# Patient Record
Sex: FEMALE | Race: WHITE | NOT HISPANIC OR LATINO | Employment: OTHER | URBAN - METROPOLITAN AREA
[De-identification: names, ages, dates, MRNs, and addresses within clinical notes are randomized per-mention and may not be internally consistent; named-entity substitution may affect disease eponyms.]

---

## 2017-01-12 ENCOUNTER — HOSPITAL ENCOUNTER (EMERGENCY)
Facility: CLINIC | Age: 32
Discharge: HOME | End: 2017-01-12
Attending: PSYCHIATRY & NEUROLOGY | Admitting: PSYCHIATRY & NEUROLOGY
Payer: MEDICARE

## 2017-01-12 VITALS
HEART RATE: 57 BPM | DIASTOLIC BLOOD PRESSURE: 81 MMHG | RESPIRATION RATE: 16 BRPM | OXYGEN SATURATION: 97 % | TEMPERATURE: 97.5 F | SYSTOLIC BLOOD PRESSURE: 153 MMHG

## 2017-01-12 DIAGNOSIS — F71 MODERATE INTELLECTUAL DISABILITY: ICD-10-CM

## 2017-01-12 PROCEDURE — 90791 PSYCH DIAGNOSTIC EVALUATION: CPT

## 2017-01-12 PROCEDURE — 99285 EMERGENCY DEPT VISIT HI MDM: CPT | Mod: 25 | Performed by: PSYCHIATRY & NEUROLOGY

## 2017-01-12 PROCEDURE — 99283 EMERGENCY DEPT VISIT LOW MDM: CPT | Mod: Z6 | Performed by: PSYCHIATRY & NEUROLOGY

## 2017-01-12 ASSESSMENT — ENCOUNTER SYMPTOMS
HALLUCINATIONS: 0
DYSPHORIC MOOD: 0
CHEST TIGHTNESS: 0
DIZZINESS: 0
FEVER: 0
SHORTNESS OF BREATH: 0
BACK PAIN: 0
ABDOMINAL PAIN: 0
NERVOUS/ANXIOUS: 0
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
## 2017-01-12 NOTE — ED NOTES
Bed: ED12  Expected date: 1/12/17  Expected time: 3:58 PM  Means of arrival: Ambulance  Comments:  Rita Medic. 31 y.o F Crisis eval. Yellow. 5 mins
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
## 2017-01-12 NOTE — ED AVS SNAPSHOT
[facility], Emergency Department    [address]    [place] [address]    Phone:  [phone]    Fax:  [phone]                                       Dionne Perez   MRN: [id number]    Department:  [facility], Emergency Department   Date of Visit:  1/12/2017           Patient Information     Date Of Birth          1985        Your diagnoses for this visit were:     Moderate intellectual disability        You were seen by Edy Bethea MD.        Discharge Instructions       Follow the treatment program at the [facility]        24 Hour Appointment Hotline       To make an appointment at any [facility] clinic, call [phone] ([phone]). If you don't have a family doctor or clinic, we will help you find one. [facility] clinics are conveniently located to serve the needs of you and your family.             Review of your medicines      Notice     You have not been prescribed any medications.            Orders Needing Specimen Collection     Ordered          01/12/17 1654  Drug abuse screen 6 urine (chem dep) ([facility]) - STAT, [facility]: STAT, Needs to be Collected     Scheduled Task Status   01/12/17 1655 Collect Drug abuse screen 6 urine (chem dep) ([facility]) Open   Order Class:  PCU Collect                01/12/17 1654  HCG qualitative urine - STAT, [facility]: STAT, Needs to be Collected     Scheduled Task Status   01/12/17 1655 Collect HCG qualitative urine Open   Order Class:  PCU Collect                  Pending Results     No orders found from 1/11/2017 to 1/13/2017.            Pending Culture Results     No orders found from 1/11/2017 to 1/13/2017.            Thank you for choosing [facility]       Thank you for choosing [facility] for your care. Our goal is always to provide you with excellent care. Hearing back from our patients is one way we can continue to improve our services. Please take a few minutes to complete the written survey that you may receive in the mail after you visit with us.  "Thank you!        [employer] Information     [employer] lets you send messages to your doctor, view your test results, renew your prescriptions, schedule appointments and more. To sign up, go to [email or web address] . Click on \"Log in\" on the left side of the screen, which will take you to the Welcome page. Then click on \"Sign up Now\" on the right side of the page.     You will be asked to enter the access code listed below, as well as some personal information. Please follow the directions to create your username and password.     Your access code is: [email or web address]  Expires: 2017  4:10 PM     Your access code will  in 90 days. If you need help or a new code, please call your [facility] clinic or [phone].        Care EveryWhere ID     This is your Care EveryWhere ID. This could be used by other organizations to access your [facility] medical records  [id number]        After Visit Summary       This is your record. Keep this with you and show to your community pharmacist(s) and doctor(s) at your next visit.                  "

## 2017-01-12 NOTE — ED AVS SNAPSHOT
[facility], [place], Emergency Department    [address]    [facility] [address]    Phone:  [phone]    Fax:  [phone]                                       Dionne Perez   MRN: [id number]    Department:  [facility], Emergency Department   Date of Visit:  1/12/2017           After Visit Summary Signature Page     I have received my discharge instructions, and my questions have been answered. I have discussed any challenges I see with this plan with the nurse or doctor.    ..........................................................................................................................................  Patient/Patient Representative Signature      ..........................................................................................................................................  Patient Representative Print Name and Relationship to Patient    ..................................................               ................................................  Date                                            Time    ..........................................................................................................................................  Reviewed by Signature/Title    ...................................................              ..............................................  Date                                                            Time

## 2018-01-17 ENCOUNTER — HOSPITAL ENCOUNTER (EMERGENCY)
Facility: CLINIC | Age: 33
Discharge: HOME | End: 2018-01-17
Attending: EMERGENCY MEDICINE | Admitting: EMERGENCY MEDICINE
Payer: MEDICARE

## 2018-01-17 VITALS
TEMPERATURE: 98.4 F | OXYGEN SATURATION: 96 % | DIASTOLIC BLOOD PRESSURE: 89 MMHG | RESPIRATION RATE: 16 BRPM | SYSTOLIC BLOOD PRESSURE: 143 MMHG

## 2018-01-17 DIAGNOSIS — M25.551 BILATERAL HIP PAIN: ICD-10-CM

## 2018-01-17 DIAGNOSIS — M25.552 BILATERAL HIP PAIN: ICD-10-CM

## 2018-01-17 LAB
ALBUMIN UR-MCNC: NEGATIVE MG/DL
APPEARANCE UR: CLEAR
BILIRUB UR QL STRIP: NEGATIVE
COLOR UR AUTO: YELLOW
GLUCOSE UR STRIP-MCNC: NEGATIVE MG/DL
HGB UR QL STRIP: NEGATIVE
KETONES UR STRIP-MCNC: NEGATIVE MG/DL
LEUKOCYTE ESTERASE UR QL STRIP: ABNORMAL
NITRATE UR QL: NEGATIVE
NON-SQ EPI CELLS #/AREA URNS LPF: ABNORMAL /LPF
PH UR STRIP: 6.5 PH (ref 5–7)
RBC #/AREA URNS AUTO: ABNORMAL /HPF
SOURCE: ABNORMAL
SP GR UR STRIP: 1.02 (ref 1–1.03)
UROBILINOGEN UR STRIP-ACNC: 1 EU/DL (ref 0.2–1)
WBC #/AREA URNS AUTO: ABNORMAL /HPF

## 2018-01-17 PROCEDURE — 87086 URINE CULTURE/COLONY COUNT: CPT | Performed by: EMERGENCY MEDICINE

## 2018-01-17 PROCEDURE — 99283 EMERGENCY DEPT VISIT LOW MDM: CPT

## 2018-01-17 PROCEDURE — 81001 URINALYSIS AUTO W/SCOPE: CPT | Performed by: EMERGENCY MEDICINE

## 2018-01-17 ASSESSMENT — ENCOUNTER SYMPTOMS: DYSURIA: 1
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
## 2018-01-17 NOTE — ED AVS SNAPSHOT
Emergency Department    [address] [place] [address]    Phone:  [phone]    Fax:  [phone]                                       Dionne Perez   MRN: [id number]    Department:   Emergency Department   Date of Visit:  1/17/2018           After Visit Summary Signature Page     I have received my discharge instructions, and my questions have been answered. I have discussed any challenges I see with this plan with the nurse or doctor.    ..........................................................................................................................................  Patient/Patient Representative Signature      ..........................................................................................................................................  Patient Representative Print Name and Relationship to Patient    ..................................................               ................................................  Date                                            Time    ..........................................................................................................................................  Reviewed by Signature/Title    ...................................................              ..............................................  Date                                                            Time

## 2018-01-17 NOTE — ED AVS SNAPSHOT
Emergency Department    [address] [facility] [address]    Phone:  [phone]    Fax:  [phone]                                       Dionne Perez   MRN: [id number]    Department:   Emergency Department   Date of Visit:  1/17/2018           Patient Information     Date Of Birth          1985        Your diagnoses for this visit were:     Bilateral hip pain        You were seen by Sophia Couch MD.      Follow-up Information     Follow up with Clinic, [facility]. Schedule an appointment as soon as possible for a visit in 2 days.    Contact information:    [address] [place] [address]  [phone]          Follow up with  Emergency Department.    Specialty:  EMERGENCY MEDICINE    Why:  As needed, If symptoms worsen    Contact information:    [address] [facility] [id number] [phone]        Discharge Instructions         Arthralgia    Arthralgia is the term for pain in or around the joint. It is a symptom, not a disease. This pain may involve one or more joints. In some cases, the pain moves from joint to joint.  There are many causes for joint pain. These include:    Injury    Osteoarthritis (wearing out of the joint surface)    Gout (inflammation of the joint due to crystals in the joint fluid)    Infection inside the joint      Bursitis (inflammation of the fluid-filled sacs around the joint)    Autoimmune disorders such as rheumatoid arthritis or lupus    Tendonitis (inflammation of chords that attach muscle to bone)  Home care    Rest the involved joint(s) until your symptoms improve.     You may be prescribed pain medicine. If none is prescribed, you may use acetaminophen or ibuprofen to control pain and inflammation.  Follow-up care  Follow up with your healthcare provider or as advised.  When to seek medical advice  Contact your healthcare provider right away if any of the following occurs:    Pain, swelling, or redness  of joint increases    Pain worsens or recurs after a period of improvement    Pain moves to other joints    You cannot bear weight on the affected joint     You cannot move the affected joint    Joint appears deformed    New rash appears    Fever of 100.4 F (38 C) or higher, or as directed by your healthcare provider  Date Last Reviewed: 3/1/2017    3793-1282 [employer]. [address] [place] [address]. All rights reserved. This information is not intended as a substitute for professional medical care. Always follow your healthcare professional's instructions.          24 Hour Appointment Hotline       To make an appointment at any [facility], call [phone] ([phone]). If you don't have a family doctor or clinic, we will help you find one. [facility] clinics are conveniently located to serve the needs of you and your family.          ED Discharge Orders     [facility] PT, HAND, AND CHIROPRACTIC REFERRAL       **This order will print in the [facility] Scheduling Office**    Physical Therapy, Hand Therapy and Chiropractic Care are available through:    *[facility]  *[facility]  *[facility]    Call one number to schedule at any of the above locations: [phone].    Your provider has referred you to: [facility] - PT and/or Chiropractic Care determined by clinical presentation at [facility] or [facility] Initial Visit    Indication/Reason for Referral: Low Back and hip Pain  Onset of Illness: weeks  Therapy Orders: Evaluate and Treat  Special Programs: None  Special Request: None    Cesaar Mcdonough      Additional Comments for the Therapist or Chiropractor:       Please be aware that coverage of these services is subject to the terms and limitations of your health insurance plan.  Call member services at your health plan with any benefit or coverage questions.      Please bring the following to your appointment:    *Your personal  calendar for scheduling future appointments  *Comfortable clothing                     Review of your medicines      Notice     You have not been prescribed any medications.            Procedures and tests performed during your visit     UA reflex to Microscopic and Culture    Urine Microscopic      Orders Needing Specimen Collection     None      Pending Results     No orders found from 1/15/2018 to 1/18/2018.            Pending Culture Results     No orders found from 1/15/2018 to 1/18/2018.            Pending Results Instructions     If you had any lab results that were not finalized at the time of your Discharge, you can call the ED Lab Result RN at [phone]. You will be contacted by this team for any positive Lab results or changes in treatment. The nurses are available 7 days a week from 10A to 6:30P.  You can leave a message 24 hours per day and they will return your call.        Test Results From Your Hospital Stay        1/17/2018  6:37 PM      Component Results     Component Value Ref Range & Units Status    Color Urine Yellow  Final    Appearance Urine Clear  Final    Glucose Urine Negative NEG^Negative mg/dL Final    Bilirubin Urine Negative NEG^Negative Final    Ketones Urine Negative NEG^Negative mg/dL Final    Specific Gravity Urine 1.020 1.003 - 1.035 Final    Blood Urine Negative NEG^Negative Final    pH Urine 6.5 5.0 - 7.0 pH Final    Protein Albumin Urine Negative NEG^Negative mg/dL Final    Urobilinogen Urine 1.0 0.2 - 1.0 EU/dL Final    Nitrite Urine Negative NEG^Negative Final    Leukocyte Esterase Urine Small (A) NEG^Negative Final    Source Midstream Urine  Final         1/17/2018  6:37 PM      Component Results     Component Value Ref Range & Units Status    WBC Urine 2-5 (A) OTO2^O - 2 /HPF Final    RBC Urine O - 2 OTO2^O - 2 /HPF Final    Squamous Epithelial /LPF Urine Few FEW^Few /LPF Final                Clinical Quality Measure: Blood Pressure Screening     Your blood pressure was  "checked while you were in the emergency department today. The last reading we obtained was  BP: 143/89 . Please read the guidelines below about what these numbers mean and what you should do about them.  If your systolic blood pressure (the top number) is less than 120 and your diastolic blood pressure (the bottom number) is less than 80, then your blood pressure is normal. There is nothing more that you need to do about it.  If your systolic blood pressure (the top number) is 120-139 or your diastolic blood pressure (the bottom number) is 80-89, your blood pressure may be higher than it should be. You should have your blood pressure rechecked within a year by a primary care provider.  If your systolic blood pressure (the top number) is 140 or greater or your diastolic blood pressure (the bottom number) is 90 or greater, you may have high blood pressure. High blood pressure is treatable, but if left untreated over time it can put you at risk for heart attack, stroke, or kidney failure. You should have your blood pressure rechecked by a primary care provider within the next 4 weeks.  If your provider in the emergency department today gave you specific instructions to follow-up with your doctor or provider even sooner than that, you should follow that instruction and not wait for up to 4 weeks for your follow-up visit.        Thank you for choosing [facility]       Thank you for choosing [facility] for your care. Our goal is always to provide you with excellent care. Hearing back from our patients is one way we can continue to improve our services. Please take a few minutes to complete the written survey that you may receive in the mail after you visit with us. Thank you!        [employer] Information     [employer] lets you send messages to your doctor, view your test results, renew your prescriptions, schedule appointments and more. To sign up, go to [email or web address] . Click on \"Log in\" on the left side of the screen, " "which will take you to the Welcome page. Then click on \"Sign up Now\" on the right side of the page.     You will be asked to enter the access code listed below, as well as some personal information. Please follow the directions to create your username and password.     Your access code is: [email or web address]  Expires: 2018  6:51 PM     Your access code will  in 90 days. If you need help or a new code, please call your [facility] clinic or [phone].        Care EveryWhere ID     This is your Care EveryWhere ID. This could be used by other organizations to access your [facility] medical records  [id number]        Equal Access to Services     BATOOL TRAVIS : Mayra Wilson, tracey smith, joe ross, dominic fontana. So [facility] [phone].    ATENCIÓN: Si habla español, tiene a horta disposición servicios gratuitos de asistencia lingüística. Llame al [phone].    We comply with applicable federal civil rights laws and [place] laws. We do not discriminate on the basis of race, color, national origin, age, disability, sex, sexual orientation, or gender identity.            After Visit Summary       This is your record. Keep this with you and show to your community pharmacist(s) and doctor(s) at your next visit.                  "

## 2018-01-18 LAB
BACTERIA SPEC CULT: NORMAL
BACTERIA SPEC CULT: NORMAL
Lab: NORMAL
SPECIMEN SOURCE: NORMAL

## 2018-01-18 NOTE — DISCHARGE INSTRUCTIONS

## 2018-02-19 ENCOUNTER — APPOINTMENT (OUTPATIENT)
Dept: GENERAL RADIOLOGY | Facility: CLINIC | Age: 33
End: 2018-02-19
Attending: PHYSICIAN ASSISTANT
Payer: MEDICARE

## 2018-02-19 ENCOUNTER — HOSPITAL ENCOUNTER (EMERGENCY)
Facility: CLINIC | Age: 33
Discharge: HOME | End: 2018-02-19
Attending: EMERGENCY MEDICINE | Admitting: EMERGENCY MEDICINE
Payer: MEDICARE

## 2018-02-19 VITALS
OXYGEN SATURATION: 94 % | RESPIRATION RATE: 16 BRPM | SYSTOLIC BLOOD PRESSURE: 135 MMHG | DIASTOLIC BLOOD PRESSURE: 74 MMHG | TEMPERATURE: 99.1 F

## 2018-02-19 DIAGNOSIS — M25.552 HIP PAIN, LEFT: ICD-10-CM

## 2018-02-19 PROCEDURE — 72170 X-RAY EXAM OF PELVIS: CPT

## 2018-02-19 PROCEDURE — A9270 NON-COVERED ITEM OR SERVICE: HCPCS | Mod: GY | Performed by: PHYSICIAN ASSISTANT

## 2018-02-19 PROCEDURE — 99283 EMERGENCY DEPT VISIT LOW MDM: CPT

## 2018-02-19 PROCEDURE — 25000132 ZZH RX MED GY IP 250 OP 250 PS 637: Mod: GY | Performed by: PHYSICIAN ASSISTANT

## 2018-02-19 RX ORDER — HYDROCORTISONE 2.5 %
CREAM (GRAM) TOPICAL 2 TIMES DAILY
Status: ON HOLD | COMMUNITY
End: 2020-06-06

## 2018-02-19 RX ORDER — ACETAMINOPHEN 500 MG
500 TABLET ORAL ONCE
Status: COMPLETED | OUTPATIENT
Start: 2018-02-19 | End: 2018-02-19

## 2018-02-19 RX ORDER — NYSTATIN 10B UNIT
POWDER (EA) MISCELLANEOUS
Status: ON HOLD | COMMUNITY
End: 2020-06-06

## 2018-02-19 RX ADMIN — ACETAMINOPHEN 500 MG: 500 TABLET, FILM COATED ORAL at 16:54

## 2018-02-19 ASSESSMENT — ENCOUNTER SYMPTOMS
ABDOMINAL PAIN: 1
VOMITING: 0
WOUND: 1
FEVER: 0
ARTHRALGIAS: 1

## 2018-02-19 NOTE — ED AVS SNAPSHOT
Emergency Department    [address] [facility] [address]    Phone:  [phone]    Fax:  [phone]                                       Dionne Perez   MRN: [id number]    Department:   Emergency Department   Date of Visit:  2/19/2018           Patient Information     Date Of Birth          1985        Your diagnoses for this visit were:     Hip pain, left        You were seen by Rocco Laughlin MD and Makayla Jensen PA-C.      Follow-up Information     Follow up with Clinic, [facility] In 2 days.    Why:  recheck    Contact information:    [address] [address]  [phone]          Follow up with Orthopaedics, [facility].    Contact information:    [address] [address] [phone]          Discharge Instructions         Your symptoms are likely due to early arthritis in your hips. Please continue Tylenol and use of your cane as needed. Following up with your primary doctor will be essential as they can refer you to physical therapy and other appropriate care facilities. I would recommend follow up with orthopedics as this appears to be a bone issue. Weight loss would also be quite helpful.   Arthralgia    Arthralgia is the term for pain in or around the joint. It is a symptom, not a disease. This pain may involve one or more joints. In some cases, the pain moves from joint to joint.  There are many causes for joint pain. These include:    Injury    Osteoarthritis (wearing out of the joint surface)    Gout (inflammation of the joint due to crystals in the joint fluid)    Infection inside the joint      Bursitis (inflammation of the fluid-filled sacs around the joint)    Autoimmune disorders such as rheumatoid arthritis or lupus    Tendonitis (inflammation of chords that attach muscle to bone)  Home care    Rest the involved joint(s) until your symptoms improve.     You may be prescribed pain medicine. If none is prescribed,  you may use acetaminophen or ibuprofen to control pain and inflammation.  Follow-up care  Follow up with your healthcare provider or as advised.  When to seek medical advice  Contact your healthcare provider right away if any of the following occurs:    Pain, swelling, or redness of joint increases    Pain worsens or recurs after a period of improvement    Pain moves to other joints    You cannot bear weight on the affected joint     You cannot move the affected joint    Joint appears deformed  New rash appears  Osteoarthritis  Osteoarthritis (also called degenerative joint disease) happens when the cartilage in a joint becomes damaged and worn. This may be due to age, wear and tear, overuse of the joint, or other problems. Osteoarthritis can affect any joint. But it is most common in hands, knees, spine, hips, and feet. Symptoms include joint stiffness, pain, and swelling.  Home care    When a joint is more sore than usual, rest it for a day or two.    Heat can help relieve stiffness. Take a hot bath or apply a heating pad for up to 30 minutes at a time. If symptoms are worse in the morning, using heat just after awakening can help relax the muscle and soothe the joints.     Ice helps relieve pain and swelling. It is often used after activity. Use a cold pack wrapped in a thin cloth on the joint for 10 to 15 minutes at a time.     Alternating hot and cold can also help relieve pain. Try this for 20 minutes at a time, several times per day.    Exercise helps prevent the muscles and ligaments around the joint from becoming weak. It also helps maintain function in the joint.  Be as active as you can. Talk to your healthcare provider about what activity program is best for you.    Excess weight puts a lot of extra strain on weight-bearing joints of the lower back, hips, knees, feet and ankles. If you are overweight, talk to your healthcare provider about a safe and effective weight loss program.    Use  anti-inflammatory medicines as prescribed for pain. This includes acetaminophen or NSAIDs such as ibuprofen or naproxen. If needed, topical or injected medicines may be recommended. Talk to your healthcare provider if these options are not enough to manage your pain.    Talk with your healthcare provider about devices that might help improve your function and reduce pain.  Follow-up care  Follow up with your healthcare provider as advised by our staff.  When to seek medical advice  Call your healthcare provider right away if any of these occur:    Redness or swelling of a painful joint    Discharge or pus from a painful joint    Fever of 100.4 F (38 C) or higher, or as directed by your healthcare provider    Worsening joint pain    Decreased ability to move the joint or bear weight on the joint  Date Last Reviewed: 3/1/2017    8763-2341 [employer]. [address] [place] [address]. All rights reserved. This information is not intended as a substitute for professional medical care. Always follow your healthcare professional's instructions.            Fever of 100.4 F (38 C) or higher, or as directed by your healthcare provider  Date Last Reviewed: 3/1/2017    6185-8268 [employer]. [address] [place] [address]. All rights reserved. This information is not intended as a substitute for professional medical care. Always follow your healthcare professional's instructions.          24 Hour Appointment Hotline       To make an appointment at any [facility], call [phone] ([phone]). If you don't have a family doctor or clinic, we will help you find one. [facility] clinics are conveniently located to serve the needs of you and your family.             Review of your medicines      Our records show that you are taking the medicines listed below. If these are incorrect, please call your family doctor or clinic.        Dose / Directions Last dose taken     CHERATUSSIN AC PO        Refills:  0        DICLOFENAC SODIUM PO        Refills:  0        ESCITALOPRAM OXALATE PO        Refills:  0        eucerin cream        Apply topically as needed for dry skin   Refills:  0        hydrocortisone 2.5 % cream        Apply topically 2 times daily   Refills:  0        LEVOTHYROXINE SODIUM PO        Refills:  0        MEDROXYPROGESTERONE ACETATE PO        Refills:  0        MULTIVITAL Chew        Refills:  0        nystatin Powd        Refills:  0        OMEPRAZOLE PO        Refills:  0        PRAVASTATIN SODIUM PO        Refills:  0                Procedures and tests performed during your visit     Pelvis XR, 1-2 views      Orders Needing Specimen Collection     None      Pending Results     Date and Time Order Name Status Description    2/19/2018 1648 Pelvis XR, 1-2 views Preliminary             Pending Culture Results     No orders found from 2/17/2018 to 2/20/2018.            Pending Results Instructions     If you had any lab results that were not finalized at the time of your Discharge, you can call the ED Lab Result RN at [phone]. You will be contacted by this team for any positive Lab results or changes in treatment. The nurses are available 7 days a week from 10A to 6:30P.  You can leave a message 24 hours per day and they will return your call.        Test Results From Your Hospital Stay        2/19/2018  5:11 PM      Narrative     PELVIS ONE-TWO VIEWS   2/19/2018 5:03 PM     HISTORY: Left hip pain, fall 5 weeks ago.    COMPARISON: None.        Impression     IMPRESSION: Mild joint space narrowing is seen bilaterally in both  hips. There is no evidence for fracture or dislocation on this  projection.                Clinical Quality Measure: Blood Pressure Screening     Your blood pressure was checked while you were in the emergency department today. The last reading we obtained was  BP: 135/74 . Please read the guidelines below about what these numbers mean and  "what you should do about them.  If your systolic blood pressure (the top number) is less than 120 and your diastolic blood pressure (the bottom number) is less than 80, then your blood pressure is normal. There is nothing more that you need to do about it.  If your systolic blood pressure (the top number) is 120-139 or your diastolic blood pressure (the bottom number) is 80-89, your blood pressure may be higher than it should be. You should have your blood pressure rechecked within a year by a primary care provider.  If your systolic blood pressure (the top number) is 140 or greater or your diastolic blood pressure (the bottom number) is 90 or greater, you may have high blood pressure. High blood pressure is treatable, but if left untreated over time it can put you at risk for heart attack, stroke, or kidney failure. You should have your blood pressure rechecked by a primary care provider within the next 4 weeks.  If your provider in the emergency department today gave you specific instructions to follow-up with your doctor or provider even sooner than that, you should follow that instruction and not wait for up to 4 weeks for your follow-up visit.        Thank you for choosing [facility]       Thank you for choosing [facility] for your care. Our goal is always to provide you with excellent care. Hearing back from our patients is one way we can continue to improve our services. Please take a few minutes to complete the written survey that you may receive in the mail after you visit with us. Thank you!        [facility] Information     [facility] lets you send messages to your doctor, view your test results, renew your prescriptions, schedule appointments and more. To sign up, go to [email or web address] . Click on \"Log in\" on the left side of the screen, which will take you to the Welcome page. Then click on \"Sign up Now\" on the right side of the page.     You will be asked to enter the access code listed below, as well as " some personal information. Please follow the directions to create your username and password.     Your access code is: [email or web address]  Expires: 2018  6:51 PM     Your access code will  in 90 days. If you need help or a new code, please call your [facility] clinic or [phone].        Care EveryWhere ID     This is your Care EveryWhere ID. This could be used by other organizations to access your [facility] medical records  [id number]        Equal Access to Services     [facility]: Mayra devi Sojessica, waaxda luqadaha, qaybta kaalmada adeaftab, dominic tiwari . So [facility] [phone].    ATENCIÓN: Si habla español, tiene a horta disposición servicios gratuitos de asistencia lingüística. Llame al [phone].    We comply with applicable federal civil rights laws and [place] laws. We do not discriminate on the basis of race, color, national origin, age, disability, sex, sexual orientation, or gender identity.            After Visit Summary       This is your record. Keep this with you and show to your community pharmacist(s) and doctor(s) at your next visit.

## 2018-02-19 NOTE — PROGRESS NOTES
"Social Work Note:    D: SW contacted by [facility] ED staff asking that SW assist with contacting pt's sister (Sabra) to discuss her concerns.    I/A: SW called Sabra ([phone]) to discuss ongoing concerns and to explain on-call SW role. Per Sabra, she is one of pt's guardians. Sabra stated that pt is reportedly trying to go to court to change her guardianship. Sabra stated that she is concerned for pt as pt has indicated that she wants to move into an apartment. Sabra stated that pt calls 911 every other day and gets taken to the ED frequently. SW indicated that there would not be a restriction on ED visits for pt as when she calls for EMS/911 help they would have to respond and determine if pt needs an evaluation. SW state that typically pt would be brought in. Sabra stated that she knows that \"there are other people who need help more than her (patient)\". Sabra stated that she is concerned that pt would be \"sent to the streets and it is cold\". SW stated that [facility] ED staff can call her when pt is ready to d/c.    P: SILVIA updated the HUC at the [facility] ED that Sabra would appreciate a call at d/c to let her know the plans.    On-Call SW: ABBY Manzo, LICSW     "

## 2018-02-19 NOTE — ED AVS SNAPSHOT
Emergency Department    [address] [place] [address]    Phone:  [phone]    Fax:  [phone]                                       Dionne Perez   MRN: [id number]    Department:   Emergency Department   Date of Visit:  2/19/2018           After Visit Summary Signature Page     I have received my discharge instructions, and my questions have been answered. I have discussed any challenges I see with this plan with the nurse or doctor.    ..........................................................................................................................................  Patient/Patient Representative Signature      ..........................................................................................................................................  Patient Representative Print Name and Relationship to Patient    ..................................................               ................................................  Date                                            Time    ..........................................................................................................................................  Reviewed by Signature/Title    ...................................................              ..............................................  Date                                                            Time

## 2018-02-19 NOTE — ED NOTES
Bed: ED01  Expected date: 2/19/18  Expected time: 3:48 PM  Means of arrival: Ambulance  Comments:  422 33f hip pain  ETA 1557     Nathaniel Le, RN  02/19/18 1051
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
## 2018-02-19 NOTE — DISCHARGE INSTRUCTIONS
Your symptoms are likely due to early arthritis in your hips. Please continue Tylenol and use of your cane as needed. Following up with your primary doctor will be essential as they can refer you to physical therapy and other appropriate care facilities. I would recommend follow up with orthopedics as this appears to be a bone issue. Weight loss would also be quite helpful.   Arthralgia    Arthralgia is the term for pain in or around the joint. It is a symptom, not a disease. This pain may involve one or more joints. In some cases, the pain moves from joint to joint.  There are many causes for joint pain. These include:    Injury    Osteoarthritis (wearing out of the joint surface)    Gout (inflammation of the joint due to crystals in the joint fluid)    Infection inside the joint      Bursitis (inflammation of the fluid-filled sacs around the joint)    Autoimmune disorders such as rheumatoid arthritis or lupus    Tendonitis (inflammation of chords that attach muscle to bone)  Home care    Rest the involved joint(s) until your symptoms improve.     You may be prescribed pain medicine. If none is prescribed, you may use acetaminophen or ibuprofen to control pain and inflammation.  Follow-up care  Follow up with your healthcare provider or as advised.  When to seek medical advice  Contact your healthcare provider right away if any of the following occurs:    Pain, swelling, or redness of joint increases    Pain worsens or recurs after a period of improvement    Pain moves to other joints    You cannot bear weight on the affected joint     You cannot move the affected joint    Joint appears deformed  New rash appears  Osteoarthritis  Osteoarthritis (also called degenerative joint disease) happens when the cartilage in a joint becomes damaged and worn. This may be due to age, wear and tear, overuse of the joint, or other problems. Osteoarthritis can affect any joint. But it is most common in hands, knees, spine, hips,  and feet. Symptoms include joint stiffness, pain, and swelling.  Home care    When a joint is more sore than usual, rest it for a day or two.    Heat can help relieve stiffness. Take a hot bath or apply a heating pad for up to 30 minutes at a time. If symptoms are worse in the morning, using heat just after awakening can help relax the muscle and soothe the joints.     Ice helps relieve pain and swelling. It is often used after activity. Use a cold pack wrapped in a thin cloth on the joint for 10 to 15 minutes at a time.     Alternating hot and cold can also help relieve pain. Try this for 20 minutes at a time, several times per day.    Exercise helps prevent the muscles and ligaments around the joint from becoming weak. It also helps maintain function in the joint.  Be as active as you can. Talk to your healthcare provider about what activity program is best for you.    Excess weight puts a lot of extra strain on weight-bearing joints of the lower back, hips, knees, feet and ankles. If you are overweight, talk to your healthcare provider about a safe and effective weight loss program.    Use anti-inflammatory medicines as prescribed for pain. This includes acetaminophen or NSAIDs such as ibuprofen or naproxen. If needed, topical or injected medicines may be recommended. Talk to your healthcare provider if these options are not enough to manage your pain.    Talk with your healthcare provider about devices that might help improve your function and reduce pain.  Follow-up care  Follow up with your healthcare provider as advised by our staff.  When to seek medical advice  Call your healthcare provider right away if any of these occur:    Redness or swelling of a painful joint    Discharge or pus from a painful joint    Fever of 100.4 F (38 C) or higher, or as directed by your healthcare provider    Worsening joint pain    Decreased ability to move the joint or bear weight on the joint  Date Last Reviewed: 3/1/2017     1121-3185 [employer]. [address] [place] [address]. All rights reserved. This information is not intended as a substitute for professional medical care. Always follow your healthcare professional's instructions.            Fever of 100.4 F (38 C) or higher, or as directed by your healthcare provider  Date Last Reviewed: 3/1/2017    1738-9398 [employer]. [address] [place] [address]. All rights reserved. This information is not intended as a substitute for professional medical care. Always follow your healthcare professional's instructions.

## 2018-04-28 ENCOUNTER — OFFICE VISIT (OUTPATIENT)
Dept: URGENT CARE | Facility: URGENT CARE | Age: 33
End: 2018-04-28
Payer: MEDICARE

## 2018-04-28 ENCOUNTER — RADIANT APPOINTMENT (OUTPATIENT)
Dept: GENERAL RADIOLOGY | Facility: CLINIC | Age: 33
End: 2018-04-28
Attending: FAMILY MEDICINE
Payer: MEDICARE

## 2018-04-28 VITALS
TEMPERATURE: 98.3 F | HEART RATE: 62 BPM | WEIGHT: 293 LBS | DIASTOLIC BLOOD PRESSURE: 65 MMHG | SYSTOLIC BLOOD PRESSURE: 122 MMHG | RESPIRATION RATE: 18 BRPM | OXYGEN SATURATION: 99 %

## 2018-04-28 DIAGNOSIS — B37.2 INTERTRIGINOUS CANDIDIASIS: ICD-10-CM

## 2018-04-28 DIAGNOSIS — R07.9 CHEST PAIN, UNSPECIFIED TYPE: Primary | ICD-10-CM

## 2018-04-28 DIAGNOSIS — R07.9 CHEST PAIN, UNSPECIFIED TYPE: ICD-10-CM

## 2018-04-28 PROCEDURE — 99203 OFFICE O/P NEW LOW 30 MIN: CPT | Performed by: FAMILY MEDICINE

## 2018-04-28 PROCEDURE — 71046 X-RAY EXAM CHEST 2 VIEWS: CPT | Mod: FY

## 2018-04-28 RX ORDER — CLOTRIMAZOLE 1 %
CREAM (GRAM) TOPICAL 2 TIMES DAILY PRN
Qty: 30 G | Refills: 0 | Status: SHIPPED | OUTPATIENT
Start: 2018-04-28 | End: 2018-05-16

## 2018-04-28 ASSESSMENT — PAIN SCALES - GENERAL: PAINLEVEL: WORST PAIN (10)

## 2018-04-28 NOTE — PROGRESS NOTES
Dionne Perez is a 33 year old female who comes in today for several symptoms    Went to her doctor a couple weeks ago    This last Monday and Wed to emergency room ( today is Saturday )    Per patient they didn't tell her much  No specific diagnosis or treatment    Has been taking tylenol at house    Chest pain, dyspnea, wheezing, rash    Rash on right elbow    No history of asthma    Wt up a bit but not much    Regular doctor at [facility] in [place]    Physical Exam   Constitutional: She is oriented to person, place, and time and well-developed, well-nourished, and in no distress. No distress.   HENT:   Head: Normocephalic and atraumatic.   Right Ear: Tympanic membrane, external ear and ear canal normal.   Left Ear: Tympanic membrane, external ear and ear canal normal.   Nose: Nose normal.   Mouth/Throat: Oropharynx is clear and moist.   No sinus / submandib tenderness     Eyes: Conjunctivae are normal.   Neck: Neck supple. Carotid bruit is not present.   Cardiovascular: Normal rate, regular rhythm, normal heart sounds and intact distal pulses.  Exam reveals no gallop and no friction rub.    No murmur heard.  Pulmonary/Chest: Effort normal and breath sounds normal. She exhibits no tenderness.   Abdominal: Soft. There is no tenderness.   No back or costovertebral angle tenderness     Musculoskeletal: She exhibits no edema.   Lymphadenopathy:     She has no cervical adenopathy.   Neurological: She is alert and oriented to person, place, and time.   Skin: She is not diaphoretic.   Psychiatric: Mood and affect normal.   patient has intertriginous type rash on flexor aspect of right elbow    cxr done, normal    ASSESSMENT / PLAN:  (R07.9) Chest pain, unspecified type  (primary encounter diagnosis)  Comment: exam and xray and vitals all okay here.  She also states she was seen at hospital emergency room two times this last week.  Of note, we did try to look up on care everywhere but not able to pull up  records.  Plan: XR Chest 2 Views         Discussed in detail with patient.  Advised she follow up with her primary care clinic in [place] in the next 1 1/2 weeks or so.  Be seen sooner if needed.  If gets real bad could go to emergency room.    (B37.2) Intertriginous candidiasis  Comment: use antifungal cream 2x daily. Need to give this at least a week to start working.   Plan: clotrimazole (LOTRIMIN) 1 % cream               I reviewed the patient's medications, allergies, medical history, family history, and social history.    Luca Latif MD

## 2018-04-28 NOTE — PATIENT INSTRUCTIONS
Use clotrimazole cream 2x daily as needed to rash area on elbow.  It may take at least several days before you see improvement.    Monitor other symptoms    Follow up with primary clinic in [place] in about 1 1/2 weeks    Be seen sooner if needed based on symptoms

## 2018-04-28 NOTE — MR AVS SNAPSHOT
"              After Visit Summary   4/28/2018    Dionne Perez    MRN: [id number]           Patient Information     Date Of Birth          1985        Visit Information        Provider Department      4/28/2018 2:15 PM Luca Latif MD [facility]        Today's Diagnoses     Chest pain, unspecified type    -  1    Intertriginous candidiasis          Care Instructions    Use clotrimazole cream 2x daily as needed to rash area on elbow.  It may take at least several days before you see improvement.    Monitor other symptoms    Follow up with primary clinic in [place] in about 1 1/2 weeks    Be seen sooner if needed based on symptoms               Follow-ups after your visit        Who to contact     If you have questions or need follow up information about today's clinic visit or your schedule please contact [facility] directly at [phone].  Normal or non-critical lab and imaging results will be communicated to you by [employer], letter or phone within 4 business days after the clinic has received the results. If you do not hear from us within 7 days, please contact the clinic through El[employer] or phone. If you have a critical or abnormal lab result, we will notify you by phone as soon as possible.  Submit refill requests through AQH or call your pharmacy and they will forward the refill request to us. Please allow 3 business days for your refill to be completed.          Additional Information About Your Visit        MyChart Information     AQH lets you send messages to your doctor, view your test results, renew your prescriptions, schedule appointments and more. To sign up, go to [email or web address] . Click on \"Log in\" on the left side of the screen, which will take you to the Welcome page. Then click on \"Sign up Now\" on the right side of the page.     You will be asked to enter the access code listed below, as well as some personal information. Please " follow the directions to create your username and password.     Your access code is: [email or web address]  Expires: 2018  3:10 PM     Your access code will  in 90 days. If you need help or a new code, please call your [place] clinic or [phone].        Care EveryWhere ID     This is your Care EveryWhere ID. This could be used by other organizations to access your [place] medical records  [id number]        Your Vitals Were     Pulse Temperature Respirations Pulse Oximetry Breastfeeding?       62 98.3  F (36.8  C) (Oral) 18 99% No        Blood Pressure from Last 3 Encounters:   18 122/65   18 135/74   18 143/89    Weight from Last 3 Encounters:   18 (!) 360 lb (163.3 kg)                 Today's Medication Changes          These changes are accurate as of 18  3:10 PM.  If you have any questions, ask your nurse or doctor.               Start taking these medicines.        Dose/Directions    clotrimazole 1 % cream   Commonly known as:  LOTRIMIN   Used for:  Intertriginous candidiasis   Started by:  Luca Latif MD        Apply topically 2 times daily as needed   Quantity:  30 g   Refills:  0            Where to get your medicines      Some of these will need a paper prescription and others can be bought over the counter.  Ask your nurse if you have questions.     Bring a paper prescription for each of these medications     clotrimazole 1 % cream                Primary Care Provider Office Phone # Fax #    [facility] [phone]       [address] [place] [address]        Equal Access to Services     [facility]: Hadii chyna elder hadasho Sojessica, waaxda luqadaha, qaybta kaalmada adeaftab, dominic fontana. So [facility] [phone].    ATENCIÓN: Si habla español, tiene a horta disposición servicios gratuitos de asistencia lingüística. Llame al [phone].    We comply with applicable federal civil rights laws and  [place] laws. We do not discriminate on the basis of race, color, national origin, age, disability, sex, sexual orientation, or gender identity.            Thank you!     Thank you for choosing [facility]  for your care. Our goal is always to provide you with excellent care. Hearing back from our patients is one way we can continue to improve our services. Please take a few minutes to complete the written survey that you may receive in the mail after your visit with us. Thank you!             Your Updated Medication List - Protect others around you: Learn how to safely use, store and throw away your medicines at www.disposemymeds.org.          This list is accurate as of 4/28/18  3:10 PM.  Always use your most recent med list.                   Brand Name Dispense Instructions for use Diagnosis    CHERATUSSIN AC PO           clotrimazole 1 % cream    LOTRIMIN    30 g    Apply topically 2 times daily as needed    Intertriginous candidiasis       DICLOFENAC SODIUM PO           ESCITALOPRAM OXALATE PO           eucerin cream      Apply topically as needed for dry skin        hydrocortisone 2.5 % cream      Apply topically 2 times daily        LEVOTHYROXINE SODIUM PO           MEDROXYPROGESTERONE ACETATE PO           MULTIVITAL Chew           nystatin Powd           OMEPRAZOLE PO           PRAVASTATIN SODIUM PO

## 2018-05-15 ENCOUNTER — TELEPHONE (OUTPATIENT)
Dept: FAMILY MEDICINE | Facility: CLINIC | Age: 33
End: 2018-05-15

## 2018-05-15 DIAGNOSIS — B37.2 INTERTRIGINOUS CANDIDIASIS: ICD-10-CM

## 2018-05-16 RX ORDER — CLOTRIMAZOLE 1 %
CREAM (GRAM) TOPICAL 2 TIMES DAILY PRN
Qty: 30 G | Refills: 1 | Status: ON HOLD | OUTPATIENT
Start: 2018-05-16 | End: 2020-06-06

## 2018-05-16 NOTE — TELEPHONE ENCOUNTER
A little unclear what the message was, but sounds like she might need a refill of the cream we had given her.  I sent in refill to [facility] pharmacy.    Please inform patient.    Luca Latif MD

## 2018-05-21 ENCOUNTER — OFFICE VISIT (OUTPATIENT)
Dept: URGENT CARE | Facility: URGENT CARE | Age: 33
End: 2018-05-21
Payer: MEDICARE

## 2018-05-21 ENCOUNTER — TELEPHONE (OUTPATIENT)
Dept: URGENT CARE | Facility: URGENT CARE | Age: 33
End: 2018-05-21

## 2018-05-21 ENCOUNTER — RADIANT APPOINTMENT (OUTPATIENT)
Dept: ULTRASOUND IMAGING | Facility: CLINIC | Age: 33
End: 2018-05-21
Attending: PHYSICIAN ASSISTANT
Payer: MEDICARE

## 2018-05-21 VITALS
SYSTOLIC BLOOD PRESSURE: 138 MMHG | HEART RATE: 68 BPM | TEMPERATURE: 98.8 F | DIASTOLIC BLOOD PRESSURE: 80 MMHG | OXYGEN SATURATION: 96 % | WEIGHT: 293 LBS

## 2018-05-21 DIAGNOSIS — H60.393 INFECTIVE OTITIS EXTERNA, BILATERAL: Primary | ICD-10-CM

## 2018-05-21 DIAGNOSIS — R60.0 LEG EDEMA, LEFT: ICD-10-CM

## 2018-05-21 PROCEDURE — 93971 EXTREMITY STUDY: CPT | Mod: LT

## 2018-05-21 PROCEDURE — 99214 OFFICE O/P EST MOD 30 MIN: CPT | Performed by: PHYSICIAN ASSISTANT

## 2018-05-21 RX ORDER — CIPROFLOXACIN AND DEXAMETHASONE 3; 1 MG/ML; MG/ML
4 SUSPENSION/ DROPS AURICULAR (OTIC) 2 TIMES DAILY
Qty: 7.5 ML | Refills: 0 | Status: SHIPPED | OUTPATIENT
Start: 2018-05-21 | End: 2018-05-28

## 2018-05-21 ASSESSMENT — ENCOUNTER SYMPTOMS
WOUND: 1
COUGH: 0
EYE ITCHING: 0
HEMATURIA: 0
ABDOMINAL PAIN: 0
NAUSEA: 0
EYES NEGATIVE: 1
ACTIVITY CHANGE: 0
FLANK PAIN: 0
DIARRHEA: 0
DECREASED CONCENTRATION: 1
VOMITING: 0
WEAKNESS: 0
FACIAL SWELLING: 0
CHILLS: 0
DYSURIA: 0
NERVOUS/ANXIOUS: 0
EYE REDNESS: 0
HEADACHES: 0
GASTROINTESTINAL NEGATIVE: 1
SHORTNESS OF BREATH: 0
MUSCULOSKELETAL NEGATIVE: 1
ADENOPATHY: 0
HALLUCINATIONS: 0
FEVER: 0
FATIGUE: 0
PALPITATIONS: 0
NECK PAIN: 0
CONFUSION: 1
SINUS PRESSURE: 0
TROUBLE SWALLOWING: 0
MYALGIAS: 0
CHEST TIGHTNESS: 0
EYE DISCHARGE: 0
RESPIRATORY NEGATIVE: 1
HYPERACTIVE: 0
FREQUENCY: 0
SORE THROAT: 0
WHEEZING: 0
DIZZINESS: 0
RHINORRHEA: 0
NECK STIFFNESS: 0
APPETITE CHANGE: 0
LIGHT-HEADEDNESS: 0
SINUS PAIN: 0

## 2018-05-21 NOTE — PROGRESS NOTES
Chief Complaint:     Chief Complaint   Patient presents with     Otalgia     Patient complains of pain in both ears        HPI: Dionne Perze is an 33 year old female here with care giver who presents with bilateral ear pain.  Patient is mentally handicapped, though she cannot tell me what her disability is and her care giver does not have her paperwork with. is a poor historian.     It began  2 day(s) ago and has gradually worsening.  She has been to the emergency department and was in to this clinic for chest pain in the past month, though she does not remember if they found anything.  She does not have any chest pain now.  No significant medical Hx that the care giver can think of.      Care giver also mentions that she has some L leg swelling, redness, and a sore on it.  She noticed this today.  She has not been complaining of any leg pain.  No long car rides or plane trips recently.    Recent travel?  no.      ROS:     Review of Systems   Constitutional: Negative for activity change, appetite change, chills, fatigue and fever.   HENT: Positive for ear pain. Negative for congestion, facial swelling, postnasal drip, rhinorrhea, sinus pain, sinus pressure, sore throat and trouble swallowing.    Eyes: Negative.  Negative for discharge, redness and itching.   Respiratory: Negative.  Negative for cough, chest tightness, shortness of breath and wheezing.    Cardiovascular: Positive for leg swelling. Negative for chest pain and palpitations.   Gastrointestinal: Negative.  Negative for abdominal pain, diarrhea, nausea and vomiting.   Genitourinary: Negative for dysuria, flank pain, frequency, hematuria and urgency.   Musculoskeletal: Negative.  Negative for myalgias, neck pain and neck stiffness.   Skin: Positive for wound.   Allergic/Immunologic: Negative for immunocompromised state.   Neurological: Negative for dizziness, weakness, light-headedness and headaches.   Hematological: Negative for adenopathy.    Psychiatric/Behavioral: Positive for confusion and decreased concentration. Negative for behavioral problems and hallucinations. The patient is not nervous/anxious and is not hyperactive.         Respiratory History  occasional episodes of bronchitis    No pertinent family Hx at this time.  Patient has never smoked.     Family History   No family history on file.     Problem history  There is no problem list on file for this patient.       Allergies  Allergies   Allergen Reactions     Ibuprofen         Social History  Social History     Social History     Marital status: Single     Spouse name: N/A     Number of children: N/A     Years of education: N/A     Occupational History     Not on file.     Social History Main Topics     Smoking status: Never Smoker     Smokeless tobacco: Never Used     Alcohol use No     Drug use: No     Sexual activity: Not on file     Other Topics Concern     Not on file     Social History Narrative        Current Meds    Current Outpatient Prescriptions:      ciprofloxacin-dexamethasone (CIPRODEX) otic suspension, Place 4 drops into both ears 2 times daily for 7 days, Disp: 7.5 mL, Rfl: 0     clotrimazole (LOTRIMIN) 1 % cream, Apply topically 2 times daily as needed, Disp: 30 g, Rfl: 1     DICLOFENAC SODIUM PO, , Disp: , Rfl:      ESCITALOPRAM OXALATE PO, , Disp: , Rfl:      Guaifenesin-Codeine (CHERATUSSIN AC PO), , Disp: , Rfl:      hydrocortisone 2.5 % cream, Apply topically 2 times daily, Disp: , Rfl:      LEVOTHYROXINE SODIUM PO, , Disp: , Rfl:      MEDROXYPROGESTERONE ACETATE PO, , Disp: , Rfl:      Multiple Vitamins-Minerals (MULTIVITAL) CHEW, , Disp: , Rfl:      nystatin POWD, , Disp: , Rfl:      OMEPRAZOLE PO, , Disp: , Rfl:      PRAVASTATIN SODIUM PO, , Disp: , Rfl:      Skin Protectants, Misc. (EUCERIN) cream, Apply topically as needed for dry skin, Disp: , Rfl:         OBJECTIVE     Vital signs reviewed by Zion Chong  /80 (BP Location: Left arm, Patient Position:  "Chair, Cuff Size: Adult Large)  Pulse 68  Temp 98.8  F (37.1  C) (Oral)  Wt (!) 359 lb (162.8 kg)  SpO2 96%     PEFR:  General appearance: healthy, alert and no distress  Ears: R erythema and swelling of the ear canal,  Pain with palpation of tragus. L erythema and swelling of the ear canal,  Pain with palpation of tragus. erythema, and normal landmarks\"}  Eyes: R normal, L normal  Nose: normal  Oropharynx: normal  Neck: supple and no adenopathy  Lungs: normal and clear to auscultation  Heart: S1, S2 normal, no murmur, click, rub or gallop, regular rate and rhythm  Abdomen: Abdomen soft, non-tender without masses or organomegaly  Extremities: L lower leg - Circumference of the L leg is 22 inches at the calf muscle belly compared to 18 inches on the R.  Erythema of the L lower leg.  2 cm in diameter ulceration on the anterior lower leg with black center.  Pedal pulse is weak.  Cap refill less than 2 seconds.        Labs:        Results for orders placed or performed in visit on 05/21/18   US Lower Extremity Venous Duplex Left    Narrative    ULTRASOUND VENOUS LEFT LOWER EXTREMITY  5/21/2018 12:53 PM     HISTORY:  Edema and erythema of the left leg for unknown period of  time.    COMPARISON: None.    TECHNIQUE: Ultrasound gray scale, Color Doppler flow, and spectral  Doppler waveform analysis performed.    FINDINGS:  The left common femoral, superficial femoral, popliteal and  posterior tibial veins are patent and fully compressible and  demonstrate normal venous Doppler flow. The visualized greater  saphenous vein is negative for thrombus.       Impression    IMPRESSION: No DVT demonstrated.    ILDA CHOUDHURY MD          ASSESSMENT    1. Infective otitis externa, bilateral    2. Leg edema, left        PLAN  With lower leg edema US ordered.  This was negative for DVT.  The area is not hot to touch.  She is afebrile with no pain.This does not appear to be cellulitis at this time.  Advised caregiver to have her " follow up with her PCP for further evaluation.   Rx for Ciprodex for otitis externa.  Rest, Push fluids, vaporizer, elevation of head of bed.  Ibuprofen and or Tylenol for any fever or body aches.  If symptoms worsen, recheck immediately otherwise follow up with your PCP PRN.  Worrisome symptoms discussed with instructions to go to the ED.  Care giver verbalized understanding and agreed with this plan.         Zion Chong  5/21/2018, 11:55 AM

## 2018-05-21 NOTE — MR AVS SNAPSHOT
"              After Visit Summary   2018    Dionne Perez    MRN: [id number]           Patient Information     Date Of Birth          1985        Visit Information        Provider Department      2018 11:30 AM Zion Chong PA-C [facility]        Today's Diagnoses     Infective otitis externa, bilateral    -  1    Leg edema, left           Follow-ups after your visit        Who to contact     If you have questions or need follow up information about today's clinic visit or your schedule please contact [facility] directly at [phone].  Normal or non-critical lab and imaging results will be communicated to you by [employer] Companyhart, letter or phone within 4 business days after the clinic has received the results. If you do not hear from us within 7 days, please contact the clinic through [employer] or phone. If you have a critical or abnormal lab result, we will notify you by phone as soon as possible.  Submit refill requests through [employer] or call your pharmacy and they will forward the refill request to us. Please allow 3 business days for your refill to be completed.          Additional Information About Your Visit        MyChart Information     [employer] lets you send messages to your doctor, view your test results, renew your prescriptions, schedule appointments and more. To sign up, go to [email or web address] . Click on \"Log in\" on the left side of the screen, which will take you to the Welcome page. Then click on \"Sign up Now\" on the right side of the page.     You will be asked to enter the access code listed below, as well as some personal information. Please follow the directions to create your username and password.     Your access code is: [email or web address]  Expires: 2018  3:10 PM     Your access code will  in 90 days. If you need help or a new code, please call your [facility] or [phone].        Care EveryWhere ID     This is your Care " EveryWhere ID. This could be used by other organizations to access your [place] medical records  [id number]        Your Vitals Were     Pulse Temperature Pulse Oximetry             68 98.8  F (37.1  C) (Oral) 96%          Blood Pressure from Last 3 Encounters:   05/21/18 138/80   04/28/18 122/65   02/19/18 135/74    Weight from Last 3 Encounters:   05/21/18 (!) 359 lb (162.8 kg)   04/28/18 (!) 360 lb (163.3 kg)              We Performed the Following     US Lower Extremity Venous Duplex Left          Today's Medication Changes          These changes are accurate as of 5/21/18  1:03 PM.  If you have any questions, ask your nurse or doctor.               Start taking these medicines.        Dose/Directions    ciprofloxacin-dexamethasone otic suspension   Commonly known as:  CIPRODEX   Used for:  Infective otitis externa, bilateral   Started by:  Zion Chong PA-C        Dose:  4 drop   Place 4 drops into both ears 2 times daily for 7 days   Quantity:  7.5 mL   Refills:  0            Where to get your medicines      These medications were sent to [facility] [id number] [place] [address] [address]  [address] [place] [address] [address] [address]     Phone:  [phone]     ciprofloxacin-dexamethasone otic suspension                Primary Care Provider Office Phone # Fax #    [facility] [phone]       [address] [place] [address]        Equal Access to Services     [facility] [facility]: Hadii chyna Wilson, waaxda luqadaha, qaybta kaalmada vandana, dominic fontana. So [facility] [phone].    ATENCIÓN: Si habla español, tiene a horta disposición servicios gratuitos de asistencia lingüística. Llame al [phone].    We comply with applicable federal civil rights laws and [place] laws. We do not discriminate on the basis of race, color, national origin, age, disability, sex, sexual orientation, or gender identity.             Thank you!     Thank you for choosing [facility]  for your care. Our goal is always to provide you with excellent care. Hearing back from our patients is one way we can continue to improve our services. Please take a few minutes to complete the written survey that you may receive in the mail after your visit with us. Thank you!             Your Updated Medication List - Protect others around you: Learn how to safely use, store and throw away your medicines at www.disposemymeds.org.          This list is accurate as of 5/21/18  1:03 PM.  Always use your most recent med list.                   Brand Name Dispense Instructions for use Diagnosis    CHERATUSSIN AC PO           ciprofloxacin-dexamethasone otic suspension    CIPRODEX    7.5 mL    Place 4 drops into both ears 2 times daily for 7 days    Infective otitis externa, bilateral       clotrimazole 1 % cream    LOTRIMIN    30 g    Apply topically 2 times daily as needed    Intertriginous candidiasis       DICLOFENAC SODIUM PO           ESCITALOPRAM OXALATE PO           eucerin cream      Apply topically as needed for dry skin        hydrocortisone 2.5 % cream      Apply topically 2 times daily        LEVOTHYROXINE SODIUM PO           MEDROXYPROGESTERONE ACETATE PO           MULTIVITAL Chew           nystatin Powd           OMEPRAZOLE PO           PRAVASTATIN SODIUM PO

## 2020-06-05 ENCOUNTER — TELEPHONE (OUTPATIENT)
Dept: BEHAVIORAL HEALTH | Facility: CLINIC | Age: 35
End: 2020-06-05

## 2020-06-05 ENCOUNTER — HOSPITAL ENCOUNTER (INPATIENT)
Facility: CLINIC | Age: 35
LOS: 34 days | Discharge: NURSING, REHAB OR LONG-TERM CARE | DRG: 887 | End: 2020-07-10
Attending: PSYCHIATRY & NEUROLOGY | Admitting: INTERNAL MEDICINE
Payer: MEDICARE

## 2020-06-05 DIAGNOSIS — F51.01 PRIMARY INSOMNIA: ICD-10-CM

## 2020-06-05 DIAGNOSIS — Z20.822 COVID-19 VIRUS NOT DETECTED: ICD-10-CM

## 2020-06-05 DIAGNOSIS — F81.9 INTELLECTUAL DELAY: ICD-10-CM

## 2020-06-05 DIAGNOSIS — R52 PAIN: ICD-10-CM

## 2020-06-05 DIAGNOSIS — R40.0 SOMNOLENCE: ICD-10-CM

## 2020-06-05 DIAGNOSIS — I63.00 CEREBROVASCULAR ACCIDENT (CVA) DUE TO THROMBOSIS OF PRECEREBRAL ARTERY (H): ICD-10-CM

## 2020-06-05 DIAGNOSIS — E63.9 POOR NUTRITION: ICD-10-CM

## 2020-06-05 DIAGNOSIS — M62.81 GENERALIZED MUSCLE WEAKNESS: ICD-10-CM

## 2020-06-05 DIAGNOSIS — K59.00 CONSTIPATION, UNSPECIFIED CONSTIPATION TYPE: Primary | ICD-10-CM

## 2020-06-05 LAB
ALBUMIN SERPL-MCNC: 4 G/DL (ref 3.4–5)
ALBUMIN UR-MCNC: 100 MG/DL
ALP SERPL-CCNC: 55 U/L (ref 40–150)
ALT SERPL W P-5'-P-CCNC: 89 U/L (ref 0–50)
AMMONIA PLAS-SCNC: 15 UMOL/L (ref 10–50)
AMPHETAMINES UR QL SCN: NEGATIVE
ANION GAP SERPL CALCULATED.3IONS-SCNC: 20 MMOL/L (ref 3–14)
APPEARANCE UR: CLEAR
AST SERPL W P-5'-P-CCNC: 169 U/L (ref 0–45)
BACTERIA #/AREA URNS HPF: ABNORMAL /HPF
BARBITURATES UR QL: NEGATIVE
BASOPHILS # BLD AUTO: 0 10E9/L (ref 0–0.2)
BASOPHILS NFR BLD AUTO: 0 %
BENZODIAZ UR QL: NEGATIVE
BILIRUB SERPL-MCNC: 1.2 MG/DL (ref 0.2–1.3)
BILIRUB UR QL STRIP: ABNORMAL
BUN SERPL-MCNC: 48 MG/DL (ref 7–30)
CALCIUM SERPL-MCNC: 9.4 MG/DL (ref 8.5–10.1)
CANNABINOIDS UR QL SCN: NEGATIVE
CHLORIDE SERPL-SCNC: 106 MMOL/L (ref 94–109)
CO2 SERPL-SCNC: 18 MMOL/L (ref 20–32)
COCAINE UR QL: NEGATIVE
COLOR UR AUTO: YELLOW
CREAT SERPL-MCNC: 0.98 MG/DL (ref 0.52–1.04)
DIFFERENTIAL METHOD BLD: ABNORMAL
EOSINOPHIL # BLD AUTO: 0 10E9/L (ref 0–0.7)
EOSINOPHIL NFR BLD AUTO: 0 %
ERYTHROCYTE [DISTWIDTH] IN BLOOD BY AUTOMATED COUNT: 13.7 % (ref 10–15)
ETHANOL UR QL SCN: NEGATIVE
GFR SERPL CREATININE-BSD FRML MDRD: 75 ML/MIN/{1.73_M2}
GLUCOSE SERPL-MCNC: 88 MG/DL (ref 70–99)
GLUCOSE UR STRIP-MCNC: NEGATIVE MG/DL
HCG UR QL: NEGATIVE
HCT VFR BLD AUTO: 40.6 % (ref 35–47)
HGB BLD-MCNC: 13 G/DL (ref 11.7–15.7)
HGB UR QL STRIP: ABNORMAL
HYALINE CASTS #/AREA URNS LPF: 25 /LPF (ref 0–2)
IMM GRANULOCYTES # BLD: 0.1 10E9/L (ref 0–0.4)
IMM GRANULOCYTES NFR BLD: 0.4 %
KETONES UR STRIP-MCNC: 80 MG/DL
LACTATE BLD-SCNC: 1.6 MMOL/L (ref 0.7–2)
LEUKOCYTE ESTERASE UR QL STRIP: NEGATIVE
LYMPHOCYTES # BLD AUTO: 2 10E9/L (ref 0.8–5.3)
LYMPHOCYTES NFR BLD AUTO: 10.8 %
MCH RBC QN AUTO: 28.8 PG (ref 26.5–33)
MCHC RBC AUTO-ENTMCNC: 32 G/DL (ref 31.5–36.5)
MCV RBC AUTO: 90 FL (ref 78–100)
MONOCYTES # BLD AUTO: 0.4 10E9/L (ref 0–1.3)
MONOCYTES NFR BLD AUTO: 1.9 %
MUCOUS THREADS #/AREA URNS LPF: PRESENT /LPF
NEUTROPHILS # BLD AUTO: 16.2 10E9/L (ref 1.6–8.3)
NEUTROPHILS NFR BLD AUTO: 86.9 %
NITRATE UR QL: NEGATIVE
NRBC # BLD AUTO: 0 10*3/UL
NRBC BLD AUTO-RTO: 0 /100
OPIATES UR QL SCN: NEGATIVE
PH UR STRIP: 6 PH (ref 5–7)
PLATELET # BLD AUTO: 242 10E9/L (ref 150–450)
POTASSIUM SERPL-SCNC: 3 MMOL/L (ref 3.4–5.3)
PROT SERPL-MCNC: 7.8 G/DL (ref 6.8–8.8)
RBC # BLD AUTO: 4.52 10E12/L (ref 3.8–5.2)
RBC #/AREA URNS AUTO: 0 /HPF (ref 0–2)
SODIUM SERPL-SCNC: 144 MMOL/L (ref 133–144)
SOURCE: ABNORMAL
SP GR UR STRIP: 1.02 (ref 1–1.03)
UROBILINOGEN UR STRIP-MCNC: 4 MG/DL (ref 0–2)
VALPROATE SERPL-MCNC: 29 MG/L (ref 50–100)
WBC # BLD AUTO: 18.6 10E9/L (ref 4–11)
WBC #/AREA URNS AUTO: 1 /HPF (ref 0–5)

## 2020-06-05 PROCEDURE — 87635 SARS-COV-2 COVID-19 AMP PRB: CPT | Performed by: PSYCHIATRY & NEUROLOGY

## 2020-06-05 PROCEDURE — 25000128 H RX IP 250 OP 636: Performed by: PSYCHIATRY & NEUROLOGY

## 2020-06-05 PROCEDURE — 83605 ASSAY OF LACTIC ACID: CPT | Performed by: PSYCHIATRY & NEUROLOGY

## 2020-06-05 PROCEDURE — 80320 DRUG SCREEN QUANTALCOHOLS: CPT | Performed by: PSYCHIATRY & NEUROLOGY

## 2020-06-05 PROCEDURE — 96361 HYDRATE IV INFUSION ADD-ON: CPT | Performed by: PSYCHIATRY & NEUROLOGY

## 2020-06-05 PROCEDURE — 96366 THER/PROPH/DIAG IV INF ADDON: CPT | Performed by: PSYCHIATRY & NEUROLOGY

## 2020-06-05 PROCEDURE — 87040 BLOOD CULTURE FOR BACTERIA: CPT | Performed by: PSYCHIATRY & NEUROLOGY

## 2020-06-05 PROCEDURE — 25800030 ZZH RX IP 258 OP 636: Performed by: PSYCHIATRY & NEUROLOGY

## 2020-06-05 PROCEDURE — 80164 ASSAY DIPROPYLACETIC ACD TOT: CPT | Performed by: PSYCHIATRY & NEUROLOGY

## 2020-06-05 PROCEDURE — 96365 THER/PROPH/DIAG IV INF INIT: CPT | Performed by: PSYCHIATRY & NEUROLOGY

## 2020-06-05 PROCEDURE — C9803 HOPD COVID-19 SPEC COLLECT: HCPCS | Performed by: PSYCHIATRY & NEUROLOGY

## 2020-06-05 PROCEDURE — 99285 EMERGENCY DEPT VISIT HI MDM: CPT | Mod: 25 | Performed by: PSYCHIATRY & NEUROLOGY

## 2020-06-05 PROCEDURE — 90791 PSYCH DIAGNOSTIC EVALUATION: CPT

## 2020-06-05 PROCEDURE — 81001 URINALYSIS AUTO W/SCOPE: CPT | Performed by: PSYCHIATRY & NEUROLOGY

## 2020-06-05 PROCEDURE — 82140 ASSAY OF AMMONIA: CPT | Performed by: PSYCHIATRY & NEUROLOGY

## 2020-06-05 PROCEDURE — 96375 TX/PRO/DX INJ NEW DRUG ADDON: CPT | Performed by: PSYCHIATRY & NEUROLOGY

## 2020-06-05 PROCEDURE — 81025 URINE PREGNANCY TEST: CPT | Performed by: PSYCHIATRY & NEUROLOGY

## 2020-06-05 PROCEDURE — 85025 COMPLETE CBC W/AUTO DIFF WBC: CPT | Performed by: PSYCHIATRY & NEUROLOGY

## 2020-06-05 PROCEDURE — 99285 EMERGENCY DEPT VISIT HI MDM: CPT | Mod: Z6 | Performed by: PSYCHIATRY & NEUROLOGY

## 2020-06-05 PROCEDURE — 80053 COMPREHEN METABOLIC PANEL: CPT | Performed by: PSYCHIATRY & NEUROLOGY

## 2020-06-05 PROCEDURE — 25800030 ZZH RX IP 258 OP 636

## 2020-06-05 PROCEDURE — 80307 DRUG TEST PRSMV CHEM ANLYZR: CPT | Performed by: PSYCHIATRY & NEUROLOGY

## 2020-06-05 RX ORDER — SODIUM CHLORIDE 9 MG/ML
INJECTION, SOLUTION INTRAVENOUS ONCE
Status: COMPLETED | OUTPATIENT
Start: 2020-06-05 | End: 2020-06-05

## 2020-06-05 RX ORDER — LORAZEPAM 2 MG/ML
1 INJECTION INTRAMUSCULAR ONCE
Status: COMPLETED | OUTPATIENT
Start: 2020-06-05 | End: 2020-06-05

## 2020-06-05 RX ORDER — POTASSIUM CL/LIDO/0.9 % NACL 10MEQ/0.1L
10 INTRAVENOUS SOLUTION, PIGGYBACK (ML) INTRAVENOUS ONCE
Status: COMPLETED | OUTPATIENT
Start: 2020-06-05 | End: 2020-06-05

## 2020-06-05 RX ORDER — SODIUM CHLORIDE 9 MG/ML
INJECTION, SOLUTION INTRAVENOUS
Status: COMPLETED
Start: 2020-06-05 | End: 2020-06-05

## 2020-06-05 RX ADMIN — Medication 10 MEQ: at 21:43

## 2020-06-05 RX ADMIN — Medication 100 ML: at 21:44

## 2020-06-05 RX ADMIN — LORAZEPAM 1 MG: 2 INJECTION INTRAMUSCULAR; INTRAVENOUS at 20:41

## 2020-06-05 RX ADMIN — SODIUM CHLORIDE: 9 INJECTION, SOLUTION INTRAVENOUS at 18:10

## 2020-06-05 RX ADMIN — SODIUM CHLORIDE 100 ML: 9 INJECTION, SOLUTION INTRAVENOUS at 21:44

## 2020-06-05 ASSESSMENT — ENCOUNTER SYMPTOMS
CARDIOVASCULAR NEGATIVE: 1
RESPIRATORY NEGATIVE: 1
SLEEP DISTURBANCE: 1
APPETITE CHANGE: 1
HALLUCINATIONS: 0
ACTIVITY CHANGE: 1
FACIAL ASYMMETRY: 0
CONFUSION: 1
GASTROINTESTINAL NEGATIVE: 1
MUSCULOSKELETAL NEGATIVE: 1
DECREASED CONCENTRATION: 1
EYES NEGATIVE: 1

## 2020-06-05 NOTE — LETTER
Health Information Management Services               Recipient: Sofie Chou          Sender: ABBY Padilla, LICSW  Acute Care Float   [facility]  Pager: [phone]  Ph: [phone]          Date: Elva 15, 2020  Patient Name:  Dionne Perez  Routing Message:  Global referral for TCU. Please review for TCU. Thanks! Discharge date TBD.         The documents accompanying this notice contain confidential information belonging to the sender.  This information is intended only for the use of the individual or entity named above.  The authorized recipient of this information is prohibited from disclosing this information to any other party and is required to destroy the information after its stated need has been fulfilled, unless otherwise required by state law.      If you are not the intended recipient, you are hereby notified that any disclosure, copy, distribution or action taken in reliance on the contents of these documents is strictly prohibited.  If you have received this document in error, please return it by fax to [phone] with a note on the cover sheet explaining why you are returning it (e.g. not your patient, not your provider, etc.).  If you need further assistance, please call [employer] Centralized Transcription at [phone].  Documents may also be returned by mail to [employer], , [facility] [address] [place], [place] [address].

## 2020-06-05 NOTE — ED NOTES
Sepsis BPA alert has fired and lactate was ordered Yes     Vitals 24 hr (last day)     Date/Time Temp Resp Heart Rate Pulse/Heart Rate Source BP    06/05/20 1346  97.8 (36.6)  20  112  Monitor  (!) 141/98            WBC   Date Value Ref Range Status   06/05/2020 18.6 (H) 4.0 - 11.0 10e9/L Final

## 2020-06-05 NOTE — ED NOTES
Cynthia Reynolds came to triage stating to be the PT guardian.  She states the Pt is normally very talkitive and this is not her baseline.  She left her number and asked to speak to the provider, so she can give more info on the Pt.     Cynthia Reynolds [phone]

## 2020-06-05 NOTE — TELEPHONE ENCOUNTER
S Pt is a 35 year old female who resides in a group home. Pt RN called to give collateral    B Pt is urinating on herself and cannot get off the ground. She has had medication changes, not eating, sleeping, and not enjoying life. Pt has history of presenting normally to the ER but according to RN pt is not acting normally at group home     R In route to ed

## 2020-06-05 NOTE — ED PROVIDER NOTES
ED Provider Note  [facility]      History     Chief Complaint   Patient presents with     Altered Mental Status     Pt was started on Depakote. became unresponsive. Depakote stopped Pt not getting any better. She is incontient . Not eaing She is usallyvery verbal.     HPI  Dionne Perez is a 35 year old female who is here via EMS from her group home. Patient has history of developmental delay/intellectual disability. She has a court-appointed guardian. Patient enjoys visiting the ED and prefers being in the hospital. She has called 911 compulsively when bored and asked to be brought to the ED. She would act out and make suicidal threats. Her treatment team and group home have tried to curb her behavior and excessive use of ED services by providing home care programming visits. She has been seen primarily through [facility]. Patient struggling with the recent shelter-in-place. She has been bored with being at her group home. She is unable to socialize with known acquaintances. She had left the group home and is laying out on the road. Patient has not been hospitalized for her behaviors. Her psychiatrist started her on Depakote. She appeared altered from baseline with alleged falls. She has been worked up multiple times. Depakote was discontinued 2 days ago. Her behavior has not improved. Group home and guardian feel this is new presentation for patient and they are at a loss as to how to help patient. As she was repeatedly being discharged from [facility], guardian wanted her assessed here.    Patient is minimally engaging since arrival to the ED. She has been incontinent. She is not able to care for herself and has decreased appetite and communication with staff.     Patient appears to have declined in her self-cares past several days.    Please see DEC Crisis Assessment on 06/05/2020 in Epic for further details.    Past Medical History  Past Medical History:    Diagnosis Date     Depressive disorder      Gastroesophageal reflux disease      High cholesterol      Knee pain      Thyroid disease      History reviewed. No pertinent surgical history.  clotrimazole (LOTRIMIN) 1 % cream  DICLOFENAC SODIUM PO  ESCITALOPRAM OXALATE PO  Guaifenesin-Codeine (CHERATUSSIN AC PO)  hydrocortisone 2.5 % cream  LEVOTHYROXINE SODIUM PO  MEDROXYPROGESTERONE ACETATE PO  Multiple Vitamins-Minerals (MULTIVITAL) CHEW  nystatin POWD  OMEPRAZOLE PO  PRAVASTATIN SODIUM PO  Skin Protectants, Misc. (EUCERIN) cream      Allergies   Allergen Reactions     Ibuprofen      Past medical history, past surgical history, medications, and allergies were reviewed with the patient.     Family History  No family history on file.  Family history was reviewed with the patient.     Social History  Social History     Tobacco Use     Smoking status: Never Smoker     Smokeless tobacco: Never Used   Substance Use Topics     Alcohol use: No     Drug use: No      Social history was reviewed with the patient.     Review of Systems   Unable to perform ROS: Mental status change   Constitutional: Positive for activity change and appetite change.   HENT: Negative.    Eyes: Negative.    Respiratory: Negative.    Cardiovascular: Negative.    Gastrointestinal: Negative.    Genitourinary: Positive for enuresis.   Musculoskeletal: Negative.    Skin: Negative.    Neurological: Negative for facial asymmetry.   Psychiatric/Behavioral: Positive for behavioral problems, confusion, decreased concentration and sleep disturbance. Negative for hallucinations.   All other systems reviewed and are negative.        Physical Exam   BP: (!) 141/98(wrist)  Pulse: 103  Heart Rate: 112  Temp: 97.8  F (36.6  C)  Resp: 20  SpO2: 99 %  Physical Exam  Vitals signs and nursing note reviewed.   Constitutional:       Appearance: She is obese.   HENT:      Head: Normocephalic and atraumatic.   Eyes:      Extraocular Movements: Extraocular movements  intact.   Neck:      Musculoskeletal: Normal range of motion.   Cardiovascular:      Rate and Rhythm: Normal rate.      Heart sounds: Normal heart sounds.   Pulmonary:      Effort: Pulmonary effort is normal.   Abdominal:      Palpations: Abdomen is soft.   Musculoskeletal: Normal range of motion.   Skin:     General: Skin is warm.   Neurological:      Mental Status: She is lethargic.   Psychiatric:         Attention and Perception: She does not perceive auditory or visual hallucinations.         Mood and Affect: Affect is inappropriate.         Speech: She is noncommunicative.         Behavior: Behavior is withdrawn.         Cognition and Memory: Cognition is impaired. Memory is impaired.         Judgment: Judgment is impulsive and inappropriate.         ED Course      Procedures           Results for orders placed or performed during the hospital encounter of 06/05/20   CBC with platelets differential     Status: Abnormal   Result Value Ref Range    WBC 18.6 (H) 4.0 - 11.0 10e9/L    RBC Count 4.52 3.8 - 5.2 10e12/L    Hemoglobin 13.0 11.7 - 15.7 g/dL    Hematocrit 40.6 35.0 - 47.0 %    MCV 90 78 - 100 fl    MCH 28.8 26.5 - 33.0 pg    MCHC 32.0 31.5 - 36.5 g/dL    RDW 13.7 10.0 - 15.0 %    Platelet Count 242 150 - 450 10e9/L    Diff Method Automated Method     % Neutrophils 86.9 %    % Lymphocytes 10.8 %    % Monocytes 1.9 %    % Eosinophils 0.0 %    % Basophils 0.0 %    % Immature Granulocytes 0.4 %    Nucleated RBCs 0 0 /100    Absolute Neutrophil 16.2 (H) 1.6 - 8.3 10e9/L    Absolute Lymphocytes 2.0 0.8 - 5.3 10e9/L    Absolute Monocytes 0.4 0.0 - 1.3 10e9/L    Absolute Eosinophils 0.0 0.0 - 0.7 10e9/L    Absolute Basophils 0.0 0.0 - 0.2 10e9/L    Abs Immature Granulocytes 0.1 0 - 0.4 10e9/L    Absolute Nucleated RBC 0.0    Comprehensive metabolic panel     Status: Abnormal   Result Value Ref Range    Sodium 144 133 - 144 mmol/L    Potassium 3.0 (L) 3.4 - 5.3 mmol/L    Chloride 106 94 - 109 mmol/L    Carbon  Dioxide 18 (L) 20 - 32 mmol/L    Anion Gap 20 (H) 3 - 14 mmol/L    Glucose 88 70 - 99 mg/dL    Urea Nitrogen 48 (H) 7 - 30 mg/dL    Creatinine 0.98 0.52 - 1.04 mg/dL    GFR Estimate 75 >60 mL/min/[1.73_m2]    GFR Estimate If Black 87 >60 mL/min/[1.73_m2]    Calcium 9.4 8.5 - 10.1 mg/dL    Bilirubin Total 1.2 0.2 - 1.3 mg/dL    Albumin 4.0 3.4 - 5.0 g/dL    Protein Total 7.8 6.8 - 8.8 g/dL    Alkaline Phosphatase 55 40 - 150 U/L    ALT 89 (H) 0 - 50 U/L     (H) 0 - 45 U/L   UA reflex to Microscopic and Culture     Status: Abnormal    Specimen: Urine catheter; Catheterized Urine   Result Value Ref Range    Color Urine Yellow     Appearance Urine Clear     Glucose Urine Negative NEG^Negative mg/dL    Bilirubin Urine Small (A) NEG^Negative    Ketones Urine 80 (A) NEG^Negative mg/dL    Specific Gravity Urine 1.024 1.003 - 1.035    Blood Urine Moderate (A) NEG^Negative    pH Urine 6.0 5.0 - 7.0 pH    Protein Albumin Urine 100 (A) NEG^Negative mg/dL    Urobilinogen mg/dL 4.0 (H) 0.0 - 2.0 mg/dL    Nitrite Urine Negative NEG^Negative    Leukocyte Esterase Urine Negative NEG^Negative    Source Catheterized Urine     RBC Urine 0 0 - 2 /HPF    WBC Urine 1 0 - 5 /HPF    Bacteria Urine Few (A) NEG^Negative /HPF    Mucous Urine Present (A) NEG^Negative /LPF    Hyaline Casts 25 (H) 0 - 2 /LPF   Valproic acid (Depakote level)     Status: Abnormal   Result Value Ref Range    Valproic Acid Level 29 (L) 50 - 100 mg/L   Drug abuse screen 6 urine (chem dep)     Status: None   Result Value Ref Range    Amphetamine Qual Urine Negative NEG^Negative    Barbiturates Qual Urine Negative NEG^Negative    Benzodiazepine Qual Urine Negative NEG^Negative    Cannabinoids Qual Urine Negative NEG^Negative    Cocaine Qual Urine Negative NEG^Negative    Ethanol Qual Urine Negative NEG^Negative    Opiates Qualitative Urine Negative NEG^Negative   HCG qualitative urine (UPT)     Status: None   Result Value Ref Range    HCG Qual Urine Negative  NEG^Negative   Ammonia     Status: None   Result Value Ref Range    Ammonia 15 10 - 50 umol/L   Lactic acid     Status: None   Result Value Ref Range    Lactic Acid 1.6 0.7 - 2.0 mmol/L   Blood culture     Status: None (Preliminary result)    Specimen: Arm, Left; Blood    PURPLE PORT   Result Value Ref Range    Specimen Description Blood PURPLE PORT     Culture Micro PENDING      Medications   sodium chloride 0.9% infusion ( Intravenous Stopped 6/5/20 1922)   LORazepam (ATIVAN) injection 1 mg (1 mg Intravenous Given 6/5/20 2041)   potassium chloride 10 mEq in 100 mL intermittent infusion with 10 mg lidocaine (10 mEq Intravenous New Bag 6/5/20 2143)   sodium chloride 0.9 % infusion (100 mLs  New Bag 6/5/20 2144)        Assessments & Plan (with Medical Decision Making)   Patient with progressive deterioration in her functioning and self-cares. She has been more noncommunicative. This is a new presentation for her group home staff and guardian. There was concern that this is behavioral in nature and perhaps she is volitionally refusing to engage, but lab work reveals elevated WBC and neutrophils and ALT and AST. This is new compared to previous results. Urine does not show an infection. Blood cultures are pending. Patient has been falling and she had a CXR and head CT that was nonrevealing. COVID-10 test was negative from 1 week ago. She was given an Ativan challenge to rule out possible akinetic catatonia. She did not respond with more alertness to Ativan. Patient would benefit from a medical admission for further work-up to her deteriorating mental state and generalized weakness.      I have reviewed the nursing notes. I have reviewed the findings, diagnosis, plan and need for follow up with the patient.    New Prescriptions    No medications on file       Final diagnoses:   Intellectual delay   Somnolence   Generalized muscle weakness       --  Arnoldo Corado MD   Emergency Medicine   [facility], [place], EMERGENCY  DEPARTMENT  6/5/2020     Arnoldo Corado MD  06/05/20 1745

## 2020-06-05 NOTE — LETTER
Health Information Management Services               Recipient: Yg Estrada           Sender: ABBY Padilla, LICSW  Acute Care Float   [facility]  Pager: [phone]  Ph: [phone]          Date: Elva 15, 2020  Patient Name:  Dionne Perez  Routing Message:  Global referral for TCU.Thanks! Discharge date TBD.           The documents accompanying this notice contain confidential information belonging to the sender.  This information is intended only for the use of the individual or entity named above.  The authorized recipient of this information is prohibited from disclosing this information to any other party and is required to destroy the information after its stated need has been fulfilled, unless otherwise required by state law.      If you are not the intended recipient, you are hereby notified that any disclosure, copy, distribution or action taken in reliance on the contents of these documents is strictly prohibited.  If you have received this document in error, please return it by fax to [phone] with a note on the cover sheet explaining why you are returning it (e.g. not your patient, not your provider, etc.).  If you need further assistance, please call [employer] Centralized Transcription at [phone].  Documents may also be returned by mail to [employer], , [address], [place], [place] [address].

## 2020-06-06 ENCOUNTER — APPOINTMENT (OUTPATIENT)
Dept: CT IMAGING | Facility: CLINIC | Age: 35
DRG: 887 | End: 2020-06-06
Payer: MEDICARE

## 2020-06-06 ENCOUNTER — APPOINTMENT (OUTPATIENT)
Dept: MRI IMAGING | Facility: CLINIC | Age: 35
DRG: 887 | End: 2020-06-06
Attending: EMERGENCY MEDICINE
Payer: MEDICARE

## 2020-06-06 ENCOUNTER — APPOINTMENT (OUTPATIENT)
Dept: MRI IMAGING | Facility: CLINIC | Age: 35
DRG: 887 | End: 2020-06-06
Attending: PHYSICIAN ASSISTANT
Payer: MEDICARE

## 2020-06-06 ENCOUNTER — APPOINTMENT (OUTPATIENT)
Dept: CT IMAGING | Facility: CLINIC | Age: 35
DRG: 887 | End: 2020-06-06
Attending: PHYSICIAN ASSISTANT
Payer: MEDICARE

## 2020-06-06 ENCOUNTER — APPOINTMENT (OUTPATIENT)
Dept: GENERAL RADIOLOGY | Facility: CLINIC | Age: 35
DRG: 887 | End: 2020-06-06
Payer: MEDICARE

## 2020-06-06 ENCOUNTER — APPOINTMENT (OUTPATIENT)
Dept: NEUROLOGY | Facility: CLINIC | Age: 35
DRG: 887 | End: 2020-06-06
Attending: PSYCHIATRY & NEUROLOGY
Payer: MEDICARE

## 2020-06-06 PROBLEM — R41.82 ALTERED MENTAL STATUS: Status: ACTIVE | Noted: 2020-06-06

## 2020-06-06 LAB
ALBUMIN SERPL-MCNC: 3.4 G/DL (ref 3.4–5)
ALP SERPL-CCNC: 45 U/L (ref 40–150)
ALT SERPL W P-5'-P-CCNC: 78 U/L (ref 0–50)
ANION GAP SERPL CALCULATED.3IONS-SCNC: 12 MMOL/L (ref 3–14)
AST SERPL W P-5'-P-CCNC: 149 U/L (ref 0–45)
BASE DEFICIT BLDV-SCNC: 1.5 MMOL/L
BASOPHILS # BLD AUTO: 0 10E9/L (ref 0–0.2)
BASOPHILS NFR BLD AUTO: 0.1 %
BILIRUB SERPL-MCNC: 0.9 MG/DL (ref 0.2–1.3)
BUN SERPL-MCNC: 47 MG/DL (ref 7–30)
CALCIUM SERPL-MCNC: 8.7 MG/DL (ref 8.5–10.1)
CHLORIDE SERPL-SCNC: 112 MMOL/L (ref 94–109)
CK SERPL-CCNC: 2642 U/L (ref 30–225)
CO2 SERPL-SCNC: 21 MMOL/L (ref 20–32)
CREAT SERPL-MCNC: 0.79 MG/DL (ref 0.52–1.04)
CRP SERPL-MCNC: 9.8 MG/L (ref 0–8)
DIFFERENTIAL METHOD BLD: ABNORMAL
EOSINOPHIL # BLD AUTO: 0 10E9/L (ref 0–0.7)
EOSINOPHIL NFR BLD AUTO: 0 %
ERYTHROCYTE [DISTWIDTH] IN BLOOD BY AUTOMATED COUNT: 13.5 % (ref 10–15)
GFR SERPL CREATININE-BSD FRML MDRD: >90 ML/MIN/{1.73_M2}
GLUCOSE BLDC GLUCOMTR-MCNC: 103 MG/DL (ref 70–99)
GLUCOSE BLDC GLUCOMTR-MCNC: 109 MG/DL (ref 70–99)
GLUCOSE BLDC GLUCOMTR-MCNC: 135 MG/DL (ref 70–99)
GLUCOSE BLDC GLUCOMTR-MCNC: 90 MG/DL (ref 70–99)
GLUCOSE SERPL-MCNC: 97 MG/DL (ref 70–99)
HBA1C MFR BLD: 5 % (ref 0–5.6)
HCO3 BLDV-SCNC: 22 MMOL/L (ref 21–28)
HCT VFR BLD AUTO: 38.1 % (ref 35–47)
HGB BLD-MCNC: 11.9 G/DL (ref 11.7–15.7)
IMM GRANULOCYTES # BLD: 0.1 10E9/L (ref 0–0.4)
IMM GRANULOCYTES NFR BLD: 0.8 %
LACTATE BLD-SCNC: 1.2 MMOL/L (ref 0.7–2)
LYMPHOCYTES # BLD AUTO: 1 10E9/L (ref 0.8–5.3)
LYMPHOCYTES NFR BLD AUTO: 9.7 %
MCH RBC QN AUTO: 28.1 PG (ref 26.5–33)
MCHC RBC AUTO-ENTMCNC: 31.2 G/DL (ref 31.5–36.5)
MCV RBC AUTO: 90 FL (ref 78–100)
MONOCYTES # BLD AUTO: 0.7 10E9/L (ref 0–1.3)
MONOCYTES NFR BLD AUTO: 7.3 %
NEUTROPHILS # BLD AUTO: 8.2 10E9/L (ref 1.6–8.3)
NEUTROPHILS NFR BLD AUTO: 82.1 %
NRBC # BLD AUTO: 0 10*3/UL
NRBC BLD AUTO-RTO: 0 /100
O2/TOTAL GAS SETTING VFR VENT: ABNORMAL %
PCO2 BLDV: 32 MM HG (ref 40–50)
PH BLDV: 7.45 PH (ref 7.32–7.43)
PLATELET # BLD AUTO: 164 10E9/L (ref 150–450)
PO2 BLDV: 68 MM HG (ref 25–47)
POTASSIUM SERPL-SCNC: 2.9 MMOL/L (ref 3.4–5.3)
POTASSIUM SERPL-SCNC: 3.1 MMOL/L (ref 3.4–5.3)
PROT SERPL-MCNC: 6.5 G/DL (ref 6.8–8.8)
RBC # BLD AUTO: 4.24 10E12/L (ref 3.8–5.2)
SARS-COV-2 RNA SPEC QL NAA+PROBE: NOT DETECTED
SODIUM SERPL-SCNC: 146 MMOL/L (ref 133–144)
SPECIMEN SOURCE: NORMAL
T4 FREE SERPL-MCNC: 0.98 NG/DL (ref 0.76–1.46)
TSH SERPL DL<=0.005 MIU/L-ACNC: 0.34 MU/L (ref 0.4–4)
WBC # BLD AUTO: 9.9 10E9/L (ref 4–11)

## 2020-06-06 PROCEDURE — 83690 ASSAY OF LIPASE: CPT | Performed by: STUDENT IN AN ORGANIZED HEALTH CARE EDUCATION/TRAINING PROGRAM

## 2020-06-06 PROCEDURE — 80053 COMPREHEN METABOLIC PANEL: CPT | Performed by: PHYSICIAN ASSISTANT

## 2020-06-06 PROCEDURE — 25800025 ZZH RX 258: Performed by: PHYSICIAN ASSISTANT

## 2020-06-06 PROCEDURE — A9585 GADOBUTROL INJECTION: HCPCS | Performed by: EMERGENCY MEDICINE

## 2020-06-06 PROCEDURE — 84443 ASSAY THYROID STIM HORMONE: CPT | Performed by: PHYSICIAN ASSISTANT

## 2020-06-06 PROCEDURE — 00000146 ZZHCL STATISTIC GLUCOSE BY METER IP

## 2020-06-06 PROCEDURE — 83605 ASSAY OF LACTIC ACID: CPT | Performed by: PHYSICIAN ASSISTANT

## 2020-06-06 PROCEDURE — 25000128 H RX IP 250 OP 636: Performed by: GENERAL PRACTICE

## 2020-06-06 PROCEDURE — 82803 BLOOD GASES ANY COMBINATION: CPT | Performed by: PHYSICIAN ASSISTANT

## 2020-06-06 PROCEDURE — 85025 COMPLETE CBC W/AUTO DIFF WBC: CPT | Performed by: PHYSICIAN ASSISTANT

## 2020-06-06 PROCEDURE — 25000128 H RX IP 250 OP 636: Performed by: PHYSICIAN ASSISTANT

## 2020-06-06 PROCEDURE — 25000128 H RX IP 250 OP 636: Performed by: PSYCHIATRY & NEUROLOGY

## 2020-06-06 PROCEDURE — 25000128 H RX IP 250 OP 636: Performed by: STUDENT IN AN ORGANIZED HEALTH CARE EDUCATION/TRAINING PROGRAM

## 2020-06-06 PROCEDURE — 70553 MRI BRAIN STEM W/O & W/DYE: CPT

## 2020-06-06 PROCEDURE — 40000141 ZZH STATISTIC PERIPHERAL IV START W/O US GUIDANCE

## 2020-06-06 PROCEDURE — 71045 X-RAY EXAM CHEST 1 VIEW: CPT

## 2020-06-06 PROCEDURE — 70544 MR ANGIOGRAPHY HEAD W/O DYE: CPT

## 2020-06-06 PROCEDURE — 25500064 ZZH RX 255 OP 636: Performed by: RADIOLOGY

## 2020-06-06 PROCEDURE — 84132 ASSAY OF SERUM POTASSIUM: CPT | Performed by: PHYSICIAN ASSISTANT

## 2020-06-06 PROCEDURE — 70547 MR ANGIOGRAPHY NECK W/O DYE: CPT

## 2020-06-06 PROCEDURE — 84439 ASSAY OF FREE THYROXINE: CPT | Performed by: PHYSICIAN ASSISTANT

## 2020-06-06 PROCEDURE — 25500064 ZZH RX 255 OP 636: Performed by: EMERGENCY MEDICINE

## 2020-06-06 PROCEDURE — 20000004 ZZH R&B ICU UMMC

## 2020-06-06 PROCEDURE — 83036 HEMOGLOBIN GLYCOSYLATED A1C: CPT | Performed by: PHYSICIAN ASSISTANT

## 2020-06-06 PROCEDURE — 82550 ASSAY OF CK (CPK): CPT | Performed by: PHYSICIAN ASSISTANT

## 2020-06-06 PROCEDURE — 74160 CT ABDOMEN W/CONTRAST: CPT

## 2020-06-06 PROCEDURE — 86140 C-REACTIVE PROTEIN: CPT | Performed by: PHYSICIAN ASSISTANT

## 2020-06-06 PROCEDURE — 70498 CT ANGIOGRAPHY NECK: CPT

## 2020-06-06 PROCEDURE — 95711 VEEG 2-12 HR UNMONITORED: CPT

## 2020-06-06 PROCEDURE — 99221 1ST HOSP IP/OBS SF/LOW 40: CPT | Performed by: PSYCHIATRY & NEUROLOGY

## 2020-06-06 PROCEDURE — 99223 1ST HOSP IP/OBS HIGH 75: CPT | Mod: AI | Performed by: HOSPITALIST

## 2020-06-06 PROCEDURE — 25800025 ZZH RX 258: Performed by: HOSPITALIST

## 2020-06-06 PROCEDURE — A9585 GADOBUTROL INJECTION: HCPCS | Performed by: RADIOLOGY

## 2020-06-06 PROCEDURE — 25800030 ZZH RX IP 258 OP 636: Performed by: STUDENT IN AN ORGANIZED HEALTH CARE EDUCATION/TRAINING PROGRAM

## 2020-06-06 PROCEDURE — 36415 COLL VENOUS BLD VENIPUNCTURE: CPT | Performed by: PHYSICIAN ASSISTANT

## 2020-06-06 RX ORDER — PSEUDOEPHEDRINE HCL 30 MG
TABLET ORAL
COMMUNITY
End: 2022-07-31

## 2020-06-06 RX ORDER — GADOBUTROL 604.72 MG/ML
15 INJECTION INTRAVENOUS ONCE
Status: DISCONTINUED | OUTPATIENT
Start: 2020-06-06 | End: 2020-06-06 | Stop reason: CLARIF

## 2020-06-06 RX ORDER — CLONIDINE HYDROCHLORIDE 0.1 MG/1
0.1 TABLET ORAL 2 TIMES DAILY
COMMUNITY
End: 2022-07-31

## 2020-06-06 RX ORDER — VITAMINS A AND D OINTMENT 15.5; 53.4 G/100G; G/100G
OINTMENT TOPICAL
COMMUNITY
End: 2022-07-31

## 2020-06-06 RX ORDER — ACETAMINOPHEN 325 MG/1
TABLET ORAL
Status: ON HOLD | COMMUNITY
End: 2020-07-10

## 2020-06-06 RX ORDER — CLONAZEPAM 0.5 MG/1
0.5 TABLET ORAL EVERY 8 HOURS PRN
COMMUNITY
End: 2022-07-31

## 2020-06-06 RX ORDER — POTASSIUM CHLORIDE 7.45 MG/ML
10 INJECTION INTRAVENOUS
Status: DISCONTINUED | OUTPATIENT
Start: 2020-06-06 | End: 2020-07-10 | Stop reason: HOSPADM

## 2020-06-06 RX ORDER — IOPAMIDOL 755 MG/ML
75 INJECTION, SOLUTION INTRAVASCULAR ONCE
Status: COMPLETED | OUTPATIENT
Start: 2020-06-06 | End: 2020-06-06

## 2020-06-06 RX ORDER — MEDROXYPROGESTERONE ACETATE 10 MG
TABLET ORAL
COMMUNITY
End: 2022-07-31

## 2020-06-06 RX ORDER — POTASSIUM CL/LIDO/0.9 % NACL 10MEQ/0.1L
10 INTRAVENOUS SOLUTION, PIGGYBACK (ML) INTRAVENOUS
Status: DISCONTINUED | OUTPATIENT
Start: 2020-06-06 | End: 2020-07-10 | Stop reason: HOSPADM

## 2020-06-06 RX ORDER — POTASSIUM CHLORIDE 750 MG/1
20-40 TABLET, EXTENDED RELEASE ORAL
Status: DISCONTINUED | OUTPATIENT
Start: 2020-06-06 | End: 2020-07-10 | Stop reason: HOSPADM

## 2020-06-06 RX ORDER — GADOBUTROL 604.72 MG/ML
0.1 INJECTION INTRAVENOUS ONCE
Status: COMPLETED | OUTPATIENT
Start: 2020-06-06 | End: 2020-06-06

## 2020-06-06 RX ORDER — LANOLIN ALCOHOL/MO/W.PET/CERES
3 CREAM (GRAM) TOPICAL AT BEDTIME
COMMUNITY
End: 2022-07-31

## 2020-06-06 RX ORDER — CALCIUM POLYCARBOPHIL 625 MG 625 MG/1
3 TABLET ORAL 2 TIMES DAILY
COMMUNITY
End: 2022-07-31

## 2020-06-06 RX ORDER — LEVOTHYROXINE SODIUM 50 UG/1
50 TABLET ORAL DAILY
COMMUNITY

## 2020-06-06 RX ORDER — NALOXONE HYDROCHLORIDE 0.4 MG/ML
.1-.4 INJECTION, SOLUTION INTRAMUSCULAR; INTRAVENOUS; SUBCUTANEOUS
Status: DISCONTINUED | OUTPATIENT
Start: 2020-06-06 | End: 2020-07-10 | Stop reason: HOSPADM

## 2020-06-06 RX ORDER — ASPIRIN 325 MG
325 TABLET ORAL ONCE
Status: DISCONTINUED | OUTPATIENT
Start: 2020-06-06 | End: 2020-06-07

## 2020-06-06 RX ORDER — FAMOTIDINE 20 MG/1
20 TABLET, FILM COATED ORAL DAILY
Status: ON HOLD | COMMUNITY
End: 2023-06-05

## 2020-06-06 RX ORDER — LORAZEPAM 2 MG/ML
2 INJECTION INTRAMUSCULAR ONCE
Status: COMPLETED | OUTPATIENT
Start: 2020-06-06 | End: 2020-06-06

## 2020-06-06 RX ORDER — POTASSIUM CHLORIDE 29.8 MG/ML
20 INJECTION INTRAVENOUS
Status: DISCONTINUED | OUTPATIENT
Start: 2020-06-06 | End: 2020-07-10 | Stop reason: HOSPADM

## 2020-06-06 RX ORDER — HYDROCORTISONE 2.5 %
CREAM (GRAM) TOPICAL 2 TIMES DAILY PRN
COMMUNITY
End: 2022-07-31

## 2020-06-06 RX ORDER — FAMOTIDINE 20 MG/1
20 TABLET, FILM COATED ORAL DAILY
Status: ON HOLD | COMMUNITY
End: 2020-06-06

## 2020-06-06 RX ORDER — ESCITALOPRAM OXALATE 10 MG/1
30 TABLET ORAL DAILY
Status: ON HOLD | COMMUNITY
End: 2023-06-05

## 2020-06-06 RX ORDER — ALUMINA, MAGNESIA, AND SIMETHICONE 2400; 2400; 240 MG/30ML; MG/30ML; MG/30ML
15 SUSPENSION ORAL 2 TIMES DAILY PRN
COMMUNITY
End: 2022-07-31

## 2020-06-06 RX ORDER — POTASSIUM CHLORIDE 1.5 G/1.58G
20-40 POWDER, FOR SOLUTION ORAL
Status: DISCONTINUED | OUTPATIENT
Start: 2020-06-06 | End: 2020-07-10 | Stop reason: HOSPADM

## 2020-06-06 RX ORDER — ASPIRIN 81 MG/1
81 TABLET, CHEWABLE ORAL DAILY
Status: DISCONTINUED | OUTPATIENT
Start: 2020-06-07 | End: 2020-06-08

## 2020-06-06 RX ORDER — ESCITALOPRAM OXALATE 10 MG/1
10 TABLET ORAL DAILY
Status: DISCONTINUED | OUTPATIENT
Start: 2020-06-06 | End: 2020-06-07

## 2020-06-06 RX ORDER — NYSTATIN 100000 [USP'U]/G
POWDER TOPICAL
COMMUNITY
End: 2022-07-31

## 2020-06-06 RX ORDER — IOPAMIDOL 755 MG/ML
135 INJECTION, SOLUTION INTRAVASCULAR ONCE
Status: COMPLETED | OUTPATIENT
Start: 2020-06-06 | End: 2020-06-06

## 2020-06-06 RX ORDER — LIDOCAINE 40 MG/G
CREAM TOPICAL
Status: DISCONTINUED | OUTPATIENT
Start: 2020-06-06 | End: 2020-07-10 | Stop reason: HOSPADM

## 2020-06-06 RX ORDER — ONDANSETRON 4 MG/1
4 TABLET, ORALLY DISINTEGRATING ORAL EVERY 8 HOURS PRN
COMMUNITY
End: 2022-07-31

## 2020-06-06 RX ORDER — PRAVASTATIN SODIUM 40 MG
40 TABLET ORAL DAILY
COMMUNITY

## 2020-06-06 RX ADMIN — DEXTROSE AND SODIUM CHLORIDE: 5; 450 INJECTION, SOLUTION INTRAVENOUS at 11:26

## 2020-06-06 RX ADMIN — IOPAMIDOL 135 ML: 755 INJECTION, SOLUTION INTRAVENOUS at 07:40

## 2020-06-06 RX ADMIN — POTASSIUM CHLORIDE 10 MEQ: 7.46 INJECTION, SOLUTION INTRAVENOUS at 13:10

## 2020-06-06 RX ADMIN — POTASSIUM CHLORIDE 10 MEQ: 7.46 INJECTION, SOLUTION INTRAVENOUS at 11:59

## 2020-06-06 RX ADMIN — POTASSIUM CHLORIDE 10 MEQ: 7.46 INJECTION, SOLUTION INTRAVENOUS at 22:56

## 2020-06-06 RX ADMIN — LORAZEPAM 2 MG: 2 INJECTION, SOLUTION INTRAMUSCULAR; INTRAVENOUS at 16:08

## 2020-06-06 RX ADMIN — POTASSIUM CHLORIDE 10 MEQ: 7.46 INJECTION, SOLUTION INTRAVENOUS at 14:53

## 2020-06-06 RX ADMIN — DEXTROSE AND SODIUM CHLORIDE: 5; 450 INJECTION, SOLUTION INTRAVENOUS at 19:51

## 2020-06-06 RX ADMIN — POTASSIUM CHLORIDE 10 MEQ: 7.46 INJECTION, SOLUTION INTRAVENOUS at 19:02

## 2020-06-06 RX ADMIN — ENOXAPARIN SODIUM 40 MG: 40 INJECTION SUBCUTANEOUS at 11:26

## 2020-06-06 RX ADMIN — GADOBUTROL 10 ML: 604.72 INJECTION INTRAVENOUS at 13:52

## 2020-06-06 RX ADMIN — POTASSIUM CHLORIDE 10 MEQ: 7.46 INJECTION, SOLUTION INTRAVENOUS at 21:15

## 2020-06-06 RX ADMIN — IOPAMIDOL 75 ML: 755 INJECTION, SOLUTION INTRAVENOUS at 20:49

## 2020-06-06 RX ADMIN — POTASSIUM CHLORIDE 10 MEQ: 7.46 INJECTION, SOLUTION INTRAVENOUS at 18:01

## 2020-06-06 RX ADMIN — POTASSIUM CHLORIDE 10 MEQ: 7.46 INJECTION, SOLUTION INTRAVENOUS at 23:57

## 2020-06-06 RX ADMIN — LORAZEPAM 2 MG: 2 INJECTION, SOLUTION INTRAMUSCULAR; INTRAVENOUS at 13:19

## 2020-06-06 RX ADMIN — POTASSIUM CHLORIDE 10 MEQ: 7.46 INJECTION, SOLUTION INTRAVENOUS at 16:06

## 2020-06-06 RX ADMIN — POTASSIUM CHLORIDE 10 MEQ: 7.46 INJECTION, SOLUTION INTRAVENOUS at 20:05

## 2020-06-06 RX ADMIN — SODIUM CHLORIDE, POTASSIUM CHLORIDE, SODIUM LACTATE AND CALCIUM CHLORIDE 1000 ML: 600; 310; 30; 20 INJECTION, SOLUTION INTRAVENOUS at 06:35

## 2020-06-06 ASSESSMENT — PAIN DESCRIPTION - DESCRIPTORS: DESCRIPTORS: PATIENT UNABLE TO DESCRIBE

## 2020-06-06 ASSESSMENT — ACTIVITIES OF DAILY LIVING (ADL)
TRANSFERRING: 4-->COMPLETELY DEPENDENT
WHICH_OF_THE_ABOVE_FUNCTIONAL_RISKS_HAD_A_RECENT_ONSET_OR_CHANGE?: AMBULATION;COGNITION;COMMUNICATION/SPEECH
ADLS_ACUITY_SCORE: 37
ADLS_ACUITY_SCORE: 37
BATHING: 4-->COMPLETELY DEPENDENT
COGNITION: 2 - DIFFICULTY WITH ORGANIZING THOUGHTS
SWALLOWING: 2-->DIFFICULTY SWALLOWING LIQUIDS/FOODS
RETIRED_COMMUNICATION: 3-->UNABLE TO UNDERSTAND OR SPEAK (NOT RELATED TO LANGUAGE BARRIER)
RETIRED_EATING: 4-->COMPLETELY DEPENDENT
DRESS: 4-->COMPLETELY DEPENDENT
FALL_HISTORY_WITHIN_LAST_SIX_MONTHS: YES
ADLS_ACUITY_SCORE: 37
TOILETING: 4-->COMPLETELY DEPENDENT
AMBULATION: 4-->COMPLETELY DEPENDENT
ADLS_ACUITY_SCORE: 37

## 2020-06-06 NOTE — PROGRESS NOTES
Brief medicine note:  - Please refer to H&P by Dr. Uribe early this am for full details regarding the events and concerns that led to admission. In brief, Pt is a 36 yo female with severe developmental delay, hypothyroidism, depression, anxiety and questionable hx of psychosis admitted from  after becoming unresponsive with urinary incontinence. D/w pt's guardian, and at baseline pt acts as if she were 6 or 7 years old and is very outgoing and talkative. However, during the COVID-19 pandemic, pt has had worsening behavioral dysregulation related to not being able to socialize as usual resulting in her calling 911 frequently and lying in streets. Has had a psychiatrist (Dr. Blake at [facility]) for at least three years, and pt apparently chronically managed on Lexapro, but no persistent use of other psychotropic meds. Did trial risperidone last year that did not help. Most recently started on Depakote but this was discontinued 6/3. Pt found yesterday on her belly at  unresponsive covered in her own urine. Currently pt's VSS, but remains unresponsive, and on exam extremities and facial muscles very rigid. Pt currently able to maintain her airway. CK just over 2500. Per pt's guardian, pt had an episode of unresponsive last year of which pt was awake but not responding and had some facial grimmacing, but this episode was transient and current episode much worse. Has hx of being observed as responding to internal stimuli.     Plan for today:  - Consult Neurology and Psychaitry. Appreciate recommendations.  - In interim, obtain MRI brain with and with contrast.   - Start MIVFs with repeat total CK and CMP tomorrow am    Glen Cook PA-C  Internal Medicine Hospitalist   [facility] Hospitalist group  [phone]

## 2020-06-06 NOTE — ED NOTES
[facility], [place]   ED Nurse to Floor Handoff     Dionne Perez is a 35 year old female who speaks English and lives with others,  in a group home  They arrived in the ED by ambulance from home    ED Chief Complaint: Altered Mental Status (Pt was started on Depakote. became unresponsive. Depakote stopped Pt not getting any better. She is incontient . Not eaing She is usallyvery verbal.)    ED Dx;   Final diagnoses:   Intellectual delay   Somnolence   Generalized muscle weakness         Needed?: No    Allergies:   Allergies   Allergen Reactions     Ibuprofen    .  Past Medical Hx:   Past Medical History:   Diagnosis Date     Depressive disorder      Gastroesophageal reflux disease      High cholesterol      Knee pain      Thyroid disease       Baseline Mental status: WDL  Current Mental Status changes: other ? catatonia    Infection present or suspected this encounter: cultures pending  Sepsis suspected: No  Isolation type: No active isolations     Activity level - Baseline/Home:  Independent  Activity Level - Current:   Total Care    Bariatric equipment needed?: Yes    In the ED these meds were given:   Medications   sodium chloride 0.9% infusion ( Intravenous Stopped 6/5/20 1922)   LORazepam (ATIVAN) injection 1 mg (1 mg Intravenous Given 6/5/20 2041)   potassium chloride 10 mEq in 100 mL intermittent infusion with 10 mg lidocaine (10 mEq Intravenous New Bag 6/5/20 2143)   sodium chloride 0.9 % infusion (100 mLs  New Bag 6/5/20 2144)       Drips running?  No    Home pump  No    Current LDAs  Peripheral IV 06/05/20 Left Upper forearm (Active)   Site Assessment WDL 06/05/20 1810   Line Status Infusing 06/05/20 1810   Phlebitis Scale 0-->no symptoms 06/05/20 1810   Number of days: 0       Peripheral IV 06/05/20 Right Upper forearm (Active)   Number of days: 0       Labs results:   Labs Ordered and Resulted from Time of ED Arrival Up to the Time of Departure from the ED   CBC  WITH PLATELETS DIFFERENTIAL - Abnormal; Notable for the following components:       Result Value    WBC 18.6 (*)     Absolute Neutrophil 16.2 (*)     All other components within normal limits   COMPREHENSIVE METABOLIC PANEL - Abnormal; Notable for the following components:    Potassium 3.0 (*)     Carbon Dioxide 18 (*)     Anion Gap 20 (*)     Urea Nitrogen 48 (*)     ALT 89 (*)      (*)     All other components within normal limits   UA MACROSCOPIC WITH REFLEX TO MICRO AND CULTURE - Abnormal; Notable for the following components:    Bilirubin Urine Small (*)     Ketones Urine 80 (*)     Blood Urine Moderate (*)     Protein Albumin Urine 100 (*)     Urobilinogen mg/dL 4.0 (*)     Bacteria Urine Few (*)     Mucous Urine Present (*)     Hyaline Casts 25 (*)     All other components within normal limits   VALPROIC ACID - Abnormal; Notable for the following components:    Valproic Acid Level 29 (*)     All other components within normal limits   DRUG ABUSE SCREEN 6 CHEM DEP URINE ([facility])   HCG QUALITATIVE URINE   AMMONIA   LACTIC ACID WHOLE BLOOD   COVID-19 VIRUS (CORONAVIRUS) BY PCR   PERIPHERAL IV CATHETER   PULSE OXIMETRY NURSING   CARDIAC CONTINUOUS MONITORING   STRAIGHT CATH FOR URINE   BLOOD CULTURE   BLOOD CULTURE       Imaging Studies: No results found for this or any previous visit (from the past 24 hour(s)).    Recent vital signs:   BP (!) 142/75   Pulse 103   Temp 97.2  F (36.2  C) (Axillary)   Resp 22   SpO2 100%     Cordelia Coma Scale Score: 11 (06/05/20 1545)       Cardiac Rhythm: Normal Sinus  Pt needs tele? Yes  Skin/wound Issues: multiple bruising to bilat lower extremities    Code Status: Full Code    Pain control: good    Nausea control: pt had none    Abnormal labs/tests/findings requiring intervention: see emr    Family present during ED course? No   Family Comments/Social Situation comments: resides in group home, has a legal guardian    Tasks needing completion: None    , Jaylon,  RN  [phone] [facility]  [phone]

## 2020-06-06 NOTE — PROGRESS NOTES
Brief Medicine Note    Cross cover notified by Glen Cook PA-C at 3:10pm. Radiology had called Joey regarding MRI Brain w/ and w/o contrast. There is significant artifact, however, Radiology discussed there could possibly be an area suggestive of new infarct in cerebellum.    Called and discussed with Neurology team. They reviewed imaging and will discuss with Stroke Neurology service for further recommendations/evaluation. At this time, unclear if this is true infarct.     However- they discussed that if this was an area of infarct, there is no last known well time and is >4.5 hrs and at this time, the encephalopathy and rigidity do not align with findings on MRI.     Discussed with Neurology and they will be in communication with Medicine service after evaluation and discussion with Stroke Neurology.     Psychiatry also discussed with Medicine. They are planning for an additional Ativan challenge with 2mg IV once EEG leads are placed (psych will order). At this time, they discussed that this is likely functional but awaiting MRI results as above. Will continue to follow.     Will continue to monitor patient closely. Please call medicine with any additional questions or concerns.    ADDENDUM: Discussed with Neurology who d/w Stroke Service. Given subacute in etiology- recommended nonurgent CTA head and neck, TTE as well as loading dose of ASA 325mg once today, followed by 81mg daily thereafter. Will add on Hemoglobin A1C and fasting lipid panel on in the AM. CTA head and neck ordered.     Natty Parson PA-C  Hospitalist Service  Pager [phone]

## 2020-06-06 NOTE — PROGRESS NOTES
EEG CLINICAL NEUROPHYSIOLOGY PRELIMINARY REPORT    First 45 minutes of video-EEG reviewed. Received lorazapam 2 mg about 35 minutes prior to the study.  Long periods of sleep with sleep spindles. EEG is reactive, when stimulated with cares diffuse fast activity and irregular delta is noted. No epileptiform discharges and seizures.    EEG consistent with mild to moderate diffuse encephalopathy. Developmental delay and recent lorazapam may be enough to account for this degree of slowing but hard to know for sure. Thus far no evidence of seizures or seizure tendency. Will continue video-EEG monitoring. Full report to follow.    Kedar Judge MD  Pager [phone]

## 2020-06-06 NOTE — CONSULTS
[facility], [place]   Initial Psychiatric Consult   Consult date: June 6, 2020         Reason for Consult, requesting source:    [facility]. Neurology has been consulted.   Requesting source: Zohaib Ruiz        HPI:   This 35 year old woman his intellectual disability and resides in a group home, has a court appointed guardian.  Normally she does quite well in terms of having a part-time job and socializing with friends.  However since being isolated due to the COVID situation she has gone downhill.  She is started acting out, frequently calling 911 to go to the emergency department at one point left the group home and was lying in the street.  Please refer to Dr Corado's note from 6/5 for more details of recent events.  Was previously on Depakote but she seemed to get worse.  She has been evaluated at [facility] multiple times, and is now been referred to the [facility] for further work-up.  At this point she is essentially non responsive.  I was unable to obtain any information from her.  She has received several doses of IV Ativan with slight response (becoming a bit more interactive)   Has had MRI of brain, EEG just started.         Past Psychiatric History:   I am not aware of any psychiatric admissions, but she has had frequent visits to the hospital emergency department for various ailments.  Clinical summary from [facility] mentions use of Abilify, Risperdal, clonazepam, but not recently filled.  She does continue to use Lexapro 20 mg/day  History of suicidal ideation is mentioned in N Mem chart.         Substance Use and History:   No use of drugs, alcohol or tobacco as far as I know        Past Medical History:   PAST MEDICAL HISTORY:   Past Medical History:   Diagnosis Date     Depressive disorder      Gastroesophageal reflux disease      High cholesterol      Knee pain      Thyroid disease        PAST SURGICAL HISTORY: History reviewed. No pertinent surgical  history.          Family History:   FAMILY HISTORY: No family history on file.        Social History:   SOCIAL HISTORY:   Social History     Tobacco Use     Smoking status: Never Smoker     Smokeless tobacco: Never Used   Substance Use Topics     Alcohol use: No       Resides in a group home. Information regarding her family is not currently available to me.          Physical ROS:   Unable to complete.          PTA Medications:     Medications Prior to Admission   Medication Sig Dispense Refill Last Dose     clotrimazole (LOTRIMIN) 1 % cream Apply topically 2 times daily as needed 30 g 1      DICLOFENAC SODIUM PO         ESCITALOPRAM OXALATE PO         Guaifenesin-Codeine (CHERATUSSIN AC PO)         hydrocortisone 2.5 % cream Apply topically 2 times daily        LEVOTHYROXINE SODIUM PO         MEDROXYPROGESTERONE ACETATE PO         Multiple Vitamins-Minerals (MULTIVITAL) CHEW         nystatin POWD         OMEPRAZOLE PO         PRAVASTATIN SODIUM PO         Skin Protectants, Misc. (EUCERIN) cream Apply topically as needed for dry skin               Allergies:     Allergies   Allergen Reactions     Ibuprofen           Labs:     Recent Results (from the past 48 hour(s))   CBC with platelets differential    Collection Time: 06/05/20  4:21 PM   Result Value Ref Range    WBC 18.6 (H) 4.0 - 11.0 10e9/L    RBC Count 4.52 3.8 - 5.2 10e12/L    Hemoglobin 13.0 11.7 - 15.7 g/dL    Hematocrit 40.6 35.0 - 47.0 %    MCV 90 78 - 100 fl    MCH 28.8 26.5 - 33.0 pg    MCHC 32.0 31.5 - 36.5 g/dL    RDW 13.7 10.0 - 15.0 %    Platelet Count 242 150 - 450 10e9/L    Diff Method Automated Method     % Neutrophils 86.9 %    % Lymphocytes 10.8 %    % Monocytes 1.9 %    % Eosinophils 0.0 %    % Basophils 0.0 %    % Immature Granulocytes 0.4 %    Nucleated RBCs 0 0 /100    Absolute Neutrophil 16.2 (H) 1.6 - 8.3 10e9/L    Absolute Lymphocytes 2.0 0.8 - 5.3 10e9/L    Absolute Monocytes 0.4 0.0 - 1.3 10e9/L    Absolute Eosinophils 0.0 0.0 - 0.7  10e9/L    Absolute Basophils 0.0 0.0 - 0.2 10e9/L    Abs Immature Granulocytes 0.1 0 - 0.4 10e9/L    Absolute Nucleated RBC 0.0    Comprehensive metabolic panel    Collection Time: 06/05/20  4:21 PM   Result Value Ref Range    Sodium 144 133 - 144 mmol/L    Potassium 3.0 (L) 3.4 - 5.3 mmol/L    Chloride 106 94 - 109 mmol/L    Carbon Dioxide 18 (L) 20 - 32 mmol/L    Anion Gap 20 (H) 3 - 14 mmol/L    Glucose 88 70 - 99 mg/dL    Urea Nitrogen 48 (H) 7 - 30 mg/dL    Creatinine 0.98 0.52 - 1.04 mg/dL    GFR Estimate 75 >60 mL/min/[1.73_m2]    GFR Estimate If Black 87 >60 mL/min/[1.73_m2]    Calcium 9.4 8.5 - 10.1 mg/dL    Bilirubin Total 1.2 0.2 - 1.3 mg/dL    Albumin 4.0 3.4 - 5.0 g/dL    Protein Total 7.8 6.8 - 8.8 g/dL    Alkaline Phosphatase 55 40 - 150 U/L    ALT 89 (H) 0 - 50 U/L     (H) 0 - 45 U/L   Valproic acid (Depakote level)    Collection Time: 06/05/20  4:21 PM   Result Value Ref Range    Valproic Acid Level 29 (L) 50 - 100 mg/L   Ammonia    Collection Time: 06/05/20  4:21 PM   Result Value Ref Range    Ammonia 15 10 - 50 umol/L   Lactic acid    Collection Time: 06/05/20  5:56 PM   Result Value Ref Range    Lactic Acid 1.6 0.7 - 2.0 mmol/L   UA reflex to Microscopic and Culture    Collection Time: 06/05/20  7:32 PM    Specimen: Urine catheter; Catheterized Urine   Result Value Ref Range    Color Urine Yellow     Appearance Urine Clear     Glucose Urine Negative NEG^Negative mg/dL    Bilirubin Urine Small (A) NEG^Negative    Ketones Urine 80 (A) NEG^Negative mg/dL    Specific Gravity Urine 1.024 1.003 - 1.035    Blood Urine Moderate (A) NEG^Negative    pH Urine 6.0 5.0 - 7.0 pH    Protein Albumin Urine 100 (A) NEG^Negative mg/dL    Urobilinogen mg/dL 4.0 (H) 0.0 - 2.0 mg/dL    Nitrite Urine Negative NEG^Negative    Leukocyte Esterase Urine Negative NEG^Negative    Source Catheterized Urine     RBC Urine 0 0 - 2 /HPF    WBC Urine 1 0 - 5 /HPF    Bacteria Urine Few (A) NEG^Negative /HPF    Mucous Urine  Present (A) NEG^Negative /LPF    Hyaline Casts 25 (H) 0 - 2 /LPF   Drug abuse screen 6 urine (chem dep)    Collection Time: 06/05/20  7:32 PM   Result Value Ref Range    Amphetamine Qual Urine Negative NEG^Negative    Barbiturates Qual Urine Negative NEG^Negative    Benzodiazepine Qual Urine Negative NEG^Negative    Cannabinoids Qual Urine Negative NEG^Negative    Cocaine Qual Urine Negative NEG^Negative    Ethanol Qual Urine Negative NEG^Negative    Opiates Qualitative Urine Negative NEG^Negative   HCG qualitative urine (UPT)    Collection Time: 06/05/20  7:32 PM   Result Value Ref Range    HCG Qual Urine Negative NEG^Negative   Blood culture    Collection Time: 06/05/20  7:45 PM    Specimen: Arm, Left; Blood    Left Arm   Result Value Ref Range    Specimen Description Blood Left Arm     Culture Micro No growth after 16 hours    Blood culture    Collection Time: 06/05/20  9:38 PM    Specimen: Arm, Left; Blood    PURPLE PORT   Result Value Ref Range    Specimen Description Blood PURPLE PORT     Culture Micro No growth after 14 hours    Glucose by meter    Collection Time: 06/06/20  3:10 AM   Result Value Ref Range    Glucose 90 70 - 99 mg/dL   Glucose by meter    Collection Time: 06/06/20  6:21 AM   Result Value Ref Range    Glucose 103 (H) 70 - 99 mg/dL   CBC with platelets differential    Collection Time: 06/06/20  9:24 AM   Result Value Ref Range    WBC 9.9 4.0 - 11.0 10e9/L    RBC Count 4.24 3.8 - 5.2 10e12/L    Hemoglobin 11.9 11.7 - 15.7 g/dL    Hematocrit 38.1 35.0 - 47.0 %    MCV 90 78 - 100 fl    MCH 28.1 26.5 - 33.0 pg    MCHC 31.2 (L) 31.5 - 36.5 g/dL    RDW 13.5 10.0 - 15.0 %    Platelet Count 164 150 - 450 10e9/L    Diff Method Automated Method     % Neutrophils 82.1 %    % Lymphocytes 9.7 %    % Monocytes 7.3 %    % Eosinophils 0.0 %    % Basophils 0.1 %    % Immature Granulocytes 0.8 %    Nucleated RBCs 0 0 /100    Absolute Neutrophil 8.2 1.6 - 8.3 10e9/L    Absolute Lymphocytes 1.0 0.8 - 5.3 10e9/L     Absolute Monocytes 0.7 0.0 - 1.3 10e9/L    Absolute Eosinophils 0.0 0.0 - 0.7 10e9/L    Absolute Basophils 0.0 0.0 - 0.2 10e9/L    Abs Immature Granulocytes 0.1 0 - 0.4 10e9/L    Absolute Nucleated RBC 0.0    Comprehensive metabolic panel    Collection Time: 06/06/20  9:24 AM   Result Value Ref Range    Sodium 146 (H) 133 - 144 mmol/L    Potassium 3.1 (L) 3.4 - 5.3 mmol/L    Chloride 112 (H) 94 - 109 mmol/L    Carbon Dioxide 21 20 - 32 mmol/L    Anion Gap 12 3 - 14 mmol/L    Glucose 97 70 - 99 mg/dL    Urea Nitrogen 47 (H) 7 - 30 mg/dL    Creatinine 0.79 0.52 - 1.04 mg/dL    GFR Estimate >90 >60 mL/min/[1.73_m2]    GFR Estimate If Black >90 >60 mL/min/[1.73_m2]    Calcium 8.7 8.5 - 10.1 mg/dL    Bilirubin Total 0.9 0.2 - 1.3 mg/dL    Albumin 3.4 3.4 - 5.0 g/dL    Protein Total 6.5 (L) 6.8 - 8.8 g/dL    Alkaline Phosphatase 45 40 - 150 U/L    ALT 78 (H) 0 - 50 U/L     (H) 0 - 45 U/L   Lactic acid whole blood    Collection Time: 06/06/20  9:24 AM   Result Value Ref Range    Lactic Acid 1.2 0.7 - 2.0 mmol/L   CRP inflammation    Collection Time: 06/06/20  9:24 AM   Result Value Ref Range    CRP Inflammation 9.8 (H) 0.0 - 8.0 mg/L   Blood gas venous    Collection Time: 06/06/20  9:24 AM   Result Value Ref Range    Ph Venous 7.45 (H) 7.32 - 7.43 pH    PCO2 Venous 32 (L) 40 - 50 mm Hg    PO2 Venous 68 (H) 25 - 47 mm Hg    Bicarbonate Venous 22 21 - 28 mmol/L    Base Deficit Venous 1.5 mmol/L    FIO2 RA    CK total    Collection Time: 06/06/20  9:24 AM   Result Value Ref Range    CK Total 2,642 (HH) 30 - 225 U/L   TSH with free T4 reflex    Collection Time: 06/06/20  9:24 AM   Result Value Ref Range    TSH 0.34 (L) 0.40 - 4.00 mU/L   T4 free    Collection Time: 06/06/20  9:24 AM   Result Value Ref Range    T4 Free 0.98 0.76 - 1.46 ng/dL   Glucose by meter    Collection Time: 06/06/20  2:54 PM   Result Value Ref Range    Glucose 109 (H) 70 - 99 mg/dL          Physical and Psychiatric Examination:     BP (!) 140/82  "  Pulse 89   Temp 99.5  F (37.5  C) (Axillary)   Resp 18   Ht 1.753 m (5' 9\")   Wt 134 kg (295 lb 6.7 oz)   SpO2 97%   BMI 43.63 kg/m    Weight is 295 lbs 6.66 oz  Body mass index is 43.63 kg/m .    Physical Exam:  I have reviewed the physical exam as documented by by the medical team and agree with findings and assessment and have no additional findings to add at this time.    Mental Status Exam:  Lying quietly in the hospital bed, moaning for most of my visit. Resists movement of extremities (but per nurse, she does relax when sleeping. Will not respond to very commands or light physical stimulation. Reasonably well groomed. Unable to assess memory, concentration, etc.              DSM-5 Diagnosis:   Intellectual disability   MDD and anxiety unspecified   Possible catatonia   Likely conversion disorder   Possible seizure disorder           Assessment:   The time course of developed increasing problems associated with  social isolation does suggest a conversion disorder.  However, there is remote chance of her having catatonia; it is not clear to me yet whether she has responded to intravenous Ativan.  Also neurological work-up is ongoing, EEG in progress          Summary of Recommendations:   I have restarted Lexapro 10 mg/day  Would hold on any antipsychotics.  I would be inclined to continue trying intravenous Ativan to look for any response in the event that this is catatonia.  Page me at [phone], or re-consult psychiatry as needed.      \"This dictation was performed with voice recognition software and may contain errors,  omissions and inadvertent word substitution.\"          "

## 2020-06-06 NOTE — PROGRESS NOTES
Admitted/transferred from: [facility]   Reason for admission/transfer: 6B overflow, altered mental status  2 RN skin assessment: completed by Ronnie Agudelo and Rashid Davila  Result of skin assessment and interventions/actions: Blanchable redness on sacrum and heels. Sacral mepilex placed. Scattered bruising on lower extremities. Interdry placed. WOC consulted.   Height, weight, drug calc weight: Done  Patient belongings (see Flowsheet)  MDRO education added to care planYes  ?

## 2020-06-06 NOTE — H&P
[facility], [place]    History and Physical - [facility] Night Float Service        Date of Admission:  6/5/2020    Assessment & Plan   Dionne Perez is a 35 year old female with PMH of MR, behavioral issues, GERD, obesity, hypothyroidism, and MDD who presents from her group home with AMS.     # AMS   # MR  Hx of progressive AMS at group home. Recently started on Depakote by psychiatry for behavioral issues. Notably, AMS worsened while on Depakote and it was discontinued.  Depakote level subtherapeutic. UDS negative. MRA neck and carotids unrevealing. Afebrile. SpO2 high 90s on room air. Elevated white count with left shift c/f infective process. UA with neg nit/leuk est. BMP remarkable for BUN 48. Glucose nl, ketonuria c/f starvation ketosis. Lactate nl. LFTs unremarkable. Likely multifactorial etiology including infectious, uremic, psych. LP considered, held off on given broad differential at this time, VSS, afebrile.   - CXR   - Follow up BCx   - 1L LR prior to CT abdomen   - CT abdomen r/o infection, obstruction   - VBG   - TSH   - B12; thiamine   - pharmacy consult for medication doses   - Consider psychiatry consult given psychiatric hx   - Consider neurology consult  - Consider LP     # Urinary incontinence   Unclear etiology. UA with ketonuria and proteinuria. No evidence of infection.   - Continue to monitor   - Look for other sources of infection     # Elevated anion gap metabolic acidosis   Lactate 1.6. ?Starvation ketosis. ?Salicylates.   - serum ketone level   - salicylate level     # Leukocytosis   WBC 18.6 with left shift c/f infective process. UA neg nit, leuk est. BCx pending. CXR pending. CT abd pending.     # Ketonuria   # Proteinuria   No hx of DM. ?Starvation ketosis.   - serum ketones     # MDD: restart escitalopram per pharmacy recs   # Hypothyroidism: restart levothyroxine per pharmacy recs   # GERD: restart omeprazole per pharmacy recs       Diet: NPO  Fluids: 1L  LR   DVT Prophylaxis: Enoxaparin (Lovenox) SQ  Davila Catheter: not present  Code Status: Full Code      Contact at [facility]: Cynthia Reynolds: [phone]         Disposition Plan   Expected discharge: 2 - 3 days, recommended to prior living arrangement once mental status at baseline.  Entered: Hoda Uribe MD 06/06/2020, 6:11 AM       The patient's care was discussed with the Attending Physician, Dr. Grant.    Hoda Uribe MD  [facility]  General acute hospital, [place]  Pager: [phone]  Please see sticky note for cross cover information  ______________________________________________________________________    Chief Complaint   AMS    Unable to obtain a history from the patient due to confusion.     History of Present Illness   Dionne Perez is a 35 year old female who presents with AMS from her group home.     History obtained from [facility] and court-appointed guardian. Patient has history of developmental delay/intellectual disability. History of behavioral issues which include repeated calls to EMS and presentations to [facility] ED. Her behavior appears to be exacerbated by the quarantine order. She has developed urinary incontinence. Noted to have recently fallen repeatedly. Recently started on Depakote by psychiatry for behavioral issues; however was discontinued 2 days ago d/t concern for worsening symptoms.     Arrived at ED and noted to be minimally engaging with staff, urinary incontinence.     Review of Systems    The 10 point Review of Systems is negative other than noted in the HPI or here.     Past Medical History    I have reviewed this patient's medical history and updated it with pertinent information if needed.   Past Medical History:   Diagnosis Date     Depressive disorder      Gastroesophageal reflux disease      High cholesterol      Knee pain      Thyroid disease         Past Surgical History   I have reviewed this  patient's surgical history and updated it with pertinent information if needed.  History reviewed. No pertinent surgical history.     Social History   I have reviewed this patient's social history and updated it with pertinent information if needed. Dionne Perez  reports that she has never smoked. She has never used smokeless tobacco. She reports that she does not drink alcohol or use drugs.    Family History   I have reviewed this patient's family history and updated it with pertinent information if needed.   No family history on file.    Prior to Admission Medications   Prior to Admission Medications   Prescriptions Last Dose Informant Patient Reported? Taking?   DICLOFENAC SODIUM PO   Yes No   ESCITALOPRAM OXALATE PO   Yes No   Guaifenesin-Codeine (CHERATUSSIN AC PO)   Yes No   LEVOTHYROXINE SODIUM PO   Yes No   MEDROXYPROGESTERONE ACETATE PO   Yes No   Multiple Vitamins-Minerals (MULTIVITAL) CHEW   Yes No   OMEPRAZOLE PO   Yes No   PRAVASTATIN SODIUM PO   Yes No   Skin Protectants, Misc. (EUCERIN) cream   Yes No   Sig: Apply topically as needed for dry skin   clotrimazole (LOTRIMIN) 1 % cream   No No   Sig: Apply topically 2 times daily as needed   hydrocortisone 2.5 % cream   Yes No   Sig: Apply topically 2 times daily   nystatin POWD   Yes No      Facility-Administered Medications: None     Allergies   Allergies   Allergen Reactions     Ibuprofen        Physical Exam   Vital Signs: Temp: 98.4  F (36.9  C) Temp src: Axillary BP: (!) 155/87 Pulse: 101 Heart Rate: 100 Resp: 18 SpO2: 100 % O2 Device: None (Room air)    Weight: 295 lbs 6.66 oz    General Appearance: lethargic, withdraws from pain, squeezes hand on command  Eyes: anicteric sclera, PERRLA  HEENT: NCAT  Respiratory: CTAB  Cardiovascular: RRR, S1, S2, no murmur   GI: obese, soft, notably withdrew to palpation diffusely   Skin: diffuse eccyhmoses bilateral LE   Neurologic: lethargic, moving all extremities     Data   Data reviewed today: I reviewed all  medications, new labs and imaging results over the last 24 hours. I personally reviewed no images or EKG's today.    Recent Labs   Lab 06/05/20  1621   WBC 18.6*   HGB 13.0   MCV 90         POTASSIUM 3.0*   CHLORIDE 106   CO2 18*   BUN 48*   CR 0.98   ANIONGAP 20*   SANDRA 9.4   GLC 88   ALBUMIN 4.0   PROTTOTAL 7.8   BILITOTAL 1.2   ALKPHOS 55   ALT 89*   *     Recent Results (from the past 24 hour(s))   MRA Neck (Carotids) wo Contrast    Narrative    EXAM: MRA NECK (CAROTIDS) WO CONTRAST  LOCATION: [facility]  DATE/TIME: 6/6/2020 2:03 AM    INDICATION: Altered mental status  COMPARISON: None.  TECHNIQUE: Neck MRA without IV contrast. Stenosis measurements made according to NASCET criteria unless otherwise specified.    FINDINGS:  Motion degraded exam. Please note that series 11 was acquired in the venous phase.    RIGHT CAROTID: No measurable stenosis or dissection.    LEFT CAROTID: No measurable stenosis or dissection.    VERTEBRAL ARTERIES: No flow-limiting stenosis. Dominant right and smaller left vertebral arteries.    AORTIC ARCH: Not visualized on this exam.      Impression    IMPRESSION:  1.  Motion degraded exam. No evidence of flow-limiting stenosis.   MRA Brain ([place]) wo Contrast    Narrative    EXAM: MRA BRAIN ([place]) WO CONTRAST  LOCATION: [facility]  DATE/TIME: 6/6/2020 2:03 AM    INDICATION: Altered mental status.  COMPARISON: None.  TECHNIQUE: 3D time-of-flight head MRA without intravenous contrast.    FINDINGS:  Motion degraded exam.    ANTERIOR CIRCULATION: The internal carotid arteries appear grossly patent. Apparent narrowing of the M1 segment of the right middle cerebral artery is favored to be artifactual. No definite proximal arterial vascular occlusion or aneurysm.    POSTERIOR CIRCULATION: Dominant right vertebral artery and small left vertebral artery. Significant motion artifact. No definite proximal occlusion or  aneurysm.      Impression    IMPRESSION:  1.  Significantly motion degraded exam. No definite proximal arterial vascular occlusion. Apparent narrowing of the right middle cerebral artery M1 segment is favored to be artifactual, but could be further evaluated with CTA or repeat imaging as   clinically indicated.

## 2020-06-06 NOTE — PROGRESS NOTES
Major Shift Events: Pt not responding to staff, not following commands. Does open eyes and moan. She becomes hypertensive and tachycardic when agitated. [facility] team notified of current condition. Interdry placed in skin folds. 1L LR bolus started.     Plan: CT abdomen today.     For vital signs and complete assessments, please see documentation flowsheets.

## 2020-06-06 NOTE — PLAN OF CARE
Pt usually very sleepy not following commands or very inconsistence will follow commands like bilateral  to squeeze hands or wiggles toes. Pt will not open eyes on command but will shut them close tight when writer tries to open gentley to view pupils w/ pen light. Ativan Challenge performed twice today as ordered by Psych,w/ 2mg Ativan, after giving med writer observed pt for 20-30 mins, pt moved very limited tightening her hand  and moaned for few seconds. Neuro ordered EEG, see notes. K+ very low, being replaced today per protocol, MD aware.

## 2020-06-06 NOTE — CONSULTS
"[facility]  Neurology Consultation    Patient Name:  Dionne Perez  MRN:  [id number]    :  1985  Date of Service:  2020  Primary care provider:  Clinic, [facility] [place]      Neurology consultation service was asked to see Dionne Perez by Dr. Zohaib Cook to evaluate altered mental status and rigidity.    History of Present Illness:   Dionne \"Jhonatan Perez is a 35 year old female with h/o developmental delay, intellectual disability (reportedly at cognitive level of 5-6 years old), depression, anxiety, and long-standing behavioral issues who presents with unresponsiveness and full-body rigidity. The patient is unable to provide any history due to unresponsiveness. She lives in a group home, where, per report, she has lately been exhibiting worsening behavioral issues and depressive symptoms thought to be related to quarantine orders during COVID-. She receives the majority of her care at [facility], and has been seen for multiple ED admissions. She has developed falls and incontinence. She was seen on 6/3 in ED for fall getting out of the shower with associated head trauma, though CT head was negative for acute intracranial pathology. Depakote was prescribed recently for worsening mood, but discontinued on 6/3 when symptoms continued to worsen. She currently takes escitalopram and risperidone, which she has taken since 2019 after a hospital admission for suicidal and homicidal behavior.    On arrival to [facility] ED, she was found to be completely rigid, resistant to passive flexion. She was given Ativan challenge (2 mg IV) for possible catatonia with no improvement of the rigidity. She was noted to be afebrile and tachycardic. Notably, rigidity has relaxed significantly whenever patient is asleep. Ativan 2 mg IV was repeated this afternoon with again no discernable effect.        ROS  A 10-point ROS was unable to be performed due to patient " unresponsiveness.     [facility]  Behavior problem, adult 05/26/2020   Suicidal ideation 11/02/2019   Suicidal ideations 11/02/2019   Gallstone pancreatitis 10/10/2019   Cholelithiasis 10/08/2019   Adjustment disorder 07/28/2019   Generalized anxiety disorder 03/22/2019   Health care home, active care coordination 10/30/2018   Acute costochondritis 05/15/2018   Syncope 05/15/2018   Sinus bradycardia, chronic 05/15/2018   Essential hypertension 05/06/2018   Contusion of left hip 11/16/2017   Hypothyroidism due to acquired atrophy of thyroid 05/16/2016   Cognitive impairment 04/10/2014   Morbid obesity 04/10/2014   Hyperlipemia 04/10/2014   History of cellulitis 04/10/2014   Developmental disability     Recurrent major depressive disorder, remission status unspecified     Major depressive disorder, recurrent severe without psychotic features     Adjustment reaction with aggression      Surgical History  Cholecystectomy  Tonsillectomy    Medications   Medication Sig Start Date   aspirin-acetaminophen-caffeine (EXCEDRIN,MIGRAINE) 250-250-65 MG tablet    Indications: Tension headache Take 1-2 tablets by mouth every 6 hours as needed for Headaches. Do not use after 4 PM. Maximal daily dose of acetaminophen (from all sources) = 3000 mg. 01/28/2016   Multiple Vitamins-Minerals (MULTIVITAMINS) CHEW   Take 1 Tab by mouth two times a day. Per patient: Taking 1 BID 06/07/2015   escitalopram oxalate (LEXAPRO) 20 MG tablet   Take 20 mg by mouth daily. 10/18/2018   Psyllium (METAMUCIL SMOOTH TEXTURE OR)   daily.     acetaminophen (TYLENOL) 325 MG tablet    Indications: Arthralgia, unspecified joint 2 tablets in the morning, 2 tablets at 2 pm, and 2 tablets at bedtime daily. 07/19/2019   diclofenac (VOLTAREN) 75 MG enteric coated tablet    Indications: Arthralgia of hip, unspecified laterality Take 1 Tablet by mouth two times a day. 08/29/2019   aluminum & magnesium hydroxide-simethicone (MAALOXMAX,MYLANTA) 400-400-40 MG/5ML suspension     Indications: Gastroesophageal reflux disease, esophagitis presence not specified Take 15 mL by mouth two times daily as needed. 09/03/2019   famotidine (PEPCID) 20 MG tablet    Indications: Chronic abdominal pain Take 1 Tablet by mouth daily. 09/03/2019   NYSTOP 206948 UNIT/GM powder    Indications: Intertrigo APPLY TOPICALLY TWICE DAILY FOR GROIN RASH USE INSTEAD OF CREAM DURING HOT WEATHER APPLY UNDER BREASTS WHEN RASH PRESENT 11/01/2019   pravastatin (PRAVACHOL) 40 MG tablet    Indications: Hyperlipidemia, unspecified hyperlipidemia type (HRC) TAKE 1 TABLET BY MOUTH DAILY. 11/21/2019   cloNIDine (CATAPRES) 0.1 MG tablet   Take 0.1 mg by mouth daily at bedtime.     medroxyPROGESTERone (PROVERA) 10 MG tablet    Indications: Menorrhagia with regular cycle Take 1 tab consecutively for 10 days straight each month. Starting on the 16th of each month 12/05/2019   pseudoephedrine (SUDAFED) 30 MG tablet    Indications: Nasal congestion Take 1-2 tabs q 4-6 hours prn for ear pain or stuffy nose. 12/05/2019   levothyroxine (SYNTHROID) 50 MCG tablet    Indications: Hypothyroidism due to acquired atrophy of thyroid Take 1 Tablet by mouth daily. 12/05/2019   ondansetron (ZOFRAN-ODT) 4 MG disintegrating tablet   1 TAB BY MOUTH EVERY 8 HOURS AS NEEDED FOR NAUSEA 03/23/2020   diclofenac (VOLTAREN) 1 % gel    Indications: Arthralgia of hip, unspecified laterality APPLY 2GRAMS (USING INCLUDED DOSING CARD) TO SKIN TWICE DAILY AS NEEDED 04/23/2020   risperiDONE (RISPERDAL) 0.5 MG tablet   TAKE 2 TABLETS (1 MG) BY MOUTH TWICE A DAY. 06/03/2020     Allergies  Allergies   Allergen Reactions     Ibuprofen Interacts with current medications     Social History  Social History     Tobacco Use     Smoking status: Never Smoker     Smokeless tobacco: Never Used   Substance Use Topics     Alcohol use: No     Care Coordinator: Saige Otto RN Care Coordinator  [facility]  [phone]   Care coordination  "focus: Frequent ER visits without cause  Living situation: Group Home   Important notes: Fan has the mental age of around 7-9 years of age. She is impulsive and sucks her thumb. She likes to go to the ER by ambulance. She has a community paramedic program to assess her as possible and NOT transport her to the ER.        Family History    Medical History Relation Name Comments   Cancer Birth Mother       Cancer Sister       Diabetes Sister         Physical Examination   Vitals: BP (!) 160/97   Pulse 122   Temp 98.5  F (36.9  C)   Resp 20   Ht 1.753 m (5' 9\")   Wt 134 kg (295 lb 6.7 oz)   SpO2 97%   BMI 43.63 kg/m    General: Morbidly obese adult female patient, lying in bed, NAD  HEENT: NC/AT, no icterus, op pink and moist  Cardiac: RRR  Chest: non-labored on RA  Abdomen: S/NT/ND  Extremities: Warm, no edema  Skin: No rash or lesion   Psych: Mood pleasant, affect congruent  Neuro:  Mental status: Unresponsive. Rhythmic grunting only, is non-verbal. Does not follow commands.   Cranial nerves: PERRL. Forcefully resists passive eye-opening.   Motor: Normal bulk and mildly increased tone. Moves all four extremities spontaneously. No abnormal movements. Unable to test strength.   Reflexes: Hyperreflexic and symmetric biceps, brachioradialis, triceps, patellae, and achilles with cross adductors present bilaterally. Positive  and Babinski bilaterally.   Sensory: Minimally withdraws to noxious stimuli in bilateral lower extremities, localizes to pain with bilateral upper extremities.   Coordination: Unable to test  Gait: Unable to test    Investigations   Lab Test 06/06/20  1701 06/06/20  0924 06/05/20  1621   NA  --  146* 144   POTASSIUM 2.9* 3.1* 3.0*   CHLORIDE  --  112* 106   CO2  --  21 18*   ANIONGAP  --  12 20*   GLC  --  97 88   BUN  --  47* 48*   CR  --  0.79 0.98   SANDRA  --  8.7 9.4     Liver Function Studies  Lab Test 06/06/20  0924   PROTTOTAL 6.5*   ALBUMIN 3.4   BILITOTAL 0.9   ALKPHOS 45   AST " "149*   ALT 78*      Ref. Range 6/6/2020 09:24   CK Total 30 - 225 U/L 2,642 (HH)   CRP  0.0 - 8.0 mg/L 9.8 (H)     TSH   Date Value Ref Range Status   06/06/2020 0.34 (L) 0.40 - 4.00 mU/L Final     T4 Free   Date Value Ref Range Status   06/06/2020 0.98 0.76 - 1.46 ng/dL Final       MR BRAIN W/O & W CONTRAST 6/6/2020 2:39 PM  \"Impression: Small infarct in the right cerebellar hemisphere. Marked motion artifact.\"    Impression  35 year old female with developmental delay, intellectual disability, history of repeated ED visits, depression, and anxiety who presents with unresponsiveness and full-body rigidity after several weeks of worsening behavioral issues, falls, and incontinence. Exam is limited as patient is unresponsive. Hemodynamically stable aside from tachycardia. Patient is afebrile. Labs notable for CK moderately elevated at 2,500 with normal lactate, CRP elevated at 9.8, potassium low, sodium high, and AST/ALT elevated in a 2:1 pattern. Rigidity, hyperreflexia, and elevated CK is somewhat suggestive of serotonin syndrome or NMS, though she is afebrile and with inconsistency in her increased tone (improves/resolves when she is asleep). CK elevation is modest enough that this could possibly be the result of volitional muscle tensing, though would be unlikely if uptrending. Additionally, while she is on risperidone which could cause NMS, she has had no changes to this medication and there is no history or report of overdose. Non-convulsive status epilepticus is on the differential as well, given unresponsiveness with mental status/behavioral change leading up to current presentation. Failure to respond to Ativan challenge makes this less likely, but does not rule it out. Likewise, catatonia or other psychiatric manifestation remains a distinct possibility not necessarily eliminated by failure of Ativan trial.    MRI performed on arrival incidentally revealed subacute infarct in the right cerebellum. This by " no means explains her unresponsiveness and muscle rigidity, but does warrant stroke workup including vessel imaging, TTE, and initiation of aspirin. Patient is already taking a statin and should continue to do so.     Recommendations  #Unresponsiveness  #Rigidity  - Video EEG monitoring overnight  - check serial CK  - continuous IV fluids   - trend temperature  - consider obtaining information if available regarding possible history of overdose    #Subacute right cerebellar infarct  - Give aspirin 325 mg x 1 dose, then start 81 mg daily from tomorrow  - CTA head/neck with contrast  - TTE    Thank you for involving Neurology in the care of Dionne Perez.  Please do not hesitate to call with questions/concerns (consult pager [phone]).      Patient was seen and discussed with Dr. Hughes.    Katherin Palma MD  Neurology PGY-2    Attending physician: Dr. Ruiz of the medicine service requested neurologic consultation for Ms. Dionne Perez for opinion regarding etiology of encephalopathy.    I saw and evaluated the patient with the resident team and I agree with the findings and plan of care as per. Dr. Palma above, with the following additions.     The patient is a 35 year old developmentally delayed woman who presents to the hospital with encephalopathy, increased muscle tone and elevated CK level. Of note, she apparently had a somewhat similar but milder presentation a few months ago.    Testing completed today includes an MRI scan of the brain that is badly degraded by motion artifact but does demonstrate a focus of restricted diffusion in the right cerebellar hemisphere consistent with acute infarct.    EEG recording also started and does not reveal any epileptiform abnormality, to date.    Impression: Encephalopathy and hyperCKemia of unknown etiology, differential diagnosis includes toxidrome versus malignant catatonia, less likely primary CNS process    Recommendations:  -Hold all serotonergic agents and  dopamine antagonists  -CTA head/neck  -Transthoracic echo with bubble study  -Aspirin 325 mg po daily    We will continue to follow.    Thor Hughes MD   of Neurology

## 2020-06-07 ENCOUNTER — APPOINTMENT (OUTPATIENT)
Dept: GENERAL RADIOLOGY | Facility: CLINIC | Age: 35
DRG: 887 | End: 2020-06-07
Attending: PHYSICIAN ASSISTANT
Payer: MEDICARE

## 2020-06-07 ENCOUNTER — APPOINTMENT (OUTPATIENT)
Dept: NEUROLOGY | Facility: CLINIC | Age: 35
DRG: 887 | End: 2020-06-07
Attending: PSYCHIATRY & NEUROLOGY
Payer: MEDICARE

## 2020-06-07 ENCOUNTER — APPOINTMENT (OUTPATIENT)
Dept: CARDIOLOGY | Facility: CLINIC | Age: 35
DRG: 887 | End: 2020-06-07
Attending: PHYSICIAN ASSISTANT
Payer: MEDICARE

## 2020-06-07 LAB
ALBUMIN SERPL-MCNC: 3.5 G/DL (ref 3.4–5)
ALP SERPL-CCNC: 42 U/L (ref 40–150)
ALT SERPL W P-5'-P-CCNC: 78 U/L (ref 0–50)
ANION GAP SERPL CALCULATED.3IONS-SCNC: 5 MMOL/L (ref 3–14)
AST SERPL W P-5'-P-CCNC: 130 U/L (ref 0–45)
BASOPHILS # BLD AUTO: 0 10E9/L (ref 0–0.2)
BASOPHILS NFR BLD AUTO: 0.2 %
BILIRUB SERPL-MCNC: 0.9 MG/DL (ref 0.2–1.3)
BUN SERPL-MCNC: 37 MG/DL (ref 7–30)
CALCIUM SERPL-MCNC: 8.6 MG/DL (ref 8.5–10.1)
CHLORIDE SERPL-SCNC: 115 MMOL/L (ref 94–109)
CHOLEST SERPL-MCNC: 140 MG/DL
CK SERPL-CCNC: 1922 U/L (ref 30–225)
CO2 SERPL-SCNC: 25 MMOL/L (ref 20–32)
CREAT SERPL-MCNC: 0.74 MG/DL (ref 0.52–1.04)
CRP SERPL-MCNC: 3.6 MG/L (ref 0–8)
DIFFERENTIAL METHOD BLD: NORMAL
EOSINOPHIL # BLD AUTO: 0 10E9/L (ref 0–0.7)
EOSINOPHIL NFR BLD AUTO: 0.4 %
ERYTHROCYTE [DISTWIDTH] IN BLOOD BY AUTOMATED COUNT: 13.4 % (ref 10–15)
GFR SERPL CREATININE-BSD FRML MDRD: >90 ML/MIN/{1.73_M2}
GLUCOSE BLDC GLUCOMTR-MCNC: 108 MG/DL (ref 70–99)
GLUCOSE BLDC GLUCOMTR-MCNC: 127 MG/DL (ref 70–99)
GLUCOSE SERPL-MCNC: 127 MG/DL (ref 70–99)
HCT VFR BLD AUTO: 37.3 % (ref 35–47)
HDLC SERPL-MCNC: 42 MG/DL
HGB BLD-MCNC: 11.8 G/DL (ref 11.7–15.7)
IMM GRANULOCYTES # BLD: 0.2 10E9/L (ref 0–0.4)
IMM GRANULOCYTES NFR BLD: 2.7 %
LACTATE BLD-SCNC: 1.2 MMOL/L (ref 0.7–2)
LDLC SERPL CALC-MCNC: 58 MG/DL
LYMPHOCYTES # BLD AUTO: 2 10E9/L (ref 0.8–5.3)
LYMPHOCYTES NFR BLD AUTO: 24.1 %
MCH RBC QN AUTO: 28.6 PG (ref 26.5–33)
MCHC RBC AUTO-ENTMCNC: 31.6 G/DL (ref 31.5–36.5)
MCV RBC AUTO: 90 FL (ref 78–100)
MONOCYTES # BLD AUTO: 0.5 10E9/L (ref 0–1.3)
MONOCYTES NFR BLD AUTO: 6.5 %
NEUTROPHILS # BLD AUTO: 5.4 10E9/L (ref 1.6–8.3)
NEUTROPHILS NFR BLD AUTO: 66.1 %
NONHDLC SERPL-MCNC: 98 MG/DL
NRBC # BLD AUTO: 0 10*3/UL
NRBC BLD AUTO-RTO: 0 /100
PLATELET # BLD AUTO: 154 10E9/L (ref 150–450)
POTASSIUM SERPL-SCNC: 3.6 MMOL/L (ref 3.4–5.3)
PROCALCITONIN SERPL-MCNC: <0.05 NG/ML
PROT SERPL-MCNC: 6.4 G/DL (ref 6.8–8.8)
RBC # BLD AUTO: 4.13 10E12/L (ref 3.8–5.2)
SODIUM SERPL-SCNC: 146 MMOL/L (ref 133–144)
TRIGL SERPL-MCNC: 200 MG/DL
WBC # BLD AUTO: 8.2 10E9/L (ref 4–11)
WBC # BLD AUTO: 9.7 10E9/L (ref 4–11)

## 2020-06-07 PROCEDURE — 25000128 H RX IP 250 OP 636: Performed by: STUDENT IN AN ORGANIZED HEALTH CARE EDUCATION/TRAINING PROGRAM

## 2020-06-07 PROCEDURE — 84145 PROCALCITONIN (PCT): CPT | Performed by: PHYSICIAN ASSISTANT

## 2020-06-07 PROCEDURE — 36415 COLL VENOUS BLD VENIPUNCTURE: CPT | Performed by: INTERNAL MEDICINE

## 2020-06-07 PROCEDURE — 99233 SBSQ HOSP IP/OBS HIGH 50: CPT | Mod: GC | Performed by: HOSPITALIST

## 2020-06-07 PROCEDURE — 36415 COLL VENOUS BLD VENIPUNCTURE: CPT | Performed by: PHYSICIAN ASSISTANT

## 2020-06-07 PROCEDURE — 71045 X-RAY EXAM CHEST 1 VIEW: CPT

## 2020-06-07 PROCEDURE — 25800025 ZZH RX 258: Performed by: INTERNAL MEDICINE

## 2020-06-07 PROCEDURE — 20000004 ZZH R&B ICU UMMC

## 2020-06-07 PROCEDURE — 25000132 ZZH RX MED GY IP 250 OP 250 PS 637: Performed by: PHYSICIAN ASSISTANT

## 2020-06-07 PROCEDURE — 40000986 XR ABDOMEN PORT 1 VW

## 2020-06-07 PROCEDURE — 99207 ZZC APP CREDIT; MD BILLING SHARED VISIT: CPT | Performed by: PHYSICIAN ASSISTANT

## 2020-06-07 PROCEDURE — 85004 AUTOMATED DIFF WBC COUNT: CPT | Performed by: PHYSICIAN ASSISTANT

## 2020-06-07 PROCEDURE — 00000146 ZZHCL STATISTIC GLUCOSE BY METER IP

## 2020-06-07 PROCEDURE — 25500064 ZZH RX 255 OP 636: Performed by: INTERNAL MEDICINE

## 2020-06-07 PROCEDURE — 82550 ASSAY OF CK (CPK): CPT | Performed by: PHYSICIAN ASSISTANT

## 2020-06-07 PROCEDURE — 25000128 H RX IP 250 OP 636: Performed by: PHYSICIAN ASSISTANT

## 2020-06-07 PROCEDURE — 80061 LIPID PANEL: CPT | Performed by: PHYSICIAN ASSISTANT

## 2020-06-07 PROCEDURE — 25000128 H RX IP 250 OP 636: Performed by: HOSPITALIST

## 2020-06-07 PROCEDURE — 85027 COMPLETE CBC AUTOMATED: CPT | Performed by: PHYSICIAN ASSISTANT

## 2020-06-07 PROCEDURE — 86140 C-REACTIVE PROTEIN: CPT | Performed by: PHYSICIAN ASSISTANT

## 2020-06-07 PROCEDURE — 80307 DRUG TEST PRSMV CHEM ANLYZR: CPT | Performed by: PHYSICIAN ASSISTANT

## 2020-06-07 PROCEDURE — 25800030 ZZH RX IP 258 OP 636: Performed by: PHYSICIAN ASSISTANT

## 2020-06-07 PROCEDURE — 80053 COMPREHEN METABOLIC PANEL: CPT | Performed by: PHYSICIAN ASSISTANT

## 2020-06-07 PROCEDURE — 85048 AUTOMATED LEUKOCYTE COUNT: CPT | Performed by: INTERNAL MEDICINE

## 2020-06-07 PROCEDURE — 95711 VEEG 2-12 HR UNMONITORED: CPT

## 2020-06-07 PROCEDURE — 93306 TTE W/DOPPLER COMPLETE: CPT | Mod: 26 | Performed by: INTERNAL MEDICINE

## 2020-06-07 PROCEDURE — 25800030 ZZH RX IP 258 OP 636: Performed by: HOSPITALIST

## 2020-06-07 PROCEDURE — 40000264 ECHOCARDIOGRAM COMPLETE

## 2020-06-07 PROCEDURE — 25800025 ZZH RX 258: Performed by: PHYSICIAN ASSISTANT

## 2020-06-07 PROCEDURE — 85004 AUTOMATED DIFF WBC COUNT: CPT | Performed by: INTERNAL MEDICINE

## 2020-06-07 PROCEDURE — 83605 ASSAY OF LACTIC ACID: CPT | Performed by: PHYSICIAN ASSISTANT

## 2020-06-07 PROCEDURE — 87040 BLOOD CULTURE FOR BACTERIA: CPT | Performed by: PHYSICIAN ASSISTANT

## 2020-06-07 PROCEDURE — 25800025 ZZH RX 258: Performed by: HOSPITALIST

## 2020-06-07 PROCEDURE — 27210437 ZZH NUTRITION PRODUCT SEMIELEM INTERMED LITER

## 2020-06-07 RX ORDER — LANOLIN ALCOHOL/MO/W.PET/CERES
3 CREAM (GRAM) TOPICAL AT BEDTIME
Status: DISCONTINUED | OUTPATIENT
Start: 2020-06-07 | End: 2020-07-10 | Stop reason: HOSPADM

## 2020-06-07 RX ORDER — CEFTRIAXONE 2 G/1
2 INJECTION, POWDER, FOR SOLUTION INTRAMUSCULAR; INTRAVENOUS EVERY 12 HOURS
Status: DISCONTINUED | OUTPATIENT
Start: 2020-06-07 | End: 2020-06-09

## 2020-06-07 RX ORDER — FAMOTIDINE 20 MG/1
20 TABLET, FILM COATED ORAL DAILY
Status: DISCONTINUED | OUTPATIENT
Start: 2020-06-08 | End: 2020-07-10 | Stop reason: HOSPADM

## 2020-06-07 RX ORDER — POLYETHYLENE GLYCOL 3350 17 G/17G
17 POWDER, FOR SOLUTION ORAL DAILY
Status: DISCONTINUED | OUTPATIENT
Start: 2020-06-07 | End: 2020-07-10 | Stop reason: HOSPADM

## 2020-06-07 RX ORDER — DEXTROSE MONOHYDRATE 100 MG/ML
INJECTION, SOLUTION INTRAVENOUS CONTINUOUS PRN
Status: DISCONTINUED | OUTPATIENT
Start: 2020-06-07 | End: 2020-07-10 | Stop reason: HOSPADM

## 2020-06-07 RX ORDER — LEVOTHYROXINE SODIUM 50 UG/1
50 TABLET ORAL DAILY
Status: DISCONTINUED | OUTPATIENT
Start: 2020-06-07 | End: 2020-07-10 | Stop reason: HOSPADM

## 2020-06-07 RX ORDER — ACYCLOVIR 200 MG/1
10 CAPSULE ORAL ONCE
Status: COMPLETED | OUTPATIENT
Start: 2020-06-07 | End: 2020-06-07

## 2020-06-07 RX ORDER — CLONAZEPAM 0.5 MG/1
0.5 TABLET ORAL EVERY 8 HOURS PRN
Status: DISCONTINUED | OUTPATIENT
Start: 2020-06-07 | End: 2020-07-10 | Stop reason: HOSPADM

## 2020-06-07 RX ORDER — ESCITALOPRAM OXALATE 10 MG/1
10 TABLET ORAL DAILY
Status: DISCONTINUED | OUTPATIENT
Start: 2020-06-08 | End: 2020-06-07

## 2020-06-07 RX ORDER — PRAVASTATIN SODIUM 40 MG
40 TABLET ORAL EVERY EVENING
Status: DISCONTINUED | OUTPATIENT
Start: 2020-06-07 | End: 2020-06-26

## 2020-06-07 RX ORDER — CLONIDINE HYDROCHLORIDE 0.1 MG/1
0.1 TABLET ORAL 2 TIMES DAILY
Status: DISCONTINUED | OUTPATIENT
Start: 2020-06-07 | End: 2020-07-10 | Stop reason: HOSPADM

## 2020-06-07 RX ORDER — FAMOTIDINE 10 MG
10 TABLET ORAL DAILY
Status: DISCONTINUED | OUTPATIENT
Start: 2020-06-07 | End: 2020-06-07

## 2020-06-07 RX ADMIN — FAMOTIDINE 10 MG: 10 TABLET, FILM COATED ORAL at 12:22

## 2020-06-07 RX ADMIN — ASPIRIN 81 MG CHEWABLE TABLET 81 MG: 81 TABLET CHEWABLE at 12:23

## 2020-06-07 RX ADMIN — DEXTROSE AND SODIUM CHLORIDE: 5; 450 INJECTION, SOLUTION INTRAVENOUS at 04:17

## 2020-06-07 RX ADMIN — ACYCLOVIR SODIUM 1000 MG: 50 INJECTION, SOLUTION INTRAVENOUS at 23:59

## 2020-06-07 RX ADMIN — ENOXAPARIN SODIUM 40 MG: 40 INJECTION SUBCUTANEOUS at 20:24

## 2020-06-07 RX ADMIN — VANCOMYCIN HYDROCHLORIDE 2500 MG: 1 INJECTION, POWDER, LYOPHILIZED, FOR SOLUTION INTRAVENOUS at 15:51

## 2020-06-07 RX ADMIN — MULTIVIT AND MINERALS-FERROUS GLUCONATE 9 MG IRON/15 ML ORAL LIQUID 15 ML: at 20:23

## 2020-06-07 RX ADMIN — LEVOTHYROXINE SODIUM 50 MCG: 50 TABLET ORAL at 12:23

## 2020-06-07 RX ADMIN — HUMAN ALBUMIN MICROSPHERES AND PERFLUTREN 5 ML: 10; .22 INJECTION, SOLUTION INTRAVENOUS at 12:00

## 2020-06-07 RX ADMIN — CEFTRIAXONE SODIUM 2 G: 2 INJECTION, POWDER, FOR SOLUTION INTRAMUSCULAR; INTRAVENOUS at 15:14

## 2020-06-07 RX ADMIN — DEXTROSE AND SODIUM CHLORIDE: 5; 450 INJECTION, SOLUTION INTRAVENOUS at 12:34

## 2020-06-07 RX ADMIN — ACYCLOVIR SODIUM 1000 MG: 50 INJECTION, SOLUTION INTRAVENOUS at 16:28

## 2020-06-07 RX ADMIN — SODIUM CHLORIDE 10 ML: 9 INJECTION INTRAMUSCULAR; INTRAVENOUS; SUBCUTANEOUS at 11:52

## 2020-06-07 RX ADMIN — CLONIDINE HYDROCHLORIDE 0.1 MG: 0.1 TABLET ORAL at 20:21

## 2020-06-07 RX ADMIN — POLYETHYLENE GLYCOL 3350 17 G: 17 POWDER, FOR SOLUTION ORAL at 12:23

## 2020-06-07 RX ADMIN — DEXTROSE AND SODIUM CHLORIDE: 5; 450 INJECTION, SOLUTION INTRAVENOUS at 20:50

## 2020-06-07 RX ADMIN — MELATONIN TAB 3 MG 3 MG: 3 TAB at 20:53

## 2020-06-07 RX ADMIN — ENOXAPARIN SODIUM 40 MG: 40 INJECTION SUBCUTANEOUS at 10:10

## 2020-06-07 RX ADMIN — CLONIDINE HYDROCHLORIDE 0.1 MG: 0.1 TABLET ORAL at 12:23

## 2020-06-07 ASSESSMENT — ACTIVITIES OF DAILY LIVING (ADL)
ADLS_ACUITY_SCORE: 43
ADLS_ACUITY_SCORE: 41
ADLS_ACUITY_SCORE: 47
ADLS_ACUITY_SCORE: 41

## 2020-06-07 ASSESSMENT — MIFFLIN-ST. JEOR: SCORE: 2165.99

## 2020-06-07 NOTE — PROVIDER NOTIFICATION
Provider notified via telephone regarding critical CK of 1,922. Provider also informed patient did not get loading dose of aspirin yesterday by prior RN as recommended by neurology (patient NPO, no NG/NJ/OG). No new orders placed. Will continue POC.

## 2020-06-07 NOTE — PLAN OF CARE
"ICU End of Shift Summary. See flowsheets for vital signs and detailed assessment.    Changes this shift: Pt mostly lethargic, follows commands, opens eyes occasionally.  Said \"I have to go to the bathroom\" and \"I'm done\" and \"I'm wet.\"  Tmax 101.4, BC drawn, CXR done, abx started. Placed NG tube for meds, pt pulled it out, placed another NG tube.  IVMF D5 1/2NS running at 125. Incont of urine 4X and 150ml in pure wick cannister.  Miralax given for constipation, PRN enema available. Oral cares done q2hrs, pt clenches teeth, mouth very odorous.     Plan:  Start TFs tonight at 15ml/hr. Place NJ tomorrow.   "

## 2020-06-07 NOTE — PROGRESS NOTES
[facility], [facility] Progress Note - Hospitalist Service, Gold 6       Date of Admission:  6/5/2020  Assessment & Plan   Ms. Dionne Perez is a 36 yo female with hx of developmental delay, depression, anxiety, likely psychosis, GERD, and hypothyrodism, admitted to [facility] for further eval and management of AMS.     # AMS  # Acute, diffuse muscle rigidity  # Incidental finding of R cerebellar infarct of uncertain significance  Due to pt's developmental delay (BL behavior is likely 5 or 5 yo) lives in a  and normally is very sociable and gregarious; however, due to COVID-19 pandemic, she developed significant behavioral dysregulation as a result of needing to practice safe distancing, etc, resulting in her frequently calling 911 and being evaluated and discharged at [facility]. Also has been randomly lying within nearby streets. The day of admission to [facility], pt was found by her guardian lying on her stomach surrounded in her own urine and noted to severely rigid. Initially evaluated [facility] and was to be admitted to inpatient psychiatry unit for catatonia, however, since she did not improve with lorazepam challenge combined with the fact that her WBC ~18k, she was transferred to [facility] and admitted to medicine. Pt continues to be unresponsive and not follow commands despite further benzodiazepine challenges and continues with diffuse muscle rigidity. Initial CK level ~2500, improved this am to ~1900 after being started on MIVFs. Neurology consulted of which subsequently ordered vEEG neg for seizures but did reveal mod diffuse encephalopathy. MRI brain with incidental R cerebellar infarct but w/o any other notable abnormalities c/w infectious or inflammatory etiologies. As a result of stroke, pt started on baby aspirin, obtained CTA head and neck that was non-diagnostic due to it being severely limited due to poor contrast opacification. TTE with bubble study obtained today  with results still in process. Psychiatry consulted and if no obvious medical etiology to pt's presentation, her current clinical picture likely is due to conversion disorder given pt's escalating problems PTA. Continues to have tachycardia. Was afebrile with no leukocytosis, however, developed low grade fever earlier this afternoon despite receiving her PTA scheduled Tylenol (Tmax 101.4).   - Neurology and Psychiatry consulted and appreciate recommendations.   - Made Neurology team aware of fever and they would like LP; consult IR as pt needs conscious sedation for LP and repeat CTA head and neck   - Start empiric IV acyclovir, ceftriaxone, and vanc  - Obtain BCs x 2, procal, CRP and LA  - Discontinue vEEG  - Hold PTA Lexapro; no start of scheduled or prn antipsychotics.   - Repeat total CK tomorrow am  - Continue MIVFs  - Follow up results of TTE with bubble study  - Continue telemetry  - Start baby aspirin but hold PTA statin given persistent mildly elevated LFTs  - Discontinue scheduled Tylenol    # Probable severe malnutrition  # Concern for dysphagia  Has had no PO intake since admission due to her mouth rigidity and not opening her mouth volitionally. Continues, therefore to be NPO. NG inserted today for med administration and to start enteral nutrition.  - Nutrition consulted and appreciate following  - Start TFs but at low rate and no advancement until pt evaluated tomorrow.   - Monitor daily lytes    # Mildly elevated LFTs:  Unclear etiology as LFTs from OSH as recently as 5/26 normal. AST down to 130 from high of 169. ALT improved from 89 to 78.  - Continue to monitor.     # Chronic pain?: Pt noted to be on scheduled Tylenol PTA, as well as have available as needed diclofenac gel. Continues to occasionally moan, but no obvious area of pain.  - Discontinue PTA scheduled Tylenol given mild transaminitis.      # Hypothyroidism: PTA on levothyroxine 50 mcg daily. TFTs on admission reveal pt euthyroid based on  free T4 WNL.  - Continue PTA levothyroxine    # GERD: Continue PTA Pepcid 20 mg daily       Diet: NPO for Medical/Clinical Reasons Except for: Meds, Ice Chips    DVT Prophylaxis: Enoxaparin (Lovenox) subcutaneous q12hrs (changed from q24hrs to q12hrs per pharmacy recommendation due to pt's wt)  Davila Catheter: not present  Code Status: Full Code           Disposition Plan   Expected discharge: 4 - 7 days, recommended to transitional care unit once neurologic workup complete and pt's mentation returns to BL.  Entered: Zohaib Cook PA-C 06/07/2020, 1:32 PM       The patient's care was discussed with the Attending Physician, Dr. Ruiz, Bedside Nurse and Patient.    Zohaib Cook PA-C  Hospitalist Service, [address], [place]  Pager: [phone]  Please see sticky note for cross cover information  ______________________________________________________________________    Interval History   No acute events overnight. Currently remains essentially unchanged from yesterday and unresponsive. Developed low grade fever earlier this afternoon.     Data reviewed today: I reviewed all medications, new labs and imaging results over the last 24 hours.     Physical Exam   Vital Signs: Temp: 101.4  F (38.6  C) Temp src: Axillary BP: (!) 154/88 Pulse: 90 Heart Rate: 86 Resp: 15 SpO2: 94 % O2 Device: None (Room air)    Weight: 309 lbs 0 oz  GEN: In NAD  HEENT: NCAT; PERRL; sclerae non-icteric  LUNGS: Decreased breath sounds otherwise clear  CV: RRR  ABD: +BSs; Obese SNTND  EXT: Continue facial, upper and lower muscle rigidity  SKIN: No acute rashes noted on exposed areas. Several contusions noted on lower ext's  NEURO: Appears to be sleeping; Is unresponsive and does not follow commands.      Data   CMP  Recent Labs   Lab 06/07/20  0432 06/06/20  1701 06/06/20  0924 06/05/20  1621   *  --  146* 144   POTASSIUM 3.6 2.9* 3.1* 3.0*   CHLORIDE 115*  --  112* 106   CO2 25  --  21 18*    ANIONGAP 5  --  12 20*   *  --  97 88   BUN 37*  --  47* 48*   CR 0.74  --  0.79 0.98   GFRESTIMATED >90  --  >90 75   GFRESTBLACK >90  --  >90 87   SANDRA 8.6  --  8.7 9.4   PROTTOTAL 6.4*  --  6.5* 7.8   ALBUMIN 3.5  --  3.4 4.0   BILITOTAL 0.9  --  0.9 1.2   ALKPHOS 42  --  45 55   *  --  149* 169*   ALT 78*  --  78* 89*     CBC  Recent Labs   Lab 06/07/20  0432 06/06/20  0924 06/05/20  1621   WBC 9.7 9.9 18.6*   RBC 4.13 4.24 4.52   HGB 11.8 11.9 13.0   HCT 37.3 38.1 40.6   MCV 90 90 90   MCH 28.6 28.1 28.8   MCHC 31.6 31.2* 32.0   RDW 13.4 13.5 13.7    164 242     Arterial Blood Gas  Recent Labs   Lab 06/06/20  0924   O2PER RA

## 2020-06-07 NOTE — PLAN OF CARE
ICU End of Shift Summary. See flowsheets for vital signs and detailed assessment.    Changes this shift: Lethargic, arouses to voice/pain. Shakes head no when asked if experiencing pain, but will occasionally moan/groan Following commands intermittency. TMAX 99.7. Elevated BP's intermittently. IVMF's @ 125. Incontinent of urine despite purewick (2 occurrences); 100 mL's urine measured. No stools. CTA head/neck done. K+ replaced and morning recheck wnl's.     Plan: ECHO. Continue EEG.

## 2020-06-07 NOTE — PHARMACY-VANCOMYCIN DOSING SERVICE
Pharmacy Vancomycin Initial Note  Date of Service 2020  Patient's  1985  35 year old, female    Indication: Meningitis    Current estimated CrCl = Estimated Creatinine Clearance: 162 mL/min (based on SCr of 0.74 mg/dL).    Creatinine for last 3 days  2020:  4:21 PM Creatinine 0.98 mg/dL  2020:  9:24 AM Creatinine 0.79 mg/dL  2020:  4:32 AM Creatinine 0.74 mg/dL    Recent Vancomycin Level(s) for last 3 days  No results found for requested labs within last 72 hours.      Vancomycin IV Administrations (past 72 hours)      No vancomycin orders with administrations in past 72 hours.                Nephrotoxins and other renal medications (From now, onward)    Start     Dose/Rate Route Frequency Ordered Stop    20 1600  acyclovir (ZOVIRAX) 1,000 mg in D5W 250 mL intermittent infusion      10 mg/kg × 95.8 kg (Adjusted)  250 mL/hr over 1 Hours Intravenous EVERY 8 HOURS 20 1440      20 1530  vancomycin (VANCOCIN) 2,500 mg in sodium chloride 0.9 % 500 mL intermittent infusion      2,500 mg  over 2 Hours Intravenous ONCE 20 1447            Contrast Orders - past 72 hours (72h ago, onward)    Start     Dose/Rate Route Frequency Ordered Stop    20 1200  perflutren diluted 1mL to 2mL with saline (OPTISON) diluted injection 5 mL      5 mL Intravenous ONCE 20 1151 20 1200    20 2030  iopamidol (ISOVUE-370) solution 75 mL      75 mL Intravenous ONCE 20 2021 20 2049    20 1330  gadobutrol (GADAVIST) injection 13.4 mL      0.1 mL/kg × 134 kg Intravenous ONCE 20 1325 20 1352    20 0700  iopamidol (ISOVUE-370) solution 135 mL      135 mL Intravenous ONCE 20 0655 20 0740    20 0203  gadobutrol (GADAVIST) injection 15 mL  Status:  Discontinued      15 mL Intravenous ONCE 20 0202 20 0541                Plan:  1.  Start vancomycin  2000mg IV x1, then 2000 mg IV q8h (20mg/kg adjBW=95.8kg, note high  risk for accumulation so watch levels/SCR closely), likely will eventually need lower maintenance dose but didn't want to underdose given indication.   2.  Goal Trough Level: 15-20 mg/L   3.  Pharmacy will check trough levels as appropriate in 1-3 Days.  (given BMI likely will accumulate on q8h regimen but dosed aggressively in setting of needing CNS penetration)  4. Serum creatinine levels will be ordered daily for the first week of therapy and at least twice weekly for subsequent weeks.    5. [place] method utilized to dose vancomycin therapy: Method 2    [facility]od, [facility]

## 2020-06-07 NOTE — PROGRESS NOTES
EEG CLINICAL NEUROPHYSIOLOGY PRELIMINARY REPORT    EEG through 9 AM reviewed. Continues consistent with moderate diffuse encephalopathy this morning. EEG is reactive. No epileptiform discharges or seizures. Full report to follow.    Kedar Judge MD  Pager [phone]

## 2020-06-07 NOTE — PROGRESS NOTES
"CLINICAL NUTRITION SERVICES - ASSESSMENT NOTE     Nutrition Prescription    Recommendations:  Advance TF toward goal rate per primary team     Malnutrition Status:    Unable to determine due to lack of nutrition history, weight history and nutrition focused physical assessment.    Interventions ordered by dietitian:  - Initiate Peptamen intense VHP @ 15 ml/hr via NGT. No advancement per MD.    - FWF of 30 ml q4h for tube patency   - Certavite daily     Future Recommendations:  Once appropriate for full feeds, recommend Peptamen intense VHP @ 75 ml/hr to provide 1800 ml/day, 1800 kcal/day (22 kcal/kg), 165 g protein (2 g/kg), 137 g CHO, 1512 ml free H2O, 7 g fiber daily.     If more concentrated regimen desired, recommend Nutren 1.5 @ 55 mL/hr + Prosource (2 pkts BID) to provide 2040 kcals (25 kcal/kg/day), 134 g PRO (1.5 g/kg/day), 1003 mL H2O, 232 g CHO and no fiber daily.       REASON FOR ASSESSMENT  Dionne Peerz is a 35 year old female assessed by the dietitian for Provider Order - \"Probable malnutrition, may need to start enteral feeds soon\"    NUTRITION HISTORY  Unable to obtain nutrition history. Pt lethargic.    CURRENT NUTRITION ORDERS  Diet: NPO <24 hrs. Not safe for oral intake d/t mental status.     LABS  Labs reviewed  K2.9 --> replaced to 3.6     MEDICATIONS  Medications reviewed  D5% + 1/2 NS @ 125 ml/hr     ANTHROPOMETRICS  Height: 175.3 cm (5' 9\")  Most Recent Weight: 140.2 kg (309 lb)    IBW: 65.9 kg  BMI: Obesity Grade III BMI >40  Weight History: Admitted at 134 kg (295 lbs) on 6/6. Based on records, pt has lost 65 lbs over the past 2 years, however, unclear how quickly weight was lost.   Wt Readings from Last 10 Encounters:   06/07/20 140.2 kg (309 lb)   05/21/18 (!) 162.8 kg (359 lb)   04/28/18 (!) 163.3 kg (360 lb)       Dosing Weight: 83 kg (adjusted, based on IBW of 65.9 kg and admit weight of 134 kg on 6/6).     ASSESSED NUTRITION NEEDS  Estimated Energy Needs: 1650 - 2100 kcals/day (20 - " 25 kcals/kg)  Justification: Obese  Estimated Protein Needs: 125 - 165 grams protein/day (1.5 - 2 grams of pro/kg)  Justification: Obesity guidelines  Estimated Fluid Needs: 1650 - 2100 mL/day (1 mL/kcal)   Justification: Maintenance    PHYSICAL FINDINGS  Unable to obtain nutrition-focused physical assessment to ensure social distancing and to minimize use of PPE. Will complete per MD request. See malnutrition section below.    MALNUTRITION  % Intake: Unable to assess  % Weight Loss: Unable to assess  Subcutaneous Fat Loss: Unable to assess  Muscle Loss: Unable to assess  Fluid Accumulation/Edema: Unable to assess  Malnutrition Diagnosis: Unable to determine due to lack of nutrition history, weight history and nutrition focused physical assessment.     NUTRITION DIAGNOSIS  Inadequate oral intake related to altered mental status as evidenced by NPO <24hrs.       INTERVENTIONS  Implementation   Enteral Nutrition - Trophic feeds only per discussion w/ MD.     Goals  Diet adv v nutrition support within 2-3 days.     Monitoring/Evaluation  Progress toward goals will be monitored and evaluated per protocol.    Sophia Emmanuel RD, LD   Weekday Pager: [phone]  Weekend Pager: [phone]

## 2020-06-07 NOTE — PHARMACY-ADMISSION MEDICATION HISTORY
"Admission medication history interview status for the 6/5/2020 admission is complete. See Epic admission navigator for allergy information, pharmacy, prior to admission medications and immunization status.     Medication history interview sources:  SureScripts, Chart Review/Notes, Group Home Med List (Faxed)    Changes made to PTA medication list (reason)  Added:   -Tylenol (per med list)  -Maalox ES (per med list)  -Fibercon (per med list)  -Clonazepam (per med list - fill history confirms this)  -Clonidine (per med list - fill history confirms this)  -Famotidine (per med list - fill history confirms this)  -Melatonin (per med list)  -Calmoseptine (per med list)  -Ondansetron (per med list - fill history confirms this)  -Sudafed (per med list)  -Metamucil (per med list)  -Vitamin A and D Ointment (per med list)  Deleted:   -Clotrimazole (not on med list)  -Cheratussin (not on med list)  -Multivitamin (on med list - but states \"do not fill\")  -Omeprazole (not on med list)  -Eucerin (not on med list)  Changed: ADDED DIRECTIONS TO THE MEDS BELOW:    -Diclofenac: Apply 2 g topically 2 times daily as needed for moderate pain    -Escitalopram: Take 20 mg by mouth daily    -Levothyroxine: Take 50 mcg by mouth daily    -Medroxyprogesterone: Take 10 mg by mouth once daily for 10 days straight each month. Start on the 16th of each month.    -Nystatin: Apply topically 2 times daily for groin rash. Use instead of cream during hot weather. Apply under breasts when rash present.    -Pravastatin: Take 40 mg by mouth daily    -Hydrocortisone: Apply topically 2 times daily as needed      Additional medication history information (including reliability of information, actions taken by pharmacist):    Patient is currently intubated and is unable to participate in medication reconciliation. The group home was contacted and they sent over a medication list to go off of.     No other remarks.      Prior to Admission medications  "   Medication Sig Last Dose Taking? Auth Provider   acetaminophen (TYLENOL) 325 MG tablet Take 2 tablets by mouth at 9AM and 3PM. Take 2 tablets by mouth at bedtime. Total: 6 tablets/day  Yes Unknown, Entered By History   alum & mag hydroxide-simethicone (MAALOX  ES) 400-400-40 MG/5ML SUSP suspension Take 15 mLs by mouth 2 times daily as needed for indigestion  Yes Unknown, Entered By History   calcium polycarbophil (FIBERCON) 625 MG tablet Take 3 tablets by mouth 2 times daily  Yes Unknown, Entered By History   clonazePAM (KLONOPIN) 0.5 MG tablet Take 0.5 mg by mouth every 8 hours as needed for anxiety   Yes Unknown, Entered By History   cloNIDine (CATAPRES) 0.1 MG tablet Take 0.1 mg by mouth 2 times daily  Yes Unknown, Entered By History   diclofenac (VOLTAREN) 1 % topical gel Apply 2 g topically 2 times daily as needed for moderate pain  Yes Unknown, Entered By History   escitalopram (LEXAPRO) 20 MG tablet Take 20 mg by mouth daily  Yes Unknown, Entered By History   famotidine (PEPCID) 20 MG tablet Take 20 mg by mouth daily  Yes Unknown, Entered By History   hydrocortisone 2.5 % cream Apply topically 2 times daily as needed  Yes Unknown, Entered By History   levothyroxine (SYNTHROID/LEVOTHROID) 50 MCG tablet Take 50 mcg by mouth daily  Yes Unknown, Entered By History   medroxyPROGESTERone (PROVERA) 10 MG tablet Take 10 mg by mouth once daily for 10 days straight each month. Start on the 16th of each month.  Yes Unknown, Entered By History   melatonin 3 MG tablet Take 1 mg by mouth At Bedtime  Yes Unknown, Entered By History   menthol-zinc oxide (CALMOSEPTINE) 0.44-20.6 % OINT ointment Apply small amount to anus after bowel movements or as needed for irritation.  Yes Unknown, Entered By History   nystatin (NYSTOP) 321780 UNIT/GM external powder Apply topically 2 times daily for groin rash. Use instead of cream during hot weather. Apply under breasts when rash present.  Yes Unknown, Entered By History   ondansetron  (ZOFRAN-ODT) 4 MG ODT tab Take 4 mg by mouth every 8 hours as needed for nausea  Yes Unknown, Entered By History   pravastatin (PRAVACHOL) 40 MG tablet Take 40 mg by mouth daily  Yes Unknown, Entered By History   pseudoePHEDrine (SUDAFED) 30 MG tablet Take 30-60 mg by mouth every 4 to 6 hours as needed for ear pain or stuffy nose.  Yes Unknown, Entered By History   psyllium (METAMUCIL/KONSYL) 58.6 % powder Take 1 Tablespoonful by mouth daily Mix in 8 ounces of fluid and drink.  Yes Unknown, Entered By History   Vitamins A & D (VITAMIN A & D) external ointment Apply topically to affected area 2 times daily for up to 7 days. (To irritated skin around vulva)  Yes Unknown, Entered By History         Medication history completed by: Dennis Cotto, Pharmacist Intern (PD2)

## 2020-06-07 NOTE — PROGRESS NOTES
[facility]  Neurology Consult Progress Note    Patient Name: Dionne Perez  Patient MRN: [id number]  Admission Date: 6/5/2020  Today's Date: 06/07/2020    Assessment:  Dionne Perez is a 35 year old female with developmental delay, intellectual disability, history of repeated ED visits, depression, and anxiety who presents with unresponsiveness and full-body rigidity after several weeks of worsening behavioral issues, falls, and incontinence. Exam continues to be limited as patient is unresponsive. Hemodynamically stable aside from tachycardia. Patient has been afebrile. Labs initially notable for CK moderately elevated at 2,500 with normal lactate, now down-trending, CRP elevated at 9.8, potassium low, sodium high, and AST/ALT elevated in a 2:1 pattern.     Overall, clinical picture appears to be most c/w toxidrome/medication effect of some sort vs. Primarily Psychiatric or functional origin. Rigidity, hyperreflexia, and elevated CK is somewhat suggestive of serotonin syndrome or NMS, though she is afebrile and with inconsistency in her increased tone (improves/resolves when she is asleep) there is likely a functional component. To this, CK elevation is modest enough that this could possibly be the result of volitional muscle tensing, although this would be dx of exclusion.  Additionally, while she is on risperidone which could cause NMS, she has had no changes to this medication and there is no history or report of overdose; although cannot exclude if she were somehow able to take more or take other resident's medications. Ativan challenge has been attempted x 2, w/o reported significant results, although this does not necessarily exclude catatonia. Otherwise neurologically, she is not in NCSE and no frequent subclinical seizures. MRI performed w/o any notable abnormalities c/w infectious or inflammatory etiologies (Apart from incidental R cerebellar infarct of unclear signficance) and  she is hemodynamically stable beyond tachycardia w/o fever or leukocytosis.     For her stroke, this appears to be incidentally found and unclear why this occurred at this time or how it relates to her current mental status (although in of itself would not lead to this presentation, cannot exclude other related processes leading to both - but these entities would typically have a much more significant systemic appearance). Can continue typical stroke w/u for now; will need repeat CT/CTA and re-assessment based on clinical course.    Recommendations:  - repeat comprehensive blood drug panel  -continue to Trend CK  - ESR  - hold all serotonergic + anti-dopaminergic medications  - fluid  Resuscitation  - Can discontinue video EEG  - Patient will need repeated head and neck vascular studies to better assess stroke and other etiologies; it appears this will likely have to be done under sedation  -Given patient is hemodynamically stable without fever or leukocytosis, CNS infection appears unlikely but if mental status persists despite further work-up, lumbar puncture would be appropriate (although would likely have to be done under sedation. when patient gets CTA head and neck, would be useful to perform LP at the same time if exam remains unchanged).  - Appreciate Psychiatry's continued involvement  - For possibly incidental stroke:  - Continue aspirin daily, if patient cannot take orally, this can be given rectally   -TTE with bubble study  - Atorvastatin 40mg when enteral access available  - Continue cardiac monitoring      Interval Events:  Continues to have minimal responsiveness, supposedly rigidity improved somewhat in sleep.  No seizures overnight per prelim read.  Not responsive to verbal stimuli, groans with tactile and noxious stimuli but no other usable communication.      Review of Systems:  10 point review of systems was unable to be performed due to mental status/particpation.     Objective:  BP (!) 167/87   " Pulse 109   Temp 99  F (37.2  C) (Axillary)   Resp 29   Ht 1.753 m (5' 9\")   Wt 140.2 kg (309 lb)   SpO2 95%   BMI 45.63 kg/m     General: initially resting calmly, upon exam continues to groan intermittently throughout exam.  HEENT: NC/AT. No scleral icterus. MMM.   Cardio: tachycardic.  Pulm: breathing intermittently labored w/ exam maneuvers.  Abdomen: obese.  Extremities/Derm: minimal moving all, warm and well perfused. No focal/significant skin lesions.   Psych: unable to assess.  Neuro:   Mental status: eyes closed, does not open throughout interview. No verbal response beyond moaning/grunting w/ exam maneuvers and throughout rest of encounter. Minimally squeezes fingers w/ R hand x 1, ? Of moves thumb upward to command on R hand, no other e/o command following or communication. Cannot assess language quality/function, fund of knowledge, memory or other component based on alertness/participation.      Cranial nerves: Resists eye opening, cannot assess VF due to this. Pupils equal and reactive to light.  Eyes appear conjugate, no deviation, some lateral movement with VOR.  Face musculature with mild nasolabial asymmetry, likely related to positioning.  Unable to assess facial sensation, hearing,Palate elevation, shoulder shrug, voice quality, tongue or uvula due to mental status.  No hypophonia, no dysarthria. Tongue and uvula are midline.    Motor: Low amplitude/high-frequency tremor in the right greater than left hand infrequently appreciated. Significant rigidity throughout, resists movement.  Spontaneous movement appreciated in right lower extremity when she low amplitude flexed at hip; no other spontaneous movement.  Minimal movement to noxious stimuli.   Reflexes: + Babinski. + . No ankle clonus due to stiffness. B+BR brisk w/ spread b/l. P brisk b/l. A unable to elicit due to stiffness.    Sensory: Grimaces, breathing pattern changes and minimal w/d or indeterminant movement to noxious " stimuli.     Coordination/Gait: unable to assess due to mental status.    Investigations:  I have reviewed all interval/pertinent laboratory and imaging results.

## 2020-06-08 ENCOUNTER — ANESTHESIA EVENT (OUTPATIENT)
Dept: ONCOLOGY | Facility: CLINIC | Age: 35
End: 2020-06-08

## 2020-06-08 ENCOUNTER — ANESTHESIA (OUTPATIENT)
Dept: ONCOLOGY | Facility: CLINIC | Age: 35
End: 2020-06-08

## 2020-06-08 LAB
ACETAMINOPHEN QUAL: NEGATIVE
ALBUMIN SERPL-MCNC: 2.8 G/DL (ref 3.4–5)
ALP SERPL-CCNC: 36 U/L (ref 40–150)
ALT SERPL W P-5'-P-CCNC: 104 U/L (ref 0–50)
AMOBARBITAL QUAL: NEGATIVE
ANION GAP SERPL CALCULATED.3IONS-SCNC: 5 MMOL/L (ref 3–14)
AST SERPL W P-5'-P-CCNC: 170 U/L (ref 0–45)
BARBITAL QUAL: NEGATIVE
BASOPHILS # BLD AUTO: 0 10E9/L (ref 0–0.2)
BASOPHILS NFR BLD AUTO: 0.5 %
BILIRUB SERPL-MCNC: 0.9 MG/DL (ref 0.2–1.3)
BUN SERPL-MCNC: 26 MG/DL (ref 7–30)
BUTABARBITAL QUAL: NEGATIVE
BUTALBITAL QUAL: NEGATIVE
CAFFEINE QUAL: NEGATIVE
CALCIUM SERPL-MCNC: 8.1 MG/DL (ref 8.5–10.1)
CARBAMAZEPINE QUAL: NEGATIVE
CARISOPRODOL QUAL: NEGATIVE
CHLORIDE SERPL-SCNC: 114 MMOL/L (ref 94–109)
CHLORPROPAMIDE UR-MCNC: NEGATIVE UG/ML
CK SERPL-CCNC: 1398 U/L (ref 30–225)
CO2 SERPL-SCNC: 26 MMOL/L (ref 20–32)
CREAT SERPL-MCNC: 0.73 MG/DL (ref 0.52–1.04)
DIFFERENTIAL METHOD BLD: ABNORMAL
DRUGS SERPL SCN: NEGATIVE
EOSINOPHIL # BLD AUTO: 0.1 10E9/L (ref 0–0.7)
EOSINOPHIL NFR BLD AUTO: 0.8 %
ERYTHROCYTE [DISTWIDTH] IN BLOOD BY AUTOMATED COUNT: 13.4 % (ref 10–15)
ERYTHROCYTE [SEDIMENTATION RATE] IN BLOOD BY WESTERGREN METHOD: 12 MM/H (ref 0–20)
ETHCLORVYNOL QUAL: NEGATIVE
ETHINAMATE QUAL: NEGATIVE
ETHOSUXIMIDE QUAL: NEGATIVE
ETHOTOIN QUAL: NEGATIVE
GFR SERPL CREATININE-BSD FRML MDRD: >90 ML/MIN/{1.73_M2}
GGT SERPL-CCNC: 66 U/L (ref 0–40)
GLUCOSE BLDC GLUCOMTR-MCNC: 104 MG/DL (ref 70–99)
GLUCOSE SERPL-MCNC: 125 MG/DL (ref 70–99)
GLUTETHIMIDE QUAL: NEGATIVE
HCT VFR BLD AUTO: 36.5 % (ref 35–47)
HGB BLD-MCNC: 11.1 G/DL (ref 11.7–15.7)
IBUPROFEN QUAL: NEGATIVE
IMM GRANULOCYTES # BLD: 0.2 10E9/L (ref 0–0.4)
IMM GRANULOCYTES NFR BLD: 2.3 %
INR PPP: 1.16 (ref 0.86–1.14)
LIPASE SERPL-CCNC: 876 U/L (ref 73–393)
LYMPHOCYTES # BLD AUTO: 2 10E9/L (ref 0.8–5.3)
LYMPHOCYTES NFR BLD AUTO: 24.6 %
MAGNESIUM SERPL-MCNC: 2.4 MG/DL (ref 1.6–2.3)
MCH RBC QN AUTO: 28.5 PG (ref 26.5–33)
MCHC RBC AUTO-ENTMCNC: 30.4 G/DL (ref 31.5–36.5)
MCV RBC AUTO: 94 FL (ref 78–100)
MEPHENYTOIN QUAL: NEGATIVE
MEPHOBARBITAL QUAL: NEGATIVE
MEPROBAMATE QUAL: NEGATIVE
METHAQUALONE QUAL: NEGATIVE
METHARBITAL QUAL: NEGATIVE
METHSUXIMIDE QUAL: NEGATIVE
METHYPRYLON QUAL: NEGATIVE
MONOCYTES # BLD AUTO: 0.5 10E9/L (ref 0–1.3)
MONOCYTES NFR BLD AUTO: 5.8 %
NEUTROPHILS # BLD AUTO: 5.4 10E9/L (ref 1.6–8.3)
NEUTROPHILS NFR BLD AUTO: 66 %
NRBC # BLD AUTO: 0 10*3/UL
NRBC BLD AUTO-RTO: 0 /100
PENTOBARBITAL QUAL: NEGATIVE
PHENACETIN QUAL: NEGATIVE
PHENOBARBITAL QUAL: NEGATIVE
PHENSUXIMIDE QUAL: NEGATIVE
PHENYTOIN QUAL: NEGATIVE
PHOSPHATE SERPL-MCNC: 1.1 MG/DL (ref 2.5–4.5)
PHOSPHATE SERPL-MCNC: 1.1 MG/DL (ref 2.5–4.5)
PLATELET # BLD AUTO: 125 10E9/L (ref 150–450)
POTASSIUM SERPL-SCNC: 2.9 MMOL/L (ref 3.4–5.3)
POTASSIUM SERPL-SCNC: 3.9 MMOL/L (ref 3.4–5.3)
PRIMIDONE QUAL: NEGATIVE
PROT SERPL-MCNC: 5.5 G/DL (ref 6.8–8.8)
RBC # BLD AUTO: 3.9 10E12/L (ref 3.8–5.2)
SALICYLATE QUAL: NEGATIVE
SECOBARBITAL QUAL: NEGATIVE
SODIUM SERPL-SCNC: 145 MMOL/L (ref 133–144)
TALBUTAL QUAL: NEGATIVE
THEOPHYLLINE QUAL: NEGATIVE
THIOPENTAL QUAL: NEGATIVE
TYBAMATE QUAL: NEGATIVE
VALPROIC ACID QUAL: NEGATIVE
VANCOMYCIN SERPL-MCNC: 33.4 MG/L
WBC # BLD AUTO: 8.3 10E9/L (ref 4–11)

## 2020-06-08 PROCEDURE — 83690 ASSAY OF LIPASE: CPT | Performed by: STUDENT IN AN ORGANIZED HEALTH CARE EDUCATION/TRAINING PROGRAM

## 2020-06-08 PROCEDURE — 25000125 ZZHC RX 250: Performed by: HOSPITALIST

## 2020-06-08 PROCEDURE — 82977 ASSAY OF GGT: CPT | Performed by: STUDENT IN AN ORGANIZED HEALTH CARE EDUCATION/TRAINING PROGRAM

## 2020-06-08 PROCEDURE — 85025 COMPLETE CBC W/AUTO DIFF WBC: CPT | Performed by: PHYSICIAN ASSISTANT

## 2020-06-08 PROCEDURE — 12000001 ZZH R&B MED SURG/OB UMMC

## 2020-06-08 PROCEDURE — 36415 COLL VENOUS BLD VENIPUNCTURE: CPT | Performed by: PHYSICIAN ASSISTANT

## 2020-06-08 PROCEDURE — 25000132 ZZH RX MED GY IP 250 OP 250 PS 637: Mod: GY | Performed by: PHYSICIAN ASSISTANT

## 2020-06-08 PROCEDURE — 84100 ASSAY OF PHOSPHORUS: CPT | Performed by: PHYSICIAN ASSISTANT

## 2020-06-08 PROCEDURE — 25800025 ZZH RX 258: Performed by: HOSPITALIST

## 2020-06-08 PROCEDURE — 25800030 ZZH RX IP 258 OP 636: Performed by: HOSPITALIST

## 2020-06-08 PROCEDURE — 83735 ASSAY OF MAGNESIUM: CPT | Performed by: PHYSICIAN ASSISTANT

## 2020-06-08 PROCEDURE — 25000132 ZZH RX MED GY IP 250 OP 250 PS 637: Mod: GY | Performed by: HOSPITALIST

## 2020-06-08 PROCEDURE — 82550 ASSAY OF CK (CPK): CPT | Performed by: PHYSICIAN ASSISTANT

## 2020-06-08 PROCEDURE — 85610 PROTHROMBIN TIME: CPT | Performed by: NURSE PRACTITIONER

## 2020-06-08 PROCEDURE — 27210437 ZZH NUTRITION PRODUCT SEMIELEM INTERMED LITER

## 2020-06-08 PROCEDURE — 36415 COLL VENOUS BLD VENIPUNCTURE: CPT

## 2020-06-08 PROCEDURE — 25000128 H RX IP 250 OP 636: Performed by: HOSPITALIST

## 2020-06-08 PROCEDURE — 25800030 ZZH RX IP 258 OP 636: Performed by: PHYSICIAN ASSISTANT

## 2020-06-08 PROCEDURE — 80053 COMPREHEN METABOLIC PANEL: CPT | Performed by: PHYSICIAN ASSISTANT

## 2020-06-08 PROCEDURE — 25000132 ZZH RX MED GY IP 250 OP 250 PS 637: Performed by: PHYSICIAN ASSISTANT

## 2020-06-08 PROCEDURE — 83735 ASSAY OF MAGNESIUM: CPT | Performed by: HOSPITALIST

## 2020-06-08 PROCEDURE — 84100 ASSAY OF PHOSPHORUS: CPT | Performed by: HOSPITALIST

## 2020-06-08 PROCEDURE — 36415 COLL VENOUS BLD VENIPUNCTURE: CPT | Performed by: STUDENT IN AN ORGANIZED HEALTH CARE EDUCATION/TRAINING PROGRAM

## 2020-06-08 PROCEDURE — 36415 COLL VENOUS BLD VENIPUNCTURE: CPT | Performed by: NURSE PRACTITIONER

## 2020-06-08 PROCEDURE — 25000128 H RX IP 250 OP 636: Performed by: PHYSICIAN ASSISTANT

## 2020-06-08 PROCEDURE — 99233 SBSQ HOSP IP/OBS HIGH 50: CPT | Performed by: HOSPITALIST

## 2020-06-08 PROCEDURE — 80202 ASSAY OF VANCOMYCIN: CPT

## 2020-06-08 PROCEDURE — 25000132 ZZH RX MED GY IP 250 OP 250 PS 637: Mod: GY | Performed by: STUDENT IN AN ORGANIZED HEALTH CARE EDUCATION/TRAINING PROGRAM

## 2020-06-08 PROCEDURE — 25800025 ZZH RX 258: Performed by: PHYSICIAN ASSISTANT

## 2020-06-08 PROCEDURE — 85652 RBC SED RATE AUTOMATED: CPT | Performed by: STUDENT IN AN ORGANIZED HEALTH CARE EDUCATION/TRAINING PROGRAM

## 2020-06-08 PROCEDURE — 36415 COLL VENOUS BLD VENIPUNCTURE: CPT | Performed by: HOSPITALIST

## 2020-06-08 PROCEDURE — 00000146 ZZHCL STATISTIC GLUCOSE BY METER IP

## 2020-06-08 PROCEDURE — 84132 ASSAY OF SERUM POTASSIUM: CPT | Performed by: STUDENT IN AN ORGANIZED HEALTH CARE EDUCATION/TRAINING PROGRAM

## 2020-06-08 RX ORDER — MAGNESIUM SULFATE HEPTAHYDRATE 40 MG/ML
4 INJECTION, SOLUTION INTRAVENOUS EVERY 4 HOURS PRN
Status: DISCONTINUED | OUTPATIENT
Start: 2020-06-08 | End: 2020-07-10 | Stop reason: HOSPADM

## 2020-06-08 RX ORDER — CALCIUM POLYCARBOPHIL 625 MG 625 MG/1
1250 TABLET ORAL 2 TIMES DAILY
Status: DISCONTINUED | OUTPATIENT
Start: 2020-06-08 | End: 2020-07-10 | Stop reason: HOSPADM

## 2020-06-08 RX ORDER — ASPIRIN 81 MG/1
243 TABLET, CHEWABLE ORAL ONCE
Status: COMPLETED | OUTPATIENT
Start: 2020-06-08 | End: 2020-06-08

## 2020-06-08 RX ORDER — ASPIRIN 81 MG/1
324 TABLET, CHEWABLE ORAL DAILY
Status: DISCONTINUED | OUTPATIENT
Start: 2020-06-09 | End: 2020-07-10 | Stop reason: HOSPADM

## 2020-06-08 RX ORDER — BISACODYL 10 MG
10 SUPPOSITORY, RECTAL RECTAL ONCE
Status: COMPLETED | OUTPATIENT
Start: 2020-06-08 | End: 2020-06-08

## 2020-06-08 RX ADMIN — MULTIVIT AND MINERALS-FERROUS GLUCONATE 9 MG IRON/15 ML ORAL LIQUID 15 ML: at 08:03

## 2020-06-08 RX ADMIN — ASPIRIN 81 MG CHEWABLE TABLET 243 MG: 81 TABLET CHEWABLE at 12:16

## 2020-06-08 RX ADMIN — VANCOMYCIN HYDROCHLORIDE 2000 MG: 1 INJECTION, POWDER, LYOPHILIZED, FOR SOLUTION INTRAVENOUS at 02:08

## 2020-06-08 RX ADMIN — CLONIDINE HYDROCHLORIDE 0.1 MG: 0.1 TABLET ORAL at 08:03

## 2020-06-08 RX ADMIN — DEXTROSE AND SODIUM CHLORIDE: 5; 450 INJECTION, SOLUTION INTRAVENOUS at 05:56

## 2020-06-08 RX ADMIN — POTASSIUM PHOSPHATE, MONOBASIC AND POTASSIUM PHOSPHATE, DIBASIC 20 MMOL: 224; 236 INJECTION, SOLUTION INTRAVENOUS at 07:54

## 2020-06-08 RX ADMIN — POLYETHYLENE GLYCOL 3350 17 G: 17 POWDER, FOR SOLUTION ORAL at 08:03

## 2020-06-08 RX ADMIN — FAMOTIDINE 20 MG: 20 TABLET ORAL at 08:03

## 2020-06-08 RX ADMIN — ACYCLOVIR SODIUM 1000 MG: 50 INJECTION, SOLUTION INTRAVENOUS at 08:15

## 2020-06-08 RX ADMIN — ACYCLOVIR SODIUM 1000 MG: 50 INJECTION, SOLUTION INTRAVENOUS at 15:32

## 2020-06-08 RX ADMIN — CLONIDINE HYDROCHLORIDE 0.1 MG: 0.1 TABLET ORAL at 20:32

## 2020-06-08 RX ADMIN — BISACODYL 10 MG: 10 SUPPOSITORY RECTAL at 00:14

## 2020-06-08 RX ADMIN — VANCOMYCIN HYDROCHLORIDE 2000 MG: 1 INJECTION, POWDER, LYOPHILIZED, FOR SOLUTION INTRAVENOUS at 11:28

## 2020-06-08 RX ADMIN — DEXTROSE AND SODIUM CHLORIDE: 5; 450 INJECTION, SOLUTION INTRAVENOUS at 21:27

## 2020-06-08 RX ADMIN — ENOXAPARIN SODIUM 40 MG: 40 INJECTION SUBCUTANEOUS at 08:03

## 2020-06-08 RX ADMIN — CALCIUM POLYCARBOPHIL 1250 MG: 625 TABLET, FILM COATED ORAL at 20:32

## 2020-06-08 RX ADMIN — LEVOTHYROXINE SODIUM 50 MCG: 50 TABLET ORAL at 08:03

## 2020-06-08 RX ADMIN — POTASSIUM CHLORIDE 40 MEQ: 1.5 POWDER, FOR SOLUTION ORAL at 06:59

## 2020-06-08 RX ADMIN — ASPIRIN 81 MG CHEWABLE TABLET 81 MG: 81 TABLET CHEWABLE at 08:03

## 2020-06-08 RX ADMIN — CEFTRIAXONE SODIUM 2 G: 2 INJECTION, POWDER, FOR SOLUTION INTRAMUSCULAR; INTRAVENOUS at 03:51

## 2020-06-08 RX ADMIN — MELATONIN TAB 3 MG 3 MG: 3 TAB at 22:58

## 2020-06-08 RX ADMIN — CEFTRIAXONE SODIUM 2 G: 2 INJECTION, POWDER, FOR SOLUTION INTRAMUSCULAR; INTRAVENOUS at 14:30

## 2020-06-08 RX ADMIN — POTASSIUM CHLORIDE 40 MEQ: 1.5 POWDER, FOR SOLUTION ORAL at 05:19

## 2020-06-08 ASSESSMENT — ACTIVITIES OF DAILY LIVING (ADL)
ADLS_ACUITY_SCORE: 45
ADLS_ACUITY_SCORE: 47
ADLS_ACUITY_SCORE: 45
ADLS_ACUITY_SCORE: 47
ADLS_ACUITY_SCORE: 45
ADLS_ACUITY_SCORE: 47

## 2020-06-08 ASSESSMENT — MIFFLIN-ST. JEOR: SCORE: 2175.07

## 2020-06-08 NOTE — PROGRESS NOTES
Nutrition Services - Brief Note    Pt with NGT in place. Paged by tech in radiology dept asking if Cortrak FT could be placed instead of the Radiologist placing the FT (XR feeding tube placement was entered by MD). Pt does not need a small bore or small bowel FT. We have nasal bridles for most sizes of enteral tubes. Pt appears to have a 16 Fr NGT (the tube size is not documented on the flowsheet).     Interventions already implemented by the RD:  Discussed above with bedside RN. She is agreeable to bridling the NGT that pt has in place.     A 16 Fr bridle was place onto pt's NGT. She tolerated the procedure. See that note for details.    Recommendations:  Advance TF by 10 mL q4h until @ goal of 75 mL/hr.     RD will continue to follow.  Denise Parsons RD, LD  ([facility], [phone]- [phone])

## 2020-06-08 NOTE — ANESTHESIA PREPROCEDURE EVALUATION
Anesthesia Pre-Procedure Evaluation    Patient: Dionne Perez   MRN:     [id number] Gender:   female   Age:    35 year old :      1985        Preoperative Diagnosis: CT (claw toe) [Q66.89]   Procedure(s):  ANESTHESIA OUT OF OR CT     LABS:  CBC:   Lab Results   Component Value Date    WBC 8.3 2020    WBC 8.2 2020    HGB 11.1 (L) 2020    HGB 11.8 2020    HCT 36.5 2020    HCT 37.3 2020     (L) 2020     2020     BMP:   Lab Results   Component Value Date     (H) 2020     (H) 2020    POTASSIUM 3.9 2020    POTASSIUM 2.9 (L) 2020    CHLORIDE 114 (H) 2020    CHLORIDE 115 (H) 2020    CO2 26 2020    CO2 25 2020    BUN 26 2020    BUN 37 (H) 2020    CR 0.73 2020    CR 0.74 2020     (H) 2020     (H) 2020     COAGS:   Lab Results   Component Value Date    INR 1.16 (H) 2020     POC:   Lab Results   Component Value Date     (H) 2020    HCG Negative 2020     OTHER:   Lab Results   Component Value Date    LACT 1.2 2020    A1C 5.0 2020    SANDRA 8.1 (L) 2020    PHOS 1.1 (L) 2020    MAG 2.4 (H) 2020    ALBUMIN 2.8 (L) 2020    PROTTOTAL 5.5 (L) 2020     (H) 2020     (H) 2020    GGT 66 (H) 2020    ALKPHOS 36 (L) 2020    BILITOTAL 0.9 2020    LIPASE 876 (H) 2020    CLOTILDE 15 2020    TSH 0.34 (L) 2020    T4 0.98 2020    CRP 3.6 2020    SED 12 2020        Preop Vitals    BP Readings from Last 3 Encounters:   20 119/70   18 138/80   18 122/65    Pulse Readings from Last 3 Encounters:   20 63   18 68   18 62      Resp Readings from Last 3 Encounters:   20 18   18 18   18 16    SpO2 Readings from Last 3 Encounters:   20 96%   18 96%   18 99%      Temp  "Readings from Last 1 Encounters:   06/08/20 37.2  C (99  F) (Axillary)    Ht Readings from Last 1 Encounters:   06/06/20 1.753 m (5' 9\")      Wt Readings from Last 1 Encounters:   06/08/20 141.1 kg (311 lb)    Estimated body mass index is 45.93 kg/m  as calculated from the following:    Height as of this encounter: 1.753 m (5' 9\").    Weight as of this encounter: 141.1 kg (311 lb).     LDA:  Peripheral IV 06/05/20 Left Upper forearm (Active)   Site Assessment [facility] 06/08/20 1600   Line Status Infusing 06/08/20 1600   Phlebitis Scale 0-->no symptoms 06/08/20 1600   Infiltration Scale 0 06/08/20 1600   Infiltration Site Treatment Method  None 06/08/20 0400   Extravasation? No 06/08/20 1600   Number of days: 3       Peripheral IV 06/05/20 Right Upper forearm (Active)   Site Assessment [facility] 06/08/20 1600   Line Status Saline locked 06/08/20 1600   Phlebitis Scale 0-->no symptoms 06/08/20 1600   Infiltration Scale 0 06/08/20 1600   Infiltration Site Treatment Method  None 06/08/20 0400   Extravasation? No 06/08/20 1600   Number of days: 3       Peripheral IV 06/06/20 Left;Posterior;Lateral Upper forearm (Active)   Site Assessment [facility] 06/08/20 1600   Line Status Infusing 06/08/20 1600   Phlebitis Scale 0-->no symptoms 06/08/20 1600   Infiltration Scale 0 06/08/20 1600   Infiltration Site Treatment Method  None 06/08/20 0400   Extravasation? No 06/08/20 1600   Number of days: 2       NG/OG/NJ Tube Nasogastric Left nostril (Active)   Site Description [facility] 06/08/20 1600   Status Enteral Feedings 06/08/20 1600   Drainage Appearance Tan 06/08/20 0400   Placement Confirmation [place] unchanged 06/08/20 1600   [place] (cm marking) at nare/mouth 55 cm 06/08/20 1600   Intake (ml) 50 ml 06/08/20 0800   Flush/Free Water (mL) 30 mL 06/08/20 1600   Residual (mL) 15 mL 06/08/20 0400   Number of days: 1       External Urinary Catheter (Active)   Skin no redness;no breakdown 06/08/20 0400   Urine Color [place] 06/08/20 1600   Urine " Appearance Cloudy 06/08/20 1600   Urine Odor Odor present 06/07/20 1200   Output (mL) 100 mL 06/08/20 1600   Collection Container Standard 06/08/20 0400   Securement Method Tape 06/08/20 0400   External Catheter changed Done 06/08/20 0300   Number of days: 2        Past Medical History:   Diagnosis Date     Depressive disorder      Gastroesophageal reflux disease      High cholesterol      Knee pain      Thyroid disease       History reviewed. No pertinent surgical history.   Allergies   Allergen Reactions     Ibuprofen         Anesthesia Evaluation     .             ROS/MED HX    ENT/Pulmonary:  - neg pulmonary ROS     Neurologic: Comment: Found to have muscle ridigity and AMS    Right cerebellar infarct (incidental finding)    (+)CVA Developmental delay      Cardiovascular:  - neg cardiovascular ROS       METS/Exercise Tolerance:     Hematologic:  - neg hematologic  ROS       Musculoskeletal: Comment: Muscle rigidity, resolving and resolving CK levels - neg musculoskeletal ROS       GI/Hepatic:     (+) GERD       Renal/Genitourinary:         Endo:     (+) thyroid problem hypothyroidism, .      Psychiatric:     (+) psychiatric history       Infectious Disease:  - neg infectious disease ROS       Malignancy:      - no malignancy   Other:    - neg other ROS                     PHYSICAL EXAM:   Mental Status/Neuro: A/A/O   Airway: Facies: Feasible  Mallampati: I  Mouth/Opening: Full  TM distance: > 6 cm  Neck ROM: Full   Respiratory: Auscultation: CTAB     Resp. Rate: Normal     Resp. Effort: Normal      CV: Rhythm: Regular  Rate: Age appropriate  Heart: Normal Sounds  Edema: None   Comments:      Dental: Normal Dentition                Assessment:   ASA SCORE: 3    H&P: History and physical reviewed and following examination; no interval change.   Smoking Status:  Non-Smoker/Unknown   NPO Status: NPO Appropriate     Plan:   Anes. Type:  MAC   Pre-Medication: None   Induction:  IV (Standard)   Airway: Native Airway    Access/Monitoring: PIV   Maintenance: Propofol Sedation     Postop Plan:   Postop Pain: None  Postop Sedation/Airway: Not planned     PONV Management:   Adult Risk Factors: Female, Non-Smoker   Prevention:, Propofol     CONSENT: Direct conversation   Plan and risks discussed with: Legal Guardian   Blood Products: Consent Deferred (Minimal Blood Loss)                   Jose Ayoub DO

## 2020-06-08 NOTE — CONSULTS
Interventional Radiology Consult Service Note    Patient is on NEURORADIOLOGY schedule 6/9 at 2 pm for a dx lumbar puncture with sedation.   INR pending.  Orders for NPO have been entered.   Medications to be held include: lovenox x1 dose  Consent will be done prior to procedure.     Please contact the IR charge RN at [phone] for estimated time of procedure.     Case discussed with neuroradiology and Sabra Bhatti PA-C. This is a 35 year old female with developmental delay, intellectual disability, history of repeated ED visits, depression, and anxiety who presents with unresponsiveness and full-body rigidity after several weeks of worsening behavioral issues, falls, and incontinence. Was afebrile with no leukocytosis, however, developed low grade fever 6/7 in the afternoon despite receiving her PTA scheduled Tylenol (Tmax 101.4). She was started in IV antibiotics and dx LP with sedation was requested.     *Team has also requested sedation for dx imaging. This needs to be coordinated with anesthesia. IR does not provide sedation for dx imaging. Additionally, the LP will require fluoroscopy, so it will not be able to be done in CT at the time of dx imaging.    Rosangela Renteria, KIKO, APRN  Interventional Radiology  Pager: [phone]

## 2020-06-08 NOTE — PROGRESS NOTES
[facility]  Neurology Consult Progress Note    Patient Name: Dionne Perez  Patient MRN: [id number]  Admission Date: 6/5/2020  Today's Date: 06/08/2020    Assessment:  Dionne Perez is a 35 year old female with developmental delay, intellectual disability, history of repeated ED visits, depression, and anxiety who presents with unresponsiveness and full-body rigidity after several weeks of worsening behavioral issues, falls, and incontinence. Exam continues to be limited as patient is unresponsive. Hemodynamically stable aside from tachycardia. Patient has been afebrile. Labs initially notable for CK moderately elevated at 2,500 with normal lactate, now down-trending, CRP elevated at 9.8, potassium low, sodium high, and AST/ALT elevated in a 2:1 pattern.     Overall, clinical picture appears to be most c/w toxidrome/medication effect related to serotonergic or anti-dopaminergic etiology  vs. Primarily Psychiatric or functional origin. Autoimmune or inflammatory seems less likely given clinical stability + low serum markers. That being said, given that she had a fever 6/7 up to 101.4, this may still be toxidrome or more concerning as early manifestation of CNS infection and thus LP must be performed to exclude this, especially since there may have been a temporal association w/ starting empiric antibiotics/antivirals.     Recommendations:  - R/o other systemic precipitants of decompensated psychiatric disease  - consider RUQ US, ggt, lipase? (although CT Abd negative at presentation, has had rising LFTs but will defer to primary team related to utility)  - Drug screen negative, although risperdone + escitalopram not on test  - continue to Trend CK given clinical instability despite initial downtrend  - ESR (ordered)  - hold all serotonergic + anti-dopaminergic medications  - continue fluid  Resuscitation  - will follow up LP + CTA   - LP must have opening pressure  - Patient will need  "repeated head and neck vascular studies to better assess stroke and other etiologies; it appears this will likely have to be done under sedation  -Given patient is hemodynamically stable without fever or leukocytosis, CNS infection appears unlikely but if mental status persists despite further work-up, lumbar puncture would be appropriate (although would likely have to be done under sedation. when patient gets CTA head and neck, would be useful to perform LP at the same time if exam remains unchanged).  - Appreciate Psychiatry's continued involvement  - For possibly incidental stroke:  - Continue aspirin 325 daily, if patient cannot take orally, this can be given rectally as 300  -TTE with bubble study: negative  - Atorvastatin 40mg when enteral access available  - Continue cardiac monitoring      PAtient seen and discussed w/ my attending, Dr. Block, who agrees w/ assessment and plan.    Naun Haque MD     Interval Events:  Talking w/ RN ON more so than previous. Following commands. Abdominal pain reported. Tmax was 101.4, started on Antibiotics/antiviral. AST + ALT trending upward.  This AM, sleeping on arrival peacefully, upon attempting to move arms, patient started groaning, which persisted throughout interaction. Able to tell me her name, ? Of affirming abdominal pain. No other useable interaction.       Review of Systems:  10 point review of systems was unable to be performed due to mental status/particpation.     Objective:   /53 (BP Location: Left arm)   Pulse 54   Temp 99.2  F (37.3  C) (Oral)   Resp 23   Ht 1.753 m (5' 9\")   Wt 141.1 kg (311 lb)   SpO2 95%   BMI 45.93 kg/m     General: initially resting calmly, upon exam continues to groan intermittently throughout exam.  HEENT: NC/AT. No scleral icterus. MMM.   Cardio: regular rate.  Pulm: breathing intermittently labored w/ exam maneuvers, unlabored when sleeping.  Abdomen: obese, no tenderness on light palpation. "   Extremities/Derm: minimally moving all, warm and well perfused. No focal/significant skin lesions.   Psych: unable to assess.  Neuro:   Mental status: eyes closed, opens briefly upon command. Able to tell me her name but no other verbal response beyond moaning/grunting w/ exam maneuvers and throughout rest of encounter. Minimally moves fingers to command, raises arms above head equally.  Cannot assess language quality/function, fund of knowledge, memory or other component based on alertness/participation.      Cranial nerves: Resists eye opening, cannot assess VF or pupils due to this. Eyes appear conjugate, no deviation, some lateral spont movement and looking toward speaker. Face musculature symmetric.  Unable to assess facial sensation, hearing,Palate elevation, shoulder shrug, voice quality or uvula due to mental status. Sticks tongue out minimally past lips, midline.     Motor: no abnormal movement. Tone is significantly diminished when sleeping, although likely increased to some extent. Upon awakening, Significant rigidity throughout, resists movement.  Able to raise bue antigravity equally, w/d in ble.       Reflexes: + Babinski. + . No ankle clonus due to stiffness, although improved from previous. B+BR brisk w/ spread b/l. P brisk b/l. A unable to elicit due to stiffness.    Sensory: Grimaces, breathing pattern changes and w/d or to noxious stimuli.     Coordination/Gait: unable to assess due to mental status.    Investigations:  I have reviewed all interval/pertinent laboratory and imaging results.

## 2020-06-08 NOTE — PROGRESS NOTES
Social Work Services Progress Note    Hospital Day: 3  Date of Initial Social Work Evaluation:  Not yet able to complete  Collaborated with:  Pt's group home ([phone]), [facility] and [facility] Medical Records. Voicemail left for the person listed as patient's guardian, Cynthia Reynolds ([phone]).     Data:  Pt is listed as having a guardian, SILVIA attempting to locate guardianship paperwork    Intervention:  SW reviewed chart and contacted medical records for the above healthcare systems in an attempt to locate appropriate guardianship paperwork. Neither healthcare systems apparently has any guardianship paperwork on file. Voicemails were left for patient's listed guardian Cynthia (who has apparently been in touch with other members of the care team today) and patient's group home.    Group home called back, stated they do have guardianship paperwork, and asked SW to get the paperwork from Cynthia. SW observed that [facility] has not yet returned SW's call and may not have access to a fax machine; group home staff took fax number and stated they would have the guardianship paperwork faxed over. SW awaiting paperwork.    Patient has in-home behavioral health support through [facility] ([phone]). Per chart review, patient's community paramedic Faustina Palacios knows her well and may be a good source of information on how to help patient manage her behaviors should the need arise. No behavioral outbursts or similar issues identified at this time.    Assessment:  Did not meet with pt for this intervention    Plan:    Anticipated Disposition:  Return to group home with existing community supports    Barriers to d/c plan:  Pt remains critically ill in ICU    Follow Up:  SW will continue to remain available for patient and family support, discharge planning, other resources and support PRN.    Vilma Rodriguez, ABBY, [facility]  ICU    [facility]   P:  [phone]  Pager: [phone]

## 2020-06-08 NOTE — PROGRESS NOTES
[facility], [facility] Progress Note - Hospitalist Service, Gold 6       Date of Admission:  6/5/2020  Assessment & Plan   Ms. Dionne Perez is a 34 yo female with hx of developmental delay, depression, anxiety, likely psychosis, GERD, and hypothyrodism, admitted to [facility] for further eval and management of AMS.      # AMS  # Acute, diffuse muscle rigidity  Due to pt's developmental delay (BL behavior is likely 5 or 7 yo) lives in a  and normally is very sociable and gregarious; however, due to COVID-19 pandemic, she developed significant behavioral dysregulation as a result of needing to practice safe distancing, etc, resulting in her frequently calling 911 and being evaluated and discharged at [facility]. Also has been randomly lying within nearby streets. The day of admission to [facility], pt was found by her guardian lying on her stomach surrounded in her own urine and noted to be severely rigid. Initially evaluated [facility] and was to be admitted to inpatient psychiatry unit for catatonia, however, since she did not improve with lorazepam challenge combined with the fact that her WBC ~18k, she was transferred to [place] and admitted to medicine. Pt continues to be unresponsive and not follow commands despite further benzodiazepine challenges and continues with diffuse muscle rigidity. Initial CK level ~2500,after being started on MIVFs now down trending. Neurology consulted of which subsequently ordered vEEG neg for seizures but did reveal mod diffuse encephalopathy. MRI brain with incidental R cerebellar infarct but w/o any other notable abnormalities c/w infectious or inflammatory etiologies. Was afebrile with no leukocytosis, however, developed low grade fever 6/7 despite receiving her PTA scheduled Tylenol (Tmax 101.4). Started on vancomycin, acyclovir and ceftriaxone. CRP 3.6, LA wnl, procal wnl. Vital signs stable. Plan for LP 6/9 with conscious sedation.   - Neurology  and Psychiatry consulted and appreciate recommendations.   - Continue empiric IV acyclovir, ceftriaxone, and vanc  - Follow blood cultures- NGTD  - Hold PTA Lexapro; no start of scheduled or prn antipsychotics  - Repeat total CK tomorrow am  - Continue MIVFs  - Continue telemetry  - Discontinue scheduled Tylenol   - Delirium precautions      # Probable severe malnutrition  # Concern for dysphagia  Has had no PO intake since admission due to her mouth rigidity and not opening her mouth volitionally. Continues, therefore to be NPO. NG inserted 6/7 for med administration and to start enteral nutrition. Patient removed NG 6/7 evening, replaced with bridal.   - Nutrition consulted and appreciate following  - Continue TFs   - Monitor daily lytes, phos, mag     # Incidental finding of R cerebellar infarct of uncertain significance  MRI head for AMS workup revealed finding of R cerebellar infarct. Pt started on aspirin, obtained CTA head and neck that was non-diagnostic due to it being severely limited due to poor contrast opacification. TTE with bubble study negative.   - Neurology consulted   - Continue  mg daily, if unable to take orally, can be given rectally as 300 mg   - Hold statin in the setting of elevated LFTs   - Repeat CTA head and neck with sedation    - Continue tele monitoring      # Mildly elevated LFTs:  Unclear etiology as LFTs from OSH as recently as 5/26 normal. AST and ALT increasing today. GGT elevated to 66. Lipase 876 (< 3X the upper limit of normal). CT abdomen 6/6 with no acute findings. Reported to have abdominal pain overnight, denies any pain today. Appears to be tolerating TF.   - RUQ US   - Repeat LFTs in AM      # Chronic pain?: Pt noted to be on scheduled Tylenol PTA, as well as have available as needed diclofenac gel. Continues to occasionally moan, but no obvious area of pain.  - Discontinue PTA scheduled Tylenol given mild transaminitis.       # Hypothyroidism: PTA on levothyroxine  50 mcg daily. TFTs on admission reveal pt euthyroid based on free T4 WNL.  - Continue PTA levothyroxine     # GERD: Continue PTA Pepcid 20 mg daily       Diet: NPO for Medical/Clinical Reasons Except for: Meds, Ice Chips  Adult Formula Drip Feeding: Continuous Peptamen Intense VHP; Nasogastric tube; Goal Rate: 15; mL/hr; Medication - Feeding Tube Flush Frequency: At least 15-30 mL water before and after medication administration and with tube clogging; Trophic rate...    DVT Prophylaxis: Enoxaparin (Lovenox) subcutaneous, hold for LP tomorrow.   Davila Catheter: not present  Code Status: Full Code           Disposition Plan   Expected discharge: 4 - 7 days, recommended to transitional care unit once adequate pain management/ tolerating PO medications, antibiotic plan established and mental status at baseline.  Entered: THEO Hernandez 06/08/2020, 1:23 PM       The patient's care was discussed with the Attending Physician, Dr. Ruiz, Bedside Nurse, Care Coordinator/, Patient and neurology Consultant and guardian, Cynthia.     THEO Hernandez  Hospitalist Service, [address]  Pager: [phone]  Please see sticky note for cross cover information  ______________________________________________________________________    Interval History   Patient seen and examined. Reviewed nursing notes. Per nursing notes, patient was more interactive overnight, speaking few sentences, and reports to have abdominal pain.      Awake on exam, answering simple questions intermittently. Was able to say her name and that she was in the hospital. Denies any pain. Denies dyspnea. Grunting during exam. Appeared anxious.     Data reviewed today: I reviewed all medications, new labs and imaging results over the last 24 hours.     Physical Exam   Vital Signs: Temp: 99.4  F (37.4  C) Temp src: Axillary BP: 119/70 Pulse: 63 Heart Rate: 59 Resp: 18 SpO2: 96 % O2 Device: None (Room  air)    Weight: 311 lbs 0 oz  GENERAL: Alert during exam with eyes open, oriented to person and place, not date. Appears anxious.   HEENT: Anicteric sclera. PERRL. Mucous membranes moist and without lesions. Intermittently tracking eye movements. NG tube in place.   CV: RRR. S1, S2. No murmurs appreciated.   RESPIRATORY: Effort normal on RA. Grunting. Lung exam difficult to assess due to body habitus, no wheezing, crackles or rhonchi appreciated.    GI: Abdomen obese, soft and non distended, bowel sounds present. No tenderness, rebound, guarding.   MUSCULOSKELETAL: No joint swelling or tenderness. Moves all extremities. Rigidity with passive movement. Facial rigidity.   NEUROLOGICAL: Not following commands.    EXTREMITIES: No peripheral edema. Intact bilateral pedal pulses.   SKIN: No jaundice. No rashes on visible skin.      Data   Recent Labs   Lab 06/08/20  0914 06/08/20  0403 06/07/20  1810 06/07/20  0432 06/06/20  1701 06/06/20  0924   WBC  --  8.3 8.2 9.7  --  9.9   HGB  --  11.1*  --  11.8  --  11.9   MCV  --  94  --  90  --  90   PLT  --  125*  --  154  --  164   INR 1.16*  --   --   --   --   --    NA  --  145*  --  146*  --  146*   POTASSIUM  --  2.9*  --  3.6 2.9* 3.1*   CHLORIDE  --  114*  --  115*  --  112*   CO2  --  26  --  25  --  21   BUN  --  26  --  37*  --  47*   CR  --  0.73  --  0.74  --  0.79   ANIONGAP  --  5  --  5  --  12   SANDRA  --  8.1*  --  8.6  --  8.7   GLC  --  125*  --  127*  --  97   ALBUMIN  --  2.8*  --  3.5  --  3.4   PROTTOTAL  --  5.5*  --  6.4*  --  6.5*   BILITOTAL  --  0.9  --  0.9  --  0.9   ALKPHOS  --  36*  --  42  --  45   ALT  --  104*  --  78*  --  78*   AST  --  170*  --  130*  --  149*     Recent Results (from the past 24 hour(s))   XR Abdomen Port 1 View    Narrative    EXAM: XR ABDOMEN PORT 1 VW  6/7/2020 4:24 PM      HISTORY: Pt pulled NGT; RN replaced. Please eval location tip of NGT    COMPARISON: 6/7/2020    FINDINGS: Enteric tube sidehole projects at the  expected location of  the stomach.    Nonobstructive bowel gas pattern. No pneumatosis. No portal venous  gas. Cholecystectomy clips. No visualized masses.    Visualized portions of the lung demonstrate no focal airspace  opacities. Soft tissues and osseous structures are unremarkable.      Impression    IMPRESSION:   1. Enteric tube sidehole projects at expected location of the stomach.  2. Nonobstructive bowel gas pattern.    I have personally reviewed the examination and initial interpretation  and I agree with the findings.    NAWAF SALDAÑA, DO     Medications     dextrose       dextrose 5% and 0.45% NaCl 125 mL/hr at 06/08/20 0700     sodium chloride 0.9%         acyclovir (ZOVIRAX) IV  10 mg/kg (Adjusted) Intravenous Q8H     [START ON 6/9/2020] aspirin  324 mg Oral or Feeding Tube Daily     cefTRIAXone  2 g Intravenous Q12H     cloNIDine  0.1 mg Oral or NG Tube BID     [Held by provider] enoxaparin ANTICOAGULANT  40 mg Subcutaneous Q12H     famotidine  20 mg Oral or NG Tube Daily     levothyroxine  50 mcg Oral or NG Tube Daily     melatonin  3 mg Oral or NG Tube At Bedtime     multivitamins w/minerals  15 mL Per Feeding Tube Daily     polyethylene glycol  17 g Oral or G tube Daily     [Held by provider] pravastatin  40 mg Oral or NG Tube QPM     sodium chloride (PF)  3 mL Intracatheter Q8H     vancomycin (VANCOCIN) IV  2,000 mg Intravenous Q8H

## 2020-06-08 NOTE — PROCEDURES
Bridle Placement:   Reason for bridle placement: pt removed NGT   Medicine delivered during procedure: lubricating jelly    Procedure: Successful- 16 Fr bridle placed  Condition of nose/skin at time of bridle placement: Pt had very large piece of crusted nasal secretions + blood in right nare, which she sneezed and expelled during bridle placement. Probe was then able to slide into the back of pt's right nare. Slight bleeding with bridle string sliding through behind vomer bone but this appeared to stop by the time the bridle clip was closed.   Face to Face time with patient: 5 minutes.  Denise Parsons, MARIELA, LD  ([facility] dietitian, pgr- [phone])

## 2020-06-08 NOTE — PHARMACY-VANCOMYCIN DOSING SERVICE
Pharmacy Vancomycin Note  Date of Service 2020  Patient's  1985   35 year old, female    Indication: Meningitis  Goal Trough Level: 15-20 mg/L  Day of Therapy: 2  Current Vancomycin regimen:  2000 mg IV q8h    Current estimated CrCl = Estimated Creatinine Clearance: 164.9 mL/min (based on SCr of 0.73 mg/dL).    Creatinine for last 3 days  2020:  9:24 AM Creatinine 0.79 mg/dL  2020:  4:32 AM Creatinine 0.74 mg/dL  2020:  4:03 AM Creatinine 0.73 mg/dL    Recent Vancomycin Levels (past 3 days)  2020:  4:58 PM Vancomycin Level 33.4 mg/L (5.5 hour level, expect 8 hour trough to be ~23 mg/L)    Vancomycin IV Administrations (past 72 hours)                   vancomycin (VANCOCIN) 2,000 mg in sodium chloride 0.9 % 500 mL intermittent infusion (mg) 2,000 mg New Bag 20 1128     2,000 mg New Bag  0208    vancomycin (VANCOCIN) 2,500 mg in sodium chloride 0.9 % 500 mL intermittent infusion (mg) 2,500 mg New Bag 20 1551                Nephrotoxins and other renal medications (From now, onward)    Start     Dose/Rate Route Frequency Ordered Stop    20 1541  vancomycin place vivas - receiving intermittent dosing      1 each Intravenous SEE ADMIN INSTRUCTIONS 20 1541      20 1600  acyclovir (ZOVIRAX) 1,000 mg in D5W 250 mL intermittent infusion      10 mg/kg × 95.8 kg (Adjusted)  250 mL/hr over 1 Hours Intravenous EVERY 8 HOURS 20 1440               Contrast Orders - past 72 hours (72h ago, onward)    Start     Dose/Rate Route Frequency Ordered Stop    20 1200  perflutren diluted 1mL to 2mL with saline (OPTISON) diluted injection 5 mL      5 mL Intravenous ONCE 20 1151 20 1200    20 2030  iopamidol (ISOVUE-370) solution 75 mL      75 mL Intravenous ONCE 20 1330  gadobutrol (GADAVIST) injection 13.4 mL      0.1 mL/kg × 134 kg Intravenous ONCE 20 1325 20 1352    20 0700  iopamidol  (ISOVUE-370) solution 135 mL      135 mL Intravenous ONCE 06/06/20 0655 06/06/20 0740    06/06/20 0203  gadobutrol (GADAVIST) injection 15 mL  Status:  Discontinued      15 mL Intravenous ONCE 06/06/20 0202 06/06/20 0541          Interpretation of levels and current regimen:  Trough level is  Supratherapeutic    Has serum creatinine changed > 50% in last 72 hours: No    Urine output:  good urine output    Renal Function: Stable    Plan:  1.  Decrease Dose to 2250mg IV q12h (23mg/kg adjBW, 17mg/kg actBW; not suprising that patient accumulated on q8h regimen given BMI, decrease by 25% and expect new trough to be ~17)  2.  Pharmacy will check trough levels as appropriate in 1-3 Days.    3. Serum creatinine levels will be ordered daily for the first week of therapy and at least twice weekly for subsequent weeks.      [facility]        .

## 2020-06-08 NOTE — PLAN OF CARE
Major Shift Events: Patient awake more throughout shift. Patient on room air, lungs diminished bilaterally. Oral suction provided throughout shift & encouraged to cough/deep breathe. Pt does not appear to be in any pain.       Plan: Continue to monitor hemodynamic stability, plan for LP, abd US, and CTA tomorrow, update primary team with any acute changes.    For vital signs and complete assessments, please see documentation flowsheets.     Problem: Adult Inpatient Plan of Care  Goal: Plan of Care Review  6/8/2020 1705 by Cookie Lopes RN  Outcome: No Change  6/8/2020 0622 by Noreen Reynaga RN  Outcome: No Change  Goal: Patient-Specific Goal (Individualization)  6/8/2020 1705 by Cookie Lopes RN  Outcome: No Change  6/8/2020 0622 by Noreen Reynaga RN  Outcome: No Change  Goal: Absence of Hospital-Acquired Illness or Injury  6/8/2020 1705 by Cookie Lopes RN  Outcome: No Change  6/8/2020 0622 by Noreen Reynaga RN  Outcome: No Change  Goal: Optimal Comfort and Wellbeing  6/8/2020 1705 by Cookie Lopes RN  Outcome: No Change  6/8/2020 0622 by Noreen Reynaga RN  Outcome: No Change  Goal: Readiness for Transition of Care  6/8/2020 1705 by Cookie Lopes RN  Outcome: No Change  6/8/2020 0622 by Noreen Reynaga RN  Outcome: No Change  Goal: Rounds/Family Conference  6/8/2020 1705 by Cookie Lopes RN  Outcome: No Change  6/8/2020 0622 by Noreen Reynaga RN  Outcome: No Change     Problem: Confusion Acute  Goal: Optimal Cognitive Function  6/8/2020 1705 by Cookie Lopes RN  Outcome: No Change  6/8/2020 0622 by Noreen Reynaga RN  Outcome: No Change

## 2020-06-08 NOTE — PLAN OF CARE
ICU End of Shift Summary. See flowsheets for vital signs and detailed assessment.    Changes this shift: Opening eyes to voice and speaking more with RN tonight. Following commands. Episodes of stiffness still noted in BUE's while awake. C/O abdominal pain; one time suppository given resulting in one small hard stool. TMAX 101.1 overnight. SB to ST (HR 40's to 100's). TF's started @ 15. IVMF's @ 125. K+ 2.9 and Phos 1.1; replacing per PRN protocol. CK critical but down trending (1398).     Plan: IR for LP. Continue antibiotics. Transfer to floor with telemetry once bed available.

## 2020-06-09 ENCOUNTER — APPOINTMENT (OUTPATIENT)
Dept: CT IMAGING | Facility: CLINIC | Age: 35
DRG: 887 | End: 2020-06-09
Attending: PHYSICIAN ASSISTANT
Payer: MEDICARE

## 2020-06-09 ENCOUNTER — APPOINTMENT (OUTPATIENT)
Dept: ULTRASOUND IMAGING | Facility: CLINIC | Age: 35
DRG: 887 | End: 2020-06-09
Attending: PHYSICIAN ASSISTANT
Payer: MEDICARE

## 2020-06-09 ENCOUNTER — APPOINTMENT (OUTPATIENT)
Dept: INTERVENTIONAL RADIOLOGY/VASCULAR | Facility: CLINIC | Age: 35
DRG: 887 | End: 2020-06-09
Attending: NURSE PRACTITIONER
Payer: MEDICARE

## 2020-06-09 LAB
ALBUMIN SERPL-MCNC: 2.3 G/DL (ref 3.4–5)
ALP SERPL-CCNC: 38 U/L (ref 40–150)
ALT SERPL W P-5'-P-CCNC: 132 U/L (ref 0–50)
ANION GAP SERPL CALCULATED.3IONS-SCNC: 5 MMOL/L (ref 3–14)
APPEARANCE CSF: CLEAR
APPEARANCE CSF: CLEAR
AST SERPL W P-5'-P-CCNC: 143 U/L (ref 0–45)
B-HCG SERPL-ACNC: <1 IU/L (ref 0–5)
BILIRUB SERPL-MCNC: 0.7 MG/DL (ref 0.2–1.3)
BUN SERPL-MCNC: 13 MG/DL (ref 7–30)
CALCIUM SERPL-MCNC: 7.7 MG/DL (ref 8.5–10.1)
CHLORIDE SERPL-SCNC: 114 MMOL/L (ref 94–109)
CK SERPL-CCNC: 880 U/L (ref 30–225)
CO2 SERPL-SCNC: 25 MMOL/L (ref 20–32)
COLOR CSF: COLORLESS
COLOR CSF: COLORLESS
CREAT SERPL-MCNC: 0.61 MG/DL (ref 0.52–1.04)
GFR SERPL CREATININE-BSD FRML MDRD: >90 ML/MIN/{1.73_M2}
GLUCOSE BLDC GLUCOMTR-MCNC: 91 MG/DL (ref 70–99)
GLUCOSE CSF-MCNC: 65 MG/DL (ref 40–70)
GLUCOSE SERPL-MCNC: 111 MG/DL (ref 70–99)
GRAM STN SPEC: NORMAL
Lab: NORMAL
MAGNESIUM SERPL-MCNC: 1.7 MG/DL (ref 1.6–2.3)
PHOSPHATE SERPL-MCNC: 2.3 MG/DL (ref 2.5–4.5)
POTASSIUM SERPL-SCNC: 3.7 MMOL/L (ref 3.4–5.3)
PROT CSF-MCNC: 58 MG/DL (ref 15–60)
PROT SERPL-MCNC: 5 G/DL (ref 6.8–8.8)
RBC # CSF MANUAL: 0 /UL (ref 0–2)
RBC # CSF MANUAL: NORMAL /UL (ref 0–2)
SODIUM SERPL-SCNC: 144 MMOL/L (ref 133–144)
SPECIMEN SOURCE: NORMAL
TUBE # CSF: 1 #
TUBE # CSF: 4 #
WBC # CSF MANUAL: 0 /UL (ref 0–5)
WBC # CSF MANUAL: NORMAL /UL (ref 0–5)

## 2020-06-09 PROCEDURE — 87205 SMEAR GRAM STAIN: CPT | Performed by: PHYSICIAN ASSISTANT

## 2020-06-09 PROCEDURE — 25000128 H RX IP 250 OP 636: Performed by: PHYSICIAN ASSISTANT

## 2020-06-09 PROCEDURE — 84157 ASSAY OF PROTEIN OTHER: CPT | Performed by: PHYSICIAN ASSISTANT

## 2020-06-09 PROCEDURE — 76705 ECHO EXAM OF ABDOMEN: CPT

## 2020-06-09 PROCEDURE — 82784 ASSAY IGA/IGD/IGG/IGM EACH: CPT | Performed by: PHYSICIAN ASSISTANT

## 2020-06-09 PROCEDURE — 25800030 ZZH RX IP 258 OP 636: Performed by: HOSPITALIST

## 2020-06-09 PROCEDURE — 82040 ASSAY OF SERUM ALBUMIN: CPT | Performed by: PHYSICIAN ASSISTANT

## 2020-06-09 PROCEDURE — 009U3ZX DRAINAGE OF SPINAL CANAL, PERCUTANEOUS APPROACH, DIAGNOSTIC: ICD-10-PCS | Performed by: RADIOLOGY

## 2020-06-09 PROCEDURE — 89050 BODY FLUID CELL COUNT: CPT | Performed by: PHYSICIAN ASSISTANT

## 2020-06-09 PROCEDURE — 84702 CHORIONIC GONADOTROPIN TEST: CPT | Performed by: PHYSICIAN ASSISTANT

## 2020-06-09 PROCEDURE — 27210437 ZZH NUTRITION PRODUCT SEMIELEM INTERMED LITER

## 2020-06-09 PROCEDURE — 83916 OLIGOCLONAL BANDS: CPT | Performed by: PHYSICIAN ASSISTANT

## 2020-06-09 PROCEDURE — 87015 SPECIMEN INFECT AGNT CONCNTJ: CPT | Performed by: PHYSICIAN ASSISTANT

## 2020-06-09 PROCEDURE — 82550 ASSAY OF CK (CPK): CPT | Performed by: PHYSICIAN ASSISTANT

## 2020-06-09 PROCEDURE — 25000132 ZZH RX MED GY IP 250 OP 250 PS 637: Mod: GY | Performed by: PHYSICIAN ASSISTANT

## 2020-06-09 PROCEDURE — 62329 THER SPI PNXR CSF FLUOR/CT: CPT

## 2020-06-09 PROCEDURE — 99152 MOD SED SAME PHYS/QHP 5/>YRS: CPT

## 2020-06-09 PROCEDURE — 99207 ZZC APP CREDIT; MD BILLING SHARED VISIT: CPT | Performed by: PHYSICIAN ASSISTANT

## 2020-06-09 PROCEDURE — 87529 HSV DNA AMP PROBE: CPT | Performed by: PHYSICIAN ASSISTANT

## 2020-06-09 PROCEDURE — 84100 ASSAY OF PHOSPHORUS: CPT | Performed by: PHYSICIAN ASSISTANT

## 2020-06-09 PROCEDURE — 12000001 ZZH R&B MED SURG/OB UMMC

## 2020-06-09 PROCEDURE — 25800025 ZZH RX 258: Performed by: HOSPITALIST

## 2020-06-09 PROCEDURE — 82042 OTHER SOURCE ALBUMIN QUAN EA: CPT | Performed by: PHYSICIAN ASSISTANT

## 2020-06-09 PROCEDURE — 25000128 H RX IP 250 OP 636: Performed by: HOSPITALIST

## 2020-06-09 PROCEDURE — 40000141 ZZH STATISTIC PERIPHERAL IV START W/O US GUIDANCE

## 2020-06-09 PROCEDURE — 87070 CULTURE OTHR SPECIMN AEROBIC: CPT | Performed by: PHYSICIAN ASSISTANT

## 2020-06-09 PROCEDURE — 25000128 H RX IP 250 OP 636: Performed by: STUDENT IN AN ORGANIZED HEALTH CARE EDUCATION/TRAINING PROGRAM

## 2020-06-09 PROCEDURE — 70496 CT ANGIOGRAPHY HEAD: CPT

## 2020-06-09 PROCEDURE — 25000132 ZZH RX MED GY IP 250 OP 250 PS 637: Performed by: STUDENT IN AN ORGANIZED HEALTH CARE EDUCATION/TRAINING PROGRAM

## 2020-06-09 PROCEDURE — 25800030 ZZH RX IP 258 OP 636: Performed by: PHYSICIAN ASSISTANT

## 2020-06-09 PROCEDURE — 25800025 ZZH RX 258: Performed by: PHYSICIAN ASSISTANT

## 2020-06-09 PROCEDURE — 87075 CULTR BACTERIA EXCEPT BLOOD: CPT | Performed by: PHYSICIAN ASSISTANT

## 2020-06-09 PROCEDURE — 99233 SBSQ HOSP IP/OBS HIGH 50: CPT | Performed by: STUDENT IN AN ORGANIZED HEALTH CARE EDUCATION/TRAINING PROGRAM

## 2020-06-09 PROCEDURE — 83735 ASSAY OF MAGNESIUM: CPT | Performed by: PHYSICIAN ASSISTANT

## 2020-06-09 PROCEDURE — 70450 CT HEAD/BRAIN W/O DYE: CPT

## 2020-06-09 PROCEDURE — 36415 COLL VENOUS BLD VENIPUNCTURE: CPT | Performed by: PHYSICIAN ASSISTANT

## 2020-06-09 PROCEDURE — 25000128 H RX IP 250 OP 636: Performed by: RADIOLOGY

## 2020-06-09 PROCEDURE — 80053 COMPREHEN METABOLIC PANEL: CPT | Performed by: PHYSICIAN ASSISTANT

## 2020-06-09 PROCEDURE — 00000146 ZZHCL STATISTIC GLUCOSE BY METER IP

## 2020-06-09 PROCEDURE — 25000125 ZZHC RX 250: Performed by: HOSPITALIST

## 2020-06-09 PROCEDURE — 25000132 ZZH RX MED GY IP 250 OP 250 PS 637: Performed by: PHYSICIAN ASSISTANT

## 2020-06-09 PROCEDURE — 82945 GLUCOSE OTHER FLUID: CPT | Performed by: PHYSICIAN ASSISTANT

## 2020-06-09 RX ORDER — IOPAMIDOL 755 MG/ML
75 INJECTION, SOLUTION INTRAVASCULAR ONCE
Status: COMPLETED | OUTPATIENT
Start: 2020-06-09 | End: 2020-06-09

## 2020-06-09 RX ORDER — FENTANYL CITRATE 50 UG/ML
25-50 INJECTION, SOLUTION INTRAMUSCULAR; INTRAVENOUS EVERY 5 MIN PRN
Status: DISCONTINUED | OUTPATIENT
Start: 2020-06-09 | End: 2020-06-09 | Stop reason: HOSPADM

## 2020-06-09 RX ORDER — NALOXONE HYDROCHLORIDE 0.4 MG/ML
.1-.4 INJECTION, SOLUTION INTRAMUSCULAR; INTRAVENOUS; SUBCUTANEOUS
Status: DISCONTINUED | OUTPATIENT
Start: 2020-06-09 | End: 2020-06-09 | Stop reason: HOSPADM

## 2020-06-09 RX ORDER — FLUMAZENIL 0.1 MG/ML
0.2 INJECTION, SOLUTION INTRAVENOUS
Status: DISCONTINUED | OUTPATIENT
Start: 2020-06-09 | End: 2020-06-09 | Stop reason: HOSPADM

## 2020-06-09 RX ORDER — DEXTROSE MONOHYDRATE 25 G/50ML
25-50 INJECTION, SOLUTION INTRAVENOUS
Status: CANCELLED | OUTPATIENT
Start: 2020-06-09

## 2020-06-09 RX ORDER — NICOTINE POLACRILEX 4 MG
15-30 LOZENGE BUCCAL
Status: CANCELLED | OUTPATIENT
Start: 2020-06-09

## 2020-06-09 RX ADMIN — CALCIUM POLYCARBOPHIL 1250 MG: 625 TABLET, FILM COATED ORAL at 21:16

## 2020-06-09 RX ADMIN — ACYCLOVIR SODIUM 1000 MG: 50 INJECTION, SOLUTION INTRAVENOUS at 01:03

## 2020-06-09 RX ADMIN — LEVOTHYROXINE SODIUM 50 MCG: 50 TABLET ORAL at 11:49

## 2020-06-09 RX ADMIN — VANCOMYCIN HYDROCHLORIDE 2250 MG: 10 INJECTION, POWDER, LYOPHILIZED, FOR SOLUTION INTRAVENOUS at 02:15

## 2020-06-09 RX ADMIN — ACYCLOVIR SODIUM 1000 MG: 50 INJECTION, SOLUTION INTRAVENOUS at 16:51

## 2020-06-09 RX ADMIN — MULTIVIT AND MINERALS-FERROUS GLUCONATE 9 MG IRON/15 ML ORAL LIQUID 15 ML: at 11:49

## 2020-06-09 RX ADMIN — CALCIUM POLYCARBOPHIL 1250 MG: 625 TABLET, FILM COATED ORAL at 11:49

## 2020-06-09 RX ADMIN — FAMOTIDINE 20 MG: 20 TABLET ORAL at 11:49

## 2020-06-09 RX ADMIN — POTASSIUM PHOSPHATE, MONOBASIC AND POTASSIUM PHOSPHATE, DIBASIC 15 MMOL: 224; 236 INJECTION, SOLUTION INTRAVENOUS at 09:43

## 2020-06-09 RX ADMIN — MELATONIN TAB 3 MG 3 MG: 3 TAB at 21:16

## 2020-06-09 RX ADMIN — POTASSIUM PHOSPHATE, MONOBASIC AND POTASSIUM PHOSPHATE, DIBASIC 20 MMOL: 224; 236 INJECTION, SOLUTION INTRAVENOUS at 02:07

## 2020-06-09 RX ADMIN — CLONIDINE HYDROCHLORIDE 0.1 MG: 0.1 TABLET ORAL at 11:49

## 2020-06-09 RX ADMIN — VANCOMYCIN HYDROCHLORIDE 2250 MG: 10 INJECTION, POWDER, LYOPHILIZED, FOR SOLUTION INTRAVENOUS at 11:48

## 2020-06-09 RX ADMIN — CEFTRIAXONE SODIUM 2 G: 2 INJECTION, POWDER, FOR SOLUTION INTRAMUSCULAR; INTRAVENOUS at 04:31

## 2020-06-09 RX ADMIN — DEXTROSE AND SODIUM CHLORIDE: 5; 450 INJECTION, SOLUTION INTRAVENOUS at 19:47

## 2020-06-09 RX ADMIN — MIDAZOLAM 1 MG: 1 INJECTION INTRAMUSCULAR; INTRAVENOUS at 14:45

## 2020-06-09 RX ADMIN — ACYCLOVIR SODIUM 1000 MG: 50 INJECTION, SOLUTION INTRAVENOUS at 09:54

## 2020-06-09 RX ADMIN — CEFTRIAXONE SODIUM 2 G: 2 INJECTION, POWDER, FOR SOLUTION INTRAMUSCULAR; INTRAVENOUS at 16:51

## 2020-06-09 RX ADMIN — FENTANYL CITRATE 50 MCG: 50 INJECTION, SOLUTION INTRAMUSCULAR; INTRAVENOUS at 14:45

## 2020-06-09 RX ADMIN — CLONIDINE HYDROCHLORIDE 0.1 MG: 0.1 TABLET ORAL at 21:16

## 2020-06-09 RX ADMIN — IOPAMIDOL 75 ML: 755 INJECTION, SOLUTION INTRAVENOUS at 15:09

## 2020-06-09 RX ADMIN — ASPIRIN 81 MG CHEWABLE TABLET 324 MG: 81 TABLET CHEWABLE at 11:48

## 2020-06-09 ASSESSMENT — ACTIVITIES OF DAILY LIVING (ADL)
ADLS_ACUITY_SCORE: 41
ADLS_ACUITY_SCORE: 39
ADLS_ACUITY_SCORE: 41
ADLS_ACUITY_SCORE: 39
ADLS_ACUITY_SCORE: 41
ADLS_ACUITY_SCORE: 39

## 2020-06-09 ASSESSMENT — MIFFLIN-ST. JEOR: SCORE: 2243.11

## 2020-06-09 NOTE — PROGRESS NOTES
Care Coordinator Progress Note    Admission Date/Time:  6/5/2020  Attending MD:  Sterling Vazquez MD    Data  Chart reviewed, discussed with interdisciplinary team.   Patient was admitted for:    Intellectual delay  Somnolence  Generalized muscle weakness  Covid-19 Virus not Detected.    Concerns with insurance coverage for discharge needs: None.  Current Living Situation: Patient lives in a group home.  Support System: Supportive and Involved  Services Involved: Group home staff provide assistance w/meals, medication management/ and ADLs if needed  Transportation at Discharge: TBD pending dispo plan.   Transportation to Medical Appointments: Group home previously assisted  Barriers to Discharge: Medical stability, safe discharge dipso, enteral feedings, infectious work up, continued psychiatric needs.      Assessment  Patient is a 35yr old female with a prior medical history of developmental delay, anxiety, likely psychosis, GERD, and hypothyroidism who was admitted w/altered mental status. Patient lives at a group home, writer contacted and spoke with staff member who confirmed that at baseline patient is ambulatory, able to complete most ADLs independently, receives medication management, activities, meals and housekeeping at the group home. Patient is alert and oriented at baseline, able to independently utilize [employer] for outings in the community. Writer contacted patient's presumed guardian, Cynthia to further discuss patient's baseline. Writer requested guardianship paperwork at this time, yCnthia states that she will send it to requested fax today. Patient is currently an asst x2 w/lift, on NJ TF, and is receiving multiple IV abx. Psych continues to follow as well, as patient was admitted w/altered behavior and in need of medication management. RNCC will continue to follow for discharge planning.      Group Home:   Phone: [phone]    Presumed guardian, guardianship paperwork requested:  Cynthia  Rodolfo: [phone]     Plan  Anticipated Discharge Date:  TBD  Anticipated Discharge Plan:  TBD    Gwen Green, RNCC, BSN    [facility]  [address] [place] [address]    [email or web address]    Office: [phone] Pager: [phone]  To contact the weekend RNCC, page [phone].
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
## 2020-06-09 NOTE — PRE-PROCEDURE
GENERAL PRE-PROCEDURE:   Procedure:  Fluoroscopy guided lumbar puncture  Date/Time:  6/9/2020 2:26 PM    Verbal consent obtained?: Yes    Written consent obtained?: Yes    Risks and benefits: Risks, benefits and alternatives were discussed    DC Plan: Appropriate discharge home plan in place for patients who are going home after procedure   Consent given by:  Guardian  Patient states understanding of procedure being performed: Yes    Patient's understanding of procedure matches consent: Yes    Procedure consent matches procedure scheduled: Yes    Expected level of sedation:  Moderate  Appropriately NPO:  Yes  ASA Class:  Class 2- mild systemic disease, no acute problems, no functional limitations  Mallampati  :  Grade 1- soft palate, uvula, tonsillar pillars, and posterior pharyngeal wall visible  Lungs:  Lungs clear with good breath sounds bilaterally  Heart:  Normal heart sounds and rate  History & Physical reviewed:  History and physical reviewed and no updates needed  Statement of review:  I have reviewed the lab findings, diagnostic data, medications, and the plan for sedation

## 2020-06-09 NOTE — PLAN OF CARE
RN assumed cares at 0700, Pt alert although developmental delay makes it difficult to determine orientation.  VS stable this shift.  Pt diaphoretic throughout the shift, although afebrile at each check.  Pt denies pain, no discernable non-verbal queues of pain this shift.  Pt sent to IR this afternoon for LP, sent to CT post procedure for CT with sedation; tolerated well.  Pt received IV abx and phos replacement this shift.  NPO since midnight, TF likely to be restarted post procedure.  No BM this shift, adequate urine output through the purewick catheter.  No acute incidents this shift.

## 2020-06-09 NOTE — IR NOTE
Patient Name: Doinne Perez  Medical Record Number: [id number]  Today's Date: 6/9/2020    Procedure: lumbar puncture  Proceduralist: Ling Knight and Tyler    Procedure Start: 1440  Procedure end: 1455 Sedation medications administered: versed 1Mg., fentanyl  50 Mcg.    Report given to: Tyree CANTRELL    Other Notes: Pt arrived to IR room 3  from   Consent reviewed. Pt denies any questions or concerns regarding procedure. Pt positioned left lateral  and monitored per protocol. Pt tolerated procedure without any noted complications. Pt transferred back to .

## 2020-06-09 NOTE — PROGRESS NOTES
[facility], [facility] Progress Note - Hospitalist Service, Gold 6       Date of Admission:  6/5/2020  Assessment & Plan   Ms. Dionne Perez is a 34 yo female with hx of developmental delay, depression, anxiety, likely psychosis, GERD, and hypothyrodism, admitted to [facility] for further eval and management of AMS.      # AMS  # Acute, diffuse muscle rigidity  Due to pt's developmental delay (BL behavior is likely 5 or 5 yo) lives in a  and normally is very sociable and gregarious; however, due to COVID-19 pandemic, she developed significant behavioral dysregulation as a result of needing to practice safe distancing, etc, resulting in her frequently calling 911 and being evaluated and discharged at [facility]. Also has been randomly lying within nearby streets. The day of admission to [facility], pt was found by her guardian lying on her stomach surrounded in her own urine and noted to be severely rigid. Initially evaluated at [facility] and was to be admitted to inpatient psychiatry unit for catatonia, however, since she did not improve with lorazepam challenge and had fever with leukocytosis w/ WBC ~18k, she was transferred to [place] and admitted to medicine. Initial CK level ~2500, now down trending after being started on mIVFs. Neurology consulted of which subsequently ordered vEEG neg for seizures but did reveal mod diffuse encephalopathy. MRI brain with incidental R cerebellar infarct but w/o any other notable abnormalities c/w infectious or inflammatory etiologies. Was afebrile with no leukocytosis, however, developed low grade fever 6/7 despite receiving her PTA scheduled Tylenol (Tmax 101.4). Drug screen negative. Started on vancomycin, acyclovir and ceftriaxone. CRP 3.6, LA wnl, procal wnl. Vital signs stable. Improvement noted 6/7 after initiation of antibiotics and acyclovir.   - Neurology and Psychiatry consulted and appreciate recommendations.    - Plan for LP today with  gram stain, aerobic and anaerobic cultures, protein, glucose, oligoclonal bands, opening pressure, cell counts, freeze sample  - Continue empiric IV acyclovir, ceftriaxone, and vanc  - Follow blood cultures- NGTD  - Hold PTA Lexapro; would not start new scheduled or prn antipsychotics  - Continue to monitor CK  - Continue MIVFs  - Continue telemetry  - Discontinue scheduled Tylenol   - Delirium precautions      # Probable severe malnutrition  # Concern for dysphagia  Has had no PO intake since admission due to her mouth rigidity and not opening her mouth volitionally. Continues, therefore to be NPO. NG inserted 6/7 for med administration and to start enteral nutrition. Patient removed NG 6/7 evening, replaced with bridal.   - Nutrition consulted and appreciate following  - Holding TF currently for LP and CTA, resume once completed   - Monitor daily lytes, phos, mag      # Incidental finding of R cerebellar infarct of uncertain significance  MRI head for AMS workup revealed finding of R cerebellar infarct. Pt started on aspirin, obtained CTA head and neck that was non-diagnostic due to it being severely limited due to poor contrast opacification. TTE with bubble study negative.   - Neurology consulted   - Continue  mg daily, if unable to take orally, can be given rectally as 300 mg   - Hold statin in the setting of elevated LFTs   - Repeat CTA head and neck with sedation to be completed today   - Continue tele monitoring      # Mildly elevated LFTs  # Hepatic steatosis   Unclear etiology as LFTs from OSH as recently as 5/26 normal. AST and ALT increasing today. GGT elevated to 66. Lipase 876 (< 3X the upper limit of normal). CT abdomen 6/6 with no acute findings. RUQ US with mild hepatomegaly and diffuse hepatic steatosis, CBD 5.5 mm, no hepatic biliary ductal dilation. Tbili wnl. Reported to have abdominal pain on admission, no further complaints of pain. Appears to be tolerating TF. Possible pancreatitis on  admission vs LFT elevation 2/2 hepatic steatosis and CK elevation.   - Continue to monitor      # Chronic pain?: Pt noted to be on scheduled Tylenol PTA, as well as have available as needed diclofenac gel. Continues to occasionally moan, but no obvious area of pain.  - Discontinue PTA scheduled Tylenol given transaminitis     # Hypothyroidism: PTA on levothyroxine 50 mcg daily. TFTs on admission reveal pt euthyroid based on free T4 WNL.  - Continue PTA levothyroxine     # GERD: Continue PTA Pepcid 20 mg daily       Diet: NPO for Medical/Clinical Reasons Except for: Meds, Ice Chips  Adult Formula Drip Feeding: Continuous Peptamen Intense VHP; Nasogastric tube; Goal Rate: 15; mL/hr; Medication - Feeding Tube Flush Frequency: At least 15-30 mL water before and after medication administration and with tube clogging; Trophic rate...    DVT Prophylaxis: Enoxaparin (Lovenox) subcutaneous on hold for LP today. Resume once LP completed.   Davila Catheter: not present  Code Status: Full Code           Disposition Plan   Expected discharge: 4 - 7 days, recommended to transitional care unit once adequate pain management/ tolerating PO medications, antibiotic plan established and mental status at baseline.  Entered: THEO Hernandez 06/09/2020, 12:28 PM       The patient's care was discussed with the Attending Physician, Dr. Kaamljit Dan, Bedside Nurse, Patient and neurology Consultant.    THEO Hernandez  Hospitalist Service, [address]  Pager: [phone]  Please see sticky note for cross cover information  ______________________________________________________________________    Interval History   Patient seen with nurse present. Non verbal during exam. Shaking head yes intermittently to answer questions. Denies any pain. More awake and alert today.     No acute events overnight. TF on hold for LP.     Data reviewed today: I reviewed all medications, new labs and imaging  results over the last 24 hours. I personally reviewed    Physical Exam   Vital Signs: Temp: 95.4  F (35.2  C) Temp src: Oral BP: (!) 156/73 Pulse: 72 Heart Rate: 64 Resp: 22 SpO2: 98 % O2 Device: Nasal cannula Oxygen Delivery: 1 LPM  Weight: 311 lbs 0 oz  GENERAL: Alert during exam with eyes open, non verbal, moaning during exam.   HEENT: Anicteric sclera. PERRL. Mucous membranes moist and without lesions. Intermittently tracking eye movements. NG tube in place.   CV: RRR. S1, S2. No murmurs appreciated.   RESPIRATORY: Effort normal on 2L NC, moaning. Lung exam difficult to assess due to body habitus, no wheezing, crackles or rhonchi appreciated.    GI: Abdomen obese, soft and non distended, bowel sounds present. No tenderness, rebound, guarding.   MUSCULOSKELETAL: No joint swelling or tenderness. Moves all extremities. Rigidity with passive movement. Facial rigid when awake.  NEUROLOGICAL: Follows commands intermittently.  EXTREMITIES: No peripheral edema. Intact bilateral pedal pulses.   SKIN: No jaundice. No rashes on visible skin.    Data   Recent Labs   Lab 06/09/20  0607 06/08/20  1233 06/08/20  0914 06/08/20  0403 06/07/20  1810 06/07/20  0432  06/06/20  0924   WBC  --   --   --  8.3 8.2 9.7  --  9.9   HGB  --   --   --  11.1*  --  11.8  --  11.9   MCV  --   --   --  94  --  90  --  90   PLT  --   --   --  125*  --  154  --  164   INR  --   --  1.16*  --   --   --   --   --      --   --  145*  --  146*  --  146*   POTASSIUM 3.7 3.9  --  2.9*  --  3.6   < > 3.1*   CHLORIDE 114*  --   --  114*  --  115*  --  112*   CO2 25  --   --  26  --  25  --  21   BUN 13  --   --  26  --  37*  --  47*   CR 0.61  --   --  0.73  --  0.74  --  0.79   ANIONGAP 5  --   --  5  --  5  --  12   SANDRA 7.7*  --   --  8.1*  --  8.6  --  8.7   *  --   --  125*  --  127*  --  97   ALBUMIN 2.3*  --   --  2.8*  --  3.5  --  3.4   PROTTOTAL 5.0*  --   --  5.5*  --  6.4*  --  6.5*   BILITOTAL 0.7  --   --  0.9  --  0.9  --  0.9    ALKPHOS 38*  --   --  36*  --  42  --  45   *  --   --  104*  --  78*  --  78*   *  --   --  170*  --  130*  --  149*   LIPASE  --  876*  --   --   --   --   --   --     < > = values in this interval not displayed.     Recent Results (from the past 24 hour(s))   US Abdomen Limited Portable    Narrative    EXAMINATION: US ABDOMEN LIMITED PORTABLE  6/9/2020 8:05 AM      CLINICAL HISTORY: Transaminitis, fever, AMS    COMPARISON: Correlation made with CT abdomen and pelvis dated 6/6/2020         FINDINGS:    Examination is limited secondary to patient body habitus.    The liver is diffusely echogenic and measures 17.2 cm. There is no  intrahepatic or extrahepatic biliary ductal dilatation. No focal liver  mass is visualized. The common bile duct measures 5.5 mm.     The gallbladder surgically absent.    The pancreas is not well-visualized.    The right kidney is grossly normal in position and echogenicity. The  right kidney measures 11.3 cm and is not well visualized on this  examination. No hydronephrosis or shadowing stones.        Impression    IMPRESSION:   Mild hepatomegaly and diffuse hepatic steatosis. Examination is  limited secondary to patient body habitus.        I have personally reviewed the examination and initial interpretation  and I agree with the findings.    ILDA SALINAS MD     Medications     dextrose       dextrose 5% and 0.45% NaCl Stopped (06/09/20 0300)     - MEDICATION INSTRUCTIONS -       sodium chloride 0.9%         acyclovir (ZOVIRAX) IV  10 mg/kg (Adjusted) Intravenous Q8H     aspirin  324 mg Oral or Feeding Tube Daily     calcium polycarbophil  1,250 mg Per NG tube BID     cefTRIAXone  2 g Intravenous Q12H     cloNIDine  0.1 mg Oral or NG Tube BID     [Held by provider] enoxaparin ANTICOAGULANT  40 mg Subcutaneous Q12H     famotidine  20 mg Oral or NG Tube Daily     levothyroxine  50 mcg Oral or NG Tube Daily     melatonin  3 mg Oral or NG Tube At Bedtime      multivitamins w/minerals  15 mL Per Feeding Tube Daily     polyethylene glycol  17 g Oral or G tube Daily     [Held by provider] pravastatin  40 mg Oral or NG Tube QPM     sodium chloride (PF)  3 mL Intracatheter Q8H     vancomycin (VANCOCIN) IV  2,250 mg Intravenous Q12H

## 2020-06-09 NOTE — PROGRESS NOTES
Brief Neurology note:    Patient seen this AM, exam largely unchanged. LP to be performed today, unclear as of this AM when CTA will be able to be performed. From neurology perspective, continue current management and further evaluation of systemic factors that could be contributing to mental status/exam and Neurology will follow up 6/10 after LP performed.     Darci Haque  Neurology Resident

## 2020-06-09 NOTE — PROGRESS NOTES
Transferred to:  room 25 at 2150  Status at time of transfer: Vital signs stable on 2 liters nasal canula, alert, denies pain.  Belongings: Sent with patient  Davila removed? NA    Chart and medications: Sent with patient  Family notified: RN spoke with patient sister Awa and updated her.

## 2020-06-09 NOTE — PLAN OF CARE
Asking for things with logical statements such as 'I need to be changed'. Multiple antibiotics infused during the night. Phosphorus replaced. Recheck this morning is a little low 2.3. Sent request to pharmacy for medication for replacement. Tachypneic will awake, but not when sleeping. Mitts on for safety of medical devices. Bridled NJ tube intact, skin is not red. Tube feeding stopped per order at midnight for procedure today. On bedpan without having bowel movement. Said twice that she needed to be changed, but no BM. Purewick in place without leakage, changed.  ml. LP ordered and may also have abdominal CT with anesthesia. Continue with current plan of nursing care, and update MD with concerns as needed.

## 2020-06-10 LAB
ALBUMIN SERPL ELPH-MCNC: 2.9 G/DL (ref 3.7–5.1)
ALBUMIN SERPL-MCNC: 2.5 G/DL (ref 3.4–5)
ALP SERPL-CCNC: 46 U/L (ref 40–150)
ALPHA1 GLOB SERPL ELPH-MCNC: 0.3 G/DL (ref 0.2–0.4)
ALPHA2 GLOB SERPL ELPH-MCNC: 0.7 G/DL (ref 0.5–0.9)
ALT SERPL W P-5'-P-CCNC: 133 U/L (ref 0–50)
ANA SER QL IF: NEGATIVE
ANION GAP SERPL CALCULATED.3IONS-SCNC: 6 MMOL/L (ref 3–14)
AST SERPL W P-5'-P-CCNC: 124 U/L (ref 0–45)
B-GLOBULIN SERPL ELPH-MCNC: 0.6 G/DL (ref 0.6–1)
BILIRUB SERPL-MCNC: 0.6 MG/DL (ref 0.2–1.3)
BUN SERPL-MCNC: 9 MG/DL (ref 7–30)
CALCIUM SERPL-MCNC: 8.1 MG/DL (ref 8.5–10.1)
CHLORIDE SERPL-SCNC: 111 MMOL/L (ref 94–109)
CK SERPL-CCNC: 816 U/L (ref 30–225)
CO2 SERPL-SCNC: 25 MMOL/L (ref 20–32)
CREAT SERPL-MCNC: 0.59 MG/DL (ref 0.52–1.04)
ERYTHROCYTE [DISTWIDTH] IN BLOOD BY AUTOMATED COUNT: 12.9 % (ref 10–15)
GAMMA GLOB SERPL ELPH-MCNC: 0.6 G/DL (ref 0.7–1.6)
GFR SERPL CREATININE-BSD FRML MDRD: >90 ML/MIN/{1.73_M2}
GLUCOSE SERPL-MCNC: 106 MG/DL (ref 70–99)
HBV CORE AB SERPL QL IA: NONREACTIVE
HBV SURFACE AB SERPL IA-ACNC: 0.88 M[IU]/ML
HBV SURFACE AG SERPL QL IA: NONREACTIVE
HCT VFR BLD AUTO: 35 % (ref 35–47)
HCV AB SERPL QL IA: NONREACTIVE
HGB BLD-MCNC: 10.9 G/DL (ref 11.7–15.7)
HSV1 DNA CSF QL NAA+PROBE: NOT DETECTED
HSV2 DNA CSF QL NAA+PROBE: NOT DETECTED
IRON SATN MFR SERPL: 37 % (ref 15–46)
IRON SERPL-MCNC: 72 UG/DL (ref 35–180)
M PROTEIN SERPL ELPH-MCNC: 0 G/DL
MAGNESIUM SERPL-MCNC: 1.6 MG/DL (ref 1.6–2.3)
MCH RBC QN AUTO: 28 PG (ref 26.5–33)
MCHC RBC AUTO-ENTMCNC: 31.1 G/DL (ref 31.5–36.5)
MCV RBC AUTO: 90 FL (ref 78–100)
MICROBIOLOGIST REVIEW: NORMAL
PHOSPHATE SERPL-MCNC: 2.4 MG/DL (ref 2.5–4.5)
PLATELET # BLD AUTO: 111 10E9/L (ref 150–450)
POTASSIUM SERPL-SCNC: 3.3 MMOL/L (ref 3.4–5.3)
POTASSIUM SERPL-SCNC: 4 MMOL/L (ref 3.4–5.3)
PROT PATTERN SERPL ELPH-IMP: ABNORMAL
PROT SERPL-MCNC: 5.4 G/DL (ref 6.8–8.8)
RBC # BLD AUTO: 3.89 10E12/L (ref 3.8–5.2)
SODIUM SERPL-SCNC: 142 MMOL/L (ref 133–144)
TIBC SERPL-MCNC: 198 UG/DL (ref 240–430)
WBC # BLD AUTO: 7.3 10E9/L (ref 4–11)

## 2020-06-10 PROCEDURE — 84165 PROTEIN E-PHORESIS SERUM: CPT | Performed by: PHYSICIAN ASSISTANT

## 2020-06-10 PROCEDURE — 82550 ASSAY OF CK (CPK): CPT | Performed by: PHYSICIAN ASSISTANT

## 2020-06-10 PROCEDURE — 85027 COMPLETE CBC AUTOMATED: CPT | Performed by: PHYSICIAN ASSISTANT

## 2020-06-10 PROCEDURE — 25000125 ZZHC RX 250: Performed by: HOSPITALIST

## 2020-06-10 PROCEDURE — 99233 SBSQ HOSP IP/OBS HIGH 50: CPT | Performed by: HOSPITALIST

## 2020-06-10 PROCEDURE — 25800025 ZZH RX 258: Performed by: HOSPITALIST

## 2020-06-10 PROCEDURE — 36415 COLL VENOUS BLD VENIPUNCTURE: CPT | Performed by: HOSPITALIST

## 2020-06-10 PROCEDURE — 25800025 ZZH RX 258: Performed by: PHYSICIAN ASSISTANT

## 2020-06-10 PROCEDURE — 27210437 ZZH NUTRITION PRODUCT SEMIELEM INTERMED LITER

## 2020-06-10 PROCEDURE — 25000132 ZZH RX MED GY IP 250 OP 250 PS 637: Mod: GY | Performed by: STUDENT IN AN ORGANIZED HEALTH CARE EDUCATION/TRAINING PROGRAM

## 2020-06-10 PROCEDURE — 80053 COMPREHEN METABOLIC PANEL: CPT | Performed by: PHYSICIAN ASSISTANT

## 2020-06-10 PROCEDURE — 25800030 ZZH RX IP 258 OP 636: Performed by: PHYSICIAN ASSISTANT

## 2020-06-10 PROCEDURE — G0499 HEPB SCREEN HIGH RISK INDIV: HCPCS | Performed by: PHYSICIAN ASSISTANT

## 2020-06-10 PROCEDURE — 83550 IRON BINDING TEST: CPT | Performed by: PHYSICIAN ASSISTANT

## 2020-06-10 PROCEDURE — 86706 HEP B SURFACE ANTIBODY: CPT | Performed by: PHYSICIAN ASSISTANT

## 2020-06-10 PROCEDURE — 83540 ASSAY OF IRON: CPT | Performed by: PHYSICIAN ASSISTANT

## 2020-06-10 PROCEDURE — 25000132 ZZH RX MED GY IP 250 OP 250 PS 637: Mod: GY | Performed by: PHYSICIAN ASSISTANT

## 2020-06-10 PROCEDURE — 86704 HEP B CORE ANTIBODY TOTAL: CPT | Performed by: PHYSICIAN ASSISTANT

## 2020-06-10 PROCEDURE — 83735 ASSAY OF MAGNESIUM: CPT | Performed by: PHYSICIAN ASSISTANT

## 2020-06-10 PROCEDURE — 86803 HEPATITIS C AB TEST: CPT | Performed by: PHYSICIAN ASSISTANT

## 2020-06-10 PROCEDURE — 86038 ANTINUCLEAR ANTIBODIES: CPT | Performed by: PHYSICIAN ASSISTANT

## 2020-06-10 PROCEDURE — 36415 COLL VENOUS BLD VENIPUNCTURE: CPT | Performed by: PHYSICIAN ASSISTANT

## 2020-06-10 PROCEDURE — 25000128 H RX IP 250 OP 636: Performed by: PHYSICIAN ASSISTANT

## 2020-06-10 PROCEDURE — 84132 ASSAY OF SERUM POTASSIUM: CPT | Performed by: HOSPITALIST

## 2020-06-10 PROCEDURE — 84100 ASSAY OF PHOSPHORUS: CPT | Performed by: PHYSICIAN ASSISTANT

## 2020-06-10 PROCEDURE — 12000001 ZZH R&B MED SURG/OB UMMC

## 2020-06-10 PROCEDURE — 00000402 ZZHCL STATISTIC TOTAL PROTEIN: Performed by: PHYSICIAN ASSISTANT

## 2020-06-10 PROCEDURE — 25800030 ZZH RX IP 258 OP 636: Performed by: HOSPITALIST

## 2020-06-10 RX ADMIN — LEVOTHYROXINE SODIUM 50 MCG: 50 TABLET ORAL at 08:10

## 2020-06-10 RX ADMIN — POLYETHYLENE GLYCOL 3350 17 G: 17 POWDER, FOR SOLUTION ORAL at 08:10

## 2020-06-10 RX ADMIN — Medication 10 MEQ: at 11:40

## 2020-06-10 RX ADMIN — DEXTROSE AND SODIUM CHLORIDE: 5; 450 INJECTION, SOLUTION INTRAVENOUS at 23:59

## 2020-06-10 RX ADMIN — ACYCLOVIR SODIUM 1000 MG: 50 INJECTION, SOLUTION INTRAVENOUS at 08:52

## 2020-06-10 RX ADMIN — Medication 10 MEQ: at 10:22

## 2020-06-10 RX ADMIN — CLONIDINE HYDROCHLORIDE 0.1 MG: 0.1 TABLET ORAL at 08:10

## 2020-06-10 RX ADMIN — MELATONIN TAB 3 MG 3 MG: 3 TAB at 21:26

## 2020-06-10 RX ADMIN — CLONAZEPAM 0.5 MG: 0.5 TABLET ORAL at 13:21

## 2020-06-10 RX ADMIN — MULTIVIT AND MINERALS-FERROUS GLUCONATE 9 MG IRON/15 ML ORAL LIQUID 15 ML: at 08:10

## 2020-06-10 RX ADMIN — DEXTROSE AND SODIUM CHLORIDE: 5; 450 INJECTION, SOLUTION INTRAVENOUS at 03:49

## 2020-06-10 RX ADMIN — FAMOTIDINE 20 MG: 20 TABLET ORAL at 08:10

## 2020-06-10 RX ADMIN — ACYCLOVIR SODIUM 1000 MG: 50 INJECTION, SOLUTION INTRAVENOUS at 01:42

## 2020-06-10 RX ADMIN — ASPIRIN 81 MG CHEWABLE TABLET 324 MG: 81 TABLET CHEWABLE at 08:10

## 2020-06-10 RX ADMIN — Medication 10 MEQ: at 09:17

## 2020-06-10 RX ADMIN — CALCIUM POLYCARBOPHIL 1250 MG: 625 TABLET, FILM COATED ORAL at 08:10

## 2020-06-10 RX ADMIN — Medication 10 MEQ: at 08:05

## 2020-06-10 RX ADMIN — POTASSIUM PHOSPHATE, MONOBASIC AND POTASSIUM PHOSPHATE, DIBASIC 15 MMOL: 224; 236 INJECTION, SOLUTION INTRAVENOUS at 12:49

## 2020-06-10 RX ADMIN — DEXTROSE AND SODIUM CHLORIDE: 5; 450 INJECTION, SOLUTION INTRAVENOUS at 14:28

## 2020-06-10 RX ADMIN — CLONIDINE HYDROCHLORIDE 0.1 MG: 0.1 TABLET ORAL at 21:26

## 2020-06-10 ASSESSMENT — MIFFLIN-ST. JEOR: SCORE: 2261.25

## 2020-06-10 ASSESSMENT — ACTIVITIES OF DAILY LIVING (ADL)
ADLS_ACUITY_SCORE: 40
ADLS_ACUITY_SCORE: 43
ADLS_ACUITY_SCORE: 39
ADLS_ACUITY_SCORE: 39
ADLS_ACUITY_SCORE: 38
ADLS_ACUITY_SCORE: 38

## 2020-06-10 NOTE — PLAN OF CARE
Care assumed [id number]. Alert, GIOVANNA orientation. Pt denies pain. 3 PIV left arm, 2 infusing, 1 saline locked. Feeds running at 15 mL/hr- restarted with new bag at 1800. Tolerating well. Assist x2 with mechanical lift. Urinary incontinence, purewick in place. No BM this shift.

## 2020-06-10 NOTE — PROGRESS NOTES
"  Care Coordinator - Discharge Planning    Admission Date/Time:  6/5/2020  Attending MD:  Lavon Fair MD     Data  Date of initial CC assessment:  6/9/2020  Chart reviewed, discussed with interdisciplinary team.   Patient was admitted for:   1. Intellectual delay    2. Somnolence    3. Generalized muscle weakness    4. Covid-19 Virus not Detected         Assessment   Full assessment completed in previous note    Coordination of Care and Referrals:   Call received from patient's presume guardian, Cynthia who is asking why daily updates have not been communicated by the team to patient's guardian or family members. Writer requested guardianship paperwork (this is the 3rd request for guardianship paperwork). \"Successor Letters of Limited Guardianship\" received, sent to [facility] for urgent review and verification. Primary provider updated.     Plan  Anticipated Discharge Date:  TBD  Anticipated Discharge Plan:  TBD    Gwen Green, RNCC, BSN    [facility]    Medicine Group  [address] [place] [id number]    [email or web address]  [email or web address]    Office: [phone] Pager: [phone]  To contact the weekend RNCC, page [phone].         "

## 2020-06-10 NOTE — PLAN OF CARE
Bridled NJ in place and no skin breakdown found. TF is at goal of 15 ml/hour. Patient stated clearly 'I am hungry', and asked for something to eat. NPO. She may be ready for swallow study to assess if she is safe for oral intake, clearly showing interest. No stool tonight. Purewick working well. MIVF infusing. Using soft touch call light appropriately. Turned every 2 hours. Continue with current plan of nursing care, and update MD with concerns as needed.

## 2020-06-10 NOTE — PROGRESS NOTES
[facility], [facility] Progress Note - Hospitalist Service, Gold 6       Date of Admission:  6/5/2020  Assessment & Plan   Ms. Dionne Perez is a 34 yo female with hx of developmental delay, depression, anxiety, likely psychosis, GERD, and hypothyrodism, admitted to [facility] for further eval and management of AMS.      1. AMS: Patient remains minimally responsive and not at her baseline. She seems like she is improving from her admission. Her nuerologic workup has been largely unrevealing without evidence of seizures on EEG, MRI demonstrated new cerebellar infarct. MRI without any clear ett which does not explain her change in mentation. LP without any evidence of infection.   - Neurology has no additional recommendations for additional workup  - will discuss with psychiatry  About if this should be considered conversion vs catatonia   - DC IV acyclovir, ceftriaxone, and vanc  - Hold PTA Lexapro; would not start new scheduled or prn antipsychotics     2. Concern for dysphagia  Has had no PO intake since admission due to her mouth rigidity and not opening her mouth volitionally. Continues, therefore to be NPO. NG inserted 6/7 for med administration and to start enteral nutrition. Patient removed NG 6/7 evening, replaced with bridal.   - uptitrating TFs to goal of 75 ml/hr  - Nutrition consulted and appreciate following  - Monitor daily lytes, phos, mag      3. Incidental finding of R cerebellar infarct of uncertain significance  MRI head for AMS workup revealed finding of R cerebellar infarct. Pt started on aspirin, obtained CTA head and neck that was non-diagnostic due to it being severely limited due to poor contrast opacification. TTE with bubble study negative.   - Neurology consulted   - Continue  mg daily, if unable to take orally, can be given rectally as 300 mg   - Hold statin in the setting of elevated LFTs   - Repeat CTA head and neck with sedation to be completed  today   - Continue tele monitoring      4.  Mildly elevated LFTs: likely from Hepatic steatosis   Unclear etiology as LFTs from OSH as recently as 5/26 normal. AST and ALT increasing today. GGT elevated to 66. Lipase 876 (< 3X the upper limit of normal). CT abdomen 6/6 with no acute findings. RUQ US with mild hepatomegaly and diffuse hepatic steatosis, CBD 5.5 mm, no hepatic biliary ductal dilation. Tbili wnl. Reported to have abdominal pain on admission, no further complaints of pain. Appears to be tolerating TF. Possible pancreatitis on admission vs LFT elevation 2/2 hepatic steatosis and CK elevation.   - Continue to monitor      # Chronic pain?: Pt noted to be on scheduled Tylenol PTA, as well as have available as needed diclofenac gel. Continues to occasionally moan, but no obvious area of pain.  - Discontinue PTA scheduled Tylenol given transaminitis     # Hypothyroidism: PTA on levothyroxine 50 mcg daily. TFTs on admission reveal pt euthyroid based on free T4 WNL.  - Continue PTA levothyroxine     # GERD: Continue PTA Pepcid 20 mg daily       Diet: NPO for Medical/Clinical Reasons Except for: Meds, Ice Chips  Adult Formula Drip Feeding: Continuous Peptamen Intense VHP; Nasogastric tube; Goal Rate: 75; mL/hr; Medication - Feeding Tube Flush Frequency: At least 15-30 mL water before and after medication administration and with tube clogging; Amount to Se...    DVT Prophylaxis: Enoxaparin (Lovenox) subcutaneous on hold for LP today. Resume once LP completed.   Davila Catheter: not present  Code Status: Full Code           Disposition Plan   Expected discharge: 4 - 7 days, recommended to transitional care unit once adequate pain management/ tolerating PO medications, antibiotic plan established and mental status at baseline.  Entered: Lavon Fair MD 06/10/2020, 1:23 PM     Lavon Fair MD  Hospitalist Service, [address], [place]  Pager: [phone]  Please  see jayda note for cross cover information  ______________________________________________________________________    Interval History   Patient seen  And was initially sleeping or had her eyes closed and turned in response to calling her name. Followed commands but did not respond to any other verbal cquestions    Data reviewed today: I reviewed all medications, new labs and imaging results over the last 24 hours. I personally reviewed    Physical Exam   Vital Signs: Temp: 98.6  F (37  C) Temp src: Axillary BP: (!) 142/77 Pulse: 74 Heart Rate: 76 Resp: 20 SpO2: 97 % O2 Device: Nasal cannula Oxygen Delivery: 1 LPM  Weight: 326 lbs 0 oz  GENERAL: Alert during exam with eyes open, non verbal, moaning during exam.   HEENT: Anicteric sclera. PERRL. Mucous membranes moist and without lesions. Intermittently tracking eye movements. NG tube in place.   CV: RRR. S1, S2. No murmurs appreciated.   RESPIRATORY: Effort normal on 2L NC, moaning. Lung exam difficult to assess due to body habitus, no wheezing, crackles or rhonchi appreciated.    GI: Abdomen obese, soft and non distended, bowel sounds present. No tenderness, rebound, guarding.   MUSCULOSKELETAL: No joint swelling or tenderness. Moves all extremities. Rigidity with passive movement. Facial rigid when awake.  NEUROLOGICAL: Follows commands intermittently.  EXTREMITIES: No peripheral edema. Intact bilateral pedal pulses.   SKIN: No jaundice. No rashes on visible skin.

## 2020-06-10 NOTE — PROGRESS NOTES
CLINICAL NUTRITION SERVICES     Nutrition Prescription    RECOMMENDATIONS FOR MDs/PROVIDERS TO ORDER:  Tranisiton off of dextrose containing IVF with EN advancement to goal to prevent potential electrolyte abnormality exacerbations.     Monitor and replace lytes (K+, Mg++, Phos) as appropriate per protocol.    Monitor GI status. Bowel regimen per primary team as appro.     Fluid management per primary team. Current FWF for tube patency only.      Future/Additional Recommendations:  Monitor tolerance to EN advancement (lytes, BG, GI status) and ability to advance to goal rate.      Following up on nutrition POC. Please see 6/7 RD note for full assessment details.     NEW FINDINGS   Nutrition Support: Enteral Nutrition  - Access: Nasogastric Tube  - Goal Regimen: Peptamen intense VHP @ 75 ml/hr to provide 1800 ml/day, 1800 kcal/day (22 kcal/kg), 165 g protein (2 g/kg), 137 g CHO, 1512 ml free H2O, 7 g fiber daily.   - Intake: Running at 15 mL/hr since 1800 on 6/9 per intake/output. Restarted s/p LP.     Labs: K+ 3.3 (L) - replacement given today (40 mEq KCl total), Mg++ 1.6 (WNL), Phos 2.4 (L)  Glucose 106 <- 91     Meds: D5 and 0.45% NaCl @ 125 mL/hr (provides 150 g dex, 510 kcal in 3000 mL daily), Lyte (Mg++, K+, Phos) replacement PRN per standard replacement protocol     Interventions  Collaboration with other providers - paged team to ask if okay to advance TFs today. Team entered advancement comments per previous RD recommendations (10 mL q4h until @ goal of 75 mL/hr). Paged team with recommendation to transition off dextrose containing IVF with EN advancement.     Lb Gregorio, MS, RD, LD  5A/5B floor pager [phone]

## 2020-06-10 NOTE — PLAN OF CARE
"Assumed cares: [id number]  Status: Admitted for somnolence, AMS    VS: VSS on RA  Neuros: Alert, opens eyes spontaneously, GIOVANNA orientation. Follows commands intermittently, sometimes able to make needs known  Cardiac: WDL  Respiratory: WDL, no SOB reported  GI/: Purewick in place, adequate urine output, no BM this shift  Nutrition: NPO ex ice chips, TF infusing @ 25 mL/hr, due to be increased at 1600 to 35 mL/hr  IV/Drains: PIV upper L arm infusing D5% 0.45 NS @ 115 mL/hr, rate corresponds to changes in TF, see order. Lower upper L PIV running TKO with phosphorus infusion  Activity: Turned/repositioned q2h. Patient stating \"I want to get up, I want to sit in chair!\" Patient unable to move legs in bed, with prompting, may require lift assistance at this time.  Pain: C/o some pain with PIV infusion of potassium, relieved with slower rate  Skin: Mepilex in place on coccyx  Labs: K+ replaced, recheck at 1500. Phos replacement infusing, recheck in AM.    Plan of Care: Patient sleeping on and off throughout shift. Able to make some needs known. Fans placed on patient for comfort. Klonopin given x1 with relief for patient report of anxiety. Call light within reach. Continue to monitor and update MD with changes.    "

## 2020-06-10 NOTE — PROVIDER NOTIFICATION
"Spoke with [facility] cover:    \"MD note states to 'continue telemetry' but no telemetry orders- do you want telemetry?\"    Provider response to wait until tomorrow as long as pt is hemodynamically stable.  "

## 2020-06-11 ENCOUNTER — DOCUMENTATION ONLY (OUTPATIENT)
Dept: OTHER | Facility: CLINIC | Age: 35
End: 2020-06-11

## 2020-06-11 ENCOUNTER — APPOINTMENT (OUTPATIENT)
Dept: PHYSICAL THERAPY | Facility: CLINIC | Age: 35
DRG: 887 | End: 2020-06-11
Attending: HOSPITALIST
Payer: MEDICARE

## 2020-06-11 LAB
ALBUMIN SERPL-MCNC: 2.2 G/DL (ref 3.4–5)
ALP SERPL-CCNC: 52 U/L (ref 40–150)
ALT SERPL W P-5'-P-CCNC: 111 U/L (ref 0–50)
ANION GAP SERPL CALCULATED.3IONS-SCNC: 4 MMOL/L (ref 3–14)
AST SERPL W P-5'-P-CCNC: 95 U/L (ref 0–45)
AST SERPL W P-5'-P-CCNC: ABNORMAL U/L (ref 0–45)
BACTERIA SPEC CULT: NO GROWTH
BACTERIA SPEC CULT: NO GROWTH
BILIRUB SERPL-MCNC: 0.5 MG/DL (ref 0.2–1.3)
BUN SERPL-MCNC: 8 MG/DL (ref 7–30)
CALCIUM SERPL-MCNC: 8.1 MG/DL (ref 8.5–10.1)
CHLORIDE SERPL-SCNC: 110 MMOL/L (ref 94–109)
CK SERPL-CCNC: 620 U/L (ref 30–225)
CK SERPL-CCNC: NORMAL U/L (ref 30–225)
CO2 SERPL-SCNC: 26 MMOL/L (ref 20–32)
CREAT SERPL-MCNC: 0.48 MG/DL (ref 0.52–1.04)
GFR SERPL CREATININE-BSD FRML MDRD: >90 ML/MIN/{1.73_M2}
GLUCOSE SERPL-MCNC: 91 MG/DL (ref 70–99)
MAGNESIUM SERPL-MCNC: 1.7 MG/DL (ref 1.6–2.3)
PHOSPHATE SERPL-MCNC: 3.5 MG/DL (ref 2.5–4.5)
POTASSIUM SERPL-SCNC: 3.5 MMOL/L (ref 3.4–5.3)
POTASSIUM SERPL-SCNC: 5.7 MMOL/L (ref 3.4–5.3)
PROT SERPL-MCNC: 5.1 G/DL (ref 6.8–8.8)
SODIUM SERPL-SCNC: 140 MMOL/L (ref 133–144)
SPECIMEN SOURCE: NORMAL
SPECIMEN SOURCE: NORMAL

## 2020-06-11 PROCEDURE — 97110 THERAPEUTIC EXERCISES: CPT | Mod: GP | Performed by: REHABILITATION PRACTITIONER

## 2020-06-11 PROCEDURE — 36415 COLL VENOUS BLD VENIPUNCTURE: CPT | Performed by: PHYSICIAN ASSISTANT

## 2020-06-11 PROCEDURE — 82550 ASSAY OF CK (CPK): CPT | Performed by: INTERNAL MEDICINE

## 2020-06-11 PROCEDURE — 25000132 ZZH RX MED GY IP 250 OP 250 PS 637: Mod: GY | Performed by: PHYSICIAN ASSISTANT

## 2020-06-11 PROCEDURE — 97162 PT EVAL MOD COMPLEX 30 MIN: CPT | Mod: GP | Performed by: REHABILITATION PRACTITIONER

## 2020-06-11 PROCEDURE — 25800025 ZZH RX 258: Performed by: HOSPITALIST

## 2020-06-11 PROCEDURE — 99233 SBSQ HOSP IP/OBS HIGH 50: CPT | Performed by: HOSPITALIST

## 2020-06-11 PROCEDURE — 84100 ASSAY OF PHOSPHORUS: CPT | Performed by: PHYSICIAN ASSISTANT

## 2020-06-11 PROCEDURE — 25000132 ZZH RX MED GY IP 250 OP 250 PS 637: Performed by: HOSPITALIST

## 2020-06-11 PROCEDURE — 25000132 ZZH RX MED GY IP 250 OP 250 PS 637: Performed by: PHYSICIAN ASSISTANT

## 2020-06-11 PROCEDURE — 84460 ALANINE AMINO (ALT) (SGPT): CPT

## 2020-06-11 PROCEDURE — 82247 BILIRUBIN TOTAL: CPT

## 2020-06-11 PROCEDURE — 84132 ASSAY OF SERUM POTASSIUM: CPT | Performed by: INTERNAL MEDICINE

## 2020-06-11 PROCEDURE — 83735 ASSAY OF MAGNESIUM: CPT | Performed by: PHYSICIAN ASSISTANT

## 2020-06-11 PROCEDURE — 80048 BASIC METABOLIC PNL TOTAL CA: CPT

## 2020-06-11 PROCEDURE — 36415 COLL VENOUS BLD VENIPUNCTURE: CPT | Performed by: INTERNAL MEDICINE

## 2020-06-11 PROCEDURE — 84155 ASSAY OF PROTEIN SERUM: CPT

## 2020-06-11 PROCEDURE — 25000128 H RX IP 250 OP 636: Performed by: PHYSICIAN ASSISTANT

## 2020-06-11 PROCEDURE — 84075 ASSAY ALKALINE PHOSPHATASE: CPT

## 2020-06-11 PROCEDURE — 84450 TRANSFERASE (AST) (SGOT): CPT | Performed by: INTERNAL MEDICINE

## 2020-06-11 PROCEDURE — 25000132 ZZH RX MED GY IP 250 OP 250 PS 637: Mod: GY | Performed by: STUDENT IN AN ORGANIZED HEALTH CARE EDUCATION/TRAINING PROGRAM

## 2020-06-11 PROCEDURE — 12000001 ZZH R&B MED SURG/OB UMMC

## 2020-06-11 PROCEDURE — 27210437 ZZH NUTRITION PRODUCT SEMIELEM INTERMED LITER

## 2020-06-11 PROCEDURE — 82040 ASSAY OF SERUM ALBUMIN: CPT

## 2020-06-11 PROCEDURE — 97530 THERAPEUTIC ACTIVITIES: CPT | Mod: GP | Performed by: REHABILITATION PRACTITIONER

## 2020-06-11 RX ORDER — ESCITALOPRAM OXALATE 10 MG/1
10 TABLET ORAL DAILY
Status: DISCONTINUED | OUTPATIENT
Start: 2020-06-11 | End: 2020-06-14

## 2020-06-11 RX ADMIN — CLONIDINE HYDROCHLORIDE 0.1 MG: 0.1 TABLET ORAL at 08:51

## 2020-06-11 RX ADMIN — CLONIDINE HYDROCHLORIDE 0.1 MG: 0.1 TABLET ORAL at 21:28

## 2020-06-11 RX ADMIN — MELATONIN TAB 3 MG 3 MG: 3 TAB at 21:28

## 2020-06-11 RX ADMIN — LEVOTHYROXINE SODIUM 50 MCG: 50 TABLET ORAL at 08:51

## 2020-06-11 RX ADMIN — ESCITALOPRAM OXALATE 10 MG: 10 TABLET ORAL at 14:11

## 2020-06-11 RX ADMIN — FAMOTIDINE 20 MG: 20 TABLET ORAL at 08:51

## 2020-06-11 RX ADMIN — ENOXAPARIN SODIUM 40 MG: 40 INJECTION SUBCUTANEOUS at 21:28

## 2020-06-11 RX ADMIN — POLYETHYLENE GLYCOL 3350 17 G: 17 POWDER, FOR SOLUTION ORAL at 08:51

## 2020-06-11 RX ADMIN — DEXTROSE AND SODIUM CHLORIDE: 5; 450 INJECTION, SOLUTION INTRAVENOUS at 13:16

## 2020-06-11 RX ADMIN — CALCIUM POLYCARBOPHIL 1250 MG: 625 TABLET, FILM COATED ORAL at 08:51

## 2020-06-11 RX ADMIN — ASPIRIN 81 MG CHEWABLE TABLET 324 MG: 81 TABLET CHEWABLE at 08:51

## 2020-06-11 RX ADMIN — CALCIUM POLYCARBOPHIL 1250 MG: 625 TABLET, FILM COATED ORAL at 21:28

## 2020-06-11 RX ADMIN — MULTIVIT AND MINERALS-FERROUS GLUCONATE 9 MG IRON/15 ML ORAL LIQUID 15 ML: at 08:51

## 2020-06-11 ASSESSMENT — ACTIVITIES OF DAILY LIVING (ADL)
ADLS_ACUITY_SCORE: 31
ADLS_ACUITY_SCORE: 39
ADLS_ACUITY_SCORE: 43
ADLS_ACUITY_SCORE: 39

## 2020-06-11 ASSESSMENT — MIFFLIN-ST. JEOR: SCORE: 2270.32

## 2020-06-11 NOTE — PLAN OF CARE
Alert but orientation unable to be assessed due patient not answering the questions. Denies pain. Patient able to verbalize when she was incontinent. Perineal cares done and repositioned Q 2 hours with assist of 2 and lift. Tube feeding running at 65 ml/hr and to be increased to goal of 75 ml/hr at 0800.

## 2020-06-11 NOTE — PROGRESS NOTES
06/11/20 1056   Quick Adds   Type of Visit Initial PT Evaluation   Living Environment   Lives With facility resident   Living Arrangements group home   Living Environment Comment Per notes, pt may end up going to a different group home.     Self-Care   Usual Activity Tolerance moderate   Current Activity Tolerance fair   Activity/Exercise/Self-Care Comment PLOF info complicated by pt's cognition   Functional Level Prior   Ambulation 0-->independent   Transferring 0-->independent   Fall history within last six months   (chart says yes, pt didn't answer)   Which of the above functional risks had a recent onset or change? ambulation;transferring   Prior Functional Level Comment Says no ambulatory AD at baseline and regular flat bed (not hospital bed) at baseline. Indicates can't remember if stairs or not.   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 per previous consultation, who currently continues to have some behavioral symptoms, although per records they appear to be improving gradually   Precautions/Limitations fall precautions   Cognitive Status Examination   Orientation person  (guessed fall, didn't answer place)   Level of Consciousness lethargic/somnolent;alert   Follows Commands and Answers Questions 25% of the time  (delayed responses, multiple prompts, appears to try)   Personal Safety and Judgment at risk behaviors demonstrated   Memory impaired   Pain Assessment   Patient Currently in Pain No   Integumentary/Edema   Integumentary/Edema no deficits were identifed   Integumentary/Edema Comments BUE and BLE WFL   Posture    Posture Forward  "head position;Protracted shoulders;Kyphosis   Range of Motion (ROM)   ROM Quick Adds No deficits were identified   ROM Comment BLE   Strength   Strength Comments B hip flexion 2-/5, rest of LE ~ 3+/5 with mobility   Bed Mobility   Bed Mobility Comments sup to sit with HOB elevated and mod Ax1-2.    Transfer Skills   Transfer Comments Total A of ceiling lift for EOB to wc.   Gait   Gait Comments Too fatigued/weak to perform today   Balance   Balance Comments Static: Up to Mod A for sitting balance, up to min Ax1 with heavy BUE support for standing balance   Sensory Examination   Sensory Perception Comments Doesn't answer consistently, no evidence of decreased sensation   Muscle Tone   Muscle Tone no deficits were identified   Muscle Tone Comments BLE   General Therapy Interventions   Planned Therapy Interventions balance training;bed mobility training;gait training;neuromuscular re-education;stretching;strengthening;transfer training;risk factor education;home program guidelines;progressive activity/exercise   Clinical Impression   Criteria for Skilled Therapeutic Intervention yes, treatment indicated   PT Diagnosis impaired functional mobility   Influenced by the following impairments decreased strength, balance, endurance, posture, cognition/memory   Functional limitations due to impairments Up to total A of lift for transfers.   Clinical Presentation Evolving/Changing   Clinical Presentation Rationale PMH and clinical judgment   Clinical Decision Making (Complexity) Moderate complexity   Therapy Frequency 5x/week   Predicted Duration of Therapy Intervention (days/wks) 2 wks   Anticipated Discharge Disposition Transitional Care Facility   Risk & Benefits of therapy have been explained Yes   Patient, Family & other staff in agreement with plan of care Yes   [facility] AM-PAC TM \"6 Clicks\"   2016, Trustees of [facility], under license to [employer].  All rights reserved.   6 Clicks Short Forms Basic " "Mobility Inpatient Short Form   [facility] AM-PAC  \"6 Clicks\" V.2 Basic Mobility Inpatient Short Form   1. Turning from your back to your side while in a flat bed without using bedrails? 2 - A Lot   2. Moving from lying on your back to sitting on the side of a flat bed without using bedrails? 2 - A Lot   3. Moving to and from a bed to a chair (including a wheelchair)? 1 - Total   4. Standing up from a chair using your arms (e.g., wheelchair, or bedside chair)? 2 - A Lot   5. To walk in hospital room? 1 - Total   6. Climbing 3-5 steps with a railing? 1 - Total   Basic Mobility Raw Score (Score out of 24.Lower scores equate to lower levels of function) 9   Total Evaluation Time   Total Evaluation Time (Minutes) 7     "

## 2020-06-11 NOTE — PROGRESS NOTES
[facility]  Neurology Consult Progress Note    Patient Name: Dionne Perez  Patient MRN: [id number]  Admission Date: 6/5/2020  Today's Date: 06/10/2020    Assessment:  Dionne Perez is a 35 year old female with developmental delay, intellectual disability, history of repeated ED visits, depression, and anxiety who presents with unresponsiveness and full-body rigidity after several weeks of worsening behavioral issues, falls, and incontinence. Exam continues to be limited as patient is unresponsive. Hemodynamically stable aside from tachycardia. Patient has been afebrile. Labs initially notable for CK moderately elevated at 2,500 with normal lactate, now down-trending, CRP elevated at 9.8, potassium low, sodium high, and AST/ALT elevated in a 2:1 pattern. CSF on 6/9 benign. CTA w/o e/o significant pathology.     Overall, AMS of unclear definite etiology, with features c/w toxidrome vs. Medication effect (serotonergic vs. Anti-dopaminergic) vs. Viral infection vs. Seemingly most likely Psychiatric decompensation / behavioral overlay in light  of recent escalating behaviors. Cannot exclude head trauma w/ TBI/post-concussive syndrome w/ prolonged downtime leading to elevated CK. No e/o CNS contributory pathology related to infectious, inflammatory or other etiology. Although no personal history, if patient's presentation becomes more frequent and stereotyped, further evaluation for seizure may be indicated - she does have risk re: developmental delay + recent head traumas 2/2 falls, although description of recent behavior appear to be more so psychiatric in nature and her vEEG + MRI do not show e/o epilepsy or epileptiform discharge. Regardless of etiology, patient's recovery may be prolonged due to decreased cognitive reserve.      Recommendations:  - R/o other systemic precipitants of decompensated psychiatric disease; appreciate primary and psychiatric input  - No further neurologic w/u  "indicated at this time  - For incidental cerebellar stroke:  - Continue aspirin 325 daily, if patient cannot take orally, this can be given rectally as 300mg GA  - Atorvastatin 40mg qday (per most recent stroke association recommendations, regardless of LDL, patients should be placed on high intensity statin for lipid lowering and plaque stabilization    Patient discussed w/ my attending, Dr. Block, who agrees w/ assessment and plan.    Naun Haque MD     Interval Events:  Unchanged exam ; no verbal output beyond mouthing name. Follows limited commands.       Review of Systems:  10 point review of systems was unable to be performed due to mental status/particpation.     Objective:   BP (!) 163/90 (BP Location: Right arm)   Pulse 83   Temp 97.8  F (36.6  C) (Axillary)   Resp 18   Ht 1.753 m (5' 9\")   Wt 149.7 kg (330 lb)   SpO2 95%   BMI 48.73 kg/m     General: initially resting calmly, upon exam groan intermittently throughout exam.  HEENT: NC/AT. No scleral icterus. MMM.   Cardio: regular rate.  Pulm: breathing intermittently labored w/ exam maneuvers, unlabored when sleeping.  Abdomen: obese, no tenderness on light palpation.   Extremities/Derm: minimally moving all, warm and well perfused. No focal/significant skin lesions.   Psych: unable to assess.  Neuro:   Mental status: eyes closed, opens briefly upon command. Able to tell me her name but no other verbal response beyond moaning/grunting w/ exam maneuvers and throughout rest of encounter. Moves fingers+ squeezes to command, raises arms above head equally.  Cannot assess language quality/function, fund of knowledge, memory or other component based on alertness/participation.      Cranial nerves: Resists eye opening, cannot assess VF or pupils due to this. Eyes appear conjugate, no deviation, some lateral spont movement and looking toward speaker, tracks + buries sclera.  Face musculature symmetric.  Unable to assess facial sensation, " hearing,Palate elevation, shoulder shrug, voice quality or uvula due to mental status. Sticks tongue out minimally past lips, midline.     Motor: no abnormal movement. Tone is seemingly increased throughout, although likely increased to some extent due to volitional resistance. Upon awakening, Significant rigidity throughout, resists movement.  Able to raise bue antigravity equally, w/d in ble.       Reflexes: + Babinski. + . No ankle clonus due to stiffness, although improved from previous. B+BR brisk w/ spread b/l. P brisk b/l. A unable to elicit due to stiffness.    Sensory: acknowledges/nods to light stimuli.   Coordination/Gait: unable to assess due to mental status.    Investigations:  I have reviewed all interval/pertinent laboratory and imaging results.

## 2020-06-11 NOTE — PLAN OF CARE
Care assumed [id number]. Alert, GIOVANNA orientation. VSS, on room air. Pt denies pain. 2 PIV left arm, lower arm PIV saline locked, upper arm PIV infusing D5 1/2 NS at 95 mL/hr. IVF decreased by 10 with each feed rate increase. Feeds running through NG at 45 mL/hr, increased by 10 q4h, tolerating well. Continue to increase until goal rate of 75 mL/hr. Assist x2 with lift. Urinary incontinence, purewick in place. 3 loose/watery BM this shift. Awa, pt's sister, called for an update. RN called Cynthia, pt's guardian, and confirmed that all information to go through Cynthia, no family members.

## 2020-06-11 NOTE — PROGRESS NOTES
[facility], [facility] Progress Note - Hospitalist Service, Gold 6       Date of Admission:  6/5/2020  Assessment & Plan   Ms. Dionne Perez is a 36 yo female with hx of developmental delay, depression, anxiety, likely psychosis, GERD, and hypothyrodism, admitted to [facility] for further eval and management of AMS.      1. AMS: Likely a type of adjustment disorder in the setting of acute stress and her developmental delay. Patient remains minimally responsive and not at her baseline. She seems like she is improving from her admission. Her nuerologic workup has been largely unrevealing without evidence of seizures on EEG, MRI demonstrated new cerebellar infarct. MRI without any clear ett which does not explain her change in mentation. LP without any evidence of infection.   - Neurology has no additional recommendations for additional workup  - Appreciate Psychiatry input   - DC IV acyclovir, ceftriaxone, and vanc  - Restart PTA Lexapro 10mg - will look to titrate up to 20mg PTA dose     2. Concern for dysphagia  Has had no PO intake since admission due to her mouth rigidity and not opening her mouth volitionally. Continues, therefore to be NPO. NG inserted 6/7 for med administration and to start enteral nutrition. Patient removed NG 6/7 evening, replaced with bridal.   - uptitrating TFs to goal of 75 ml/hr  - Nutrition consulted and appreciate following  - Monitor daily lytes, phos, mag      3. Incidental finding of R cerebellar infarct of uncertain significance  MRI head for AMS workup revealed finding of R cerebellar infarct. Pt started on aspirin, obtained CTA head and neck that was non-diagnostic due to it being severely limited due to poor contrast opacification. TTE with bubble study negative.   - Neurology consulted   - Continue  mg daily, if unable to take orally, can be given rectally as 300 mg   - Hold statin in the setting of elevated LFTs   - Repeat CTA head and  neck without evidence of vascular stenosis  - Continue tele monitoring      4.  Mildly elevated LFTs: likely from Hepatic steatosis   Unclear etiology as LFTs from OSH as recently as 5/26 normal. AST and ALT increasing today. GGT elevated to 66. Lipase 876 (< 3X the upper limit of normal). CT abdomen 6/6 with no acute findings. RUQ US with mild hepatomegaly and diffuse hepatic steatosis, CBD 5.5 mm, no hepatic biliary ductal dilation. Tbili wnl. Reported to have abdominal pain on admission, no further complaints of pain. Appears to be tolerating TF. Possible pancreatitis on admission vs LFT elevation 2/2 hepatic steatosis and CK elevation.   - Continue to monitor      # Chronic pain?: Pt noted to be on scheduled Tylenol PTA, as well as have available as needed diclofenac gel. Continues to occasionally moan, but no obvious area of pain.  - Discontinue PTA scheduled Tylenol given transaminitis     # Hypothyroidism: PTA on levothyroxine 50 mcg daily. TFTs on admission reveal pt euthyroid based on free T4 WNL.  - Continue PTA levothyroxine     # GERD: Continue PTA Pepcid 20 mg daily       Diet: NPO for Medical/Clinical Reasons Except for: Meds, Ice Chips  Adult Formula Drip Feeding: Continuous Peptamen Intense VHP; Nasogastric tube; Goal Rate: 75; mL/hr; Medication - Feeding Tube Flush Frequency: At least 15-30 mL water before and after medication administration and with tube clogging; Amount to Se...    DVT Prophylaxis: Enoxaparin .   Davila Catheter: not present  Code Status: Full Code           Disposition Plan   Expected discharge: 4 - 7 days, recommended to transitional care unit once adequate pain management/ tolerating PO medications, antibiotic plan established and mental status at baseline.  Entered: Lavon Fair MD 06/11/2020, 1:22 PM     Lavon Fair MD  Hospitalist Service, [address], [place]  Pager: [phone]  Please see sticky note for cross cover  information  ______________________________________________________________________    Interval History   Patient seen  And was initially sleeping or had her eyes closed and turned in response to calling her name. Followed commands but did not respond to any other verbal cquestions    Data reviewed today: I reviewed all medications, new labs and imaging results over the last 24 hours. I personally reviewed    Physical Exam   Vital Signs: Temp: 98.2  F (36.8  C) Temp src: Axillary BP: 120/64 Pulse: 74 Heart Rate: 65 Resp: 18 SpO2: 97 % O2 Device: None (Room air)    Weight: 330 lbs 0 oz  GENERAL: Alert during exam with eyes open, non verbal, moaning during exam.   HEENT: Anicteric sclera. PERRL. Mucous membranes moist and without lesions. Intermittently tracking eye movements. NG tube in place.   CV: RRR. S1, S2. No murmurs appreciated.   RESPIRATORY: Effort normal on 2L NC, moaning. Lung exam difficult to assess due to body habitus, no wheezing, crackles or rhonchi appreciated.    GI: Abdomen obese, soft and non distended, bowel sounds present. No tenderness, rebound, guarding.   MUSCULOSKELETAL: No joint swelling or tenderness. Moves all extremities. Rigidity with passive movement. Facial rigid when awake.  NEUROLOGICAL: Follows commands intermittently.  EXTREMITIES: No peripheral edema. Intact bilateral pedal pulses.   SKIN: No jaundice. No rashes on visible skin.

## 2020-06-11 NOTE — PLAN OF CARE
PT 5B Evaluation completed and treatment initiated.   Discharge Planner PT   Patient plan for discharge: doesn't answer.  Current status: Mod A of 2 for sup to sit with HOB elevated.  Progressed from Mod A to CGA with BUE support for EOB sitting - sheet looped around footboard for LUE support was helpful.  Stood from very raised height with min Ax1 and CGA of 2nd. Unable to stand from wc height and pt really wanting to pull up, may do well with EZ stand that accomodates 330 +lbs  Barriers to return to prior living situation: A of 2, increased fall risk, cognition  Recommendations for discharge: TCU  Rationale for recommendations: to progress transfers and gait, and return to PLOF of independent gait without AD.         Entered by: Anna Hartmann 06/11/2020 11:05 AM

## 2020-06-11 NOTE — PROGRESS NOTES
This RN received call from another RN that patient had fallen. Per staff, patient had yelled out for help, and patient was found sitting on bottom on floor directly in front of wheelchair. Patient declined hitting her head when asked. VSS, BP initially high at 142/102, but decreased once patient calmed down. Patient pointed to pain on her R hip where a small abrasion was noted, primapore in place. Patient lifted back to bed, stated she felt scared but then was reassured she was safe in bed, and patient felt better. MD notified. Will continue to monitor.

## 2020-06-11 NOTE — PLAN OF CARE
Assumed cares: [id number]  Status: Admitted for AMS    VS: VSS on RA  Neuros: Alert, unable to answer orientation units  Cardiac: WDL  Respiratory: WDL, no SOB reported  GI/: Voiding spontaneously, purewick in place, no BM this shift  Nutrition: NPO, TF infusing @ 75 mL/hr  IV/Drains: PIV running TKO  Activity: Up with lift to wheelchair with two assist  Pain: Denies  Skin: Abrasion to R hip covered with primapore, Mepilex to coccyx   Labs: Electrolytes stable    Plan of Care: Lexapro started today. TF at goal rate of 75 mL/hr. Patient had fall this shift, see previous note for details. No c/o pain or nausea. Able to make needs met at times. Continue to monitor and update MD with changes.

## 2020-06-11 NOTE — CONSULTS
"Consult Date:  06/10/2020      FOLLOWUP PSYCHIATRIC CONSULTATION      REASON FOR CONSULTATION:  The Medicine Service requested a followup consultation to evaluate this patient for possible catatonia or conversion disorder.      HISTORY OF PRESENT ILLNESS:  The patient is a 35-year-old female with a history of intellectual disability, major depressive disorder, possible catatonia likely conversion per previous consultation, who currently continues to have some behavioral symptoms, although per records they appear to be improving gradually.  The patient was first seen by Dr. Solorio from our service on 06/06/2020.  At that point, Dr. Solorio's impression was that this was possible catatonic episodes versus conversion disorder.  He felt it was more likely conversion disorder.  The patient did have some benzodiazepines after admission and did not appear to have much of a response to that.  Of note, the patient had been having difficulties in her place of residence due to the isolation due to COVID, described as \"gone downhill.\"  This was described in Dr. Solorio's consultation.  Case was discussed with Dr. Fair of the Medicine Service.  He stated that the patient did appear to be getting a little better, was somewhat minimally reactive, had had an extensive neurology workup with the only findings that EEG showed possible encephalopathy.  There was no seizure activity.  The patient discussed with nurse who has been working with her.  She stated the patient opens her eyes.  She will answer questions in a brief manner.  She did not describe any catatonic type symptoms of the patient, states that in general the patient is cooperative and does appear to be improving.  On my interview, I had difficulty engaging the patient.  She really was mute when I was talking to her.  She did state that she wanted to go \"potty.\"  I had the nurse come in.  She did speak briefly with the nurse.  I then talked with her, asked her to move her " arms and legs and touch her nose and things of that sort.  She was able to follow directions.  She did not answer many questions, although I did ask her whether she liked the place she was living in; she shook her head no.  I asked her if she wanted to go back there, she shook her head no again.  The patient appeared in no acute distress but made very poor eye contact and was very difficult to engage.      REVIEW OF SYSTEMS:  A 4-point review of systems was performed by asking the patient to shake her head.  She had no complaints per that exam.      PHYSICAL EXAMINATION:   VITAL SIGNS:  Blood pressure 163/90, pulse 76, respirations 80, temperature 97.8.      MEDICATIONS:  Reviewed per Epic and include:   1.  Clonidine 0.1 mg b.i.d.   2.  Clonazepam 0.5 mg q.8h. p.r.n.   3.  Of note, on admission the patient had been on escitalopram.  It was unclear whether she was actually taking that; it was held.      MENTAL STATUS EXAMINATION:  The patient is lying in bed, appears to be in no acute distress.  Was very difficult to engage, made very little eye contact although at times would look at me.  Was able to follow instructions.  Was able to answer some brief questions in 1-word answers or by shaking her head.  Was unable to engage in any type of dialogue.  I asked her if she is depressed, she shook her head no. I asked if she was hearing voices, she shook her head no.  I asked her if she wanted to hurt herself, she shook her head no.  She was alert.      IMPRESSION:  The patient is a 35-year-old female with a history of intellectual disability, anxiety, depression who appears to have been responding to issues related to stressors due to COVID issues and isolation.  She has had an extensive neurological workup which is negative.  Her symptom complex does not appear consistent with catatonia.  This may be some type of conversion or behavioral or aggression  due to stress.  She does not appear psychotic.  On nodding her head  she denies that she wants to hurt herself.      DSM DIAGNOSES:     1.  Intellectual disability.     2.  History of anxiety and depression.     3.  Adjustment disorder with possible features of conversion disorder.      TREATMENT RECOMMENDATIONS:   1.  Would reinstitute the patient's escitalopram after clarifying diagnosis.   2.  I think it would be helpful to have PT and OT evaluate the patient's functional status and see if we can get her up and moving about, particularly if there is a component of conversion here that may be quite helpful.   3.  The patient may need a stay in a TCU with physical and occupational therapy and efforts to get her back to her baseline physically.     4.  Issues of her living situation should be looked to see if there could be anything done to mitigate the stressors she has been experiencing.   5.  Please call or reconsult with any questions, concerns or change in the patient's status.  My number is [phone].         ARIE HORNE MD             D: 06/10/2020   T: 06/10/2020   MT: JUAN      Name:     KISHA CANCHOLA   MRN:      [id number]        Account:       [id number]   :      1985           Consult Date:  06/10/2020      Document: [id number]       cc: [facility]        Arie Horne MD

## 2020-06-11 NOTE — PROGRESS NOTES
"  Care Coordinator - Discharge Planning    Admission Date/Time:  6/5/2020  Attending MD:  Lavon Fair MD     Data  Date of initial CC assessment:  6/9/2020  Chart reviewed, discussed with interdisciplinary team.   Patient was admitted for:   1. Intellectual delay    2. Somnolence    3. Generalized muscle weakness    4. Covid-19 Virus not Detected         Assessment   Full assessment completed in previous note    Coordination of Care and Referrals:   Writer contacted patient's presumed guardian, Cyntiha, requesting \"Guardian Authorization Document.\" Cynthia confirmed that she received a call from someone w/[employer] who would be sending an email to her w/contact information and request of additional document. RNCC will continue to follow for discharge planning.       Group Home:   Phone: [phone]     Presumed guardian, guardianship paperwork requested:  Cynthia Reynolds: [phone]     Plan  Anticipated Discharge Date:  TBD  Anticipated Discharge Plan:  TBD    Gwen Green RNCC, BSN    [facility]  [address] [place] [id number]    [email or web address]  [email or web address]    Office: [phone] Pager: [phone]  To contact the weekend RNCC, page [phone].         "

## 2020-06-11 NOTE — PROGRESS NOTES
06/11/20 1056   Quick Adds   Type of Visit Initial PT Evaluation   Living Environment   Lives With facility resident   Living Arrangements group home   Living Environment Comment Per CC, pt will either disch back to her group home, or if needed, TCU   Self-Care   Usual Activity Tolerance moderate   Current Activity Tolerance fair   Activity/Exercise/Self-Care Comment PLOF info complicated by pt's cognition   Functional Level Prior   Ambulation 0-->independent   Transferring 0-->independent   Fall history within last six months   (chart says yes, pt didn't answer)   Which of the above functional risks had a recent onset or change? ambulation;transferring   Prior Functional Level Comment Says no ambulatory AD at baseline and regular flat bed (not hospital bed) at baseline. Indicates can't remember if stairs or not.   General Information   Onset of Illness/Injury or Date of Surgery - Date 06/05/20   Referring Physician Lavon Fair MD    Patient/Family Goals Statement return to walking   Pertinent History of Current Problem (include personal factors and/or comorbidities that impact the POC)  35-year-old female with a history of intellectual disability, major depressive disorder, possible catatonia likely conversion per previous consultation, who currently continues to have some behavioral symptoms, although per records they appear to be improving gradually   Precautions/Limitations fall precautions   Cognitive Status Examination   Orientation person  (guessed fall, didn't answer place)   Level of Consciousness lethargic/somnolent;alert   Follows Commands and Answers Questions 25% of the time  (delayed responses, multiple prompts, appears to try)   Personal Safety and Judgment at risk behaviors demonstrated   Memory impaired   Pain Assessment   Patient Currently in Pain No   Integumentary/Edema   Integumentary/Edema no deficits were identifed   Integumentary/Edema Comments BUE and BLE WFL   Posture   "  Posture Forward head position;Protracted shoulders;Kyphosis   Range of Motion (ROM)   ROM Quick Adds No deficits were identified   ROM Comment BLE   Strength   Strength Comments B hip flexion 2-/5, rest of LE ~ 3+/5 with mobility   Bed Mobility   Bed Mobility Comments sup to sit with HOB elevated and mod Ax1-2.    Transfer Skills   Transfer Comments Total A of ceiling lift for EOB to wc.   Gait   Gait Comments Too fatigued/weak to perform today   Balance   Balance Comments Static: Up to Mod A for sitting balance, up to min Ax1 with heavy BUE support for standing balance   Sensory Examination   Sensory Perception Comments Doesn't answer consistently, no evidence of decreased sensation   Muscle Tone   Muscle Tone no deficits were identified   Muscle Tone Comments BLE   General Therapy Interventions   Planned Therapy Interventions balance training;bed mobility training;gait training;neuromuscular re-education;stretching;strengthening;transfer training;risk factor education;home program guidelines;progressive activity/exercise   Clinical Impression   Criteria for Skilled Therapeutic Intervention yes, treatment indicated   PT Diagnosis impaired functional mobility   Influenced by the following impairments decreased strength, balance, endurance, posture, cognition/memory   Functional limitations due to impairments Up to total A of lift for transfers.   Clinical Presentation Evolving/Changing   Clinical Presentation Rationale PMH and clinical judgment   Clinical Decision Making (Complexity) Moderate complexity   Therapy Frequency 5x/week   Predicted Duration of Therapy Intervention (days/wks) 2 wks   Anticipated Discharge Disposition Transitional Care Facility   Risk & Benefits of therapy have been explained Yes   Patient, Family & other staff in agreement with plan of care Yes   [facility] AM-PAC TM \"6 Clicks\"   2016, Trustees of [facility], under license to [employer].  All rights reserved.   6 Clicks " "Short Forms Basic Mobility Inpatient Short Form   [facility] AM-PAC  \"6 Clicks\" V.2 Basic Mobility Inpatient Short Form   1. Turning from your back to your side while in a flat bed without using bedrails? 2 - A Lot   2. Moving from lying on your back to sitting on the side of a flat bed without using bedrails? 2 - A Lot   3. Moving to and from a bed to a chair (including a wheelchair)? 1 - Total   4. Standing up from a chair using your arms (e.g., wheelchair, or bedside chair)? 2 - A Lot   5. To walk in hospital room? 1 - Total   6. Climbing 3-5 steps with a railing? 1 - Total   Basic Mobility Raw Score (Score out of 24.Lower scores equate to lower levels of function) 9   Total Evaluation Time   Total Evaluation Time (Minutes) 7     "

## 2020-06-12 ENCOUNTER — APPOINTMENT (OUTPATIENT)
Dept: PHYSICAL THERAPY | Facility: CLINIC | Age: 35
DRG: 887 | End: 2020-06-12
Payer: MEDICARE

## 2020-06-12 ENCOUNTER — APPOINTMENT (OUTPATIENT)
Dept: OCCUPATIONAL THERAPY | Facility: CLINIC | Age: 35
DRG: 887 | End: 2020-06-12
Attending: HOSPITALIST
Payer: MEDICARE

## 2020-06-12 LAB
ALB CSF/SERPL: 12.8 RATIO (ref 0–9)
ALBUMIN CSF-MCNC: 30 MG/DL (ref 0–35)
ALBUMIN SERPL-MCNC: 2.4 G/DL (ref 3.4–5)
ALBUMIN SERPL-MCNC: 2340 MG/DL (ref 3500–5200)
ALP SERPL-CCNC: 56 U/L (ref 40–150)
ALT SERPL W P-5'-P-CCNC: 91 U/L (ref 0–50)
ANION GAP SERPL CALCULATED.3IONS-SCNC: 4 MMOL/L (ref 3–14)
AST SERPL W P-5'-P-CCNC: 74 U/L (ref 0–45)
BILIRUB SERPL-MCNC: 0.5 MG/DL (ref 0.2–1.3)
BUN SERPL-MCNC: 15 MG/DL (ref 7–30)
CALCIUM SERPL-MCNC: 8.3 MG/DL (ref 8.5–10.1)
CHLORIDE SERPL-SCNC: 108 MMOL/L (ref 94–109)
CK SERPL-CCNC: 406 U/L (ref 30–225)
CO2 SERPL-SCNC: 29 MMOL/L (ref 20–32)
CREAT SERPL-MCNC: 0.52 MG/DL (ref 0.52–1.04)
GFR SERPL CREATININE-BSD FRML MDRD: >90 ML/MIN/{1.73_M2}
GLUCOSE SERPL-MCNC: 98 MG/DL (ref 70–99)
IGG CSF-MCNC: 3.2 MG/DL (ref 0–6)
IGG SERPL-MCNC: 399 MG/DL (ref 768–1632)
IGG SYNTH RATE SER+CSF CALC-MRATE: 3.4 MG/D
IGG/ALB CLEAR SER+CSF-RTO: 0.63 RATIO (ref 0.28–0.66)
IGG/ALB CSF: 0.11 RATIO (ref 0.09–0.25)
MAGNESIUM SERPL-MCNC: 1.8 MG/DL (ref 1.6–2.3)
OLIGOCLONAL BANDS CSF ELPH-IMP: ABNORMAL
OLIGOCLONAL BANDS CSF ELPH-IMP: NEGATIVE
OLIGOCLONAL BANDS CSF IEF: 0 BANDS (ref 0–1)
PHOSPHATE SERPL-MCNC: 3.4 MG/DL (ref 2.5–4.5)
POTASSIUM SERPL-SCNC: 3.6 MMOL/L (ref 3.4–5.3)
PROT SERPL-MCNC: 5.2 G/DL (ref 6.8–8.8)
SODIUM SERPL-SCNC: 140 MMOL/L (ref 133–144)

## 2020-06-12 PROCEDURE — 97530 THERAPEUTIC ACTIVITIES: CPT | Mod: GO | Performed by: OCCUPATIONAL THERAPIST

## 2020-06-12 PROCEDURE — 25000128 H RX IP 250 OP 636: Performed by: PHYSICIAN ASSISTANT

## 2020-06-12 PROCEDURE — 97530 THERAPEUTIC ACTIVITIES: CPT | Mod: GP

## 2020-06-12 PROCEDURE — 80053 COMPREHEN METABOLIC PANEL: CPT | Performed by: PHYSICIAN ASSISTANT

## 2020-06-12 PROCEDURE — 25000132 ZZH RX MED GY IP 250 OP 250 PS 637: Performed by: PHYSICIAN ASSISTANT

## 2020-06-12 PROCEDURE — 25800025 ZZH RX 258: Performed by: HOSPITALIST

## 2020-06-12 PROCEDURE — 25000132 ZZH RX MED GY IP 250 OP 250 PS 637: Mod: GY | Performed by: HOSPITALIST

## 2020-06-12 PROCEDURE — 97535 SELF CARE MNGMENT TRAINING: CPT | Mod: GO | Performed by: OCCUPATIONAL THERAPIST

## 2020-06-12 PROCEDURE — 99232 SBSQ HOSP IP/OBS MODERATE 35: CPT | Performed by: HOSPITALIST

## 2020-06-12 PROCEDURE — 97166 OT EVAL MOD COMPLEX 45 MIN: CPT | Mod: GO | Performed by: OCCUPATIONAL THERAPIST

## 2020-06-12 PROCEDURE — 83735 ASSAY OF MAGNESIUM: CPT | Performed by: PHYSICIAN ASSISTANT

## 2020-06-12 PROCEDURE — 84100 ASSAY OF PHOSPHORUS: CPT | Performed by: PHYSICIAN ASSISTANT

## 2020-06-12 PROCEDURE — 12000001 ZZH R&B MED SURG/OB UMMC

## 2020-06-12 PROCEDURE — 25000132 ZZH RX MED GY IP 250 OP 250 PS 637: Mod: GY | Performed by: STUDENT IN AN ORGANIZED HEALTH CARE EDUCATION/TRAINING PROGRAM

## 2020-06-12 PROCEDURE — 36415 COLL VENOUS BLD VENIPUNCTURE: CPT | Performed by: PHYSICIAN ASSISTANT

## 2020-06-12 PROCEDURE — 82550 ASSAY OF CK (CPK): CPT | Performed by: PHYSICIAN ASSISTANT

## 2020-06-12 PROCEDURE — 99207 ZZC CDG-MDM COMPONENT: MEETS LOW - DOWN CODED: CPT | Performed by: HOSPITALIST

## 2020-06-12 RX ADMIN — CLONIDINE HYDROCHLORIDE 0.1 MG: 0.1 TABLET ORAL at 11:04

## 2020-06-12 RX ADMIN — DEXTROSE AND SODIUM CHLORIDE 65 ML/HR: 5; 450 INJECTION, SOLUTION INTRAVENOUS at 04:27

## 2020-06-12 RX ADMIN — CALCIUM POLYCARBOPHIL 1250 MG: 625 TABLET, FILM COATED ORAL at 11:04

## 2020-06-12 RX ADMIN — FAMOTIDINE 20 MG: 20 TABLET ORAL at 11:03

## 2020-06-12 RX ADMIN — ESCITALOPRAM OXALATE 10 MG: 10 TABLET ORAL at 11:04

## 2020-06-12 RX ADMIN — ASPIRIN 81 MG CHEWABLE TABLET 324 MG: 81 TABLET CHEWABLE at 11:04

## 2020-06-12 RX ADMIN — CLONIDINE HYDROCHLORIDE 0.1 MG: 0.1 TABLET ORAL at 21:39

## 2020-06-12 RX ADMIN — MULTIVIT AND MINERALS-FERROUS GLUCONATE 9 MG IRON/15 ML ORAL LIQUID 15 ML: at 11:03

## 2020-06-12 RX ADMIN — CALCIUM POLYCARBOPHIL 1250 MG: 625 TABLET, FILM COATED ORAL at 21:39

## 2020-06-12 RX ADMIN — ENOXAPARIN SODIUM 40 MG: 40 INJECTION SUBCUTANEOUS at 21:39

## 2020-06-12 RX ADMIN — POLYETHYLENE GLYCOL 3350 17 G: 17 POWDER, FOR SOLUTION ORAL at 11:05

## 2020-06-12 RX ADMIN — MELATONIN TAB 3 MG 3 MG: 3 TAB at 21:39

## 2020-06-12 RX ADMIN — LEVOTHYROXINE SODIUM 50 MCG: 50 TABLET ORAL at 11:03

## 2020-06-12 RX ADMIN — ENOXAPARIN SODIUM 40 MG: 40 INJECTION SUBCUTANEOUS at 11:04

## 2020-06-12 ASSESSMENT — ACTIVITIES OF DAILY LIVING (ADL)
ADLS_ACUITY_SCORE: 31
ADLS_ACUITY_SCORE: 31
PREVIOUS_RESPONSIBILITIES: WORK
ADLS_ACUITY_SCORE: 31

## 2020-06-12 NOTE — PROGRESS NOTES
"   06/12/20 1137   Quick Adds   Type of Visit Initial Occupational Therapy Evaluation   Living Environment   Lives With facility resident   Living Arrangements group home   Living Environment Comment Per PT/CC, pt will either dc back to group home, or to TCU   Self-Care   Usual Activity Tolerance moderate   Current Activity Tolerance fair   Activity/Exercise/Self-Care Comment Pt's cognition affects accuracy of PLOF info   Functional Level   Ambulation 0-->independent   Transferring 0-->independent   Toileting 1-->assistive equipment   Bathing 1-->assistive equipment   Dressing 0-->independent   Cognition 2 - difficulty with organizing thoughts   Which of the above functional risks had a recent onset or change? ambulation;transferring;toileting;bathing;dressing;cognition   Prior Functional Level Comment Per CC note, \"at baseline patient is ambulatory, able to complete most ADLs independently, receives medication management, activities, meals and housekeeping at the group home.\"   General Information   Onset of Illness/Injury or Date of Surgery - Date 06/11/20   Referring Physician Lavon Fair   Patient/Family Goals Statement not stated   Additional Occupational Profile Info/Pertinent History of Current Problem 34 yo female with hx of developmental delay, depression, anxiety, likely psychosis, GERD, and hypothyrodism, admitted to [facility] for further eval and management of AMS   Precautions/Limitations fall precautions   Weight-Bearing Status - LUE full weight-bearing   Weight-Bearing Status - LLE full weight-bearing   Weight-Bearing Status - RLE full weight-bearing   Heart Disease Risk Factors Overweight;Lack of physical activity   General Observations Pt supine upon arrival; pt agreeable to OT session   General Info Comments Activity order: up with assist   Cognitive Status Examination   Level of Consciousness alert   Follows Commands (Cognition) repetition of directions required   Cognitive Comment Difficult to " assess as pt mainly non-verbal in session, only answering yes/no questions at times   Sensory Examination   Sensory Quick Adds No deficits were identified   Pain Assessment   Patient Currently in Pain No   Posture   Posture protracted shoulders;forward head position   Range of Motion (ROM)   ROM Comment Decreased ROM B shoulders, otherwise WFL   Strength   Strength Comments Generally 4/5 BUE   Mobility   Bed Mobility Bed mobility skill: Scooting/Bridging;Bed mobility skill: Sit to supine;Bed mobility skill: Supine to sit   Bed Mobility Skill: Scooting/Bridging   Level of Rains: Scooting/Bridging dependent (less than 25% patients effort)   Assistive Device: Scooting/Bridging other (see comments)  (ceiling lift)   Bed Mobility Skill: Sit to Supine   Level of Rains: Sit/Supine moderate assist (50% patients effort)   Physical Assist/Nonphysical Assist: Sit/Supine 2 persons;verbal cues;supervision;set-up required   Bed Mobility Skill: Supine to Sit   Level of Rains: Supine/Sit moderate assist (50% patients effort)   Physical Assist/Nonphysical Assist: Supine/Sit 2 persons;verbal cues;supervision;set-up required   Transfer Skill: Sit to Stand   Level of Rains: Sit/Stand moderate assist (50% patients effort)   Physical Assist/Nonphysical Assist: Sit/Stand 2 persons;verbal cues;supervision;set-up required   Transfer Skill: Sit to Stand full weight-bearing   Assistive Device for Transfer: Sit/Stand standard walker   Balance   Balance Comments pt tends to lean to R seated EOB; needs VC for upright posture in standing   Lower Body Dressing   Level of Rains: Dress Lower Body dependent (less than 25% patients effort)   Physical Assist/Nonphysical Assist: Dress Lower Body 1 person assist   Grooming   Level of Rains: Grooming maximum assist (25% patients effort)   Physical Assist/Nonphysical Assist: Grooming 1 person assist;verbal cues;supervision;set-up required   Instrumental Activities  "of Daily Living (IADL)   Previous Responsibilities work  (used metro mobility indep)   IADL Comments group home assists with home chores   Activities of Daily Living Analysis   Impairments Contributing to Impaired Activities of Daily Living balance impaired;cognition impaired;ROM decreased;strength decreased   General Therapy Interventions   Planned Therapy Interventions ADL retraining;transfer training   Clinical Impression   Criteria for Skilled Therapeutic Interventions Met yes, treatment indicated   OT Diagnosis impaired ADLs   Influenced by the following impairments weakness, impaired cognition, decreased balance, decreased ROM   Assessment of Occupational Performance 3-5 Performance Deficits   Identified Performance Deficits dressing, bathing, toileting   Clinical Decision Making (Complexity) Moderate complexity   Therapy Frequency 5x/week   Predicted Duration of Therapy Intervention (days/wks) 7 days   Anticipated Discharge Disposition Transitional Care Facility   Risks and Benefits of Treatment have been explained. Yes   Patient, Family & other staff in agreement with plan of care Yes   [facility] AM-PAC  \"6 Clicks\" Daily Activity Inpatient Short Form   1. Putting on and taking off regular lower body clothing? 2 - A Lot   2. Bathing (including washing, rinsing, drying)? 2 - A Lot   3. Toileting, which includes using toilet, bedpan or urinal? 2 - A Lot   4. Putting on and taking off regular upper body clothing? 2 - A Lot   5. Taking care of personal grooming such as brushing teeth? 2 - A Lot   6. Eating meals? 1 - Total   Daily Activity Raw Score (Score out of 24.Lower scores equate to lower levels of function) 11   Total Evaluation Time   Total Evaluation Time (Minutes) 8     "

## 2020-06-12 NOTE — PLAN OF CARE
Care assumed [id number]. Alert, GIOVANNA orientation. Pt denies pain. PIV left forearm infusing D5 1/2 NS, PIV upper left arm saline locked. Assist x2, repositioned q2h. Urinary incontinence, purewick in place with adequate output. No BM this shift. Awa, pt's sister, called again- information only to be given to Cynthia pt's guardian.

## 2020-06-12 NOTE — PLAN OF CARE
D/I: Pt is withdrawn with flat affect, but calls appropriately for reposition and ashlyn cares. However, she does not respond to orientation assessment questions,  denies pain. Left forearm PIV infusing D5 1/2 NS, PIV upper left arm saline locked. Assist x2, repositioned q2h. Urinary incontinence, purewick in place with adequate output. No BM this shift. TF @ 75 ml/hr.  P: Continue to monitor pt and follow plan of care.

## 2020-06-12 NOTE — PLAN OF CARE
Discharge Planner OT   Patient plan for discharge: did not state  Current status: evaluation completed, treatment initiated.  Patient lives in a group home, and per CC note patient was generally independent with ADLs and mobility at baseline.  She used metro mobility independently, and is A&O at baseline.    Today pt Modx2 supine <> sit and sit <> stand with 2WW.  Pt stood 2x for ~30 seconds each. Pt washed face with [place] assist.  Pt generally nonverbal, or nodding head/shaking head to yes/no questions; at end of session pt did give one complete sentence.    Barriers to return to prior living situation: impaired cognition, weakness, decreased balance, decreased endurance, falls risk, current level of assist with ADLs/transfers  Recommendations for discharge: TCU  Rationale for recommendations: pt well below baseline, and would benefit from continued therapy to increase safety and independence with ADLs/transfers/mobility.       Entered by: Vesna Pacheco 06/12/2020 11:57 AM

## 2020-06-12 NOTE — PLAN OF CARE
"Assumed cares 0700 to 1900.     /65 (BP Location: Right arm)   Pulse 71   Temp 97.9  F (36.6  C) (Oral)   Resp 18   Ht 1.753 m (5' 9\")   Wt (!) 150.6 kg (332 lb)   SpO2 97%   BMI 49.03 kg/m       Pain: Denies.  Neuro: GIOVANNA orientation. Pt has flat affect and is hypoactive/withdrawn/quiet.  Respiratory: Lung sounds clear and equal on RA.  Cardiac/Neurovascular: HR and pulses WDL.  GI/: Bowel sounds active. 1 small incontinent BM. Incontinent of urine, purewick in place, good UOP.  Nutrition: NPO. TFs via NG @ 75/hr.  Activity: Not OOB. Turned q2h in bed.  Skin: Bottom has blanchable redness.  Lines: 2 PIVs in LUE, one infusing and one saline locked. D5 with 1/2 NS infusing @ 10/hr.   Events this shift: Free water flushes increased from 30 mL q4h to 60 mL q4h per orders.     Plan: Cont to monitor.    Anni Concepcion RN on 6/12/2020 at 5:50 PM      "

## 2020-06-12 NOTE — PROGRESS NOTES
Care Coordinator - Discharge Planning    Admission Date/Time:  6/5/2020  Attending MD:  Lavon Fair MD     Data  Date of initial CC assessment:  6/9/2020  Chart reviewed, discussed with interdisciplinary team.   Patient was admitted for:   1. Intellectual delay    2. Somnolence    3. Generalized muscle weakness    4. Covid-19 Virus not Detected         Assessment   Full assessment completed in previous note    Coordination of Care and Referrals:   Writer received call from patient's guardian, Cynthia. Cynthia states that patient's sister Awa would like to talk with patient on the phone. Cynthia explained to Awa that the providers have noted that it may be very difficult for patient to talk on the phone at this time. Cynthia gives permission for patient's family members, Awa Montaño and Dennis to talk with patient if patient is able, bedside nurse updated.     Plan  Anticipated Discharge Date:  TBD  Anticipated Discharge Plan:  TBD    Gwen Green, RNCC, BSN    [facility]    Medicine Group  [address] [place] [id number]    [email or web address]  [email or web address]    Office: [phone] Pager: [phone]  To contact the weekend RNCC, page [phone].

## 2020-06-12 NOTE — PROGRESS NOTES
[facility], [facility] Progress Note - Hospitalist Service, Gold 6       Date of Admission:  6/5/2020  Assessment & Plan   Ms. Dionne Perez is a 34 yo female with hx of developmental delay, depression, anxiety, likely psychosis, GERD, and hypothyrodism, admitted to [facility] for further eval and management of AMS.      1. AMS: Likely a type of adjustment disorder in the setting of acute stress and her developmental delay. Patient remains minimally responsive and not at her baseline. She seems like she is improving from her admission. Her nuerologic workup has been largely unrevealing without evidence of seizures on EEG, MRI demonstrated new cerebellar infarct. MRI without any clear etiology which does not explain her change in mentation. LP without any evidence of infection.   - Neurology has no additional recommendations for additional workup  - Appreciate Psychiatry input - as is improving seems like could be adjustment disorder  - DC IV acyclovir, ceftriaxone, and vanc  - Restart PTA Lexapro 10mg - will look to titrate up to 20mg PTA dose     2. Concern for dysphagia  Has had no PO intake since admission due to her mouth rigidity and not opening her mouth volitionally. Continues, therefore to be NPO. NG inserted 6/7 for med administration and to start enteral nutrition. Patient removed NG 6/7 evening, replaced with bridal.   -  TFs to goal of 75 ml/hr  - Nutrition consulted and appreciate following  - Monitor daily lytes, phos, mag      3. Incidental finding of R cerebellar infarct of uncertain significance  MRI head for AMS workup revealed finding of R cerebellar infarct. Pt started on aspirin, obtained CTA head and neck that was non-diagnostic due to it being severely limited due to poor contrast opacification. TTE with bubble study negative.   - Neurology consulted   - Continue  mg daily, if unable to take orally, can be given rectally as 300 mg   - Hold statin in the  setting of elevated LFTs - trending down  - Repeat CTA head and neck without evidence of vascular stenosis  - Continue tele monitoring      4.  Mildly elevated LFTs: likely from Hepatic steatosis   Unclear etiology as LFTs from OSH as recently as 5/26 normal. AST and ALT increasing today. GGT elevated to 66. Lipase 876 (< 3X the upper limit of normal). CT abdomen 6/6 with no acute findings. RUQ US with mild hepatomegaly and diffuse hepatic steatosis, CBD 5.5 mm, no hepatic biliary ductal dilation. Tbili wnl. Reported to have abdominal pain on admission, no further complaints of pain. Appears to be tolerating TF. Possible pancreatitis on admission vs LFT elevation 2/2 hepatic steatosis and CK elevation.   - Continue to monitor      # Chronic pain?: Pt noted to be on scheduled Tylenol PTA, as well as have available as needed diclofenac gel. Continues to occasionally moan, but no obvious area of pain.  - Discontinue PTA scheduled Tylenol given transaminitis     # Hypothyroidism: PTA on levothyroxine 50 mcg daily. TFTs on admission reveal pt euthyroid based on free T4 WNL.  - Continue PTA levothyroxine     # GERD: Continue PTA Pepcid 20 mg daily       Diet: NPO for Medical/Clinical Reasons Except for: Meds, Ice Chips  Adult Formula Drip Feeding: Continuous Peptamen Intense VHP; Nasogastric tube; Goal Rate: 75; mL/hr; Medication - Feeding Tube Flush Frequency: At least 15-30 mL water before and after medication administration and with tube clogging; Amount to Se...    DVT Prophylaxis: Enoxaparin .   Davila Catheter: not present  Code Status: Full Code           Disposition Plan   Expected discharge: 4 - 7 days, recommended to transitional care unit once adequate pain management/ tolerating PO medications, antibiotic plan established and mental status at baseline.  Entered: Lavon Fair MD 06/12/2020, 12:41 PM     Lavon Fair MD  Hospitalist Service, [address],  [place]  Pager: [phone]  Please see sticky note for cross cover information  ______________________________________________________________________    Interval History   Patient seen  And turned in response to calling her name. She did respond to some of my questions but not all which is better than the day before. Seemed more interactive than days prior    Data reviewed today: I reviewed all medications, new labs and imaging results over the last 24 hours. I personally reviewed    Physical Exam   Vital Signs: Temp: 98.1  F (36.7  C) Temp src: Oral BP: 119/71 Pulse: 71 Heart Rate: 64 Resp: 18 SpO2: 99 % O2 Device: None (Room air)    Weight: 332 lbs 0 oz  GENERAL: Alert during exam with eyes open, non verbal, moaning during exam.   HEENT: Anicteric sclera. PERRL. Mucous membranes moist and without lesions. Intermittently tracking eye movements. NG tube in place.   CV: RRR. S1, S2. No murmurs appreciated.   RESPIRATORY: Effort normal on 2L NC, moaning. Lung exam difficult to assess due to body habitus, no wheezing, crackles or rhonchi appreciated.    GI: Abdomen obese, soft and non distended, bowel sounds present. No tenderness, rebound, guarding.   MUSCULOSKELETAL: No joint swelling or tenderness. Moves all extremities. Rigidity with passive movement. Facial rigid when awake.  NEUROLOGICAL: Follows commands intermittently.  EXTREMITIES: No peripheral edema. Intact bilateral pedal pulses.   SKIN: No jaundice. No rashes on visible skin.

## 2020-06-12 NOTE — PROGRESS NOTES
CLINICAL NUTRITION SERVICES - REASSESSMENT NOTE     Nutrition Prescription    RECOMMENDATIONS FOR MDs/PROVIDERS TO ORDER:  Patient with recent increase in wt status, Continues to be On D5 IVF. May consider to discontinue IVF if feasible. TF and Free water flushes is meeting full hydration needs.     Malnutrition Status:    Unable to determine due to unable to perform all aspects of NFPE    Recommendations already ordered by Registered Dietitian (RD):  1. Free water flushes was adjusted to 60 ml every 4 hours to provide for full hydration needs   2. No changes to TF support     Future/Additional Recommendations:  None      EVALUATION OF THE PROGRESS TOWARD GOALS   Diet:   kept NPO, had no po intake since admit ( per MD, due to mouth rigidity and not opening her mouth)    Nutrition Support:   -NG tube placed 6/7 with bridal for EN support.  -Peptamen VHP @ 75 ml/hr  -Provision: 1800 ml/day, 1800 kcal/day (22 kcal/kg), 165g protein (2 g/kg), 137 gm CHO, 1512 ml free H2O, 7 gm fiber daily.     Intake:   --PO: NPO  --TF: Average 4 day EN intake 760 ml daily ( met 42% TF goal volume = provided patient with 756 kcal/day (9 kcal /kg) and 69 gm protein (0.8 gm/kg).     --PIV infusing D5 1/2 NS    NEW FINDINGS   Chart reviewed: Past history of developmental delay, GERD, and hypothyrodism, admitted to [facility] for further eval and management of AMS. Patient remains minimally responsive.     EXTREMITIES: No peripheral edema.  GI: Stool 1-6 daily intermittent     WT trend: Admit wt 134 kg (6/6)-> up to 150.6 kg (6/11)- likely volume up     Labs noted: tolerating high protein TF support. Renal function normal range. Lytes normal range     MALNUTRITION  % Intake: </= 50% for >/= 5 days (severe)  % Weight Loss: Unable to assess  Subcutaneous Fat Loss: Unable to assess  Muscle Loss: Unable to assess  Fluid Accumulation/Edema: None noted  Malnutrition Diagnosis: Unable to determine due to unable to perform all aspects of NFPE    Previous  Goals   Diet adv v nutrition support within 2-3 days.  Evaluation: Met    Previous Nutrition Diagnosis  Inadequate oral intake related to altered mental status as evidenced by NPO <24hrs.      Evaluation: Improving    CURRENT NUTRITION DIAGNOSIS  Inadequate enteral nutrition infusion related to daily interruption to TF advancement toward goal as evidenced by Average 4 day EN provided 42% of the estimated goal volume     INTERVENTIONS  Implementation  Feeding tube flush    Goals  Total avg nutritional intake to meet a minimum of 20 kcal/kg and 1.5 gm PRO/kg daily (per dosing wt 83 kg adjusted wt ).    Monitoring/Evaluation  Progress toward goals will be monitored and evaluated per protocol.    Trevor Hall RD/CECE  Pager [phone]

## 2020-06-12 NOTE — PLAN OF CARE
PT - Lift for transfers. Pt would benefit from recliner in room, sitting at least 1.5 hrs per day. Pt declining to sit in w/c after fall on 6/11    Discharge Planner PT   Patient plan for discharge: not stated  Current status: Pt mod A for rolling in bed to place sling. Pt dependently transferred bed<>w/c via OH lift. Pt performs 3x sit<>stand using EZ stand to facilitate performance of transfer. Pt tolerates standing ~30 sec each trial  Barriers to return to prior living situation: medical status, Ax2, falls risk  Recommendations for discharge: TCU  Rationale for recommendations: Pt will benefit from continued skilled PT to progress strength, activity tolerance, and performance of functional mobility to return towards PLOF  Entered by: Gwen Murillo 06/12/2020 9:54 AM

## 2020-06-13 LAB
ALBUMIN SERPL-MCNC: 2.3 G/DL (ref 3.4–5)
ALP SERPL-CCNC: 69 U/L (ref 40–150)
ALT SERPL W P-5'-P-CCNC: 97 U/L (ref 0–50)
ANION GAP SERPL CALCULATED.3IONS-SCNC: 4 MMOL/L (ref 3–14)
AST SERPL W P-5'-P-CCNC: 91 U/L (ref 0–45)
BACTERIA SPEC CULT: NO GROWTH
BACTERIA SPEC CULT: NO GROWTH
BILIRUB SERPL-MCNC: 0.5 MG/DL (ref 0.2–1.3)
BUN SERPL-MCNC: 17 MG/DL (ref 7–30)
CALCIUM SERPL-MCNC: 8.3 MG/DL (ref 8.5–10.1)
CHLORIDE SERPL-SCNC: 110 MMOL/L (ref 94–109)
CK SERPL-CCNC: 351 U/L (ref 30–225)
CO2 SERPL-SCNC: 28 MMOL/L (ref 20–32)
CREAT SERPL-MCNC: 0.53 MG/DL (ref 0.52–1.04)
GFR SERPL CREATININE-BSD FRML MDRD: >90 ML/MIN/{1.73_M2}
GLUCOSE SERPL-MCNC: 95 MG/DL (ref 70–99)
MAGNESIUM SERPL-MCNC: 1.8 MG/DL (ref 1.6–2.3)
PHOSPHATE SERPL-MCNC: 3.5 MG/DL (ref 2.5–4.5)
POTASSIUM SERPL-SCNC: 3.6 MMOL/L (ref 3.4–5.3)
PROT SERPL-MCNC: 5.2 G/DL (ref 6.8–8.8)
SODIUM SERPL-SCNC: 141 MMOL/L (ref 133–144)
SPECIMEN SOURCE: NORMAL
SPECIMEN SOURCE: NORMAL

## 2020-06-13 PROCEDURE — 83735 ASSAY OF MAGNESIUM: CPT | Performed by: PHYSICIAN ASSISTANT

## 2020-06-13 PROCEDURE — 84100 ASSAY OF PHOSPHORUS: CPT | Performed by: PHYSICIAN ASSISTANT

## 2020-06-13 PROCEDURE — 12000001 ZZH R&B MED SURG/OB UMMC

## 2020-06-13 PROCEDURE — 25000132 ZZH RX MED GY IP 250 OP 250 PS 637: Performed by: HOSPITALIST

## 2020-06-13 PROCEDURE — 80053 COMPREHEN METABOLIC PANEL: CPT | Performed by: PHYSICIAN ASSISTANT

## 2020-06-13 PROCEDURE — 82550 ASSAY OF CK (CPK): CPT | Performed by: PHYSICIAN ASSISTANT

## 2020-06-13 PROCEDURE — 36415 COLL VENOUS BLD VENIPUNCTURE: CPT | Performed by: PHYSICIAN ASSISTANT

## 2020-06-13 PROCEDURE — 25000128 H RX IP 250 OP 636: Performed by: PHYSICIAN ASSISTANT

## 2020-06-13 PROCEDURE — 25000132 ZZH RX MED GY IP 250 OP 250 PS 637: Performed by: PHYSICIAN ASSISTANT

## 2020-06-13 PROCEDURE — 27210437 ZZH NUTRITION PRODUCT SEMIELEM INTERMED LITER

## 2020-06-13 PROCEDURE — 99232 SBSQ HOSP IP/OBS MODERATE 35: CPT | Performed by: HOSPITALIST

## 2020-06-13 PROCEDURE — 25000132 ZZH RX MED GY IP 250 OP 250 PS 637: Performed by: STUDENT IN AN ORGANIZED HEALTH CARE EDUCATION/TRAINING PROGRAM

## 2020-06-13 RX ADMIN — CALCIUM POLYCARBOPHIL 1250 MG: 625 TABLET, FILM COATED ORAL at 19:58

## 2020-06-13 RX ADMIN — FAMOTIDINE 20 MG: 20 TABLET ORAL at 10:48

## 2020-06-13 RX ADMIN — MULTIVIT AND MINERALS-FERROUS GLUCONATE 9 MG IRON/15 ML ORAL LIQUID 15 ML: at 10:48

## 2020-06-13 RX ADMIN — ENOXAPARIN SODIUM 40 MG: 40 INJECTION SUBCUTANEOUS at 20:46

## 2020-06-13 RX ADMIN — CALCIUM POLYCARBOPHIL 1250 MG: 625 TABLET, FILM COATED ORAL at 10:47

## 2020-06-13 RX ADMIN — ASPIRIN 81 MG CHEWABLE TABLET 324 MG: 81 TABLET CHEWABLE at 10:47

## 2020-06-13 RX ADMIN — ENOXAPARIN SODIUM 40 MG: 40 INJECTION SUBCUTANEOUS at 10:48

## 2020-06-13 RX ADMIN — LEVOTHYROXINE SODIUM 50 MCG: 50 TABLET ORAL at 10:48

## 2020-06-13 RX ADMIN — CLONIDINE HYDROCHLORIDE 0.1 MG: 0.1 TABLET ORAL at 10:48

## 2020-06-13 RX ADMIN — MELATONIN TAB 3 MG 3 MG: 3 TAB at 21:20

## 2020-06-13 RX ADMIN — CLONIDINE HYDROCHLORIDE 0.1 MG: 0.1 TABLET ORAL at 19:58

## 2020-06-13 RX ADMIN — ESCITALOPRAM OXALATE 10 MG: 10 TABLET ORAL at 10:48

## 2020-06-13 ASSESSMENT — MIFFLIN-ST. JEOR: SCORE: 2288.47

## 2020-06-13 ASSESSMENT — ACTIVITIES OF DAILY LIVING (ADL)
ADLS_ACUITY_SCORE: 31
ADLS_ACUITY_SCORE: 27
ADLS_ACUITY_SCORE: 31
ADLS_ACUITY_SCORE: 27
ADLS_ACUITY_SCORE: 31
ADLS_ACUITY_SCORE: 31

## 2020-06-13 NOTE — PROGRESS NOTES
[facility], [facility] Progress Note - Hospitalist Service, Gold 6       Date of Admission:  6/5/2020  Assessment & Plan   Ms. Dionne Perez is a 36 yo female with hx of developmental delay, depression, anxiety, likely psychosis, GERD, and hypothyrodism, admitted to [facility] for further eval and management of AMS.      1. AMS: Likely a type of adjustment disorder in the setting of acute stress and her developmental delay. Patient remains minimally responsive and not at her baseline. She seems like she is improving from her admission. Her nuerologic workup has been largely unrevealing without evidence of seizures on EEG, MRI demonstrated new cerebellar infarct. MRI without any clear etiology which does not explain her change in mentation. LP without any evidence of infection.   - Neurology has no additional recommendations for additional workup  - Appreciate Psychiatry input - as is improving seems like could be adjustment disorder  - DC IV acyclovir, ceftriaxone, and vanc  - Restart PTA Lexapro 10mg - will look to titrate up to 20mg PTA dose     2. Concern for dysphagia  Has had no PO intake since admission due to her mouth rigidity and not opening her mouth volitionally. Continues, therefore to be NPO. NG inserted 6/7 for med administration and to start enteral nutrition. Patient removed NG 6/7 evening, replaced with bridal.   -  TFs to goal of 75 ml/hr  - Nutrition consulted and appreciate following  - Monitor daily lytes, phos, mag      3. Incidental finding of R cerebellar infarct of uncertain significance  MRI head for AMS workup revealed finding of R cerebellar infarct. Pt started on aspirin, obtained CTA head and neck that was non-diagnostic due to it being severely limited due to poor contrast opacification. TTE with bubble study negative.   - Neurology consulted   - Continue  mg daily, if unable to take orally, can be given rectally as 300 mg   - Hold statin in the  setting of elevated LFTs - trending down  - Repeat CTA head and neck without evidence of vascular stenosis  - Continue tele monitoring      4.  Mildly elevated LFTs: likely from Hepatic steatosis   Unclear etiology as LFTs from OSH as recently as 5/26 normal. AST and ALT increasing today. GGT elevated to 66. Lipase 876 (< 3X the upper limit of normal). CT abdomen 6/6 with no acute findings. RUQ US with mild hepatomegaly and diffuse hepatic steatosis, CBD 5.5 mm, no hepatic biliary ductal dilation. Tbili wnl. Reported to have abdominal pain on admission, no further complaints of pain. Appears to be tolerating TF. Possible pancreatitis on admission vs LFT elevation 2/2 hepatic steatosis and CK elevation.   - Continue to monitor      # Chronic pain?: Pt noted to be on scheduled Tylenol PTA, as well as have available as needed diclofenac gel. Continues to occasionally moan, but no obvious area of pain.  - Discontinue PTA scheduled Tylenol given transaminitis     # Hypothyroidism: PTA on levothyroxine 50 mcg daily. TFTs on admission reveal pt euthyroid based on free T4 WNL.  - Continue PTA levothyroxine     # GERD: Continue PTA Pepcid 20 mg daily       Diet: NPO for Medical/Clinical Reasons Except for: Meds, Ice Chips  Adult Formula Drip Feeding: Continuous Peptamen Intense VHP; Nasogastric tube; Goal Rate: 75; mL/hr; Medication - Feeding Tube Flush Frequency: At least 15-30 mL water before and after medication administration and with tube clogging; Amount to Se...    DVT Prophylaxis: Enoxaparin .   Davila Catheter: not present  Code Status: Full Code           Disposition Plan   Expected discharge: 4 - 7 days, recommended to transitional care unit once adequate pain management/ tolerating PO medications, antibiotic plan established and mental status at baseline.  Entered: Lavon Fair MD 06/13/2020, 2:08 PM     Lavon Fair MD  Hospitalist Service, [address],  [place]  Pager: [phone]  Please see sticky note for cross cover information  ______________________________________________________________________    Interval History   Patient seen  And turned in response to calling her name. She did respond to some of my questions and seems more interactive today   Data reviewed today: I reviewed all medications, new labs and imaging results over the last 24 hours. I personally reviewed    Physical Exam   Vital Signs: Temp: 97.1  F (36.2  C) Temp src: Oral BP: 119/65 Pulse: 52 Heart Rate: 59 Resp: 18 SpO2: 97 % O2 Device: None (Room air)    Weight: 332 lbs 0 oz  GENERAL: sleepy but awawkens easily and responds in some ways but not in others  HEENT: Anicteric sclera. PERRL. Mucous membranes moist and without lesions. Intermittently tracking eye movements. NG tube in place.   CV: RRR. S1, S2. No murmurs appreciated.   RESPIRATORY: Effort normal on 2L NC, moaning. Lung exam difficult to assess due to body habitus, no wheezing, crackles or rhonchi appreciated.    GI: Abdomen obese, soft and non distended, bowel sounds present. No tenderness, rebound, guarding.   MUSCULOSKELETAL: No joint swelling or tenderness. Moves all extremities. Rigidity with passive movement. Facial rigid when awake.  NEUROLOGICAL: Follows commands intermittently.  EXTREMITIES: No peripheral edema. Intact bilateral pedal pulses.   SKIN: No jaundice. No rashes on visible skin.

## 2020-06-13 NOTE — PLAN OF CARE
"Assumed cares 0700 to 1900.     /65 (BP Location: Right arm)   Pulse 53   Temp 97.5  F (36.4  C) (Oral)   Resp 16   Ht 1.753 m (5' 9\")   Wt (!) 150.6 kg (332 lb)   SpO2 97%   BMI 49.03 kg/m       Pain: Denies.  Neuro: Disoriented to time and situation. Pt has flat affect and is hypoactive/withdrawn/quiet.  Respiratory: Lung sounds clear and equal on RA.  Cardiac/Neurovascular: HR decreased, pulses WDL.  GI/: Bowel sounds active. 1 large incontinent BM; held miralax. Incontinent of urine, purewick in place, good UOP.  Nutrition: NPO. TFs via NG @ 75/hr.  Activity: Not OOB. Turned q2h in bed.  Skin: Bottom has blanchable redness.  Lines: 2 PIVs in LUE, one infusing and one saline locked. D5 with 1/2 NS infusing @ 10/hr.        Plan: Cont to monitor.     Anni Concepcion RN on 6/12/2020 at 5:50 PM  "

## 2020-06-13 NOTE — PLAN OF CARE
Assumed care 1900 to 0730. Vital signs stable on room air. Pt is alert and oriented to person and place disoriented to time and situation. Pt has a flat affect, withdrawn, hypoactive. Pt denies Pain. Pt denies nausea and SOB. Lung sounds clear. Cardiac WDL. Bowel Sounds present. Pt has tube feeding running at 75 mls hr with a 60 ml flush Q4 hrs. Pt voiding adequately via purewick, clear yellow urine. Pt incontinent of urine and BM. Pt needs lifts for transfers. Pt turned every two hours with assist of 2. Pt has a mepilex in place for red area to bottom. Pt has 2 left PIV one running D5 1/2 NS 10 ml per hr. Pt resting in bed will continue to monitor and follow plan of care.

## 2020-06-14 LAB
BACTERIA SPEC CULT: NO GROWTH
Lab: NORMAL
SPECIMEN SOURCE: NORMAL

## 2020-06-14 PROCEDURE — 25000132 ZZH RX MED GY IP 250 OP 250 PS 637: Performed by: PHYSICIAN ASSISTANT

## 2020-06-14 PROCEDURE — 99232 SBSQ HOSP IP/OBS MODERATE 35: CPT | Performed by: HOSPITALIST

## 2020-06-14 PROCEDURE — 27210437 ZZH NUTRITION PRODUCT SEMIELEM INTERMED LITER

## 2020-06-14 PROCEDURE — 12000001 ZZH R&B MED SURG/OB UMMC

## 2020-06-14 PROCEDURE — 25000132 ZZH RX MED GY IP 250 OP 250 PS 637: Performed by: STUDENT IN AN ORGANIZED HEALTH CARE EDUCATION/TRAINING PROGRAM

## 2020-06-14 PROCEDURE — 25000128 H RX IP 250 OP 636: Performed by: PHYSICIAN ASSISTANT

## 2020-06-14 PROCEDURE — 25000132 ZZH RX MED GY IP 250 OP 250 PS 637: Performed by: HOSPITALIST

## 2020-06-14 RX ORDER — ESCITALOPRAM OXALATE 10 MG/1
20 TABLET ORAL DAILY
Status: DISCONTINUED | OUTPATIENT
Start: 2020-06-15 | End: 2020-07-10 | Stop reason: HOSPADM

## 2020-06-14 RX ADMIN — CALCIUM POLYCARBOPHIL 1250 MG: 625 TABLET, FILM COATED ORAL at 20:26

## 2020-06-14 RX ADMIN — ASPIRIN 81 MG CHEWABLE TABLET 324 MG: 81 TABLET CHEWABLE at 08:25

## 2020-06-14 RX ADMIN — ENOXAPARIN SODIUM 40 MG: 40 INJECTION SUBCUTANEOUS at 08:25

## 2020-06-14 RX ADMIN — ENOXAPARIN SODIUM 40 MG: 40 INJECTION SUBCUTANEOUS at 20:26

## 2020-06-14 RX ADMIN — MELATONIN TAB 3 MG 3 MG: 3 TAB at 21:14

## 2020-06-14 RX ADMIN — FAMOTIDINE 20 MG: 20 TABLET ORAL at 08:25

## 2020-06-14 RX ADMIN — CALCIUM POLYCARBOPHIL 1250 MG: 625 TABLET, FILM COATED ORAL at 08:25

## 2020-06-14 RX ADMIN — LEVOTHYROXINE SODIUM 50 MCG: 50 TABLET ORAL at 08:25

## 2020-06-14 RX ADMIN — ESCITALOPRAM OXALATE 10 MG: 10 TABLET ORAL at 08:25

## 2020-06-14 RX ADMIN — CLONIDINE HYDROCHLORIDE 0.1 MG: 0.1 TABLET ORAL at 08:25

## 2020-06-14 RX ADMIN — MULTIVIT AND MINERALS-FERROUS GLUCONATE 9 MG IRON/15 ML ORAL LIQUID 15 ML: at 08:25

## 2020-06-14 ASSESSMENT — ACTIVITIES OF DAILY LIVING (ADL)
ADLS_ACUITY_SCORE: 31

## 2020-06-14 ASSESSMENT — MIFFLIN-ST. JEOR: SCORE: 2293.91

## 2020-06-14 NOTE — PLAN OF CARE
"Assumed cares 0700 to 1900.     BP 92/52 (BP Location: Right arm)   Pulse 51   Temp 97.5  F (36.4  C) (Oral)   Resp 18   Ht 1.753 m (5' 9\")   Wt (!) 153 kg (337 lb 3.2 oz)   SpO2 96%   BMI 49.80 kg/m       Pain: Denies.  Neuro: Disoriented to time. Pt has flat affect and is hypoactive/withdrawn/quiet.  Respiratory: Lung sounds clear and equal on RA.  Cardiac/Neurovascular: HR decreased, pulses WDL.  GI/: Bowel sounds active. 1 large loose, incontinent BM; held miralax. Incontinent of urine, purewick in place, good UOP.  Nutrition: NPO. TFs via NG @ 75/hr.   Activity: Not OOB. Turned q2h in bed.  Skin: Bottom has blanchable redness.  Lines: 2 PIVs in LUE, one infusing and one saline locked. D5 with 1/2 NS infusing @ 10/hr.   Events: Lexapro dose increased by provider.        Plan: Cont to monitor.     Anni Concepcion RN on 6/14/2020 at 4:46 PM  "

## 2020-06-14 NOTE — PROGRESS NOTES
[facility], [facility] Progress Note - Hospitalist Service, Gold 6       Date of Admission:  6/5/2020  Assessment & Plan   Ms. Dionne Perez is a 34 yo female with hx of developmental delay, depression, anxiety, likely psychosis, GERD, and hypothyrodism, admitted to [facility] for further eval and management of AMS.      1. AMS: Likely a type of adjustment disorder in the setting of acute stress and her developmental delay. Patient remains minimally responsive and not at her baseline. She seems like she is improving from her admission. Her nuerologic workup has been largely unrevealing without evidence of seizures on EEG, MRI demonstrated new cerebellar infarct. MRI without any clear etiology which does not explain her change in mentation. LP without any evidence of infection.   - Neurology has no additional recommendations for additional workup  - Appreciate Psychiatry input - as is improving seems like could be adjustment disorder  - DC IV acyclovir, ceftriaxone, and vanc  - uptitrate PTA Lexapro up to 20mg PTA dose     2. Concern for dysphagia  Has had no PO intake since admission due to her mouth rigidity and not opening her mouth volitionally. Continues, therefore to be NPO. NG inserted 6/7 for med administration and to start enteral nutrition. Patient removed NG 6/7 evening, replaced with bridal.   -  TFs to goal of 75 ml/hr  - Nutrition consulted and appreciate following  - Monitor daily lytes, phos, mag      3. Incidental finding of R cerebellar infarct of uncertain significance  MRI head for AMS workup revealed finding of R cerebellar infarct. Pt started on aspirin, obtained CTA head and neck that was non-diagnostic due to it being severely limited due to poor contrast opacification. TTE with bubble study negative.   - Neurology consulted   - Continue  mg daily, if unable to take orally, can be given rectally as 300 mg   - Hold statin in the setting of elevated LFTs -  trending down  - Repeat CTA head and neck without evidence of vascular stenosis  - Continue tele monitoring      4.  Mildly elevated LFTs: likely from Hepatic steatosis   Unclear etiology as LFTs from OSH as recently as 5/26 normal. AST and ALT increasing today. GGT elevated to 66. Lipase 876 (< 3X the upper limit of normal). CT abdomen 6/6 with no acute findings. RUQ US with mild hepatomegaly and diffuse hepatic steatosis, CBD 5.5 mm, no hepatic biliary ductal dilation. Tbili wnl. Reported to have abdominal pain on admission, no further complaints of pain. Appears to be tolerating TF. Possible pancreatitis on admission vs LFT elevation 2/2 hepatic steatosis and CK elevation.   - Continue to monitor      # Chronic pain?: Pt noted to be on scheduled Tylenol PTA, as well as have available as needed diclofenac gel. Continues to occasionally moan, but no obvious area of pain.  - Discontinue PTA scheduled Tylenol given transaminitis     # Hypothyroidism: PTA on levothyroxine 50 mcg daily. TFTs on admission reveal pt euthyroid based on free T4 WNL.  - Continue PTA levothyroxine     # GERD: Continue PTA Pepcid 20 mg daily       Diet: NPO for Medical/Clinical Reasons Except for: Meds, Ice Chips  Adult Formula Drip Feeding: Continuous Peptamen Intense VHP; Nasogastric tube; Goal Rate: 75; mL/hr; Medication - Feeding Tube Flush Frequency: At least 15-30 mL water before and after medication administration and with tube clogging; Amount to Se...    DVT Prophylaxis: Enoxaparin .   Davila Catheter: not present  Code Status: Full Code           Disposition Plan   Expected discharge: 4 - 7 days, recommended to transitional care unit once adequate pain management/ tolerating PO medications, antibiotic plan established and mental status at baseline.  Entered: Lavon Fair MD 06/14/2020, 12:58 PM     Lavon Fair MD  Hospitalist Service, [address], [place]  Pager:  [phone]  Please see sticky note for cross cover information  ______________________________________________________________________    Interval History   Patient seen And turned in response to calling her name. She did respond to some of my questions and seems about the same level of interaction as the day before.     Data reviewed today: I reviewed all medications, new labs and imaging results over the last 24 hours. I personally reviewed    Physical Exam   Vital Signs: Temp: 98.6  F (37  C) Temp src: Oral BP: 102/56 Pulse: 54 Heart Rate: 54 Resp: 18 SpO2: 97 % O2 Device: None (Room air)    Weight: 336 lbs 0 oz  GENERAL: sleepy but awawkens easily and responds in some ways but not in others  HEENT: Anicteric sclera. PERRL. Mucous membranes moist and without lesions. Intermittently tracking eye movements. NG tube in place.   CV: RRR. S1, S2. No murmurs appreciated.   RESPIRATORY: Effort normal on 2L NC, moaning. Lung exam difficult to assess due to body habitus, no wheezing, crackles or rhonchi appreciated.    GI: Abdomen obese, soft and non distended, bowel sounds present. No tenderness, rebound, guarding.   MUSCULOSKELETAL: No joint swelling or tenderness. Moves all extremities. Rigidity with passive movement. Facial rigid when awake.  NEUROLOGICAL: Follows commands intermittently.  EXTREMITIES: No peripheral edema. Intact bilateral pedal pulses.   SKIN: No jaundice. No rashes on visible skin.

## 2020-06-14 NOTE — PLAN OF CARE
Assumed care 1900 to 0730. Vital signs stable on room air. Pt bradycardic, pulse 53. Pt is alert and oriented to person and place disoriented to time and situation. Pt has a flat affect, withdrawn, hypoactive. Pt denies Pain. Pt denies nausea and SOB. Lung sounds clear. Bowel Sounds present. Pt has tube feeding running at 75 mls hr with a 60 ml flush Q4 hrs. Pt voiding adequately via purewick, clear yellow urine. Pt incontinent of urine and BM. Pt needs lifts for transfers. Pt turned every two hours with assist of 2. Pt has blanchable redness to  bottom. Pt has 2 left PIV one running D5 1/2 NS 10 ml per hr. Pt resting in bed will continue to monitor and follow plan of care.

## 2020-06-15 ENCOUNTER — APPOINTMENT (OUTPATIENT)
Dept: OCCUPATIONAL THERAPY | Facility: CLINIC | Age: 35
DRG: 887 | End: 2020-06-15
Payer: MEDICARE

## 2020-06-15 ENCOUNTER — APPOINTMENT (OUTPATIENT)
Dept: PHYSICAL THERAPY | Facility: CLINIC | Age: 35
DRG: 887 | End: 2020-06-15
Payer: MEDICARE

## 2020-06-15 ENCOUNTER — APPOINTMENT (OUTPATIENT)
Dept: SPEECH THERAPY | Facility: CLINIC | Age: 35
DRG: 887 | End: 2020-06-15
Attending: HOSPITALIST
Payer: MEDICARE

## 2020-06-15 PROCEDURE — 92526 ORAL FUNCTION THERAPY: CPT | Mod: GN

## 2020-06-15 PROCEDURE — 25000128 H RX IP 250 OP 636: Performed by: PHYSICIAN ASSISTANT

## 2020-06-15 PROCEDURE — 25000132 ZZH RX MED GY IP 250 OP 250 PS 637: Performed by: PHYSICIAN ASSISTANT

## 2020-06-15 PROCEDURE — 27210437 ZZH NUTRITION PRODUCT SEMIELEM INTERMED LITER

## 2020-06-15 PROCEDURE — 97535 SELF CARE MNGMENT TRAINING: CPT | Mod: GO

## 2020-06-15 PROCEDURE — 25000132 ZZH RX MED GY IP 250 OP 250 PS 637: Performed by: STUDENT IN AN ORGANIZED HEALTH CARE EDUCATION/TRAINING PROGRAM

## 2020-06-15 PROCEDURE — 97530 THERAPEUTIC ACTIVITIES: CPT | Mod: GO

## 2020-06-15 PROCEDURE — 25000132 ZZH RX MED GY IP 250 OP 250 PS 637: Performed by: HOSPITALIST

## 2020-06-15 PROCEDURE — 99232 SBSQ HOSP IP/OBS MODERATE 35: CPT | Performed by: HOSPITALIST

## 2020-06-15 PROCEDURE — 12000001 ZZH R&B MED SURG/OB UMMC

## 2020-06-15 PROCEDURE — 97530 THERAPEUTIC ACTIVITIES: CPT | Mod: GP

## 2020-06-15 RX ADMIN — CLONIDINE HYDROCHLORIDE 0.1 MG: 0.1 TABLET ORAL at 07:43

## 2020-06-15 RX ADMIN — LEVOTHYROXINE SODIUM 50 MCG: 50 TABLET ORAL at 07:43

## 2020-06-15 RX ADMIN — ESCITALOPRAM OXALATE 20 MG: 10 TABLET ORAL at 07:43

## 2020-06-15 RX ADMIN — CLONIDINE HYDROCHLORIDE 0.1 MG: 0.1 TABLET ORAL at 20:02

## 2020-06-15 RX ADMIN — ASPIRIN 81 MG CHEWABLE TABLET 324 MG: 81 TABLET CHEWABLE at 07:43

## 2020-06-15 RX ADMIN — ENOXAPARIN SODIUM 40 MG: 40 INJECTION SUBCUTANEOUS at 07:43

## 2020-06-15 RX ADMIN — CALCIUM POLYCARBOPHIL 1250 MG: 625 TABLET, FILM COATED ORAL at 07:43

## 2020-06-15 RX ADMIN — MULTIVIT AND MINERALS-FERROUS GLUCONATE 9 MG IRON/15 ML ORAL LIQUID 15 ML: at 07:43

## 2020-06-15 RX ADMIN — MELATONIN TAB 3 MG 3 MG: 3 TAB at 21:37

## 2020-06-15 RX ADMIN — FAMOTIDINE 20 MG: 20 TABLET ORAL at 07:43

## 2020-06-15 RX ADMIN — CALCIUM POLYCARBOPHIL 1250 MG: 625 TABLET, FILM COATED ORAL at 20:02

## 2020-06-15 RX ADMIN — ENOXAPARIN SODIUM 40 MG: 40 INJECTION SUBCUTANEOUS at 20:02

## 2020-06-15 ASSESSMENT — ACTIVITIES OF DAILY LIVING (ADL)
ADLS_ACUITY_SCORE: 31

## 2020-06-15 NOTE — PLAN OF CARE
PT 5B: Up with use of ceiling lift.     Discharge Planner PT   Patient plan for discharge: Did not discuss  Current status: Engaged pt in sit <> stand from recliner with min-mod A x 2 and standing tolerance for 10-30 seconds with CGA to min A x 2. Pt showing more engagement and responding to ~75% of questions during session.   Barriers to return to prior living situation: medical status, cognition   Recommendations for discharge: TCU  Rationale for recommendations: Pt is below her baseline for mobility and will continue to benefit from rehab to improve strength, balance, and endurance.        Entered by: Grace Chu 06/15/2020 2:18 PM

## 2020-06-15 NOTE — PLAN OF CARE
"Assumed cares 0700 to 1500.     /65 (BP Location: Right arm)   Pulse 63   Temp 97.7  F (36.5  C) (Oral)   Resp 20   Ht 1.753 m (5' 9\")   Wt (!) 153 kg (337 lb 3.2 oz)   SpO2 97%   BMI 49.80 kg/m       Pain: Denies.  Neuro: Disoriented to time. Pt has flat affect and is hypoactive/withdrawn/quiet.  Respiratory: Lung sounds clear and equal on RA. Appears dyspneic on exertion.  Cardiac/Neurovascular: HR decreased at times, pulses WDL.  GI/: Bowel sounds active. Incontinent of urine, purewick in place, good UOP.  Nutrition: NPO. TFs via NG @ 75/hr.   Activity: Turned q2h in bed. Up to chair with therapy via lift.  Skin: Bottom has blanchable redness.  Lines: 2 PIVs in LUE, one infusing and one saline locked. D5 with 1/2 NS infusing @ 10/hr.   Events: Worked with PT and OT. Speech consulted.     Plan: Cont to monitor.     Anni Concepcion RN on 6/15/2020 at 2:40 PM    "

## 2020-06-15 NOTE — PROGRESS NOTES
[facility], [facility] Progress Note - Hospitalist Service, Gold 6       Date of Admission:  6/5/2020  Assessment & Plan   Ms. Dionne Perez is a 36 yo female with hx of developmental delay, depression, anxiety, likely psychosis, GERD, and hypothyrodism, admitted to [facility] for further eval and management of AMS.      1. AMS: Likely a type of adjustment disorder in the setting of acute stress and her developmental delay. Patient remains minimally responsive and not at her baseline. She seems like she is improving from her admission. Baseline is she responds in few words, but is independent with her ADLs. Her nuerologic workup has been largely unrevealing without evidence of seizures on EEG, MRI demonstrated new cerebellar infarct. MRI without any clear etiology which does not explain her change in mentation. LP without any evidence of infection.   - Neurology has no additional recommendations for additional workup  - Appreciate Psychiatry input - as is improving seems like could be adjustment disorder  - DC IV acyclovir, ceftriaxone, and vanc  - uptitrate PTA Lexapro up to 20mg PTA dose     2. Concern for dysphagia  Has had no PO intake since admission due to her mouth rigidity and not opening her mouth volitionally. Continues, therefore to be NPO. NG inserted 6/7 for med administration and to start enteral nutrition. Patient removed NG 6/7 evening, replaced with bridal.   -  TFs to goal of 75 ml/hr  - speech therapy consult  - Nutrition consulted and appreciate following  - Monitor daily lytes, phos, mag      3. Incidental finding of R cerebellar infarct of uncertain significance  MRI head for AMS workup revealed finding of R cerebellar infarct. Pt started on aspirin, obtained CTA head and neck that was non-diagnostic due to it being severely limited due to poor contrast opacification. TTE with bubble study negative.   - Neurology consulted   - Continue  mg daily, if unable  to take orally, can be given rectally as 300 mg   - Hold statin in the setting of elevated LFTs - trending down  - Repeat CTA head and neck without evidence of vascular stenosis  - Continue tele monitoring      4.  Mildly elevated LFTs: likely from Hepatic steatosis   Unclear etiology as LFTs from OSH as recently as 5/26 normal. AST and ALT increasing today. GGT elevated to 66. Lipase 876 (< 3X the upper limit of normal). CT abdomen 6/6 with no acute findings. RUQ US with mild hepatomegaly and diffuse hepatic steatosis, CBD 5.5 mm, no hepatic biliary ductal dilation. Tbili wnl. Reported to have abdominal pain on admission, no further complaints of pain. Appears to be tolerating TF. Possible pancreatitis on admission vs LFT elevation 2/2 hepatic steatosis and CK elevation.   - Continue to monitor      # Chronic pain?: Pt noted to be on scheduled Tylenol PTA, as well as have available as needed diclofenac gel. Continues to occasionally moan, but no obvious area of pain.  - Discontinue PTA scheduled Tylenol given transaminitis     # Hypothyroidism: PTA on levothyroxine 50 mcg daily. TFTs on admission reveal pt euthyroid based on free T4 WNL.  - Continue PTA levothyroxine     # GERD: Continue PTA Pepcid 20 mg daily       Diet: NPO for Medical/Clinical Reasons Except for: Meds, Ice Chips  Adult Formula Drip Feeding: Continuous Peptamen Intense VHP; Nasogastric tube; Goal Rate: 75; mL/hr; Medication - Feeding Tube Flush Frequency: At least 15-30 mL water before and after medication administration and with tube clogging; Amount to Se...    DVT Prophylaxis: Enoxaparin .   Davila Catheter: not present  Code Status: Full Code           Disposition Plan   Expected discharge: 4 - 7 days, recommended to transitional care unit once adequate pain management/ tolerating PO medications, and her mental status is improving  Entered: Lavon Fair MD 06/15/2020, 1:14 PM     Lavon Fair MD  Hospitalist Service [facility]  6  [facility], [place]  Pager: [phone]  Please see sticky note for cross cover information  ______________________________________________________________________    Interval History   Patient asleep but awakens in response to calling her name. She did respond to some of my questions and seems about the same level of interaction as the day before.     Data reviewed today: I reviewed all medications, new labs and imaging results over the last 24 hours. I personally reviewed    Physical Exam   Vital Signs: Temp: 97.7  F (36.5  C) Temp src: Oral BP: 118/65 Pulse: 63 Heart Rate: 56 Resp: 20 SpO2: 97 % O2 Device: None (Room air)    Weight: 337 lbs 3.2 oz  GENERAL: sleepy but awawkens easily and responds in some ways but not in others  HEENT: Anicteric sclera. PERRL. Mucous membranes moist and without lesions. Intermittently tracking eye movements. NG tube in place.   CV: RRR. S1, S2. No murmurs appreciated.   RESPIRATORY: Effort normal on 2L NC, moaning. Lung exam difficult to assess due to body habitus, no wheezing, crackles or rhonchi appreciated.    GI: Abdomen obese, soft and non distended, bowel sounds present. No tenderness, rebound, guarding.   MUSCULOSKELETAL: No joint swelling or tenderness. Moves all extremities. Rigidity with passive movement. Facial rigid when awake.  NEUROLOGICAL: Follows commands intermittently.  EXTREMITIES: No peripheral edema. Intact bilateral pedal pulses.   SKIN: No jaundice. No rashes on visible skin.

## 2020-06-15 NOTE — PROGRESS NOTES
Social Work: Assessment with Discharge Plan    Patient Name:  Dionne Perez  :  1985  Age:  35 year old  MRN:  [id number]  Risk/Complexity Score:  Filed Complexity Screen Score: 11  Completed assessment with:  Pt's guardian (Cynthia)     Presenting Information   Reason for Referral:  Discharge plan  Date of Intake:  Elva 15, 2020  Referral Source:  Chart Review  Decision Maker:  Pt's guardian   Alternate Decision Maker:  No alternative decision maker at this time  Health Care Directive:  Provided education - Pt has a guardian   Living Situation:  Group home   Previous Functional Status:  Independent - Prior to admissions, pt was independent with mobility.   Patient and family understanding of hospitalization:  Unable to assess pt's understanding of hospitalization. Per guardian, pt does not understand her hospitalization.   Cultural/Language/Spiritual Considerations:  No Christianity identified. Pt's primary language is English   Adjustment to Illness:  Unable to assess     Physical Health  Reason for Admission:    1. Intellectual delay    2. Somnolence    3. Generalized muscle weakness    4. COVID-19 virus not detected      Services Needed/Recommended:  TCU    Mental Health/Chemical Dependency  Diagnosis:  Major depressive disorder   Support/Services in Place:  Pt is connected with [facility] paramedic team. She has a therapist and psychiatrist   Services Needed/Recommended:  None identified     Support System  Significant relationship at present time:  Pt's guardian and Group home staff  Family of origin is available for support:  Sisters  Other support available:  Formal support from [facility]   Gaps in support system:  None identified   Patient is caregiver to:  None     Provider Information   Primary Care Physician:  Clinic, [facility]   [phone]   Clinic:  [address] [address] / [facility] [address]      :  Yes     Financial   Income Source:  Did not discuss   Financial  Concerns:  None reported   Insurance:    Payor/Plan Subscriber Name Rel Member # Group #   MEDICAID [place] [place]  [id number]       [address] [address]       Discharge Plan   Patient and family discharge goal:  Pt's guardian is agreeable to TCU   Provided education on discharge plan:  YES  Patient agreeable to discharge plan:  YES  A list of Medicare Certified Facilities was provided to the patient and/or family to encourage patient choice. Patient's choices for facility are:  Pt/family was provided with the Medicare Compare list for SNF.  Discussed associated Medicare star ratings to assist with choice for referrals/discharge planning Yes    Education was given to pt/family that star ratings are updated/maintained by Medicare and can be reviewed by visiting www.medicare.gov Yes    Guardian agreeable to global referrals to [facility] provide Skilled rehabilitation or complex medical:  YES  General information regarding anticipated insurance coverage and possible out of pocket cost was discussed. Patient and patient's family are aware patient may incur the cost of transportation to the facility, pending insurance payment: YES  Barriers to discharge:  Medical stability     Discharge Recommendations   Anticipated Disposition:  PT/OT recs: TCU   Transportation Needs:  Other:  TBD   Name of Transportation Company and Phone:  TBD    Additional comments   SW spoke with pt's guardian re: recommendation for TCU. Guardian agreeable to global referrals for pt at this time. She indicated that she does not feel like inpatient psych is appropriate due to her developmental delay.    Global referrals made to   [facility]      SW to follow up as needed    ABBY Padilla, [facility]  Acute Care Float   [facility]  Pager: [phone]

## 2020-06-15 NOTE — PLAN OF CARE
OT 5B  Discharge Planner OT   Patient plan for discharge: not discussed   Current status: pt remains withdrawn and with flat affect. Intermittently follows commands and verbally responds but typically 1 word answers; requiring increased time for processing though. Pt requiring max A for bed mobility and dependently lifted to recliner chair. Pt declining any standing or ADL activity and minimally engaged in UE ROM/command following.   Barriers to return to prior living situation: cognitive/mental status and assist level for mobility   Recommendations for discharge: TCU  Rationale for recommendations: pt significantly below baseline and will requiring further therapy to progress overall safety and IND       Entered by: Danielle Red 06/15/2020 10:37 AM

## 2020-06-15 NOTE — PROGRESS NOTES
06/15/20 1524   General Information   Onset Date 06/05/20   Start of Care Date 06/15/20   Referring Physician  Lavon Fair MD     Patient Profile Review/OT: Additional Occupational Profile Info See Profile for full history and prior level of function   Patient/Family Goals Statement Pt unable to state, but does appear motivated for PO intake   Swallowing Evaluation Bedside swallow evaluation   Behaviorial Observations Alert  (cooperative; slow to respond to questions/commands)   Mode of current nutrition NPO   Respiratory Status Room air   Comments Orders received for swallow evaluation. Pt  with hx of developmental delay, depression, anxiety, likely psychosis, GERD, and hypothyrodism, admitted to [facility] for further eval and management of AMS.    Clinical Swallow Evaluation   Oral Musculature   (mild generalized weakness vs. reduced effort)   Structural Abnormalities none present   Dentition present and adequate   Mucosal Quality good   Additional Documentation Yes   Swallow Eval   Feeding Assistance frequent cues/help required   Clinical Swallow Eval: Thin Liquid Texture Trial   Mode of Presentation, Thin Liquids straw;self-fed;fed by clinician   Volume of Liquid or Food Presented ~5oz   Oral Phase of Swallow WFL   Pharyngeal Phase of Swallow throat clearing  (with initial sip; no other s/sx of aspiration observed)   Diagnostic Statement swallow appears functional for thin liquids    Clinical Swallow Eval: Puree Solid Texture Trial   Mode of Presentation, Puree spoon;fed by clinician   Volume of Puree Presented tsp bites x4   Oral Phase, Puree WFL   Pharyngeal Phase, Puree intact  (no s/sx of aspiration observed)   Diagnostic Statement swallow functional for puree textures on exam   Clinical Swallow Eval: Semisolid Texture Trial   Mode of Presentation, Semisolid spoon;fed by clinician   Volume of Semisolid Food Presented tsp bites x3   Oral Phase, Semisolid WFL   Oral Residue, Semisolid   (none noted)    Pharyngeal Phase, Semisolid intact  (no s/sx of aspiration observed)   Diagnostic Statement swallow functional for soft solid textures on exam   Clinical Swallow Eval: Solid Food Texture Trial   Mode of Presentation, Solid self-fed   Volume of Solid Food Presented bite of yaritza cracker x2   Oral Phase, Solid Residue in oral cavity   Oral Residue, Solid left anterior lateral sulci;mid posterior tongue   Pharyngeal Phase, Solid   (no s/sx of aspiration observed)   Diagnostic Statement concern for elevated aspiration risk with solid textures due to reduced bolus formation/control   Swallow Compensations   Swallow Compensations Pacing;Reduce amounts   Esophageal Phase of Swallow   Patient reports or presents with symptoms of esophageal dysphagia No   General Therapy Interventions   Planned Therapy Interventions Dysphagia Treatment   Dysphagia treatment Instruction of safe swallow strategies;Modified diet education   Swallow Eval: Clinical Impressions   Skilled Criteria for Therapy Intervention Skilled criteria met.  Treatment indicated.   Functional Assessment Scale (FAS) 5   Treatment Diagnosis mild dysphagia   Diet texture recommendations Dysphagia diet level 2;Thin liquids   Recommended Feeding/Eating Techniques alternate between small bites and sips of food/liquid;maintain upright posture during/after eating for 30 mins;small sips/bites   Demonstrates Need for Referral to Another Service occupational therapy;physical therapy   Therapy Frequency 5x/week   Predicted Duration of Therapy Intervention (days/wks) 2 weeks   Anticipated Discharge Disposition inpatient rehabilitation facility   Risks and Benefits of Treatment have been explained. Yes   Patient, family and/or staff in agreement with Plan of Care Yes   Clinical Impression Comments Clinical swallow evaluation completed per MD order. Pt presents with mild oral-pharyngeal dysphagia on exam characterized by reduced oral awareness and elevated aspiration risk.  Pt demonstrated functional tolerance of thin liquids, purees and soft-solid textures. Reduced bolus formation/control and oral residuals noted with solid textures, concerning for elevated aspiration risk. At this time, recommend initiate dysphagia diet level 2 with thin liquids as tolerated with supervision/assist. Pt to sit upright for all PO intake, take small bites/sips and alternate consistencies. ST to follow for PO tolerance and to assist with diet advance as appropriate   Total Evaluation Time   Total Evaluation Time (Minutes) 18

## 2020-06-15 NOTE — PLAN OF CARE
Assumed care 1900 to 0730. Vital signs stable on room air. Pt bradycardic, pulse 55. Pt is alert and oriented to person and place disoriented to time and situation. Pt has a flat affect, withdrawn, hypoactive. Pt denies Pain. Pt denies nausea and SOB. Lung sounds clear. Bowel Sounds present. Pt has tube feeding running at 75 mls hr with a 60 ml flush Q4 hrs. Pt voiding adequately via purewick, clear yellow urine. Pt incontinent of urine and BM. Pt needs lifts for transfers. Pt turned every two hours with assist of 2. Pt has blanchable redness to  bottom. Pt refused to turn two times last night. Pt has 2 left PIV one running D5 1/2 NS 10 ml per hr. Pt resting in bed. Plan: to discharge to TCU when mental status in back to baseline, and pt able to tolerate oral medication. Will continue to monitor and follow plan of care.

## 2020-06-15 NOTE — PLAN OF CARE
Discharge Planner SLP   Patient plan for discharge: unknown  Current status: Clinical swallow evaluation completed per MD order. Pt presents with mild oral-pharyngeal dysphagia on exam characterized by reduced oral awareness and elevated aspiration risk. Pt demonstrated functional tolerance of thin liquids, purees and soft-solid textures. Reduced bolus formation/control and oral residuals noted with solid textures, concerning for elevated aspiration risk. At this time, recommend initiate dysphagia diet level 2 with thin liquids as tolerated with supervision/assist. Pt to sit upright for all PO intake, take small bites/sips and alternate consistencies. ST to follow for PO tolerance and to assist with diet advance as appropriate  Barriers to return to prior living situation: medical status  Recommendations for discharge: TCU  Rationale for recommendations: Pt is below baseline function       Entered by: Ria Thibodeaux 06/15/2020 3:33 PM

## 2020-06-16 ENCOUNTER — APPOINTMENT (OUTPATIENT)
Dept: SPEECH THERAPY | Facility: CLINIC | Age: 35
DRG: 887 | End: 2020-06-16
Payer: MEDICARE

## 2020-06-16 ENCOUNTER — APPOINTMENT (OUTPATIENT)
Dept: OCCUPATIONAL THERAPY | Facility: CLINIC | Age: 35
DRG: 887 | End: 2020-06-16
Payer: MEDICARE

## 2020-06-16 ENCOUNTER — APPOINTMENT (OUTPATIENT)
Dept: PHYSICAL THERAPY | Facility: CLINIC | Age: 35
DRG: 887 | End: 2020-06-16
Payer: MEDICARE

## 2020-06-16 PROCEDURE — 25800025 ZZH RX 258: Performed by: HOSPITALIST

## 2020-06-16 PROCEDURE — 25000128 H RX IP 250 OP 636: Performed by: PHYSICIAN ASSISTANT

## 2020-06-16 PROCEDURE — 92526 ORAL FUNCTION THERAPY: CPT | Mod: GN

## 2020-06-16 PROCEDURE — 97530 THERAPEUTIC ACTIVITIES: CPT | Mod: GP | Performed by: REHABILITATION PRACTITIONER

## 2020-06-16 PROCEDURE — 97535 SELF CARE MNGMENT TRAINING: CPT | Mod: GO | Performed by: OCCUPATIONAL THERAPIST

## 2020-06-16 PROCEDURE — 25000132 ZZH RX MED GY IP 250 OP 250 PS 637: Performed by: PHYSICIAN ASSISTANT

## 2020-06-16 PROCEDURE — 99232 SBSQ HOSP IP/OBS MODERATE 35: CPT | Performed by: INTERNAL MEDICINE

## 2020-06-16 PROCEDURE — 97110 THERAPEUTIC EXERCISES: CPT | Mod: GO | Performed by: OCCUPATIONAL THERAPIST

## 2020-06-16 PROCEDURE — 12000001 ZZH R&B MED SURG/OB UMMC

## 2020-06-16 PROCEDURE — 27210437 ZZH NUTRITION PRODUCT SEMIELEM INTERMED LITER

## 2020-06-16 PROCEDURE — 25000132 ZZH RX MED GY IP 250 OP 250 PS 637: Performed by: HOSPITALIST

## 2020-06-16 PROCEDURE — 25000132 ZZH RX MED GY IP 250 OP 250 PS 637: Performed by: STUDENT IN AN ORGANIZED HEALTH CARE EDUCATION/TRAINING PROGRAM

## 2020-06-16 RX ADMIN — MULTIVIT AND MINERALS-FERROUS GLUCONATE 9 MG IRON/15 ML ORAL LIQUID 15 ML: at 08:28

## 2020-06-16 RX ADMIN — ENOXAPARIN SODIUM 40 MG: 40 INJECTION SUBCUTANEOUS at 21:06

## 2020-06-16 RX ADMIN — ENOXAPARIN SODIUM 40 MG: 40 INJECTION SUBCUTANEOUS at 08:30

## 2020-06-16 RX ADMIN — CALCIUM POLYCARBOPHIL 1250 MG: 625 TABLET, FILM COATED ORAL at 21:06

## 2020-06-16 RX ADMIN — CALCIUM POLYCARBOPHIL 1250 MG: 625 TABLET, FILM COATED ORAL at 08:27

## 2020-06-16 RX ADMIN — CLONIDINE HYDROCHLORIDE 0.1 MG: 0.1 TABLET ORAL at 21:06

## 2020-06-16 RX ADMIN — CLONIDINE HYDROCHLORIDE 0.1 MG: 0.1 TABLET ORAL at 08:21

## 2020-06-16 RX ADMIN — MELATONIN TAB 3 MG 3 MG: 3 TAB at 21:06

## 2020-06-16 RX ADMIN — ASPIRIN 81 MG CHEWABLE TABLET 324 MG: 81 TABLET CHEWABLE at 08:27

## 2020-06-16 RX ADMIN — ESCITALOPRAM OXALATE 20 MG: 10 TABLET ORAL at 08:28

## 2020-06-16 RX ADMIN — LEVOTHYROXINE SODIUM 50 MCG: 50 TABLET ORAL at 08:27

## 2020-06-16 RX ADMIN — FAMOTIDINE 20 MG: 20 TABLET ORAL at 08:27

## 2020-06-16 RX ADMIN — DEXTROSE AND SODIUM CHLORIDE: 5; 450 INJECTION, SOLUTION INTRAVENOUS at 20:52

## 2020-06-16 ASSESSMENT — ACTIVITIES OF DAILY LIVING (ADL)
ADLS_ACUITY_SCORE: 31

## 2020-06-16 ASSESSMENT — MIFFLIN-ST. JEOR: SCORE: 2206.82

## 2020-06-16 NOTE — PLAN OF CARE
OT/5B:     Discharge Planner OT   Patient plan for discharge: Not discussed   Current status: Pt withdrawn with flat affect, responding to questions 50% of times. With encouragement, agreeable to up to chair with max A for rolling and dependent ceiling lift.  Declined oral hygiene task, agitated with attempt for assist, would pull away from toothbrush and clench jaw, pulled hand away from all ADL tools when attempting to engage pt in ADLs. Completes 30 repetitions of reaching for target in all planes to address BUE strength.   Barriers to return to prior living situation: Medical, dependent transfers, altered mental status  Recommendations for discharge: TCU  Rationale for recommendations: Will benefit from continued therapy to achieve baseline functional performance to return to group home.        Entered by: Khalida Napoles 06/16/2020 11:45 AM

## 2020-06-16 NOTE — PLAN OF CARE
Assumed care 2300 to 0730. Vital signs stable on room air. Pt bradycardic, pulse 54. Pt is alert and oriented to person and place and time disoriented situation. Pt has a flat affect, withdrawn, hypoactive. Pt denies Pain. Pt denies nausea and SOB. Lung sounds clear. Bowel Sounds present. Pt has tube feeding running at 75 mls hr with a 60 ml flush Q4 hrs. Pt voiding adequately via purewick, rich urine 1100 output. Pt incontinent of urine and BM. Pt needs lifts for transfers. Pt turned every two hours with assist of 2. Pt has blanchable redness to  bottom. Pt refused to turn last night, pt reminded of why she need to be repositioned. Pt allowed to be turned after education, but not every two hours.  Pt has 2 left PIV one running D5 1/2 NS 10 ml per hr. Pt resting in bed. Plan: to discharge to TCU when mental status in back to baseline, and pt able to tolerate oral medication. Will continue to monitor and follow plan of care.

## 2020-06-16 NOTE — PROGRESS NOTES
Care Coordinator - Discharge Planning    Admission Date/Time:  6/5/2020  Attending MD:  Lavon Fair MD     Data  Date of initial CC assessment:  6/9/2020  Chart reviewed, discussed with interdisciplinary team.   Patient was admitted for:   1. Intellectual delay    2. Somnolence    3. Generalized muscle weakness    4. COVID-19 virus not detected         Assessment   Full assessment completed in previous note    Coordination of Care and Referrals:   Writer contacted the group home to confirm that they would be able to accept patient back after TCU stay and  to baseline mobility. Pauline, group [facility] staff member confirms they will be able to accept patient back to the home when medically ready for discharge from TCU. Unit SW updated.     [facility] (Pauline is the )   Phone: [phone]    Plan  Anticipated Discharge Date:  TBD  Anticipated Discharge Plan:  TCU    Gwen Green, RNCC, BSN    [facility]    Medicine Group  [address] [place] [address]    [email or web address]    Office: [phone] Pager: [phone]  To contact the weekend RNCC, page [phone].

## 2020-06-16 NOTE — PROGRESS NOTES
Pt has been alert but this writer was not able to assess orientation. Pt can speak but choose not to respond to questions. This am, pt declined to take po med. All medication crushed and pushed via NG tube. Pt had about 50% of breakfast, although slow, pt was eating per self. TF running at 75mL/hr with water flushes q 4hrs in btwn. Pt was up in the chair with OT for about 1 hr. Has been sleeping soundly since got back in bed.

## 2020-06-16 NOTE — PLAN OF CARE
"Care from [id number].    Status: Admitted with altered mental status.  Vitals: /63 (BP Location: Right arm)   Pulse 52   Temp 97.7  F (36.5  C) (Oral)   Resp 12   Ht 1.753 m (5' 9\")   Wt (!) 153 kg (337 lb 3.2 oz)   SpO2 97%   BMI 49.80 kg/m  . RA.  Neuros: A&O x4. Flat affect, hypoactive, slow to respond.  IV: L PIV SL. L PIV infusing.  Resp/trach: LS diminished. Dyspnea upon exertion.  Diet: DD2 diet with thins. Ate 75% of dinner. TF continued at 75 mL/hr through NG.  Bowel status: BS+, no BM this shift.  : Pure-wick in place, incontinence as well. Pure-wick changed and perineal cares done.  Skin: Blanchable redness to buttocks. Bruising to BLE's.  Pain: Repositioned.  Activity: Ax2 w/mechanical lift. Up in chair at beginning of the shift. In bed and turned remainder of the shift as needed. Patient can reposition upper body fairly well with minimal help.  Plan: Speech consulted. Social work consulted, appears we are awaiting TCU placement. Will continue to monitor and follow POC.  "

## 2020-06-16 NOTE — PROGRESS NOTES
Pt only had 25% of food on lunch tray then call nursing staffs in to pickup tray. Very hesitant but finally allow nursing staffs to turn and change Purewick. Pt had total of 600 mL of clear urine output. Still no BM this shift. Skin intact except bruising on R flank and gerald LE. TF still running at 75mL/hr. Able to call for help appropriately.

## 2020-06-16 NOTE — PLAN OF CARE
Discharge Planner PT   Patient plan for discharge: not stated   Current status: pt needing mech sling to recliner. Pt demo 3 sit to stand with min A x 2. Pt able to stand from 15 to 40 sec. Needing cont CGA to min A. Pt able to assist for all mobility skills but still needing min to mod A to roll and stand.   Barriers to return to prior living situation: weakness  Recommendations for discharge: PT - per plan established by the Physical Therapist, according to functional mobility the  discharge recommendation is TCU   Rationale for recommendations: pt is progressing with functional mobility.        Entered by: Aubrey Kevin 06/16/2020 3:42 PM

## 2020-06-16 NOTE — PROGRESS NOTES
[facility], [facility] Progress Note - Hospitalist Service, Gold 8       Date of Admission:  6/5/2020  Assessment & Plan     Plan for 6/16/2020  Awaiting Placement in her Group home   PT/OT  Diet as advised by Speech    MsFabiola Perez is a 34 yo female with hx of developmental delay, depression, anxiety, likely psychosis, GERD, and hypothyrodism, admitted 6/5/2020 to [facility] for further eval and management of Acute Mental Status change. She was found to have Cerebellar Infarct in the MRI scan done 6/6/2020. LP done 6/9/2020 without any evidence of infection. Neurology could not come up with any cause of AMS and Ceftriaxone. Vancomycin and Acyclovir were discontinued and  Psychiatry thought it  could be adjustment disorder. Patient was thought to have not been opening the mouth Voluntarily.  NG inserted 6/7/2020 for medication  administration and start enteral nutrition which she removed 6/7 evening and is now replaced with bridal. and TFs to goal of 75 ml/hr. On 6/16/2020 was evaluated by Speech therapy and to Continue dysphagia diet level 2 with thin liquids as tolerated with supervision/assist      1. AMS: Likely a type of adjustment disorder in the setting of acute stress and her developmental delay. Patient remains minimally responsive and not at her baseline. She seems like she is improving from her admission. Baseline is she responds in few words, but is independent with her ADLs. Her nuerologic workup has been largely unrevealing without evidence of seizures on EEG, MRI demonstrated new cerebellar infarct. MRI without any clear etiology which does not explain her change in mentation. LP without any evidence of infection.   - Neurology has no additional recommendations for additional workup  - Appreciate Psychiatry input - as is improving seems like could be adjustment disorder  - DC'ed IV acyclovir, ceftriaxone, and vanc  - uptitrate PTA Lexapro up to 20mg PTA dose     2.  Concern for dysphagia  Has had no PO intake since admission due to her mouth rigidity and not opening her mouth volitionally. Continues, therefore to be NPO. NG inserted 6/7 for med administration and to start enteral nutrition. Patient removed NG 6/7 evening, replaced with bridal.   -  TFs to goal of 75 ml/hr  - speech therapy consult and recommendations appreciated   - Nutrition consulted and appreciate following        3. Incidental finding of R cerebellar infarct of uncertain significance  MRI head for AMS workup revealed finding of R cerebellar infarct. Pt started on aspirin, obtained CTA head and neck that was non-diagnostic due to it being severely limited due to poor contrast opacification. TTE with bubble study negative.   - Neurology consulted   - Continue  mg daily, if unable to take orally, can be given rectally as 300 mg   - Hold statin in the setting of elevated LFTs - trending down  - Repeat CTA head and neck without evidence of vascular stenosis  - Continue tele monitoring      4.  Mildly elevated LFTs: likely from Hepatic steatosis   Unclear etiology as LFTs from OSH as recently as 5/26 normal. AST and ALT increasing today. GGT elevated to 66. Lipase 876 (< 3X the upper limit of normal). CT abdomen 6/6 with no acute findings. RUQ US with mild hepatomegaly and diffuse hepatic steatosis, CBD 5.5 mm, no hepatic biliary ductal dilation. Tbili wnl. Reported to have abdominal pain on admission, no further complaints of pain. Appears to be tolerating TF. Possible pancreatitis on admission vs LFT elevation 2/2 hepatic steatosis and CK elevation.   - Continue to monitor      # Chronic pain?: Pt noted to be on scheduled Tylenol PTA, as well as have available as needed diclofenac gel. Continues to occasionally moan, but no obvious area of pain.  - Discontinue PTA scheduled Tylenol given transaminitis     # Hypothyroidism: PTA on levothyroxine 50 mcg daily. TFTs on admission reveal pt euthyroid based on  free T4 WNL.  - Continue PTA levothyroxine     # GERD: Continue PTA Pepcid 20 mg daily       Diet: Adult Formula Drip Feeding: Continuous Peptamen Intense VHP; Nasogastric tube; Goal Rate: 75; mL/hr; Medication - Feeding Tube Flush Frequency: At least 15-30 mL water before and after medication administration and with tube clogging; Amount to Se...  Dysphagia Diet Level 2 [facility] Altered Thin Liquids (water, ice chips, juice, milk gelatin, ice cream, etc)  Calorie Counts    DVT Prophylaxis: Enoxaparin (Lovenox) SQ  Davila Catheter: not present  Code Status: Full Code           Disposition Plan   Expected discharge: when ever a bed is available , recommended to prior living arrangement once bed is available .  Entered: Dick Balbuena MD 06/16/2020, 2:06 PM       The patient's care was discussed with the Patient.    Dick Balbuena MD  Hospitalist Service, [address]  Pager: [phone]  Please see sticky note for cross cover information  ______________________________________________________________________    Interval History   Denies any new symptoms     Data reviewed today: I reviewed all medications, new labs and imaging results over the last 24 hours. I personally reviewed no images or EKG's today.    Physical Exam   Vital Signs: Temp: 97.5  F (36.4  C) Temp src: Oral BP: 115/63 Pulse: 54 Heart Rate: 53 Resp: 16 SpO2: 97 % O2 Device: None (Room air)    Weight: 337 lbs 3.2 oz  General Appearance: Not looking in distress  Respiratory: Lungs are clear to auscultation with decreased breath sounds in the Lung bases   Cardiovascular: S1 S2 RRR  GI: Abdomen is soft wthout tenderness    Other: CNS Patient looks Alert and Oriented to person - answering simple questions with a yes/no answers      Data   Recent Labs   Lab 06/13/20  0603 06/12/20  0737 06/11/20  0747 06/11/20  0601  06/10/20  0620   WBC  --   --   --   --   --  7.3   HGB  --   --   --   --   --  10.9*   MCV  --   --    --   --   --  90   PLT  --   --   --   --   --  111*    140  --  140  --  142   POTASSIUM 3.6 3.6 3.5 5.7*   < > 3.3*   CHLORIDE 110* 108  --  110*  --  111*   CO2 28 29  --  26  --  25   BUN 17 15  --  8  --  9   CR 0.53 0.52  --  0.48*  --  0.59   ANIONGAP 4 4  --  4  --  6   SANDRA 8.3* 8.3*  --  8.1*  --  8.1*   GLC 95 98  --  91  --  106*   ALBUMIN 2.3* 2.4*  --  2.2*  --  2.5*   PROTTOTAL 5.2* 5.2*  --  5.1*  --  5.4*   BILITOTAL 0.5 0.5  --  0.5  --  0.6   ALKPHOS 69 56  --  52  --  46   ALT 97* 91*  --  111*  --  133*   AST 91* 74* 95* Canceled, Test credited  --  124*    < > = values in this interval not displayed.     No results found for this or any previous visit (from the past 24 hour(s)).

## 2020-06-16 NOTE — PLAN OF CARE
Discharge Planner SLP   Patient plan for discharge: Did not discuss  Current status: Pt tolerated current diet during her breakfast meal. Limited additional trials as pt stated she was full. Insufficient mastication and oral residue with more advanced solids.     Continue dysphagia diet level 2 with thin liquids as tolerated with supervision/assist. Pt to sit upright for all PO intake, take small bites/sips and alternate consistencies. ST to follow for PO tolerance and to assist with diet advance as appropriate    Barriers to return to prior living situation: Below baseline  Recommendations for discharge: TCU  Rationale for recommendations: SLP at next level of care for management of dysphagia          Entered by: Connie Blackwell 06/16/2020 9:09 AM

## 2020-06-17 ENCOUNTER — APPOINTMENT (OUTPATIENT)
Dept: OCCUPATIONAL THERAPY | Facility: CLINIC | Age: 35
DRG: 887 | End: 2020-06-17
Payer: MEDICARE

## 2020-06-17 ENCOUNTER — APPOINTMENT (OUTPATIENT)
Dept: PHYSICAL THERAPY | Facility: CLINIC | Age: 35
DRG: 887 | End: 2020-06-17
Payer: MEDICARE

## 2020-06-17 ENCOUNTER — APPOINTMENT (OUTPATIENT)
Dept: SPEECH THERAPY | Facility: CLINIC | Age: 35
DRG: 887 | End: 2020-06-17
Payer: MEDICARE

## 2020-06-17 LAB
ALBUMIN SERPL-MCNC: 2.5 G/DL (ref 3.4–5)
ALP SERPL-CCNC: 118 U/L (ref 40–150)
ALT SERPL W P-5'-P-CCNC: 140 U/L (ref 0–50)
AST SERPL W P-5'-P-CCNC: 82 U/L (ref 0–45)
BILIRUB DIRECT SERPL-MCNC: 0.2 MG/DL (ref 0–0.2)
BILIRUB SERPL-MCNC: 0.7 MG/DL (ref 0.2–1.3)
PLATELET # BLD AUTO: 141 10E9/L (ref 150–450)
PROT SERPL-MCNC: 5.7 G/DL (ref 6.8–8.8)

## 2020-06-17 PROCEDURE — 25000132 ZZH RX MED GY IP 250 OP 250 PS 637: Performed by: HOSPITALIST

## 2020-06-17 PROCEDURE — 25000132 ZZH RX MED GY IP 250 OP 250 PS 637: Performed by: PHYSICIAN ASSISTANT

## 2020-06-17 PROCEDURE — 27210437 ZZH NUTRITION PRODUCT SEMIELEM INTERMED LITER

## 2020-06-17 PROCEDURE — 97110 THERAPEUTIC EXERCISES: CPT | Mod: GO | Performed by: OCCUPATIONAL THERAPIST

## 2020-06-17 PROCEDURE — 85049 AUTOMATED PLATELET COUNT: CPT | Performed by: INTERNAL MEDICINE

## 2020-06-17 PROCEDURE — 80076 HEPATIC FUNCTION PANEL: CPT | Performed by: INTERNAL MEDICINE

## 2020-06-17 PROCEDURE — 99231 SBSQ HOSP IP/OBS SF/LOW 25: CPT | Performed by: INTERNAL MEDICINE

## 2020-06-17 PROCEDURE — 12000001 ZZH R&B MED SURG/OB UMMC

## 2020-06-17 PROCEDURE — 97530 THERAPEUTIC ACTIVITIES: CPT | Mod: GP

## 2020-06-17 PROCEDURE — 92526 ORAL FUNCTION THERAPY: CPT | Mod: GN

## 2020-06-17 PROCEDURE — 97530 THERAPEUTIC ACTIVITIES: CPT | Mod: GO | Performed by: OCCUPATIONAL THERAPIST

## 2020-06-17 PROCEDURE — 25000128 H RX IP 250 OP 636: Performed by: PHYSICIAN ASSISTANT

## 2020-06-17 PROCEDURE — 36415 COLL VENOUS BLD VENIPUNCTURE: CPT | Performed by: INTERNAL MEDICINE

## 2020-06-17 PROCEDURE — 25000132 ZZH RX MED GY IP 250 OP 250 PS 637: Performed by: STUDENT IN AN ORGANIZED HEALTH CARE EDUCATION/TRAINING PROGRAM

## 2020-06-17 PROCEDURE — 97535 SELF CARE MNGMENT TRAINING: CPT | Mod: GO | Performed by: OCCUPATIONAL THERAPIST

## 2020-06-17 RX ADMIN — ENOXAPARIN SODIUM 40 MG: 40 INJECTION SUBCUTANEOUS at 20:07

## 2020-06-17 RX ADMIN — MULTIVIT AND MINERALS-FERROUS GLUCONATE 9 MG IRON/15 ML ORAL LIQUID 15 ML: at 11:38

## 2020-06-17 RX ADMIN — POLYETHYLENE GLYCOL 3350 17 G: 17 POWDER, FOR SOLUTION ORAL at 08:11

## 2020-06-17 RX ADMIN — ENOXAPARIN SODIUM 40 MG: 40 INJECTION SUBCUTANEOUS at 08:08

## 2020-06-17 RX ADMIN — CALCIUM POLYCARBOPHIL 1250 MG: 625 TABLET, FILM COATED ORAL at 20:07

## 2020-06-17 RX ADMIN — CALCIUM POLYCARBOPHIL 1250 MG: 625 TABLET, FILM COATED ORAL at 11:38

## 2020-06-17 RX ADMIN — MELATONIN TAB 3 MG 3 MG: 3 TAB at 22:10

## 2020-06-17 RX ADMIN — CLONIDINE HYDROCHLORIDE 0.1 MG: 0.1 TABLET ORAL at 20:07

## 2020-06-17 RX ADMIN — LEVOTHYROXINE SODIUM 50 MCG: 50 TABLET ORAL at 08:08

## 2020-06-17 RX ADMIN — ESCITALOPRAM OXALATE 20 MG: 10 TABLET ORAL at 08:08

## 2020-06-17 RX ADMIN — ASPIRIN 81 MG CHEWABLE TABLET 324 MG: 81 TABLET CHEWABLE at 08:04

## 2020-06-17 RX ADMIN — FAMOTIDINE 20 MG: 20 TABLET ORAL at 08:08

## 2020-06-17 RX ADMIN — CLONIDINE HYDROCHLORIDE 0.1 MG: 0.1 TABLET ORAL at 08:08

## 2020-06-17 ASSESSMENT — ACTIVITIES OF DAILY LIVING (ADL)
ADLS_ACUITY_SCORE: 31

## 2020-06-17 NOTE — PLAN OF CARE
Discharge Planner SLP   Patient plan for discharge: unknown  Current status: Recommend continue dysphagia diet level 2 with thin liquids as tolerated with supervision/assist. Pt to sit upright for all PO intake and should take small bites/sips. Pt with reduced oral awareness and requires increased time for mastication/bolus manipulation/oral clearance with advanced solids.  Barriers to return to prior living situation: dysphagia; medical status  Recommendations for discharge: return to group home  Rationale for recommendations: unclear if Pt will require ongoing ST intervention post discharge       Entered by: Ria Thibodeaux 06/17/2020 10:18 AM

## 2020-06-17 NOTE — PROGRESS NOTES
[facility], [facility] Progress Note - Hospitalist Service, Gold 8       Date of Admission:  6/5/2020  Assessment & Plan     Plan for 6/17/2020  Awaiting Placement in her Group home 6/18/2020   Diet as advised by Speech    Ms. Dionne Perez is a 36 yo female with hx of developmental delay, depression, anxiety, likely psychosis, GERD, and hypothyrodism, admitted 6/5/2020 to [facility] for further eval and management of Acute Mental Status change. She was found to have Cerebellar Infarct in the MRI scan done 6/6/2020. LP done 6/9/2020 without any evidence of infection. Neurology could not come up with any cause of AMS and Ceftriaxone. Vancomycin and Acyclovir were discontinued and  Psychiatry thought it  could be adjustment disorder. Patient was thought to have not been opening the mouth Voluntarily.  NG inserted 6/7/2020 for medication  administration and start enteral nutrition which she removed 6/7 evening and is now replaced with bridal. and TFs to goal of 75 ml/hr. On 6/16/2020 was evaluated by Speech therapy and to Continue dysphagia diet level 2 with thin liquids as tolerated with supervision/assist      1. AMS: Likely a type of adjustment disorder in the setting of acute stress and her developmental delay. Patient remains minimally responsive and not at her baseline. She seems like she is improving from her admission. Baseline is she responds in few words, but is independent with her ADLs. Her nuerologic workup has been largely unrevealing without evidence of seizures on EEG, MRI demonstrated new cerebellar infarct. MRI without any clear etiology which does not explain her change in mentation. LP without any evidence of infection.   - Neurology has no additional recommendations for additional workup  - Appreciate Psychiatry input - as is improving seems like could be adjustment disorder  - DC'ed IV acyclovir, ceftriaxone, and vanc  - uptitrate PTA Lexapro up to 20mg PTA  dose     2. Concern for dysphagia  Has had no PO intake since admission due to her mouth rigidity and not opening her mouth volitionally. Continues, therefore to be NPO. NG inserted 6/7 for med administration and to start enteral nutrition. Patient removed NG 6/7 evening, replaced with bridal.   -  TFs to goal of 75 ml/hr  - speech therapy consult and recommendations appreciated   - Nutrition consulted and appreciate following        3. Incidental finding of R cerebellar infarct of uncertain significance  MRI head for AMS workup revealed finding of R cerebellar infarct. Pt started on aspirin, obtained CTA head and neck that was non-diagnostic due to it being severely limited due to poor contrast opacification. TTE with bubble study negative.   - Neurology consulted   - Continue  mg daily, if unable to take orally, can be given rectally as 300 mg   - Hold statin in the setting of elevated LFTs - trending down  - Repeat CTA head and neck without evidence of vascular stenosis  - Continue tele monitoring      4.  Mildly elevated LFTs: likely from Hepatic steatosis   Unclear etiology as LFTs from OSH as recently as 5/26 normal. AST and ALT increasing today. GGT elevated to 66. Lipase 876 (< 3X the upper limit of normal). CT abdomen 6/6 with no acute findings. RUQ US with mild hepatomegaly and diffuse hepatic steatosis, CBD 5.5 mm, no hepatic biliary ductal dilation. Tbili wnl. Reported to have abdominal pain on admission, no further complaints of pain. Appears to be tolerating TF. Possible pancreatitis on admission vs LFT elevation 2/2 hepatic steatosis and CK elevation.   - Continue to monitor      # Chronic pain?: Pt noted to be on scheduled Tylenol PTA, as well as have available as needed diclofenac gel. Continues to occasionally moan, but no obvious area of pain.  - Discontinue PTA scheduled Tylenol given transaminitis     # Hypothyroidism: PTA on levothyroxine 50 mcg daily. TFTs on admission reveal pt  euthyroid based on free T4 WNL.  - Continue PTA levothyroxine     # GERD: Continue PTA Pepcid 20 mg daily       Diet: Adult Formula Drip Feeding: Continuous Peptamen Intense VHP; Nasogastric tube; Goal Rate: 75; mL/hr; Medication - Feeding Tube Flush Frequency: At least 15-30 mL water before and after medication administration and with tube clogging; Amount to Se...  Dysphagia Diet Level 2 Mech Altered Thin Liquids (water, ice chips, juice, milk gelatin, ice cream, etc)  Calorie Counts    DVT Prophylaxis: Enoxaparin (Lovenox) SQ  Davila Catheter: not present  Code Status: Full Code           Disposition Plan   Expected discharge: when ever a bed is available , recommended to prior living arrangement once bed is available .  Entered: Dick Balbuena MD 06/17/2020, 6:17 PM       The patient's care was discussed with the Patient.    Dick Balbuena MD  Hospitalist Service, [address]  Pager: [phone]  Please see sticky note for cross cover information  ______________________________________________________________________    Interval History   No new symptoms today     Data reviewed today: I reviewed all medications, new labs and imaging results over the last 24 hours. I personally reviewed no images or EKG's today.    Physical Exam   Vital Signs: Temp: 98.8  F (37.1  C) Temp src: Oral BP: 105/58 Pulse: 56 Heart Rate: 53 Resp: 16 SpO2: 98 % O2 Device: Nasal cannula    Weight: 318 lbs 0 oz  General Appearance: Not looking in distress  Respiratory: Lungs are clear to auscultation with decreased breath sounds in the Lung bases   Cardiovascular: S1 S2 RRR  GI: Abdomen is soft wthout tenderness    Other: CNS Patient looks Alert and Oriented to person has this vacant stare without talking much       Data   Recent Labs   Lab 06/17/20  0535 06/13/20  0603 06/12/20  0737 06/11/20  0747 06/11/20  0601   *  --   --   --   --    NA  --  141 140  --  140   POTASSIUM  --  3.6 3.6  3.5 5.7*   CHLORIDE  --  110* 108  --  110*   CO2  --  28 29  --  26   BUN  --  17 15  --  8   CR  --  0.53 0.52  --  0.48*   ANIONGAP  --  4 4  --  4   SANDRA  --  8.3* 8.3*  --  8.1*   GLC  --  95 98  --  91   ALBUMIN 2.5* 2.3* 2.4*  --  2.2*   PROTTOTAL 5.7* 5.2* 5.2*  --  5.1*   BILITOTAL 0.7 0.5 0.5  --  0.5   ALKPHOS 118 69 56  --  52   * 97* 91*  --  111*   AST 82* 91* 74* 95* Canceled, Test credited     No results found for this or any previous visit (from the past 24 hour(s)).

## 2020-06-17 NOTE — PROGRESS NOTES
[id number]: Puts call light on appropriately. Slow to respond. Flat affect. VSS on RA. Denies pain. Incontinent of urine- purewick in place. No BM this shift, positive bowel sounds. NG with TF at 75mL/hr. Q2H repo. Up with lift. Bottom has blanchable redness. 2 PIVs- one infusing D5 1/2NS at 10mL/hr. Continue to monitor and follow POC.

## 2020-06-17 NOTE — PLAN OF CARE
OT/5B:  Discharge Planner OT   Patient plan for discharge: unstated   Current status: Pt resistant to therapy. Pt dependent lift bed to chair. Pt refusing [place] assist with oral cares. Pt able to wash face with verbal cues. Pt able to complete UE strengthening with target activity.   Barriers to return to prior living situation: ADL I  Recommendations for discharge: TCU  Rationale for recommendations: to progress ADL I       Entered by: Cathleen Holden 06/17/2020 11:27 AM

## 2020-06-17 NOTE — PROGRESS NOTES
"Care from 12:00 - 15:00     /56 (BP Location: Right arm)   Pulse 54   Temp 96.5  F (35.8  C) (Oral)   Resp 16   Ht 1.753 m (5' 9\")   Wt 144.2 kg (318 lb)   SpO2 96%   BMI 46.96 kg/m      Pt alert, disoriented to place and time. Pt is withdrawn and not offering much verbally. VSS on RA. Pt resting comfortably this afternoon, watching TV and napping between cares. Up to recliner at 14:15 with PT, pt encouraged to stay up for at least an hour. TF continues at rate of 75 mL/hr. Purewick catheter in place for urine incontinence. Pt remains stable and ready for discharge to TCU - placement planning underway.   "

## 2020-06-17 NOTE — PLAN OF CARE
"Assumed cares 1500 to 2300.    /56 (BP Location: Right arm)   Pulse 54   Temp 97.9  F (36.6  C) (Oral)   Resp 16   Ht 1.753 m (5' 9\")   Wt 144.2 kg (318 lb)   SpO2 97%   BMI 46.96 kg/m       Pain: Denies.  Neuro: Disoriented to time. Pt has flat affect and is hypoactive/withdrawn/quiet.  Respiratory: Lung sounds clear and equal on RA.   Cardiac/Neurovascular: HR decreased and pulses WDL.  GI/: Bowel sounds active. Incontinent of urine, purewick in place, good UOP.  Nutrition: DD2 diet, poor intake. TFs via NG @ 75/hr.   Activity: Turned q2h in bed. Up to chair with therapy via lift.  Skin: Bottom has blanchable redness.  Lines: 2 PIVs in LUE, one infusing and one saline locked. D5 with 1/2 NS infusing @ 10/hr.      Plan: Cont to monitor.     Anni Concepcion RN on 6/16/2020 at 9:00 PM  "

## 2020-06-17 NOTE — PLAN OF CARE
PT - Lift for transfers  Discharge Planner PT   Patient plan for discharge: not stated  Current status: Pt mod A for rolling in bed for placement of sling. Pt dependently transferred bed>recliner. Pt trials 4x sit<>stands from recliner with mod Ax2. Pt tolerates standing up to ~20 seconds, not consistently following commands from therapist  Barriers to return to prior living situation: medical status, impaired functional mobility, level of assist, below baseline, weakness, falls risk  Recommendations for discharge: TCU  Rationale for recommendations: Pt will benefit from continued skilled PT in order to progress strength, activity tolerance, gait and safety with functional mobility to return towards PLOF  Entered by: Gwen Murillo 06/17/2020 2:41 PM

## 2020-06-18 ENCOUNTER — APPOINTMENT (OUTPATIENT)
Dept: OCCUPATIONAL THERAPY | Facility: CLINIC | Age: 35
DRG: 887 | End: 2020-06-18
Payer: MEDICARE

## 2020-06-18 PROCEDURE — 25000128 H RX IP 250 OP 636: Performed by: PHYSICIAN ASSISTANT

## 2020-06-18 PROCEDURE — 99231 SBSQ HOSP IP/OBS SF/LOW 25: CPT | Performed by: INTERNAL MEDICINE

## 2020-06-18 PROCEDURE — 27210437 ZZH NUTRITION PRODUCT SEMIELEM INTERMED LITER

## 2020-06-18 PROCEDURE — 25000132 ZZH RX MED GY IP 250 OP 250 PS 637: Performed by: PHYSICIAN ASSISTANT

## 2020-06-18 PROCEDURE — 97530 THERAPEUTIC ACTIVITIES: CPT | Mod: GO

## 2020-06-18 PROCEDURE — 25000132 ZZH RX MED GY IP 250 OP 250 PS 637: Performed by: HOSPITALIST

## 2020-06-18 PROCEDURE — 12000001 ZZH R&B MED SURG/OB UMMC

## 2020-06-18 PROCEDURE — 25000132 ZZH RX MED GY IP 250 OP 250 PS 637: Performed by: STUDENT IN AN ORGANIZED HEALTH CARE EDUCATION/TRAINING PROGRAM

## 2020-06-18 RX ADMIN — LEVOTHYROXINE SODIUM 50 MCG: 50 TABLET ORAL at 05:51

## 2020-06-18 RX ADMIN — CALCIUM POLYCARBOPHIL 1250 MG: 625 TABLET, FILM COATED ORAL at 10:14

## 2020-06-18 RX ADMIN — ENOXAPARIN SODIUM 40 MG: 40 INJECTION SUBCUTANEOUS at 19:15

## 2020-06-18 RX ADMIN — ENOXAPARIN SODIUM 40 MG: 40 INJECTION SUBCUTANEOUS at 10:17

## 2020-06-18 RX ADMIN — ESCITALOPRAM OXALATE 20 MG: 10 TABLET ORAL at 10:15

## 2020-06-18 RX ADMIN — POLYETHYLENE GLYCOL 3350 17 G: 17 POWDER, FOR SOLUTION ORAL at 10:14

## 2020-06-18 RX ADMIN — MELATONIN TAB 3 MG 3 MG: 3 TAB at 21:39

## 2020-06-18 RX ADMIN — CALCIUM POLYCARBOPHIL 1250 MG: 625 TABLET, FILM COATED ORAL at 20:07

## 2020-06-18 RX ADMIN — FAMOTIDINE 20 MG: 20 TABLET ORAL at 10:14

## 2020-06-18 RX ADMIN — ASPIRIN 81 MG CHEWABLE TABLET 324 MG: 81 TABLET CHEWABLE at 10:14

## 2020-06-18 RX ADMIN — CLONIDINE HYDROCHLORIDE 0.1 MG: 0.1 TABLET ORAL at 10:15

## 2020-06-18 RX ADMIN — SODIUM PHOSPHATE, DIBASIC AND SODIUM PHOSPHATE, MONOBASIC, UNSPECIFIED FORM 1 ENEMA: 7; 19 ENEMA RECTAL at 20:07

## 2020-06-18 RX ADMIN — MULTIVIT AND MINERALS-FERROUS GLUCONATE 9 MG IRON/15 ML ORAL LIQUID 15 ML: at 10:14

## 2020-06-18 ASSESSMENT — ACTIVITIES OF DAILY LIVING (ADL)
ADLS_ACUITY_SCORE: 31

## 2020-06-18 NOTE — PROGRESS NOTES
Calorie Count  Intake recorded for: 6/17  Total Kcals: 0 Total Protein: 0g  Kcals from Hospital Food: 0   Protein: 0g  Kcals from Outside Food (average):0 Protein: 0g  # Meals Recorded: 1 meal ordered. No food intake recorded.  # Supplements Recorded: 0    Note: Per Epic Food Record, pt consumed 75% at 8:00 PM, but not enough information was given to calculate calories/protein.

## 2020-06-18 NOTE — PROGRESS NOTES
[facility], [facility] Progress Note - Hospitalist Service, Gold 8       Date of Admission:  6/5/2020  Assessment & Plan     Plan for 6/17/2020  Awaiting Placement in her Group home         Ms. Dionne Perez is a 36 yo female with hx of developmental delay, depression, anxiety, likely psychosis, GERD, and hypothyrodism, admitted 6/5/2020 to [facility] for further eval and management of Acute Mental Status change. She was found to have Cerebellar Infarct in the MRI scan done 6/6/2020. LP done 6/9/2020 without any evidence of infection. Neurology could not come up with any cause of AMS and Ceftriaxone. Vancomycin and Acyclovir were discontinued and  Psychiatry thought it  could be adjustment disorder. Patient was thought to have not been opening the mouth Voluntarily.  NG inserted 6/7/2020 for medication  administration and start enteral nutrition which she removed 6/7 evening and is now replaced with bridal. and TFs to goal of 75 ml/hr. On 6/16/2020 was evaluated by Speech therapy and to Continue dysphagia diet level 2 with thin liquids as tolerated with supervision/assist      1. AMS: Likely a type of adjustment disorder in the setting of acute stress and her developmental delay. Patient remains minimally responsive and not at her baseline. She seems like she is improving from her admission. Baseline is she responds in few words, but is independent with her ADLs. Her nuerologic workup has been largely unrevealing without evidence of seizures on EEG, MRI demonstrated new cerebellar infarct. MRI without any clear etiology which does not explain her change in mentation. LP without any evidence of infection.   - Neurology has no additional recommendations for additional workup  - Appreciate Psychiatry input - as is improving seems like could be adjustment disorder  - DC'ed IV acyclovir, ceftriaxone, and vanc  - uptitrate PTA Lexapro up to 20mg PTA dose     2. Concern for dysphagia  Has  had no PO intake since admission due to her mouth rigidity and not opening her mouth volitionally. Continues, therefore to be NPO. NG inserted 6/7 for med administration and to start enteral nutrition. Patient removed NG 6/7 evening, replaced with bridal.   -  TFs to goal of 75 ml/hr  - speech therapy consult and recommendations appreciated   - Nutrition consulted and appreciate following        3. Incidental finding of R cerebellar infarct of uncertain significance  MRI head for AMS workup revealed finding of R cerebellar infarct. Pt started on aspirin, obtained CTA head and neck that was non-diagnostic due to it being severely limited due to poor contrast opacification. TTE with bubble study negative.   - Neurology consulted   - Continue  mg daily, if unable to take orally, can be given rectally as 300 mg   - Hold statin in the setting of elevated LFTs - trending down  - Repeat CTA head and neck without evidence of vascular stenosis  - Continue tele monitoring      4.  Mildly elevated LFTs: likely from Hepatic steatosis   Unclear etiology as LFTs from OSH as recently as 5/26 normal. AST and ALT increasing today. GGT elevated to 66. Lipase 876 (< 3X the upper limit of normal). CT abdomen 6/6 with no acute findings. RUQ US with mild hepatomegaly and diffuse hepatic steatosis, CBD 5.5 mm, no hepatic biliary ductal dilation. Tbili wnl. Reported to have abdominal pain on admission, no further complaints of pain. Appears to be tolerating TF. Possible pancreatitis on admission vs LFT elevation 2/2 hepatic steatosis and CK elevation.   - Continue to monitor      # Chronic pain?: Pt noted to be on scheduled Tylenol PTA, as well as have available as needed diclofenac gel. Continues to occasionally moan, but no obvious area of pain.  - Discontinue PTA scheduled Tylenol given transaminitis     # Hypothyroidism: PTA on levothyroxine 50 mcg daily. TFTs on admission reveal pt euthyroid based on free T4 WNL.  - Continue  PTA levothyroxine     # GERD: Continue PTA Pepcid 20 mg daily       Diet: Adult Formula Drip Feeding: Continuous Peptamen Intense VHP; Nasogastric tube; Goal Rate: 75; mL/hr; Medication - Feeding Tube Flush Frequency: At least 15-30 mL water before and after medication administration and with tube clogging; Amount to Se...  Dysphagia Diet Level 2 [facility] Altered Thin Liquids (water, ice chips, juice, milk gelatin, ice cream, etc)  Calorie Counts    DVT Prophylaxis: Enoxaparin (Lovenox) SQ  Davila Catheter: not present  Code Status: Full Code           Disposition Plan   Expected discharge: when ever a bed is available , recommended to prior living arrangement once bed is available .  Entered: Dick Balbuena MD 06/18/2020, 5:03 PM       The patient's care was discussed with the Patient.    Dick Balbuena MD  Hospitalist Service, [address], [place]  Pager: [phone]  Please see sticky note for cross cover information  ______________________________________________________________________    Interval History   No new symptoms today - patient does not interact much     Data reviewed today: I reviewed all medications, new labs and imaging results over the last 24 hours. I personally reviewed no images or EKG's today.    Physical Exam   Vital Signs: Temp: 98.5  F (36.9  C) Temp src: Oral BP: 103/53 Pulse: 58 Heart Rate: 56 Resp: 20 SpO2: 96 % O2 Device: None (Room air)    Weight: 318 lbs 0 oz  General Appearance: Not looking in distress  Respiratory: Lungs are clear to auscultation with decreased breath sounds in the Lung bases   Cardiovascular: S1 S2 RRR  GI: Abdomen is soft wthout tenderness    Other: CNS Patient looks Alert and Oriented to person has this vacant stare without talking much       Data   Recent Labs   Lab 06/17/20  0535 06/13/20  0603 06/12/20  0737   *  --   --    NA  --  141 140   POTASSIUM  --  3.6 3.6   CHLORIDE  --  110* 108   CO2  --  28 29   BUN  --  17 15    CR  --  0.53 0.52   ANIONGAP  --  4 4   SANDRA  --  8.3* 8.3*   GLC  --  95 98   ALBUMIN 2.5* 2.3* 2.4*   PROTTOTAL 5.7* 5.2* 5.2*   BILITOTAL 0.7 0.5 0.5   ALKPHOS 118 69 56   * 97* 91*   AST 82* 91* 74*     No results found for this or any previous visit (from the past 24 hour(s)).

## 2020-06-18 NOTE — PROGRESS NOTES
"Social Work Services Progress Note    Hospital Day: 13  Date of Initial Social Work Evaluation:  6/15/2020  Collaborated with:  TCUs, pt's guardian (Cynthia), management, primary team and chart review     Data:  SW following for TCU placement. SW spoke with pt's guardian re: placement update and hospitalization. SW reported that pt has been declined from [place] [facility] facilities due to needs. Guardian voiced understanding and indicated that is understandable considering pt's needs. SW reported that team continues to develop nutrition/NG plan. SW voiced that  facilities are reviewing, however if pt is not accepted to TCU, pt might continue her hospitalization for rehab purposes until able to return to . Guardian voiced understanding. She reported that pt might be scared of falling due to falling in the basement of her . She also reported that pt might need more \"pushing\" to walk as historically she likes hospitalization. Guardian indicated that if pt were given the choice, she would probably lay in bed, rather than working. SW indicated she will share information with therapies who will be working with her.     Additional referrals made to   - [facility]   - [facility] [address]     Discontinued referrals   [place] facilities ([phone] f: [phone]) -  [facility]  [place] [facility] (ph: [phone] f:[phone]) - [facility]    TCU - Denied. Indicated pt would be better served at a community TCU.      Intervention:  Discharge planning/coordination of care     Assessment:  Pt remains hospitalized     Plan:    Anticipated Disposition:  TBD     Barriers to d/c plan:  Stability/Accepting facility     Follow Up:  SW to follow up as needed    ABBY Padilla, [facility]  Acute Care Float   [facility]  Pager: [phone]      "

## 2020-06-18 NOTE — PLAN OF CARE
"PT: PT in Pt room with rehab tech [place]. Pt with angry look on her faced stating \" \"I'm not getting up today\"  time spend encouraging pt but Pt just glares at writer, \"no\".   PT session Cx today, pt not participating.   "

## 2020-06-18 NOTE — PLAN OF CARE
"Assumed cares 1500 to 2300.     /58 (BP Location: Right arm)   Pulse 56   Temp 98.8  F (37.1  C) (Oral)   Resp 16   Ht 1.753 m (5' 9\")   Wt 144.2 kg (318 lb)   SpO2 98%   BMI 46.96 kg/m       Pain: Denies.  Neuro: Disoriented to time. Pt has flat affect and is hypoactive/withdrawn/quiet; though occasionally smiled this shift and became @ times more talkative.  Respiratory: Lung sounds clear and equal on RA.   Cardiac/Neurovascular: HR decreased and pulses WDL.  GI/: Bowel sounds active. Incontinent of urine, purewick in place, good UOP.  Nutrition: DD2 diet, good intake, fed self. TFs via NG @ 75/hr.   Activity: Turned q2h in bed. Up to chair with therapy via lift.  Skin: No concerns.  Lines: 2 PIVs in LUE, one infusing and one saline locked. D5 with 1/2 NS infusing @ 10/hr.      Plan: Cont to monitor.     Anni Concepcion RN on 6/17/2020 at 9:06 PM  "

## 2020-06-18 NOTE — PLAN OF CARE
"[id number]    /61 (BP Location: Right arm)   Pulse 54   Temp 97.8  F (36.6  C) (Oral)   Resp 16   Ht 1.753 m (5' 9\")   Wt 144.2 kg (318 lb)   SpO2 97%   BMI 46.96 kg/m      Reason for admission: Altered mental status  Activity: Assist x2, mechanical lift  Pain: Denies  Neuro: Alert to self only. Flat, hypoactive affect. Developmentally delayed at baseline. Smiling this morning.    Cardiac: Bradycardia in 50s  Respiratory: Non labored breathing on RA. O2 sats WDL.  GI/: Voiding adequate UOP via purewick catheter. Purewick and can changed this morning at 0600. Abdomen soft, nt, nd, +BS. No BM this shift. TF at goal of 75cc/h.   Diet: DD2, thins, sada counts.   Lines: PIVx2 intact, sites WDL, SLx1, D5 1/2 NS at 10cc/h x1.   Wounds: Blanchable redness on bottom KEVIN.   Labs/imaging: Reviewed. See chart.       Continue to monitor and follow POC    "

## 2020-06-18 NOTE — PLAN OF CARE
"/53 (BP Location: Right arm)   Pulse 58   Temp 98.5  F (36.9  C) (Oral)   Resp 20   Ht 1.753 m (5' 9\")   Wt 144.2 kg (318 lb)   SpO2 96%   BMI 46.96 kg/m      Care from: [id number]      VS & Pain: VSS ex bradycardic, on room air, denied pain    Neuro: Alert but disoriented to place, time, and situation, pt has been sad and tearful this shift-writer comforted pt and asked what made pt so sad but pt won't tell writer the reason, pt told writer she wanted to be left alone    Respiratory: coarse lung sounds noted    Cardiac: bradycardic    Peripheral neurovascular: WDL    GI/: adequate urine output, no BM this shift    Nutrition: good appetite and oral intake for breakfast, refused lunch, denied nausea    Skin: PIV, bruised, and scab    Musculoskeletal: significantly impaired    Lines: L PIV is saline locked    Activity: Assist of 2, mechanical lift    Events this shift: Provided perineal care and changed Purewick. Changed TF tubing. Repositioned q2h. Call light within reach and bed alarm on.    Plan: Continue to monitor and follow poc. Waiting for placement.  "

## 2020-06-18 NOTE — PLAN OF CARE
Discharge Planner OT   Patient plan for discharge: not stated  Current status: Pt supine upon arrival, needed max encouragement for participation in therapy session, pt very emotional throughout session, crying but unable to verbalize reasoning why. Th provided extensive encouragement and education on importance of OOB activity to promote functional strength to eventually return home. Pt unable to demo understanding. Pt firmly declined transfer into chair but was agreeable to repositioning in bed and rolling to get cleaned up. Pt rolled from side to side 6x with maxA x2 and VCs for hand placement, maxA for repositioning and pericares. Pt did smile one time during session, otherwise visibly upset throughout, likely behavioral.   Barriers to return to prior living situation: medical status, behaviors, balance and coordination deficits, general weakness   Recommendations for discharge: TCU vs home with assist from Group Home staff   Rationale for recommendations: Pt is below baseline level of function and has been very difficult to motivate to participate with therapies, may be beneficial to have member of group home staff who is familiar to pt come in to increase pts participation.        Entered by: Court Michele 06/18/2020 2:43 PM

## 2020-06-19 ENCOUNTER — APPOINTMENT (OUTPATIENT)
Dept: PHYSICAL THERAPY | Facility: CLINIC | Age: 35
DRG: 887 | End: 2020-06-19
Payer: MEDICARE

## 2020-06-19 ENCOUNTER — APPOINTMENT (OUTPATIENT)
Dept: OCCUPATIONAL THERAPY | Facility: CLINIC | Age: 35
DRG: 887 | End: 2020-06-19
Payer: MEDICARE

## 2020-06-19 PROCEDURE — 25000128 H RX IP 250 OP 636: Performed by: PHYSICIAN ASSISTANT

## 2020-06-19 PROCEDURE — 25000132 ZZH RX MED GY IP 250 OP 250 PS 637: Performed by: HOSPITALIST

## 2020-06-19 PROCEDURE — 25000132 ZZH RX MED GY IP 250 OP 250 PS 637: Performed by: PHYSICIAN ASSISTANT

## 2020-06-19 PROCEDURE — 25000132 ZZH RX MED GY IP 250 OP 250 PS 637: Performed by: STUDENT IN AN ORGANIZED HEALTH CARE EDUCATION/TRAINING PROGRAM

## 2020-06-19 PROCEDURE — 97110 THERAPEUTIC EXERCISES: CPT | Mod: GO

## 2020-06-19 PROCEDURE — 99231 SBSQ HOSP IP/OBS SF/LOW 25: CPT | Performed by: INTERNAL MEDICINE

## 2020-06-19 PROCEDURE — 12000001 ZZH R&B MED SURG/OB UMMC

## 2020-06-19 PROCEDURE — 97530 THERAPEUTIC ACTIVITIES: CPT | Mod: GO

## 2020-06-19 PROCEDURE — 97530 THERAPEUTIC ACTIVITIES: CPT | Mod: GP

## 2020-06-19 RX ADMIN — CLONIDINE HYDROCHLORIDE 0.1 MG: 0.1 TABLET ORAL at 20:03

## 2020-06-19 RX ADMIN — CALCIUM POLYCARBOPHIL 1250 MG: 625 TABLET, FILM COATED ORAL at 23:02

## 2020-06-19 RX ADMIN — CLONIDINE HYDROCHLORIDE 0.1 MG: 0.1 TABLET ORAL at 08:40

## 2020-06-19 RX ADMIN — ASPIRIN 81 MG CHEWABLE TABLET 324 MG: 81 TABLET CHEWABLE at 08:37

## 2020-06-19 RX ADMIN — FAMOTIDINE 20 MG: 20 TABLET ORAL at 08:37

## 2020-06-19 RX ADMIN — LEVOTHYROXINE SODIUM 50 MCG: 50 TABLET ORAL at 06:52

## 2020-06-19 RX ADMIN — ESCITALOPRAM OXALATE 20 MG: 10 TABLET ORAL at 08:38

## 2020-06-19 RX ADMIN — MELATONIN TAB 3 MG 3 MG: 3 TAB at 23:02

## 2020-06-19 RX ADMIN — ENOXAPARIN SODIUM 40 MG: 40 INJECTION SUBCUTANEOUS at 08:40

## 2020-06-19 RX ADMIN — MULTIVIT AND MINERALS-FERROUS GLUCONATE 9 MG IRON/15 ML ORAL LIQUID 15 ML: at 10:17

## 2020-06-19 RX ADMIN — ENOXAPARIN SODIUM 40 MG: 40 INJECTION SUBCUTANEOUS at 20:03

## 2020-06-19 RX ADMIN — CALCIUM POLYCARBOPHIL 1250 MG: 625 TABLET, FILM COATED ORAL at 10:17

## 2020-06-19 ASSESSMENT — ACTIVITIES OF DAILY LIVING (ADL)
ADLS_ACUITY_SCORE: 31

## 2020-06-19 ASSESSMENT — MIFFLIN-ST. JEOR: SCORE: 2488.05

## 2020-06-19 NOTE — PROGRESS NOTES
[facility], [facility] Progress Note - Hospitalist Service, Gold 8       Date of Admission:  6/5/2020  Assessment & Plan     Plan for today  Awaiting Placement in her Group home more alert today        Ms. Dionne Perez is a 36 yo female with hx of developmental delay, depression, anxiety, likely psychosis, GERD, and hypothyrodism, admitted 6/5/2020 to [facility] for further eval and management of Acute Mental Status change. She was found to have Cerebellar Infarct in the MRI scan done 6/6/2020. LP done 6/9/2020 without any evidence of infection. Neurology could not come up with any cause of AMS and Ceftriaxone. Vancomycin and Acyclovir were discontinued and  Psychiatry thought it  could be adjustment disorder. Patient was thought to have not been opening the mouth Voluntarily.  NG inserted 6/7/2020 for medication  administration and start enteral nutrition which she removed 6/7 evening and is now replaced with bridal. and TFs to goal of 75 ml/hr. On 6/16/2020 was evaluated by Speech therapy and to Continue dysphagia diet level 2 with thin liquids as tolerated with supervision/assist      1. AMS: Likely a type of adjustment disorder in the setting of acute stress and her developmental delay. Patient remains minimally responsive and not at her baseline. She seems like she is improving from her admission. Baseline is she responds in few words, but is independent with her ADLs. Her nuerologic workup has been largely unrevealing without evidence of seizures on EEG, MRI demonstrated new cerebellar infarct. MRI without any clear etiology which does not explain her change in mentation. LP without any evidence of infection.   - Neurology has no additional recommendations for additional workup  - Appreciate Psychiatry input - as is improving seems like could be adjustment disorder  - DC'ed IV acyclovir, ceftriaxone, and vanc  - uptitrate PTA Lexapro up to 20mg PTA dose     2. Concern for  dysphagia  Has had no PO intake since admission due to her mouth rigidity and not opening her mouth volitionally. Continues, therefore to be NPO. NG inserted 6/7 for med administration and to start enteral nutrition. Patient removed NG 6/7 evening, replaced with bridal.   -  TFs to goal of 75 ml/hr  - speech therapy consult and recommendations appreciated   - Nutrition consulted and appreciate following        3. Incidental finding of R cerebellar infarct of uncertain significance  MRI head for AMS workup revealed finding of R cerebellar infarct. Pt started on aspirin, obtained CTA head and neck that was non-diagnostic due to it being severely limited due to poor contrast opacification. TTE with bubble study negative.   - Neurology consulted   - Continue  mg daily, if unable to take orally, can be given rectally as 300 mg   - Hold statin in the setting of elevated LFTs - trending down  - Repeat CTA head and neck without evidence of vascular stenosis  - Continue tele monitoring      4.  Mildly elevated LFTs: likely from Hepatic steatosis   Unclear etiology as LFTs from OSH as recently as 5/26 normal. AST and ALT increasing today. GGT elevated to 66. Lipase 876 (< 3X the upper limit of normal). CT abdomen 6/6 with no acute findings. RUQ US with mild hepatomegaly and diffuse hepatic steatosis, CBD 5.5 mm, no hepatic biliary ductal dilation. Tbili wnl. Reported to have abdominal pain on admission, no further complaints of pain. Appears to be tolerating TF. Possible pancreatitis on admission vs LFT elevation 2/2 hepatic steatosis and CK elevation.   - Continue to monitor      # Chronic pain?: Pt noted to be on scheduled Tylenol PTA, as well as have available as needed diclofenac gel. Continues to occasionally moan, but no obvious area of pain.  - Discontinue PTA scheduled Tylenol given transaminitis     # Hypothyroidism: PTA on levothyroxine 50 mcg daily. TFTs on admission reveal pt euthyroid based on free T4  WNL.  - Continue PTA levothyroxine     # GERD: Continue PTA Pepcid 20 mg daily       Diet: Adult Formula Drip Feeding: Continuous Peptamen Intense VHP; Nasogastric tube; Goal Rate: 75; mL/hr; Medication - Feeding Tube Flush Frequency: At least 15-30 mL water before and after medication administration and with tube clogging; Amount to Se...  Dysphagia Diet Level 2 Mech Altered Thin Liquids (water, ice chips, juice, milk gelatin, ice cream, etc)  Calorie Counts    DVT Prophylaxis: Enoxaparin (Lovenox) SQ  Davila Catheter: not present  Code Status: Full Code           Disposition Plan   Expected discharge: when ever a bed is available , recommended to prior living arrangement once bed is available .  Entered: Dick Balbuena MD 06/19/2020, 4:49 PM       The patient's care was discussed with the Patient.    Dick Balbuena MD  Hospitalist Service, [address]  Pager: [phone]  Please see sticky note for cross cover information  ______________________________________________________________________    Interval History   No new symptoms today - patient more interactive today     Data reviewed today: I reviewed all medications, new labs and imaging results over the last 24 hours. I personally reviewed no images or EKG's today.    Physical Exam   Vital Signs: Temp: 97.2  F (36.2  C) Temp src: Oral BP: 97/72 Pulse: 52 Heart Rate: 52 Resp: 18 SpO2: 98 % O2 Device: None (Room air)    Weight: 318 lbs 0 oz  General Appearance: Not looking in distress  Respiratory: Lungs are clear to auscultation with decreased breath sounds in the Lung bases   Cardiovascular: S1 S2 RRR  GI: Abdomen is soft wthout tenderness    Other: CNS Patient looks Alert and Oriented to person        Data   Recent Labs   Lab 06/17/20  0535 06/13/20  0603   *  --    NA  --  141   POTASSIUM  --  3.6   CHLORIDE  --  110*   CO2  --  28   BUN  --  17   CR  --  0.53   ANIONGAP  --  4   SANDRA  --  8.3*   GLC  --  95    ALBUMIN 2.5* 2.3*   PROTTOTAL 5.7* 5.2*   BILITOTAL 0.7 0.5   ALKPHOS 118 69   * 97*   AST 82* 91*     No results found for this or any previous visit (from the past 24 hour(s)).

## 2020-06-19 NOTE — PLAN OF CARE
"Assumed cares 1500 to 2300.     BP 95/51 (BP Location: Right arm)   Pulse 51   Temp 96.9  F (36.1  C) (Oral)   Resp 18   Ht 1.753 m (5' 9\")   Wt 144.2 kg (318 lb)   SpO2 96%   BMI 46.96 kg/m       Pain: Denies.  Neuro: Disoriented to time. Pt has flat affect and is hypoactive/withdrawn/quiet; though occasionally smiled this shift and became @ times more talkative. Slow to respond.  Respiratory: Lung sounds clear and equal on RA.  Cardiac/Neurovascular: HR decreased and pulses WDL.  GI/: Bowel sounds active. No BM for many days (per chart review), admin PRN fleet enema, post enema pt had large incontinent BM. Incontinent of urine, purewick in place, good UOP.  Nutrition: DD2 diet, good intake, fed self. TFs via NG @ 75/hr.   Activity: Turned q2h in bed.   Skin: No concerns.  Lines: 2 PIVs in LUE, both saline locked. D5 with 1/2 NS discontinued per provider.   Events: Held scheduled clonidine d/t soft BP. Per provider, vitals changed from q4h to q8h.    Plan: Cont to monitor.     Anni Concepcion RN on 6/18/2020 at 9:24 PM    "

## 2020-06-19 NOTE — PLAN OF CARE
"Assumed cares [id number]. Pt was checked hourly on shift    BP 98/54 (BP Location: Right arm)   Pulse 58   Temp 98.8  F (37.1  C) (Oral)   Resp 18   Ht 1.753 m (5' 9\")   Wt 144.2 kg (318 lb)   SpO2 95%   BMI 46.96 kg/m       Pt A&O x3, disorientated to time. Hypoactive/withdrawn/quiet. Soft BP & bradycardiac, other VSS on RA. Pt denies SOB and chest pain. Denies pain. NG in-place infusing TF @ 75mL, DD2 diet. 2-PIV flushed, SL. Incontinent to stool & urine; purewick in-place, changed, good UOP; smear BMx1. Coccyx/saccrum redness blanchable, intact skin. Repositioned q2h. Pt appeared to be sleeping between cares.     Plan of Care: Continuing monitoring patient and notify MD of any changes.    Jannie Trotter RN on 6/19/2020 at 7:12 AM      "

## 2020-06-19 NOTE — PLAN OF CARE
SLP: Pt refused PO trials. She reported diet tolerance but did not want anything at time of tx attempt.    Continue current recommendations - Recommend continue dysphagia diet level 2 with thin liquids as tolerated with supervision/assist. Pt to sit upright for all PO intake and should take small bites/sips.    Reduce frequency to 3x/week d/t limited participation and slow progression in dysphagia tx.

## 2020-06-19 NOTE — PROGRESS NOTES
"Calorie Count    Intake recorded for: 6/18/2020  Total Kcals: 287 Total Protein: 17g    Kcals from Hospital Food: 287   Protein: 17g    Kcals from Outside Food (average):0 Protein: 0g    # Meals Recorded: 2 meals ordered, 1 recorded (100% meatloaf, mashed potatoes w/ gravy)    # Supplements Recorded: no intake recorded.     Epic Food Record also shows \"75% intake\" @ 1000. Meal tray ticket not saved, and no note in food record stating what was eaten. Not enough information to accurately calculate nutritionals. Meal try included scrambled eggs, oatmeal w/ 1% milk & brown sugar, blueberry muffin w/ butter. 75% of all items would yield 500 kcal & 23 grams protein.          "

## 2020-06-19 NOTE — PROGRESS NOTES
CLINICAL NUTRITION SERVICES - REASSESSMENT NOTE     Nutrition Prescription    RECOMMENDATIONS FOR MDs/PROVIDERS TO ORDER:  Given patient with improvement in mentation and eating some food, decreased TF provision to new goal of 45 ml/hr.   --Calorie count re-ordered: If results of calorie count collection shows ave intake meeting ~ 5815-3560 kcals and ~50-70 g PRO per day, would recommend to discontinue EN support.     Malnutrition Status:    Unable to determine due to unable to perform all aspects of NFPE    Recommendations already ordered by Registered Dietitian (RD):  1. Given some improvement in oral intake, decreased TF provision to new rate @ 45 ml/hr to supplement PO.     Access; NG tube  Dosing wt: 83 kg adjusted wt from dry admit wt    EN: Decreased TF to Peptamen VHP to new goal @ 45 ml/hr,  --Peptamen VHP @ goal 45 ml/hr (1080 ml/day) to provide 1080 kcals (13 kcal/kg/day + pending PO), 100 gm PRO (1.2 g/kg/day), 907 ml free H2O, 84 gm CHO and 4 g Fiber daily.    2. Calorie count re-ordered x 3 days     Future/Additional Recommendations:  Further changes to TF support is pending calorie count data       EVALUATION OF THE PROGRESS TOWARD GOALS   Diet: Patient was evaluated by Speech therapy on 6/16 and to Continue dysphagia diet level 2 with thin liquids as tolerated.     Nutrition Support:   --Patient was thought to have not been opening the mouth Voluntarily. NG tube placed 6/7 with bridal for EN support.  -Peptamen VHP @ 75 ml/hr  -Provision: 1800 ml/day, 1800 kcal/day (22 kcal/kg), 165g protein (2 g/kg), 137 gm CHO, 1512 ml free H2O, 7 gm fiber daily.       Intake:  PO: Tolerating PO. Ate 75% of her dinner tonight.    TF: Average 7 day TF intake provided: 1640 ml ( met 91% estimated goal volume).  --Provided patient with 1640 kcal/day (20 kcal /kg) and 150 gm protein (1.8 gm/kg).     Calorie count:   6/19: Consumed 0% bkf, 75% dinner (475 kcal, 22 gm protein)  6/18: Consumed 75% bkf and 100% dinner (~  687 kcal and 35 gm protein)  6/17: Consumed 0% bkf and 75% dinner (meal intake note recorded)     NEW FINDINGS   GI: Stool 1-2 daily intermittent      WT trend: Admit wt 134 kg (6/6)-> up to 172.4 kg (6/19)- Pt is edematous per RN. Patient with net positive accumulative fluid balance of + ~ 22.4 liters since admit.       Neuro: History of developmental delay. Presented with acute Mental Status change. She was found to have Cerebellar Infarct in the MRI scan done 6/6/2020, Also adjustment disorder. Improving slowly.     Labs noted, meds reviewed.     MALNUTRITION  % Intake: TF is meeting needs  % Weight Loss: Unable to assess due to volume up status   Subcutaneous Fat Loss: Unable to assess  Muscle Loss: Unable to assess  Fluid Accumulation/Edema: Moderate  Malnutrition Diagnosis: Unable to determine due to unable to perform all aspects of NFPE    Previous Goals   Total avg nutritional intake to meet a minimum of 20 kcal/kg and 1.5 gm PRO/kg daily (per dosing wt 83 kg adjusted wt ).  Evaluation: Met    Previous Nutrition Diagnosis  Inadequate enteral nutrition infusion related to daily interruption to TF advancement toward goal as evidenced by Average 4 day EN provided 42% of the estimated goal volume    Evaluation: Improving    CURRENT NUTRITION DIAGNOSIS  Inadequate oral intake related to slow improve in altered mentation as evidenced by continues to rely on NG tube feed support to supplement oral intake     INTERVENTIONS  Implementation  Enteral Nutrition - decreased to new goal of 45 ml/hr  Feeding tube flush - decreased to 30 ml every 4 hours     Goals  Total avg nutritional intake to meet a minimum of 20 kcal/kg and 1.5 gm PRO/kg daily (per dosing wt 83 kg adjusted wt ).    Monitoring/Evaluation  Progress toward goals will be monitored and evaluated per protocol.    Trevor Hall RD/CECE  Pager [phone]

## 2020-06-19 NOTE — PLAN OF CARE
Pt A&O x3. Disoriented to time. Pt has periods of laughing today with staff. Able to answer simple questions with enough time to answer. Denies pain or nausea. TF running through NG at 75 mL/hr. Tolerating DD2 diet. 2 PIV SL. Purewick patent with good output. No BM. Coccyx with blanchable redness. Ate 75% of supper. Repositioned for q2h. Will continue to monitor.

## 2020-06-19 NOTE — PLAN OF CARE
Discharge Planner OT   Patient plan for discharge: not stated   Current status: MaxA for rolling in bed, dependent in sling transfer from bed to recliner chair. Pt completed core strengthening exercises for seated balance while in chair and maxA x2 for sit<>stand transfer, able to achieve about 75% of standing posture.   Barriers to return to prior living situation: deconditioning, fatigue, level of assist, balance deficits   Recommendations for discharge: TCU   Rationale for recommendations: Pt is below baseline level of function and has been very difficult to motivate to participate with therapies, may be beneficial to have member of group home staff who is familiar to pt come in to increase pts participation.        Entered by: Court Michele 06/19/2020 10:08 AM

## 2020-06-19 NOTE — PLAN OF CARE
PT - Lift for transfers  Discharge Planner PT   Patient plan for discharge: none stated  Current status: Pt mod A for rolling in bed for placement of sling. Pt dependently transferred bed>recliner. Pt performs 2x sit<>stand with max Ax2 with FWW, tolerates standing only ~20 seconds each trial. Pt declining any further activity, reporting dizziness (/65 sitting, 126/63 after standing, 114/73 return to supine)  Barriers to return to prior living situation: medical status, level of assist, falls risk, below baseline  Recommendations for discharge: TCU  Rationale for recommendations: Pt will benefit from continued skilled PT in order to progress strength, activity tolerance, and performance of functional mobility to return towards PLOF  Entered by: Gwen Murillo 06/19/2020 4:09 PM

## 2020-06-20 PROCEDURE — 25000132 ZZH RX MED GY IP 250 OP 250 PS 637: Performed by: STUDENT IN AN ORGANIZED HEALTH CARE EDUCATION/TRAINING PROGRAM

## 2020-06-20 PROCEDURE — 25000132 ZZH RX MED GY IP 250 OP 250 PS 637: Performed by: PHYSICIAN ASSISTANT

## 2020-06-20 PROCEDURE — 25000132 ZZH RX MED GY IP 250 OP 250 PS 637: Performed by: HOSPITALIST

## 2020-06-20 PROCEDURE — 25000128 H RX IP 250 OP 636: Performed by: PHYSICIAN ASSISTANT

## 2020-06-20 PROCEDURE — 27210437 ZZH NUTRITION PRODUCT SEMIELEM INTERMED LITER

## 2020-06-20 PROCEDURE — 12000001 ZZH R&B MED SURG/OB UMMC

## 2020-06-20 PROCEDURE — 99232 SBSQ HOSP IP/OBS MODERATE 35: CPT | Performed by: INTERNAL MEDICINE

## 2020-06-20 RX ADMIN — ENOXAPARIN SODIUM 40 MG: 40 INJECTION SUBCUTANEOUS at 20:45

## 2020-06-20 RX ADMIN — MULTIVIT AND MINERALS-FERROUS GLUCONATE 9 MG IRON/15 ML ORAL LIQUID 15 ML: at 10:04

## 2020-06-20 RX ADMIN — ASPIRIN 81 MG CHEWABLE TABLET 324 MG: 81 TABLET CHEWABLE at 10:05

## 2020-06-20 RX ADMIN — POLYETHYLENE GLYCOL 3350 17 G: 17 POWDER, FOR SOLUTION ORAL at 10:04

## 2020-06-20 RX ADMIN — LEVOTHYROXINE SODIUM 50 MCG: 50 TABLET ORAL at 05:36

## 2020-06-20 RX ADMIN — ENOXAPARIN SODIUM 40 MG: 40 INJECTION SUBCUTANEOUS at 10:04

## 2020-06-20 RX ADMIN — ESCITALOPRAM OXALATE 20 MG: 10 TABLET ORAL at 10:04

## 2020-06-20 RX ADMIN — CALCIUM POLYCARBOPHIL 1250 MG: 625 TABLET, FILM COATED ORAL at 10:05

## 2020-06-20 RX ADMIN — MELATONIN TAB 3 MG 3 MG: 3 TAB at 22:39

## 2020-06-20 RX ADMIN — CLONIDINE HYDROCHLORIDE 0.1 MG: 0.1 TABLET ORAL at 10:05

## 2020-06-20 RX ADMIN — FAMOTIDINE 20 MG: 20 TABLET ORAL at 10:05

## 2020-06-20 RX ADMIN — CALCIUM POLYCARBOPHIL 1250 MG: 625 TABLET, FILM COATED ORAL at 20:44

## 2020-06-20 ASSESSMENT — ACTIVITIES OF DAILY LIVING (ADL)
ADLS_ACUITY_SCORE: 29
ADLS_ACUITY_SCORE: 31
ADLS_ACUITY_SCORE: 31
ADLS_ACUITY_SCORE: 29

## 2020-06-20 ASSESSMENT — MIFFLIN-ST. JEOR: SCORE: 2488.05

## 2020-06-20 NOTE — PLAN OF CARE
Pt A&O x3 disoriented to time. TF turned down to 45 mL/hr to promote appetite. Denied breakfast. Ordered supper. Denies pain. PIV SL. No BM. Given scheduled lakeisha lax.  Will continue to monitor.

## 2020-06-20 NOTE — PROGRESS NOTES
ASSUMED CARE: [id number]  NEURO: Alert and oriented x3, responds slowly when responding to questions  ACTIVITY: Assist of 2  PAIN: Denies  RESPIRATORY: RA, shallow breathing after repositioning  GI/: Incontinent, purwick in place.   SKIN: Pt is edematous and has bruised and dry skin.   DIET: Dysphagia diet 2   CHANGES IN SHIFT: Repositioned and turned pt Q2H, performed perineal care. Tube feeding running at 75ml/hr via NG.   POC: Continue to monitor and update MD with any changes.     Yajaira Webster RN on 6/20/2020 at 6:38 AM

## 2020-06-20 NOTE — PROGRESS NOTES
[facility], [facility] Progress Note - Hospitalist Service, Gold 8       Date of Admission:  6/5/2020  Assessment & Plan     Plan for today  Awaiting Placement in her Group home   Check CMP in am and can start Statins if trending down   Monitor Blood pressure and if MAP is <60 Bolus with IV Fluids        Ms. Dionne Perez is a 36 yo female with hx of developmental delay, depression, anxiety, likely psychosis, GERD, and hypothyrodism, admitted 6/5/2020 to [facility] for further eval and management of Acute Mental Status change. She was found to have Cerebellar Infarct in the MRI scan done 6/6/2020. LP done 6/9/2020 without any evidence of infection. Neurology could not come up with any cause of AMS and Ceftriaxone. Vancomycin and Acyclovir were discontinued and  Psychiatry thought it  could be adjustment disorder. Patient was thought to have not been opening the mouth Voluntarily.  NG inserted 6/7/2020 for medication  administration and start enteral nutrition which she removed 6/7 evening and is now replaced with bridal. and TFs to goal of 75 ml/hr. On 6/16/2020 was evaluated by Speech therapy and to Continue dysphagia diet level 2 with thin liquids as tolerated with supervision/assist     Plan for today  Awaiting discharge Placement      1. AMS: Likely a type of adjustment disorder in the setting of acute stress and her developmental delay. Patient remains minimally responsive and not at her baseline. She seems like she is improving from her admission. Baseline is she responds in few words, but is independent with her ADLs. Her nuerologic workup has been largely unrevealing without evidence of seizures on EEG, MRI demonstrated new cerebellar infarct. MRI without any clear etiology which does not explain her change in mentation. LP without any evidence of infection.   - Neurology has no additional recommendations for additional workup  - Appreciate Psychiatry input - as is improving  seems like could be adjustment disorder  - DC'ed IV acyclovir, ceftriaxone, and vanc  - uptitrate PTA Lexapro up to 20mg PTA dose     2. Concern for dysphagia  Has had no PO intake since admission due to her mouth rigidity and not opening her mouth volitionally. Continues, therefore to be NPO. NG inserted 6/7 for med administration and to start enteral nutrition. Patient removed NG 6/7 evening, replaced with bridal.   -  TFs to goal of 75 ml/hr  - speech therapy consult and recommendations appreciated   - Nutrition consulted and appreciate following        3. Incidental finding of R cerebellar infarct of uncertain significance  MRI head for AMS workup revealed finding of R cerebellar infarct. Pt started on aspirin, obtained CTA head and neck that was non-diagnostic due to it being severely limited due to poor contrast opacification. TTE with bubble study negative.   - Neurology consulted   - Continue  mg daily, if unable to take orally, can be given rectally as 300 mg   - Hold statin in the setting of elevated LFTs - trending down - check CMP in am and restart Statins if trending down   - Repeat CTA head and neck without evidence of vascular stenosis  - Continue tele monitoring      4.  Mildly elevated LFTs: likely from Hepatic steatosis   Unclear etiology as LFTs from OSH as recently as 5/26 normal. AST and ALT increasing today. GGT elevated to 66. Lipase 876 (< 3X the upper limit of normal). CT abdomen 6/6 with no acute findings. RUQ US with mild hepatomegaly and diffuse hepatic steatosis, CBD 5.5 mm, no hepatic biliary ductal dilation. Tbili wnl. Reported to have abdominal pain on admission, no further complaints of pain. Appears to be tolerating TF. Possible pancreatitis on admission vs LFT elevation 2/2 hepatic steatosis and CK elevation.   - Continue to monitor      # Chronic pain?: Pt noted to be on scheduled Tylenol PTA, as well as have available as needed diclofenac gel. Continues to occasionally  diane, but no obvious area of pain.  - Discontinue PTA scheduled Tylenol given transaminitis     # Hypothyroidism: PTA on levothyroxine 50 mcg daily. TFTs on admission reveal pt euthyroid based on free T4 WNL.  - Continue PTA levothyroxine     # GERD: Continue PTA Pepcid 20 mg daily       Diet: Dysphagia Diet Level 2 [facility] Altered Thin Liquids (water, ice chips, juice, milk gelatin, ice cream, etc)  Calorie Counts  Adult Formula Drip Feeding: Continuous Peptamen Intense VHP; Nasogastric tube; Goal Rate: 45; mL/hr; Medication - Feeding Tube Flush Frequency: At least 15-30 mL water before and after medication administration and with tube clogging; Amount to Se...    DVT Prophylaxis: Enoxaparin (Lovenox) SQ  Davila Catheter: not present  Code Status: Full Code           Disposition Plan   Expected discharge: when ever a bed is available , recommended to prior living arrangement once bed is available .  Entered: Dick Balbuena MD 06/20/2020, 5:34 PM       The patient's care was discussed with the Patient.    Dick Balbuena MD  Hospitalist Service, [address]  Pager: [phone]  Please see sticky note for cross cover information  ______________________________________________________________________    Interval History   More interactive today     Data reviewed today: I reviewed all medications, new labs and imaging results over the last 24 hours. I personally reviewed no images or EKG's today.    Physical Exam   Vital Signs: Temp: 97.5  F (36.4  C) Temp src: Oral BP: (!) 89/55 Pulse: 50 Heart Rate: 50 Resp: 16 SpO2: 98 % O2 Device: None (Room air)    Weight: 380 lbs 0 oz  General Appearance: Not looking in distress  Respiratory: Lungs are clear to auscultation with decreased breath sounds in the Lung bases   Cardiovascular: S1 S2 RRR  GI: Abdomen is soft wthout tenderness    Other: CNS Patient looks Alert and Oriented to person        Data   Recent Labs   Lab 06/17/20  2649    *   ALBUMIN 2.5*   PROTTOTAL 5.7*   BILITOTAL 0.7   ALKPHOS 118   *   AST 82*     No results found for this or any previous visit (from the past 24 hour(s)).

## 2020-06-21 LAB
ALBUMIN SERPL-MCNC: 2.5 G/DL (ref 3.4–5)
ALP SERPL-CCNC: 192 U/L (ref 40–150)
ALT SERPL W P-5'-P-CCNC: 148 U/L (ref 0–50)
ANION GAP SERPL CALCULATED.3IONS-SCNC: 7 MMOL/L (ref 3–14)
AST SERPL W P-5'-P-CCNC: 95 U/L (ref 0–45)
BILIRUB SERPL-MCNC: 0.9 MG/DL (ref 0.2–1.3)
BUN SERPL-MCNC: 26 MG/DL (ref 7–30)
CALCIUM SERPL-MCNC: 8.5 MG/DL (ref 8.5–10.1)
CHLORIDE SERPL-SCNC: 106 MMOL/L (ref 94–109)
CO2 SERPL-SCNC: 26 MMOL/L (ref 20–32)
CREAT SERPL-MCNC: 0.57 MG/DL (ref 0.52–1.04)
GFR SERPL CREATININE-BSD FRML MDRD: >90 ML/MIN/{1.73_M2}
GLUCOSE SERPL-MCNC: 91 MG/DL (ref 70–99)
POTASSIUM SERPL-SCNC: 4.3 MMOL/L (ref 3.4–5.3)
PROT SERPL-MCNC: 6.1 G/DL (ref 6.8–8.8)
SODIUM SERPL-SCNC: 139 MMOL/L (ref 133–144)

## 2020-06-21 PROCEDURE — 25000132 ZZH RX MED GY IP 250 OP 250 PS 637: Performed by: PHYSICIAN ASSISTANT

## 2020-06-21 PROCEDURE — 25000132 ZZH RX MED GY IP 250 OP 250 PS 637: Performed by: INTERNAL MEDICINE

## 2020-06-21 PROCEDURE — 36415 COLL VENOUS BLD VENIPUNCTURE: CPT | Performed by: INTERNAL MEDICINE

## 2020-06-21 PROCEDURE — 99232 SBSQ HOSP IP/OBS MODERATE 35: CPT | Performed by: INTERNAL MEDICINE

## 2020-06-21 PROCEDURE — 25000128 H RX IP 250 OP 636: Performed by: PHYSICIAN ASSISTANT

## 2020-06-21 PROCEDURE — 80053 COMPREHEN METABOLIC PANEL: CPT | Performed by: INTERNAL MEDICINE

## 2020-06-21 PROCEDURE — 25000132 ZZH RX MED GY IP 250 OP 250 PS 637: Performed by: HOSPITALIST

## 2020-06-21 PROCEDURE — 25000132 ZZH RX MED GY IP 250 OP 250 PS 637: Performed by: STUDENT IN AN ORGANIZED HEALTH CARE EDUCATION/TRAINING PROGRAM

## 2020-06-21 PROCEDURE — 12000001 ZZH R&B MED SURG/OB UMMC

## 2020-06-21 PROCEDURE — 99207 ZZC CDG-MDM COMPONENT: MEETS LOW - DOWN CODED: CPT | Performed by: INTERNAL MEDICINE

## 2020-06-21 PROCEDURE — 27210437 ZZH NUTRITION PRODUCT SEMIELEM INTERMED LITER

## 2020-06-21 RX ORDER — AMOXICILLIN 250 MG
1 CAPSULE ORAL AT BEDTIME
Status: DISCONTINUED | OUTPATIENT
Start: 2020-06-21 | End: 2020-07-10 | Stop reason: HOSPADM

## 2020-06-21 RX ORDER — BISACODYL 10 MG
10 SUPPOSITORY, RECTAL RECTAL DAILY PRN
Status: DISCONTINUED | OUTPATIENT
Start: 2020-06-21 | End: 2020-07-10 | Stop reason: HOSPADM

## 2020-06-21 RX ADMIN — ESCITALOPRAM OXALATE 20 MG: 10 TABLET ORAL at 09:31

## 2020-06-21 RX ADMIN — POLYETHYLENE GLYCOL 3350 17 G: 17 POWDER, FOR SOLUTION ORAL at 09:32

## 2020-06-21 RX ADMIN — DOCUSATE SODIUM AND SENNOSIDES 1 TABLET: 50; 8.6 TABLET ORAL at 21:50

## 2020-06-21 RX ADMIN — MELATONIN TAB 3 MG 3 MG: 3 TAB at 21:50

## 2020-06-21 RX ADMIN — CLONIDINE HYDROCHLORIDE 0.1 MG: 0.1 TABLET ORAL at 09:31

## 2020-06-21 RX ADMIN — ENOXAPARIN SODIUM 40 MG: 40 INJECTION SUBCUTANEOUS at 09:30

## 2020-06-21 RX ADMIN — LEVOTHYROXINE SODIUM 50 MCG: 50 TABLET ORAL at 05:49

## 2020-06-21 RX ADMIN — ENOXAPARIN SODIUM 40 MG: 40 INJECTION SUBCUTANEOUS at 19:43

## 2020-06-21 RX ADMIN — FAMOTIDINE 20 MG: 20 TABLET ORAL at 09:30

## 2020-06-21 RX ADMIN — MULTIVIT AND MINERALS-FERROUS GLUCONATE 9 MG IRON/15 ML ORAL LIQUID 15 ML: at 09:36

## 2020-06-21 RX ADMIN — ASPIRIN 81 MG CHEWABLE TABLET 324 MG: 81 TABLET CHEWABLE at 09:30

## 2020-06-21 RX ADMIN — CLONIDINE HYDROCHLORIDE 0.1 MG: 0.1 TABLET ORAL at 19:43

## 2020-06-21 RX ADMIN — CALCIUM POLYCARBOPHIL 1250 MG: 625 TABLET, FILM COATED ORAL at 09:36

## 2020-06-21 RX ADMIN — CALCIUM POLYCARBOPHIL 1250 MG: 625 TABLET, FILM COATED ORAL at 21:50

## 2020-06-21 ASSESSMENT — ACTIVITIES OF DAILY LIVING (ADL)
ADLS_ACUITY_SCORE: 29
ADLS_ACUITY_SCORE: 27
ADLS_ACUITY_SCORE: 29
ADLS_ACUITY_SCORE: 27
ADLS_ACUITY_SCORE: 27
ADLS_ACUITY_SCORE: 29

## 2020-06-21 ASSESSMENT — MIFFLIN-ST. JEOR: SCORE: 2483.51

## 2020-06-21 NOTE — PROGRESS NOTES
ASSUMED CARE: [id number]  NEURO: Alert and oriented x3, responds slowly when responding to questions  ACTIVITY: Assist of 2  PAIN: Denies  RESPIRATORY: Pt on RA  GI/: Incontinent, purwick in place.   SKIN: Pt is edematous and has bruised and dry skin.   DIET: Dysphagia diet 2     CHANGES IN SHIFT: Pt refused to be repositioned and turned, as well as having blood pressure taken. Tube feeding running at 45ml/hr via NG. Pt slept throughout the night.   POC: Continue to monitor and update MD with any changes.     Yajaira Webster RN on 6/21/2020 at 7:39 AM

## 2020-06-21 NOTE — PLAN OF CARE
Pt A&O x3. Disoriented to time. Seemed down in mood today. When asked pt stated that she was doing fine. Intermittently refusing turns. TF running at 45 mL/hr through [place]. [employer] Foundationck in place with adequate urinary output. On DD2 diet. Ate about 50% of lunch. No BM plan to start senna tonight. Suppository ordered PRN if needed tomorrow. Will continue to monitor.

## 2020-06-21 NOTE — PROGRESS NOTES
[facility], [place]    Medicine Progress Note - Hospitalist Service, Gold 8       Date of Admission:  6/5/2020  Assessment & Plan     6/20 :     Awaiting Placement in her Group home   Check CMP in am and can start Statins if trending down   Monitor Blood pressure and if MAP is <60 Bolus with IV Fluids     6/21 :       BP readings stable  ALT and AST still trending up : will hold off statins  Added senna and dulcolax suppository prn    Awaiting placement       Ms. Dionne Perez is a 34 yo female with hx of developmental delay, depression, anxiety, likely psychosis, GERD, and hypothyrodism, admitted 6/5/2020 to [facility] for further eval and management of Acute Mental Status change. She was found to have Cerebellar Infarct in the MRI scan done 6/6/2020. LP done 6/9/2020 without any evidence of infection. Neurology could not come up with any cause of AMS and Ceftriaxone. Vancomycin and Acyclovir were discontinued and  Psychiatry thought it  could be adjustment disorder. Patient was thought to have not been opening the mouth Voluntarily.  NG inserted 6/7/2020 for medication  administration and start enteral nutrition which she removed 6/7 evening and is now replaced with bridal. and TFs to goal of 75 ml/hr. On 6/16/2020 was evaluated by Speech therapy and to Continue dysphagia diet level 2 with thin liquids as tolerated with supervision/assist          1. AMS: Likely a type of adjustment disorder in the setting of acute stress and her developmental delay. Patient remains minimally responsive and not at her baseline. She seems like she is improving from her admission. Baseline is she responds in few words, but is independent with her ADLs. Her nuerologic workup has been largely unrevealing without evidence of seizures on EEG, MRI demonstrated new cerebellar infarct. MRI without any clear etiology which does not explain her change in mentation. LP without any evidence of infection.   - Neurology  has no additional recommendations for additional workup  - Appreciate Psychiatry input - as is improving seems like could be adjustment disorder  - DC'ed IV acyclovir, ceftriaxone, and vanc  - uptitrate PTA Lexapro up to 20mg PTA dose     2. Concern for dysphagia  Has had no PO intake since admission due to her mouth rigidity and not opening her mouth volitionally. Continues, therefore to be NPO. NG inserted 6/7 for med administration and to start enteral nutrition. Patient removed NG 6/7 evening, replaced with bridal.   -  TFs to goal of 75 ml/hr  - speech therapy consult and recommendations appreciated   - Nutrition consulted and appreciate following        3. Incidental finding of R cerebellar infarct of uncertain significance  MRI head for AMS workup revealed finding of R cerebellar infarct. Pt started on aspirin, obtained CTA head and neck that was non-diagnostic due to it being severely limited due to poor contrast opacification. TTE with bubble study negative.   - Neurology consulted   - Continue  mg daily, if unable to take orally, can be given rectally as 300 mg   - Hold statin in the setting of elevated LFTs - trending down - check CMP in am and restart Statins if trending down   - Repeat CTA head and neck without evidence of vascular stenosis  - Continue tele monitoring      4.  Mildly elevated LFTs: likely from Hepatic steatosis   Unclear etiology as LFTs from OSH as recently as 5/26 normal. AST and ALT increasing today. GGT elevated to 66. Lipase 876 (< 3X the upper limit of normal). CT abdomen 6/6 with no acute findings. RUQ US with mild hepatomegaly and diffuse hepatic steatosis, CBD 5.5 mm, no hepatic biliary ductal dilation. Tbili wnl. Reported to have abdominal pain on admission, no further complaints of pain. Appears to be tolerating TF. Possible pancreatitis on admission vs LFT elevation 2/2 hepatic steatosis and CK elevation.   - Continue to monitor      # Chronic pain?: Pt noted to be on  scheduled Tylenol PTA, as well as have available as needed diclofenac gel. Continues to occasionally moan, but no obvious area of pain.  - Discontinue PTA scheduled Tylenol given transaminitis     # Hypothyroidism: PTA on levothyroxine 50 mcg daily. TFTs on admission reveal pt euthyroid based on free T4 WNL.  - Continue PTA levothyroxine     # GERD: Continue PTA Pepcid 20 mg daily       Diet: Dysphagia Diet Level 2 [facility] Altered Thin Liquids (water, ice chips, juice, milk gelatin, ice cream, etc)  Calorie Counts  Adult Formula Drip Feeding: Continuous Peptamen Intense VHP; Nasogastric tube; Goal Rate: 45; mL/hr; Medication - Feeding Tube Flush Frequency: At least 15-30 mL water before and after medication administration and with tube clogging; Amount to Se...    DVT Prophylaxis: Enoxaparin (Lovenox) SQ  Davila Catheter: not present  Code Status: Full Code           Disposition Plan   Expected discharge: when ever a bed is available , recommended to prior living arrangement once bed is available .  Entered: Kory Watters MD 06/21/2020, 4:42 PM       The patient's care was discussed with the Patient.    Kory Watters MD  Hospitalist Service, [address]  Pager: [phone]  Please see sticky note for cross cover information  ______________________________________________________________________      Data reviewed today: I reviewed all medications, new labs and imaging results over the last 24 hours. I personally reviewed no images or EKG's today.    Physical Exam   Vital Signs: Temp: 98.4  F (36.9  C) Temp src: Oral BP: 121/65 Pulse: 52 Heart Rate: 55 Resp: 16 SpO2: 96 % O2 Device: None (Room air)    Weight: 379 lbs 0 oz  General Appearance: Not looking in distress  Respiratory: Lungs are clear to auscultation with decreased breath sounds in the Lung bases   Cardiovascular: S1 S2 RRR  GI: Abdomen is soft wthout tenderness    Other: CNS Patient looks Alert and Oriented to  person        Data   Recent Labs   Lab 06/21/20  0610 06/17/20  0535   PLT  --  141*     --    POTASSIUM 4.3  --    CHLORIDE 106  --    CO2 26  --    BUN 26  --    CR 0.57  --    ANIONGAP 7  --    SANDRA 8.5  --    GLC 91  --    ALBUMIN 2.5* 2.5*   PROTTOTAL 6.1* 5.7*   BILITOTAL 0.9 0.7   ALKPHOS 192* 118   * 140*   AST 95* 82*     No results found for this or any previous visit (from the past 24 hour(s)).

## 2020-06-22 PROCEDURE — 25000132 ZZH RX MED GY IP 250 OP 250 PS 637: Performed by: INTERNAL MEDICINE

## 2020-06-22 PROCEDURE — 25000132 ZZH RX MED GY IP 250 OP 250 PS 637: Performed by: STUDENT IN AN ORGANIZED HEALTH CARE EDUCATION/TRAINING PROGRAM

## 2020-06-22 PROCEDURE — 25000132 ZZH RX MED GY IP 250 OP 250 PS 637: Performed by: HOSPITALIST

## 2020-06-22 PROCEDURE — 25000132 ZZH RX MED GY IP 250 OP 250 PS 637: Performed by: PHYSICIAN ASSISTANT

## 2020-06-22 PROCEDURE — 99231 SBSQ HOSP IP/OBS SF/LOW 25: CPT | Performed by: INTERNAL MEDICINE

## 2020-06-22 PROCEDURE — 25000128 H RX IP 250 OP 636: Performed by: PHYSICIAN ASSISTANT

## 2020-06-22 PROCEDURE — 27210437 ZZH NUTRITION PRODUCT SEMIELEM INTERMED LITER

## 2020-06-22 PROCEDURE — 12000001 ZZH R&B MED SURG/OB UMMC

## 2020-06-22 RX ADMIN — MELATONIN TAB 3 MG 3 MG: 3 TAB at 22:22

## 2020-06-22 RX ADMIN — ENOXAPARIN SODIUM 40 MG: 40 INJECTION SUBCUTANEOUS at 20:01

## 2020-06-22 RX ADMIN — ENOXAPARIN SODIUM 40 MG: 40 INJECTION SUBCUTANEOUS at 09:22

## 2020-06-22 RX ADMIN — ASPIRIN 81 MG CHEWABLE TABLET 324 MG: 81 TABLET CHEWABLE at 09:22

## 2020-06-22 RX ADMIN — CALCIUM POLYCARBOPHIL 1250 MG: 625 TABLET, FILM COATED ORAL at 22:21

## 2020-06-22 RX ADMIN — DOCUSATE SODIUM AND SENNOSIDES 1 TABLET: 50; 8.6 TABLET ORAL at 22:22

## 2020-06-22 RX ADMIN — FAMOTIDINE 20 MG: 20 TABLET ORAL at 09:22

## 2020-06-22 RX ADMIN — MULTIVIT AND MINERALS-FERROUS GLUCONATE 9 MG IRON/15 ML ORAL LIQUID 15 ML: at 09:21

## 2020-06-22 RX ADMIN — LEVOTHYROXINE SODIUM 50 MCG: 50 TABLET ORAL at 05:47

## 2020-06-22 RX ADMIN — ESCITALOPRAM OXALATE 20 MG: 10 TABLET ORAL at 09:22

## 2020-06-22 RX ADMIN — CLONIDINE HYDROCHLORIDE 0.1 MG: 0.1 TABLET ORAL at 09:22

## 2020-06-22 RX ADMIN — CALCIUM POLYCARBOPHIL 1250 MG: 625 TABLET, FILM COATED ORAL at 09:22

## 2020-06-22 ASSESSMENT — ACTIVITIES OF DAILY LIVING (ADL)
ADLS_ACUITY_SCORE: 27

## 2020-06-22 NOTE — PLAN OF CARE
"OT 5B: Cancel. Pt declining therapy today, stating \"not today\" multiple times. Increasing agitation and forceful \"no\" with attempt to put down bed rail or adjust lines in prep for mobility. Unable to change pt's mind despite encouragement and education.  "

## 2020-06-22 NOTE — PROGRESS NOTES
[facility], [facility] Progress Note - Hospitalist Service, Gold 8       Date of Admission:  6/5/2020  Assessment & Plan     Plan for today  Awaiting Placement in her Group home   Check CMP in am and can start Statins if trending down   Monitor Blood pressure and if MAP is <60 Bolus with IV Fluids        Ms. Dionne Perez is a 36 yo female with hx of developmental delay, depression, anxiety, likely psychosis, GERD, and hypothyrodism, admitted 6/5/2020 to [facility] for further eval and management of Acute Mental Status change. She was found to have Cerebellar Infarct in the MRI scan done 6/6/2020. LP done 6/9/2020 without any evidence of infection. Neurology could not come up with any cause of AMS and Ceftriaxone. Vancomycin and Acyclovir were discontinued and  Psychiatry thought it  could be adjustment disorder. Patient was thought to have not been opening the mouth Voluntarily.  NG inserted 6/7/2020 for medication  administration and start enteral nutrition which she removed 6/7 evening and is now replaced with bridal. and TFs to goal of 75 ml/hr. On 6/16/2020 was evaluated by Speech therapy and to Continue dysphagia diet level 2 with thin liquids as tolerated with supervision/assist     Plan for today  Will discharge when bed available     1. AMS: Likely a type of adjustment disorder in the setting of acute stress and her developmental delay. Patient remains minimally responsive and not at her baseline. She seems like she is improving from her admission. Baseline is she responds in few words, but is independent with her ADLs. Her nuerologic workup has been largely unrevealing without evidence of seizures on EEG, MRI demonstrated new cerebellar infarct. MRI without any clear etiology which does not explain her change in mentation. LP without any evidence of infection.   - Neurology has no additional recommendations for additional workup  - Appreciate Psychiatry input - as is  improving seems like could be adjustment disorder  - DC'ed IV acyclovir, ceftriaxone, and vanc  - uptitrate PTA Lexapro up to 20mg PTA dose     2. Concern for dysphagia  Has had no PO intake since admission due to her mouth rigidity and not opening her mouth volitionally. Continues, therefore to be NPO. NG inserted 6/7 for med administration and to start enteral nutrition. Patient removed NG 6/7 evening, replaced with bridal.   -  TFs to goal of 75 ml/hr  - speech therapy consult and recommendations appreciated   - Nutrition consulted and appreciate following        3. Incidental finding of R cerebellar infarct of uncertain significance  MRI head for AMS workup revealed finding of R cerebellar infarct. Pt started on aspirin, obtained CTA head and neck that was non-diagnostic due to it being severely limited due to poor contrast opacification. TTE with bubble study negative.   - Neurology consulted   - Continue  mg daily, if unable to take orally, can be given rectally as 300 mg   - Hold statin in the setting of elevated LFTs - trending down - check CMP in am and restart Statins if trending down   - Repeat CTA head and neck without evidence of vascular stenosis  - Continue tele monitoring      4.  Mildly elevated LFTs: likely from Hepatic steatosis   Unclear etiology as LFTs from OSH as recently as 5/26 normal. AST and ALT increasing today. GGT elevated to 66. Lipase 876 (< 3X the upper limit of normal). CT abdomen 6/6 with no acute findings. RUQ US with mild hepatomegaly and diffuse hepatic steatosis, CBD 5.5 mm, no hepatic biliary ductal dilation. Tbili wnl. Reported to have abdominal pain on admission, no further complaints of pain. Appears to be tolerating TF. Possible pancreatitis on admission vs LFT elevation 2/2 hepatic steatosis and CK elevation.   - Continue to monitor      # Chronic pain?: Pt noted to be on scheduled Tylenol PTA, as well as have available as needed diclofenac gel. Continues to  occasionally moan, but no obvious area of pain.  - Discontinue PTA scheduled Tylenol given transaminitis     # Hypothyroidism: PTA on levothyroxine 50 mcg daily. TFTs on admission reveal pt euthyroid based on free T4 WNL.  - Continue PTA levothyroxine     # GERD: Continue PTA Pepcid 20 mg daily       Diet: Dysphagia Diet Level 2 [facility] Altered Thin Liquids (water, ice chips, juice, milk gelatin, ice cream, etc)  Calorie Counts  Adult Formula Drip Feeding: Continuous Peptamen Intense VHP; Nasogastric tube; Goal Rate: 45; mL/hr; Medication - Feeding Tube Flush Frequency: At least 15-30 mL water before and after medication administration and with tube clogging; Amount to Se...    DVT Prophylaxis: Enoxaparin (Lovenox) SQ  Davila Catheter: not present  Code Status: Full Code           Disposition Plan   Expected discharge: when ever a bed is available , recommended to prior living arrangement once bed is available .  Entered: Dick Balbuena MD 06/22/2020, 2:14 PM       The patient's care was discussed with the Patient.    Dick Balbuena MD  Hospitalist Service, [address]  Pager: [phone]  Please see sticky note for cross cover information  ______________________________________________________________________    Interval History   Patient follows with her eyes   Does not interact verbally much     Data reviewed today: I reviewed all medications, new labs and imaging results over the last 24 hours. I personally reviewed no images or EKG's today.    Physical Exam   Vital Signs: Temp: 97.7  F (36.5  C) Temp src: Oral BP: 115/58   Heart Rate: 50 Resp: 16 SpO2: 95 % O2 Device: None (Room air)    Weight: 379 lbs 0 oz  General Appearance: Not looking in distress  Respiratory: Lungs are clear to auscultation with decreased breath sounds in the Lung bases   Cardiovascular: S1 S2 RRR  GI: Abdomen is soft wthout tenderness    Other: CNS Patient looks Alert and Oriented to person         Data   Recent Labs   Lab 06/21/20  0610 06/17/20  0535   PLT  --  141*     --    POTASSIUM 4.3  --    CHLORIDE 106  --    CO2 26  --    BUN 26  --    CR 0.57  --    ANIONGAP 7  --    SANDRA 8.5  --    GLC 91  --    ALBUMIN 2.5* 2.5*   PROTTOTAL 6.1* 5.7*   BILITOTAL 0.9 0.7   ALKPHOS 192* 118   * 140*   AST 95* 82*     No results found for this or any previous visit (from the past 24 hour(s)).

## 2020-06-22 NOTE — PLAN OF CARE
"Assumed cares 6954-1180     /65 (BP Location: Right arm)   Pulse 52   Temp 98.4  F (36.9  C) (Oral)   Resp 16   Ht 1.753 m (5' 9\")   Wt (!) 171.9 kg (379 lb)   SpO2 96%   BMI 55.97 kg/m       Pain: Patient denied pain.   Neuro: A&Ox3. Disoriented to time.   Respiratory: Lungs clear and equal. Diminished in lower lungs.   Cardiac/Neurovascular: HR intermittently carter.   GI/: Patient incontinent of urine and bowel. Adequate urine output. No BM. Purewick in place.   Nutrition: Tube feeds at 45ml/hr. Dysphagia 2 diet.   Activity: Up w/2 ceiling lift.   Skin: No concerns.   Lines: PIV x2 (SL).   Events this shift: Patient did refuse to turn q2h's during my shift.      Plan: Continue to monitor. Waiting for placement.     "

## 2020-06-22 NOTE — PLAN OF CARE
Time of care:  7755-1063  Reason for admission: Altered Mental Status  Neuros: Disoriented to time. Flat affect.   Cardiac: Bradycardic. Denies chest pain.  Respiratory: RA, denies SOB. LS clear.  Diet: Dysphagia level 2. TF at goal via NG.  Activity: [id number]. Patient refusing turns at times. Millie cares x1 per large incontinent urine.   GI/: Voiding AUOP. No BM, senna started yesterday. +Gas. New purewick placed.  Skin: Scattered bruising, otherwise intact  Lines: PIV SL.  Labs: Elevated liver enzymes, decreased platelets.  Pain: Denies  Continue POC.

## 2020-06-22 NOTE — PROGRESS NOTES
Brief Note:      SW received update from RNCC that provider stated pt was to discharge tomorrow,  6/23/2020. SW thanked RNCC for the update as SW has not found placement for this pt yet and RNCC and SW discussed possibility of seeing if Group Home with tube feeds was a viable consideration as there has been no luck thus far with TCU placement.   SW reviewed chart to see this and contacted MD, SW spoke with MD and shared concern. MD agreed to update documentation as there is no current placement for pt to discharge to.     ABBY Gregorio, [facility]   [facility]  Pager: [phone]

## 2020-06-22 NOTE — PLAN OF CARE
"/58 (BP Location: Right arm)   Pulse 52   Temp 97.7  F (36.5  C) (Oral)   Resp 16   Ht 1.753 m (5' 9\")   Wt (!) 171.9 kg (379 lb)   SpO2 95%   BMI 55.97 kg/m      VSS, except HR 50-54. On RA. A& O x2, disoriented to time.Turned every 2 hrs. Has purewick on, but leaking urine around it. Soft BM x1. Did not eat breakfast or lunch, said she's not hungry. Speech saw pt this afternoon for f/u swallow eval, but pt refused. Tolerating TF at goal 45ml/hr. Denies nausea or pain. Up w/2 ceiling lift. Left PIV x2 SL.   "

## 2020-06-22 NOTE — PROGRESS NOTES
"Calorie Count    Intake recorded for: 6/21/2020  Total Kcals: 150 Total Protein: 6g    Kcals from Hospital Food: 150   Protein: 6g    Kcals from Outside Food (average):0 Protein: 0g    # Meals Recorded: 2 meals ordered from kitchen, 1 recorded (100% applesauce, 50% diet lemonade, 25% hot roast beef w/ gravy, mashed potatoes w/ gravy)    # Supplements Recorded: no intake recorded    Epic Food Record also show \"25% intake\" @ 1930 - comments don't specify what was eaten, and meal tray ticket not saved. Not enough information to accurately calculate nutritionals. Meal tray included egg noodles w/ meat sauce, green beans, peaches, vanilla pudding, 1% milk, and decaf coffee. 25% intake of each food item would yield 150 kcal & 6 grams protein.             "

## 2020-06-22 NOTE — PROGRESS NOTES
Care Coordinator - Discharge Planning    Admission Date/Time:  6/5/2020  Attending MD:  Dick Balbuena MD     Data  Date of initial CC assessment:  6/9/2020  Chart reviewed, discussed with interdisciplinary team.   Patient was admitted for:   1. Intellectual delay    2. Somnolence    3. Generalized muscle weakness    4. COVID-19 virus not detected         Assessment   Full assessment completed in previous note    Coordination of Care and Referrals:   Patient continues w/enteral feeds and it is unclear if she will need them at discharge. Writer contacted patient's group home to further discuss discharge planning, no answer. RNCC will follow up, will need to confirm level of care the group home would be able to accommodate if unable to secure TCU placement.      [facility] ([facility] is the )   Phone: [phone]    Plan  Anticipated Discharge Date:  TBD  Anticipated Discharge Plan:  TBD    Gwen Green, RNCC, BSN    [facility]    Medicine Group  [address] [place] [id number]    [email or web address]  [email or web address]    Office: [phone] Pager: [phone]  To contact the weekend RNCC, page [phone].

## 2020-06-22 NOTE — PLAN OF CARE
ST 5A: Cancel- Pt refusing PO trials this date. Per RN has not eaten anything all day. Pt reports feeling full. Will reschedule

## 2020-06-22 NOTE — PLAN OF CARE
PT 5B: Cancel - Increased time spent attempting to encourage pt to perform OOB activity with PT. Pt eventually agreeable to trial EOB, but then once room set up, tearful and declining.

## 2020-06-23 ENCOUNTER — APPOINTMENT (OUTPATIENT)
Dept: PHYSICAL THERAPY | Facility: CLINIC | Age: 35
DRG: 887 | End: 2020-06-23
Payer: MEDICARE

## 2020-06-23 LAB
BACTERIA SPEC CULT: NORMAL
Lab: NORMAL
PLATELET # BLD AUTO: 142 10E9/L (ref 150–450)
SPECIMEN SOURCE: NORMAL

## 2020-06-23 PROCEDURE — 25000132 ZZH RX MED GY IP 250 OP 250 PS 637: Performed by: PHYSICIAN ASSISTANT

## 2020-06-23 PROCEDURE — 27210437 ZZH NUTRITION PRODUCT SEMIELEM INTERMED LITER

## 2020-06-23 PROCEDURE — 25000132 ZZH RX MED GY IP 250 OP 250 PS 637: Performed by: STUDENT IN AN ORGANIZED HEALTH CARE EDUCATION/TRAINING PROGRAM

## 2020-06-23 PROCEDURE — 85049 AUTOMATED PLATELET COUNT: CPT | Performed by: INTERNAL MEDICINE

## 2020-06-23 PROCEDURE — 97530 THERAPEUTIC ACTIVITIES: CPT | Mod: GP

## 2020-06-23 PROCEDURE — 99231 SBSQ HOSP IP/OBS SF/LOW 25: CPT | Performed by: INTERNAL MEDICINE

## 2020-06-23 PROCEDURE — 12000001 ZZH R&B MED SURG/OB UMMC

## 2020-06-23 PROCEDURE — 36415 COLL VENOUS BLD VENIPUNCTURE: CPT | Performed by: INTERNAL MEDICINE

## 2020-06-23 PROCEDURE — 25000132 ZZH RX MED GY IP 250 OP 250 PS 637: Performed by: INTERNAL MEDICINE

## 2020-06-23 PROCEDURE — 25000128 H RX IP 250 OP 636: Performed by: PHYSICIAN ASSISTANT

## 2020-06-23 PROCEDURE — 25000132 ZZH RX MED GY IP 250 OP 250 PS 637: Performed by: HOSPITALIST

## 2020-06-23 RX ADMIN — ESCITALOPRAM OXALATE 20 MG: 10 TABLET ORAL at 08:25

## 2020-06-23 RX ADMIN — POLYETHYLENE GLYCOL 3350 17 G: 17 POWDER, FOR SOLUTION ORAL at 08:25

## 2020-06-23 RX ADMIN — ENOXAPARIN SODIUM 40 MG: 40 INJECTION SUBCUTANEOUS at 08:25

## 2020-06-23 RX ADMIN — ENOXAPARIN SODIUM 40 MG: 40 INJECTION SUBCUTANEOUS at 21:42

## 2020-06-23 RX ADMIN — CALCIUM POLYCARBOPHIL 1250 MG: 625 TABLET, FILM COATED ORAL at 09:40

## 2020-06-23 RX ADMIN — MELATONIN TAB 3 MG 3 MG: 3 TAB at 21:42

## 2020-06-23 RX ADMIN — CLONIDINE HYDROCHLORIDE 0.1 MG: 0.1 TABLET ORAL at 08:25

## 2020-06-23 RX ADMIN — FAMOTIDINE 20 MG: 20 TABLET ORAL at 08:25

## 2020-06-23 RX ADMIN — CALCIUM POLYCARBOPHIL 1250 MG: 625 TABLET, FILM COATED ORAL at 21:42

## 2020-06-23 RX ADMIN — MULTIVIT AND MINERALS-FERROUS GLUCONATE 9 MG IRON/15 ML ORAL LIQUID 15 ML: at 09:40

## 2020-06-23 RX ADMIN — LEVOTHYROXINE SODIUM 50 MCG: 50 TABLET ORAL at 06:31

## 2020-06-23 RX ADMIN — DOCUSATE SODIUM AND SENNOSIDES 1 TABLET: 50; 8.6 TABLET ORAL at 21:42

## 2020-06-23 RX ADMIN — ASPIRIN 81 MG CHEWABLE TABLET 324 MG: 81 TABLET CHEWABLE at 08:25

## 2020-06-23 ASSESSMENT — ACTIVITIES OF DAILY LIVING (ADL)
ADLS_ACUITY_SCORE: 29
ADLS_ACUITY_SCORE: 27

## 2020-06-23 ASSESSMENT — MIFFLIN-ST. JEOR: SCORE: 2469.9

## 2020-06-23 NOTE — PLAN OF CARE
"OT: CX: Pt encouraged and educated about activity, pt refused. Pt only verbalized the word \"no,\" refused all activity offered.   "

## 2020-06-23 NOTE — PLAN OF CARE
Assumed cares 9227-3485. Pt A&Ox3, disoriented to time. Vitals WDL ex bradycardic in the 50's. Tube feeds via NG at 45 mL/hr. Repositioned x4 overnight. Denies pain/nausea. Purewick patent and draining. No BM this shift. Continue plan of care.

## 2020-06-23 NOTE — PROGRESS NOTES
Social Work Services Progress Note    Hospital Day: 18  Date of Initial Social Work Evaluation:  6/15/2020  Collaborated with:  [facility], Leo butler     Data: 34 yo female with hx of developmental delay, depression, anxiety, likely psychosis, GERD, and hypothyrodism, admitted 6/5/2020 to [facility] for further eval and management of Acute Mental Status change. She was found to have Cerebellar Infarct in the MRI scan done 6/6/2020. LP done 6/9/2020 without any evidence of infection. Neurology could not come up with any cause of AMS and Ceftriaxone. Vancomycin and Acyclovir were discontinued and  Psychiatry thought it  could be adjustment disorder. Patient was thought to have not been opening the mouth Voluntarily.  NG inserted 6/7/2020 for medication  administration and start enteral nutrition which she removed 6/7 evening and is now replaced with bridal. and TFs to goal of 75 ml/hr. On 6/16/2020 was evaluated by Speech therapy and to Continue dysphagia diet level 2 with thin liquids as tolerated with supervision/assist        Intervention:  SW made calls to both [facility] referrals were sent.    [facility] reports hat they are not taking admissions at this time due to COVID cases in house per what Medical Director is suggesting.  Per admissions this is being reviewed daily and can change. Per Admissions they anticipate they might  Be able to take admissions again next week.     Leo PISANO - SILVIA made call to Jennifer in admissions and left a message requesting return call re: referral.     Assessment:  SILVIA did not interact with pt or guardian during this interaction.    Plan:    Anticipated Disposition:  TBD    Barriers to d/c plan: Accepting facility     Follow Up:  SILVIA will continue to follow and work toward safe discharge plan.     Myrna Gabriel, ABBY, LGSW  [facility]   [facility]  Pager: [phone]

## 2020-06-23 NOTE — PLAN OF CARE
[id number]. Pt A&Ox3, disoriented to time. VSS, except HR in the 50's. Tube feeds via NG at 45mL/hr. Turned x3 this shift. Denies pain/nausea. Purewick patent, no leaking with 375ml of urine. No BM this shift. Did not eat breakfast or lunch.

## 2020-06-23 NOTE — PLAN OF CARE
PT 5B:     Discharge Planner PT   Patient plan for discharge: did not discuss  Current status: After much encouragement, pt agreeable for minimal therapy. Min-mod A for rolling, dependent ceiling lift transfer bed > recliner, semi sit <> stand from recliner at CGA x 2. Pt declining to trial further standing.   Barriers to return to prior living situation: medical status, cognition, level of assist group home can provide   Recommendations for discharge: TCU  Rationale for recommendations: Pt is currently below her baseline for mobility. She will benefit from continued rehab to improve strength, balance, and endurance.        Entered by: Grace Chu 06/23/2020 3:27 PM

## 2020-06-23 NOTE — PROGRESS NOTES
Care Coordinator - Discharge Planning    Admission Date/Time:  6/5/2020  Attending MD:  Dick Balbuena MD     Data  Date of initial CC assessment:  6/9/2020  Chart reviewed, discussed with interdisciplinary team.   Patient was admitted for:   1. Intellectual delay    2. Somnolence    3. Generalized muscle weakness    4. COVID-19 virus not detected         Assessment   Full assessment completed in previous note    Coordination of Care and Referrals:  Writer received call from patient's group home. Pauline confirms that if patient were unable to go to TCU, they would likely be able to meet her needs but she needs to confirm this with management. Patient would need home care (RN/PT/OT), home infusion and DME to safely discharge to home. Call out to patient's guardian, Cynthia to further discuss discharge options. Cynthia is understanding of the difficulty with finding a TCU. Cynthia states that she really like group home in which the patient lives and if they are able to accommodate her needs and home care/home infusion and DME is arranged, she is agreeable to discharge to the group home. Home care and home infusion options discussed, options provided from Medicare.gov list. Cynthia appreciative of care in the hospital and discharge planning.     Additional call received from Pauline, and the group home nurse, Cathleen. Cathleen voiced great concern for the patient returning to the group home. Cathleen states that the patient will return, magically get up and walk and her behaviors will continue. Writer noted that behaviors have not been observed while in the hospital and it is difficult for the team to treat unobserved behaviors. Cathleen states that the patient can be aggressive at times, run out of the house, lay down in the street. Cathleen notes that it is difficult to not treat these behaviors if not present, but notes that they were very hopeful for IP psych care and further follow up-as they have been trying to get the patient help for the  past 6 months. Cathleen requested an additional psych consult to further explore what is going on, wanting to know when/why the Depakote was stopped as well. Cathleen notes that accommodating enteral feeds in the group home would require training and she voiced hesitation in letting additional staff into the home for required teaching. Writer reiterated that if patient were to discharge to the group home, home PT/OT would be necessary. Cathleen requested that TCU placement continue to be pursued, questioned why placement was so difficult if behaviors were not present. Writer acknowledged concerns, will update SW. RNCC will f/u with the primary provider regarding possible psych consult and nutrition plan.     Plan  Anticipated Discharge Date:  TBD  Anticipated Discharge Plan:  TBD    Gwen Green, RNCC, BSN    [facility]  [address] [place] [id number]    [email or web address]    Office: [phone] Pager: [phone]  To contact the weekend RNCC, page [phone].

## 2020-06-23 NOTE — PROGRESS NOTES
Calorie Count  Intake recorded for: 6/22  Total Kcals: 310 Total Protein: 15g  Kcals from Hospital Food: 310  Protein: 15g  Kcals from Outside Food (average):0 Protein: 0g  # Meals Recorded: 100% egg noodles w/ meat sauce   # Supplements Recorded: 0

## 2020-06-23 NOTE — PLAN OF CARE
"/44 (BP Location: Right arm)   Pulse 53   Temp 98.2  F (36.8  C) (Oral)   Resp 18   Ht 1.753 m (5' 9\")   Wt (!) 170.6 kg (376 lb)   SpO2 96%   BMI 55.53 kg/m      Care from: 2838-9873      VS & Pain: VSS ex bradycardic, on room air, denied pain    Neuro: Alert, disoriented to time, flat affect    Respiratory: diminished lung sounds in BLL    Cardiac: bradycardic    Peripheral neurovascular: dependent and generalized edema noted    GI/: urinary and stool incontinence- Purewick in place, adequate urine output, no BM this shift    Nutrition: fair appetite and oral intake, TF is infusing at 45 mL/hr, denied nausea    Skin: PIVs, bruised, scab, blanchable redness at coccyx- applied barrier cream and repositioned q2h    Musculoskeletal: moderately impaired    Lines: L PIVs are saline locked    Activity: Assist of 2, mechanical lift    Events this shift: Provided perineal care and changed Purewick. Repositioned q2h. Call light within reach and bed alarm on.    Plan: Continue to monitor and follow poc. Waiting for placement.  "

## 2020-06-23 NOTE — PROGRESS NOTES
AVSS. Pt denied pain/nausea. Requested repositioning x2. Using purewick but it is leaking, changed & cleaned up x1. No bm. Did not give scheduled clonidine as pt's BP did not meet parameters. Tube feed running at 45 ml/hr. Makes needs known. Continue to monitor & follow plan of care.

## 2020-06-23 NOTE — PROGRESS NOTES
[facility] Progress Note - Hospitalist Service, Gold 8       Date of Admission:  6/5/2020  Assessment & Plan     Plan for today  Awaiting Placement in her Group home   Check CMP in 2 days and can start Statins if trending down   Monitor Blood pressure and if MAP is <60 Bolus with IV Fluids        Ms. Dionne Perez is a 36 yo female with hx of developmental delay, depression, anxiety, likely psychosis, GERD, and hypothyrodism, admitted 6/5/2020 to [facility] for further eval and management of Acute Mental Status change. She was found to have Cerebellar Infarct in the MRI scan done 6/6/2020. LP done 6/9/2020 without any evidence of infection. Neurology could not come up with any cause of AMS and Ceftriaxone. Vancomycin and Acyclovir were discontinued and  Psychiatry thought it  could be adjustment disorder. Patient was thought to have not been opening the mouth Voluntarily.  NG inserted 6/7/2020 for medication  administration and start enteral nutrition which she removed 6/7 evening and is now replaced with bridal. and TFs to goal of 75 ml/hr. On 6/16/2020 was evaluated by Speech therapy and to Continue dysphagia diet level 2 with thin liquids as tolerated with supervision/assist     Plan for today  Will discharge when bed available     1. AMS: Likely a type of adjustment disorder in the setting of acute stress and her developmental delay. Patient remains minimally responsive and not at her baseline. She seems like she is improving from her admission. Baseline is she responds in few words, but is independent with her ADLs. Her nuerologic workup has been largely unrevealing without evidence of seizures on EEG, MRI demonstrated new cerebellar infarct. MRI without any clear etiology which does not explain her change in mentation. LP without any evidence of infection.   - Neurology has no additional recommendations for additional workup  - Appreciate Psychiatry input - as is  improving seems like could be adjustment disorder  - DC'ed IV acyclovir, ceftriaxone, and vanc  - uptitrate PTA Lexapro up to 20mg PTA dose     2. Concern for dysphagia  Has had no PO intake since admission due to her mouth rigidity and not opening her mouth volitionally. Continues, therefore to be NPO. NG inserted 6/7 for med administration and to start enteral nutrition. Patient removed NG 6/7 evening, replaced with bridal.   -  TFs to goal of 75 ml/hr  - speech therapy consult and recommendations appreciated   - Nutrition consulted and appreciate following        3. Incidental finding of R cerebellar infarct of uncertain significance  MRI head for AMS workup revealed finding of R cerebellar infarct. Pt started on aspirin, obtained CTA head and neck that was non-diagnostic due to it being severely limited due to poor contrast opacification. TTE with bubble study negative.   - Neurology consulted   - Continue  mg daily, if unable to take orally, can be given rectally as 300 mg   - Hold statin in the setting of elevated LFTs - trending down - check CMP in am and restart Statins if trending down   - Repeat CTA head and neck without evidence of vascular stenosis  - Continue tele monitoring      4.  Mildly elevated LFTs: likely from Hepatic steatosis   Unclear etiology as LFTs from OSH as recently as 5/26 normal. AST and ALT increasing today. GGT elevated to 66. Lipase 876 (< 3X the upper limit of normal). CT abdomen 6/6 with no acute findings. RUQ US with mild hepatomegaly and diffuse hepatic steatosis, CBD 5.5 mm, no hepatic biliary ductal dilation. Tbili wnl. Reported to have abdominal pain on admission, no further complaints of pain. Appears to be tolerating TF. Possible pancreatitis on admission vs LFT elevation 2/2 hepatic steatosis and CK elevation.   - Continue to monitor      # Chronic pain?: Pt noted to be on scheduled Tylenol PTA, as well as have available as needed diclofenac gel. Continues to  occasionally moan, but no obvious area of pain.  - Discontinue PTA scheduled Tylenol given transaminitis     # Hypothyroidism: PTA on levothyroxine 50 mcg daily. TFTs on admission reveal pt euthyroid based on free T4 WNL.  - Continue PTA levothyroxine     # GERD: Continue PTA Pepcid 20 mg daily       Diet: Dysphagia Diet Level 2 [facility] Altered Thin Liquids (water, ice chips, juice, milk gelatin, ice cream, etc)  Calorie Counts  Adult Formula Drip Feeding: Continuous Peptamen Intense VHP; Nasogastric tube; Goal Rate: 45; mL/hr; Medication - Feeding Tube Flush Frequency: At least 15-30 mL water before and after medication administration and with tube clogging; Amount to Se...    DVT Prophylaxis: Enoxaparin (Lovenox) SQ  Davila Catheter: not present  Code Status: Full Code           Disposition Plan   Expected discharge: when ever a bed is available , recommended to prior living arrangement once bed is available .  Entered: Dick Balbuena MD 06/23/2020, 6:19 PM       The patient's care was discussed with the Patient.    Dick Balbuena MD  Hospitalist Service, [address]  Pager: [phone]  Please see sticky note for cross cover information  ______________________________________________________________________    Interval History   Patient denies any new symptoms to me   Does not interact verbally much     Data reviewed today: I reviewed all medications, new labs and imaging results over the last 24 hours. I personally reviewed no images or EKG's today.    Physical Exam   Vital Signs: Temp: 95.9  F (35.5  C) Temp src: Oral BP: 116/55 Pulse: 50   Resp: 18 SpO2: 99 % O2 Device: None (Room air)    Weight: 376 lbs 0 oz  General Appearance: Not looking in distress  Respiratory: Lungs are clear to auscultation with decreased breath sounds in the Lung bases   Cardiovascular: S1 S2 RRR  GI: Abdomen is soft wthout tenderness    Other: CNS Patient looks Alert and Oriented to person         Data   Recent Labs   Lab 06/23/20  0555 06/21/20  0610 06/17/20  0535   *  --  141*   NA  --  139  --    POTASSIUM  --  4.3  --    CHLORIDE  --  106  --    CO2  --  26  --    BUN  --  26  --    CR  --  0.57  --    ANIONGAP  --  7  --    SANDRA  --  8.5  --    GLC  --  91  --    ALBUMIN  --  2.5* 2.5*   PROTTOTAL  --  6.1* 5.7*   BILITOTAL  --  0.9 0.7   ALKPHOS  --  192* 118   ALT  --  148* 140*   AST  --  95* 82*     No results found for this or any previous visit (from the past 24 hour(s)).

## 2020-06-24 ENCOUNTER — APPOINTMENT (OUTPATIENT)
Dept: OCCUPATIONAL THERAPY | Facility: CLINIC | Age: 35
DRG: 887 | End: 2020-06-24
Payer: MEDICARE

## 2020-06-24 ENCOUNTER — APPOINTMENT (OUTPATIENT)
Dept: PHYSICAL THERAPY | Facility: CLINIC | Age: 35
DRG: 887 | End: 2020-06-24
Payer: MEDICARE

## 2020-06-24 ENCOUNTER — APPOINTMENT (OUTPATIENT)
Dept: SPEECH THERAPY | Facility: CLINIC | Age: 35
DRG: 887 | End: 2020-06-24
Payer: MEDICARE

## 2020-06-24 LAB
SARS-COV-2 PCR COMMENT: NORMAL
SARS-COV-2 RNA SPEC QL NAA+PROBE: NEGATIVE
SARS-COV-2 RNA SPEC QL NAA+PROBE: NORMAL
SPECIMEN SOURCE: NORMAL
SPECIMEN SOURCE: NORMAL

## 2020-06-24 PROCEDURE — 25000132 ZZH RX MED GY IP 250 OP 250 PS 637: Performed by: PHYSICIAN ASSISTANT

## 2020-06-24 PROCEDURE — 97110 THERAPEUTIC EXERCISES: CPT | Mod: GO

## 2020-06-24 PROCEDURE — 25000132 ZZH RX MED GY IP 250 OP 250 PS 637: Performed by: HOSPITALIST

## 2020-06-24 PROCEDURE — 99232 SBSQ HOSP IP/OBS MODERATE 35: CPT | Performed by: INTERNAL MEDICINE

## 2020-06-24 PROCEDURE — U0003 INFECTIOUS AGENT DETECTION BY NUCLEIC ACID (DNA OR RNA); SEVERE ACUTE RESPIRATORY SYNDROME CORONAVIRUS 2 (SARS-COV-2) (CORONAVIRUS DISEASE [COVID-19]), AMPLIFIED PROBE TECHNIQUE, MAKING USE OF HIGH THROUGHPUT TECHNOLOGIES AS DESCRIBED BY CMS-2020-01-R: HCPCS | Performed by: INTERNAL MEDICINE

## 2020-06-24 PROCEDURE — 25000128 H RX IP 250 OP 636: Performed by: PHYSICIAN ASSISTANT

## 2020-06-24 PROCEDURE — 12000001 ZZH R&B MED SURG/OB UMMC

## 2020-06-24 PROCEDURE — 27210437 ZZH NUTRITION PRODUCT SEMIELEM INTERMED LITER

## 2020-06-24 PROCEDURE — 25000132 ZZH RX MED GY IP 250 OP 250 PS 637: Performed by: STUDENT IN AN ORGANIZED HEALTH CARE EDUCATION/TRAINING PROGRAM

## 2020-06-24 PROCEDURE — 97530 THERAPEUTIC ACTIVITIES: CPT | Mod: GP

## 2020-06-24 PROCEDURE — 92526 ORAL FUNCTION THERAPY: CPT | Mod: GN

## 2020-06-24 RX ADMIN — ENOXAPARIN SODIUM 40 MG: 40 INJECTION SUBCUTANEOUS at 21:32

## 2020-06-24 RX ADMIN — FAMOTIDINE 20 MG: 20 TABLET ORAL at 09:22

## 2020-06-24 RX ADMIN — ESCITALOPRAM OXALATE 20 MG: 10 TABLET ORAL at 09:22

## 2020-06-24 RX ADMIN — MULTIVIT AND MINERALS-FERROUS GLUCONATE 9 MG IRON/15 ML ORAL LIQUID 15 ML: at 09:21

## 2020-06-24 RX ADMIN — CALCIUM POLYCARBOPHIL 1250 MG: 625 TABLET, FILM COATED ORAL at 09:21

## 2020-06-24 RX ADMIN — ENOXAPARIN SODIUM 40 MG: 40 INJECTION SUBCUTANEOUS at 09:20

## 2020-06-24 RX ADMIN — ASPIRIN 81 MG CHEWABLE TABLET 324 MG: 81 TABLET CHEWABLE at 09:21

## 2020-06-24 RX ADMIN — MELATONIN TAB 3 MG 3 MG: 3 TAB at 21:33

## 2020-06-24 RX ADMIN — LEVOTHYROXINE SODIUM 50 MCG: 50 TABLET ORAL at 05:31

## 2020-06-24 RX ADMIN — POLYETHYLENE GLYCOL 3350 17 G: 17 POWDER, FOR SOLUTION ORAL at 09:21

## 2020-06-24 RX ADMIN — CLONIDINE HYDROCHLORIDE 0.1 MG: 0.1 TABLET ORAL at 21:33

## 2020-06-24 ASSESSMENT — MIFFLIN-ST. JEOR: SCORE: 2433.62

## 2020-06-24 ASSESSMENT — ACTIVITIES OF DAILY LIVING (ADL)
ADLS_ACUITY_SCORE: 29

## 2020-06-24 NOTE — DOWNTIME EVENT NOTE
The EMR was down for 2 hours on 6/24/2020.    Nurse Selina Cid was responsible for completing the paper charting during this time period.     The following information was re-entered into the system by Selina Cid RN: Intake and output    The following information will remain in the paper chart: N/A  Selina Cid RN  6/24/2020

## 2020-06-24 NOTE — PROGRESS NOTES
Social Work Services Progress Note    Hospital Day: 19  Collaborated with:  Leo on  [phone]    Data:  Pt continues to be medically stable for discharge if accepting facility is secured.     Intervention:  SILVIA made call to Leo of  to inquire about referral status.   SILVIA spoke with Jennifer with admissions, and they are unable to accept pt at this time.     SILVIA updated RNCC.    SILVIA followed up  TCU and spoke with Cathleen  TCU liaison asking that they review pt a second time to see if might be an option for this pt to discharge to  TCU as a stepping stone to the group home.  Cathleen agreed to review again and see if they may be able to assist.       Assessment:  SILVIA did not interact with pt during this encounter.     Plan:    Anticipated Disposition:  Facility:  TCU vs Group home vs ?     Barriers to d/c plan:  Accepting facility for placement.     Follow Up:  SILVIA will continue to follow for discharge planning and needs.     ABBY Gregorio, SW  [facility]   [facility]  Pager: [phone]      Addendum at 2:28pm:     SILVIA received follow up call from  Liaison Cathleen providing update that  TCU will be able to accept pt to  TCU.  Pt will need PAS and new COVID test.     SILVIA updated Charge RN and requested to have updated COVID test. Charge RN agreed to ask MD to order new COVID test.    SILVIA updated RNCC.    SILVIA contacted pts cecilio Marie with [employer] and provided update that pt will be discharging to  TCU.  Guardian was pleased with this and acknowledged understanding that this is meant to be a short term stay.     Guardian also expressed that pt behaviors are quite different than what pt is exhibiting in the hospital.  SILVIA advised that perhaps if behaviors present again when pt goes back to the community the mental health crisis team is contacted for intervention.     SILVIA completed PAS and contacted [facility] Linkage Line and spoke with Izabella who indicated that pt would  trigger level 2 and provided SW with contact for [facility]  [phone].      SW made call to Bruna with [facility] and left message requesting return call.     SW will continue to follow for discharge plan.     ABBY Gregorio, [facility]   [facility]  Pager: [phone]

## 2020-06-24 NOTE — PLAN OF CARE
Discharge Planner SLP   Patient plan for discharge: unknown  Current status: Recommend advance diet to regular with thin liquids as tolerated with supervision/assist as needed. Pt to sit upright for all PO intake, take small bites/sips and alternate consistencies. Ok to trial meds orally from swallow standpoint. ST to follow for PO tolerance.  Barriers to return to prior living situation: medical status  Recommendations for discharge: will defer to PT/OT  Rationale for recommendations: Do not anticipate ongoing ST needs post discharge       Entered by: Ria Thibodeaux 06/24/2020 11:58 AM

## 2020-06-24 NOTE — PLAN OF CARE
Assumed care 2300 to 0730. Vital signs stable on room air. Pt bradycardic, pulse 50. Pt is alert and oriented to person and place andsituation disoriented time. Pt has a flat affect, withdrawn, hypoactive. Pt denies Pain. Pt denies nausea and SOB. Lung sounds clear. Bowel Sounds present. Pt has tube feeding running at 45 mls hr with a 30 ml flush Q4 hrs. Pt voiding adequately via purewick, rich urine 500 output. Pt incontinent of urine and BM. Pt needs lifts for transfers. Pt turned every two hours with assist of 2. Pt has blanchable redness to  bottom.   Pt has 2 left PIV saline locked. Pt resting in bed. Plan: to discharge to TCU when mental status in back to baseline, and pt able to tolerate oral medication. Will continue to monitor and follow plan of care.

## 2020-06-24 NOTE — PROGRESS NOTES
[facility], [facility] Progress Note - Hospitalist Service, Gold 8       Date of Admission:  6/5/2020  Assessment & Plan     Plan for today  Awaiting Placement in her Group home- checked covid test to screen before being accepted at her Group Home      Check CMP in am and if decreasing can start Statins      Monitor Blood pressure and if MAP is <60 Bolus with IV Fluids        Ms. Dionne Perez is a 36 yo female with hx of developmental delay, depression, anxiety, likely psychosis, GERD, and hypothyrodism, admitted 6/5/2020 to [facility] for further eval and management of Acute Mental Status change. She was found to have Cerebellar Infarct in the MRI scan done 6/6/2020. LP done 6/9/2020 without any evidence of infection. Neurology could not come up with any cause of AMS and Ceftriaxone. Vancomycin and Acyclovir were discontinued and  Psychiatry thought it  could be adjustment disorder. Patient was thought to have not been opening the mouth Voluntarily.  NG inserted 6/7/2020 for medication  administration and start enteral nutrition which she removed 6/7 evening and is now replaced with bridal. and TFs to goal of 75 ml/hr. On 6/16/2020 was evaluated by Speech therapy and to Continue dysphagia diet level 2 with thin liquids as tolerated with supervision/assist     Plan for today  Will discharge when bed available     1. AMS: Likely a type of adjustment disorder in the setting of acute stress and her developmental delay. Patient remains minimally responsive and not at her baseline. She seems like she is improving from her admission. Baseline is she responds in few words, but is independent with her ADLs. Her nuerologic workup has been largely unrevealing without evidence of seizures on EEG, MRI demonstrated new cerebellar infarct. MRI without any clear etiology which does not explain her change in mentation. LP without any evidence of infection.   - Neurology has no additional  recommendations for additional workup  - Appreciate Psychiatry input - as is improving seems like could be adjustment disorder  - DC'ed IV acyclovir, ceftriaxone, and vanc  - uptitrate PTA Lexapro up to 20mg PTA dose     2. Concern for dysphagia  Has had no PO intake since admission due to her mouth rigidity and not opening her mouth volitionally. Continues, therefore to be NPO. NG inserted 6/7 for med administration and to start enteral nutrition. Patient removed NG 6/7 evening, replaced with bridal.   -  TFs to goal of 75 ml/hr  - speech therapy consult and recommendations appreciated   - Nutrition consulted and appreciate following        3. Incidental finding of R cerebellar infarct of uncertain significance  MRI head for AMS workup revealed finding of R cerebellar infarct. Pt started on aspirin, obtained CTA head and neck that was non-diagnostic due to it being severely limited due to poor contrast opacification. TTE with bubble study negative.   - Neurology consulted   - Continue  mg daily, if unable to take orally, can be given rectally as 300 mg   - Hold statin in the setting of elevated LFTs - trending down - check CMP in am and restart Statins if trending down   - Repeat CTA head and neck without evidence of vascular stenosis  - Continue tele monitoring      4.  Mildly elevated LFTs: likely from Hepatic steatosis   Unclear etiology as LFTs from OSH as recently as 5/26 normal. AST and ALT increasing today. GGT elevated to 66. Lipase 876 (< 3X the upper limit of normal). CT abdomen 6/6 with no acute findings. RUQ US with mild hepatomegaly and diffuse hepatic steatosis, CBD 5.5 mm, no hepatic biliary ductal dilation. Tbili wnl. Reported to have abdominal pain on admission, no further complaints of pain. Appears to be tolerating TF. Possible pancreatitis on admission vs LFT elevation 2/2 hepatic steatosis and CK elevation.   - Continue to monitor      # Chronic pain?: Pt noted to be on scheduled Tylenol  PTA, as well as have available as needed diclofenac gel. Continues to occasionally moan, but no obvious area of pain.  - Discontinue PTA scheduled Tylenol given transaminitis     # Hypothyroidism: PTA on levothyroxine 50 mcg daily. TFTs on admission reveal pt euthyroid based on free T4 WNL.  - Continue PTA levothyroxine     # GERD: Continue PTA Pepcid 20 mg daily       Diet: Adult Formula Drip Feeding: Continuous Peptamen Intense VHP; Nasogastric tube; Goal Rate: 45; mL/hr; Medication - Feeding Tube Flush Frequency: At least 15-30 mL water before and after medication administration and with tube clogging; Amount to Se...  Regular Diet Adult    DVT Prophylaxis: Enoxaparin (Lovenox) SQ  Davila Catheter: not present  Code Status: Full Code           Disposition Plan   Expected discharge: when ever a bed is available , recommended to prior living arrangement once bed is available .  Entered: Dick Balbuena MD 06/24/2020, 12:20 PM       The patient's care was discussed with the Patient.    Dick Balbuena MD  Hospitalist Service, [address]  Pager: [phone]  Please see sticky note for cross cover information  ______________________________________________________________________    Interval History   Patient denies any new symptoms to me   Much more interactive and pleasant -     Her Group home wants a Psychiatric evaluation (I am told by the ) as she is supposed to be agitated and abusive to others - she has not had any of those symptoms here     Data reviewed today: I reviewed all medications, new labs and imaging results over the last 24 hours. I personally reviewed no images or EKG's today.    Physical Exam   Vital Signs: Temp: 98.2  F (36.8  C) Temp src: Oral BP: 118/73 Pulse: 52 Heart Rate: 54 Resp: 16 SpO2: 96 % O2 Device: None (Room air)    Weight: 376 lbs 0 oz  General Appearance: Not looking in distress  Respiratory: Lungs are clear to auscultation with  decreased breath sounds in the Lung bases   Cardiovascular: S1 S2 Bradycardia +  GI: Abdomen is soft wthout tenderness    Other: CNS Patient looks Alert and Oriented to person        Data   Recent Labs   Lab 06/23/20  0555 06/21/20  0610   *  --    NA  --  139   POTASSIUM  --  4.3   CHLORIDE  --  106   CO2  --  26   BUN  --  26   CR  --  0.57   ANIONGAP  --  7   SANDRA  --  8.5   GLC  --  91   ALBUMIN  --  2.5*   PROTTOTAL  --  6.1*   BILITOTAL  --  0.9   ALKPHOS  --  192*   ALT  --  148*   AST  --  95*     No results found for this or any previous visit (from the past 24 hour(s)).

## 2020-06-24 NOTE — PLAN OF CARE
Afebrile,  bradycardic, pulse in the  50's, OVSS on RA.  Alert and oriented to person and place and situation disoriented time.  Pt has a flat affect, slow to respond.  Denies pain or nausea.  Lung sounds clear.  Tube feeding running at 45 mls hr with a 30 ml flush Q4 hrs. Pt voiding adequately via purewick.  No BM this shift.   Pt needs lifts for transfers. Pt turned every two hours with assist of 2. Pt has blanchable redness to  bottom.  PIV x2 SL. Declined to get up to chair.  Appetite fair.  Diet advanced to regular.   Continue plan of care.

## 2020-06-24 NOTE — PROGRESS NOTES
Care Coordinator - Discharge Planning    Admission Date/Time:  6/5/2020  Attending MD:  Dick Balbuena MD     Data  Date of initial CC assessment:  6/9/2020  Chart reviewed, discussed with interdisciplinary team.   Patient was admitted for:   1. Intellectual delay    2. Somnolence    3. Generalized muscle weakness    4. COVID-19 virus not detected         Assessment   Full assessment completed in previous note    Coordination of Care and Referrals:   Call received from patient's guardian regarding discharge planning. Cynthia states understanding that the group home is hesitant for patient's return at this time. Cynthia just wanted to touch base regarding discharge planning, reiterating that patient will need encouragement to participate in PT/OT. Cynthia notes that patient's waiver expires on 7/4 and she will need a re-assessment a few days before hand if she is not discharged. Writer spoke w/Dr. Balbuena, he will consider consulting psych per the group home's request, unclear if there is an acute need for psych consult at this time. Unit SW updated.      Plan  Anticipated Discharge Date:  TBD  Anticipated Discharge Plan:  TBD    Gwen Green, RNCC, BSN    [facility]    Medicine Group  [address] [place] [id number]    [email or web address]    Office: [phone] Pager: [phone]  To contact the weekend RNCC, page [phone].

## 2020-06-24 NOTE — PROGRESS NOTES
Calorie Count  Intake recorded for: 6/23  Total Kcals: 415 Total Protein: 29g  Kcals from Hospital Food: 415  Protein: 29g  Kcals from Outside Food (average):0 Protein: 0g  # Meals Recorded: 100% hot roast beef w/ gravy, side mac & cheese, 25% mashed potatoes w/ gravy  # Supplements Recorded: 0

## 2020-06-24 NOTE — PLAN OF CARE
PT - Lift for transfers  Discharge Planner PT   Patient plan for discharge: group home vs. TCU?  Current status: Pt needing encouragement for participation in therapy session. Pt mod A for rolling in bed for placement. Pt dependently lifted to EOB for core strengthening. Pt declines additional activity today  Barriers to return to prior living situation: medical status, below baseline, level of assist, impaired functional mobility, falls risk  Recommendations for discharge: TCU  Rationale for recommendations: Pt will benefit from continued skilled PT to progress strength, activity tolerance, and performance of functional mobility.  Entered by: Gwen Murillo 06/24/2020 4:31 PM

## 2020-06-25 ENCOUNTER — HOSPITAL ENCOUNTER (INPATIENT)
Facility: SKILLED NURSING FACILITY | Age: 35
End: 2020-06-25
Payer: MEDICARE

## 2020-06-25 LAB
ALBUMIN SERPL-MCNC: 2.7 G/DL (ref 3.4–5)
ALP SERPL-CCNC: 174 U/L (ref 40–150)
ALT SERPL W P-5'-P-CCNC: 83 U/L (ref 0–50)
ANION GAP SERPL CALCULATED.3IONS-SCNC: 5 MMOL/L (ref 3–14)
AST SERPL W P-5'-P-CCNC: 40 U/L (ref 0–45)
BILIRUB SERPL-MCNC: 0.8 MG/DL (ref 0.2–1.3)
BUN SERPL-MCNC: 28 MG/DL (ref 7–30)
CALCIUM SERPL-MCNC: 8.7 MG/DL (ref 8.5–10.1)
CHLORIDE SERPL-SCNC: 108 MMOL/L (ref 94–109)
CO2 SERPL-SCNC: 27 MMOL/L (ref 20–32)
CREAT SERPL-MCNC: 0.52 MG/DL (ref 0.52–1.04)
GFR SERPL CREATININE-BSD FRML MDRD: >90 ML/MIN/{1.73_M2}
GLUCOSE SERPL-MCNC: 88 MG/DL (ref 70–99)
POTASSIUM SERPL-SCNC: 4.2 MMOL/L (ref 3.4–5.3)
PROT SERPL-MCNC: 5.9 G/DL (ref 6.8–8.8)
SODIUM SERPL-SCNC: 140 MMOL/L (ref 133–144)

## 2020-06-25 PROCEDURE — 97530 THERAPEUTIC ACTIVITIES: CPT | Mod: GP

## 2020-06-25 PROCEDURE — 25000128 H RX IP 250 OP 636: Performed by: PHYSICIAN ASSISTANT

## 2020-06-25 PROCEDURE — 36415 COLL VENOUS BLD VENIPUNCTURE: CPT | Performed by: INTERNAL MEDICINE

## 2020-06-25 PROCEDURE — 25000132 ZZH RX MED GY IP 250 OP 250 PS 637: Performed by: PHYSICIAN ASSISTANT

## 2020-06-25 PROCEDURE — 99232 SBSQ HOSP IP/OBS MODERATE 35: CPT | Performed by: INTERNAL MEDICINE

## 2020-06-25 PROCEDURE — 25000132 ZZH RX MED GY IP 250 OP 250 PS 637: Performed by: INTERNAL MEDICINE

## 2020-06-25 PROCEDURE — 25000132 ZZH RX MED GY IP 250 OP 250 PS 637: Performed by: STUDENT IN AN ORGANIZED HEALTH CARE EDUCATION/TRAINING PROGRAM

## 2020-06-25 PROCEDURE — 25000132 ZZH RX MED GY IP 250 OP 250 PS 637: Performed by: HOSPITALIST

## 2020-06-25 PROCEDURE — 12000001 ZZH R&B MED SURG/OB UMMC

## 2020-06-25 PROCEDURE — 80053 COMPREHEN METABOLIC PANEL: CPT | Performed by: INTERNAL MEDICINE

## 2020-06-25 RX ADMIN — CALCIUM POLYCARBOPHIL 1250 MG: 625 TABLET, FILM COATED ORAL at 09:24

## 2020-06-25 RX ADMIN — FAMOTIDINE 20 MG: 20 TABLET ORAL at 09:23

## 2020-06-25 RX ADMIN — CLONIDINE HYDROCHLORIDE 0.1 MG: 0.1 TABLET ORAL at 09:22

## 2020-06-25 RX ADMIN — LEVOTHYROXINE SODIUM 50 MCG: 50 TABLET ORAL at 05:38

## 2020-06-25 RX ADMIN — MULTIVIT AND MINERALS-FERROUS GLUCONATE 9 MG IRON/15 ML ORAL LIQUID 15 ML: at 09:24

## 2020-06-25 RX ADMIN — ENOXAPARIN SODIUM 40 MG: 40 INJECTION SUBCUTANEOUS at 21:17

## 2020-06-25 RX ADMIN — ENOXAPARIN SODIUM 40 MG: 40 INJECTION SUBCUTANEOUS at 09:24

## 2020-06-25 RX ADMIN — CALCIUM POLYCARBOPHIL 1250 MG: 625 TABLET, FILM COATED ORAL at 21:17

## 2020-06-25 RX ADMIN — POLYETHYLENE GLYCOL 3350 17 G: 17 POWDER, FOR SOLUTION ORAL at 09:24

## 2020-06-25 RX ADMIN — ESCITALOPRAM OXALATE 20 MG: 10 TABLET ORAL at 09:24

## 2020-06-25 RX ADMIN — ASPIRIN 81 MG CHEWABLE TABLET 324 MG: 81 TABLET CHEWABLE at 09:23

## 2020-06-25 RX ADMIN — DOCUSATE SODIUM AND SENNOSIDES 1 TABLET: 50; 8.6 TABLET ORAL at 21:17

## 2020-06-25 RX ADMIN — CLONIDINE HYDROCHLORIDE 0.1 MG: 0.1 TABLET ORAL at 21:17

## 2020-06-25 RX ADMIN — MELATONIN TAB 3 MG 3 MG: 3 TAB at 21:17

## 2020-06-25 ASSESSMENT — ACTIVITIES OF DAILY LIVING (ADL)
ADLS_ACUITY_SCORE: 29
ADLS_ACUITY_SCORE: 25
ADLS_ACUITY_SCORE: 29

## 2020-06-25 NOTE — PROGRESS NOTES
[facility], [facility] Progress Note - Hospitalist Service, Gold 8       Date of Admission:  6/5/2020  Assessment & Plan   Plan for today- no change  Awaiting Placement in her Group home/ TCU- checked covid test to screen before being accepted at her Group Home   ( A/w level-2 PAS screen)     Ms. Dionne Perez is a 36 yo female with hx of developmental delay, depression, anxiety, likely psychosis, GERD, and hypothyrodism, admitted 6/5/2020 to [facility] for further eval and management of Acute Mental Status change. She was found to have Cerebellar Infarct in the MRI scan done 6/6/2020. LP done 6/9/2020 without any evidence of infection. Neurology could not come up with any cause of AMS and Ceftriaxone. Vancomycin and Acyclovir were discontinued and  Psychiatry thought it  could be adjustment disorder. Patient was thought to have not been opening the mouth Voluntarily.  NG inserted 6/7/2020 for medication  administration and start enteral nutrition which she removed 6/7 evening and is now replaced with bridal. and TFs to goal of 75 ml/hr. On 6/16/2020 was evaluated by Speech therapy and to Continue dysphagia diet level 2 with thin liquids as tolerated with supervision/assist        1. AMS: Likely a type of adjustment disorder in the setting of acute stress and her developmental delay. Patient remains minimally responsive and not at her baseline. She seems like she is improving from her admission. Baseline is she responds in few words, but is independent with her ADLs. Her nuerologic workup has been largely unrevealing without evidence of seizures on EEG, MRI demonstrated new cerebellar infarct. MRI without any clear etiology which does not explain her change in mentation. LP without any evidence of infection.   - Neurology has no additional recommendations for additional workup  - Appreciate Psychiatry input - as is improving seems like could be adjustment disorder  - DC'ed IV  acyclovir, ceftriaxone, and vanc  - uptitrate PTA Lexapro up to 20mg PTA dose     2. Concern for dysphagia  Has had no PO intake since admission due to her mouth rigidity and not opening her mouth volitionally. Continues, therefore to be NPO. NG inserted 6/7 for med administration and to start enteral nutrition. Patient removed NG 6/7 evening, replaced with bridal.   -  TFs to goal of 75 ml/hr  - speech therapy consult and recommendations appreciated   - Nutrition consulted and appreciate following      3. Incidental finding of R cerebellar infarct of uncertain significance  MRI head for AMS workup revealed finding of R cerebellar infarct. Pt started on aspirin, obtained CTA head and neck that was non-diagnostic due to it being severely limited due to poor contrast opacification. TTE with bubble study negative.   - Neurology consulted   - Continue  mg daily, if unable to take orally, can be given rectally as 300 mg   - Hold statin in the setting of elevated LFTs - trending down - check CMP in am and restart Statins if trending down   - Repeat CTA head and neck without evidence of vascular stenosis  - Continue tele monitoring      4.  Mildly elevated LFTs: likely from Hepatic steatosis   Unclear etiology as LFTs from OSH as recently as 5/26 normal. AST and ALT increasing today. GGT elevated to 66. Lipase 876 (< 3X the upper limit of normal). CT abdomen 6/6 with no acute findings. RUQ US with mild hepatomegaly and diffuse hepatic steatosis, CBD 5.5 mm, no hepatic biliary ductal dilation. Tbili wnl. Reported to have abdominal pain on admission, no further complaints of pain. Appears to be tolerating TF. Possible pancreatitis on admission vs LFT elevation 2/2 hepatic steatosis and CK elevation.   - Continue to monitor      # Chronic pain?: Pt noted to be on scheduled Tylenol PTA, as well as have available as needed diclofenac gel. Continues to occasionally moan, but no obvious area of pain.  - Discontinue PTA  "scheduled Tylenol given transaminitis     # Hypothyroidism: PTA on levothyroxine 50 mcg daily. TFTs on admission reveal pt euthyroid based on free T4 WNL.  - Continue PTA levothyroxine     # GERD: Continue PTA Pepcid 20 mg daily        Diet: Adult Formula Drip Feeding: Continuous Peptamen Intense VHP; Nasogastric tube; Goal Rate: 45; mL/hr; Medication - Feeding Tube Flush Frequency: At least 15-30 mL water before and after medication administration and with tube clogging; Amount to Se...  Regular Diet Adult    DVT Prophylaxis: Enoxaparin (Lovenox) SQ  Davila Catheter: not present  Code Status: Full Code           Disposition Plan   Expected discharge: Tomorrow, recommended to transitional care unit once safe disposition plan/ TCU bed available.  Entered: Baldemar Horvath MD, MD 06/25/2020, 5:20 PM       The patient's care was discussed with the Bedside Nurse and Care Coordinator/.    Baldemar Horvath MD, MD  Hospitalist Service, [address], [place]  Pager: [phone]  Please see sticky note for cross cover information  __________________________________________________________________  Interval History   Denies any new complaints  4 point ROS done and negative unless mentioned    Data reviewed today: I reviewed all medications, new labs and imaging results over the last 24 hours. I personally reviewed no images or EKG's today.    Physical Exam   Vital Signs: Temp: 98.3  F (36.8  C) Temp src: Oral BP: 93/51   Heart Rate: 52 Resp: 18 SpO2: 96 % O2 Device: None (Room air)    Weight: 368 lbs 0 oz  /47 (BP Location: Right arm)   Pulse (!) 49   Temp 97.8  F (36.6  C) (Oral)   Resp 18   Ht 1.753 m (5' 9\")   Wt (!) 166.9 kg (368 lb)   SpO2 94%   BMI 54.34 kg/m    Gen- pleasant laying on bed  HEENT- NAD, REYNALDO  Neck- supple, no JVD elevation  CVS- I+II+ no m/r/g  RS- CTAB, decreased at base  Abdo- soft, no tenderness . No g/r/r   Ext-  lympheedema       Data   BMP  Recent " Labs   Lab 06/25/20  0509 06/21/20  0610    139   POTASSIUM 4.2 4.3   CHLORIDE 108 106   SANDRA 8.7 8.5   CO2 27 26   BUN 28 26   CR 0.52 0.57   GLC 88 91     CBC  Recent Labs   Lab 06/23/20  0555   *     INRNo lab results found in last 7 days.  LFTs  Recent Labs   Lab 06/25/20  0509 06/21/20  0610   ALKPHOS 174* 192*   AST 40 95*   ALT 83* 148*   BILITOTAL 0.8 0.9   PROTTOTAL 5.9* 6.1*   ALBUMIN 2.7* 2.5*

## 2020-06-25 NOTE — PROGRESS NOTES
Social Work Services Progress Note    Hospital Day: 20  Collaborated with:  [facility] liaison, NST, Charge RN, Nahomy [place] DD Level 2 screener, Kelly [place] MI level 2 screener.    Data:    SILVIA spoke with [facility] Liaison for [facility] T[facility] and was able to verify pt is able to discharge to facility today pending level 2 PAS screen.     SILVIA received message from Bruna at [facility] stating case is being referred to  for the screening level 2.     SILVIA received vm from [facility]  [phone] who received the referral for pts DD [employer] is working on getting level 2 completed for PAS OBRA.     SILVIA contacted [facility] transportation who indicated pt will require speciality stretcher ride due to BMI.  SILVIA secured a tentative ride for 4pm in event that OBRA screening is completed.     SILVIA received VM from Kelly [phone] with [facility] MI level 2 PAS OBRA screening requesting return call. SILVIA was able to reach Kelly and learned that there is one person assigned for DD and one person assigned for MI as pt showed up as needing additional screening for both.  SILVIA sent H and P as requested to Kelly Fax - [phone].    SILVIA updated NST to please update Charge RN.    SILVIA updated [facility] liaison for TCU as well.     Intervention:  Continued collaboration towards discharge.    Assessment:  SILVIA did not interact with pt at this time.     Plan:    Anticipated Disposition:  Facility:  [facility] TCU    Barriers to d/c plan:  Level 2 PAS - OBRA MI and DD     Follow Up:  SILVIA will continue to follow for discharge planning.     ABBY Gregorio, KOLTON Rashid   [facility]  Pager: [phone]      Addendum at  2:31 pm:    SILVIA spoke with [facility] liaison who indicated need to cancel for today as no discharge preparation has been made and team is unable to plan for this pts arrival.     SILVIA cancelled [facility] ride for  4pm.     SILVIA updated Charge RN.     ABBY Gregorio, KOLTON Rashid   [facility]  Pager:  [phone]    Addendum at 3:15pm:     SILVIA received call from Nahomy with [facility]da [facility] DD PAS level 2 askign about pts admit date and about actual address for TCU.  SILVIA provided. Nahomy stated she would submit report today and send copy of level 2 PAS via Fax to TCU.     ABBY Gregorio, [facility]   [facility]  Pager: [phone]

## 2020-06-25 NOTE — PLAN OF CARE
"PT Cancel: Pt initially not responding to therapist when asked about participation in session. As therapist initiates bed mobility, pt yells out \"no\". Repeatedly says \"no\" as therapist spends time encouraging participation.      Per RN, order placed for home O2 test. Pt has not been on O2 during pt sessions, vitals have remained stable. Pt with minimal participation in activity, would not be able to perform accurate home O2 test. Pt plan to discharge to TCU possibly 6/26  "

## 2020-06-25 NOTE — PLAN OF CARE
"[id number]    BP (P) 107/49 (BP Location: Right arm)   Pulse 52   Temp 98.3  F (36.8  C) (Oral)   Resp 16   Ht 1.753 m (5' 9\")   Wt (!) 166.9 kg (368 lb)   SpO2 95%   BMI 54.34 kg/m      Alert with flat affect.  Delayed response.  VSS on RA.  Denies pain.  TF running 45mL/hr with 30 mL flushes Q4H.  TF tubing out of stock, will contact nutrition for TF cans instead of bags.  No BM this shift.  Purewick in place, voided 300 mL out.  Repositioned Q2H.      POC: discharge to [facility] today.    "

## 2020-06-25 NOTE — PLAN OF CARE
Major Shift Events: No acute events. Patrick. Other VSS on room air. Alert. Slow to respond. Ate eggs & oatmeal for breakfast. Declined lunch. External cath in place. No BM this shift.   Plan: Continue to monitor & follow plan of care.  *For vital signs and complete assessments, please see documentation flowsheets.

## 2020-06-25 NOTE — PLAN OF CARE
"/57 (BP Location: Right arm)   Pulse 52   Temp 97  F (36.1  C) (Oral)   Resp 16   Ht 1.753 m (5' 9\")   Wt (!) 166.9 kg (368 lb)   SpO2 97%   BMI 54.34 kg/m      Care from: 9129-2184      VS & Pain: VSS ex bradycardic, on room air, denied pain    Neuro: Alert, disoriented to time and situation, flat affect    Respiratory: diminished lung sounds in BLL    Cardiac: bradycardic    Peripheral neurovascular: dependent and generalized edema noted    GI/: urinary and stool incontinence- Purewick in place, adequate urine output, large soft and loose BM x2 this shift     Nutrition: good appetite and oral intake, TF is infusing at 45 mL/hr, denied nausea    Skin: PIVs, bruised, scab, blanchable redness at coccyx- applied barrier cream and repositioned q2h    Musculoskeletal: moderately impaired    Lines: L PIVs are saline locked    Activity: Assist of 2, mechanical lift    Events this shift: Asymptomatic COVID test was negative. Provided perineal care and changed Purewick. Repositioned q2h. Call light within reach and bed alarm on.    Plan: Continue to monitor and follow poc. Anticipate discharge to  TCU tomorrow.  "

## 2020-06-25 NOTE — PLAN OF CARE
Discharge Planner OT   Patient plan for discharge: Not discussed.   Current status: Pt supine inclined in bed. Pt refusing OOB activity despite education and encouragement. Pt engaged in BUE AROM/AAROM exercise x 12 reps each motion with fair tolerance and rest breaks throughout. VSS.   Barriers to return to prior living situation: Decreased independence with functional transfers/ADLs. Decreased strength/endurance. Motivation.   Recommendations for discharge: TCU  Rationale for recommendations: Pt will benefit from continued therapy to address barriers above and to maximize functional independence.        Entered by: Cl Sosa 06/24/2020 8:12 PM

## 2020-06-25 NOTE — PROGRESS NOTES
Care Coordinator - Discharge Planning    Admission Date/Time:  6/5/2020  Attending MD:  Dick Balbuena MD     Data  Date of initial CC assessment:  6/9/2020  Chart reviewed, discussed with interdisciplinary team.   Patient was admitted for:   1. Intellectual delay    2. Somnolence    3. Generalized muscle weakness    4. COVID-19 virus not detected         Assessment   Full assessment completed in previous note    Coordination of Care and Referrals:   Call received from Pauline aviles/patient's group home. Provided update on discharge planning, no further concerns at this time. Plan for patient to discharge to  TCU after level 2 screen by the [facility].       Plan  Anticipated Discharge Date:  TBD  Anticipated Discharge Plan:  TCU    Gwen Green, RNCC, BSN    [facility]  [address] [place] [id number]    [email or web address]    Office: [phone] Pager: [phone]  To contact the weekend RNCC, page [phone].

## 2020-06-26 ENCOUNTER — APPOINTMENT (OUTPATIENT)
Dept: SPEECH THERAPY | Facility: CLINIC | Age: 35
DRG: 887 | End: 2020-06-26
Payer: MEDICARE

## 2020-06-26 PROCEDURE — 25000128 H RX IP 250 OP 636: Performed by: PHYSICIAN ASSISTANT

## 2020-06-26 PROCEDURE — 99232 SBSQ HOSP IP/OBS MODERATE 35: CPT | Performed by: INTERNAL MEDICINE

## 2020-06-26 PROCEDURE — 25000132 ZZH RX MED GY IP 250 OP 250 PS 637: Performed by: STUDENT IN AN ORGANIZED HEALTH CARE EDUCATION/TRAINING PROGRAM

## 2020-06-26 PROCEDURE — G0463 HOSPITAL OUTPT CLINIC VISIT: HCPCS

## 2020-06-26 PROCEDURE — 25000132 ZZH RX MED GY IP 250 OP 250 PS 637: Performed by: PHYSICIAN ASSISTANT

## 2020-06-26 PROCEDURE — 99231 SBSQ HOSP IP/OBS SF/LOW 25: CPT | Performed by: PSYCHIATRY & NEUROLOGY

## 2020-06-26 PROCEDURE — 12000001 ZZH R&B MED SURG/OB UMMC

## 2020-06-26 PROCEDURE — 92526 ORAL FUNCTION THERAPY: CPT | Mod: GN

## 2020-06-26 PROCEDURE — 27210437 ZZH NUTRITION PRODUCT SEMIELEM INTERMED LITER

## 2020-06-26 PROCEDURE — 25000132 ZZH RX MED GY IP 250 OP 250 PS 637: Performed by: HOSPITALIST

## 2020-06-26 PROCEDURE — 25000132 ZZH RX MED GY IP 250 OP 250 PS 637: Performed by: INTERNAL MEDICINE

## 2020-06-26 RX ORDER — PRAVASTATIN SODIUM 40 MG
40 TABLET ORAL DAILY
Status: DISCONTINUED | OUTPATIENT
Start: 2020-06-27 | End: 2020-07-10 | Stop reason: HOSPADM

## 2020-06-26 RX ADMIN — CALCIUM POLYCARBOPHIL 1250 MG: 625 TABLET, FILM COATED ORAL at 21:26

## 2020-06-26 RX ADMIN — CLONIDINE HYDROCHLORIDE 0.1 MG: 0.1 TABLET ORAL at 21:26

## 2020-06-26 RX ADMIN — CALCIUM POLYCARBOPHIL 1250 MG: 625 TABLET, FILM COATED ORAL at 10:42

## 2020-06-26 RX ADMIN — ESCITALOPRAM OXALATE 20 MG: 10 TABLET ORAL at 10:42

## 2020-06-26 RX ADMIN — ASPIRIN 81 MG CHEWABLE TABLET 324 MG: 81 TABLET CHEWABLE at 10:41

## 2020-06-26 RX ADMIN — MULTIVIT AND MINERALS-FERROUS GLUCONATE 9 MG IRON/15 ML ORAL LIQUID 15 ML: at 10:42

## 2020-06-26 RX ADMIN — DOCUSATE SODIUM AND SENNOSIDES 1 TABLET: 50; 8.6 TABLET ORAL at 21:26

## 2020-06-26 RX ADMIN — POLYETHYLENE GLYCOL 3350 17 G: 17 POWDER, FOR SOLUTION ORAL at 10:41

## 2020-06-26 RX ADMIN — ENOXAPARIN SODIUM 40 MG: 40 INJECTION SUBCUTANEOUS at 10:36

## 2020-06-26 RX ADMIN — FAMOTIDINE 20 MG: 20 TABLET ORAL at 10:42

## 2020-06-26 RX ADMIN — CLONIDINE HYDROCHLORIDE 0.1 MG: 0.1 TABLET ORAL at 10:42

## 2020-06-26 RX ADMIN — MELATONIN TAB 3 MG 3 MG: 3 TAB at 21:25

## 2020-06-26 RX ADMIN — ENOXAPARIN SODIUM 40 MG: 40 INJECTION SUBCUTANEOUS at 21:50

## 2020-06-26 RX ADMIN — LEVOTHYROXINE SODIUM 50 MCG: 50 TABLET ORAL at 10:42

## 2020-06-26 ASSESSMENT — MIFFLIN-ST. JEOR: SCORE: 2424.55

## 2020-06-26 ASSESSMENT — ACTIVITIES OF DAILY LIVING (ADL)
ADLS_ACUITY_SCORE: 29
ADLS_ACUITY_SCORE: 25

## 2020-06-26 NOTE — CONSULTS
Consult Date:  06/26/2020      PSYCHIATRY CONSULTATION      IDENTIFICATION:  Ms. Dionne Perez is a 35-year-old white female with very significant intellectual impairment.  She was admitted with potential altered mental status and has been evaluated by both Dr. Horne and Dr. Solorio.  I am asked to provide psychiatric followup by Dr. Horvath.      This patient was seen by Dr. Solorio on 06/06 and thought to have likely a conversion disorder, but potentially catatonia.  On 06/10, she was seen by Dr. Horne, who felt she had an adjustment disorder with possible features of conversion along with intellectual disability and history of anxiety and depression.  On my interview today, the patient was lying in bed.  She was smiling and was watching a cartoon on television, though I do not believe she was actually seeing it.  She reports that she is enjoying her time in the hospital.  She reports she is eating and sleeping well and she did not think there was anything more that I could do for her.  I did discuss the case with her primary nurse who says the patient has been pleasant and happy for the last several days.  There had been no outbursts, no episodes of catatonia and nothing that would make him think of conversion.  It appears that she is doing remarkably better.      MENTAL STATUS EXAMINATION:  Today, the patient was pleasant and cooperative.  Her mood was reported as good.  Her affect was full.  She was smiling appropriately.  Her speech is somewhat immature but coherent and goal oriented.  She is generally able to make her needs known.  Her content of thought was without obvious psychosis or suicidal ideation.  Recent and remote memory, concentration, fund of knowledge are all impaired, but this appears to be a part of her chronic condition.  Her use of language and concentration were at baseline.  She is generally alert and oriented to self and place, not necessarily to date.  I note that when physical therapy comes in  to see her, she closes her eyes and becomes unresponsive, likely because she has no interest in working with physical therapy.  She seems to prefer staying in bed watching television.  Insight and judgment are guarded.        Recent vital signs include temperature of 98.3, heart rate of 52, respiration rate of 20 with 98% oxygen saturation and a blood pressure of 111/52.      ASSESSMENT:  Intellectual disability; history of depression, which seems to have resolved.  (The reason for her altered mental status on admission remains unclear.  It was unlikely to be neurologic in origin given her negative neurological workup.)      RECOMMENDATIONS:  The patient appears to be in a good place and ready to transfer back to her original placement.         PENNY CRAMER MD             D: 2020   T: 2020   MT: KAY      Name:     KISHA CANCHOLA   MRN:      [id number]        Account:       [id number]   :      1985           Consult Date:  2020      Document: [id number]

## 2020-06-26 NOTE — PLAN OF CARE
"/58 (BP Location: Right arm)   Pulse (!) 49   Temp 97.7  F (36.5  C) (Oral)   Resp 18   Ht 1.753 m (5' 9\")   Wt (!) 166.9 kg (368 lb)   SpO2 96%   BMI 54.34 kg/m      Care from: [id number]      VS & Pain: VSS ex bradycardic, on room air, denied pain    Neuro: Alert, disoriented to time and situation, flat affect    Respiratory: diminished lung sounds in BLL    Cardiac: bradycardic    Peripheral neurovascular: dependent and generalized edema noted    GI/: urinary and stool incontinence- Purewick in place, adequate urine output, no BM this shift     Nutrition: good appetite and oral intake, TF is infusing at 45 mL/hr, denied nausea    Skin: PIVs, bruised, scab, pea size open skin noted at coccyx- applied mepilex, repositioned q2h, and ordered a WOC consult    Musculoskeletal: moderately impaired    Lines: L PIVs are saline locked    Activity: Assist of 2, mechanical lift    Events this shift: Provided perineal care and changed Purewick. Repositioned q2h. Walk test was ordered, please refer to PT note for details. Call light within reach and bed alarm on.    Plan: Continue to monitor and follow poc. Possible discharge to  TCU tomorrow.  "

## 2020-06-26 NOTE — PROGRESS NOTES
"Social Work Services Progress Note    Hospital Day: 21  Collaborated with:   TCU Liaison    Data:  SW received page from [facility] TCU liaison Amaris updating SW that in fact after further review that [facility] TCU is not the most appropriate setting for pt.  [facility] Liaison explained that they are committed to helping to find more appropriate placement and are escalating with Jermaine.      In addition, SW was asked to; a.) contact primary team following pt and ask that Psych get \"re involved\" as pts primary reason for admitting was for psych. B.) That SW reach out to [facility] and if in agreement SW make referral to [facility] Psych unit.     SILVIA MD about re engaging psych and will ask psych to let SW know if there is any reason to indicate inpatient Psychiatric care.     SW spoke with Psych who met with pt and indicates that there is no indication for this pt to go to in patient psych. SW will not send referral to [facility] Psyciatric unit.     Intervention:  Continued discharge planning and follow up.    Assessment:  SW did not have interaction with pt at this time.     Plan:    Anticipated Disposition:  Facility:  D    Barriers to d/c plan:  Accepting facility     Follow Up:   SW will continue to follow for discharge planning and assistance with Psychosocial needs.     ABBY Gregorio, LGSW  [facility]   [facility]  Pager: [phone]        "

## 2020-06-26 NOTE — PROGRESS NOTES
CLINICAL NUTRITION SERVICES - REASSESSMENT NOTE     Nutrition Prescription    RECOMMENDATIONS FOR MDs/PROVIDERS TO ORDER:  Recommend continue EN provisions to meet nutrition needs until pt able to meet ~ 1396-3048 kcals and ~50-70 g PRO per day via PO intake.  --> If to remain inpatient, please continue kcal counts to quantify PO intake.    Malnutrition Status:    Unable to determine due to unable to obtain all parameters/NFPE d/t preserving PPE and social distancing during Covid-19 pandemic.    Recommendations already ordered by Registered Dietitian (RD):  Enteral Nutrition - continue  Supplements PRN    Future/Additional Recommendations:  Monitor adequacy of PO intake and adjust EN provisions as appro.  Monitor skin status.     EVALUATION OF THE PROGRESS TOWARD GOALS   Diet:   Regular per SLP (advanced 6/24 from Dysphagia Diet Level 2)    Intake:   Kcal counts:  6/21   190 kcals and 6 g protein (2 meal/s and 0 supplement/s recorded)  6/22   310 kcals and 15 g protein (1 meal/s and 0 supplement/s recorded)  6/23   415 kcals and 29 g protein (1 meal/s and 0 supplement/s recorded)  * Pt consumed a 3-day average of 305 kcals and 17 g protein daily, meeting 18% low end estimated energy needs and 14% low end estimated protein needs.      Per flowsheets, pt consumed 100% of 1 meal on 6/24 and 50% of breakfast + 100% of dinner on 6/25 (skipped lunch). Per RN notes pt with improving appetite throughout week. Noted to have minimal appetite 6/22 (pt stating that she's not hungry, didn't order breakfast or lunch). Appetite starting to improve 6/23 but again did not eat breakfast or lunch. Fair appetite noted on 6/24 and good appetite noted on 6/25.     Nutrition Support: Enteral Nutrition  - Access: Nasogastric Tube   - Goal Regimen: Peptamen VHP @ goal 45 ml/hr (1080 ml/day) to provide 1080 kcals (13 kcal/kg/day), 100 g PRO (1.2 g/kg/day), 907 ml free H2O, 84 g CHO and 4 g Fiber daily.  - FWF: 30 mL q4h patency flushes. Goal  EN + FWF = 1087 mL free water/day    Intake:   TF regimen modified from Peptamen VHP @ 75 mL/hr to 45 mL/hr on 6/20 d/t increasing PO intake.     Per intake/ouput, 11 kcal/kg/day and 1.0 g pro/kg/day per DW 83 kg (adjusted). This meets 57% low end estimated energy needs and 69% low end estimated protein needs.       PO + EN Provisions: ~15 kcal/kg/day (75% est need) and 1.2 g pro/kg/day (80% est need).      NEW FINDINGS   Chart Review: Awaiting placement to TCU. Possible discharge today.    Labs: Reviewed.     Medications: Reviewed.    Weight: Throughout admission weight has fluctuated between 134 - 172.4 kg with overall upward trend likely in setting of fluid status. Current weight is overall +32.9 kg from admission weight. Dosing weight (83 kg adjusted) remains appropriate.     GI:  Per intake/output, last BM documented on 6/24 with 2x occurrences. Abdomen noted to be soft and non-tender.     Skin: WOC consulted 6/25 for potential pressure injury on coccyx.     MALNUTRITION  % Intake: Decreased intake does not meet criteria (EN + PO meeting 75-80% est needs)  % Weight Loss: Unable to assess d/t suspected confounding fluid  Subcutaneous Fat Loss: Unable to assess  Muscle Loss: Unable to assess  Fluid Accumulation/Edema: Moderate per chart review  Malnutrition Diagnosis: Unable to determine due to unable to obtain all parameters/NFPE d/t preserving PPE and social distancing during Covid-19 pandemic    Previous Goals   Total avg nutritional intake to meet a minimum of 20 kcal/kg and 1.5 gm PRO/kg daily (per dosing wt 83 kg adjusted wt ).  Evaluation: Not met    Previous Nutrition Diagnosis  Inadequate oral intake related to slow improve in altered mentation as evidenced by continues to rely on NG tube feed support to supplement oral intake   Evaluation: Improving    CURRENT NUTRITION DIAGNOSIS  Inadequate oral intake related to variable appetite inhibiting ability to meet nutrition needs PO as evidenced by calorie  count data indicating 3-day average PO intake of 305 kcals and 17 g protein daily, meeting 18% low end estimated energy needs and 14% low end estimated protein needs      INTERVENTIONS  Implementation  Enteral Nutrition - Continue  Collaboration with Providers - discussed nutrition POC with RN. Will enter order for PRN supplements but will not schedule at this time.     Goals  Total avg nutritional intake to meet a minimum of 20 kcal/kg and 1.5 g PRO/kg daily (per dosing wt 83 kg).    Monitoring/Evaluation  Progress toward goals will be monitored and evaluated per protocol.    Lb Gregorio, MS, RD, LD  5A/5B floor pager [phone]

## 2020-06-26 NOTE — PLAN OF CARE
"PT Cancel: Pt declining therapy. Pt closing eyes as therapist is having conversation with pt and is not responding. Pt then states \"I'm sure\" \"I do not want to get up\".   "

## 2020-06-26 NOTE — PLAN OF CARE
Major Shift Events: No acute events. Patrick (baseline). Other VSS on room air. Alert. Slow to respond. Worked w/ SLP. Declined OT. Appetite improving. Ate majority of breakfast & lunch tray. TF @ 45 ml/hr. Incontinent of urine. No BM this shift. Denies pain.  Plan: Continue to monitor & follow plan of care.  *For vital signs and complete assessments, please see documentation flowsheets.

## 2020-06-26 NOTE — PLAN OF CARE
Discharge Planner SLP   Patient plan for discharge: none stated   Current status: Pt with functional tolerance of current diet level. Recommend pt continue regular textures and thin liquids with supervision/assist as needed. Caregivers to encourage pt to sit fully upright for all PO. Suspect pt is at baseline oropharyngeal swallow function; goals met.   Barriers to return to prior living situation: none per ST  Recommendations for discharge: defer to PT/OT  Rationale for recommendations: suspect pt is at baseline oropharyngeal swallow function. Will complete ST orders.        Entered by: Khalida Mendoza 06/26/2020 2:33 PM       Speech Language Therapy Discharge Summary    Reason for therapy discharge:    All goals and outcomes met, no further needs identified.    Progress towards therapy goal(s). See goals on Care Plan in [facility] electronic health record for goal details.  Goals met    Therapy recommendation(s):    No further therapy is recommended.

## 2020-06-26 NOTE — PROGRESS NOTES
[facility], [facility] Progress Note - Hospitalist Service, Gold 8       Date of Admission:  6/5/2020  Assessment & Plan   Plan for today- no change medically. Psych consult requested by TCU    Awaiting Placement in her Group home/ TCU- checked covid test to screen before being accepted at her Group Home   ( A/w level-2 PAS screen)     Ms. Dionne Perez is a 36 yo female with hx of developmental delay, depression, anxiety, likely psychosis, GERD, and hypothyrodism, admitted 6/5/2020 to [facility] for further eval and management of Acute Mental Status change. She was found to have Cerebellar Infarct in the MRI scan done 6/6/2020. LP done 6/9/2020 without any evidence of infection. Neurology could not come up with any cause of AMS and Ceftriaxone. Vancomycin and Acyclovir were discontinued and  Psychiatry thought it  could be adjustment disorder. Patient was thought to have not been opening the mouth Voluntarily.  NG inserted 6/7/2020 for medication  administration and start enteral nutrition which she removed 6/7 evening and is now replaced with bridal. and TFs to goal of 75 ml/hr. On 6/16/2020 was evaluated by Speech therapy and to Continue dysphagia diet level 2 with thin liquids as tolerated with supervision/assist        1. AMS: Likely a type of adjustment disorder in the setting of acute stress and her developmental delay. Patient remains minimally responsive and not at her baseline. She seems like she is improving from her admission. Baseline is she responds in few words, but is independent with her ADLs. Her nuerologic workup has been largely unrevealing without evidence of seizures on EEG, MRI demonstrated new cerebellar infarct. MRI without any clear etiology which does not explain her change in mentation. LP without any evidence of infection.   - Neurology has no additional recommendations for additional workup  - Appreciate Psychiatry input - as is improving seems like could  be adjustment disorder  - DC'ed IV acyclovir, ceftriaxone, and vanc  - uptitrated PTA Lexapro up to 20mg PTA dose     2. Concern for dysphagia  Has had no PO intake since admission due to her mouth rigidity and not opening her mouth volitionally. Continues, therefore to be NPO. NG inserted 6/7 for med administration and to start enteral nutrition. Patient removed NG 6/7 evening, replaced with bridal.   -  TFs to goal of 75 ml/hr  - speech therapy consult and recommendations appreciated   - Nutrition consulted and appreciate following      3. Incidental finding of R cerebellar infarct of uncertain significance  MRI head for AMS workup revealed finding of R cerebellar infarct. Pt started on aspirin, obtained CTA head and neck that was non-diagnostic due to it being severely limited due to poor contrast opacification. TTE with bubble study negative.   - Neurology consulted   - Continue  mg daily, if unable to take orally, can be given rectally as 300 mg   - Hold statin in the setting of elevated LFTs - trending down -resume 6/26 and monitor LFT  - Repeat CTA head and neck without evidence of vascular stenosis  - Continue tele monitoring      4.  Mildly elevated LFTs: likely from Hepatic steatosis   Unclear etiology as LFTs from OSH as recently as 5/26 normal. AST and ALT increasing today. GGT elevated to 66. Lipase 876 (< 3X the upper limit of normal). CT abdomen 6/6 with no acute findings. RUQ US with mild hepatomegaly and diffuse hepatic steatosis, CBD 5.5 mm, no hepatic biliary ductal dilation. Tbili wnl. Reported to have abdominal pain on admission, no further complaints of pain. Appears to be tolerating TF. Possible pancreatitis on admission vs LFT elevation 2/2 hepatic steatosis and CK elevation.   - Continue to monitor      # Chronic pain?: Pt noted to be on scheduled Tylenol PTA, as well as have available as needed diclofenac gel.   - Discontinued PTA scheduled Tylenol given transaminitis     #  "Hypothyroidism: PTA on levothyroxine 50 mcg daily. TFTs on admission reveal pt euthyroid based on free T4 WNL.  - Continue PTA levothyroxine     # GERD: Continue PTA Pepcid 20 mg daily        Diet: Adult Formula Drip Feeding: Continuous Peptamen Intense VHP; Nasogastric tube; Goal Rate: 45; mL/hr; Medication - Feeding Tube Flush Frequency: At least 15-30 mL water before and after medication administration and with tube clogging; Amount to Se...  Regular Diet Adult  Snacks/Supplements Adult: Other; Pt may order snacks/supplements PRN; Between Meals    DVT Prophylaxis: Enoxaparin (Lovenox) SQ  Davila Catheter: not present  Code Status: Full Code           Disposition Plan   Expected discharge: Tomorrow, recommended to transitional care unit once safe disposition plan/ TCU bed available.  Entered: Baldemar Horvath MD, MD 06/26/2020, 4:39 PM       The patient's care was discussed with the Bedside Nurse and Care Coordinator/.    Baldemar Horvath MD, MD  Hospitalist Service, [address], [place]  Pager: [phone]  Please see sticky note for cross cover information  __________________________________________________________________  Interval History   Denies any new complaints  4 point ROS done and negative unless mentioned    Data reviewed today: I reviewed all medications, new labs and imaging results over the last 24 hours. I personally reviewed no images or EKG's today.    Physical Exam   Vital Signs: Temp: 98.3  F (36.8  C) Temp src: Oral BP: 111/52 Pulse: 52 Heart Rate: 52 Resp: 20 SpO2: 98 % O2 Device: None (Room air)    Weight: 368 lbs 0 oz  /52 (BP Location: Right arm)   Pulse 52   Temp 98.3  F (36.8  C) (Oral)   Resp 20   Ht 1.753 m (5' 9\")   Wt (!) 166.9 kg (368 lb)   SpO2 98%   BMI 54.34 kg/m    Gen- pleasant laying on bed  HEENT- NAD, REYNALDO  Neck- supple, no JVD elevation  CVS- I+II+ no m/r/g  RS- CTAB, decreased at base  Abdo- soft, no tenderness . No g/r/r "   Ext-  lymphedema       Data   BMP  Recent Labs   Lab 06/25/20  0509 06/21/20  0610    139   POTASSIUM 4.2 4.3   CHLORIDE 108 106   SANDRA 8.7 8.5   CO2 27 26   BUN 28 26   CR 0.52 0.57   GLC 88 91     CBC  Recent Labs   Lab 06/23/20  0555   *     INRNo lab results found in last 7 days.  LFTs  Recent Labs   Lab 06/25/20  0509 06/21/20  0610   ALKPHOS 174* 192*   AST 40 95*   ALT 83* 148*   BILITOTAL 0.8 0.9   PROTTOTAL 5.9* 6.1*   ALBUMIN 2.7* 2.5*

## 2020-06-26 NOTE — PLAN OF CARE
OT Cx.  Patient declining all offers of activity EOB/supine.  Patient educated on benefits of activity, and she continued to decline.

## 2020-06-26 NOTE — PLAN OF CARE
Pt is oriented to self and place. She required assist of 2 and the angelica for repositioning every 2 hours overnight. She is incontinent of urine and has a pur wick catheter in place. Her NG is infusing at 45 ml an hour and 30 ml of free water was flushed q4h. No complaints of pain or N/V/D.      Problem: Adult Inpatient Plan of Care  Goal: Plan of Care Review  6/26/2020 0559 by Awa Fiore, RN  Outcome: No Change  6/25/2020 1948 by Delonte Ayoub, RN  Outcome: No Change

## 2020-06-26 NOTE — CONSULTS
WO Nurse Inpatient Wound Assessment   Reason for consultation: coccyx wound     Assessment  Coccyx wound due to Incontinence Associated Dermatitis (IAD), Moisture Associated Skin Damage (MASD) and Skin Tear  Status: initial assessment    Treatment Plan  coccyx wound: BID and PRN to maintain barrier, apply Critic-aid paste over wound sites. With incontinence, cleanse with Raz cleanse and protect and paper washclothes. Do not need to remove all paste with cleaning, only skim off top soiled layer and reapply to maintain barrier. If full removal needed, please use baby oil to reduce friction forces to the skin.     Orders Written    WOC Nurse follow-up plan:weekly  Nursing to notify the Provider(s) and re-consult the WOC Nurse if wound(s) deteriorates or new skin concern.    Patient History  According to provider note(s):  Ms. Dionne Perez is a 36 yo female with hx of developmental delay, depression, anxiety, likely psychosis, GERD, and hypothyrodism, admitted 6/5/2020 to [facility] for further eval and management of Acute Mental Status change. She was found to have Cerebellar Infarct in the MRI scan done 6/6/2020. LP done 6/9/2020 without any evidence of infection. Neurology could not come up with any cause of AMS and Ceftriaxone. Vancomycin and Acyclovir were discontinued and  Psychiatry thought it  could be adjustment disorder    Objective Data  Containment of urine/stool: Incontinence Protocol, Incontinent pad in bed and External catheter    Active Diet Order  Orders Placed This Encounter      Regular Diet Adult      Output:   I/O last 3 completed shifts:  In: 1970 [P.O.:590; NG/GT:300]  Out: 700 [Urine:700]    Risk Assessment:   Sensory Perception: 2-->very limited  Moisture: 3-->occasionally moist  Activity: 2-->chairfast  Mobility: 2-->very limited  Nutrition: 3-->adequate  Friction and Shear: 2-->potential problem  Kris Score: 14                          Labs:   Recent Labs   Lab 06/25/20  0509   ALBUMIN 2.7*        Physical Exam  Skin inspection: focused buttock    Wound Location:  coccyx  Date of last photo -  Wound History: patient with incontinence, is able to assist with turns pretty well.   Measurements (length x width x depth, in cm) 0.5  x 0.5  x  0.1 cm   Wound Base: 100 % dermis  Tunneling N/A  Undermining N/A  Palpation of the wound bed: normal   Periwound skin: intact  Periwound Color: normal and consistent with surrounding tissue  Periwound Temperature: normal   Drainage:, scant  Description of drainage: serous  Odor: none  Pain: denies , none    Interventions  Current support surface: Bariatric Low air loss mattress  Current off-loading measures: Pillows  Visual inspection and assessment completed   Wound Care: done per plan of care  Supplies: floor stock  Education provided: plan of care  Discussed plan of care with Patient and Nurse    Shagufta Delatorre RN, CWOCN

## 2020-06-27 LAB — GLUCOSE BLDC GLUCOMTR-MCNC: 94 MG/DL (ref 70–99)

## 2020-06-27 PROCEDURE — 25000132 ZZH RX MED GY IP 250 OP 250 PS 637: Performed by: STUDENT IN AN ORGANIZED HEALTH CARE EDUCATION/TRAINING PROGRAM

## 2020-06-27 PROCEDURE — 25000132 ZZH RX MED GY IP 250 OP 250 PS 637: Performed by: INTERNAL MEDICINE

## 2020-06-27 PROCEDURE — 99232 SBSQ HOSP IP/OBS MODERATE 35: CPT | Performed by: INTERNAL MEDICINE

## 2020-06-27 PROCEDURE — 12000001 ZZH R&B MED SURG/OB UMMC

## 2020-06-27 PROCEDURE — 25000132 ZZH RX MED GY IP 250 OP 250 PS 637: Performed by: HOSPITALIST

## 2020-06-27 PROCEDURE — 25000132 ZZH RX MED GY IP 250 OP 250 PS 637: Performed by: PHYSICIAN ASSISTANT

## 2020-06-27 PROCEDURE — 99207 ZZC CDG-CUT & PASTE-POTENTIAL IMPACT ON LEVEL: CPT | Performed by: INTERNAL MEDICINE

## 2020-06-27 PROCEDURE — 00000146 ZZHCL STATISTIC GLUCOSE BY METER IP

## 2020-06-27 PROCEDURE — 27210437 ZZH NUTRITION PRODUCT SEMIELEM INTERMED LITER

## 2020-06-27 PROCEDURE — 25000128 H RX IP 250 OP 636: Performed by: PHYSICIAN ASSISTANT

## 2020-06-27 RX ADMIN — LEVOTHYROXINE SODIUM 50 MCG: 50 TABLET ORAL at 09:44

## 2020-06-27 RX ADMIN — FAMOTIDINE 20 MG: 20 TABLET ORAL at 09:42

## 2020-06-27 RX ADMIN — ESCITALOPRAM OXALATE 20 MG: 10 TABLET ORAL at 09:44

## 2020-06-27 RX ADMIN — CALCIUM POLYCARBOPHIL 1250 MG: 625 TABLET, FILM COATED ORAL at 09:42

## 2020-06-27 RX ADMIN — POLYETHYLENE GLYCOL 3350 17 G: 17 POWDER, FOR SOLUTION ORAL at 09:41

## 2020-06-27 RX ADMIN — MULTIVIT AND MINERALS-FERROUS GLUCONATE 9 MG IRON/15 ML ORAL LIQUID 15 ML: at 09:41

## 2020-06-27 RX ADMIN — ENOXAPARIN SODIUM 40 MG: 40 INJECTION SUBCUTANEOUS at 09:16

## 2020-06-27 RX ADMIN — ASPIRIN 81 MG CHEWABLE TABLET 324 MG: 81 TABLET CHEWABLE at 09:43

## 2020-06-27 RX ADMIN — ENOXAPARIN SODIUM 40 MG: 40 INJECTION SUBCUTANEOUS at 22:05

## 2020-06-27 RX ADMIN — CLONIDINE HYDROCHLORIDE 0.1 MG: 0.1 TABLET ORAL at 09:42

## 2020-06-27 RX ADMIN — PRAVASTATIN SODIUM 40 MG: 40 TABLET ORAL at 09:44

## 2020-06-27 ASSESSMENT — ACTIVITIES OF DAILY LIVING (ADL)
ADLS_ACUITY_SCORE: 25

## 2020-06-27 NOTE — PROGRESS NOTES
[facility], [facility] Progress Note - Hospitalist Service, Gold 8       Date of Admission:  6/5/2020  Assessment & Plan   Plan for today- no change medically.     Awaiting Placement in her Group home/ TCU- checked covid test to screen before being accepted at her Group Home   ( A/w level-2 PAS screen)     Ms. Dionne Perez is a 36 yo female with hx of developmental delay, depression, anxiety, likely psychosis, GERD, and hypothyrodism, admitted 6/5/2020 to [facility] for further eval and management of Acute Mental Status change. She was found to have Cerebellar Infarct in the MRI scan done 6/6/2020. LP done 6/9/2020 without any evidence of infection. Neurology could not come up with any cause of AMS and Ceftriaxone. Vancomycin and Acyclovir were discontinued and  Psychiatry thought it  could be adjustment disorder. Patient was thought to have not been opening the mouth Voluntarily.  NG inserted 6/7/2020 for medication  administration and start enteral nutrition which she removed 6/7 evening and is now replaced with bridal. and TFs to goal of 75 ml/hr. On 6/16/2020 was evaluated by Speech therapy and to Continue dysphagia diet level 2 with thin liquids as tolerated with supervision/assist        1. AMS: Likely a type of adjustment disorder in the setting of acute stress and her developmental delay. Patient remains minimally responsive and not at her baseline. She seems like she is improving from her admission. Baseline is she responds in few words, but is independent with her ADLs. Her nuerologic workup has been largely unrevealing without evidence of seizures on EEG, MRI demonstrated new cerebellar infarct. MRI without any clear etiology which does not explain her change in mentation. LP without any evidence of infection.   - Neurology has no additional recommendations for additional workup  - Appreciate Psychiatry input - as is improving seems like could be adjustment  disorder  - DC'ed IV acyclovir, ceftriaxone, and vanc  - uptitrated PTA Lexapro up to 20mg PTA dose     2. Concern for dysphagia  Has had no PO intake since admission due to her mouth rigidity and not opening her mouth volitionally. Continues, therefore to be NPO. NG inserted 6/7 for med administration and to start enteral nutrition. Patient removed NG 6/7 evening, replaced with bridal.   -  TFs to goal of 75 ml/hr  - speech therapy consult and recommendations appreciated   - Nutrition consulted and appreciate following      3. Incidental finding of R cerebellar infarct of uncertain significance  MRI head for AMS workup revealed finding of R cerebellar infarct. Pt started on aspirin, obtained CTA head and neck that was non-diagnostic due to it being severely limited due to poor contrast opacification. TTE with bubble study negative.   - Neurology consulted   - Continue  mg daily, if unable to take orally, can be given rectally as 300 mg   - Hold statin in the setting of elevated LFTs - trending down -resume 6/26 and monitor LFT  - Repeat CTA head and neck without evidence of vascular stenosis  - Continue tele monitoring      4.  Mildly elevated LFTs: likely from Hepatic steatosis   Unclear etiology as LFTs from OSH as recently as 5/26 normal. AST and ALT increasing today. GGT elevated to 66. Lipase 876 (< 3X the upper limit of normal). CT abdomen 6/6 with no acute findings. RUQ US with mild hepatomegaly and diffuse hepatic steatosis, CBD 5.5 mm, no hepatic biliary ductal dilation. Tbili wnl. Reported to have abdominal pain on admission, no further complaints of pain. Appears to be tolerating TF. Possible pancreatitis on admission vs LFT elevation 2/2 hepatic steatosis and CK elevation.   - Continue to monitor      # Chronic pain?: Pt noted to be on scheduled Tylenol PTA, as well as have available as needed diclofenac gel.   - Discontinued PTA scheduled Tylenol given transaminitis     # Hypothyroidism: PTA on  "levothyroxine 50 mcg daily. TFTs on admission reveal pt euthyroid based on free T4 WNL.  - Continue PTA levothyroxine     # GERD: Continue PTA Pepcid 20 mg daily        Diet: Adult Formula Drip Feeding: Continuous Peptamen Intense VHP; Nasogastric tube; Goal Rate: 45; mL/hr; Medication - Feeding Tube Flush Frequency: At least 15-30 mL water before and after medication administration and with tube clogging; Amount to Se...  Snacks/Supplements Adult: Other; Pt may order snacks/supplements PRN; Between Meals  Regular Diet Adult    DVT Prophylaxis: Enoxaparin (Lovenox) SQ  Davila Catheter: not present  Code Status: Full Code           Disposition Plan   Expected discharge: Tomorrow, recommended to transitional care unit once safe disposition plan/ TCU bed available.  Entered: Baldemar Horvath MD, MD 06/27/2020, 12:48 PM       The patient's care was discussed with the Bedside Nurse and Care Coordinator/.    Baldemar Horvath MD, MD  Hospitalist Service, [address], [place]  Pager: [phone]  Please see sticky note for cross cover information  __________________________________________________________________  Interval History   Denies any new complaints  4 point ROS done and negative unless mentioned    Data reviewed today: I reviewed all medications, new labs and imaging results over the last 24 hours. I personally reviewed no images or EKG's today.    Physical Exam   Vital Signs: Temp: 97.9  F (36.6  C) Temp src: Oral BP: 130/69 Pulse: 52 Heart Rate: 57 Resp: 18 SpO2: 97 % O2 Device: None (Room air)    Weight: 366 lbs 0 oz  /69   Pulse 52   Temp 97.9  F (36.6  C) (Oral)   Resp 18   Ht 1.753 m (5' 9\")   Wt (!) 166 kg (366 lb)   SpO2 97%   BMI 54.05 kg/m    Gen- pleasant laying on bed  HEENT- NAD, REYNALDO  Neck- supple, no JVD elevation  CVS- I+II+ no m/r/g  RS- CTAB, decreased at base  Abdo- soft, no tenderness . No g/r/r   Ext-  lymphedema       Data   BMP  Recent Labs "   Lab 06/25/20  0509 06/21/20  0610    139   POTASSIUM 4.2 4.3   CHLORIDE 108 106   SANDRA 8.7 8.5   CO2 27 26   BUN 28 26   CR 0.52 0.57   GLC 88 91     CBC  Recent Labs   Lab 06/23/20  0555   *     INRNo lab results found in last 7 days.  LFTs  Recent Labs   Lab 06/25/20  0509 06/21/20  0610   ALKPHOS 174* 192*   AST 40 95*   ALT 83* 148*   BILITOTAL 0.8 0.9   PROTTOTAL 5.9* 6.1*   ALBUMIN 2.7* 2.5*

## 2020-06-27 NOTE — PLAN OF CARE
"Shift: 7475-0949  Situation:  Patient with significant intellectual disability, history of depression,  who was admitted for AMSCerebellar Infarct in the MRI scan done 6/6/2020.  LP on 6-9-2020 w/o evidence of infection. Cerebellar infarct identified on MRI 6-6-2020.   Mood:  Patient flat affect, but able to relay she lives in group home with  4 residents.   Vitals:/51 (BP Location: Right arm)   Pulse 52   Temp 97.9  F (36.6  C) (Oral)   Resp 18   Ht 1.753 m (5' 9\")   Wt (!) 166 kg (366 lb)   SpO2 97%   BMI 54.05 kg/m    Neuro:Alert, oriented to self and place, but not time.  Respiratory:  NL breathing on RA  Cardiac:  Bradycardic,Generalized edema  Nutrition:  TF infusing at goal rate of 45 ml/hr.   GI/:  Incontinent of urine Purewick external catheter exchanges with   350 ml output. And 1 episode incontinence of urine  Bowel movements: 1 loose medium  Skin:  Coccyx wound with mepilex covered r/t IAD and MASD, skin tear. WOC evaluated patient   Activity:  Mechanical lift  Shift events:   No acute events overnight. Patient slept between cares  Plan of care:  Awaiting placement @  TCU once level 2 screen completed via [facility].   Continue to monitor nutrition, labs, pain, and follow POC. Notify MD of changes.  Call light within reach, bed alarm for safety. Turn and reposition q 2 hours.      "

## 2020-06-27 NOTE — PLAN OF CARE
Major Shift Events: No acute events. Patrick (baseline). Other VSS on room air. Alert. Slow to respond. Declined to get up to chair.  Appetite improving. Ate mac & cheese, pizza, & ice cream. TF @ 45 ml/hr. Incontinent of urine. 2 BM's today.  Denies pain.  Plan: Continue to monitor & follow plan of care.  *For vital signs and complete assessments, please see documentation flowsheets.

## 2020-06-28 PROCEDURE — 12000001 ZZH R&B MED SURG/OB UMMC

## 2020-06-28 PROCEDURE — 25000132 ZZH RX MED GY IP 250 OP 250 PS 637: Performed by: STUDENT IN AN ORGANIZED HEALTH CARE EDUCATION/TRAINING PROGRAM

## 2020-06-28 PROCEDURE — 25000132 ZZH RX MED GY IP 250 OP 250 PS 637: Performed by: PHYSICIAN ASSISTANT

## 2020-06-28 PROCEDURE — 25000132 ZZH RX MED GY IP 250 OP 250 PS 637: Performed by: INTERNAL MEDICINE

## 2020-06-28 PROCEDURE — 25000128 H RX IP 250 OP 636: Performed by: PHYSICIAN ASSISTANT

## 2020-06-28 PROCEDURE — 99207 ZZC CDG-CHARGE REQUIRED MANUAL ENTRY: CPT | Performed by: INTERNAL MEDICINE

## 2020-06-28 PROCEDURE — 25000132 ZZH RX MED GY IP 250 OP 250 PS 637: Performed by: HOSPITALIST

## 2020-06-28 PROCEDURE — 99232 SBSQ HOSP IP/OBS MODERATE 35: CPT | Performed by: INTERNAL MEDICINE

## 2020-06-28 PROCEDURE — 27210437 ZZH NUTRITION PRODUCT SEMIELEM INTERMED LITER

## 2020-06-28 RX ADMIN — CALCIUM POLYCARBOPHIL 1250 MG: 625 TABLET, FILM COATED ORAL at 11:06

## 2020-06-28 RX ADMIN — ASPIRIN 81 MG CHEWABLE TABLET 324 MG: 81 TABLET CHEWABLE at 07:56

## 2020-06-28 RX ADMIN — MULTIVIT AND MINERALS-FERROUS GLUCONATE 9 MG IRON/15 ML ORAL LIQUID 15 ML: at 11:06

## 2020-06-28 RX ADMIN — MELATONIN TAB 3 MG 3 MG: 3 TAB at 20:44

## 2020-06-28 RX ADMIN — PRAVASTATIN SODIUM 40 MG: 40 TABLET ORAL at 07:57

## 2020-06-28 RX ADMIN — CLONIDINE HYDROCHLORIDE 0.1 MG: 0.1 TABLET ORAL at 20:40

## 2020-06-28 RX ADMIN — FAMOTIDINE 20 MG: 20 TABLET ORAL at 07:57

## 2020-06-28 RX ADMIN — ENOXAPARIN SODIUM 40 MG: 40 INJECTION SUBCUTANEOUS at 20:40

## 2020-06-28 RX ADMIN — ENOXAPARIN SODIUM 40 MG: 40 INJECTION SUBCUTANEOUS at 07:57

## 2020-06-28 RX ADMIN — LEVOTHYROXINE SODIUM 50 MCG: 50 TABLET ORAL at 06:11

## 2020-06-28 RX ADMIN — ESCITALOPRAM OXALATE 20 MG: 10 TABLET ORAL at 07:57

## 2020-06-28 ASSESSMENT — ACTIVITIES OF DAILY LIVING (ADL)
ADLS_ACUITY_SCORE: 24
ADLS_ACUITY_SCORE: 25
ADLS_ACUITY_SCORE: 25
ADLS_ACUITY_SCORE: 24

## 2020-06-28 ASSESSMENT — MIFFLIN-ST. JEOR: SCORE: 2342.9

## 2020-06-28 NOTE — PLAN OF CARE
Patient denies pain. Resting in-between cares. Leakage around purewick. Incontinent of urine x3. No BM this shift. Good appetite with meals and tolerating TF at goal rate of 45 ml/hr. Will continue to monitor and follow plan of care.

## 2020-06-28 NOTE — PLAN OF CARE
"Shift:?[id number]   Situation: ?Patient with significant intellectual disability, history of depression, ?who was admitted for AMS Cerebellar Infarct in the MRI scan done 6/6/2020.??LP on 6-9-2020 w/o evidence of infection. Cerebellar infarct identified on MRI 6-6-2020.   Mood: ?Patient flat affect, but will smile and engage in conversation with slow, 1:1 simple commands and exchange/instructions.   Vitals: /51 (BP Location: Right arm) ? Pulse 52 ? Temp 97.9  F (36.6  C) (Oral) ? Resp 18 ? Ht 1.753 m (5' 9\") ? Wt (!) 166 kg (366 lb) ? SpO2 97% ? BMI 54.05 kg/m    Neuro: Alert, oriented to self and place, but not time. Able to use call light appropriately   Respiratory: ?NL breathing on RA   Cardiac: ?Bradycardic, generalized edema. Held Clonidine r/t BP 88/41 @ HR 52.   Nutrition: ?TF infusing at goal rate of 45 ml/hr.   GI/: ?Incontinent of urine. Purewick external catheter exchanges with?350?ml output. And 1 episode incontinence of urine. Total linen change. Purewick catches 50% of urine output, but body habitus precludes exact fit and patient will soak covidiene sheet partial.   *Bowel movements: 1 loose, large BM overnight   Skin: ?Coccyx wound with skin tear. WOC evaluated patient Barrier cream applied   Activity:??Mechanical lift   Shift events: ??No acute events overnight. Patient slept between cares   Plan of care: ?Awaiting placement @  TCU once level 2 screen completed via [facility]. Continue to monitor nutrition, labs, skin,pain, and follow POC. Notify MD of changes. ?Call light within reach, bed alarm for safety. Turn and reposition q 2 hours   "

## 2020-06-28 NOTE — PROGRESS NOTES
[facility], [facility] Progress Note - Hospitalist Service, Gold 8       Date of Admission:  6/5/2020  Assessment & Plan   Plan for today- no change medically.     Awaiting Placement in her Group home/ TCU- checked covid test to screen before being accepted at her Group Home   ( A/w level-2 PAS screen)     Ms. Dionne Perez is a 36 yo female with hx of developmental delay, depression, anxiety, likely psychosis, GERD, and hypothyrodism, admitted 6/5/2020 to [facility] for further eval and management of Acute Mental Status change. She was found to have Cerebellar Infarct in the MRI scan done 6/6/2020. LP done 6/9/2020 without any evidence of infection. Neurology could not come up with any cause of AMS and Ceftriaxone. Vancomycin and Acyclovir were discontinued and  Psychiatry thought it  could be adjustment disorder. Patient was thought to have not been opening the mouth Voluntarily.  NG inserted 6/7/2020 for medication  administration and start enteral nutrition which she removed 6/7 evening and is now replaced with bridal. and TFs to goal of 75 ml/hr. On 6/16/2020 was evaluated by Speech therapy and to Continue dysphagia diet level 2 with thin liquids as tolerated with supervision/assist        1. AMS: Likely a type of adjustment disorder in the setting of acute stress and her developmental delay. Patient remains minimally responsive and not at her baseline. She seems like she is improving from her admission. Baseline is she responds in few words, but is independent with her ADLs. Her nuerologic workup has been largely unrevealing without evidence of seizures on EEG, MRI demonstrated new cerebellar infarct. MRI without any clear etiology which does not explain her change in mentation. LP without any evidence of infection.   - Neurology has no additional recommendations for additional workup  - Appreciate Psychiatry input - as is improving seems like could be adjustment  disorder  - DC'ed IV acyclovir, ceftriaxone, and vanc  - uptitrated PTA Lexapro up to 20mg PTA dose     2. Concern for dysphagia  Has had no PO intake since admission due to her mouth rigidity and not opening her mouth volitionally. Continues, therefore to be NPO. NG inserted 6/7 for med administration and to start enteral nutrition. Patient removed NG 6/7 evening, replaced with bridal.   -  TFs to goal of 75 ml/hr  - speech therapy consult and recommendations appreciated   - Nutrition consulted and appreciate following      3. Incidental finding of R cerebellar infarct of uncertain significance  MRI head for AMS workup revealed finding of R cerebellar infarct. Pt started on aspirin, obtained CTA head and neck that was non-diagnostic due to it being severely limited due to poor contrast opacification. TTE with bubble study negative.   - Neurology consulted   - Continue  mg daily, if unable to take orally, can be given rectally as 300 mg   - Hold statin in the setting of elevated LFTs - trending down -resume 6/26 and monitor LFT  - Repeat CTA head and neck without evidence of vascular stenosis  - Continue tele monitoring      4.  Mildly elevated LFTs: likely from Hepatic steatosis   Unclear etiology as LFTs from OSH as recently as 5/26 normal. AST and ALT increasing today. GGT elevated to 66. Lipase 876 (< 3X the upper limit of normal). CT abdomen 6/6 with no acute findings. RUQ US with mild hepatomegaly and diffuse hepatic steatosis, CBD 5.5 mm, no hepatic biliary ductal dilation. Tbili wnl. Reported to have abdominal pain on admission, no further complaints of pain. Appears to be tolerating TF. Possible pancreatitis on admission vs LFT elevation 2/2 hepatic steatosis and CK elevation.   - Continue to monitor      # Chronic pain?: Pt noted to be on scheduled Tylenol PTA, as well as have available as needed diclofenac gel.   - Discontinued PTA scheduled Tylenol given transaminitis     # Hypothyroidism: PTA on  "levothyroxine 50 mcg daily. TFTs on admission reveal pt euthyroid based on free T4 WNL.  - Continue PTA levothyroxine     # GERD: Continue PTA Pepcid 20 mg daily        Diet: Adult Formula Drip Feeding: Continuous Peptamen Intense VHP; Nasogastric tube; Goal Rate: 45; mL/hr; Medication - Feeding Tube Flush Frequency: At least 15-30 mL water before and after medication administration and with tube clogging; Amount to Se...  Snacks/Supplements Adult: Other; Pt may order snacks/supplements PRN; Between Meals  Regular Diet Adult    DVT Prophylaxis: Enoxaparin (Lovenox) SQ  Davila Catheter: not present  Code Status: Full Code           Disposition Plan   Expected discharge: Tomorrow, recommended to transitional care unit once safe disposition plan/ TCU bed available.  Entered: Baldemar Horvath MD, MD 06/28/2020, 4:26 PM       The patient's care was discussed with the Bedside Nurse and Care Coordinator/.    Baldemar Horvath MD, MD  Hospitalist Service, [address], [place]  Pager: [phone]  Please see sticky note for cross cover information  __________________________________________________________________  Interval History   Denies any new complaints  4 point ROS done and negative unless mentioned    Data reviewed today: I reviewed all medications, new labs and imaging results over the last 24 hours. I personally reviewed no images or EKG's today.    Physical Exam   Vital Signs: Temp: 98.1  F (36.7  C) Temp src: Oral BP: 112/50 Pulse: 61 Heart Rate: 54 Resp: 16 SpO2: 98 % O2 Device: None (Room air)    Weight: 366 lbs 0 oz  /50 (BP Location: Right arm)   Pulse 61   Temp 98.1  F (36.7  C) (Oral)   Resp 16   Ht 1.753 m (5' 9\")   Wt (!) 166 kg (366 lb)   SpO2 98%   BMI 54.05 kg/m    Gen- pleasant laying on bed  HEENT- NAD, REYNALDO  Neck- supple, no JVD elevation  CVS- I+II+ no m/r/g  RS- CTAB, decreased at base  Abdo- soft, no tenderness . No g/r/r   Ext-  lymphedema "       Data   BMP  Recent Labs   Lab 06/25/20  0509      POTASSIUM 4.2   CHLORIDE 108   SANDRA 8.7   CO2 27   BUN 28   CR 0.52   GLC 88     CBC  Recent Labs   Lab 06/23/20  0555   *     INRNo lab results found in last 7 days.  LFTs  Recent Labs   Lab 06/25/20  0509   ALKPHOS 174*   AST 40   ALT 83*   BILITOTAL 0.8   PROTTOTAL 5.9*   ALBUMIN 2.7*

## 2020-06-29 LAB
ANION GAP SERPL CALCULATED.3IONS-SCNC: 6 MMOL/L (ref 3–14)
BUN SERPL-MCNC: 27 MG/DL (ref 7–30)
CALCIUM SERPL-MCNC: 9.1 MG/DL (ref 8.5–10.1)
CHLORIDE SERPL-SCNC: 110 MMOL/L (ref 94–109)
CO2 SERPL-SCNC: 25 MMOL/L (ref 20–32)
CREAT SERPL-MCNC: 0.54 MG/DL (ref 0.52–1.04)
ERYTHROCYTE [DISTWIDTH] IN BLOOD BY AUTOMATED COUNT: 15.9 % (ref 10–15)
GFR SERPL CREATININE-BSD FRML MDRD: >90 ML/MIN/{1.73_M2}
GLUCOSE BLDC GLUCOMTR-MCNC: 86 MG/DL (ref 70–99)
GLUCOSE SERPL-MCNC: 83 MG/DL (ref 70–99)
HCT VFR BLD AUTO: 33.1 % (ref 35–47)
HGB BLD-MCNC: 10.2 G/DL (ref 11.7–15.7)
MCH RBC QN AUTO: 29.1 PG (ref 26.5–33)
MCHC RBC AUTO-ENTMCNC: 30.8 G/DL (ref 31.5–36.5)
MCV RBC AUTO: 95 FL (ref 78–100)
PLATELET # BLD AUTO: 158 10E9/L (ref 150–450)
POTASSIUM SERPL-SCNC: 4.6 MMOL/L (ref 3.4–5.3)
RBC # BLD AUTO: 3.5 10E12/L (ref 3.8–5.2)
SODIUM SERPL-SCNC: 140 MMOL/L (ref 133–144)
WBC # BLD AUTO: 6.1 10E9/L (ref 4–11)

## 2020-06-29 PROCEDURE — 85027 COMPLETE CBC AUTOMATED: CPT | Performed by: INTERNAL MEDICINE

## 2020-06-29 PROCEDURE — 99231 SBSQ HOSP IP/OBS SF/LOW 25: CPT | Performed by: INTERNAL MEDICINE

## 2020-06-29 PROCEDURE — 25000132 ZZH RX MED GY IP 250 OP 250 PS 637: Performed by: INTERNAL MEDICINE

## 2020-06-29 PROCEDURE — 25000132 ZZH RX MED GY IP 250 OP 250 PS 637: Performed by: PHYSICIAN ASSISTANT

## 2020-06-29 PROCEDURE — 25000132 ZZH RX MED GY IP 250 OP 250 PS 637: Performed by: HOSPITALIST

## 2020-06-29 PROCEDURE — 25000132 ZZH RX MED GY IP 250 OP 250 PS 637: Performed by: STUDENT IN AN ORGANIZED HEALTH CARE EDUCATION/TRAINING PROGRAM

## 2020-06-29 PROCEDURE — 00000146 ZZHCL STATISTIC GLUCOSE BY METER IP

## 2020-06-29 PROCEDURE — 25000128 H RX IP 250 OP 636: Performed by: PHYSICIAN ASSISTANT

## 2020-06-29 PROCEDURE — 27210437 ZZH NUTRITION PRODUCT SEMIELEM INTERMED LITER

## 2020-06-29 PROCEDURE — 36416 COLLJ CAPILLARY BLOOD SPEC: CPT | Performed by: INTERNAL MEDICINE

## 2020-06-29 PROCEDURE — 12000001 ZZH R&B MED SURG/OB UMMC

## 2020-06-29 PROCEDURE — 80048 BASIC METABOLIC PNL TOTAL CA: CPT | Performed by: INTERNAL MEDICINE

## 2020-06-29 RX ADMIN — PRAVASTATIN SODIUM 40 MG: 40 TABLET ORAL at 08:21

## 2020-06-29 RX ADMIN — MULTIVIT AND MINERALS-FERROUS GLUCONATE 9 MG IRON/15 ML ORAL LIQUID 15 ML: at 11:24

## 2020-06-29 RX ADMIN — ESCITALOPRAM OXALATE 20 MG: 10 TABLET ORAL at 08:21

## 2020-06-29 RX ADMIN — LEVOTHYROXINE SODIUM 50 MCG: 50 TABLET ORAL at 08:22

## 2020-06-29 RX ADMIN — FAMOTIDINE 20 MG: 20 TABLET ORAL at 08:21

## 2020-06-29 RX ADMIN — ASPIRIN 81 MG CHEWABLE TABLET 324 MG: 81 TABLET CHEWABLE at 08:22

## 2020-06-29 RX ADMIN — ENOXAPARIN SODIUM 40 MG: 40 INJECTION SUBCUTANEOUS at 20:07

## 2020-06-29 RX ADMIN — CALCIUM POLYCARBOPHIL 1250 MG: 625 TABLET, FILM COATED ORAL at 20:07

## 2020-06-29 RX ADMIN — ENOXAPARIN SODIUM 40 MG: 40 INJECTION SUBCUTANEOUS at 08:21

## 2020-06-29 RX ADMIN — CLONIDINE HYDROCHLORIDE 0.1 MG: 0.1 TABLET ORAL at 20:07

## 2020-06-29 RX ADMIN — CALCIUM POLYCARBOPHIL 1250 MG: 625 TABLET, FILM COATED ORAL at 11:24

## 2020-06-29 RX ADMIN — MELATONIN TAB 3 MG 3 MG: 3 TAB at 22:15

## 2020-06-29 ASSESSMENT — ACTIVITIES OF DAILY LIVING (ADL)
ADLS_ACUITY_SCORE: 24
ADLS_ACUITY_SCORE: 24
ADLS_ACUITY_SCORE: 28
ADLS_ACUITY_SCORE: 24

## 2020-06-29 NOTE — PROGRESS NOTES
[facility], [facility] Progress Note - Hospitalist Service, Gold 8       Date of Admission:  6/5/2020  Assessment & Plan   Plan for today- no change medically.     Awaiting Placement in her Group home/ TCU- checked covid test to screen before being accepted at her Group Home   ( A/w level-2 PAS screen)     Ms. Dionne Perez is a 36 yo female with hx of developmental delay, depression, anxiety, likely psychosis, GERD, and hypothyrodism, admitted 6/5/2020 to [facility] for further eval and management of Acute Mental Status change. She was found to have Cerebellar Infarct in the MRI scan done 6/6/2020. LP done 6/9/2020 without any evidence of infection. Neurology could not come up with any cause of AMS and Ceftriaxone. Vancomycin and Acyclovir were discontinued and  Psychiatry thought it  could be adjustment disorder. Patient was thought to have not been opening the mouth Voluntarily.  NG inserted 6/7/2020 for medication  administration and start enteral nutrition which she removed 6/7 evening and is now replaced with bridal. and TFs to goal of 75 ml/hr. On 6/16/2020 was evaluated by Speech therapy and to Continue dysphagia diet level 2 with thin liquids as tolerated with supervision/assist        1. AMS: Likely a type of adjustment disorder in the setting of acute stress and her developmental delay. Patient remains minimally responsive and not at her baseline. She seems like she is improving from her admission. Baseline is she responds in few words, but is independent with her ADLs. Her nuerologic workup has been largely unrevealing without evidence of seizures on EEG, MRI demonstrated new cerebellar infarct. MRI without any clear etiology which does not explain her change in mentation. LP without any evidence of infection.   - Neurology has no additional recommendations for additional workup  - Appreciate Psychiatry input - as is improving seems like could be adjustment  disorder  - DC'ed IV acyclovir, ceftriaxone, and vanc  - uptitrated PTA Lexapro up to 20mg PTA dose     2. Concern for dysphagia  Has had no PO intake since admission due to her mouth rigidity and not opening her mouth volitionally. Continues, therefore to be NPO. NG inserted 6/7 for med administration and to start enteral nutrition. Patient removed NG 6/7 evening, replaced with bridal.   -  TFs to goal of 75 ml/hr  - speech therapy consult and recommendations appreciated   - Nutrition consulted and appreciate following      3. Incidental finding of R cerebellar infarct of uncertain significance  MRI head for AMS workup revealed finding of R cerebellar infarct. Pt started on aspirin, obtained CTA head and neck that was non-diagnostic due to it being severely limited due to poor contrast opacification. TTE with bubble study negative.   - Neurology consulted   - Continue  mg daily, if unable to take orally, can be given rectally as 300 mg   - Hold statin in the setting of elevated LFTs - trending down -resume 6/26 and monitor LFT  - Repeat CTA head and neck without evidence of vascular stenosis  - Continue tele monitoring      4.  Mildly elevated LFTs: likely from Hepatic steatosis   Unclear etiology as LFTs from OSH as recently as 5/26 normal. AST and ALT increasing today. GGT elevated to 66. Lipase 876 (< 3X the upper limit of normal). CT abdomen 6/6 with no acute findings. RUQ US with mild hepatomegaly and diffuse hepatic steatosis, CBD 5.5 mm, no hepatic biliary ductal dilation. Tbili wnl. Reported to have abdominal pain on admission, no further complaints of pain. Appears to be tolerating TF. Possible pancreatitis on admission vs LFT elevation 2/2 hepatic steatosis and CK elevation.   - Continue to monitor      # Chronic pain?: Pt noted to be on scheduled Tylenol PTA, as well as have available as needed diclofenac gel.   - Discontinued PTA scheduled Tylenol given transaminitis     # Hypothyroidism: PTA on  "levothyroxine 50 mcg daily. TFTs on admission reveal pt euthyroid based on free T4 WNL.  - Continue PTA levothyroxine     # GERD: Continue PTA Pepcid 20 mg daily        Diet: Adult Formula Drip Feeding: Continuous Peptamen Intense VHP; Nasogastric tube; Goal Rate: 45; mL/hr; Medication - Feeding Tube Flush Frequency: At least 15-30 mL water before and after medication administration and with tube clogging; Amount to Se...  Snacks/Supplements Adult: Other; Pt may order snacks/supplements PRN; Between Meals  Regular Diet Adult    DVT Prophylaxis: Enoxaparin (Lovenox) SQ  Davila Catheter: not present  Code Status: Full Code           Disposition Plan   Expected discharge: Tomorrow, recommended to transitional care unit once safe disposition plan/ TCU bed available.  Entered: Baldemar Horvath MD, MD 06/29/2020, 2:19 PM       The patient's care was discussed with the Bedside Nurse and Care Coordinator/.    Baldemar Horvath MD, MD  Hospitalist Service, [address], [place]  Pager: [phone]  Please see sticky note for cross cover information  __________________________________________________________________  Interval History   Denies any new complaints  4 point ROS done and negative unless mentioned    Data reviewed today: I reviewed all medications, new labs and imaging results over the last 24 hours. I personally reviewed no images or EKG's today.    Physical Exam   Vital Signs: Temp: 97.7  F (36.5  C) Temp src: Oral BP: 100/47 Pulse: (!) 49   Resp: 18 SpO2: 95 % O2 Device: None (Room air)    Weight: 348 lbs 0 oz  /47 (BP Location: Right arm)   Pulse (!) 49   Temp 97.7  F (36.5  C) (Oral)   Resp 18   Ht 1.753 m (5' 9\")   Wt (!) 157.9 kg (348 lb)   SpO2 95%   BMI 51.39 kg/m    Gen- pleasant laying on bed  HEENT- NAD, REYNALDO  Neck- supple, no JVD elevation  CVS- I+II+ no m/r/g  RS- CTAB, decreased at base  Abdo- soft, no tenderness . No g/r/r   Ext-  lymphedema "       Data   BMP  Recent Labs   Lab 06/29/20  0614 06/25/20  0509    140   POTASSIUM 4.6 4.2   CHLORIDE 110* 108   SANDRA 9.1 8.7   CO2 25 27   BUN 27 28   CR 0.54 0.52   GLC 83 88     CBC  Recent Labs   Lab 06/29/20  0614 06/23/20  0555   WBC 6.1  --    RBC 3.50*  --    HGB 10.2*  --    HCT 33.1*  --    MCV 95  --    MCH 29.1  --    MCHC 30.8*  --    RDW 15.9*  --     142*     INRNo lab results found in last 7 days.  LFTs  Recent Labs   Lab 06/25/20  0509   ALKPHOS 174*   AST 40   ALT 83*   BILITOTAL 0.8   PROTTOTAL 5.9*   ALBUMIN 2.7*

## 2020-06-29 NOTE — PLAN OF CARE
Patient often refuses to turn every two hours. Patient uses call light when she needs anythign. Refusing to get in the chair today. Refused to work with therapy stating she is too tired, patient has pure wic in place with some leaking unto her pad. No bowel movement. Continues on tube feed and eats a regular diet pills crushed and placed down tube.

## 2020-06-29 NOTE — PLAN OF CARE
"OT 5B: cancel, pt declining OOB activity upon AM attempt, not answering writer when approached and pretending to fall asleep, shook head \"yes\" when Th offered to check in later, will check back if scheduling allows or will reschedule for 6/30.   "

## 2020-06-29 NOTE — PLAN OF CARE
"  Shift:?[id number]     Situation: ?Patient with significant intellectual disability, history of depression, who was admitted for AMS  and Cerebellar Infarct identified on the MRI scan 6/6/2020.?LP on 6-9-2020 w/o evidence of infection. Cerebellar infarct identified on MRI 6-6-2020.     Mood: ?Patient calm, quiet, and contemplative. Patient can engage in minimal conversation with slow, simple instructions.     Vitals:? /57 (BP Location: Right arm)   Pulse 51   Temp 96.2  F (35.7  C) (Oral)   Resp 18   Ht 1.753 m (5' 9\")   Wt (!) 157.9 kg (348 lb)   SpO2 96%   BMI 51.39 kg/m      Neuro: Alert, oriented to self and place, but not time. Able to use call light appropriately     Respiratory: ?NL breathing on RA     Cardiac: ?Bradycardic, generalized edema. Held Clonidine r/t BP 88/41 @ HR 52.     Nutrition: ? Adult formula drip feeding-continuous Peptamen Intense VHP: Nasogastric tube: Goal rate of 45 mL/hr; mediation -feeding tube flush frequency at least 15-30 ml before and after medication administration and with tube clogging.TF infusing at goal rate of 45 ml/hr.     GI/: ?Incontinent of urine. Purewick external catheter exchanges with?650ml output. Total linen change. Purewick catches 50% of urine output, but body habitus precludes exact fit and patient will soak covidiene sheet partial.   *Bowel movements: None overnight    Skin: ?Coccyx wound with skin tear. WOC evaluated patient Barrier cream applied     Lines: Patient's has 2 LUE PIVs, both are no longer functioning and infiltrated. Patient would not allow this RN to remove LUE PIVs.     Activity:??Mechanical lift and 2 to turn and reposition. Weight shift changes and draw sheets.     Shift events: ?Patient watched television in between falling asleep and conversing with staff. No acute events overnight.     Patient enjoys watching old movies and sitcoms.     Plan of care: ?Awaiting placement @  TCU once level 2 screen completed via [facility]. " Continue to monitor nutrition, labs, skin,pain, and follow POC. Notify MD of changes. Patient with falls risk precautions. ?Call light within reach, bed alarm for safety. Specialized air mattress for good skin hygiene.

## 2020-06-30 ENCOUNTER — APPOINTMENT (OUTPATIENT)
Dept: OCCUPATIONAL THERAPY | Facility: CLINIC | Age: 35
DRG: 887 | End: 2020-06-30
Payer: MEDICARE

## 2020-06-30 LAB — GLUCOSE BLDC GLUCOMTR-MCNC: 86 MG/DL (ref 70–99)

## 2020-06-30 PROCEDURE — 25000132 ZZH RX MED GY IP 250 OP 250 PS 637: Performed by: HOSPITALIST

## 2020-06-30 PROCEDURE — 00000146 ZZHCL STATISTIC GLUCOSE BY METER IP

## 2020-06-30 PROCEDURE — 99232 SBSQ HOSP IP/OBS MODERATE 35: CPT | Performed by: INTERNAL MEDICINE

## 2020-06-30 PROCEDURE — 25000128 H RX IP 250 OP 636: Performed by: PHYSICIAN ASSISTANT

## 2020-06-30 PROCEDURE — 12000001 ZZH R&B MED SURG/OB UMMC

## 2020-06-30 PROCEDURE — 25000132 ZZH RX MED GY IP 250 OP 250 PS 637: Performed by: INTERNAL MEDICINE

## 2020-06-30 PROCEDURE — 97530 THERAPEUTIC ACTIVITIES: CPT | Mod: GO | Performed by: OCCUPATIONAL THERAPIST

## 2020-06-30 PROCEDURE — 25000132 ZZH RX MED GY IP 250 OP 250 PS 637: Performed by: PHYSICIAN ASSISTANT

## 2020-06-30 PROCEDURE — 25000132 ZZH RX MED GY IP 250 OP 250 PS 637: Performed by: STUDENT IN AN ORGANIZED HEALTH CARE EDUCATION/TRAINING PROGRAM

## 2020-06-30 PROCEDURE — 27210437 ZZH NUTRITION PRODUCT SEMIELEM INTERMED LITER

## 2020-06-30 RX ADMIN — DOCUSATE SODIUM AND SENNOSIDES 1 TABLET: 50; 8.6 TABLET ORAL at 21:03

## 2020-06-30 RX ADMIN — LEVOTHYROXINE SODIUM 50 MCG: 50 TABLET ORAL at 05:06

## 2020-06-30 RX ADMIN — ENOXAPARIN SODIUM 40 MG: 40 INJECTION SUBCUTANEOUS at 21:02

## 2020-06-30 RX ADMIN — CALCIUM POLYCARBOPHIL 1250 MG: 625 TABLET, FILM COATED ORAL at 21:03

## 2020-06-30 RX ADMIN — ENOXAPARIN SODIUM 40 MG: 40 INJECTION SUBCUTANEOUS at 09:23

## 2020-06-30 RX ADMIN — ASPIRIN 81 MG CHEWABLE TABLET 324 MG: 81 TABLET CHEWABLE at 09:22

## 2020-06-30 RX ADMIN — ESCITALOPRAM OXALATE 20 MG: 10 TABLET ORAL at 09:22

## 2020-06-30 RX ADMIN — MELATONIN TAB 3 MG 3 MG: 3 TAB at 21:03

## 2020-06-30 RX ADMIN — FAMOTIDINE 20 MG: 20 TABLET ORAL at 09:22

## 2020-06-30 RX ADMIN — PRAVASTATIN SODIUM 40 MG: 40 TABLET ORAL at 09:23

## 2020-06-30 ASSESSMENT — ACTIVITIES OF DAILY LIVING (ADL)
ADLS_ACUITY_SCORE: 24
ADLS_ACUITY_SCORE: 28
ADLS_ACUITY_SCORE: 24
ADLS_ACUITY_SCORE: 28
ADLS_ACUITY_SCORE: 28
ADLS_ACUITY_SCORE: 23

## 2020-06-30 NOTE — PLAN OF CARE
PT 5B: Cancel - Per OT, pt only participating in OT today if she did not have to participate in PT. Due to pt's difficult participation and limited therapy actually being provided in sessions, will decrease frequency until improved participation is demonstrated.

## 2020-06-30 NOTE — PROGRESS NOTES
[facility], [facility] Progress Note - Hospitalist Service, Gold 8       Date of Admission:  6/5/2020  Assessment & Plan   Dionne Perez is a 35 year old female admitted on 6/5/2020. She Ms. Dionne Perez is a 34 yo female with hx of developmental delay, depression, anxiety, likely psychosis, GERD, and hypothyrodism, admitted 6/5/2020 to [facility] for further eval and management of Acute Mental Status change.     1. AMS: Likely adjustment/conversion disorder in the setting of acute stress and her developmental delay. Patient remains minimally responsive and not at her baseline. She seems like she is improving from her admission. Baseline is she responds in few words, but is independent with her ADLs. Her nuerologic workup has been largely unrevealing without evidence of seizures on EEG, MRI demonstrated new cerebellar infarct. MRI without any clear etiology which does not explain her change in mentation. LP without any evidence of infection.   - Neurology has no additional recommendations for additional workup  - Appreciate Psychiatry input - as is improving seems like could be adjustment disorder  - DC'ed IV acyclovir, ceftriaxone, and vanc  - uptitrated PTA Lexapro up to 20 mg PTA dose   - needs significant positive encouragement to eat and participate in physical therapy in order to discharge to either TCU or her group home.  Will focus first on food, in order to discontinue NJ tube.     2. Concern for dysphagia  Has had no PO intake since admission due to her mouth rigidity and not opening her mouth volitionally. Continues, therefore to be NPO. NG inserted 6/7 for med administration and to start enteral nutrition. Patient removed NG 6/7 evening, replaced with bridal.   -  TFs to goal of 75 ml/hr  - speech therapy consult and recommendations appreciated   - Nutrition consulted and appreciate following      3. Incidental finding of R cerebellar infarct of uncertain  significance  MRI head for AMS workup revealed finding of R cerebellar infarct. Pt started on aspirin, obtained CTA head and neck that was non-diagnostic due to it being severely limited due to poor contrast opacification. TTE with bubble study negative.   - Neurology consulted   - Continue  mg daily, if unable to take orally, can be given rectally as 300 mg   - Hold statin in the setting of elevated LFTs - trending down -resume 6/26 and monitor LFT  - Repeat CTA head and neck without evidence of vascular stenosis  - Continue tele monitoring      4.  Mildly elevated LFTs: likely from Hepatic steatosis   Unclear etiology as LFTs from OSH as recently as 5/26 normal. AST and ALT increasing today. GGT elevated to 66. Lipase 876 (< 3X the upper limit of normal). CT abdomen 6/6 with no acute findings. RUQ US with mild hepatomegaly and diffuse hepatic steatosis, CBD 5.5 mm, no hepatic biliary ductal dilation. Tbili wnl. Reported to have abdominal pain on admission, no further complaints of pain. Appears to be tolerating TF. Possible pancreatitis on admission vs LFT elevation 2/2 hepatic steatosis and CK elevation.   - Continue to monitor      # Chronic pain?: Pt noted to be on scheduled Tylenol PTA, as well as have available as needed diclofenac gel.   - Discontinued PTA scheduled Tylenol given transaminitis     # Hypothyroidism: PTA on levothyroxine 50 mcg daily. TFTs on admission reveal pt euthyroid based on free T4 WNL.  - Continue PTA levothyroxine     # GERD: Continue PTA Pepcid 20 mg daily     Diet: Adult Formula Drip Feeding: Continuous Peptamen Intense VHP; Nasogastric tube; Goal Rate: 45; mL/hr; Medication - Feeding Tube Flush Frequency: At least 15-30 mL water before and after medication administration and with tube clogging; Amount to Se...  Snacks/Supplements Adult: Other; Pt may order snacks/supplements PRN; Between Meals  Regular Diet Adult  Calorie Counts    DVT Prophylaxis: Heparin SQ  Davila  Catheter: not present  Code Status: Full Code           Disposition Plan   Expected discharge: 2 - 3 days, recommended to transitional care unit once safe disposition plan/ TCU bed available.  Entered: Jose Carlos Stone MD 06/30/2020, 2:43 PM       The patient's care was discussed with the Care Coordinator/ and Patient.    Jose Carlos Stone MD  Hospitalist Service, [address], [place]  Pager: [phone]  Please see sticky note for cross cover information  ______________________________________________________________________    Interval History   Patient seen and examined.  No acute overnight events. Responsive, states she is feeling more hungry, but otherwise not actively participating in cares.  Tolerating TF.  Denies HA, dizziness, CP, N/V, abdominal pain or other symptoms currently.     Data reviewed today: I reviewed all medications, new labs and imaging results over the last 24 hours. I personally reviewed no images or EKG's today.    Physical Exam   Vital Signs: Temp: 97.2  F (36.2  C) Temp src: Oral BP: 116/54 Pulse: 51 Heart Rate: 55 Resp: 16 SpO2: 98 % O2 Device: None (Room air)    Weight: 348 lbs 0 oz  General Appearance: NAD  Respiratory: CTA b/l  Cardiovascular: RRR S1/S2, no m,r,g  GI: soft, NT, ND +BS  Skin: no rashes  Other: No edema     Data   Recent Labs   Lab 06/29/20  0614 06/25/20  0509   WBC 6.1  --    HGB 10.2*  --    MCV 95  --      --     140   POTASSIUM 4.6 4.2   CHLORIDE 110* 108   CO2 25 27   BUN 27 28   CR 0.54 0.52   ANIONGAP 6 5   SANDRA 9.1 8.7   GLC 83 88   ALBUMIN  --  2.7*   PROTTOTAL  --  5.9*   BILITOTAL  --  0.8   ALKPHOS  --  174*   ALT  --  83*   AST  --  40     No results found for this or any previous visit (from the past 24 hour(s)).  Medications     dextrose         aspirin  324 mg Oral or Feeding Tube Daily     calcium polycarbophil  1,250 mg Per NG tube BID     cloNIDine  0.1 mg Oral or NG Tube BID      enoxaparin ANTICOAGULANT  40 mg Subcutaneous Q12H     escitalopram  20 mg Oral Daily     famotidine  20 mg Oral or NG Tube Daily     levothyroxine  50 mcg Oral or NG Tube Daily     melatonin  3 mg Oral or NG Tube At Bedtime     multivitamins w/minerals  15 mL Per Feeding Tube Daily     polyethylene glycol  17 g Oral or G tube Daily     pravastatin  40 mg Oral Daily     senna-docusate  1 tablet Per Feeding Tube At Bedtime     sodium chloride (PF)  3 mL Intracatheter Q8H

## 2020-06-30 NOTE — PLAN OF CARE
Discharge Planner OT   Patient plan for discharge: Not stated  Current status: Pt refusing therapy and OOB activity. Provided firm encouragement about choices of up to chair or sitting EOB. Pt agreeable to sit EOB. Requires max A for rolling, dependent for supine<> EOB with use of sling. Pt able to sit EOB x 10 mins with SBA, for dependent ADLs (pt refusing to attempt), completed UB sponge bath, grooming and gown change. Returned to supine via ceiling lift.   Barriers to return to prior living situation: Medical, weakness, dependent transfers, self limiting behaviors, cognition   Recommendations for discharge: TCU  Rationale for recommendations: Pt not yet at baseline PLOF. Difficult to motivate for therapy. Needs continued PT/OT to increase IND with self cares and functional mobility prior to return to prior living arrangement.        Entered by: Khalida Napoles 06/30/2020 1:09 PM

## 2020-06-30 NOTE — PLAN OF CARE
Assumed cares 1500 to 2300. Disoriented to time and situation. Assist of 2 with cares. Bradycardic at baseline; other VSS on RA. NG patent with continuous TF at goal rate 45ml/h. 1 incontinent BM this shift. Purewick in place with good UO. Repositioned q4h, as pt refused q2 repo.

## 2020-06-30 NOTE — PLAN OF CARE
Patient refusing to get out of bed. Per group home patient can walk and help turn. Patient needs encouragement to help turn and get out of bed. Purewic in place. This am was not on suction and patient had complete bed change. Patient placed on calorie counts this am. Did not order lunch stated she was not hungry. Patient sleeping most of day and noticed her doing this more then she has been in the past. Needs encouragement to order food for lunch and dinner so her NG can come out.

## 2020-06-30 NOTE — PROGRESS NOTES
Social Work Services Progress Note    Hospital Day: 26  Collaborated with:  Chart review, Jermaine Blankens[facility] TCU admissions - Ria/Khalida    Data:  SW assisting with following up regarding TCU placement.     Per chart review, pt had been accepted to [facility] and then was declined and deemed inappropriate for admission. [facility] liaison had reported that they were committed to helping to find placement and would escalate the case to Jermaine.      Per notes, SW had spoken to Jermaine on 6/23 and they were not accepting new admissions due to COIVD cases in their unit.     SW left a VM today for Ria/Khalida in admissions to discuss any changes in COVID cases/bed availability this week, and ask that they re-evaluate pt for admission as well.     Intervention:  Coordination of care.    Assessment:  Pt needing TCU placement, global denails from Effie/Yg.     Plan:    Anticipated Disposition:  Facility:  TCU then return to group home    Barriers to d/c plan:  Bed availability, pt complexities     Follow Up:  Unit SW to continue to follow    KOLTON Drew  Unit 5/Unit 8 Ortho/Med/Surg & [facility]  Phone: [phone] Pager: [phone]

## 2020-07-01 ENCOUNTER — APPOINTMENT (OUTPATIENT)
Dept: PHYSICAL THERAPY | Facility: CLINIC | Age: 35
DRG: 887 | End: 2020-07-01
Payer: MEDICARE

## 2020-07-01 LAB — GLUCOSE BLDC GLUCOMTR-MCNC: 93 MG/DL (ref 70–99)

## 2020-07-01 PROCEDURE — 99232 SBSQ HOSP IP/OBS MODERATE 35: CPT | Performed by: INTERNAL MEDICINE

## 2020-07-01 PROCEDURE — 27210437 ZZH NUTRITION PRODUCT SEMIELEM INTERMED LITER

## 2020-07-01 PROCEDURE — 25000132 ZZH RX MED GY IP 250 OP 250 PS 637: Performed by: HOSPITALIST

## 2020-07-01 PROCEDURE — 25000132 ZZH RX MED GY IP 250 OP 250 PS 637: Performed by: INTERNAL MEDICINE

## 2020-07-01 PROCEDURE — 12000001 ZZH R&B MED SURG/OB UMMC

## 2020-07-01 PROCEDURE — 97530 THERAPEUTIC ACTIVITIES: CPT | Mod: GP

## 2020-07-01 PROCEDURE — 25000132 ZZH RX MED GY IP 250 OP 250 PS 637: Performed by: STUDENT IN AN ORGANIZED HEALTH CARE EDUCATION/TRAINING PROGRAM

## 2020-07-01 PROCEDURE — 00000146 ZZHCL STATISTIC GLUCOSE BY METER IP

## 2020-07-01 PROCEDURE — 25000128 H RX IP 250 OP 636: Performed by: PHYSICIAN ASSISTANT

## 2020-07-01 PROCEDURE — 25000132 ZZH RX MED GY IP 250 OP 250 PS 637: Performed by: PHYSICIAN ASSISTANT

## 2020-07-01 RX ADMIN — PRAVASTATIN SODIUM 40 MG: 40 TABLET ORAL at 09:46

## 2020-07-01 RX ADMIN — DOCUSATE SODIUM AND SENNOSIDES 1 TABLET: 50; 8.6 TABLET ORAL at 21:26

## 2020-07-01 RX ADMIN — MELATONIN TAB 3 MG 3 MG: 3 TAB at 21:26

## 2020-07-01 RX ADMIN — MULTIVIT AND MINERALS-FERROUS GLUCONATE 9 MG IRON/15 ML ORAL LIQUID 15 ML: at 09:47

## 2020-07-01 RX ADMIN — CALCIUM POLYCARBOPHIL 1250 MG: 625 TABLET, FILM COATED ORAL at 21:26

## 2020-07-01 RX ADMIN — CLONIDINE HYDROCHLORIDE 0.1 MG: 0.1 TABLET ORAL at 09:45

## 2020-07-01 RX ADMIN — CLONIDINE HYDROCHLORIDE 0.1 MG: 0.1 TABLET ORAL at 21:28

## 2020-07-01 RX ADMIN — ESCITALOPRAM OXALATE 20 MG: 10 TABLET ORAL at 09:46

## 2020-07-01 RX ADMIN — MULTIVIT AND MINERALS-FERROUS GLUCONATE 9 MG IRON/15 ML ORAL LIQUID 15 ML: at 14:09

## 2020-07-01 RX ADMIN — ENOXAPARIN SODIUM 40 MG: 40 INJECTION SUBCUTANEOUS at 09:45

## 2020-07-01 RX ADMIN — FAMOTIDINE 20 MG: 20 TABLET ORAL at 09:45

## 2020-07-01 RX ADMIN — ENOXAPARIN SODIUM 40 MG: 40 INJECTION SUBCUTANEOUS at 21:26

## 2020-07-01 RX ADMIN — ASPIRIN 81 MG CHEWABLE TABLET 324 MG: 81 TABLET CHEWABLE at 09:45

## 2020-07-01 RX ADMIN — LEVOTHYROXINE SODIUM 50 MCG: 50 TABLET ORAL at 09:46

## 2020-07-01 ASSESSMENT — ACTIVITIES OF DAILY LIVING (ADL)
ADLS_ACUITY_SCORE: 19

## 2020-07-01 ASSESSMENT — MIFFLIN-ST. JEOR: SCORE: 2342.9

## 2020-07-01 NOTE — PLAN OF CARE
Pt is AOX4, sating well on RA. Clonidine held for SBP<110. Incontinent of stool X2. Repositioned X2, otherwise refused repositioning. TF infusing at 45ml/h. Pt states she ate a pizza for dinner. Purewick in place.  Pt does not have a PIV, she states they were removed because she did not need them anymore. Cont to monitor.

## 2020-07-01 NOTE — PROGRESS NOTES
Calorie Count  Intake recorded for: 6/30  Total Kcals: 513 Total Protein: 12g  Kcals from Hospital Food: 513  Protein: 12g  Kcals from Outside Food (average):0 Protein: 0g  # Meals Recorded: 100% bagel w/ cream cheese, oatmeal w/ brown sugar, 50% apple juice   # Supplements Recorded: 0

## 2020-07-01 NOTE — PLAN OF CARE
"PT - Lift for transfers. Pt with very minimal participation in sessions, despite being able to physically mobilize. Plan to have OT hold and have pt seen by PT 3x/week until able to have more consistent participation    Discharge Planner PT   Patient plan for discharge: group home vs. TCU?  Current status: Pt needing significant encouragement for participation in session. Pt told plan to go into chair, states \"no\" initially, but then assists therapists for rolling in bed to place sling. Pt dependently lifted bed>recliner. Pt refusing standing trial, yelling out \"No\" and forcefully moving hand away from therapist or pushing back against recliner when tech attempted to sit pt forward.   Barriers to return to prior living situation: medical status, below baseline  Recommendations for discharge: TCU  Rationale for recommendations: Pt not at her baseline. Pt will need continued skilled PT in order to progress strength, activity tolerance, gait and performance of transfers.  Entered by: Gwen Murillo 07/01/2020 12:18 PM      "

## 2020-07-01 NOTE — PLAN OF CARE
Shift:?[id number]     Situation: ?Patient with significant intellectual disability, history of depression, who was admitted for AMS??and?Cerebellar Infarct identified on?the MRI scan 6/6/2020.?LP on 6-9-2020 w/o evidence of infection. Cerebellar infarct identified on MRI 6-6-2020.     Mood: ?Patient calm, quiet, and contemplative. Patient can engage in minimal conversation with slow, simple instructions.     Vitals:?      Neuro: Alert, oriented to self and place, but not time. Able to use call light appropriately     Respiratory: ?NL breathing on RA     Cardiac: ?Bradycardic, generalized edema..     Nutrition: ? Adult formula drip feeding-continuous Peptamen Intense VHP: Nasogastric tube: Goal rate of 45 mL/hr; mediation -feeding tube flush frequency at least 15-30 ml before and after medication administration and with tube clogging.TF infusing at goal rate of 45 ml/hr.     GI/: ?Incontinent of urine. Purewick external catheter exchanges with?600 ml urine output . Purewick catches 50% of urine output, but body habitus precludes exact fit and patient will soak covidiene sheet partial.     *Bowel movements: small smear of greenish brown stool pocketed below and behind the Purewick extermal catheter. Patient needs frequent site checks.     Skin: ?Coccyx wound with skin tear. WOC evaluated patient Barrier cream applied     Lines: None     Activity:??Mechanical lift and 2 to turn and reposition. Weight shift changes and draw sheets.     Shift events: ?Patient watched television in between falling asleep and conversing with     Plan of care: ?Awaiting placement @  TCU once level 2 screen completed via [facility]. Continue to monitor nutrition, labs, skin, pain, and follow POC. Notify MD of changes. Patient with falls risk precautions. ?Call light within reach, bed alarm for safety. Specialized air mattress for good skin hygiene.   ?   ?   ?   ?

## 2020-07-01 NOTE — PROGRESS NOTES
Patient was placed in chair with therapy and within 10 minutes patient asked to get back into bed. Md wants her in the chair 3x a day for at least an hour at a time. Patient did not like that answer and started yelling and crying. Patient will be back in bed after sitting in chair for hour. She has been yelling out that she wants to go back to bed and each time this RN educates her that it is good to sit in chair. Offered something to drink and eat while she is in chair and she refusing.

## 2020-07-01 NOTE — PROGRESS NOTES
[facility], [place]    Medicine Progress Note - Hospitalist Service, Gold 8       Date of Admission:  6/5/2020  Assessment & Plan   Dionne Perez is a 35 year old female admitted on 6/5/2020. She Ms. Dionne Perez is a 34 yo female with hx of developmental delay, depression, anxiety, likely psychosis, GERD, and hypothyrodism, admitted 6/5/2020 to [facility] for further eval and management of Acute Mental Status change.     1. AMS: Likely adjustment/conversion disorder in the setting of acute stress and her developmental delay. Patient remains minimally responsive and not at her baseline. She seems like she is improving from her admission. Baseline is she responds in few words, but is independent with her ADLs. Her nuerologic workup has been largely unrevealing without evidence of seizures on EEG, MRI demonstrated new cerebellar infarct. MRI without any clear etiology which does not explain her change in mentation. LP without any evidence of infection.   - Neurology has no additional recommendations for additional workup  - Appreciate Psychiatry input - as is improving seems like could be adjustment disorder  - DC'ed IV acyclovir, ceftriaxone, and vanc  - uptitrated PTA Lexapro up to 20 mg PTA dose   - needs significant positive encouragement to eat and participate in physical therapy in order to discharge to either TCU or her group home.  Will focus first on food, in order to discontinue NJ tube.    - reinforced the importance of PT/OT.  Pt agreed to work with PT tomorrow.     2. Concern for dysphagia  Has had no PO intake since admission due to her mouth rigidity and not opening her mouth volitionally. Continues, therefore to be NPO. NG inserted 6/7 for med administration and to start enteral nutrition. Patient removed NG 6/7 evening, replaced with bridal.   -  TFs to goal of 75 ml/hr  - speech therapy consult and recommendations appreciated   - Nutrition consulted and appreciate  following      3. Incidental finding of R cerebellar infarct of uncertain significance  MRI head for AMS workup revealed finding of R cerebellar infarct. Pt started on aspirin, obtained CTA head and neck that was non-diagnostic due to it being severely limited due to poor contrast opacification. TTE with bubble study negative.   - Neurology consulted   - Continue  mg daily, if unable to take orally, can be given rectally as 300 mg   - Hold statin in the setting of elevated LFTs - trending down -resume 6/26 and monitor LFT  - Repeat CTA head and neck without evidence of vascular stenosis  - Continue tele monitoring      4.  Mildly elevated LFTs: likely from Hepatic steatosis   Unclear etiology as LFTs from OSH as recently as 5/26 normal. AST and ALT increasing today. GGT elevated to 66. Lipase 876 (< 3X the upper limit of normal). CT abdomen 6/6 with no acute findings. RUQ US with mild hepatomegaly and diffuse hepatic steatosis, CBD 5.5 mm, no hepatic biliary ductal dilation. Tbili wnl. Reported to have abdominal pain on admission, no further complaints of pain. Appears to be tolerating TF. Possible pancreatitis on admission vs LFT elevation 2/2 hepatic steatosis and CK elevation.   - Continue to monitor      # Chronic pain?: Pt noted to be on scheduled Tylenol PTA, as well as have available as needed diclofenac gel.   - Discontinued PTA scheduled Tylenol given transaminitis     # Hypothyroidism: PTA on levothyroxine 50 mcg daily. TFTs on admission reveal pt euthyroid based on free T4 WNL.  - Continue PTA levothyroxine     # GERD: Continue PTA Pepcid 20 mg daily       Diet: Adult Formula Drip Feeding: Continuous Peptamen Intense VHP; Nasogastric tube; Goal Rate: 45; mL/hr; Medication - Feeding Tube Flush Frequency: At least 15-30 mL water before and after medication administration and with tube clogging; Amount to Se...  Snacks/Supplements Adult: Other; Pt may order snacks/supplements PRN; Between  Meals  Regular Diet Adult  Calorie Counts    DVT Prophylaxis: Anti-embolisim stockings (TEDs)  Davila Catheter: not present  Code Status: Full Code           Disposition Plan   Expected discharge: 2 - 3 days, recommended to transitional care unit once safe disposition plan/ TCU bed available.  Entered: Jose Carlos Stone MD 07/01/2020, 12:15 PM       The patient's care was discussed with the Bedside Nurse, Care Coordinator/ and Patient.    Jose Carlos Stone MD  Hospitalist Service, [address], [place]  Pager: [phone]  Please see sticky note for cross cover information  ______________________________________________________________________    Interval History   Patient seen and examined.  No acute overnight events.  Patient is eating better.  I asked her at length whether she was willing to participate with physical therapy.  She refused to answer whether she would work with them today, but agreed to work with them tomorrow.  She did state that she liked walking and would like to walk again.  No HA, dizziness, CP, N/V, abdominal pain or other symptoms currently.    Data reviewed today: I reviewed all medications, new labs and imaging results over the last 24 hours. I personally reviewed no images or EKG's today.    Physical Exam   Vital Signs: Temp: 96.8  F (36  C) Temp src: Oral BP: 122/64 Pulse: 54 Heart Rate: 53 Resp: 16 SpO2: 97 % O2 Device: None (Room air)    Weight: 348 lbs 0 oz  General Appearance: NAD  Respiratory: CTA b/l  Cardiovascular: RRR S1/S2, no m,r,g  GI: soft, NT, ND, +BS  Skin: no rashes  Other: No edema    Data   Recent Labs   Lab 06/29/20  0614 06/25/20  0509   WBC 6.1  --    HGB 10.2*  --    MCV 95  --      --     140   POTASSIUM 4.6 4.2   CHLORIDE 110* 108   CO2 25 27   BUN 27 28   CR 0.54 0.52   ANIONGAP 6 5   SANDRA 9.1 8.7   GLC 83 88   ALBUMIN  --  2.7*   PROTTOTAL  --  5.9*   BILITOTAL  --  0.8   ALKPHOS  --  174*   ALT  --   83*   AST  --  40     No results found for this or any previous visit (from the past 24 hour(s)).  Medications     dextrose         aspirin  324 mg Oral or Feeding Tube Daily     calcium polycarbophil  1,250 mg Per NG tube BID     cloNIDine  0.1 mg Oral or NG Tube BID     enoxaparin ANTICOAGULANT  40 mg Subcutaneous Q12H     escitalopram  20 mg Oral Daily     famotidine  20 mg Oral or NG Tube Daily     levothyroxine  50 mcg Oral or NG Tube Daily     melatonin  3 mg Oral or NG Tube At Bedtime     multivitamins w/minerals  15 mL Per Feeding Tube Daily     polyethylene glycol  17 g Oral or G tube Daily     pravastatin  40 mg Oral Daily     senna-docusate  1 tablet Per Feeding Tube At Bedtime

## 2020-07-01 NOTE — PLAN OF CARE
Patient up in chair this afternoon. Yelling out to go back to bed within 10 minutes,. Patient did sit in chair for one hour and was happy when she went back in bed. Patient did not want lunch she stated she is too full. Patient using her purwic.

## 2020-07-01 NOTE — PROGRESS NOTES
"Social Work Services Progress Note    Hospital Day: 26  Collaborated with:  Patients Guardian    Data:      SW received call from Cynthia Farah - inquiring about visitors if she would be able to visit as she spoke with pt and pt sounded great and indicated she would like to see Cynthia (Guardian).     SILVIA provided brief update on how pt is doing and what has and continues to be looked at for discharge options.     SILVIA offered to send additional community referrals and at this Cynthia was very agreeable and gave approval to send referrals within 25 [place] radius of [place] stating \"anywhere\" indicating that she is open to where pt is able to be placed continuing to explain that this would help her and the community SW be able to \"buy time\" to help find alternative group home or longer term placement option.    Cynthia did inquire about [facility] making a referral, SILVIA explained that pt has no acute psychiatric needs that could be addressed at this time and that there was an additional psychiatric consult placed where pt was seen in the last week to look at if this could be an option, pt is not appropriate at this time.     SILVIA agreed to continue to keep Cynthia updated with any new information.       Intervention:  Follow up and discharge planning.     Assessment:  pts guardian is in agreement with plans and moving forward towards d/c.     Plan:    Anticipated Disposition:  Facility:  D    Barriers to d/c plan:  Accepting faciltiy    Follow Up:  SILVIA will continue to follow for discharge planning.     ABBY Gregorio, [facility]   [facility]  Pager: [phone]                "

## 2020-07-01 NOTE — PLAN OF CARE
"Shift:?[id number]   Situation: ?Patient with significant intellectual disability, history of depression, who was admitted for AMS??and?Cerebellar Infarct identified on?the MRI scan 6/6/2020.?LP on 6-9-2020 w/o evidence of infection. Cerebellar infarct identified on MRI 6-6-2020.   Mood: ?Patient calm, quiet, and contemplative. Patient can engage in minimal conversation with slow, simple instructions.   Vitals: VSS /64   Pulse 54   Temp 96.8  F (36  C) (Oral)   Resp 16   Ht 1.753 m (5' 9\")   Wt (!) 157.9 kg (348 lb)   SpO2 97%   BMI 51.39 kg/m     Neuro: Alert, oriented to self and place, but not time. Able to use call light appropriately   Respiratory: ?NL breathing on RA   Cardiac: ?Bradycardic, generalized edema..   Nutrition: ? Adult formula drip feeding-continuous Peptamen Intense VHP: Nasogastric tube: Goal rate of 45 mL/hr; mediation -feeding tube flush frequency at least 15-30 ml before and after medication administration and with tube clogging.TF infusing at goal rate of 45 ml/hr.   GI/: ?Incontinent of urine. Purewick external catheter exchanges with?600 ml urine output . Purewick catches 50% of urine output, but body habitus precludes exact fit and patient will soak covidiene sheet partial.   *Bowel movements: small smear of greenish brown stool pocketed below and behind the Purewick extermal catheter. Patient needs frequent site checks.   Skin: ?Coccyx wound with skin tear. WOC evaluated patient Barrier cream applied   Lines: None   Activity:??Mechanical lift and 2 to turn and reposition. Weight shift changes and draw sheets. Patient was getting  up at group home  Shift events: ?Patient watched television in between falling asleep and conversing with   Plan of care: ?Awaiting placement @  TCU once level 2 screen completed via [facility]. Continue to monitor nutrition, labs, skin, pain, and follow POC. Notify MD of changes. Patient with falls risk precautions. ?Call light within reach, bed " alarm for safety. Specialized air mattress for good skin hygiene.   ?

## 2020-07-01 NOTE — PROVIDER NOTIFICATION
Paged Gold cross cover MD at [phone] ([facility])    LOW 5B [phone] S.JOSE Purcell RN [phone] writer leaving now but just wanted to let you know clonidine was held for DL=843/50.

## 2020-07-02 LAB — GLUCOSE BLDC GLUCOMTR-MCNC: 96 MG/DL (ref 70–99)

## 2020-07-02 PROCEDURE — 25000132 ZZH RX MED GY IP 250 OP 250 PS 637: Performed by: INTERNAL MEDICINE

## 2020-07-02 PROCEDURE — 00000146 ZZHCL STATISTIC GLUCOSE BY METER IP

## 2020-07-02 PROCEDURE — 12000001 ZZH R&B MED SURG/OB UMMC

## 2020-07-02 PROCEDURE — 25000132 ZZH RX MED GY IP 250 OP 250 PS 637: Performed by: STUDENT IN AN ORGANIZED HEALTH CARE EDUCATION/TRAINING PROGRAM

## 2020-07-02 PROCEDURE — 27210437 ZZH NUTRITION PRODUCT SEMIELEM INTERMED LITER

## 2020-07-02 PROCEDURE — 25000132 ZZH RX MED GY IP 250 OP 250 PS 637: Performed by: PHYSICIAN ASSISTANT

## 2020-07-02 PROCEDURE — 25000132 ZZH RX MED GY IP 250 OP 250 PS 637: Performed by: HOSPITALIST

## 2020-07-02 PROCEDURE — 99232 SBSQ HOSP IP/OBS MODERATE 35: CPT | Performed by: INTERNAL MEDICINE

## 2020-07-02 PROCEDURE — 25000128 H RX IP 250 OP 636: Performed by: PHYSICIAN ASSISTANT

## 2020-07-02 RX ADMIN — PRAVASTATIN SODIUM 40 MG: 40 TABLET ORAL at 09:11

## 2020-07-02 RX ADMIN — MELATONIN TAB 3 MG 3 MG: 3 TAB at 21:51

## 2020-07-02 RX ADMIN — MULTIVIT AND MINERALS-FERROUS GLUCONATE 9 MG IRON/15 ML ORAL LIQUID 15 ML: at 09:20

## 2020-07-02 RX ADMIN — ASPIRIN 81 MG CHEWABLE TABLET 324 MG: 81 TABLET CHEWABLE at 09:10

## 2020-07-02 RX ADMIN — CLONIDINE HYDROCHLORIDE 0.1 MG: 0.1 TABLET ORAL at 09:11

## 2020-07-02 RX ADMIN — DOCUSATE SODIUM AND SENNOSIDES 1 TABLET: 50; 8.6 TABLET ORAL at 21:51

## 2020-07-02 RX ADMIN — CALCIUM POLYCARBOPHIL 1250 MG: 625 TABLET, FILM COATED ORAL at 09:20

## 2020-07-02 RX ADMIN — CALCIUM POLYCARBOPHIL 1250 MG: 625 TABLET, FILM COATED ORAL at 21:50

## 2020-07-02 RX ADMIN — LEVOTHYROXINE SODIUM 50 MCG: 50 TABLET ORAL at 09:20

## 2020-07-02 RX ADMIN — ENOXAPARIN SODIUM 40 MG: 40 INJECTION SUBCUTANEOUS at 19:22

## 2020-07-02 RX ADMIN — FAMOTIDINE 20 MG: 20 TABLET ORAL at 09:11

## 2020-07-02 RX ADMIN — CLONIDINE HYDROCHLORIDE 0.1 MG: 0.1 TABLET ORAL at 19:22

## 2020-07-02 RX ADMIN — ESCITALOPRAM OXALATE 20 MG: 10 TABLET ORAL at 09:10

## 2020-07-02 RX ADMIN — ENOXAPARIN SODIUM 40 MG: 40 INJECTION SUBCUTANEOUS at 09:10

## 2020-07-02 ASSESSMENT — ACTIVITIES OF DAILY LIVING (ADL)
ADLS_ACUITY_SCORE: 19
ADLS_ACUITY_SCORE: 25
ADLS_ACUITY_SCORE: 19
ADLS_ACUITY_SCORE: 21
ADLS_ACUITY_SCORE: 19
ADLS_ACUITY_SCORE: 19

## 2020-07-02 ASSESSMENT — MIFFLIN-ST. JEOR: SCORE: 2238.57

## 2020-07-02 NOTE — PROGRESS NOTES
Calorie Count  Intake recorded for: 7/1  Total Kcals: 602 Total Protein: 26g  Kcals from Hospital Food: 602  Protein: 26g  Kcals from Outside Food (average):0 Protein: 0g  # Meals Recorded: 100% apple juice, sausage leon, scrambled eggs, 25% oatmeal w/ brown sugar  # Supplements Recorded: 0

## 2020-07-02 NOTE — PLAN OF CARE
NEURO: alert. Unable to ascertain orientation as pt did not respond clearly to all questions. Slow to respond to commands. Occasionally withdrawn.  RESPIRATORY: LS diminished in bases, SpO2>92% on RA.   CARDIO: VSS.   GI/: BS active, +gas, + BM. Purewick in place- adequate UOP. NJ in place and patent- TF running. Tolerating PO intake in AM 50% breakfast and 100% applesauce- declined lunch.  SKIN: Coccyx reddened- barrier cream applied. Turned q 2 hours and incontinence,  on pulsate mattress.   ACTIVITY: Lift transfers. Heavy 2 assist bed repositioning.   PAIN: Denied pain.   LINES: No PIV line.   PLAN: Waiting for placement,notify MD with concerns.

## 2020-07-02 NOTE — PLAN OF CARE
"Shift:?[id number]   Situation: ?Patient with significant intellectual disability, history of depression, who was admitted for AMS??and?Cerebellar Infarct identified on?the MRI scan 6/6/2020.?LP on 6-9-2020 w/o evidence of infection. Cerebellar infarct identified on MRI 6-6-2020.   Mood: ?Patient calm, quiet, and contemplative. Patient can engage in minimal conversation with slow, simple instructions.   Vitals:?/63 (BP Location: Right arm)   Pulse 54   Temp 97.6  F (36.4  C) (Oral)   Resp 16   Ht 1.753 m (5' 9\")   Wt (!) 157.9 kg (348 lb)   SpO2 95%   BMI 51.39 kg/m      Neuro: Alert, oriented to self and place, but not time. Able to use call light appropriately   Respiratory: ?NL breathing on RA   Cardiac: ?WNL ,generalized edema..   Nutrition: ? Adult formula drip feeding-continuous Peptamen Intense via Nasogastric tube: Goal rate of 45 mL/hr; mediation -feeding tube flush frequency at least 15-30 ml before and after medication administration and with tube clogging.TF infusing at goal rate of 45 ml/hr.   GI/: ?Incontinent of urine. Purewick external catheter exchanges with?400 ml l urine output . Purewick catches 50% of urine output, but body habitus precludes exact fit and patient will soak covidiene sheet partial.   NO BM this shift. Patient needs frequent site checks.   Skin: ?Coccyx wound with skin tear. WOC evaluated patient Barrier cream applied   Lines: None   Activity:??Mechanical lift and 2 to turn and reposition. Weight shift changes and draw sheets.   Shift events: ?Patient slept between cares.    Plan of care: ?Awaiting placement @  TCU once level 2 screen completed via [facility]. Continue to monitor nutrition, labs, skin, pain, and follow POC. Notify MD of changes. Patient with falls risk precautions. ?Call light within reach, bed alarm for safety. Specialized air mattress for good skin hygiene.   ?   ?   "

## 2020-07-02 NOTE — PLAN OF CARE
Pt is AOX4, sating well on RA. Repositioned X2, otherwise refused repositioning. TF infusing at 45ml/h. Pt a cheeseburger and fries for dinner. Purewick in place.  pt declined getting up to chair. Cont to enc pt to be independent in cares and ambulate to chair.

## 2020-07-02 NOTE — PROGRESS NOTES
[facility], [facility] Progress Note - Hospitalist Service, Gold 8       Date of Admission:  6/5/2020  Assessment & Plan   Dionne Perez is a 35 year old female admitted on 6/5/2020. She Ms. Dionne Perez is a 36 yo female with hx of developmental delay, depression, anxiety, likely psychosis, GERD, and hypothyrodism, admitted 6/5/2020 to [facility] for further eval and management of Acute Mental Status change.     Changes today:  Encourage eating, pill swallowing and working with PT/OT  If she continues to not participate, will consult PMR for evaluation of possible ARU, given need for frequent positive encouragement.      1. AMS: Likely adjustment/conversion disorder in the setting of acute stress and her developmental delay. Patient remains minimally responsive and not at her baseline. She seems like she is improving from her admission. Baseline is she responds in few words, but is independent with her ADLs. Her nuerologic workup has been largely unrevealing without evidence of seizures on EEG, MRI demonstrated new cerebellar infarct. MRI without any clear etiology which does not explain her change in mentation. LP without any evidence of infection.   - Neurology has no additional recommendations for additional workup  - Appreciate Psychiatry input - as is improving seems like could be adjustment disorder  - DC'ed IV acyclovir, ceftriaxone, and vanc  - uptitrated PTA Lexapro up to 20 mg PTA dose   - needs significant positive encouragement to eat and participate in physical therapy in order to discharge to either TCU or her group home.  Will focus first on food, in order to discontinue NJ tube.    - reinforced the importance of PT/OT.  Pt agreed to work with PT today.     2. Concern for dysphagia  Has had no PO intake since admission due to her mouth rigidity and not opening her mouth volitionally. Continues, therefore to be NPO. NG inserted 6/7 for med administration and to start  enteral nutrition. Patient removed NG 6/7 evening, replaced with bridal.   -  TFs to goal of 75 ml/hr  - speech therapy consult and recommendations appreciated   - Nutrition consulted and appreciate following      3. Incidental finding of R cerebellar infarct of uncertain significance  MRI head for AMS workup revealed finding of R cerebellar infarct. Pt started on aspirin, obtained CTA head and neck that was non-diagnostic due to it being severely limited due to poor contrast opacification. TTE with bubble study negative.   - Neurology consulted   - Continue  mg daily, if unable to take orally, can be given rectally as 300 mg   - Hold statin in the setting of elevated LFTs - trending down -resume 6/26 and monitor LFT  - Repeat CTA head and neck without evidence of vascular stenosis  - Continue tele monitoring      4.  Mildly elevated LFTs: likely from Hepatic steatosis   Unclear etiology as LFTs from OSH as recently as 5/26 normal. AST and ALT increasing today. GGT elevated to 66. Lipase 876 (< 3X the upper limit of normal). CT abdomen 6/6 with no acute findings. RUQ US with mild hepatomegaly and diffuse hepatic steatosis, CBD 5.5 mm, no hepatic biliary ductal dilation. Tbili wnl. Reported to have abdominal pain on admission, no further complaints of pain. Appears to be tolerating TF. Possible pancreatitis on admission vs LFT elevation 2/2 hepatic steatosis and CK elevation.   - Continue to monitor      # Chronic pain?: Pt noted to be on scheduled Tylenol PTA, as well as have available as needed diclofenac gel.   - Discontinued PTA scheduled Tylenol given transaminitis     # Hypothyroidism: PTA on levothyroxine 50 mcg daily. TFTs on admission reveal pt euthyroid based on free T4 WNL.  - Continue PTA levothyroxine     # GERD: Continue PTA Pepcid 20 mg daily       Diet: Adult Formula Drip Feeding: Continuous Peptamen Intense VHP; Nasogastric tube; Goal Rate: 45; mL/hr; Medication - Feeding Tube Flush Frequency:  At least 15-30 mL water before and after medication administration and with tube clogging; Amount to Se...  Snacks/Supplements Adult: Other; Pt may order snacks/supplements PRN; Between Meals  Regular Diet Adult  Calorie Counts    DVT Prophylaxis: Pneumatic Compression Devices  Davila Catheter: not present  Code Status: Full Code           Disposition Plan   Expected discharge: 2 - 3 days, recommended to transitional care unit once safe disposition plan/ TCU bed available.  Entered: Jose Carlos Stone MD 07/02/2020, 1:04 PM       The patient's care was discussed with the Bedside Nurse, Care Coordinator/ and Patient.    Jose Carlos Stone MD  Hospitalist Service, [address], [place]  Pager: [phone]  Please see sticky note for cross cover information  ______________________________________________________________________    Interval History   Patient seen and examined.  No acute overnight events.  No HA, dizziness, CP, N/V, abdominal pain or other symptoms currently.     Data reviewed today: I reviewed all medications, new labs and imaging results over the last 24 hours. I personally reviewed no images or EKG's today.    Physical Exam   Vital Signs: Temp: 97.6  F (36.4  C) Temp src: Oral BP: 120/63   Heart Rate: 56 Resp: 16 SpO2: 95 % O2 Device: None (Room air)    Weight: 348 lbs 0 oz  General Appearance: NAD  Respiratory: CTA b/l  Cardiovascular: RRR S1/S2, no m,r,g  GI: soft, NT, ND, +BS  Skin: no rashes  Other: No edema .     Data   Recent Labs   Lab 06/29/20  0614   WBC 6.1   HGB 10.2*   MCV 95         POTASSIUM 4.6   CHLORIDE 110*   CO2 25   BUN 27   CR 0.54   ANIONGAP 6   SANDRA 9.1   GLC 83     No results found for this or any previous visit (from the past 24 hour(s)).  Medications     dextrose         aspirin  324 mg Oral or Feeding Tube Daily     calcium polycarbophil  1,250 mg Per NG tube BID     cloNIDine  0.1 mg Oral or NG Tube BID      enoxaparin ANTICOAGULANT  40 mg Subcutaneous Q12H     escitalopram  20 mg Oral Daily     famotidine  20 mg Oral or NG Tube Daily     levothyroxine  50 mcg Oral or NG Tube Daily     melatonin  3 mg Oral or NG Tube At Bedtime     multivitamins w/minerals  15 mL Per Feeding Tube Daily     polyethylene glycol  17 g Oral or G tube Daily     pravastatin  40 mg Oral Daily     senna-docusate  1 tablet Per Feeding Tube At Bedtime

## 2020-07-03 ENCOUNTER — APPOINTMENT (OUTPATIENT)
Dept: PHYSICAL THERAPY | Facility: CLINIC | Age: 35
DRG: 887 | End: 2020-07-03
Payer: MEDICARE

## 2020-07-03 LAB — GLUCOSE BLDC GLUCOMTR-MCNC: 103 MG/DL (ref 70–99)

## 2020-07-03 PROCEDURE — 25000132 ZZH RX MED GY IP 250 OP 250 PS 637: Performed by: PHYSICIAN ASSISTANT

## 2020-07-03 PROCEDURE — 27210437 ZZH NUTRITION PRODUCT SEMIELEM INTERMED LITER

## 2020-07-03 PROCEDURE — 25000132 ZZH RX MED GY IP 250 OP 250 PS 637: Performed by: INTERNAL MEDICINE

## 2020-07-03 PROCEDURE — 25000132 ZZH RX MED GY IP 250 OP 250 PS 637: Performed by: STUDENT IN AN ORGANIZED HEALTH CARE EDUCATION/TRAINING PROGRAM

## 2020-07-03 PROCEDURE — 00000146 ZZHCL STATISTIC GLUCOSE BY METER IP

## 2020-07-03 PROCEDURE — 12000001 ZZH R&B MED SURG/OB UMMC

## 2020-07-03 PROCEDURE — 25000128 H RX IP 250 OP 636: Performed by: PHYSICIAN ASSISTANT

## 2020-07-03 PROCEDURE — 97530 THERAPEUTIC ACTIVITIES: CPT | Mod: GP

## 2020-07-03 PROCEDURE — G0463 HOSPITAL OUTPT CLINIC VISIT: HCPCS

## 2020-07-03 PROCEDURE — 25000132 ZZH RX MED GY IP 250 OP 250 PS 637: Performed by: HOSPITALIST

## 2020-07-03 PROCEDURE — 99232 SBSQ HOSP IP/OBS MODERATE 35: CPT | Performed by: INTERNAL MEDICINE

## 2020-07-03 RX ADMIN — PRAVASTATIN SODIUM 40 MG: 40 TABLET ORAL at 08:14

## 2020-07-03 RX ADMIN — MELATONIN TAB 3 MG 3 MG: 3 TAB at 20:23

## 2020-07-03 RX ADMIN — CALCIUM POLYCARBOPHIL 1250 MG: 625 TABLET, FILM COATED ORAL at 20:23

## 2020-07-03 RX ADMIN — ENOXAPARIN SODIUM 40 MG: 40 INJECTION SUBCUTANEOUS at 20:31

## 2020-07-03 RX ADMIN — FAMOTIDINE 20 MG: 20 TABLET ORAL at 08:15

## 2020-07-03 RX ADMIN — DOCUSATE SODIUM AND SENNOSIDES 1 TABLET: 50; 8.6 TABLET ORAL at 20:23

## 2020-07-03 RX ADMIN — CLONIDINE HYDROCHLORIDE 0.1 MG: 0.1 TABLET ORAL at 20:23

## 2020-07-03 RX ADMIN — CALCIUM POLYCARBOPHIL 1250 MG: 625 TABLET, FILM COATED ORAL at 10:25

## 2020-07-03 RX ADMIN — ESCITALOPRAM OXALATE 20 MG: 10 TABLET ORAL at 08:14

## 2020-07-03 RX ADMIN — LEVOTHYROXINE SODIUM 50 MCG: 50 TABLET ORAL at 08:14

## 2020-07-03 RX ADMIN — MULTIVIT AND MINERALS-FERROUS GLUCONATE 9 MG IRON/15 ML ORAL LIQUID 15 ML: at 10:25

## 2020-07-03 RX ADMIN — ASPIRIN 81 MG CHEWABLE TABLET 324 MG: 81 TABLET CHEWABLE at 08:14

## 2020-07-03 RX ADMIN — ENOXAPARIN SODIUM 40 MG: 40 INJECTION SUBCUTANEOUS at 08:14

## 2020-07-03 ASSESSMENT — ACTIVITIES OF DAILY LIVING (ADL)
ADLS_ACUITY_SCORE: 23
ADLS_ACUITY_SCORE: 25
ADLS_ACUITY_SCORE: 23
ADLS_ACUITY_SCORE: 25
ADLS_ACUITY_SCORE: 25
ADLS_ACUITY_SCORE: 23

## 2020-07-03 ASSESSMENT — MIFFLIN-ST. JEOR: SCORE: 2193.21

## 2020-07-03 NOTE — PROGRESS NOTES
[facility] Nurse Inpatient Wound Assessment   Reason for consultation: coccyx wound     Assessment  Coccyx wound due to Incontinence Associated Dermatitis (IAD), Moisture Associated Skin Damage (MASD) and Skin Tear  Status: healed    Treatment Plan  Perianal and  cleft BID and PRN to maintain barrier, apply Critic-aid paste. With incontinence, cleanse with Raz cleanse and protect and paper washclothes. Do not need to remove all paste with cleaning, only skim off top soiled layer and reapply to maintain barrier. If full removal needed, please use baby oil to reduce friction forces to the skin.     Orders Reviewed    WOC Nurse follow-up plan:weekly  Nursing to notify the Provider(s) and re-consult the WO Nurse if wound(s) deteriorates or new skin concern.    Patient History  According to provider note(s):  Ms. Dionne Perez is a 34 yo female with hx of developmental delay, depression, anxiety, likely psychosis, GERD, and hypothyrodism, admitted 2020 to [facility] for further eval and management of Acute Mental Status change. She was found to have Cerebellar Infarct in the MRI scan done 2020. LP done 2020 without any evidence of infection. Neurology could not come up with any cause of AMS and Ceftriaxone. Vancomycin and Acyclovir were discontinued and  Psychiatry thought it  could be adjustment disorder    Objective Data  Containment of urine/stool: Incontinence Protocol, Incontinent pad in bed and External catheter    Active Diet Order  Orders Placed This Encounter      Regular Diet Adult      Output:   I/O last 3 completed shifts:  In: 1598.5 [P.O.:563.5; NG/GT:450]  Out: 350 [Urine:350]    Risk Assessment:   Sensory Perception: 3-->slightly limited  Moisture: 3-->occasionally moist  Activity: 2-->chairfast  Mobility: 3-->slightly limited  Nutrition: 3-->adequate  Friction and Shear: 2-->potential problem  Kris Score: 16                          Labs:   Recent Labs   Lab 20  0614   HGB 10.2*   WBC 6.1        Physical Exam  Skin inspection: focused buttock    Wound Location:  coccyx  Wound History: patient with incontinence, is able to assist with turns pretty well.   Measurements (length x width x depth, in cm) reepithelialized pink intact skin      Interventions  Current support surface: Bariatric Low air loss mattress  Current off-loading measures: Pillows  Visual inspection and assessment completed   Wound Care: done per plan of care  Supplies: floor stock  Education provided: plan of care  Discussed plan of care with Patient and Nurse    Tamara Mcgrath RN, CWOCN

## 2020-07-03 NOTE — PROGRESS NOTES
Social Work Services Progress Note    Hospital Day: 28  Collaborated with:  Chart review and attempt to reach Guardian Jeanne Stone    Data:  SW reviewed chart and per conversation with MD pt did participate in therapy today.  SW complied list and send additional referrals to following locations:     [facility]    [facility] ([facility])    [facility] (SNF)     [facility] (SNF)     [facility] ([facility])      [facility] [place] (nursing home)      [facility] AT [place] (nursing home)      [facility] (SNF)       [facility] (SNF)      [facility](SNF)      [facility] ([facility])      [facility] (SNF)      [facility] (CC)      [facility] (SNF)      [facility] (SNF)      [facility] [place] (SNF)      [facility] (SNF)      [facility] (SNF)      [facility] ([facility])      [facility] C[facility] (SNF)      [facility]      [facility] [place] (SNF)    [facility]ABILIT[facility] [place] (SNF)        [facility] (SNF)          [facility]          [facility]     [facility]IP [facility]         GO[facility]ism [place]         GOOD [facility]ism CENTER [place]         GOOD [facility]ism CENTER [place]         [facility]OD [facility] CENTER [facility]         [facility]          GOOD [facility]danism CENTER [place]          GOOD Yazdanism CENTER [place]         GOOD Yazdanism CENTER [place]         Good Confucianism [facility]       Good Confucianism [place]    GOOD Yazdanism SOCIETY [facility]       [facility]           [facility] Residence     [facility]    [facility] Residence       [facility]        INTERLUDE [place]          INTERLUDE [place]YM[place]TH        JUAN-ANDIE         JU[facility]AND[facility] RESIDENCE      [facility] GUARDIAN ANGELS [place]          [facility] [place]     [facility] Home          [facility] HOME          [facility] AND REHAB         [facility] HOMES OF [facility] OF [facility] HOMES OF [facility] OF [facility] HOMES ON [facility]          [facility]        [facility]        [facility]          [facility]       [facility]       SW attempted to reach Cynthia Farah and left message indicating that SW had sent out additional referrals and asking for confirmation that she and community SW are looking into alternative group home options for this patient. SW requested a return call.     Intervention:  SW submitted additional community TCU referrals in attempt to place patient.     Assessment:  SW did not interact with pt during this interaction.     Plan:    Anticipated Disposition:  Facility:  TCU TBD    Barriers to d/c plan:  Accepting facility     Follow Up:  SILVIA will continue to follow for discharge  planning and needs.     ABBY Gregorio, [facility]    [facility] [place]  Pager: [phone]

## 2020-07-03 NOTE — PLAN OF CARE
[id number]: Patient denies pain. Allowing staff to reposition every few hours or so. BM x1. Purewick intact. Good appetite with dinner. Talked on the phone with sister, Awa. Continues to tolerate TF at goal rate of 45 ml/hr.

## 2020-07-03 NOTE — PROGRESS NOTES
CLINICAL NUTRITION SERVICES - REASSESSMENT NOTE     Nutrition Prescription    RECOMMENDATIONS FOR MDs/PROVIDERS TO ORDER:  Patient with much improve in PO and appetite. I would continue with supplemental TF support,  until patient is ready to discharge to TCU, to optimize nutrition and assist with improve in PO intake.     Malnutrition Status:    Unable to determine due to unable to perform all aspects of NFPE    Recommendations already ordered by Registered Dietitian (RD):  No changes to TF,   Calorie count    Future/Additional Recommendations:  Po improvement        EVALUATION OF THE PROGRESS TOWARD GOALS   Diet:   Regular with thin liquids + snacks / oral nutrition supplements prn ( per SLP advanced on 6/24)    Nutrition Support:   Access: NG tube placed 6/7/20  Dosing wt: 83 kg adjusted wt from dry admit wt    EN: Kept with peptamen VHP @ goal 45 ml/hr via NG tube since 6/20, (TF reduced from goal @ 75 ml/hr to stimulate appetite).     -Regimen to provide (1080 ml),  1080 kcals (13 kcal/kg/day), 100 gm PRO (1.2 g/kg/day), 907 ml free H2O, 84 gm CHO and 4 gm Fiber daily.    Free water flushes: @ 30 mL q4h patency flushes. Goal EN + FWF = 1087 mL free water/day    Intake:   PO: Calorie count data for the past 3 days showed an average daily intake of 650 kcal /day and 62 gm protein.    TF: Average 7 day EN intake: 700 daily, met 65% of the TF goal infusion. Provided patient with 700 kcal/day and 65 gm protein.      NEW FINDINGS   Chart reviewed: patient with hx of developmental delay, admitted 6/5/2020 to [facility] for further eval and management of Acute Mental Status change. Improving from admission per MD note.     Wt trend: Current wt >> admit wt, likely volume related     Labs noted: Albumin: 2.7 (L), no prealbumin. Patient is receiving appropriate amount of protein. Lytes normal.    Extremities: no edema      MALNUTRITION  % Intake: TF + PO is meeting needs  % Weight Loss: Unable to assess due to volume  up  Subcutaneous Fat Loss: Unable to assess due to social distancing with current pandemic  Muscle Loss: Unable to assess due to social distancing with current pandemic  Fluid Accumulation/Edema: None noted  Malnutrition Diagnosis: Unable to determine due to unable to perform all aspects of NFPE    Previous Goals   Total avg nutritional intake to meet a minimum of 20 kcal/kg and 1.5 g PRO/kg daily (per dosing wt 83 kg).     Evaluation: Met    Previous Nutrition Diagnosis  Inadequate oral intake related to variable appetite inhibiting ability to meet nutrition needs PO as evidenced by calorie count data indicating 3-day average PO intake of 305 kcals and 17 g protein daily, meeting 18% low end estimated energy needs and 14% low end estimated protein needs   Evaluation: Improving    CURRENT NUTRITION DIAGNOSIS  Inadequate oral intake related to slow improvement in mentation as evidenced by average daily PO intake of 650 kcal /day and 62 gm protein, continues to need supplemental TF support.     INTERVENTIONS  Implementation  Calorie count ordered     Goals  Patient to consume > 50% of TID meals to transition off TFs      Monitoring/Evaluation  Progress toward goals will be monitored and evaluated per protocol.    Trevor Hall RD/CECE  Pager [phone]

## 2020-07-03 NOTE — PLAN OF CARE
"Shift:?9641-0276   Situation: ?Patient with significant intellectual disability, history of depression, who was admitted for AMS??and?Cerebellar Infarct identified on?the MRI scan 6/6/2020.?LP on 6-9-2020 w/o evidence of infection. Cerebellar infarct identified on MRI 6-6-2020.   Mood: ?Patient calm and cooperative/ Patient can engage in minimal conversation with slow, simple instructions.   Vitals:?/62 (BP Location: Right arm)   Pulse 58   Temp 98.3  F (36.8  C) (Oral)   Resp 18   Ht 1.753 m (5' 9\")   Wt 147.4 kg (325 lb)   SpO2 95%   BMI 47.99 kg/m   Resp 16   Ht 1.753 m (5' 9\")   Wt (!) 157.9 kg (348 lb)   SpO2 95%   BMI 51.39 kg/m      Neuro: Alert, oriented to self and place, but not time.Respiratory: ?NL breathing on RA   Cardiac: ?WNL ,generalized edema..   Nutrition: ? Adult formula drip feeding-continuous Peptamen Intense via Nasogastric tube: Goal rate of 45 mL/hr; mediation -feeding tube flush frequency at least 15-30 ml before and after medication administration and with tube clogging.TF infusing at goal rate of 45 ml/hr.   GI/: ?Incontinent of urine. Purewick external catheter exchanges with?550 ml l urine output . Purewick catches 50% of urine output, but body habitus precludes exact fit and patient will soak covidiene sheet partial.   NO BM this shift. Patient needs frequent site checks.   Skin: ?Coccyx wound with skin tear.   Lines: None   Activity:??Mechanical lift and 2 to turn and reposition. Weight shift changes and draw sheets.   Shift events: ?Patient slept between cares.    Plan of care: ?Awaiting placement once level 2 screen completed via [facility]. Continue to monitor nutrition, labs, skin, pain, and follow POC. Notify MD of changes. Patient with falls risk precautions. ?Call light within reach, bed alarm for safety. Specialized air mattress for good skin hygiene.   "

## 2020-07-03 NOTE — PLAN OF CARE
Patient denies pain. Tired today and sleeping in-between cares. Good appetite with breakfast but has not eaten lunch yet. Tolerating TF at goal rate of 45 ml/hr. Good output from purewick. No BM this shift. Able to stand with PT this afternoon using EZ stand. LLE with some slight redness. WOCN  also came to see patient's bottom from constant moisture. Will apply critic-aid paste as able. Continue to monitor and follow plan of care.

## 2020-07-03 NOTE — PROGRESS NOTES
Social Work Services Progress Note    Hospital Day: 29  Collaborated with:  Unit SW    Data:  SW following up on referrals for placement    Referrals that cannot accept:    [facility]-Did not state a reason    [facility]--Did not state a reason     [facility]-Out of business? Fax and phone are out of service    [facility] [place] (SNF)-Does not accept MA patients        [facility] ([facility])-No beds available    Referrals still pending:   [facility] [place] (SNF)    [facility] [place] (SNF)    [facility] (SNF)    [facility] ([facility])    [facility] ([facility])     [facility]    [facility] ([facility])    [facility] (SNF)     [facility] ([facility]NF)    [facility](SNF)    [facility] ([facility]    [facility] ([facility]    [facility] (CC)    [facility] ([facility])    [facility]    [facility] [place] [facility])    [facility] ([facility])    [facility] (SNF)    [facility]     [facility] [place] ([facility])    [facility]    [facility] - [facility] (SNF)        [facility]       [facility] HOME        [facility] Mobile        [place]   FRIENDSHIP [facility]       [facility] [place]       [facility]OD [facility]ani CENTER [place]       [facility] CENTER [place]       GOOD [facility]ani CENTER [facility]       [facility]OD [facility] C[facility] [place]        GOOD [facility]        GOOD Yarsani CENTER [place]       [facility]       Good Sikhism [facility]     Good Sikhism [place]  GOOD Yarsani SOCIETY TONY  MEG     GOOD Jewish SOCIETY-[place]        GOOD Jewish SOCIETY-[facility]     GUARDIAN ANGELS CARE [place]  HAVEN HOMES OF [facility]      [facility]LUDE [facility]        [facility]      JUAN-ANDIE JUAN-[place] RESIDENCE    [facility]    LEISURE [place] GUARDIAN ANGELS HIBBING        [facility]   ANDERSON ON [place] T[facility] - [facility] Home        [facility] HOME        [facility] AND REHAB      [facility]        [facility]        [facility] HOM[facility] OF [facility] HOMES OF [facility] OF [facility] H[facility] ON [facility]        [facility]      [facility]      [facility] AND REHAB       [facility]      Intervention:  Ongoing follow up for TCU Placement    Assessment:  SW did not interact with pt during this encounter    Plan:    Anticipated Disposition:  Facility:  TBD  Barriers to d/c plan:  Accepting facility     Follow Up:  SW will continue to follow for discharge planning and needs.     Jill MORA. KOLTON.  Clinical   [facility]    Pager: [phone] Mon-Sat 9 am - 4:30 pm  On-call/after hours pager [phone]

## 2020-07-03 NOTE — PLAN OF CARE
PT - Pt will continue to be seen by PT 3x/week. Will increase frequency of therapy as appropriate depending on pt's consistent level of participation    Discharge Planner PT   Patient plan for discharge: group home vs rehab  Current status: Pt needing significant encouragement for participation during session. Pt is mod A of 1 with 2nd person present for supine>sit transfer, mod Ax2 for supine>sit. Pt reporting dizziness with sitting EOB, /65. Pt performs 3x sit<>stand transfers with max Ax2 with FWW. Pt unable to achieve full upright stance, and tolerates standing ~10 seconds. MD present to help therapist's increase pt's level of participation. Pt participating more today, but still refusing to go to recliner and is self limiting during session  Barriers to return to prior living situation: medical status, weakness, below baseline, falls risk  Recommendations for discharge: TCU  Rationale for recommendations: Pt will benefit from continued skilled PT in order to progress strength, activity tolerance, gait, balance and IND with functional mobility.   Entered by: Gwen Murillo 07/03/2020 1:52 PM

## 2020-07-03 NOTE — PROGRESS NOTES
[facility] Progress Note - Hospitalist Service, Gold 8       Date of Admission:  6/5/2020  Assessment & Plan   Dionne Perez is a 35 year old female admitted on 6/5/2020. She MsFabiola Perez is a 34 yo female with hx of developmental delay, depression, anxiety, likely psychosis, GERD, and hypothyrodism, admitted 6/5/2020 to [facility] for further eval and management of Acute Mental Status change.     Changes today:  Encourage eating, pill swallowing and working with PT/OT  Will consult PMR for evaluation of possible ARU, given need for frequent positive encouragement and likely rapid improvement with engagement.     1. AMS: Likely adjustment/conversion disorder in the setting of acute stress and her developmental delay. Patient remains minimally responsive and not at her baseline. She seems like she is improving from her admission. Baseline is she responds in few words, but is independent with her ADLs. Her nuerologic workup has been largely unrevealing without evidence of seizures on EEG, MRI demonstrated new cerebellar infarct. MRI without any clear etiology which does not explain her change in mentation. LP without any evidence of infection.   - Neurology has no additional recommendations for additional workup  - Appreciate Psychiatry input - as is improving seems like could be adjustment disorder  - DC'ed IV acyclovir, ceftriaxone, and vanc  - uptitrated PTA Lexapro up to 20 mg PTA dose   - needs significant positive encouragement to eat and participate in physical therapy in order to discharge to either TCU or her group home.  Will focus first on food, in order to discontinue NJ tube.    - reinforced the importance of PT/OT.  Pt agreed to work with PT today.     2. Concern for dysphagia  Has had no PO intake since admission due to her mouth rigidity and not opening her mouth volitionally. Continues, therefore to be NPO. NG inserted 6/7 for med administration and to  start enteral nutrition. Patient removed NG 6/7 evening, replaced with bridal.   -  TFs to goal of 75 ml/hr  - speech therapy consult and recommendations appreciated   - Nutrition consulted and appreciate following      3. Incidental finding of R cerebellar infarct of uncertain significance  MRI head for AMS workup revealed finding of R cerebellar infarct. Pt started on aspirin, obtained CTA head and neck that was non-diagnostic due to it being severely limited due to poor contrast opacification. TTE with bubble study negative.   - Neurology consulted   - Continue  mg daily, if unable to take orally, can be given rectally as 300 mg   - Hold statin in the setting of elevated LFTs - trending down -resume 6/26 and monitor LFT  - Repeat CTA head and neck without evidence of vascular stenosis  - Continue tele monitoring      4.  Mildly elevated LFTs: likely from Hepatic steatosis   Unclear etiology as LFTs from OSH as recently as 5/26 normal. AST and ALT increasing today. GGT elevated to 66. Lipase 876 (< 3X the upper limit of normal). CT abdomen 6/6 with no acute findings. RUQ US with mild hepatomegaly and diffuse hepatic steatosis, CBD 5.5 mm, no hepatic biliary ductal dilation. Tbili wnl. Reported to have abdominal pain on admission, no further complaints of pain. Appears to be tolerating TF. Possible pancreatitis on admission vs LFT elevation 2/2 hepatic steatosis and CK elevation.   - Continue to monitor      # Chronic pain?: Pt noted to be on scheduled Tylenol PTA, as well as have available as needed diclofenac gel.   - Discontinued PTA scheduled Tylenol given transaminitis     # Hypothyroidism: PTA on levothyroxine 50 mcg daily. TFTs on admission reveal pt euthyroid based on free T4 WNL.  - Continue PTA levothyroxine     # GERD: Continue PTA Pepcid 20 mg daily       Diet: Adult Formula Drip Feeding: Continuous Peptamen Intense VHP; Nasogastric tube; Goal Rate: 45; mL/hr; Medication - Feeding Tube Flush  Frequency: At least 15-30 mL water before and after medication administration and with tube clogging; Amount to Se...  Snacks/Supplements Adult: Other; Pt may order snacks/supplements PRN; Between Meals  Regular Diet Adult    DVT Prophylaxis: Enoxaparin (Lovenox) SQ  Davila Catheter: not present  Code Status: Full Code           Disposition Plan   Expected discharge: 2 - 3 days, recommended to TBD once safe disposition plan/ TCU bed available.  Entered: Jose Carlos Stone MD 07/03/2020, 9:51 AM       The patient's care was discussed with the Bedside Nurse, Care Coordinator/, Patient and PMR Consultant.    Jose Carlos Stone MD  Hospitalist Service, [address], [place]  Pager: [phone]  Please see sticky note for cross cover information  ______________________________________________________________________    Interval History   Patient seen and examined.  Overall feels well, no HA, dizziness, CP, N/V dyspnea or other symptoms.  Still not getting up consistently, nor taking pills.     Data reviewed today: I reviewed all medications, new labs and imaging results over the last 24 hours. I personally reviewed no images or EKG's today.    Physical Exam   Vital Signs: Temp: 98.3  F (36.8  C) Temp src: Oral BP: 100/62 Pulse: 58 Heart Rate: 51 Resp: 18 SpO2: 95 % O2 Device: None (Room air)    Weight: 325 lbs 0 oz  General Appearance: NAD  Respiratory: CTA b/l  Cardiovascular: RRR S1/S2 no m,r,g  GI: soft, NT, ND, +BS  Skin: no rashes  Other: No edema      Data   Recent Labs   Lab 06/29/20  0614   WBC 6.1   HGB 10.2*   MCV 95         POTASSIUM 4.6   CHLORIDE 110*   CO2 25   BUN 27   CR 0.54   ANIONGAP 6   SANDRA 9.1   GLC 83     No results found for this or any previous visit (from the past 24 hour(s)).  Medications     dextrose         aspirin  324 mg Oral or Feeding Tube Daily     calcium polycarbophil  1,250 mg Per NG tube BID     cloNIDine  0.1 mg Oral or NG  Tube BID     enoxaparin ANTICOAGULANT  40 mg Subcutaneous Q12H     escitalopram  20 mg Oral Daily     famotidine  20 mg Oral or NG Tube Daily     levothyroxine  50 mcg Oral or NG Tube Daily     melatonin  3 mg Oral or NG Tube At Bedtime     multivitamins w/minerals  15 mL Per Feeding Tube Daily     polyethylene glycol  17 g Oral or G tube Daily     pravastatin  40 mg Oral Daily     senna-docusate  1 tablet Per Feeding Tube At Bedtime

## 2020-07-03 NOTE — PLAN OF CARE
VSS. Alert. Does not answer orientation questions. Slept between cares.  Incontinent of bowel and bladder.  External catheter in use.  No BM this shift.  Ate 100% of her dinner.  She has used the call light appropriately to ask for help.  Tube feeding running at 45cc/hr with out any problem.  Calorie counts done.    Continue with plan of care.

## 2020-07-03 NOTE — PROGRESS NOTES
Calorie Count    Intake recorded for: 7/2/2020  Total Kcals: 844 Total Protein: 24g    Kcals from Hospital Food: 844   Protein: 24g    Kcals from Outside Food (average):0 Protein: 0g    # Meals Recorded: 2 meals ordered (first - 100% apple sauce, apple juice, 50% whole grain bagel w/ cream cheese, oatmeal w/ brown sugar)  (second - 100% tuna salad sandwich on white bread, vanilla pudding, pears)    # Supplements Recorded: no intake recorded

## 2020-07-04 PROCEDURE — 25000132 ZZH RX MED GY IP 250 OP 250 PS 637: Performed by: HOSPITALIST

## 2020-07-04 PROCEDURE — 25000128 H RX IP 250 OP 636: Performed by: PHYSICIAN ASSISTANT

## 2020-07-04 PROCEDURE — 25000132 ZZH RX MED GY IP 250 OP 250 PS 637: Performed by: PHYSICIAN ASSISTANT

## 2020-07-04 PROCEDURE — 25000132 ZZH RX MED GY IP 250 OP 250 PS 637: Performed by: STUDENT IN AN ORGANIZED HEALTH CARE EDUCATION/TRAINING PROGRAM

## 2020-07-04 PROCEDURE — 99207 ZZC CDG-MDM COMPONENT: MEETS LOW - DOWN CODED: CPT | Performed by: INTERNAL MEDICINE

## 2020-07-04 PROCEDURE — 12000001 ZZH R&B MED SURG/OB UMMC

## 2020-07-04 PROCEDURE — 25000132 ZZH RX MED GY IP 250 OP 250 PS 637: Performed by: INTERNAL MEDICINE

## 2020-07-04 PROCEDURE — 99232 SBSQ HOSP IP/OBS MODERATE 35: CPT | Performed by: INTERNAL MEDICINE

## 2020-07-04 RX ADMIN — CALCIUM POLYCARBOPHIL 1250 MG: 625 TABLET, FILM COATED ORAL at 08:00

## 2020-07-04 RX ADMIN — FAMOTIDINE 20 MG: 20 TABLET ORAL at 08:00

## 2020-07-04 RX ADMIN — ESCITALOPRAM OXALATE 20 MG: 10 TABLET ORAL at 08:00

## 2020-07-04 RX ADMIN — MULTIVIT AND MINERALS-FERROUS GLUCONATE 9 MG IRON/15 ML ORAL LIQUID 15 ML: at 10:05

## 2020-07-04 RX ADMIN — MELATONIN TAB 3 MG 3 MG: 3 TAB at 20:11

## 2020-07-04 RX ADMIN — POLYETHYLENE GLYCOL 3350 17 G: 17 POWDER, FOR SOLUTION ORAL at 08:00

## 2020-07-04 RX ADMIN — ENOXAPARIN SODIUM 40 MG: 40 INJECTION SUBCUTANEOUS at 08:00

## 2020-07-04 RX ADMIN — CALCIUM POLYCARBOPHIL 1250 MG: 625 TABLET, FILM COATED ORAL at 20:11

## 2020-07-04 RX ADMIN — ENOXAPARIN SODIUM 40 MG: 40 INJECTION SUBCUTANEOUS at 20:11

## 2020-07-04 RX ADMIN — ASPIRIN 81 MG CHEWABLE TABLET 324 MG: 81 TABLET CHEWABLE at 08:00

## 2020-07-04 RX ADMIN — LEVOTHYROXINE SODIUM 50 MCG: 50 TABLET ORAL at 06:15

## 2020-07-04 RX ADMIN — PRAVASTATIN SODIUM 40 MG: 40 TABLET ORAL at 08:00

## 2020-07-04 ASSESSMENT — ACTIVITIES OF DAILY LIVING (ADL)
ADLS_ACUITY_SCORE: 23

## 2020-07-04 ASSESSMENT — MIFFLIN-ST. JEOR: SCORE: 2138.78

## 2020-07-04 NOTE — PLAN OF CARE
Patient denies pain. Sleeping most of the day. Encouraged patient to take some pills by mouth but patient strongly declined. Tolerating TF at goal rate of 45 ml. Good appetite with breakfast but has not had lunch yet. On calorie counts. [facility] [place] called to screen patient for admission. Possible placement early in the week? Patient does not have any IV access. Purewick in place with adequate output. No BM yet today. Continue to monitor, assist and follow plan of care.

## 2020-07-04 NOTE — PLAN OF CARE
D AVSS with sat's 96% on room air. Heart regular and lungs decreased in bases otherwise clear. Voiced no c/o pain and noted no nonverbal s/s of pain or nausea. Slept well between cares. Tolerating tube feeding at goal rate. Purwick changes x 1. Turning/repositioning overnight.   I Vital's, assessment and med's per order.   A Resting in bed with call light in reach.   P Continue to monitor and update MD with changes.

## 2020-07-04 NOTE — PROGRESS NOTES
[facility] Progress Note - Hospitalist Service, Gold 8       Date of Admission:  6/5/2020  Assessment & Plan   Dionne Perez is a 35 year old female admitted on 6/5/2020. She Ms. Dionne Perez is a 34 yo female with hx of developmental delay, depression, anxiety, likely psychosis, GERD, and hypothyrodism, admitted 6/5/2020 to [facility] for further eval and management of Acute Mental Status change.     Changes today:  Encourage eating, pill swallowing and working with PT/OT  Starting to work with PT, will encourage to continue  PMR consult Monday to assist with rehab planning given complex overlap between behavior/intillectual disability and possible conversion disorder.     1. AMS: Likely adjustment/conversion disorder in the setting of acute stress and her developmental delay. Patient remains minimally responsive and not at her baseline. She seems like she is improving from her admission. Baseline is she responds in few words, but is independent with her ADLs. Her nuerologic workup has been largely unrevealing without evidence of seizures on EEG, MRI demonstrated new cerebellar infarct. MRI without any clear etiology which does not explain her change in mentation. LP without any evidence of infection.   - Neurology has no additional recommendations for additional workup  - Appreciate Psychiatry input - as is improving seems like could be adjustment disorder  - DC'ed IV acyclovir, ceftriaxone, and vanc  - uptitrated PTA Lexapro up to 20 mg PTA dose   - needs significant positive encouragement to eat and participate in physical therapy in order to discharge to either TCU or her group home.  Will focus first on food, in order to discontinue NJ tube.    - reinforced the importance of PT/OT.  Pt agreed to work with PT today.     2. Concern for dysphagia  Has had no PO intake since admission due to her mouth rigidity and not opening her mouth volitionally. Continues, therefore  to be NPO. NG inserted 6/7 for med administration and to start enteral nutrition. Patient removed NG 6/7 evening, replaced with bridal.   -  TFs to goal of 75 ml/hr  - speech therapy consult and recommendations appreciated   - Nutrition consulted and appreciate following      3. Incidental finding of R cerebellar infarct of uncertain significance  MRI head for AMS workup revealed finding of R cerebellar infarct. Pt started on aspirin, obtained CTA head and neck that was non-diagnostic due to it being severely limited due to poor contrast opacification. TTE with bubble study negative.   - Neurology consulted   - Continue  mg daily, if unable to take orally, can be given rectally as 300 mg   - Hold statin in the setting of elevated LFTs - trending down -resume 6/26 and monitor LFT  - Repeat CTA head and neck without evidence of vascular stenosis  - Continue tele monitoring      4.  Mildly elevated LFTs: likely from Hepatic steatosis   Unclear etiology as LFTs from OSH as recently as 5/26 normal. AST and ALT increasing today. GGT elevated to 66. Lipase 876 (< 3X the upper limit of normal). CT abdomen 6/6 with no acute findings. RUQ US with mild hepatomegaly and diffuse hepatic steatosis, CBD 5.5 mm, no hepatic biliary ductal dilation. Tbili wnl. Reported to have abdominal pain on admission, no further complaints of pain. Appears to be tolerating TF. Possible pancreatitis on admission vs LFT elevation 2/2 hepatic steatosis and CK elevation.   - Continue to monitor      # Chronic pain?: Pt noted to be on scheduled Tylenol PTA, as well as have available as needed diclofenac gel.   - Discontinued PTA scheduled Tylenol given transaminitis     # Hypothyroidism: PTA on levothyroxine 50 mcg daily. TFTs on admission reveal pt euthyroid based on free T4 WNL.  - Continue PTA levothyroxine     # GERD: Continue PTA Pepcid 20 mg daily         Diet: Adult Formula Drip Feeding: Continuous Peptamen Intense VHP; Nasogastric tube;  Goal Rate: 45; mL/hr; Medication - Feeding Tube Flush Frequency: At least 15-30 mL water before and after medication administration and with tube clogging; Amount to Se...  Snacks/Supplements Adult: Other; Pt may order snacks/supplements PRN; Between Meals  Regular Diet Adult  Calorie Counts    DVT Prophylaxis: Enoxaparin (Lovenox) SQ  Davila Catheter: not present  Code Status: Full Code           Disposition Plan   Expected discharge: 4 - 7 days, recommended to TBD once safe disposition plan/ TCU bed available.  Entered: Jose Carlos Stone MD 07/04/2020, 12:20 PM       The patient's care was discussed with the Bedside Nurse, Care Coordinator/ and Patient.    Jose Carlos Stone MD  Hospitalist Service, [address], [place]  Pager: [phone]  Please see sticky note for cross cover information  ______________________________________________________________________    Interval History   Patient seen and examined.  No acute overnight events. Still refusing to take pills so NJ tube remains in place.  Eating is improved. Agreed to continue to work with PT.  No HA, dizziness, CP, N/V,abdominal pain or other symptoms.      Data reviewed today: I reviewed all medications, new labs and imaging results over the last 24 hours. I personally reviewed no images or EKG's today.    Physical Exam   Vital Signs: Temp: 98  F (36.7  C) Temp src: Oral BP: 103/59 Pulse: 55 Heart Rate: 56 Resp: 18 SpO2: 95 % O2 Device: None (Room air)    Weight: 315 lbs 0 oz  General Appearance: NAD  Respiratory: CTA b/l  Cardiovascular: RRR S1, S2 no m,r,g  GI: soft, NT, ND, +BS  Skin: no rashes  Other: No edema.      Data   Recent Labs   Lab 06/29/20  0614   WBC 6.1   HGB 10.2*   MCV 95         POTASSIUM 4.6   CHLORIDE 110*   CO2 25   BUN 27   CR 0.54   ANIONGAP 6   SANDRA 9.1   GLC 83     No results found for this or any previous visit (from the past 24 hour(s)).  Medications     dextrose          aspirin  324 mg Oral or Feeding Tube Daily     calcium polycarbophil  1,250 mg Per NG tube BID     cloNIDine  0.1 mg Oral or NG Tube BID     enoxaparin ANTICOAGULANT  40 mg Subcutaneous Q12H     escitalopram  20 mg Oral Daily     famotidine  20 mg Oral or NG Tube Daily     levothyroxine  50 mcg Oral or NG Tube Daily     melatonin  3 mg Oral or NG Tube At Bedtime     multivitamins w/minerals  15 mL Per Feeding Tube Daily     polyethylene glycol  17 g Oral or G tube Daily     pravastatin  40 mg Oral Daily     senna-docusate  1 tablet Per Feeding Tube At Bedtime

## 2020-07-05 LAB — GLUCOSE BLDC GLUCOMTR-MCNC: 93 MG/DL (ref 70–99)

## 2020-07-05 PROCEDURE — 25000132 ZZH RX MED GY IP 250 OP 250 PS 637: Performed by: PHYSICIAN ASSISTANT

## 2020-07-05 PROCEDURE — 99207 ZZC CDG-MDM COMPONENT: MEETS MODERATE - UP CODED: CPT | Performed by: INTERNAL MEDICINE

## 2020-07-05 PROCEDURE — 00000146 ZZHCL STATISTIC GLUCOSE BY METER IP

## 2020-07-05 PROCEDURE — 25000128 H RX IP 250 OP 636: Performed by: PHYSICIAN ASSISTANT

## 2020-07-05 PROCEDURE — 12000001 ZZH R&B MED SURG/OB UMMC

## 2020-07-05 PROCEDURE — 25000132 ZZH RX MED GY IP 250 OP 250 PS 637: Performed by: INTERNAL MEDICINE

## 2020-07-05 PROCEDURE — 27210437 ZZH NUTRITION PRODUCT SEMIELEM INTERMED LITER

## 2020-07-05 PROCEDURE — 99232 SBSQ HOSP IP/OBS MODERATE 35: CPT | Performed by: INTERNAL MEDICINE

## 2020-07-05 PROCEDURE — 25000132 ZZH RX MED GY IP 250 OP 250 PS 637: Performed by: HOSPITALIST

## 2020-07-05 PROCEDURE — 25000132 ZZH RX MED GY IP 250 OP 250 PS 637: Performed by: STUDENT IN AN ORGANIZED HEALTH CARE EDUCATION/TRAINING PROGRAM

## 2020-07-05 RX ADMIN — POLYETHYLENE GLYCOL 3350 17 G: 17 POWDER, FOR SOLUTION ORAL at 10:11

## 2020-07-05 RX ADMIN — ENOXAPARIN SODIUM 40 MG: 40 INJECTION SUBCUTANEOUS at 10:11

## 2020-07-05 RX ADMIN — CLONIDINE HYDROCHLORIDE 0.1 MG: 0.1 TABLET ORAL at 10:12

## 2020-07-05 RX ADMIN — DOCUSATE SODIUM AND SENNOSIDES 1 TABLET: 50; 8.6 TABLET ORAL at 19:35

## 2020-07-05 RX ADMIN — ENOXAPARIN SODIUM 40 MG: 40 INJECTION SUBCUTANEOUS at 19:35

## 2020-07-05 RX ADMIN — CALCIUM POLYCARBOPHIL 1250 MG: 625 TABLET, FILM COATED ORAL at 10:12

## 2020-07-05 RX ADMIN — LEVOTHYROXINE SODIUM 50 MCG: 50 TABLET ORAL at 06:16

## 2020-07-05 RX ADMIN — ESCITALOPRAM OXALATE 20 MG: 10 TABLET ORAL at 10:12

## 2020-07-05 RX ADMIN — FAMOTIDINE 20 MG: 20 TABLET ORAL at 10:12

## 2020-07-05 RX ADMIN — ASPIRIN 81 MG CHEWABLE TABLET 324 MG: 81 TABLET CHEWABLE at 10:14

## 2020-07-05 RX ADMIN — MELATONIN TAB 3 MG 3 MG: 3 TAB at 19:35

## 2020-07-05 RX ADMIN — PRAVASTATIN SODIUM 40 MG: 40 TABLET ORAL at 10:12

## 2020-07-05 RX ADMIN — CALCIUM POLYCARBOPHIL 1250 MG: 625 TABLET, FILM COATED ORAL at 19:35

## 2020-07-05 RX ADMIN — MULTIVIT AND MINERALS-FERROUS GLUCONATE 9 MG IRON/15 ML ORAL LIQUID 15 ML: at 10:11

## 2020-07-05 ASSESSMENT — ACTIVITIES OF DAILY LIVING (ADL)
ADLS_ACUITY_SCORE: 23

## 2020-07-05 ASSESSMENT — MIFFLIN-ST. JEOR: SCORE: 2193.21

## 2020-07-05 NOTE — PLAN OF CARE
Assumed care 1500 to 1930. Vital signs stable on room air except bradycardia at times. Pt is alert disoriented to time and situation. Pt denies Pain. Pt denies nausea and SOB. Lung sounds clear. Cardiac WDL. Bowel Sounds present. Pt voiding adequately, clear yellow urine with purewic. Pt lift for transfers,. Skin red blanchable coccyx, barrier cream applied. Pt tolerating tube feed at 45 ml/hr. Pt at 75% of dinner. Pt had 2 medium, hard, green Bms this shift. Will continue to monitor and follow plan of care.

## 2020-07-05 NOTE — PROGRESS NOTES
[facility], [facility] Progress Note - Hospitalist Service, Gold 8       Date of Admission:  6/5/2020  Assessment & Plan   Dionne Perez is a 35 year old female admitted on 6/5/2020. She Ms. Dionne Perez is a 34 yo female with hx of developmental delay, depression, anxiety, likely psychosis, GERD, and hypothyrodism, admitted 6/5/2020 to [facility] for further eval and management of Acute Mental Status change.     Changes today:  Encourage eating, pill swallowing and working with PT/OT  Starting to work with PT, will encourage to continue  PMR consult Monday to assist with rehab planning given complex overlap between behavior/intillectual disability and possible conversion disorder.     1. AMS: Likely adjustment/conversion disorder in the setting of acute stress and her developmental delay. Patient remains minimally responsive and not at her baseline. She seems like she is improving from her admission. Baseline is she responds in few words, but is independent with her ADLs. Her nuerologic workup has been largely unrevealing without evidence of seizures on EEG, MRI demonstrated new cerebellar infarct. MRI without any clear etiology which does not explain her change in mentation. LP without any evidence of infection.   - Neurology has no additional recommendations for additional workup  - Appreciate Psychiatry input - as is improving seems like could be adjustment disorder  - DC'ed IV acyclovir, ceftriaxone, and vanc  - uptitrated PTA Lexapro up to 20 mg PTA dose   - needs significant positive encouragement to eat and participate in physical therapy in order to discharge to either TCU or her group home.  Will focus first on food, in order to discontinue NJ tube.    - reinforced the importance of PT/OT.  Pt agreeable to work with PT  2. Concern for dysphagia  Has had no PO intake since admission due to her mouth rigidity and not opening her mouth volitionally. Continues, therefore to be  NPO. NG inserted 6/7 for med administration and to start enteral nutrition. Patient removed NG 6/7 evening, replaced with bridal.   -  TFs to goal of 75 ml/hr  - speech therapy consult and recommendations appreciated   - Nutrition consulted and appreciate following      3. Incidental finding of R cerebellar infarct of uncertain significance  MRI head for AMS workup revealed finding of R cerebellar infarct. Pt started on aspirin, obtained CTA head and neck that was non-diagnostic due to it being severely limited due to poor contrast opacification. TTE with bubble study negative.   - Neurology consulted   - Continue  mg daily, if unable to take orally, can be given rectally as 300 mg   - Hold statin in the setting of elevated LFTs - trending down -resume 6/26 and monitor LFT  - Repeat CTA head and neck without evidence of vascular stenosis  - Continue tele monitoring      4.  Mildly elevated LFTs: likely from Hepatic steatosis   Unclear etiology as LFTs from OSH as recently as 5/26 normal. AST and ALT increasing today. GGT elevated to 66. Lipase 876 (< 3X the upper limit of normal). CT abdomen 6/6 with no acute findings. RUQ US with mild hepatomegaly and diffuse hepatic steatosis, CBD 5.5 mm, no hepatic biliary ductal dilation. Tbili wnl. Reported to have abdominal pain on admission, no further complaints of pain. Appears to be tolerating TF. Possible pancreatitis on admission vs LFT elevation 2/2 hepatic steatosis and CK elevation.   - Continue to monitor      # Chronic pain?: Pt noted to be on scheduled Tylenol PTA, as well as have available as needed diclofenac gel.   - Discontinued PTA scheduled Tylenol given transaminitis     # Hypothyroidism: PTA on levothyroxine 50 mcg daily. TFTs on admission reveal pt euthyroid based on free T4 WNL.  - Continue PTA levothyroxine     # GERD: Continue PTA Pepcid 20 mg daily       Diet: Adult Formula Drip Feeding: Continuous Peptamen Intense VHP; Nasogastric tube; Goal  Rate: 45; mL/hr; Medication - Feeding Tube Flush Frequency: At least 15-30 mL water before and after medication administration and with tube clogging; Amount to Se...  Snacks/Supplements Adult: Other; Pt may order snacks/supplements PRN; Between Meals  Regular Diet Adult  Calorie Counts    DVT Prophylaxis: Enoxaparin (Lovenox) SQ  Davila Catheter: not present  Code Status: Full Code           Disposition Plan   Expected discharge: 4 - 7 days, recommended to transitional care unit once safe disposition plan/ TCU bed available.  Entered: Jose Carlos Stone MD 07/05/2020, 12:23 PM       The patient's care was discussed with the Patient.    Jose Carlos Stone MD  Hospitalist Service, [address], [place]  Pager:[phone]  Please see sticky note for cross cover information  ______________________________________________________________________    Interval History   Patient seen and examined.  No acute overnight events.  Agreeable to working with PT, does not want to swallow pills.  No HA, dizziness, CP, N/V or other symptoms currently.     Data reviewed today: I reviewed all medications, new labs and imaging results over the last 24 hours. I personally reviewed no images or EKG's today.    Physical Exam   Vital Signs: Temp: 97.6  F (36.4  C) Temp src: Oral BP: 136/67 Pulse: 52 Heart Rate: 55 Resp: 16 SpO2: 94 % O2 Device: None (Room air)    Weight: 303 lbs 0 oz  General Appearance: NAD  Respiratory: CTA b/l  Cardiovascular: RRR S1/S2, no m,r,g  GI: soft, NT, ND, +BS  Skin: no rashes  Other: No edema      Data   Recent Labs   Lab 06/29/20  0614   WBC 6.1   HGB 10.2*   MCV 95         POTASSIUM 4.6   CHLORIDE 110*   CO2 25   BUN 27   CR 0.54   ANIONGAP 6   SANDRA 9.1   GLC 83     No results found for this or any previous visit (from the past 24 hour(s)).  Medications     dextrose         aspirin  324 mg Oral or Feeding Tube Daily     calcium polycarbophil  1,250 mg Per NG tube  BID     cloNIDine  0.1 mg Oral or NG Tube BID     enoxaparin ANTICOAGULANT  40 mg Subcutaneous Q12H     escitalopram  20 mg Oral Daily     famotidine  20 mg Oral or NG Tube Daily     levothyroxine  50 mcg Oral or NG Tube Daily     melatonin  3 mg Oral or NG Tube At Bedtime     multivitamins w/minerals  15 mL Per Feeding Tube Daily     polyethylene glycol  17 g Oral or G tube Daily     pravastatin  40 mg Oral Daily     senna-docusate  1 tablet Per Feeding Tube At Bedtime

## 2020-07-05 NOTE — PLAN OF CARE
VSS. Patient denies pain/SOB/nausea. Purewick intact-voiding adequate volumes.  Continues to tolerate TF at goal rate of 45 ml/hr.     Cont to observe and follow POC.

## 2020-07-05 NOTE — PROGRESS NOTES
Calorie Count    Intake recorded for: 7/4/2020  Total Kcals: 662 Total Protein: 16g    Kcals from Hospital Food: 662   Protein: 16g    Kcals from Outside Food (average):0 Protein: 0g    # Meals Recorded: 2 meals ordered, 1 recorded (100% Hungarian toast w/ syrup, apple juice, sausage, oatmeal w/ 2 brown sugar)    # Supplements Recorded: no intake recorded

## 2020-07-05 NOTE — PLAN OF CARE
Assumed care 0700 to 2330. Vital signs stable on room air except bradycardia at times. Pt is alert, disoriented to time and situation. Pt denies Pain. Pt denies nausea and SOB. Lung sounds clear. Cardiac WDL. Bowel Sounds present. Pt voiding adequately, clear yellow urine with purewic. Purewic changed. Pt lift for transfers. Skin red blanchable coccyx, barrier cream applied. Pt tolerating tube feed at 45 ml/hr. Pt at 75% of breakfast. Pt refused lunch said she was too full. Pt ate 100% of dinner. Will continue to monitor and follow plan of care. Plan: Transitional care, looking for placement.

## 2020-07-05 NOTE — PLAN OF CARE
[id number]  VSS, BP soft within parameters, held clonidine. Alert, disoriented to time and situation. Assist of 2 to turn. Up with lift. Purewick in place. NG with TF@45ml/hr. No PIV.

## 2020-07-06 ENCOUNTER — APPOINTMENT (OUTPATIENT)
Dept: PHYSICAL THERAPY | Facility: CLINIC | Age: 35
DRG: 887 | End: 2020-07-06
Payer: MEDICARE

## 2020-07-06 PROCEDURE — 25000132 ZZH RX MED GY IP 250 OP 250 PS 637: Performed by: INTERNAL MEDICINE

## 2020-07-06 PROCEDURE — 99232 SBSQ HOSP IP/OBS MODERATE 35: CPT | Performed by: INTERNAL MEDICINE

## 2020-07-06 PROCEDURE — 97530 THERAPEUTIC ACTIVITIES: CPT | Mod: GP

## 2020-07-06 PROCEDURE — 25000132 ZZH RX MED GY IP 250 OP 250 PS 637: Performed by: PHYSICIAN ASSISTANT

## 2020-07-06 PROCEDURE — 12000001 ZZH R&B MED SURG/OB UMMC

## 2020-07-06 PROCEDURE — 25000128 H RX IP 250 OP 636: Performed by: PHYSICIAN ASSISTANT

## 2020-07-06 PROCEDURE — 27210437 ZZH NUTRITION PRODUCT SEMIELEM INTERMED LITER

## 2020-07-06 PROCEDURE — 25000132 ZZH RX MED GY IP 250 OP 250 PS 637: Performed by: STUDENT IN AN ORGANIZED HEALTH CARE EDUCATION/TRAINING PROGRAM

## 2020-07-06 PROCEDURE — 25000132 ZZH RX MED GY IP 250 OP 250 PS 637: Performed by: HOSPITALIST

## 2020-07-06 RX ADMIN — CLONIDINE HYDROCHLORIDE 0.1 MG: 0.1 TABLET ORAL at 07:41

## 2020-07-06 RX ADMIN — CALCIUM POLYCARBOPHIL 1250 MG: 625 TABLET, FILM COATED ORAL at 19:54

## 2020-07-06 RX ADMIN — LEVOTHYROXINE SODIUM 50 MCG: 50 TABLET ORAL at 06:56

## 2020-07-06 RX ADMIN — CLONIDINE HYDROCHLORIDE 0.1 MG: 0.1 TABLET ORAL at 19:54

## 2020-07-06 RX ADMIN — POLYETHYLENE GLYCOL 3350 17 G: 17 POWDER, FOR SOLUTION ORAL at 07:41

## 2020-07-06 RX ADMIN — DOCUSATE SODIUM AND SENNOSIDES 1 TABLET: 50; 8.6 TABLET ORAL at 19:54

## 2020-07-06 RX ADMIN — MELATONIN TAB 3 MG 3 MG: 3 TAB at 19:54

## 2020-07-06 RX ADMIN — PRAVASTATIN SODIUM 40 MG: 40 TABLET ORAL at 07:41

## 2020-07-06 RX ADMIN — ESCITALOPRAM OXALATE 20 MG: 10 TABLET ORAL at 07:41

## 2020-07-06 RX ADMIN — ASPIRIN 81 MG CHEWABLE TABLET 324 MG: 81 TABLET CHEWABLE at 07:41

## 2020-07-06 RX ADMIN — FAMOTIDINE 20 MG: 20 TABLET ORAL at 07:41

## 2020-07-06 RX ADMIN — ENOXAPARIN SODIUM 40 MG: 40 INJECTION SUBCUTANEOUS at 07:41

## 2020-07-06 RX ADMIN — ENOXAPARIN SODIUM 40 MG: 40 INJECTION SUBCUTANEOUS at 19:54

## 2020-07-06 RX ADMIN — CALCIUM POLYCARBOPHIL 1250 MG: 625 TABLET, FILM COATED ORAL at 07:41

## 2020-07-06 RX ADMIN — MULTIVIT AND MINERALS-FERROUS GLUCONATE 9 MG IRON/15 ML ORAL LIQUID 15 ML: at 09:07

## 2020-07-06 ASSESSMENT — ACTIVITIES OF DAILY LIVING (ADL)
ADLS_ACUITY_SCORE: 25
ADLS_ACUITY_SCORE: 23
ADLS_ACUITY_SCORE: 25
ADLS_ACUITY_SCORE: 25

## 2020-07-06 ASSESSMENT — MIFFLIN-ST. JEOR: SCORE: 2211.35
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
## 2020-07-06 NOTE — PLAN OF CARE
Assumed cares [id number]. Pt alert but disoriented to time and situation, VSS on RA. Denies pain/nausea/SOB. Purewick patent and draining adequate output. No BM this shift. TF via NG at goal of 45 mL/hr. Pt repositioned q2 hrs with assist x2. Pt encouraged to try taking some meds PO and swallowed am synthroid without a problem. Awaiting TCU placement. Continue to monitor.

## 2020-07-06 NOTE — PLAN OF CARE
5B  Discharge Planner PT   Patient plan for discharge: did not discuss  Current status: Able to stand at edge of bed x 2 with min-mod A x 2  Barriers to return to prior living situation: limited mobility   Recommendations for discharge: TCU or back to group home  Rationale for recommendations: Patient needs physical assistance for safe mobility and would benefit from a continued therapy program       Entered by: Madyson Dawn 07/06/2020 1:42 PM

## 2020-07-06 NOTE — PROGRESS NOTES
[facility], [place]    Medicine Progress Note - Hospitalist Service, Gold 8       Date of Admission:  6/5/2020  Assessment & Plan   Dionne Perez is a 35 year old female admitted on 6/5/2020. She Ms. Dionne Perez is a 36 yo female with hx of developmental delay, depression, anxiety, likely psychosis, GERD, and hypothyrodism, admitted 6/5/2020 to [facility] for further eval and management of Acute Mental Status change.     Changes today:  Encourage eating, pill swallowing and working with PT/OT  Starting to work with PT, will encourage to continue  When she takes her pills consistently can remove NJ tube.   PMR consult Monday to assist with rehab planning given complex overlap between behavior/intillectual disability and possible conversion disorder.     1. AMS: Likely adjustment/conversion disorder in the setting of acute stress and her developmental delay. Patient's responsiveness has been improving.   Baseline is she responds in few words, but is independent with her ADLs. Her nuerologic workup has been largely unrevealing without evidence of seizures on EEG, MRI demonstrated new cerebellar infarct. MRI without any clear etiology which does not explain her change in mentation. LP without any evidence of infection.   - Neurology has no additional recommendations for additional workup  - Appreciate Psychiatry input - as is improving seems like could be adjustment disorder  - DC'ed IV acyclovir, ceftriaxone, and vanc  - uptitrated PTA Lexapro up to 20 mg PTA dose   - needs significant positive encouragement to eat and participate in physical therapy in order to discharge to either TCU or her group home.  Will focus first on food, in order to discontinue NJ tube.    - reinforced the importance of PT/OT.  Pt agreeable to work with PT    2. Concern for dysphagia  Initially had no PO intake the first week of admission due to her mouth rigidity and not opening her mouth volitionally. REAGAN  inserted 6/7 for med administration and to start enteral nutrition. Patient removed NG 6/7 evening, replaced with bridal. Over the last week PO intake has improved and she is starting to take her medications. Will remove NJ tube (a barrier to discharge) as she improves.   -  TFs to goal of 40 ml/hr  - speech therapy consult and recommendations appreciated   - Nutrition consulted and appreciate following      3. Incidental finding of R cerebellar infarct of uncertain significance  MRI head for AMS workup revealed finding of R cerebellar infarct. Pt started on aspirin, obtained CTA head and neck that was non-diagnostic due to it being severely limited due to poor contrast opacification. TTE with bubble study negative.   - Neurology consulted   - Continue  mg daily, if unable to take orally, can be given rectally as 300 mg   - Will start statin when she is consistently taking oral meds.   - Repeat CTA head and neck without evidence of vascular stenosis  - Continue tele monitoring      4.  Mildly elevated LFTs: likely from Hepatic steatosis   Unclear etiology as LFTs from OSH as recently as 5/26 normal. AST and ALT increasing today. GGT elevated to 66. Lipase 876 (< 3X the upper limit of normal). CT abdomen 6/6 with no acute findings. RUQ US with mild hepatomegaly and diffuse hepatic steatosis, CBD 5.5 mm, no hepatic biliary ductal dilation. Tbili wnl. Reported to have abdominal pain on admission, no further complaints of pain. Appears to be tolerating TF. Possible pancreatitis on admission vs LFT elevation 2/2 hepatic steatosis and CK elevation.   - will recheck tomorrow to show resolution.  - Continue to monitor      # Chronic pain?: Pt noted to be on scheduled Tylenol PTA, as well as have available as needed diclofenac gel.   - Discontinued PTA scheduled Tylenol given transaminitis     # Hypothyroidism: PTA on levothyroxine 50 mcg daily. TFTs on admission reveal pt euthyroid based on free T4 WNL.  - Continue  PTA levothyroxine     # GERD: Continue PTA Pepcid 20 mg daily     Diet: Adult Formula Drip Feeding: Continuous Peptamen Intense VHP; Nasogastric tube; Goal Rate: 45; mL/hr; Medication - Feeding Tube Flush Frequency: At least 15-30 mL water before and after medication administration and with tube clogging; Amount to Se...  Snacks/Supplements Adult: Other; Pt may order snacks/supplements PRN; Between Meals  Regular Diet Adult  Calorie Counts    DVT Prophylaxis: Enoxaparin (Lovenox) SQ  Davila Catheter: not present  Code Status: Full Code           Disposition Plan   Expected discharge: 2 - 3 days, recommended to transitional care unit once safe disposition plan/ TCU bed available.  Entered: Jose Carlos Stone MD 07/06/2020, 1:14 PM       The patient's care was discussed with the Bedside Nurse and Patient.    Jose Carlos Stone MD  Hospitalist Service, [address], [place]  Pager: [phone]  Please see sticky note for cross cover information  ______________________________________________________________________    Interval History   Patient seen and examined. Said she was willing to take her pills today and work with physical therapy.  Still slow to respond but at her baseline.  Refusing to sit in a chair.  No HA, dizziness, CP, N/V or other symptoms currently.     Data reviewed today: I reviewed all medications, new labs and imaging results over the last 24 hours. I personally reviewed no images or EKG's today.    Physical Exam   Vital Signs: Temp: 98.3  F (36.8  C) Temp src: Oral BP: 122/53   Heart Rate: 64 Resp: 16 SpO2: 95 % O2 Device: None (Room air)    Weight: 315 lbs 0 oz  General Appearance: NAD  Respiratory: CTA b/l  Cardiovascular: RRR S1/S2 no m,r,g  GI: soft, NT, ND, +BS  Skin: no rashes  Other: No edema      Data

## 2020-07-06 NOTE — PROGRESS NOTES
"Calorie Count    Intake recorded for: 7/5/2020  Total Kcals: 1049 Total Protein: 33g    Kcals from Hospital Food: 1049   Protein: 33g    Kcals from Outside Food (average):0 Protein: 0g    # Meals Recorded: 2 meals ordered. No intake recorded on calorie count sheet. Meal data pulled from Epic Food Record (100% chicken quesadilla w/ sour cream & salsa, vanilla pudding, chocolate chip cookie, 2 lemon-lime [place], fresh fruit cup)    # Supplements Recorded: no intake recorded    Epic Food Record shows \"75% intake\" @ 0910 - no note specifying what from meal tray was eaten. Meal tray included: bagel w/ cream cheese, oatmeal w/ 1% milk & brown sugar, fresh fruit cup, apple juice. 75% intake of all meal tray items would yield 550 kcal & 16 grams protein. Food Record does have note attached indicating \"550 sada,\" but without specification on individual items eaten, not enough information to accurately calculate nutritionals and add to above totals.             "

## 2020-07-06 NOTE — PROGRESS NOTES
7/6/2020:    Social Work Services Progress Note    Hospital Day: 32  Date of Initial Social Work Evaluation:  6/15/20  Collaborated with:  Chart review, TCU facilities    Data:  Dionne Perez is a 35 year old female patient admitted to [facility] on 6/6/20 due to Altered Mental Status from her group home.     Patient needs TCU prior to being able to return to her group home. Dozens of referrals pending.     Intervention:    Patient almost ready for discharge, pending success with taking pills successfully with regularity and NJ being removed. TCU referrals pending as she needs to regain baseline mobility and continue with her lack of behaviors. There have been no behavioral incidents while she has been hospitalized and Group home would like to continue to see that documentation. Guardian and group home following for next steps in care.   --Attempted to reach guardian with an update, mailbox is full, unable to reach or leave a message.      Referrals still pending:  [facility] ([facility])    [facility] ([facility])      [facility]     [facility] ([facility])    [facility] ([facility])    [facility]    [facility]ENEDICTINE [facility])    [facility] ([facility])    [facility] ([facility])    CAMILIA SANDHYA [facility] ([facility] ([facility])    [facility] Only ([facility])        [facility]            [facility]       [facility]OD [facility] [place]       GOOD [facility]ism CENTER [place]           GOOD Tenriism CENTER [place]        GOOD Tenriism CENTER [place]        GOOD Tenriism CENTER [place]       GOOD Tenriism CENTER MIGUEL [place]       Good [facility]     Good [facility]aoism [place]  GOOD [facility]riism [facility]        GOOD Tenriism [facility]       [facility]EN HOMES OF [facility] SERVICES           [facility]      YESSICA-ANDIE JUAN-ANDIE RESIDENCE    [facility]    LEISURE [place] GUARDIAN QUEENIE LEATHAANTONI        MARIE [facility]   ANDERSON ON [place] [facility] - [facility]           [facility]            [facility] RE[facility]      [facility]        [facility] HOM[facility]        [facility] HOMES OF [facility] HOMES [facility]        [facility] HOMES OF [facility] PRESBYTERIAN HOMES ON [facility]        [facility] WEST      [facility] AND REHAB         Considering facilities:     [facility]  PH: [phone]  F: [phone]  -wanted more information, message left     [facility] ([facility])    [place] [place] [facility] home:   SILVIA [facility] admissions on Thurs 7/9: [phone]  ---considering; cannot accept with NG/NJ tube, so this would need to be successfully removed with patient swallowing medications consistently  ---Clinical nurse supervisor reviewing Tuesday 7/7 and will call  on 7/7 with soonest possible bed on Wednesday 7/8 or Thursday 7/9 IF accepted. SW out Tues & Wed, available to coordinate intake if accepted on Thursday 7/9                Referrals that cannot accept/DECLINED:  [facility]- can't meet needs  [facility]--Did not state a reason   [facility]-Out of business? Fax and phone are out of service  [facility] [place] (SNF)-Does not accept MA patients      [facility])-No beds available  [facility] [place] - Referral Only (SNF)     [facility] (SNF)--due to  behaviors  [facility]--psych needs too high  [facility] (SNF)  -no beds available   [facility] --can't meet needs  [facility]  -can't meet needs  [facility] --no appropriate bed  [facility] ([facility]) - no appropriate bed  [facility] TONY [facility]  cannot meet needs   [facility] [place] --covid positive facility only  SH[facility]-- No appropriate bed  [facility]--unable to accept due to psych needs   [facility] -- non skilled nursing facility, not appropriate  [facility] --no available bed  [facility] [facility]-declined behaviors  [facility] declined, no beds open  [facility] [place] --no appropriate bed  [facility]--all male facility, not appropriate  [facility] ([facility])   --no appropriate bed  [facility] --not a medicaid facility  [facility] ([facility])---requested additional therapy notes, faxed to [phone]--declined, cannot meet needs as patient w/ limited participation  [facility] --no current openings  [facility]ADOR [facility] ([facility]) --no bed  [facility]   -- can't meet needs of patient  [facility] ([facility])  -- is a Board&Care facility, no therapies  [facility]--no appropriate bed      Assessment:  Did not meet with patient, SW continuing to work on discharge to TCU. Message left with update for Cynthia Farah.     Plan:    Anticipated Disposition:  Facility:  TBD    Barriers to d/c plan:  Accepting facility    Follow Up:  SW following for discharge planning.    ABBY Frazier, SW  [facility]  Casual   5B pager: [phone]  5b phone: [phone]

## 2020-07-06 NOTE — PROGRESS NOTES
"[id number] progress note. Care from: 07:00 - 15:00     /53 (BP Location: Right leg)   Pulse 52   Temp 98.3  F (36.8  C) (Oral)   Resp 16   Ht 1.753 m (5' 9\")   Wt 142.9 kg (315 lb)   SpO2 95%   BMI 46.52 kg/m         Vitals: VSS     Neuro: Pt alert, oriented to self, disoriented time and situation. Developmental delay at baseline with behavioral impacts   o Pain: Denies   o Mood/Behavior: Pt calm, cooperative and smiling today     Activity: Pt worked with PT today, standing a few times. Turning and repositioning q2 hours. Pt eating meals independently with some encouragement    Cardiovascular: Mild bradycardia, otherwise WDL     Respiratory: WDL     GI & Nutrition: Encouragement to eat and take snacks. At 100% of breakfast. Pt stated she was not hungry for lunch but did take a vanilla pudding. TF running at goal rate of 45 ml/hr.  o Nausea: Denies   o Bowel Movement: Negative     : Urine incontinence, purewick external catheter in place to protect skin     Skin, Wounds & LDAs: Skin largely intact ex erythema associated with urine incontinence. No open areas    Notable Labs: None     Plan Updates & Recommendation: Continue to optimize nutrition with goal of removing enteral feeding tube. Promote activity tolerance and participation with therapeutic interventions. Continue plan of care.   "

## 2020-07-07 LAB
ALBUMIN SERPL-MCNC: 2.9 G/DL (ref 3.4–5)
ALP SERPL-CCNC: 189 U/L (ref 40–150)
ALT SERPL W P-5'-P-CCNC: 60 U/L (ref 0–50)
ANION GAP SERPL CALCULATED.3IONS-SCNC: 7 MMOL/L (ref 3–14)
AST SERPL W P-5'-P-CCNC: 37 U/L (ref 0–45)
BILIRUB SERPL-MCNC: 0.8 MG/DL (ref 0.2–1.3)
BUN SERPL-MCNC: 28 MG/DL (ref 7–30)
CALCIUM SERPL-MCNC: 8.9 MG/DL (ref 8.5–10.1)
CHLORIDE SERPL-SCNC: 107 MMOL/L (ref 94–109)
CO2 SERPL-SCNC: 27 MMOL/L (ref 20–32)
CREAT SERPL-MCNC: 0.53 MG/DL (ref 0.52–1.04)
ERYTHROCYTE [DISTWIDTH] IN BLOOD BY AUTOMATED COUNT: 14.6 % (ref 10–15)
GFR SERPL CREATININE-BSD FRML MDRD: >90 ML/MIN/{1.73_M2}
GLUCOSE SERPL-MCNC: 82 MG/DL (ref 70–99)
HCT VFR BLD AUTO: 32.8 % (ref 35–47)
HGB BLD-MCNC: 10.4 G/DL (ref 11.7–15.7)
MAGNESIUM SERPL-MCNC: 2.2 MG/DL (ref 1.6–2.3)
MCH RBC QN AUTO: 29.1 PG (ref 26.5–33)
MCHC RBC AUTO-ENTMCNC: 31.7 G/DL (ref 31.5–36.5)
MCV RBC AUTO: 92 FL (ref 78–100)
PLATELET # BLD AUTO: 171 10E9/L (ref 150–450)
POTASSIUM SERPL-SCNC: 5.1 MMOL/L (ref 3.4–5.3)
PROT SERPL-MCNC: 6.3 G/DL (ref 6.8–8.8)
RBC # BLD AUTO: 3.57 10E12/L (ref 3.8–5.2)
SODIUM SERPL-SCNC: 140 MMOL/L (ref 133–144)
WBC # BLD AUTO: 9 10E9/L (ref 4–11)

## 2020-07-07 PROCEDURE — 25000132 ZZH RX MED GY IP 250 OP 250 PS 637: Performed by: STUDENT IN AN ORGANIZED HEALTH CARE EDUCATION/TRAINING PROGRAM

## 2020-07-07 PROCEDURE — 99232 SBSQ HOSP IP/OBS MODERATE 35: CPT | Performed by: INTERNAL MEDICINE

## 2020-07-07 PROCEDURE — 25000128 H RX IP 250 OP 636: Performed by: PHYSICIAN ASSISTANT

## 2020-07-07 PROCEDURE — 99207 ZZC CDG-MDM COMPONENT: MEETS LOW - DOWN CODED: CPT | Performed by: INTERNAL MEDICINE

## 2020-07-07 PROCEDURE — 25000132 ZZH RX MED GY IP 250 OP 250 PS 637: Performed by: PHYSICIAN ASSISTANT

## 2020-07-07 PROCEDURE — 85027 COMPLETE CBC AUTOMATED: CPT | Performed by: INTERNAL MEDICINE

## 2020-07-07 PROCEDURE — 25000132 ZZH RX MED GY IP 250 OP 250 PS 637: Performed by: INTERNAL MEDICINE

## 2020-07-07 PROCEDURE — 27210437 ZZH NUTRITION PRODUCT SEMIELEM INTERMED LITER

## 2020-07-07 PROCEDURE — 25000132 ZZH RX MED GY IP 250 OP 250 PS 637: Performed by: HOSPITALIST

## 2020-07-07 PROCEDURE — 12000001 ZZH R&B MED SURG/OB UMMC

## 2020-07-07 PROCEDURE — 80053 COMPREHEN METABOLIC PANEL: CPT | Performed by: INTERNAL MEDICINE

## 2020-07-07 PROCEDURE — 36416 COLLJ CAPILLARY BLOOD SPEC: CPT | Performed by: INTERNAL MEDICINE

## 2020-07-07 PROCEDURE — 83735 ASSAY OF MAGNESIUM: CPT | Performed by: INTERNAL MEDICINE

## 2020-07-07 RX ADMIN — CALCIUM POLYCARBOPHIL 1250 MG: 625 TABLET, FILM COATED ORAL at 09:12

## 2020-07-07 RX ADMIN — CALCIUM POLYCARBOPHIL 1250 MG: 625 TABLET, FILM COATED ORAL at 19:35

## 2020-07-07 RX ADMIN — ENOXAPARIN SODIUM 40 MG: 40 INJECTION SUBCUTANEOUS at 09:13

## 2020-07-07 RX ADMIN — ENOXAPARIN SODIUM 40 MG: 40 INJECTION SUBCUTANEOUS at 19:35

## 2020-07-07 RX ADMIN — ESCITALOPRAM OXALATE 20 MG: 10 TABLET ORAL at 09:12

## 2020-07-07 RX ADMIN — MULTIVIT AND MINERALS-FERROUS GLUCONATE 9 MG IRON/15 ML ORAL LIQUID 15 ML: at 09:13

## 2020-07-07 RX ADMIN — CLONIDINE HYDROCHLORIDE 0.1 MG: 0.1 TABLET ORAL at 19:35

## 2020-07-07 RX ADMIN — FAMOTIDINE 20 MG: 20 TABLET ORAL at 09:12

## 2020-07-07 RX ADMIN — DOCUSATE SODIUM AND SENNOSIDES 1 TABLET: 50; 8.6 TABLET ORAL at 19:35

## 2020-07-07 RX ADMIN — PRAVASTATIN SODIUM 40 MG: 40 TABLET ORAL at 09:13

## 2020-07-07 RX ADMIN — LEVOTHYROXINE SODIUM 50 MCG: 50 TABLET ORAL at 05:41

## 2020-07-07 RX ADMIN — POLYETHYLENE GLYCOL 3350 17 G: 17 POWDER, FOR SOLUTION ORAL at 09:12

## 2020-07-07 RX ADMIN — CLONIDINE HYDROCHLORIDE 0.1 MG: 0.1 TABLET ORAL at 09:12

## 2020-07-07 RX ADMIN — MELATONIN TAB 3 MG 3 MG: 3 TAB at 19:35

## 2020-07-07 RX ADMIN — ASPIRIN 81 MG CHEWABLE TABLET 324 MG: 81 TABLET CHEWABLE at 09:12

## 2020-07-07 ASSESSMENT — ACTIVITIES OF DAILY LIVING (ADL)
ADLS_ACUITY_SCORE: 29
ADLS_ACUITY_SCORE: 25
ADLS_ACUITY_SCORE: 29
ADLS_ACUITY_SCORE: 25

## 2020-07-07 ASSESSMENT — MIFFLIN-ST. JEOR: SCORE: 2206.82

## 2020-07-07 NOTE — PLAN OF CARE
Assumed care 1500 to 2330. Vital signs stable on room air. Pt is alert, disoriented to time and situation. Pt smiling/laughing and more talkative this shift. Pt denies Pain. Pt denies nausea and SOB. Lung sounds clear. Cardiac WDL. Bowel Sounds present. Pt took all medications orally with pudding. Pt voiding adequately, clear yellow urine with purewic. Purewic changed. Pt lift for transfers. Skin red blanchable coccyx, barrier cream applied. Pt tolerating tube feed at 45 ml/hr. Pt ate 75% of dinner. Pt bed changed out pt is now on a bariatric with a pulsate mattress. Will continue to monitor and follow plan of care. Plan: Transitional care, looking for placement

## 2020-07-07 NOTE — PROGRESS NOTES
[facility], [place]    Medicine Progress Note - Hospitalist Service, Gold 8       Date of Admission:  6/5/2020  Assessment & Plan   Dionne Perez is a 35 year old female admitted on 6/5/2020. She is a 36 yo female with developmental delay, depression, anxiety, likely psychosis, GERD, and hypothyrodism, admitted 6/5/2020 for further eval and management of Acute Mental Status change (not responding).  Is now doing better and starting to eat more and cooperate with PT and other cares more.  Need to get to point that she is eating so can discontinue NGT and discharge to TCU (not felt appropriate for ARU).       1. AMS: Likely adjustment/conversion disorder in the setting of acute stress and her developmental delay. Patient's responsiveness has been improving.   Baseline is she responds in few words, but is independent with her ADLs. Her neurologic workup during this hospitalization showed no evidence of seizures on EEG, MRI demonstrated new cerebellar infarct (no clear etiology to explain her change in mentation), LP without any evidence of infection.   - Neurology has no additional recommendations for additional workup  - Appreciate Psychiatry input - as is improving seems like could be adjustment disorder  - DC'ed IV acyclovir, ceftriaxone, and vanc  - uptitrated PTA Lexapro up to 20 mg PTA dose   - Continues to need significant positive encouragement to eat and participate in physical therapy in order to discharge to either TCU or her group home.      2. Poor PO  Initially had no PO intake the first week of admission due to her mouth rigidity and not opening her mouth volitionally. NG inserted 6/7 for med administration and to start enteral nutrition. Patient removed NG 6/7 evening, replaced with bridal. Over the last week PO intake has improved and she is starting to take her medications.   -  TFs to goal of 40 ml/hr   When she eats near goal and takes pills consistently can remove NJ  tube (very close - can likely remove tomorrow)      3. Incidental finding of R cerebellar infarct of uncertain significance  MRI head for AMS workup revealed finding of R cerebellar infarct. Pt started on aspirin, obtained CTA head and neck that was non-diagnostic due to it being severely limited due to poor contrast opacification. TTE with bubble study negative.   - Continue  mg daily  - Will start statin when she is consistently taking oral meds if AST/ALT okay   - Repeat CTA head and neck without evidence of vascular stenosis  - Continue tele monitoring      4.  Mildly elevated AST/ALT/alk phos  As recently as 5/26 normal.  CT abdomen 6/6 with no acute findings. RUQ US with mild hepatomegaly and diffuse hepatic steatosis, CBD 5.5 mm, no hepatic biliary ductal dilation. Tbili wnl. Reported to have abdominal pain on admission, no further complaints of pain. Appears to be tolerating TF. Possible pancreatitis on admission vs LFT elevation 2/2 hepatic steatosis and CK elevation.   - will follow as they appear to be slowly coming into normal range     # Chronic pain?: Pt noted to be on scheduled Tylenol PTA, as well as have available as needed diclofenac gel.   - Discontinued PTA scheduled Tylenol given transaminitis     # Hypothyroidism: PTA on levothyroxine 50 mcg daily. TFTs on admission reveal pt euthyroid based on free T4 WNL.  - Continue PTA levothyroxine     # GERD: Continue PTA Pepcid 20 mg daily       Diet: Adult Formula Drip Feeding: Continuous Peptamen Intense VHP; Nasogastric tube; Goal Rate: 45; mL/hr; Medication - Feeding Tube Flush Frequency: At least 15-30 mL water before and after medication administration and with tube clogging; Amount to Se...  Snacks/Supplements Adult: Other; Pt may order snacks/supplements PRN; Between Meals  Regular Diet Adult    DVT Prophylaxis: Enoxaparin (Lovenox) SQ  Davila Catheter: not present  Code Status: Full Code           Disposition Plan   Expected discharge: 2 -  3 days, recommended to transitional care unit once safe disposition plan/ TCU bed available.  NOTE: PM&R did not feel appropriate for ARU given lack of cooperation with therapies.   Entered: Vaibhav Stone MD 07/07/2020, 8:15 AM       The patient's care was discussed with the Bedside Nurse and Patient.    Vaibhav Stone MD  Hospitalist Service, [address], [place]  Pager: [phone]  Please see sticky note for cross cover information  ______________________________________________________________________    Interval History   Feels okay, states she is eating well.  Denies pain or other concerns.     Data reviewed today: I reviewed all medications, new labs and imaging results over the last 24 hours. I personally reviewed no images or EKG's today.    Physical Exam   Vital Signs: Temp: 96.6  F (35.9  C) Temp src: Oral BP: 98/46 Pulse: 58 Heart Rate: 53 Resp: 16 SpO2: 98 % O2 Device: None (Room air)    Weight: 319 lbs 0 oz  Constitutional: awake, alert, cooperative, no apparent distress, and appears stated age  Respiratory: No increased work of breathing, good air exchange, clear to auscultation bilaterally, no crackles or wheezing  Cardiovascular: Normal apical impulse, regular rate and rhythm, normal S1 and S2, no S3 or S4, and no murmur noted  GI: normal bowel sounds, soft, non-distended, non-tender  Skin: no rashes  Neurologic: Awake, alert,     Data   Recent Labs   Lab 07/07/20  0639   WBC 9.0   HGB 10.4*   MCV 92         POTASSIUM 5.1   CHLORIDE 107   CO2 27   BUN 28   CR 0.53   ANIONGAP 7   SANDRA 8.9   GLC 82   ALBUMIN 2.9*   PROTTOTAL 6.3*   BILITOTAL 0.8   ALKPHOS 189*   ALT 60*   AST 37     No results found for this or any previous visit (from the past 24 hour(s)).  Medications     dextrose         aspirin  324 mg Oral or Feeding Tube Daily     calcium polycarbophil  1,250 mg Per NG tube BID     cloNIDine  0.1 mg Oral or NG Tube BID     enoxaparin  ANTICOAGULANT  40 mg Subcutaneous Q12H     escitalopram  20 mg Oral Daily     famotidine  20 mg Oral or NG Tube Daily     levothyroxine  50 mcg Oral or NG Tube Daily     melatonin  3 mg Oral or NG Tube At Bedtime     multivitamins w/minerals  15 mL Per Feeding Tube Daily     polyethylene glycol  17 g Oral or G tube Daily     pravastatin  40 mg Oral Daily     senna-docusate  1 tablet Per Feeding Tube At Bedtime

## 2020-07-07 NOTE — PLAN OF CARE
"BP (!) 88/42 (BP Location: Right arm)   Pulse 54   Temp 97.6  F (36.4  C) (Oral)   Resp 16   Ht 1.753 m (5' 9\")   Wt 144.7 kg (319 lb)   SpO2 98%   BMI 47.11 kg/m      Care from: 4705-2205      VS & Pain: VSS ex bradycardic and hypotensive- MD aware, on room air, denied pain    Neuro: Alert and disoriented to time    Respiratory: wheezes on expiration in BLL    Cardiac: bradycardic    Peripheral neurovascular: generalized edema noted    GI/: urinary incontinence at times and stool incontinence- removed Purewick and encouraged pt to call when there's an urge to urinate, was able to call once; adequate urine output, no BM this shift     Nutrition: good appetite and oral intake for breakfast, declined lunch, TF is infusing at 45 mL/hr, denied nausea    Skin: bruised, scab, skin tear at coccyx- applied barrier cream, repositioned q2h    Musculoskeletal: moderately impaired    Lines: no PIV- MD put in pt care order for ok without PIV    Activity: Assist of 2, mechanical lift    Events this shift: Took meds po. Provided perineal care and changed Purewick. Repositioned q2h. Call light within reach and bed alarm on.    Plan: Continue to monitor and follow poc. Waiting for placement.  "

## 2020-07-07 NOTE — PLAN OF CARE
Assumed care 1500 to 2330. Vital signs stable on room air. Pt is alert, disoriented to time and situation. Pt tired but smiling this shift. Pt denies Pain. Pt denies nausea and SOB. Lung sounds clear. Cardiac WDL. Bowel Sounds present. Pt encouraged to take medications orally. Pt voiding adequately, clear yellow urine with purewic. Purewic changed. Pt lift for transfers. Skin red blanchable coccyx, barrier cream applied. Pt tolerating tube feed at 45 ml/hr. Pt ate 100% of dinner. Will continue to monitor and follow plan of care. Plan: Transitional care, looking for placement.

## 2020-07-07 NOTE — PROGRESS NOTES
Calorie Count  Intake recorded for: 7/6  Total Kcals: 1726 Total Protein: 56g  Kcals from Hospital Food: 1726  Protein: 56g  Kcals from Outside Food (average):0 Protein: 0g  # Meals Recorded: 2 meals (First - 100% 2 Upper sorbian toast w/ butter & syrup, oatmeal w/ brown sugar & milk, fruit cup, apple juice)      (Second - 100% chicken quesadilla w/ salsa & sour cream, marissa, pudding, fruit cup)  # Supplements Recorded: 0

## 2020-07-07 NOTE — PROGRESS NOTES
"Social Work Services Progress Note    Hospital Day: 33  Date of Initial Social Work Evaluation:  6/15/20  Collaborated with:  Chart Review, TCU facilities     Data: SW following for TCU placement. SW received VM from [facility] - no available beds.    SW received VM from Aylin at [facility] [phone] requesting call back. SW called Aylin back. Aylin was questioning why pt is unable to return to her group home. SILVIA stated that pt is requiring a TCU stay and needs PT/OT strengthening. Aylin stated that pt will need her NG tube removed prior to discharge, as they do not take. Aylin stated they need to look at pt's weight and cognition to determine if facility can accept. Per Aylin, they will call this SW back tomorrow with a \"hard yes or no.\"     Referrals still pending:  [facility] (SNF)    [facility] ([facility])      [facility] ([facility])     Critical access hospital ([facility])    [facility] ([facility])    [facility])    [facility]ENEDICTINE [facility])    [facility]    [facility] ([facility])    CAMILIA SANDHYA [facility] ([facility])    [facility] ([facility])    [facility] - [facility] Only (SNF)        [facility]       GOOD Lutheran [place]       GOOD Lutheran CENTER [place]           GOOD [facility] CENTER NEEL        GOOD [facility]an [facility]        GOOD [facility] CENTER [place]       GOOD [facility] CENTER DEANA       [facility]     [facility] [place]  GOOD Lutheran SOCIETY-[place]        GOOD Lutheran SOCIETY-[place] SPECIALTY [facility]      RENARD JUANANDIE [facility]    LEISURE [place] GUARDIAN QUEENIE LEATHAANTONI        MARIE [facility]   ANDERSON ON [place] T[facility] - BEBETO           [facility] OLIVE CAREVIEW HOME            [facility]AB      [facility] [place]        PRESTERIAN HOMES OF [facility]        PRESBYTERIAN HOMES OF [place]   PRESCopper [facility] Community HospitalIAN HOMES OF [facility]        [facility] HOMES OF [facility] PRESBYTERIAN HOMES ON [place] [facility] Tiffanie Home-[place]    [facility] AND REHAB           Considering facilities:      [facility]  PH: [phone]  F: [phone]  -message left with admissions     [facility] ([facility])    [place] [facility]:   SILVIA [facility] admissions on Thurs 7/9: [phone]  ---considering; cannot accept with NG/NJ tube, so this would need to be successfully removed with patient swallowing medications consistently  ---SW received message from nurse supervisor. SILVIA answered all questions and concerns. Will call SILVIA back tomorrow re: acceptance decision.              Referrals that cannot accept/DECLINED:  [facility]- can't meet needs  [facility]--Did not state a reason   [facility] VIEW-Out of business? Fax and phone are out of service  [facility]NT [facility] [place] (SNF)-Does not accept MA patients      [facility] [place] [facility] ([facility])-No beds available  Soha on [place] - Referral Only (SNF)     LAZARO [facility] ([facility])--due to behaviors  [facility]--psych needs too high  [facility] ([facility])  -no beds available   [facility] --can't meet needs  [facility]  -can't meet needs  [facility]) --no appropriate bed  [facility] [place] (SNF) - no appropriate bed  [facility] TONY WEAVER  cannot meet needs   INTERLUDE [place] --covid positive facility only  [facility]-- No appropriate bed  Hindu [facility] HOME--unable to accept due to psych needs   [facility] -- non skilled nursing facility, not appropriate  [facility] --no available bed  BENEDICTINE [facility]-declined behaviors  GUARDIAN [facility] [place]--- declined, no beds open  [facility] --no appropriate bed  [facility]--all male facility, not appropriate  [facility] ([facility])   --no appropriate bed  [facility] --not a medicaid facility  [facility] ([facility])---requested additional therapy notes, faxed to [phone]--declined, cannot meet needs as patient w/ limited participation  [facility] --no current openings  [facility] ([facility]) --no bed  [facility]   -- can't meet needs of patient  [facility] ([facility])  -- is a Board&Care facility, no therapies  [facility]--no appropriate bed  [facility] - no available beds.     Intervention:  Discharge planning.     Assessment:  Did not meet with pt for this interaction.     Plan:    Anticipated Disposition:  Facility:  TBD    Barriers to d/c plan:  Accepting facility     Follow Up:  SW to follow for discharge planning needs    ABBY Hahn, KOLTON Rashid   5B pager: [phone]  5b phone: [phone]

## 2020-07-08 ENCOUNTER — APPOINTMENT (OUTPATIENT)
Dept: PHYSICAL THERAPY | Facility: CLINIC | Age: 35
DRG: 887 | End: 2020-07-08
Payer: MEDICARE

## 2020-07-08 PROCEDURE — 97530 THERAPEUTIC ACTIVITIES: CPT | Mod: GP

## 2020-07-08 PROCEDURE — 25000132 ZZH RX MED GY IP 250 OP 250 PS 637: Performed by: INTERNAL MEDICINE

## 2020-07-08 PROCEDURE — 25000132 ZZH RX MED GY IP 250 OP 250 PS 637: Performed by: STUDENT IN AN ORGANIZED HEALTH CARE EDUCATION/TRAINING PROGRAM

## 2020-07-08 PROCEDURE — 25000128 H RX IP 250 OP 636: Performed by: PHYSICIAN ASSISTANT

## 2020-07-08 PROCEDURE — 25000132 ZZH RX MED GY IP 250 OP 250 PS 637: Performed by: PHYSICIAN ASSISTANT

## 2020-07-08 PROCEDURE — 12000001 ZZH R&B MED SURG/OB UMMC

## 2020-07-08 PROCEDURE — 25000132 ZZH RX MED GY IP 250 OP 250 PS 637: Performed by: HOSPITALIST

## 2020-07-08 PROCEDURE — 99232 SBSQ HOSP IP/OBS MODERATE 35: CPT | Performed by: INTERNAL MEDICINE

## 2020-07-08 RX ORDER — ATORVASTATIN CALCIUM 40 MG/1
40 TABLET, FILM COATED ORAL EVERY EVENING
Status: DISCONTINUED | OUTPATIENT
Start: 2020-07-08 | End: 2020-07-08

## 2020-07-08 RX ADMIN — ENOXAPARIN SODIUM 40 MG: 40 INJECTION SUBCUTANEOUS at 20:16

## 2020-07-08 RX ADMIN — ENOXAPARIN SODIUM 40 MG: 40 INJECTION SUBCUTANEOUS at 09:20

## 2020-07-08 RX ADMIN — POLYETHYLENE GLYCOL 3350 17 G: 17 POWDER, FOR SOLUTION ORAL at 09:20

## 2020-07-08 RX ADMIN — ESCITALOPRAM OXALATE 20 MG: 10 TABLET ORAL at 09:21

## 2020-07-08 RX ADMIN — MELATONIN TAB 3 MG 3 MG: 3 TAB at 20:13

## 2020-07-08 RX ADMIN — PRAVASTATIN SODIUM 40 MG: 40 TABLET ORAL at 09:20

## 2020-07-08 RX ADMIN — ASPIRIN 81 MG CHEWABLE TABLET 324 MG: 81 TABLET CHEWABLE at 09:21

## 2020-07-08 RX ADMIN — DOCUSATE SODIUM AND SENNOSIDES 1 TABLET: 50; 8.6 TABLET ORAL at 20:13

## 2020-07-08 RX ADMIN — LEVOTHYROXINE SODIUM 50 MCG: 50 TABLET ORAL at 06:18

## 2020-07-08 RX ADMIN — FAMOTIDINE 20 MG: 20 TABLET ORAL at 09:20

## 2020-07-08 RX ADMIN — CLONIDINE HYDROCHLORIDE 0.1 MG: 0.1 TABLET ORAL at 20:13

## 2020-07-08 RX ADMIN — MULTIVIT AND MINERALS-FERROUS GLUCONATE 9 MG IRON/15 ML ORAL LIQUID 15 ML: at 09:22

## 2020-07-08 RX ADMIN — CALCIUM POLYCARBOPHIL 1250 MG: 625 TABLET, FILM COATED ORAL at 09:25

## 2020-07-08 RX ADMIN — CALCIUM POLYCARBOPHIL 1250 MG: 625 TABLET, FILM COATED ORAL at 20:13

## 2020-07-08 ASSESSMENT — ACTIVITIES OF DAILY LIVING (ADL)
ADLS_ACUITY_SCORE: 31
ADLS_ACUITY_SCORE: 31
ADLS_ACUITY_SCORE: 29
ADLS_ACUITY_SCORE: 31
ADLS_ACUITY_SCORE: 31
ADLS_ACUITY_SCORE: 29

## 2020-07-08 ASSESSMENT — MIFFLIN-ST. JEOR: SCORE: 2266.69

## 2020-07-08 NOTE — PROGRESS NOTES
[facility] Progress Note - Hospitalist Service, Gold 8       Date of Admission:  6/5/2020  Assessment & Plan   Dionne Perez is a 35 year old female admitted on 6/5/2020. She has developmental delay, depression, anxiety, likely psychosis, GERD, and hypothyrodism, admitted for further eval and management of Acute Mental Status change (quit responding to people).  Is now doing better and starting to eat more and cooperate with PT and other cares more.  Need to get to point that she is eating so can discontinue NGT and discharge to TCU (not felt appropriate for ARU as not cooperative).       1. AMS: Likely adjustment/conversion disorder in the setting of acute stress and her developmental delay. Patient's responsiveness has been improving.   Baseline is she responds in few words, but is independent with her ADLs. Her neurologic workup during this hospitalization showed no evidence of seizures on EEG, MRI demonstrated new cerebellar infarct (no clear etiology to explain her change in mentation), LP without any evidence of infection.   - Neurology has no additional recommendations for additional workup  - Appreciate Psychiatry input - as is improving seems like could be adjustment disorder  - Lexapro 20 mg   - Continues to need significant positive encouragement to eat and participate in physical therapy in order to discharge to either TCU or her group home.       2. Poor PO  Initially had no PO intake the first week of admission due to her mouth rigidity and not opening her mouth volitionally. NG inserted 6/7 for med administration and to start enteral nutrition. Over the last week PO intake has improved and she is has been eating full meals last few days.  Will discontinue tube feeds today and if cont to eat well will remove NJT tomorrow.     3. Incidental finding of R cerebellar infarct of uncertain significance  MRI head for AMS workup revealed finding of R cerebellar  infarct. Pt started on aspirin, obtained CTA head and neck that was non-diagnostic due to it being severely limited due to poor contrast opacification. TTE with bubble study negative. Repeat CTA head and neck without evidence of vascular stenosis  -  mg daily  -Pravastatin      4.  Mildly elevated AST/ALT/alk phos  As recently as 5/26 normal.  CT abdomen 6/6 with no acute findings. RUQ US with mild hepatomegaly and diffuse hepatic steatosis, CBD 5.5 mm, no hepatic biliary ductal dilation. Tbili wnl. Reported to have abdominal pain on admission, no further complaints of pain. Appears to be tolerating TF. Possible pancreatitis on admission vs LFT elevation 2/2 hepatic steatosis and CK elevation.   - will follow as they appear to be slowly coming into normal range      # Chronic pain?: Pt noted to be on scheduled Tylenol PTA, as well as have available as needed diclofenac gel.      # Hypothyroidism: PTA on levothyroxine 50 mcg daily. TFTs on admission reveal pt euthyroid based on free T4 WNL.  - Continue PTA levothyroxine     # GERD: Continue PTA Pepcid 20 mg daily     Diet:  Regular Diet Adult    DVT Prophylaxis: Enoxaparin (Lovenox) SQ  Davila Catheter: not present  Code Status: Full Code        Disposition Plan   Expected discharge: 2 - 3 days, recommended to transitional care unit once NJT removed.  Entered: Vaibhav Stone MD 07/08/2020, 2:29 PM       The patient's care was discussed with the Patient.    Vaibhav Stone MD  Hospitalist Service, [address], [place]  Pager: [phone]  Please see sticky note for cross cover information  ______________________________________________________________________    Interval History   Feels good, eating well    Data reviewed today: I reviewed all medications, new labs and imaging results over the last 24 hours. I personally reviewed no images or EKG's today.    Physical Exam   Vital Signs: Temp: 96.2  F (35.7  C) Temp src:  Oral BP: 99/56 Pulse: 62 Heart Rate: 53 Resp: 18 SpO2: 96 % O2 Device: None (Room air)    Weight: 318 lbs 0 oz  Constitutional: awake, alert, cooperative, no apparent distress  Respiratory: No increased work of breathing, good air exchange, clear to auscultation bilaterally, no crackles or wheezing  Cardiovascular: Normal apical impulse, regular rate and rhythm, normal S1 and S2, no S3 or S4, and no murmur noted  GI:  normal bowel sounds, soft, non-distended, non-tender,   Neurologic: Awake, alert,     Data   Recent Labs   Lab 07/07/20  0639   WBC 9.0   HGB 10.4*   MCV 92         POTASSIUM 5.1   CHLORIDE 107   CO2 27   BUN 28   CR 0.53   ANIONGAP 7   SANDRA 8.9   GLC 82   ALBUMIN 2.9*   PROTTOTAL 6.3*   BILITOTAL 0.8   ALKPHOS 189*   ALT 60*   AST 37     No results found for this or any previous visit (from the past 24 hour(s)).  Medications     dextrose         aspirin  324 mg Oral or Feeding Tube Daily     atorvastatin  40 mg Oral QPM     calcium polycarbophil  1,250 mg Per NG tube BID     cloNIDine  0.1 mg Oral or NG Tube BID     enoxaparin ANTICOAGULANT  40 mg Subcutaneous Q12H     escitalopram  20 mg Oral Daily     famotidine  20 mg Oral or NG Tube Daily     levothyroxine  50 mcg Oral or NG Tube Daily     melatonin  3 mg Oral or NG Tube At Bedtime     multivitamins w/minerals  15 mL Per Feeding Tube Daily     polyethylene glycol  17 g Oral or G tube Daily     pravastatin  40 mg Oral Daily     senna-docusate  1 tablet Per Feeding Tube At Bedtime

## 2020-07-08 NOTE — PROGRESS NOTES
Social Work Services Progress Note    Hospital Day: 34  Date of Initial Social Work Evaluation:  6/15/20  Collaborated with:  Chart Review, TCU facilities     Data:  SW following for TCU placement.   SW [facility] - no appropriate bed available.   SW received VM from [facility] - no appropriate bed.     SW called [facility] [phone], message left with Aylin re: referral.     Addend 1105: SW received VM from [facility] [phone] requesting SW call her back.   SW called Aylin back. SILVIA informed Aylin that primary medical team would like to monitor nutrition today and is considering taking NG tube out tomorrow. Aylin is requesting SW call back tomorrow.    Addend 1125: SW received VM from [facility], per Aylin, they are unable to accept pt for admission - no appropriate bed.  SW left VM with [facility] [phone] re: referral.     Addend 1210: SW received VM from [facility], per admissions they are unable to accept pt due to behavioral concerns.   SW sent referral via [facility] to [facility] and left VM with admissions [phone].       Referrals still pending:  [facility] [place] ([facility])    [facility] ([facility])      [facility] ([facility])     [facility] ([facility])    [facility] ([facility])    BENEDICTINE [facility] ([facility])    [facility]ENEDICTINE [facility])    [facility]ENEDICTINE [facility] ([facility])    [facility] ([facility])    [facility] ([facility])        [facility]       GOOD [facility]       GOOD Baptist CENTER [place]           GOOD Baptist [place] NEEL        GOOD Baptist CENTER [place]         Good Mandaeism [facility]     Good Mandaeism [place]  GOOD Baptist [facility]        GOOD Baptist SOCIETY-[facility]      HAVEN  HOMES OF [facility] SERVICES           INTERLUDE [place]      JUAN-ANDIE JUAN-ANDIE RESIDENCE    [facility]    LEISURE [place] GUARDIAGATHA ANDERSON ON [place] TCU - BEBETO           [facility] HOME            [facility] AND REHAB      [facility]        PRESTERAbrazo [facility] HOMES OF [facility]        PRESArtesia General [facility] HOMES OF [facility] HOMES OF [facility] HOMES OF [facility] PRESBYTERIAN HOMES ON [place] [facility] Home-[facility] AND REHAB              Referrals that cannot accept/DECLINED:  [facility]- can't meet needs  [place]--Did not state a reason   [facility] VIEW-Out of business? Fax and phone are out of service  COVENANT LIVING [facility] AND REHABILITATION [place] ([facility])-Does not accept MA patients      CERENITY CARE [facility] ([facility])-No beds available  [facility]oha luke [place] - Referral Only (SNF)     LAZARO [facility] ([facility])--due to behaviors  [facility]--psych needs too high  [facility] ([facility])  -no beds available   [facility] --can't meet needs  [facility]  -can't meet needs  [facility] --no appropriate bed  CERENITY CARE [facility]ENTERH[facility] ([facility]) - no appropriate bed  GOOD [facility] TONY WEAVER  cannot meet needs   [facility] --covid positive facility only  [facility]-- No appropriate bed  [facility] HOME--unable to accept due to psych needs   [facility] -- non skilled nursing facility, not appropriate  [place] --no available bed  [facility]-declined behaviors  GUARDIAN ANGELS [facility] CENTER [place]--- declined, no  beds open  [facility] [place] --no appropriate bed  [facility]--all male facility, not appropriate  [facility] (SNF)   --no appropriate bed  Walker Latter-day MayteEmery --not a medicaid facility  [facility] ([facility])---requested additional therapy notes, faxed to [phone]--declined, cannot meet needs as patient w/ limited participation  [facility] --no current openings  AMBASSADOR [facility] ([facility]) --no bed  [facility]   -- can't meet needs of patient  [facility] ([facility])  -- is a Board&Care facility, no therapies  [facility]--no appropriate bed  [facility] - no available beds.   [facility]  - not accepting admissions  [facility]   - no appropriate beds    ISABELAChilton Medical CenterALESK[facility]tering [facility] CARE [place] (SNF)  - no appropriate beds  [facility] - no appropriate beds   [facility] - no appropriate beds available   [facility] - Referral Only (SNF) -unable to accept   [facility] [place] - no appropriate bed      Intervention:  Discharge planning.     Assessment:  Pt awaiting TCU placement.    Plan:    Anticipated Disposition:  Facility:  TBD    Barriers to d/c plan:  Accepting facility     Follow Up:  SW to follow for discharge planning needs    ABBY Hahn, KOLTON Rashid   5B ph: [phone]  5B pgr: [phone]

## 2020-07-08 NOTE — PLAN OF CARE
"BP 99/56 (BP Location: Right arm)   Pulse 62   Temp 96.2  F (35.7  C) (Oral)   Resp 18   Ht 1.753 m (5' 9\")   Wt 144.2 kg (318 lb)   SpO2 96%   BMI 46.96 kg/m      Care from: 1115-0705      VS & Pain: VSS ex bradycardic, on room air, denied pain    Neuro: Alert and disoriented to time and situation     Respiratory: wheezes on expiration in BLL    Cardiac: bradycardic    Peripheral neurovascular: generalized and dependent edema noted    GI/: urinary incontinence and stool incontinence- provided perineal care and change Purewick, 400 urine output and large BM x1    Nutrition: good appetite and oral intake for breakfast, declined lunch, TF was discontinued, denied nausea    Skin: bruised, scab, skin tear at coccyx- applied barrier cream, repositioned q2h    Musculoskeletal: generalized weakness, moderately impaired    Lines: no PIV- MD ok without PIV    Activity: Assist of 2, mechanical lift    Events this shift: Took meds po. Call light within reach and bed alarm on.    Plan: Continue to monitor and follow poc. Might remove NG tomorrow if good po intake for dinner. Waiting for placement.  "

## 2020-07-08 NOTE — PLAN OF CARE
[facility] cares [phone]. Pt A&Ox2, disoriented to time and situation. VSS on RA. Denies pain/nausea/SOB. Repositioned q2hrs except pt refused once. Pt is now on a Pulsate mattress. TF running at goal of 45 mL/hr. Purwick patent and draining clear yellow urine. Tolerating PO meds. Continue to monitor and await TCU placement.

## 2020-07-08 NOTE — PLAN OF CARE
5B  Discharge Planner PT   Patient plan for discharge: did not state  Current status: SBA to complete supine > sit transfer, CGA x 2 for sit <> stand up to FWW. Needs Mod-Max A to return to supine for LE support. Declines ambulation.  Barriers to return to prior living situation: medical status, mobility status, level of assist  Recommendations for discharge: TCU  Rationale for recommendations: pt will benefit from rehab stay to improve safety with IND mobility.       Entered by: Lukas Petit 07/08/2020 12:42 PM

## 2020-07-09 PROCEDURE — 12000001 ZZH R&B MED SURG/OB UMMC

## 2020-07-09 PROCEDURE — 25000132 ZZH RX MED GY IP 250 OP 250 PS 637: Performed by: STUDENT IN AN ORGANIZED HEALTH CARE EDUCATION/TRAINING PROGRAM

## 2020-07-09 PROCEDURE — 25000132 ZZH RX MED GY IP 250 OP 250 PS 637: Performed by: PHYSICIAN ASSISTANT

## 2020-07-09 PROCEDURE — 25000128 H RX IP 250 OP 636: Performed by: PHYSICIAN ASSISTANT

## 2020-07-09 PROCEDURE — 25000132 ZZH RX MED GY IP 250 OP 250 PS 637: Performed by: INTERNAL MEDICINE

## 2020-07-09 PROCEDURE — 99232 SBSQ HOSP IP/OBS MODERATE 35: CPT | Performed by: INTERNAL MEDICINE

## 2020-07-09 PROCEDURE — 25000132 ZZH RX MED GY IP 250 OP 250 PS 637: Performed by: HOSPITALIST

## 2020-07-09 RX ADMIN — MELATONIN TAB 3 MG 3 MG: 3 TAB at 20:21

## 2020-07-09 RX ADMIN — DOCUSATE SODIUM AND SENNOSIDES 1 TABLET: 50; 8.6 TABLET ORAL at 20:21

## 2020-07-09 RX ADMIN — POLYETHYLENE GLYCOL 3350 17 G: 17 POWDER, FOR SOLUTION ORAL at 09:05

## 2020-07-09 RX ADMIN — CALCIUM POLYCARBOPHIL 1250 MG: 625 TABLET, FILM COATED ORAL at 09:06

## 2020-07-09 RX ADMIN — ASPIRIN 81 MG CHEWABLE TABLET 324 MG: 81 TABLET CHEWABLE at 09:05

## 2020-07-09 RX ADMIN — CLONIDINE HYDROCHLORIDE 0.1 MG: 0.1 TABLET ORAL at 20:21

## 2020-07-09 RX ADMIN — LEVOTHYROXINE SODIUM 50 MCG: 50 TABLET ORAL at 05:35

## 2020-07-09 RX ADMIN — CALCIUM POLYCARBOPHIL 1250 MG: 625 TABLET, FILM COATED ORAL at 20:21

## 2020-07-09 RX ADMIN — ESCITALOPRAM OXALATE 20 MG: 10 TABLET ORAL at 09:05

## 2020-07-09 RX ADMIN — ENOXAPARIN SODIUM 40 MG: 40 INJECTION SUBCUTANEOUS at 09:05

## 2020-07-09 RX ADMIN — FAMOTIDINE 20 MG: 20 TABLET ORAL at 09:05

## 2020-07-09 RX ADMIN — MULTIVIT AND MINERALS-FERROUS GLUCONATE 9 MG IRON/15 ML ORAL LIQUID 15 ML: at 09:05

## 2020-07-09 RX ADMIN — PRAVASTATIN SODIUM 40 MG: 40 TABLET ORAL at 09:05

## 2020-07-09 RX ADMIN — CLONIDINE HYDROCHLORIDE 0.1 MG: 0.1 TABLET ORAL at 09:05

## 2020-07-09 ASSESSMENT — ACTIVITIES OF DAILY LIVING (ADL)
ADLS_ACUITY_SCORE: 29
ADLS_ACUITY_SCORE: 29
ADLS_ACUITY_SCORE: 25
ADLS_ACUITY_SCORE: 29

## 2020-07-09 ASSESSMENT — MIFFLIN-ST. JEOR: SCORE: 2270.32

## 2020-07-09 NOTE — PLAN OF CARE
Assumed care 1500 to 2330. Vital signs stable on room air except bradycardic HR 49. Pt is alert, disoriented situation. Pt knew she came to the hospitals because she was sick. Pt smiling/laughing and more talkative this shift, is super happy to have her tube out. Tells people she got it out, when they go in her room. Pt denies Pain. Pt denies nausea and SOB. Lung sounds clear. Cardiac bradycardic. Bowel Sounds present. Pt took all medications orally with pudding. Pt voiding adequately, clear yellow urine with purewic. Purewic changed. Pt had one soft,  green BM. Pt lift for transfers. Skin red blanchable coccyx, barrier cream applied. Lotion applied to feet, knees, and elbows for dry skin. Offered bed bath and shampoo cap but pt refused. NG removed on day shift. Pt ate 100% of dinner.  Will continue to monitor and follow plan of care. Plan: Transitional care, looking for placement

## 2020-07-09 NOTE — PROGRESS NOTES
[facility], [facility] Progress Note - Hospitalist Service, Gold 8       Date of Admission:  6/5/2020  Assessment & Plan   Dionne Perez is a 35 year old female admitted on 6/5/2020. She has developmental delay, depression, anxiety, likely psychosis, GERD, and hypothyrodism, admitted for further eval and management of Acute Mental Status change (quit responding to people).  Is now doing better and eating more and cooperating with PT and other cares .       1. AMS: Likely adjustment/conversion disorder in the setting of acute stress and her developmental delay. Patient's responsiveness has been improving.   Baseline is she responds in few words, but is independent with her ADLs. Her neurologic workup during this hospitalization showed no evidence of seizures on EEG, MRI demonstrated new cerebellar infarct (no clear etiology to explain her change in mentation), LP without any evidence of infection.   - Neurology has no additional recommendations for additional workup  - Appreciate Psychiatry input - as is improving seems like could be adjustment disorder  - Lexapro 20 mg   - Continues to need significant positive encouragement to eat and participate in physical therapy in order to discharge to either TCU or her group home.       2. Poor PO - resolved  Initially had no PO intake the first week of admission due to her mouth rigidity and not opening her mouth volitionally. NG inserted 6/7 for med administration and to start enteral nutrition. Over the last week PO intake has improved and she is has been eating full meals last few days. Removed NJT today.     3. Incidental finding of R cerebellar infarct of uncertain significance  MRI head for AMS workup revealed finding of R cerebellar infarct. Pt started on aspirin, obtained CTA head and neck that was non-diagnostic due to it being severely limited due to poor contrast opacification. TTE with bubble study negative. Repeat CTA head  and neck without evidence of vascular stenosis  -  mg daily  -Pravastatin      4.  Mildly elevated AST/ALT/alk phos  As recently as 5/26 normal.  CT abdomen 6/6 with no acute findings. RUQ US with mild hepatomegaly and diffuse hepatic steatosis, CBD 5.5 mm, no hepatic biliary ductal dilation. Tbili wnl. Reported to have abdominal pain on admission, no further complaints of pain. Appears to be tolerating TF. Possible pancreatitis on admission vs LFT elevation 2/2 hepatic steatosis and CK elevation.   - will follow as they appear to be slowly coming into normal range      # Chronic pain?: Pt noted to be on scheduled Tylenol PTA, as well as have available as needed diclofenac gel.      # Hypothyroidism: PTA on levothyroxine 50 mcg daily. TFTs on admission reveal pt euthyroid based on free T4 WNL.  - Continue PTA levothyroxine     # GERD: Continue PTA Pepcid 20 mg daily       Diet: Snacks/Supplements Adult: Other; Pt may order snacks/supplements PRN; Between Meals  Regular Diet Adult    Davila Catheter: not present  Code Status: Full Code           Disposition Plan   Expected discharge: Tomorrow, recommended to transitional care unit   Entered: Vaibhav Stone MD 07/09/2020, 5:07 PM       The patient's care was discussed with the Bedside Nurse and Patient.    Vaibhav Stone MD  Hospitalist Service, [address], [place]  Pager: [phone]  Please see sticky note for cross cover information  ______________________________________________________________________    Interval History   Feels good.  Good appetitie.  No complaints    Data reviewed today: I reviewed all medications, new labs and imaging results over the last 24 hours. I personally reviewed no images or EKG's today.    Physical Exam   Vital Signs: Temp: 96.6  F (35.9  C) Temp src: Oral BP: 120/62 Pulse: (!) 49   Resp: 18 SpO2: 99 % O2 Device: None (Room air)    Weight: 331 lbs 3.2 oz  Obese NAD  Lung CTAB  Heart RRR  without M  Abd NT  Ext well perfused    Data   Recent Labs   Lab 07/07/20  0639   WBC 9.0   HGB 10.4*   MCV 92         POTASSIUM 5.1   CHLORIDE 107   CO2 27   BUN 28   CR 0.53   ANIONGAP 7   SANDRA 8.9   GLC 82   ALBUMIN 2.9*   PROTTOTAL 6.3*   BILITOTAL 0.8   ALKPHOS 189*   ALT 60*   AST 37     No results found for this or any previous visit (from the past 24 hour(s)).  Medications     dextrose         aspirin  324 mg Oral or Feeding Tube Daily     calcium polycarbophil  1,250 mg Per NG tube BID     cloNIDine  0.1 mg Oral or NG Tube BID     escitalopram  20 mg Oral Daily     famotidine  20 mg Oral or NG Tube Daily     levothyroxine  50 mcg Oral or NG Tube Daily     melatonin  3 mg Oral or NG Tube At Bedtime     multivitamins w/minerals  15 mL Per Feeding Tube Daily     polyethylene glycol  17 g Oral or G tube Daily     pravastatin  40 mg Oral Daily     senna-docusate  1 tablet Per Feeding Tube At Bedtime

## 2020-07-09 NOTE — PLAN OF CARE
Assumed cares 0015-7211. Pt A&Ox2, disoriented to time and situation. VSS on RA ex bradycardic. TF stopped, NG to be removed if pt continues to tolerate PO meds and have good PO intake. Pt tolerating PO meds well. Denies pain/nausea/SOB. Pt occasionally refusing repositioning, other times agreeable. Purwick patent and draining clear yellow urine. Continue to await TCU placement and follow plan of care.

## 2020-07-09 NOTE — PLAN OF CARE
"Assumed care 1500 to 2330. Vital signs stable on room air except bradycardic. Pt is alert, disoriented to time and situation. Pt smiling/laughing and more talkative this shift \"asked nurse what she had for dinner\". Pt denies Pain. Pt denies nausea and SOB. Lung sounds clear. Cardiac WDL. Bowel Sounds present. Pt took all medications orally with pudding. Pt voiding adequately, clear yellow urine with purewic. Purewic changed. Pt had one soft, dark green BM. Pt lift for transfers. Skin red blanchable coccyx, barrier cream applied. Lotion applied to feet, knees, and elbows for dry skin. Tube feeding off per orders. Pt ate 100% of dinner.  Will continue to monitor and follow plan of care. Plan: Transitional care, looking for placement  "

## 2020-07-09 NOTE — PROGRESS NOTES
Social Work Services Progress Note    Hospital Day: 35  Date of Initial Social Work Evaluation:  6/15/20  Collaborated with:  TCU facilities     Data:  SILVIA received VM from Rosy, in admissions at [facility] [phone]. Rosy questioned if pt still has NJ and if she is COVID negative.  SILVIA left VM with Rosy answering all questions and requesting a phone call back.     Addend 1100: SILVIA received VM from Rosy requesting call back. SILVIA called Rosy. Rosy reports they are able to accept pt for admission. Rosy was questioning if pt needs a bariatric bed. SILVIA informed Rosy this SW will check with bedside RN.   SILVIA discussed with charge RN, pt will need bariatric bed and pt will need a stretcher ride for transport.   SILVIA called pt's guardian Cynthia [phone] to discuss. Cynthia is in agreement with placement. Cynthia requested SILVIA call back with final details later this afternoon (she is in meeting from [phone] today, so requested call back after that.)    Addend 1400: SILVIA called [facility] Line [phone] to inquire on Level II Obra PAS assessment. SILVIA was directed to call Bruna [phone]. SILVIA spoke with Bruna, who stated she will forward this SILVIA message on to appropriate team.   SILVIA reviewed pt's chart and noted Kelly / [facility] completed assessment [phone]. SILVIA called Kelly. Kelly reports she will send level II OBRA assessment to [facility]. SILVIA provided fax number to Kelly.   SILVIA called [facility] w/c ride [phone] to arrange stretcher ride for tomorrow. Pt will require stretcher ride due to DD and bariatric status.   SILVIA called Rosy in admissions. SILVIA informed Rosy pt does require a bariatric bed. SILVIA questioned Rosy re: visitor policy. Rosy reports they are allowing outside visits and family can sign up online.   SILVIA called Cynthia to update her on discharge time and visitor policy. - unable to reach and could not leave . SILVIA will try again later.  SILVIA paged Dr Stone re: discharge time.  SILVIA  received message from Rosy requesting updated notes re: pt's PO intake. SW faxed to [phone].   SW spoke with Guardian Cynthia, she is in agreement with discharge. Updated her on visitor policy.     Intervention:  Discharge Planning     Assessment:  Pt awaiting TCU placement    Plan:    Anticipated Disposition:  Facility:  TBD    Barriers to d/c plan:  Accepting facility     Follow Up:  SW to follow for discharge planning needs    ABBY Hahn, [facility]     [phone]   pgr: [phone]

## 2020-07-09 NOTE — PLAN OF CARE
Care from: 0926-4836      VS & Pain: VSS ex bradycardic, on room air, denied pain    Neuro: Alert and disoriented to time and situation     Respiratory: wheezes on expiration in BLL    Cardiac: bradycardic    Peripheral neurovascular: generalized and dependent edema noted    GI/: urinary incontinence and stool incontinence- provided perineal care and change Purewick, 400 mL urine output and no BM this shift    Nutrition: good appetite and oral intake for breakfast, declined lunch, denied nausea, pulled NG per MD order    Skin: bruised, scab, skin tear at coccyx- applied barrier cream, repositioned q2h    Musculoskeletal: generalized weakness, moderately impaired    Lines: no PIV- MD ok without PIV    Activity: Assist of 2, mechanical lift    Events this shift: Took meds po. Call light within reach and bed alarm on.    Plan: Continue to monitor and follow poc. Might discharge tomorrow per MD.

## 2020-07-10 VITALS
HEIGHT: 69 IN | RESPIRATION RATE: 18 BRPM | WEIGHT: 293 LBS | HEART RATE: 57 BPM | DIASTOLIC BLOOD PRESSURE: 56 MMHG | TEMPERATURE: 97.1 F | BODY MASS INDEX: 43.4 KG/M2 | SYSTOLIC BLOOD PRESSURE: 109 MMHG | OXYGEN SATURATION: 99 %

## 2020-07-10 PROCEDURE — 25000132 ZZH RX MED GY IP 250 OP 250 PS 637: Performed by: PHYSICIAN ASSISTANT

## 2020-07-10 PROCEDURE — 25000132 ZZH RX MED GY IP 250 OP 250 PS 637: Performed by: HOSPITALIST

## 2020-07-10 PROCEDURE — 25000132 ZZH RX MED GY IP 250 OP 250 PS 637: Performed by: STUDENT IN AN ORGANIZED HEALTH CARE EDUCATION/TRAINING PROGRAM

## 2020-07-10 PROCEDURE — 25000132 ZZH RX MED GY IP 250 OP 250 PS 637: Performed by: INTERNAL MEDICINE

## 2020-07-10 PROCEDURE — 99239 HOSP IP/OBS DSCHRG MGMT >30: CPT | Performed by: INTERNAL MEDICINE

## 2020-07-10 RX ORDER — LANOLIN ALCOHOL/MO/W.PET/CERES
3 CREAM (GRAM) TOPICAL AT BEDTIME
Qty: 30 TABLET | Refills: 4 | Status: SHIPPED | OUTPATIENT
Start: 2020-07-10 | End: 2022-07-31

## 2020-07-10 RX ORDER — ASPIRIN 81 MG/1
324 TABLET, CHEWABLE ORAL DAILY
Qty: 30 TABLET | Refills: 4 | Status: SHIPPED | OUTPATIENT
Start: 2020-07-10 | End: 2022-07-31

## 2020-07-10 RX ORDER — POLYETHYLENE GLYCOL 3350 17 G/17G
17 POWDER, FOR SOLUTION ORAL DAILY
Qty: 30 PACKET | Refills: 4 | Status: SHIPPED | OUTPATIENT
Start: 2020-07-10 | End: 2022-07-31

## 2020-07-10 RX ORDER — ACETAMINOPHEN 325 MG/1
650 TABLET ORAL EVERY 6 HOURS PRN
Qty: 1 BOTTLE | Refills: 4 | Status: SHIPPED | OUTPATIENT
Start: 2020-07-10 | End: 2022-07-31

## 2020-07-10 RX ADMIN — POLYETHYLENE GLYCOL 3350 17 G: 17 POWDER, FOR SOLUTION ORAL at 08:23

## 2020-07-10 RX ADMIN — FAMOTIDINE 20 MG: 20 TABLET ORAL at 08:23

## 2020-07-10 RX ADMIN — ASPIRIN 81 MG CHEWABLE TABLET 324 MG: 81 TABLET CHEWABLE at 08:23

## 2020-07-10 RX ADMIN — CALCIUM POLYCARBOPHIL 1250 MG: 625 TABLET, FILM COATED ORAL at 09:49

## 2020-07-10 RX ADMIN — LEVOTHYROXINE SODIUM 50 MCG: 50 TABLET ORAL at 08:22

## 2020-07-10 RX ADMIN — PRAVASTATIN SODIUM 40 MG: 40 TABLET ORAL at 08:23

## 2020-07-10 RX ADMIN — ESCITALOPRAM OXALATE 20 MG: 10 TABLET ORAL at 08:23

## 2020-07-10 ASSESSMENT — ACTIVITIES OF DAILY LIVING (ADL)
ADLS_ACUITY_SCORE: 25

## 2020-07-10 NOTE — PLAN OF CARE
Care assumed [id number] when pt discharged. No PIV in place. Urinary incontinence, purewick removed prior to discharge. Fecal incontinence, pt changed prior to discharge. Pt belongings, medications, and TCU packet sent with [facility] transport. Pt left off unit in stretcher to be transported to [facility] TCU.

## 2020-07-10 NOTE — PLAN OF CARE
PT-5B: patient discharged from hospital prior to PT session this date; no intervention provided.     Physical Therapy Discharge Summary    Reason for therapy discharge:    Discharged to transitional care facility.    Progress towards therapy goal(s). See goals on Care Plan in Epic electronic health record for goal details.  Goals partially met.  Barriers to achieving goals:   discharge from facility.    Therapy recommendation(s):    Continued therapy is recommended.  Rationale/Recommendations:  patient will benefit from continued skilled PT intervention at the TCU to address mobility deficits, improve strength & activity tolerance.

## 2020-07-10 NOTE — PLAN OF CARE
Patient denies pain. Incontinent of urine x1 and repositioned x2. Slept all night. Plan to discharge to TCU at noon today.

## 2020-07-10 NOTE — PROGRESS NOTES
Social Work Services Progress Note    Hospital Day: 36  Date of Initial Social Work Evaluation:  6/15/20  Collaborated with:  TCU facilities, Guardian Cynthia [phone]    Data:  SILVIA received VM from Rosy - admissions coordinator at [facility] [phone]. Rosy was asking for pt's social security number and also informed SILVIA her  now needs to look at the level II screen prior to admission.  SILVIA called Cynthia to ask for social security number. Cynthia will call SILVIA back with number.     Addend 1000: Rosy reports  has looked at level II screening and pt is able to admit. SILVIA faxed orders to Rosy [phone].    Intervention:  Discharge planning     Assessment:  Did not meet with pt for this interaction    Plan:    Anticipated Disposition:  Facility:  [facility] [place]     Barriers to d/c plan:  NA    Follow Up:  SILVIA to continue to follow for discharge planning needs    ABBY Hahn, LGSW  [facility]   [phone]  Pgr: [phone]

## 2020-07-10 NOTE — PLAN OF CARE
Occupational Therapy Discharge Summary    Reason for therapy discharge:    Discharged to transitional care facility.    Progress towards therapy goal(s). See goals on Care Plan in [facility] electronic health record for goal details.  Goals not met.  Barriers to achieving goals:   discharge from facility.    Therapy recommendation(s):    Continued therapy is recommended.  Rationale/Recommendations:  Pt would benefit from further therapy to progress functional independence with basic mobility and ADLs.

## 2020-07-10 NOTE — PROGRESS NOTES
RN called [facility] TCU to give report, but no answer. RN left voicemail at nurse's station to call back for report.

## 2020-07-10 NOTE — PROGRESS NOTES
Social Work Services Discharge Note      Patient Name:  Dionne Perez     Anticipated Discharge Date:  7/10/20    Discharge Disposition:   TCU:  [facility]   [address] [place] [address]  [phone] [phone]    Following MD:  Per facility      Pre-Admission Screening (PAS) online form has been completed.  The Level of Care (LOC) is:  Determined  Confirmation Code is: [id number]  Patient/caregiver informed of referral to [facility] Line for Pre-Admission Screening for skilled nursing facility (SNF) placement and to expect a phone call post discharge from SNF.     Additional Services/Equipment Arranged:  SW arranged [facility] stretcher ride [phone] for 1200. Pt requires stretcher ride due to bariatric status.      Patient / Family response to discharge plan:  Sofi Marie, [phone] is in agreement with discharge plan.      Persons notified of above discharge plan:  Sofi Marie, TCU, charge RN, bedside RN, Cruz Merritt MD, NST    Staff Discharge Instructions:  Please fax discharge orders and signed hard scripts for any controlled substances.  Please print a packet and send with patient.     CTS Handoff completed:  NO    Medicare Notice of Rights provided to the patient/family:  ABBY Lozano, LGSW  [facility]   [phone]  Pgr: [phone]

## 2020-07-10 NOTE — DISCHARGE SUMMARY
[facility], [place]  Hospitalist Discharge Summary      Date of Admission:  6/5/2020  Date of Discharge:  7/10/2020  Discharging Provider: Vaihbav Stone MD  Discharge Team: Hospitalist Service, Gold 8    Discharge Diagnoses    Altered Mental Status due to Adjustment/Conversion Disorder  R cerebellar infarct  Hepatic Steatosis    Follow-ups Needed After Discharge   Follow-up Appointments     Follow Up and recommended labs and tests      Follow up with primary care provider in 1-2 weeks for hospital followup.    Should get f/u ast/alt after starting pravastatin.   Also follow up with psychiatry in 2-4 weeks regarding behavioral issues   and psych meds             Unresulted Labs Ordered in the Past 30 Days of this Admission     No orders found from 5/6/2020 to 6/6/2020.          Hospital Course  Dionne Perez is a 35 year old female admitted on 6/5/2020. She has developmental delay, depression, anxiety, likely psychosis, GERD, and hypothyrodism, admitted for further eval and management of Acute Mental Status change (quit responding to people and quit eating).  Is now doing better and eating more and cooperating with PT and other cares .       1. AMS: Likely adjustment/conversion disorder in the setting of acute stress and her developmental delay. Patient's responsiveness has been improving.   Baseline is she responds in few words, but is independent with her ADLs. Her neurologic workup during this hospitalization showed no evidence of seizures on EEG, MRI demonstrated new cerebellar infarct (no clear etiology to explain her change in mentation), LP without any evidence of infection.   - Neurology has no additional recommendations for additional workup  - Appreciate Psychiatry input - as is improving seems like could be adjustment disorder  - Lexapro 20 mg   - Continues to need significant positive encouragement to eat and participate in physical therapy in order to discharge to  either TCU or her group home.       2. Poor PO -   Initially had no PO intake the first week of admission due to her mouth rigidity and not opening her mouth volitionally. NG inserted 6/7 for med administration and to start enteral nutrition. Over the last week PO intake has improved and she is has been eating full meals last few days. Removed NJT day prior to discharge.     3. Incidental finding of R cerebellar infarct of uncertain significance  MRI head for AMS workup revealed finding of R cerebellar infarct. Pt started on aspirin, obtained CTA head and neck that was non-diagnostic due to it being severely limited due to poor contrast opacification. TTE with bubble study negative. Repeat CTA head and neck without evidence of vascular stenosis  -  mg daily  -Pravastatin      4.  Mildly elevated AST/ALT/alk phos  As recently as 5/26 normal.  CT abdomen 6/6 with no acute findings. RUQ US with mild hepatomegaly and diffuse hepatic steatosis, CBD 5.5 mm, no hepatic biliary ductal dilation.  Hep B and C were negative. Tbili wnl.   Suspect LFT elevation 2/2 hepatic steatosis. Normalized prior to discharge, but should be checked again in future.      # Chronic pain?: Pt noted to be on scheduled Tylenol PTA, as well as have available as needed diclofenac gel.      # Hypothyroidism: PTA on levothyroxine 50 mcg daily. TFTs on admission reveal pt euthyroid based on free T4 WNL.  - Continue PTA levothyroxine     # GERD: Continue PTA Pepcid 20 mg daily      Discharge Disposition   Discharged to nursing home  Condition at discharge: Stable        Consultations This Hospital Stay   MEDICATION HISTORY IP PHARMACY CONSULT  PSYCHIATRY IP CONSULT  NEUROLOGY GENERAL ADULT IP CONSULT  VASCULAR ACCESS CARE ADULT IP CONSULT  NUTRITION SERVICES ADULT IP CONSULT  NUTRITION SERVICES ADULT IP CONSULT  PHARMACY TO DOSE VANCO  INTERVENTIONAL RADIOLOGY ADULT/PEDS IP CONSULT  PHARMACY IP CONSULT  VASCULAR ACCESS CARE ADULT IP  CONSULT  PHYSICAL THERAPY ADULT IP CONSULT  PSYCHIATRY IP CONSULT  OCCUPATIONAL THERAPY ADULT IP CONSULT  SPEECH LANGUAGE PATH ADULT IP CONSULT  WOUND OSTOMY CONTINENCE NURSE  IP CONSULT  PSYCHIATRY IP CONSULT  PHYSICAL MEDICINE & REHAB GENERAL ADULT IP CONSULT  PHYSICAL THERAPY ADULT IP CONSULT  OCCUPATIONAL THERAPY ADULT IP CONSULT    Code Status   Full Code    Time Spent on this Encounter   I, Vaibhav Stone MD, personally saw the patient today and spent greater than 30 minutes discharging this patient.       Vaibhav Stone MD  [facility], [place]  ______________________________________________________________________    Physical Exam   Vital Signs: Temp: 97.1  F (36.2  C) Temp src: Oral BP: 109/56 Pulse: 57   Resp: 18 SpO2: 99 % O2 Device: None (Room air)    Weight: 332 lbs 0 oz  Constitutional: awake, alert, cooperative, no apparent distress  Respiratory: No increased work of breathing, good air exchange, clear to auscultation bilaterally, no crackles or wheezing  Cardiovascular: Normal apical impulse, regular rate and rhythm, normal S1 and S2, no S3 or S4, and no murmur noted  GI:normal bowel sounds, soft, non-distended, non-tender  Skin: no rashes  Neurologic: Awake, alert       Primary Care Physician   [facility]    Discharge Orders      General info for SNF    Length of Stay Estimate: Long Term Care  Condition at Discharge: Stable  Level of care:skilled   Rehabilitation Potential: Fair  Admission H&P remains valid and up-to-date: Yes  Recent Chemotherapy: N/A  Use Nursing Home Standing Orders: Yes     Mantoux instructions    Give two-step Mantoux (PPD) Per Facility Policy Yes     Reason for your hospital stay    Dionne Perez is a 35 year old female admitted on 6/5/2020. She has developmental delay, depression, anxiety, likely psychosis, GERD, and hypothyrodism, admitted for further eval and management of Acute Mental Status  change (quit responding to people and quit eating).  Is now doing better and eating more and cooperating with PT and other cares .       1. AMS: Likely adjustment/conversion disorder in the setting of acute stress and her developmental delay. Patient's responsiveness has been improving.   Baseline is she responds in few words, but is independent with her ADLs. Her neurologic workup during this hospitalization showed no evidence of seizures on EEG, MRI demonstrated new cerebellar infarct (no clear etiology to explain her change in mentation), LP without any evidence of infection.   - Neurology has no additional recommendations for additional workup  - Appreciate Psychiatry input - as is improving seems like could be adjustment disorder  - Lexapro 20 mg   - Continues to need significant positive encouragement to eat and participate in physical therapy in order to discharge to either TCU or her group home.       2. Poor PO -   Initially had no PO intake the first week of admission due to her mouth rigidity and not opening her mouth volitionally. NG inserted 6/7 for med administration and to start enteral nutrition. Over the last week PO intake has improved and she is has been eating full meals last few days. Removed NJT day prior to discharge.     3. Incidental finding of R cerebellar infarct of uncertain significance  MRI head for AMS workup revealed finding of R cerebellar infarct. Pt started on aspirin, obtained CTA head and neck that was non-diagnostic due to it being severely limited due to poor contrast opacification. TTE with bubble study negative. Repeat CTA head and neck without evidence of vascular stenosis  -  mg daily  -Pravastatin      4.  Mildly elevated AST/ALT/alk phos  As recently as 5/26 normal.  CT abdomen 6/6 with no acute findings. RUQ US with mild hepatomegaly and diffuse hepatic steatosis, CBD 5.5 mm, no hepatic biliary ductal dilation.  Hep B and C were negative. Tbili wnl.   Suspect LFT  elevation 2/2 hepatic steatosis. Normalized prior to discharge, but should be checked again in future.      # Chronic pain?: Pt noted to be on scheduled Tylenol PTA, as well as have available as needed diclofenac gel.      # Hypothyroidism: PTA on levothyroxine 50 mcg daily. TFTs on admission reveal pt euthyroid based on free T4 WNL.  - Continue PTA levothyroxine     # GERD: Continue PTA Pepcid 20 mg daily        Follow Up and recommended labs and tests    Follow up with primary care provider in 1-2 weeks for hospital followup.  Should get f/u ast/alt after starting pravastatin.   Also follow up with psychiatry in 2-4 weeks regarding behavioral issues and psych meds     Activity - Up with nursing assistance     Full Code     Physical Therapy Adult Consult    Current status: SBA to complete supine > sit transfer, CGA x 2 for sit <> stand up to FWW. Needs Mod-Max A to return to supine for LE support. Declines ambulation.  Barriers to return to prior living situation: medical status, mobility status, level of assist  Recommendations for discharge: TCU  Rationale for recommendations: pt will benefit from rehab stay to improve safety with IND mobility.     Occupational Therapy Adult Consult    Evaluate and treat as clinically indicated.    Reason:  Assist with independent performing ADL     Fall precautions     Advance Diet as Tolerated    Follow this diet upon discharge: Orders Placed This Encounter  Pt may order snacks/supplements PRN; Between Meals  Regular Diet Adult       Significant Results and Procedures   Most Recent 3 CBC's:  Recent Labs   Lab Test 07/07/20  0639 06/29/20  0614 06/23/20  0555  06/10/20  0620   WBC 9.0 6.1  --   --  7.3   HGB 10.4* 10.2*  --   --  10.9*   MCV 92 95  --   --  90    158 142*   < > 111*    < > = values in this interval not displayed.     Most Recent 3 BMP's:  Recent Labs   Lab Test 07/07/20  0639 06/29/20  0614 06/25/20  0509    140 140   POTASSIUM 5.1 4.6 4.2    CHLORIDE 107 110* 108   CO2 27 25 27   BUN 28 27 28   CR 0.53 0.54 0.52   ANIONGAP 7 6 5   SANDRA 8.9 9.1 8.7   GLC 82 83 88     Most Recent 2 LFT's:  Recent Labs   Lab Test 07/07/20  0639 06/25/20  0509   AST 37 40   ALT 60* 83*   ALKPHOS 189* 174*   BILITOTAL 0.8 0.8   ,   Results for orders placed or performed during the hospital encounter of 06/05/20   MRA Brain ([place]) wo Contrast    Addendum: 6/15/2020    Addendum: Addendum is made to additional clinical information. Neurodeficit. Progressive mental deterioration.      Narrative    EXAM: MRA BRAIN ([place]) WO CONTRAST  LOCATION: [facility]  DATE/TIME: 6/6/2020 2:03 AM    INDICATION: Altered mental status.  COMPARISON: None.  TECHNIQUE: 3D time-of-flight head MRA without intravenous contrast.    FINDINGS:  Motion degraded exam.    ANTERIOR CIRCULATION: The internal carotid arteries appear grossly patent. Apparent narrowing of the M1 segment of the right middle cerebral artery is favored to be artifactual. No definite proximal arterial vascular occlusion or aneurysm.    POSTERIOR CIRCULATION: Dominant right vertebral artery and small left vertebral artery. Significant motion artifact. No definite proximal occlusion or aneurysm.      Impression    IMPRESSION:  1.  Significantly motion degraded exam. No definite proximal arterial vascular occlusion. Apparent narrowing of the right middle cerebral artery M1 segment is favored to be artifactual, but could be further evaluated with CTA or repeat imaging as   clinically indicated.   MRA Neck (Carotids) wo Contrast    Narrative    EXAM: MRA NECK (CAROTIDS) WO CONTRAST  LOCATION: [facility]  DATE/TIME: 6/6/2020 2:03 AM    INDICATION: Altered mental status  COMPARISON: None.  TECHNIQUE: Neck MRA without IV contrast. Stenosis measurements made according to NASCET criteria unless otherwise specified.    FINDINGS:  Motion degraded exam. Please note that series 11 was acquired  in the venous phase.    RIGHT CAROTID: No measurable stenosis or dissection.    LEFT CAROTID: No measurable stenosis or dissection.    VERTEBRAL ARTERIES: No flow-limiting stenosis. Dominant right and smaller left vertebral arteries.    AORTIC ARCH: Not visualized on this exam.      Impression    IMPRESSION:  1.  Motion degraded exam. No evidence of flow-limiting stenosis.   CT Abdomen w Contrast    Narrative    EXAMINATION: CT ABDOMEN W CONTRAST, 6/6/2020 7:50 AM    TECHNIQUE:  Helical CT images from the lung bases through the  symphysis pubis were obtained with IV contrast. Contrast dose:  iopamidol (ISOVUE-370) solution 135 mL    COMPARISON: None    HISTORY: Abd pain, gastroenteritis or colitis suspected    FINDINGS:    Liver: Normal parenchymal attenuation without focal mass. The hepatic  dome is collimated out of the field of view.  Biliary system: Cholecystectomy. No intrahepatic or extrahepatic  biliary ductal dilatation.  Pancreas: No focal mass or dilation of the main pancreatic duct. Mild  pancreatic atrophy.  Stomach: Within normal limits.  Spleen: Within normal limits. Large medial splenule.  Adrenal glands: Within normal limits.  Kidneys: No focal mass, hydronephrosis, or stone.  Bladder: Within normal limits.  Reproductive organs: Within normal limits.  Bowel: Moderate colonic stool burden. Small and large bowel are normal  caliber. No significant diverticulosis. No evidence for  diverticulitis. The appendix is normal. No significant inflammatory  changes surround the small or large bowel. Small fat-containing  paraumbilical hernia.  Lymph nodes: No intra-abdominal or pelvic lymphadenopathy.  Vasculature: Within normal limits.    Lung bases: Bibasilar atelectasis.    Bones and soft tissues: No suspicious soft tissue mass or fluid  collection. No suspicious osseous lesion.      Impression    IMPRESSION: No findings to explain patient's abdominal pain. No  evidence of inflammatory changes surrounding the  small or large bowel.    I have personally reviewed the examination and initial interpretation  and I agree with the findings.    ANA MOTTA MD   XR Chest Port 1 View    Narrative    Exam: XR CHEST PORT 1 VW, 6/6/2020 9:03 AM    Indication: rule out infection    Comparison: 4/28/2018    Findings:   Portable radiograph the chest. Patient is rotated to the right which  limits examination and makes the mediastinum appear to be shifted to  the right. No focal airspace opacities. No pneumothorax. No pleural  effusion. Visualized upper abdomen is unremarkable. No acute osseous  abnormalities.      Impression    Impression: No focal airspace opacities.    I have personally reviewed the examination and initial interpretation  and I agree with the findings.    ANA MOTTA MD   MR Brain w/o & w Contrast    Narrative     MR BRAIN W/O & W CONTRAST 6/6/2020 2:39 PM    Provided History: Altered level of consciousness (LOC), unexplained.    Comparison: None.    Technique: Multiplanar T1-weighted, axial FLAIR, and susceptibility  images were obtained without intravenous contrast. Following  intravenous gadolinium-based contrast administration, axial  T2-weighted, diffusion, and T1-weighted images (in multiple planes)  were obtained.    Contrast: 10 cc Gadavist    Findings:    Severely limited exam secondary to patient motion. The FLAIR and  susceptibility weighted sequences are essentially nondiagnostic. The  ventricles are proportionate to the cerebral sulci. No hydronephrosis.  No midline shift. Postcontrast images are also severely limited by  motion although there are no large enhancing lesions. No obvious  intracranial hemorrhage. There is questionable small focus of  restricted diffusion in the right cerebellar hemisphere.    No abnormality of the skull marrow signal. The visualized portions of  paranasal sinuses, and mastoid air cells are relatively clear. The  orbits are grossly unremarkable.      Impression     Impression:  Small infarct in the right cerebellar hemisphere. Marked motion  artifact.    The findings were discussed with Dr. Cook by Dr. Rahman at 3:05 PM  on 6/6/2020.    I have personally reviewed the examination and initial interpretation  and I agree with the findings.    MENDEL MATHUR MD   CTA Head Neck with Contrast    Narrative    CTA  HEAD NECK WITH CONTRAST 6/6/2020 8:58 PM    CT angiogram of the neck   CT angiogram of the base of the brain with contrast  Reconstruction by the Radiologist on the 3D workstation    History:  right subacute cerebellar infarct    Comparison:  MRI 6/6/2020.      Technique:    HEAD and NECK CTA: During rapid bolus intravenous injection of  nonionic contrast material, axial images were obtained using thin  collimation multidetector helical technique from the base of the skull  through the [place]. This CT angiogram data was reconstructed  at thin intervals with mild overlap. Images were sent to the [employer]  workstation, and 3D reconstructions were obtained. The axial source  images, multiplanar reformations, 3D reconstructions in both maximum  intensity projection display and volume rendered models were reviewed,  with reconstructions performed by the technologist and the  radiologist.    Contrast: Isovue 370    Examination is severely limited due to poorly opacified vessels  potentially due to poor contrast bolus timing.    Findings:    Head CTA demonstrates no definite aneurysm or stenosis of the major  intracranial arteries. Evaluation of the distal intracranial arteries  is limited. The anterior communicating artery is patent. The right  posterior communicating artery is patent. Hypoplastic or poorly  opacified left posterior indicating artery.    Neck CTA demonstrates no evident stenosis of the major cervical  arteries. The origins of the great vessels from the aortic arch are  patent. The normal distal right internal carotid artery measures 5 mm.  The normal  distal left internal carotid artery measures 5 mm. The V4  segments are patent. The V1 through V3 segments are poorly evaluated  due to poor opacification.    No mass is noted within the visualized portions of the cervical soft  tissues or lung apices. 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      Impression    Impression:  Examination of the cervical and major intracranial  arteries is severely limited due to poor contrast opacification and is  nondiagnostic for the cervical vessels.    I have personally reviewed the examination and initial interpretation  and I agree with the findings.    MENDEL MATHUR MD   XR Abdomen Port 1 View    Narrative    EXAM: XR ABDOMEN PORT 1 VW  6/7/2020 11:20 AM     HISTORY:  gastric tube verification       COMPARISON:  CT abdomen and pelvis dated 6/6/2020    FINDINGS:   Portable supine radiograph of the abdomen was obtained. Enteric tube  tip and sidehole projected over the stomach. Cholecystectomy clips  project over the right upper quadrant. The bowel gas pattern is  nonobstructed. No evidence of pneumatosis or portal venous gas. The  visualized portion the lung bases is clear. No acute osseous  abnormality.      Impression    IMPRESSION: The enteric tube tip and sidehole projecting over the  stomach.    I have personally reviewed the examination and initial interpretation  and I agree with the findings.    ANA MOTTA MD   XR Chest Port 1 View    Narrative    EXAM: XR CHEST PORT 1 VW  6/7/2020 1:21 PM     HISTORY:  fever       COMPARISON:  6/6/2020    FINDINGS: Single view of the chest. Midline trachea. Enteric tube  sidehole projects at the expected location of the gastroesophageal  junction. Large cardiac silhouette. No pleural effusion. Mild  retrocardiac opacities, similar to prior. No pneumothorax.       Impression    IMPRESSION:   1. Mild retrocardiac opacities, atelectasis versus edema versus  consolidation.  2. Enteric tube sidehole projects  "at the expected location the gastric  esophageal junction, consider advancing as clinically indicated..    I have personally reviewed the examination and initial interpretation  and I agree with the findings.    ANA MOTTA MD   XR Abdomen Port 1 View    Narrative    EXAM: XR ABDOMEN PORT 1 VW  6/7/2020 4:24 PM      HISTORY: Pt pulled NGT; RN replaced. Please eval location tip of NGT    COMPARISON: 6/7/2020    FINDINGS: Enteric tube sidehole projects at the expected location of  the stomach.    Nonobstructive bowel gas pattern. No pneumatosis. No portal venous  gas. Cholecystectomy clips. No visualized masses.    Visualized portions of the lung demonstrate no focal airspace  opacities. Soft tissues and osseous structures are unremarkable.      Impression    IMPRESSION:   1. Enteric tube sidehole projects at expected location of the stomach.  2. Nonobstructive bowel gas pattern.    I have personally reviewed the examination and initial interpretation  and I agree with the findings.    NAWAF SALDAÑA,    IR Lumbar Puncture    Addendum: 6/22/2020    Addendum:    History: Patient is a 35-year-old female with right cerebellar  subacute infarct. Lumbar puncture was ordered to rule out infection.     HATTIE KNIGHT MD      Narrative    Lumbar Puncture using Fluoroscopy with sedation 6/9/2020    History:  dx lumbar puncture with sedation.    Staff: Hattie Knight MD    Fellow: Ameya Scott MD    Sedation: The patient was placed on continuous monitoring. Intravenous  sedation was administered. Vital signs and sedation monitored by  nursing staff under Interventional Radiologists supervision. The  patient remained stable throughout the procedure.    Sedation time: Less than 5 minutes face-to-face    Medications:   1. 1 mg midazolam IV  2. 50 mcg fentanyl IV  3. 5 mL 1% lidocaine for local anesthesia    Procedure note: Phone consent for lumbar puncture was obtained from  the patient's legal guardian \"Cynthia\", and " benefits and risk of the  procedure were explained, including but not limited to worsening  headache, hemorrhage, infection, lower extremity pain, or nerve root  injury. The patient was sterilely prepped and draped with the patient  in the left lateral decubitus position, over the lower back. Under  fluoroscopic guidance, the interlaminar spaces were noted. 1%  lidocaine was administered for local anesthetic over the L2-3  interlaminar space, and a 22 gauge 7 inch needle was advanced into the  thecal sac under fluoroscopic guidance.  There was initial show of  clear CSF. Opening pressure was 12 cm CSF. Approximately 16 cc of CSF  were collected.    The needle was removed with the stylet in place. There was no  immediate complication associated with the procedure. Samples were  sent for the requested laboratory testing.      Estimated blood loss: Less than 1 cc.    Total fluoroscopic time: 0.8 minutes  Total Dose: 126.9 mGy      Impression    Impression: Successful lumbar puncture without immediate complication.  Opening pressure was 12 cm CSF.    I, JARAD HERZOG MD, attest that I was present for all critical  portions of the procedure and was immediately available to provide  guidance and assistance during the remainder of the procedure.     I have personally reviewed the examination and initial interpretation  and I agree with the findings.    JARAD HERZOG MD   US Abdomen Limited Portable    Narrative    EXAMINATION: US ABDOMEN LIMITED PORTABLE  6/9/2020 8:05 AM      CLINICAL HISTORY: Transaminitis, fever, AMS    COMPARISON: Correlation made with CT abdomen and pelvis dated 6/6/2020         FINDINGS:    Examination is limited secondary to patient body habitus.    The liver is diffusely echogenic and measures 17.2 cm. There is no  intrahepatic or extrahepatic biliary ductal dilatation. No focal liver  mass is visualized. The common bile duct measures 5.5 mm.     The gallbladder surgically absent.    The  pancreas is not well-visualized.    The right kidney is grossly normal in position and echogenicity. The  right kidney measures 11.3 cm and is not well visualized on this  examination. No hydronephrosis or shadowing stones.        Impression    IMPRESSION:   Mild hepatomegaly and diffuse hepatic steatosis. Examination is  limited secondary to patient body habitus.        I have personally reviewed the examination and initial interpretation  and I agree with the findings.    ILDA SALINAS MD   CTA Head Neck with Contrast    Narrative    CTA  HEAD NECK WITH CONTRAST 6/9/2020 3:23 PM    CT angiogram of the neck   CT angiogram of the base of the brain with contrast  Reconstruction by the Radiologist on the 3D workstation    Provided History:  right subacute cerebellar infarct    Comparison:  Same day head CT. Head CT and MR from 6/6/2020.      Technique:  HEAD and NECK CTA: During rapid bolus intravenous injection of  nonionic contrast material, axial images were obtained using thin  collimation multidetector helical technique from the base of the upper  aortic arch through the [place] of [place]. This CT angiogram data was  reconstructed at thin intervals with mild overlap. Images were sent to  the [employer]oking [employer] workstation, and 3D reconstructions were obtained. The  axial source images, multiplanar reformations, 3D reconstructions in  both maximum intensity projection display and volume rendered models  were reviewed, with reconstructions performed by the technologist and  the radiologist.    Contrast: iopamidol (ISOVUE-370) solution 75 mL    Findings:    Head CTA demonstrates widely patent anterior circulation. Posterior  circulation is patent and normal caliber. Anterior communicating  artery is patent. Right posterior communicating artery is patent. Left  posterior commuting artery is nonvisualized. No aneurysm identified.    Neck CTA demonstrates no stenosis of the major cervical arteries.  There is a common origin of  the right subclavian and left common  carotid arteries. The normal distal right internal carotid artery  measures 4 mm. The normal distal left internal carotid artery measures  4 mm. Retropharyngeal course of the bilateral internal carotid  arteries. Dominant right vertebral artery and hypoplastic left  vertebral artery.    Cervical mucosal spaces and fascial planes are normal. Fatty  replacement of the major salivary glands bilaterally. Thyroid gland is  normal. No cervical lymphadenopathy.    No fracture or significant degenerative changes of the cervical spine.  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    Please see separate report for same day noncontrast head CT performed  at the same time regarding additional intracranial findings.    Upper thorax: Main pulmonary artery is mildly enlarged measuring 3.2  cm. Visualized lung apices are clear. Partially visualized enteric  tube.      Impression    Impression:    1. CT head demonstrates no aneurysm or stenosis the major intracranial  arteries..  2. Neck CTA demonstrates no stenosis of the major cervical arteries.  3. See separate report of the same day noncontrast head CT regarding  those findings.  4. Partially visualized main pulmonary artery is dilated measuring 3.2  cm. This is nonspecific, though can be seen in the setting of elevated  pulmonary arterial pressures.    I have personally reviewed the examination and initial interpretation  and I agree with the findings.    BJ LAND MD   CT Head w/o Contrast    Narrative    CT HEAD W/O CONTRAST 6/9/2020 3:23 PM    Provided History: Right subacute cerebellar infarct.  ICD-10: Right cerebellar infarct.    Comparison: CT head and neck 6/6/2020. Brain MR 6/6/2020..    Technique: Using multidetector thin collimation helical acquisition  technique, axial, coronal and sagittal CT images from the skull base  to the vertex were obtained  without intravenous contrast.     Findings:      No intracranial hemorrhage, mass effect, midline shift, or abnormal  extra-axial fluid collection. The right ventricle is decompressed  relative to the left, stable since prior exam 2020. No sulcal  effacement. Basal cisterns are patent. The gray-white differentiation  is preserved in both cerebral hemispheres. Small hypodense lesion in  the right cerebellar hemisphere (series 4, image 13) corresponding to  findings on MR 2020.    The bony calvarium and bones of the skull base are intact without  acute fracture. The visualized paranasal sinuses are clear. The  mastoid air cells are clear. Orbits are unremarkable.      Impression    Impression:   Small right cerebellar hemisphere infarct. No intracranial hemorrhage  or mass effect.    I have personally reviewed the examination and initial interpretation  and I agree with the findings.    BJ LAND MD   Echo Complete    Narrative    [id number]  [id number]  [id number]  [id number]^JOSELIN^LINDA           [facility],[place]  Echocardiography Laboratory  [address] [place], [place] [address]     Name: KISHA CANCHOLA  MRN: [id number]  : 1985  Study Date: 2020 11:40 AM  Age: 35 yrs  Gender: Female  [place] [facility] [place] For Study: CVA  Ordering Physician: LINDA OLIVERA  Performed By: Emeli Boudreaux RDCS     BSA: 2.5 m2  Height: 69 in  Weight: 309 lb  HR: 96  BP: 152/89 mmHg  _____________________________________________________________________________  __        Procedure  Bubble Echocardiogram with two-dimensional, color and spectral Doppler  performed. Contrast Optison. Technically difficult study. Poor acoustic  windows. Optison (NDC #0260-0933-70) given intravenously. Patient was given 5  ml mixture of 3 ml Optison and 6 ml saline. 4 ml wasted.  _____________________________________________________________________________  __        Interpretation  Summary  Technically difficult study. Poor acoustic windows.  Global and regional left ventricular function is normal with an EF of 60-65%.  Negative bubble study.  Previous study not available for comparison.  _____________________________________________________________________________  __        Left Ventricle  Global and regional left ventricular function is normal with an EF of 60-65%.  Left ventricular size is normal. Left ventricular wall thickness cannot  evaluate. Left ventricular diastolic function is indeterminate.     Right Ventricle  Right ventricular function cannot be assessed due to poor image quality.     Atria  The left atrium appears normal. The right atrium cannot be assessed. The  atrial septum is intact as assessed by color Doppler and agitated saline  bubble study .     Mitral Valve  The mitral valve is normal.        Aortic Valve  The valve leaflets are not well visualized. On Doppler interrogation, there is  no significant stenosis or regurgitation.     Tricuspid Valve  The tricuspid valve cannot be assessed. The peak velocity of the tricuspid  regurgitant jet is not obtainable. Pulmonary artery systolic pressure cannot  be assessed.     Pulmonic Valve  The pulmonic valve cannot be assessed.     Vessels  The aorta root cannot be assessed. The pulmonary artery cannot be assessed.  The inferior vena cava was normal in size with preserved respiratory  variability. IVC diameter <2.1 cm collapsing >50% with sniff suggests a normal  RA pressure of 3 mmHg.     Pericardium  No pericardial effusion is present.        Compared to Previous Study  Previous study not available for comparison.  _____________________________________________________________________________  __  MMode/2D Measurements & Calculations     asc Aorta Diam: 3.5 cm  LA Volume (BP): 53.1 ml  LA Volume Index (BP): 21.3 ml/m2        Doppler Measurements & Calculations  MV E max cheyanne: 90.7 cm/sec  MV A max cheyanne: 90.7 cm/sec  MV E/A:  1.0  MV dec slope: 397.0 cm/sec2  E/E' av.1  Lateral E/e': 12.3  Medial E/e': 13.9        _____________________________________________________________________________  __           Report approved by: Renée Echevarria 2020 02:03 PM            Discharge Medications   Current Discharge Medication List      START taking these medications    Details   aspirin (ASA) 81 MG chewable tablet Take 4 tablets (324 mg) by mouth daily  Qty: 30 tablet, Refills: 4    Associated Diagnoses: Cerebrovascular accident (CVA) due to thrombosis of precerebral artery (H)      !! melatonin 3 MG tablet Take 1 tablet (3 mg) by mouth At Bedtime  Qty: 30 tablet, Refills: 4    Associated Diagnoses: Primary insomnia      multivitamins w/minerals (CERTAVITE) liquid 15 mLs by Per Feeding Tube route daily  Qty: 450 mL, Refills: 4    Associated Diagnoses: Poor nutrition      polyethylene glycol (MIRALAX) 17 g packet 17 g by Oral or G tube route daily  Qty: 30 packet, Refills: 4    Associated Diagnoses: Constipation, unspecified constipation type       !! - Potential duplicate medications found. Please discuss with provider.      CONTINUE these medications which have CHANGED    Details   acetaminophen (TYLENOL) 325 MG tablet Take 2 tablets (650 mg) by mouth every 6 hours as needed for mild pain Take 2 tablets by mouth at 9AM and 3PM. Take 2 tablets by mouth at bedtime. Total: 6 tablets/day  Qty: 1 Bottle, Refills: 4    Associated Diagnoses: Pain         CONTINUE these medications which have NOT CHANGED    Details   alum & mag hydroxide-simethicone (MAALOX  ES) 400-400-40 MG/5ML SUSP suspension Take 15 mLs by mouth 2 times daily as needed for indigestion      calcium polycarbophil (FIBERCON) 625 MG tablet Take 3 tablets by mouth 2 times daily      clonazePAM (KLONOPIN) 0.5 MG tablet Take 0.5 mg by mouth every 8 hours as needed for anxiety       cloNIDine (CATAPRES) 0.1 MG tablet Take 0.1 mg by mouth 2 times daily      diclofenac  (VOLTAREN) 1 % topical gel Apply 2 g topically 2 times daily as needed for moderate pain      escitalopram (LEXAPRO) 20 MG tablet Take 20 mg by mouth daily      famotidine (PEPCID) 40 MG tablet Take 20 mg by mouth daily      hydrocortisone 2.5 % cream Apply topically 2 times daily as needed      levothyroxine (SYNTHROID/LEVOTHROID) 50 MCG tablet Take 50 mcg by mouth daily      medroxyPROGESTERone (PROVERA) 10 MG tablet Take 10 mg by mouth once daily for 10 days straight each month. Start on the 16th of each month.      !! melatonin 3 MG tablet Take 3 mg by mouth At Bedtime       menthol-zinc oxide (CALMOSEPTINE) 0.44-20.6 % OINT ointment Apply small amount to anus after bowel movements or as needed for irritation.      nystatin (NYSTOP) 520804 UNIT/GM external powder Apply topically 2 times daily for groin rash. Use instead of cream during hot weather. Apply under breasts when rash present.      ondansetron (ZOFRAN-ODT) 4 MG ODT tab Take 4 mg by mouth every 8 hours as needed for nausea      pravastatin (PRAVACHOL) 40 MG tablet Take 40 mg by mouth daily      pseudoePHEDrine (SUDAFED) 30 MG tablet Take 30-60 mg by mouth every 4 to 6 hours as needed for ear pain or stuffy nose.      Vitamins A & D (VITAMIN A & D) external ointment Apply topically to affected area 2 times daily for up to 7 days. (To irritated skin around vulva)       !! - Potential duplicate medications found. Please discuss with provider.      STOP taking these medications       psyllium (METAMUCIL/KONSYL) 58.6 % powder Comments:   Reason for Stopping:             Allergies   Allergies   Allergen Reactions     Ibuprofen

## 2022-05-11 ENCOUNTER — HOSPITAL ENCOUNTER (EMERGENCY)
Facility: CLINIC | Age: 37
Discharge: HOME | End: 2022-05-11
Attending: EMERGENCY MEDICINE | Admitting: EMERGENCY MEDICINE
Payer: MEDICARE

## 2022-05-11 VITALS
OXYGEN SATURATION: 100 % | DIASTOLIC BLOOD PRESSURE: 93 MMHG | RESPIRATION RATE: 20 BRPM | HEART RATE: 56 BPM | SYSTOLIC BLOOD PRESSURE: 150 MMHG

## 2022-05-11 DIAGNOSIS — R46.89 THREATENING BEHAVIOR: ICD-10-CM

## 2022-05-11 DIAGNOSIS — R46.89 OUTBURSTS OF EXPLOSIVE BEHAVIOR: ICD-10-CM

## 2022-05-11 PROCEDURE — 90791 PSYCH DIAGNOSTIC EVALUATION: CPT

## 2022-05-11 PROCEDURE — 99285 EMERGENCY DEPT VISIT HI MDM: CPT | Mod: 25

## 2022-05-11 ASSESSMENT — ENCOUNTER SYMPTOMS
MYALGIAS: 1
ABDOMINAL PAIN: 0
BACK PAIN: 0

## 2022-05-11 NOTE — ED NOTES
This RN spoke with the DEC , she advised that she will be contacting Grace, the , and will then have the video chat with the patient

## 2022-05-11 NOTE — ED TRIAGE NOTES
Patient is from a group home, she reports that the staff has been mean to her lately, not giving her her meds when she asks for them. Patient reports that she is having thoughts of hurting the staff members. Ptient also reports that the staff have been wrongly accusing her of taking her roommates ID. Patient reports that she is also having harmful thoughts towards her roommate     Triage Assessment     Row Name 05/11/22 1613       Triage Assessment (Adult)    Airway WDL WDL       Respiratory WDL    Respiratory WDL WDL       Skin Circulation/Temperature WDL    Skin Circulation/Temperature WDL WDL       Cardiac WDL    Cardiac WDL WDL       Peripheral/Neurovascular WDL    Peripheral Neurovascular WDL WDL       Cognitive/Neuro/Behavioral WDL    Cognitive/Neuro/Behavioral WDL WDL

## 2022-05-11 NOTE — DISCHARGE INSTRUCTIONS
Recommendation and Safety Plan for: Dionne Perez  : 1985    Recommendation for Follow-up care:  Recommendation for care is for Dionne to return to her [facility] ([facility]) and Supported Living Services.   [facility]  [address], [place], [place], [address]  Phone: [phone]    - Please continue to see your established outpatient providers on a regular basis as we recommend that you see a Therapist and Psychiatrist to manage your mental health needs.     - P (mental health evaluator) attempted to reach your Guardian - [employer] @ the following numbers multiple times ([phone] and [phone]). The voicemail box was full and the 2nd number was a busy signal.  We also attempted to reach Grace the manager from the Group home to discuss recommendations, but had to leave a voicemail.      ---It is your responsibility to contact your insurance company directly to verify coverage, eligibility, payment, and benefit information for any appointments or referrals listed above.     *A BHP (Behavioral Healthcare Provider) Coordinator will be reaching out to you with 24-48 hours to assist with additional appointments if needed.      Please call [facility] at [phone] for additional scheduling assistance.      We discussed a safety plan that will work for you as well. Here are additional resources:     Safety Plan      *If I Am Having Difficulty Or Am In A Crisis, I Will:       ? Contact my established care providers       ? Call Suicide Hotline (1-550-029-XAYM)       ? Go to the nearest Emergency Department       ? Call        Warnings Signs That A Crisis May Be Developing:     I am having suicidal thoughts that turn to plans  I am having urges to self-harm  My emotions are of hopelessness; feeling like there's no way out  Rage or anger  Engaging in risky activities without thinking  Withdrawing from family/friends  Dramatic mood swings  Drastic personality changes    Neglect of personal hygiene or cares      Things That Make Me Feel Better:            Family, music, friends, exercise, relaxation breathing  Physical location,   safe people,   imagery etc.     Phone Apps that can be helpful!!   My3  https://myLAM Aviationpp.org/  VirtualHopeBox  https://UroSens/apps/virtual-hope-box/  Calm Harm (rekha to assist with preventing self-injury)  Suicide Safety Plan LLC - https://www.suicidesafeCurexo Technologylan.rekha/      Mindshift CBT Rekha     People and Social Settings That Provide Distraction:      My friends and family  Exercising  Listening to music  Reading a book  Watching a movie  Meditating  Calling a friend     People Whom I Can Ask For Help:       Family   Friends  Adult I trust  Group home staff      Professionals or Agencies I Can Contact During A Crisis:     Guardian -  [phone] and [phone]  Group Home staff     - [facility] Crisis Resources    The [facility] Crisis Response Unit (CRU) provides 24-hour phone and face-to-face crisis intervention and consultation.   Call [phone]    Suicide and Crisis Response   The primary goal of the Crisis Response Unit is to assist in stabilizing the immediate crisis, ensure safety for the client, the family and/or the community, and assist with referrals to appropriate county or other agency staff as necessary.     The Crisis Response Unit is available to all people in the community. Phones are answered by caring staff who know a lot about mental health, housing, shelters, food, employment and transportation.     The Crisis Response Unit is considered a voluntary service. This means you choose if you want help from us.     The Crisis Response Unit can also help with things like:   Mental health crisis assessments   Access to urgent psychiatry and therapy   Family education and support   Crisis phone support   Referrals to community resources     Other crisis resources:   National Hope Hotline for Youth Crisis and Suicide:  "1-089-WYZHMRG (1-292.267.1352)     [place] Crisis Text Line: Text  Home  to 177607 to communicate with a trained crisis counselor     [facility]   COPE: [facility] mental health emergencies- [phone] - They have mobile crisis teams that can come where you are.  The teams respond to anyone in the [facility] who needs an urgent response.      Texting crisis line - text [place] to 615801 - 24 hours a day 7 days a week     Fast Tracker  Linking people to mental health and substance use disorder resources  www.Toodalu.[employer]      [place] Mental Health Warm Line  Peer to peer support  Monday thru Saturday, 12 pm to 10 pm  794.434.5350 or 0.070.979.2634  Text \"Support\" to 91772     [facility] (GULSHAN)  406.785.3362 or 1.888.GULSHAN.HELPS    Ways To Improve My Environment or Surroundings:  remove sharps, lock up any weapons or things that can be used as weapons. Sobriety or remove drugs and alcohol.     *Panic Attack resources  --Here are some additional resources---  https://adaa.org/understanding-anxiety/panic-disorder/resources  https://projectlets.org/emergency-action-for-panic-attacks    *Progressive Muscle Relaxation  Https://www.youtube.com/watch?v=ClqPtWzozXs    Crisis Lines  Crisis Text Line  Text 439866  You will be connected with a trained live crisis counselor to provide support.    Por espanol, texto  JONATHAN a 376004 o texto a 442-AYUDAME en WhatsApp    The Froylan Project (LGBTQ Youth Crisis Line)  8.669.443.8020  text START to 012-438      Community Resources  Fast Tracker  Linking people to mental health and substance use disorder resources  School Admissions     [place] Mental Health Warm Line  Peer to peer support  Monday thru Saturday, 12 pm to 10 pm  952.883.7911 or 2.185.419.5631  Text \"Support\" to 84915    [facility] ([facility]ULSHAN)  267.646.7953 or 1.888.GULSHAN.HELPS      Mental Health Apps  My3  https://my3app.org/    VirtualHopeBox  " https://[employer] Park/apps/virtual-hope-box/      Additional information  Today you were seen by a licensed mental health professional through Triage and Transition services, Behavioral Healthcare Providers (P)  for a crisis assessment in the Emergency Department at [facility].  It is recommended that you follow up with your established providers (psychiatrist, mental health therapist, and/or primary care doctor - as relevant) as soon as possible. Coordinators from [facility] will be calling you in the next 24-48 hours to ensure that you have the resources you need.  You can also contact [facility] coordinators directly at [phone].     You may have been scheduled for or offered an appointment with a mental health provider. [facility] maintains an extensive network of licensed behavioral health providers to connect patients with the services they need.  We do not charge providers a fee to participate in our referral network.  We match patients with providers based on a patient's specific needs, insurance coverage, and location.  Our first effort will be to refer you to a provider within your care system, and will utilize providers outside your care system as needed.

## 2022-05-11 NOTE — ED PROVIDER NOTES
"  History   Chief Complaint:  Mental Health Problem       The history is provided by the patient and medical records.      Dionne Perez is a 37 year old female with history of depression, ROSY, hypercholesterolemia, hypertension, obesity, and suicidal ideations who presents with mental health problem. The patient reports that she is having issues with her roommate and staff at her group home. Per triage note, she states that staff \"has been mean to her lately,\" including not giving her medications when requested. At this time, she endorses thinking of harming both her roommate and group home staff. She notes no plan for harming them. She also reports pain to her left heel. Of note, the patient reportedly moved to her group home on 02/25/22, about 2 months ago. She states that she has never harmed anyone in the past. There have been no recent injuries or trauma. At this time, she denies chest pain, abdominal pain, or back pain. She additionally reports no suicidal ideation.    Review of Systems   Cardiovascular: Negative for chest pain.   Gastrointestinal: Negative for abdominal pain.   Musculoskeletal: Positive for myalgias (L foot). Negative for back pain.   Psychiatric/Behavioral: Negative for suicidal ideas.   All other systems reviewed and are negative.      Allergies:  Ibuprofen    Medications:  Melatonin  Pepcid  Aspirin  Colace  Lexapro  Pravastatin  Levothyroxine    Past Medical History:     Acute costochondritis  Adjustment reaction with aggression  Altered mental status  Biliary calculus  Cellulitis   Cholelithiasis   Chronic abdominal pain  Chronic sinus bradycardia  Cognitive impairment  Depression  Developmental disability  ROSY  Gallstone pancreatitis  GERD  Hypercholesterolemia   Hypertension   Hypothyroidism   Insomnia  Iron deficiency anemia  Morbid obesity  Suicidal ideations  Unspecified thyroid disease    Past Surgical History:    Cholecystectomy   Lumbar puncture  Tonsillectomy  Unspecified leg " surgery    Family History:    Mother: Cataract, cancer  Sister: Diabetes, breast cancer     Social History:  Presents to the emergency department alone   Arrives via ambulance  Patient lives in a group home     Physical Exam     Patient Vitals for the past 24 hrs:   BP Pulse Resp SpO2   22 1911 (!) 150/93 56 -- 100 %   22 1612 (!) 147/93 57 20 100 %       Physical Exam  Constitutional: Alert, attentive, GCS 15   Eyes: EOM are normal, anicteric, conjugate gaze  CV: distal extremities warm, well perfused  Chest: Non-labored breathing on RA  GI:  non tender. No distension. No guarding or rebound.    MSK: No focal foot tenderness. No redness, no sores  Neurological: Alert, attentive, moving all extremities equally.   Skin: Skin is warm and dry.  Psych: No suicidal ideation or homicidal ideation. Thoughts to harm group home members, but no clear plan.    Emergency Department Course     Emergency Department Course:     Reviewed:  I reviewed nursing notes, vitals, past medical history and Care Everywhere    Assessments:  164 I obtained history and examined the patient as noted above.   1800 I spoke with DEC via phone regarding the patient.    Disposition:  The patient was discharged to home.     Impression & Plan     Medical Decision Makin-year-old woman past medical history significant for depression, GERD, adjustment/conversion disorder presenting from her group home for evaluation of increased thoughts of wanting to harm group home staff as well as her roommate.  She denies any concrete plans on how to do this though reports she has been thinking about it more.  She denies a history of harming people.  She denies any suicidal ideation.  Patient was seen by DEC, they agree patient does not warrant hospitalization, we did touch base with group home and patient's guardian as well, recommended coping skills and follow up with treatment team. DEC attempted to get a hold of guardian however there voice  mailbox was full, we were able to touch base with the group home and they are willing to take her back..    Diagnosis:    ICD-10-CM    1. Outbursts of explosive behavior  R46.89    2. Threatening behavior  R46.89      Carmelo Mistry MD  [employer]  11:08 PM 05/11/22     Scribe Disclosure:  I, Cnoner Stauffer, am serving as a scribe at 4:15 PM on 5/11/2022 to document services personally performed by Carmelo Mistry MD based on my observations and the provider's statements to me.            Carmelo Mistry MD  05/11/22 6423

## 2022-05-11 NOTE — ED NOTES
This RN attempted to contact the patients sister, Awa, she did not answer the phone, voicemail left

## 2022-05-11 NOTE — CONSULTS
5/11/2022  Dionne Perez 1985     [facility] Crisis Assessment    Patient was assessed: remote  Patient location: [facility]     Referral Data and Chief Complaint  Dionne is a 37 year old who uses she/her pronouns. Patient presented to the ED via EMS and was referred to the ED by self. Patient is presenting to the ED for the following concerns: she was getting upset with group home staff and other patients.     Informed Consent and Assessment Methods  Patient is under the guardianship of [employer] (GUARDIAN) (Other) .  Writer met with patient and explained the crisis assessment process, including applicable information disclosures and limits to confidentiality, assessed understanding of the process, and obtained consent to proceed with the assessment. Patient was observed to be able to participate in the assessment as evidenced by alert, oriented and able to take active part in interview.. Assessment methods included conducting a formal interview with patient, review of medical records, collaboration with medical staff, and obtaining relevant collateral information from family and community providers when available.     Attempted to reach Cynthia Reynolds who is listed as Guardian.   [employer] (GUARDIAN) (Other)   [phone]     Narrative Summary of Presenting Problem and Current Functioning  What led to the patient presenting for crisis services, factors that make the crisis life threatening or complex, stressors, how is this disrupting the patient's life, and how current functioning is in comparison to baseline. How is patient presenting during the assessment.     Pt is presenting to the hospital due to having a hard time with group home staff and other patients.  She is having conflict with them regarding when she gets her medications or treatment for a rash she has.  She wants them to complete these things on her timeline and is mad that they are  "not. Pt was also \"wrongly accused: of taking another roommates credit card.  She told ED provider that she had increased thoughts of wanting to hurt group home staff and a roommate due to her getting wrongly accused.  When this writer speaks with her she denies any current thoughts of wanting to harm anyone in her group home or the staff.  She never had any plans on how she would hurt anyone.  She denies a history of harming people.  She denies SI, HI and SIB.    During the assessment he pt presents as calm, cooperative, content and not in crisis.     History of the Crisis  Duration of the current crisis, coping skills attempted to reduce the crisis, community resources used, and past presentations.    Pt has a hx significant for developmental delay, depression, anxiety, GERD and adjustment/conversion disorder.  She resides in a supportive living environment/ Group Home - [facility].  She has been in this group home since Feb 25th. Prior to that she said she lived with her sister.  She tells me she has her own bedroom and own bathroom at the group home.  She has been mostly happy here.     Pt was hospitalized @ [facility] for 35 days from 6/5-7/10/2020 w/ Conversion dx vs catatonia.   No hx of suicide attempts     Collateral Information  - Attempted  to reach your Guardian - [employer] @ the following numbers multiple times ([phone] and [phone]). The voicemail box was full and the 2nd number was a busy signal.  We also attempted to reach Grace the manager from the Group home to discuss recommendations, but had to leave a voicemail.     Attempted to reach  - Grace @ [phone] - left vm.  Also called Group home staff phone: [phone] - again got voicemail.     At this point, the ER will continue to try to reach out to the group home to have them pick the patient up for return to her supportive living environment.     Risk Assessment  Risk of Harm to " Self     ESS-6  1.a. Over the past 2 weeks, have you had thoughts of killing yourself? No  1.b. Have you ever attempted to kill yourself and, if yes, when did this last happen? No   2. Recent or current suicide plan? No   3. Recent or current intent to act on ideation? No  4. Lifetime psychiatric hospitalization? Yes  5. Pattern of excessive substance use? No  6. Current irritability, agitation, or aggression? No  Scoring note: BOTH 1a and 1b must be yes for it to score 1 point, if both are not yes it is zero. All others are 1 point per number. If all questions 1a/1b - 6 are no, risk is negligible. If one of 1a/1b is yes, then risk is mild. If either question 2 or 3, but not both, is yes, then risk is automatically moderate regardless of total score. If both 2 and 3 are yes, risk is automatically high regardless of total score.     Score: 1, mild risk    The patient has the following risk factors for suicide: poor impulse control    Is the patient experiencing current suicidal ideation: No    Is the patient engaging in preparatory suicide behaviors (formulating how to act on plan, giving away possessions, saying goodbye, displaying dramatic behavior changes, etc)? No    Does the patient have access to firearms or other lethal means? no    The patient has the following protective factors: social support, voluntarily seeking mental health support, displays resiliency , established relationship community mental health provider(s), future focused thinking, expresses desire to engage in treatment and safe/stable housing    Support system information: Group home staff, sister, Guardian,      Patient strengths: resilient, help-seeking     Does the patient engage in non-suicidal self-injurious behavior (NSSI/SIB)? no    Is the patient vulnerable to sexual exploitation?  No    Is the patient experiencing abuse or neglect? no    Is the patient a vulnerable adult? Yes - has a guardian     Risk of Harm to  Others  The patient has the following risk factors of harm to others: impaired self-control    Does the patient have thoughts of harming others? No not currently.  She has had them in the past when she is dysregulated emotionally     Is the patient engaging in sexually inappropriate behavior?  no     Current Substance Abuse  Is there recent substance abuse? no    Was a urine drug screen or blood alcohol level obtained: No    CAGE AID  Have you felt you ought to cut down on your drinking or drug use?  No  Have people annoyed you by criticizing your drinking or drug use? No  Have you felt bad or guilty about your drinking or drug use? No  Have you ever had a drink or used drugs first thing in the morning to steady your nerves or to get rid of a hangover? No  Score: 0/4     Current Symptoms/Concerns  Symptoms  Attention, hyperactivity, and impulsivity symptoms present: no    Anxiety symptoms present: No      Appetite symptoms present: No     Behavioral difficulties present: Yes: Displaces Blame, Impulsivity/Disinhibition and Negativistic/Defiant     Cognitive impairment symptoms present: Yes: Decision-Making, Judgment/Insight, Language Disturbance and Motor Disturbance    Depressive symptoms present: No    Eating disorder symptoms present: No    Learning disabilities, cognitive challenges, and/or developmental disorder symptoms present: Yes: Communication, Developmental Incidents, Functional Impairments, Mood, Self-Regulation and Sensory     Manic/hypomanic symptoms present: No    Personality and interpersonal functioning difficulties present : Yes: Displaces Blame, Emotional Deregulation, Impaired Impulse Control and Impaired Interpersonal Functioning    Psychosis symptoms present: No      Sleep difficulties present: Yes: Difficulty staying sleep     Substance abuse disorder symptoms present: No     Trauma and stressor related symptoms present: No     Mental Status Exam   Affect: Appropriate and engaged  Appearance:  Appropriate    Attention Span/Concentration: Attentive?    Eye Contact: Variable   Fund of Knowledge: Delayed    Language /Speech Content: Non-fluent   Language /Speech Volume: Normal    Language /Speech Rate/Productions: takes time to process and answer most questions    Recent Memory: Variable   Remote Memory: Variable   Mood: Irritable and Normal  (irritable earlier, calm when in hospital).  Orientation to Person: Yes    Orientation to Place: Yes   Orientation to Time of Day: Yes    Orientation to Date: Yes    Situation (Do they understand why they are here?): Yes    Psychomotor Behavior: Normal    Thought Content: Clear   Thought Form: Intact     Mental Health and Substance Abuse History  History  Current and historical diagnoses or mental health concerns: adjustment/conversion disorder, Developmental Delay, Depression    Prior MH services (inpatient, programmatic care, outpatient, etc) : Yes Hx of Inpatient, DBT, Group Home    History of substance abuse: No    Prior NICOLE services (inpatient, programmatic care, detox, outpatient, etc) : No    History of commitment: No    Family history of MH/NICOEL: Unknown    Trauma history: unknown    Medication  Psychotropic medications:   Medications:  Melatonin  Pepcid  Aspirin  Colace  Lexapro  Pravastatin  Levothyroxine    Current Care Team  Primary Care Provider: [facility] [place]     Psychiatrist: unknown - Guardian not avail, MHS in the past  [employer] (GUARDIAN) (Other)   [phone]     Therapist: she denies that she see's one currently     : Likely has a DD  and CADI , but was unable to reach Guardian or staff to confirm this.     CTSS or ARMHS: unknown    ACT Team: unknown    Other: unknown    Release of Information  No: Guardian was not present in the ED    Biopsychosocial Information  Socioeconomic Information  Current living situation:   [facility] ([facility] and Supported Living Services.    [facility]  [address], [place], [place], [address]  Phone: [phone]    Employment/income source: unemployed    Relevant legal issues: no    Cultural, Orthodox, or spiritual influences on mental health care: none noted    Is the patient active in the  or a : no      Relevant Medical Concerns   Patient identifies concerns with completing ADLs? No     Patient can ambulate independently? Yes     Other medical concerns? hypercholesterolemia, hypertension, obesity    History of concussion or TBI? unknown      Diagnosis    Intellectual Disabilites  318.0 (71) Moderate - primary     311 (F32.9) Unspecified Depressive Disorder  - by history     300.00 (F41.9) Unspecified Anxiety Disorder - by history     300.11 Conversion Disorder (Functional Neurological Symptom Disorder)  (F44.7) With mixed symptoms  - by history     Therapeutic Intervention  The following therapeutic methodologies were employed when working with the patient: establishing rapport, active listening, assessing dimensions of crisis, solution focused brief therapy, identifying additional supports and alternative coping skills, establishing a discharge plan, safety planning, psychoeducation, motivational interviewing, brief supportive therapy and harm reduction. Patient response to intervention: receptive to therapeutic interventions.    Disposition  Recommended disposition: Group Home: [facility]       Reviewed case and recommendations with attending provider. Attending Name: Dr. Mistry      Attending concurs with disposition: Yes      Patient concurs with disposition: Yes      Final disposition: Group home: [facility] .     Clinical Substantiation of Recommendations   Rationale with supporting factors for disposition and diagnosis.   Dionne was alert and oriented.  Calm, cooperative with variable eye contact.  Mood was within normal limits with her intellectual disability considered.  Affect was normal and congruent to  speech content.. Thought process, including associations, was unremarkable and thought content was devoid of suicidal and homicidal ideation and psychotic thought. No hallucinations. No delusions and no paranoia.  Insight and judgment was fair considering her developmental delay.  Pt was adequate for discharge as she has a safe group home and supportive staff and guardian to assist in her care. Pt was able to contract for safety.     Assessment and intervention included interviewing patient directly, obtaining collateral from ED provider and reviewing chart/EPIC notes. Multiple attempts were made to reach group home staff and Guardian.      Psychotherapy techniques that were utilized include assessment of risk, establishing rapport , empathic and active listening.  Writer employed crisis psychotherapy and validation of feelings.  DEC verbally administered the ESS-6 screening tool - ESS-6 Results:low risk     Assessment Details  Patient interview started at: 5:10pm and completed at: 6:10pm    Total duration spent on the patient case in minutes: 2.0 hrs     CPT code(s) utilized: 28628 - Psychotherapy for Crisis - 60 (30-74*) min     Aftercare and Safety Planning  Follow up plans with MH/NICOLE services: Yes return to group home and work with established providers       Aftercare plan placed in the AVS and provided to patient: Yes. Given to patient by ED RN    Debbie DAMICO, [facility]       Recommendation and Safety Plan for: Dionne Perez  : 1985     Recommendation for Follow-up care:  Recommendation for care is for Dionne to return to her [facility] ([facility]) and Supported Living Services.   [facility]  [address], [place], [address]  Phone: [phone]     - Please continue to see your established outpatient providers on a regular basis as we recommend that you see a Therapist and Psychiatrist to manage your mental health needs.      - BHP (mental health evaluator) attempted to reach  your Guardian - [employer] @ the following numbers multiple times ([phone] and [phone]). The voicemail box was full and the 2nd number was a busy signal.  We also attempted to reach Grace the manager from the Group home to discuss recommendations, but had to leave a voicemail.       ---It is your responsibility to contact your insurance company directly to verify coverage, eligibility, payment, and benefit information for any appointments or referrals listed above.      *A BHP (Behavioral Healthcare Provider) Coordinator will be reaching out to you with 24-48 hours to assist with additional appointments if needed.      Please call P at [phone] for additional scheduling assistance.       We discussed a safety plan that will work for you as well. Here are additional resources:     Safety Plan      *If I Am Having Difficulty Or Am In A Crisis, I Will:       ? Contact my established care providers       ? Call Suicide Hotline (5-156-077-QZUJ)       ? Go to the nearest Emergency Department       ? Call 9-1-1       Warnings Signs That A Crisis May Be Developing:     I am having suicidal thoughts that turn to plans  I am having urges to self-harm  My emotions are of hopelessness; feeling like there's no way out  Rage or anger  Engaging in risky activities without thinking  Withdrawing from family/friends  Dramatic mood swings  Drastic personality changes   Neglect of personal hygiene or cares      Things That Make Me Feel Better:            Family, music, friends, exercise, relaxation breathing  Physical location,   safe people,   imagery etc.      Phone Apps that can be helpful!!     My3  https://myHello World Mobilepp.org/    VirtualHopeBox  https://Zvents.org/apps/virtual-hope-box/    Calm Harm (rekha to assist with preventing self-injury)    Suicide Safety Plan LLC - https://www.suicidesafetyplan.rekha/        Mindshift CBT Rekha      People and Social Settings That Provide Distraction:      My  friends and family  Exercising  Listening to music  Reading a book  Watching a movie  Meditating  Calling a friend     People Whom I Can Ask For Help:       Family   Friends  Adult I trust  Group home staff      Professionals or Agencies I Can Contact During A Crisis:     Guardian -  [phone] and [phone]  Group Home staff      - [facility] Crisis Resources     The [facility] Crisis Response Unit (CRU) provides 24-hour phone and face-to-face crisis intervention and consultation.   Call [phone]     Suicide and Crisis Response   The primary goal of the Crisis Response Unit is to assist in stabilizing the immediate crisis, ensure safety for the client, the family and/or the community, and assist with referrals to appropriate county or other agency staff as necessary.      The Crisis Response Unit is available to all people in the community. Phones are answered by caring staff who know a lot about mental health, housing, shelters, food, employment and transportation.      The Crisis Response Unit is considered a voluntary service. This means you choose if you want help from us.      The Crisis Response Unit can also help with things like:   Mental health crisis assessments   Access to urgent psychiatry and therapy   Family education and support   Crisis phone support   Referrals to community resources      Other crisis resources:   [facility] Hotline for Youth Crisis and Suicide: 3-410-IYTWXBC (1-329.878.5984)      [place] Crisis Text Line: Text  Home  to 484513 to communicate with a trained crisis counselor      [facility]   COPE: [facility] mental health emergencies- [phone] - They have mobile crisis teams that can come where you are.  The teams respond to anyone in the [facility] who needs an urgent response.      Texting crisis line - text [place] to 326499 - 24 hours a day 7 days a week      Fast Tracker  Linking people to mental health and substance use disorder resources   "www.Bluespec.OneSource Water      [place] Mental Health Warm Line  Peer to peer support  Monday thru Saturday, 12 pm to 10 pm  427.391.4109 or 3.205.270.8177  Text \"Support\" to 43841     National [place] on Mental Illness (GULSHAN)  053.080.8036 or 1.888.GULSHAN.HELPS     Ways To Improve My Environment or Surroundings:  remove sharps, lock up any weapons or things that can be used as weapons. Sobriety or remove drugs and alcohol.      *Panic Attack resources  --Here are some additional resources---  https://adaa.org/understanding-anxiety/panic-disorder/resources  https://projectlets.org/emergency-action-for-panic-attacks     *Progressive Muscle Relaxation  Https://www.Outsmartube.com/watch?v=ClqPtWzozXs     Crisis Lines  Crisis Text Line  Text 710311  You will be connected with a trained live crisis counselor to provide support.     Por espanol, texto  JONATHAN a 462772 o texto a 442-AYUDAME en WhatsApp     The [employer] Project (LGBTQ Youth Crisis Line)  1.186.121.0528  text START to 880-179        Community Resources  Fast Tracker  Linking people to mental health and substance use disorder resources  [employer]uespec.[employer] Water      [place] Mental Health Warm Line  Peer to peer support  Monday thru Saturday, 12 pm to 10 pm  792.301.2070 or 2.855.916.9111  Text \"Support\" to 18271     [employer] [place] on Mental Illness (GULSHAN)  331.950.4009 or 1.888.GULSHAN.HELPS        Mental Health Apps  My3  https://myOrthodatapp.org/     VirtualHopeBox  https://Memrise.org/apps/virtual-hope-box/        Additional information  Today you were seen by a licensed mental health professional through Triage and Transition services, Behavioral Healthcare Providers (BHP)  for a crisis assessment in the Emergency Department at [facility].  It is recommended that you follow up with your established providers (psychiatrist, mental health therapist, and/or primary care doctor - as relevant) as soon as possible. Coordinators from BHP will be calling " you in the next 24-48 hours to ensure that you have the resources you need.  You can also contact [facility] coordinators directly at [phone].      You may have been scheduled for or offered an appointment with a mental health provider. [facility] maintains an extensive network of licensed behavioral health providers to connect patients with the services they need.  We do not charge providers a fee to participate in our referral network.  We match patients with providers based on a patient's specific needs, insurance coverage, and location.  Our first effort will be to refer you to a provider within your care system, and will utilize providers outside your care system as needed.

## 2022-05-11 NOTE — ED NOTES
Attempted to reach Guardian (multiple calls) at both numbers listed below.  One number had a full voicemail box, the other number was a busy signal.      Attempted to reach  - Grace @ [phone] - left vm.  Also called Group home staff phone: [phone] - again got voicemail.    At this point, the ER will continue to try to reach out to the group home to have them pick the patient up for return to her supportive living environment.       Recommendation and Safety Plan for: Dionne Perez  : 1985    Recommendation for Follow-up care:  Recommendation for care is for Dionne to return to her [facility] ([facility]) and Supported Living Services.   [facility]  [address], [place], [place], [address]  Phone: [phone]    - Please continue to see your established outpatient providers on a regular basis as we recommend that you see a Therapist and Psychiatrist to manage your mental health needs.     - P (mental health evaluator) attempted to reach your Guardian - [employer] @ the following numbers multiple times ([phone] and [phone]). The voicemail box was full and the 2nd number was a busy signal.  We also attempted to reach Grace the manager from the Group home to discuss recommendations, but had to leave a voicemail.      ---It is your responsibility to contact your insurance company directly to verify coverage, eligibility, payment, and benefit information for any appointments or referrals listed above.     *A BHP (Behavioral Healthcare Provider) Coordinator will be reaching out to you with 24-48 hours to assist with additional appointments if needed.      Please call [facility] at [phone] for additional scheduling assistance.      We discussed a safety plan that will work for you as well. Here are additional resources:     Safety Plan      *If I Am Having Difficulty Or Am In A Crisis, I Will:       ? Contact my established care providers        ? Call Suicide Hotline (7-555-067-DMMN)       ? Go to the nearest Emergency Department       ? Call 9-1-1       Warnings Signs That A Crisis May Be Developing:     I am having suicidal thoughts that turn to plans  I am having urges to self-harm  My emotions are of hopelessness; feeling like there's no way out  Rage or anger  Engaging in risky activities without thinking  Withdrawing from family/friends  Dramatic mood swings  Drastic personality changes   Neglect of personal hygiene or cares      Things That Make Me Feel Better:            Family, music, friends, exercise, relaxation breathing  Physical location,   safe people,   imagery etc.     Phone Apps that can be helpful!!   - My3  https://Qompium.Be-Bound/  - VirtualHopeBox  https://test company/apps/virtual-hope-box/  - Calm Harm (rekha to assist with preventing self-injury)  - Suicide Safety Plan Synthelis - https://www.suicidesafeCommonBond.rekha/      - Mindshift CBT Rekha     People and Social Settings That Provide Distraction:      My friends and family  Exercising  Listening to music  Reading a book  Watching a movie  Meditating  Calling a friend     People Whom I Can Ask For Help:       Family   Friends  Adult I trust  Group home staff      Professionals or Agencies I Can Contact During A Crisis:     Guardian -  [phone] and [phone]  Group Home staff     - [facility] Crisis Resources    The [facility] Crisis Response Unit (CRU) provides 24-hour phone and face-to-face crisis intervention and consultation.   Call [phone]    Suicide and Crisis Response   The primary goal of the Crisis Response Unit is to assist in stabilizing the immediate crisis, ensure safety for the client, the family and/or the community, and assist with referrals to appropriate county or other agency staff as necessary.     The Crisis Response Unit is available to all people in the community. Phones are answered by caring staff who know a lot about mental health, housing,  "shelters, food, employment and transportation.     The Crisis Response Unit is considered a voluntary service. This means you choose if you want help from us.     The Crisis Response Unit can also help with things like:   Mental health crisis assessments   Access to urgent psychiatry and therapy   Family education and support   Crisis phone support   Referrals to community resources     Other crisis resources:   [facility] Hotline for Youth Crisis and Suicide: 8-304-TCCESOV (1-322.369.1882)     [place] Crisis Text Line: Text  Home  to 885159 to communicate with a trained crisis counselor     [facility]   COPE: [facility] mental health emergencies- [phone] - They have mobile crisis teams that can come where you are.  The teams respond to anyone in the [facility] who needs an urgent response.      Texting crisis line - text [place] to 352852 - 24 hours a day 7 days a week     Fast Tracker  Linking people to mental health and substance use disorder resources  www.Kapta.org      [place] Mental Health Warm Line  Peer to peer support  Monday thru Saturday, 12 pm to 10 pm  203.450.8330 or 0.901.590.7168  Text \"Support\" to 23619     National [place] on Mental Illness (GULSHAN)  407.667.8011 or 1.888.GULSHAN.HELPS    Ways To Improve My Environment or Surroundings:  remove sharps, lock up any weapons or things that can be used as weapons. Sobriety or remove drugs and alcohol.     *Panic Attack resources  --Here are some additional resources---  https://adaa.org/understanding-anxiety/panic-disorder/resources  https://projectlets.org/emergency-action-for-panic-attacks    *Progressive Muscle Relaxation  Https://www.youtube.com/watch?v=ClqPtWzozXs    Crisis Lines  Crisis Text Line  Text 907323  You will be connected with a trained live crisis counselor to provide support.    Por stefano, yunio  JONATHAN a 438944 o texto a 442-AYUDAME en WhatsApp    The Froylan Project (LGBTQ Youth Crisis Line)  3.573.669.0112 " " text START to 766-303      Community Resources  Fast Tracker  Linking people to mental health and substance use disorder resources  ActuatedMedicaln."Shahab P. Tabatabai, Broker"     [place] Mental Health Warm Line  Peer to peer support  Monday thru Saturday, 12 pm to 10 pm  138.014.3502 or 2.926.626.8642  Text \"Support\" to 41302    National [place] on Mental Illness (GULSHAN)  166.628.5868 or 1.888.GULSHAN.HELPS      Mental Health Apps  My3  https://Ideapod.org/    VirtualHopeBox  https://Ayasdi/apps/virtual-hope-box/      Additional information  Today you were seen by a licensed mental health professional through Triage and Transition services, Behavioral Healthcare Providers (P)  for a crisis assessment in the Emergency Department at [facility].  It is recommended that you follow up with your established providers (psychiatrist, mental health therapist, and/or primary care doctor - as relevant) as soon as possible. Coordinators from [facility] will be calling you in the next 24-48 hours to ensure that you have the resources you need.  You can also contact [facility] coordinators directly at [phone].     You may have been scheduled for or offered an appointment with a mental health provider. [facility] maintains an extensive network of licensed behavioral health providers to connect patients with the services they need.  We do not charge providers a fee to participate in our referral network.  We match patients with providers based on a patient's specific needs, insurance coverage, and location.  Our first effort will be to refer you to a provider within your care system, and will utilize providers outside your care system as needed.             "

## 2022-05-12 ENCOUNTER — HOSPITAL ENCOUNTER (EMERGENCY)
Facility: CLINIC | Age: 37
Discharge: HOME | End: 2022-05-12
Attending: EMERGENCY MEDICINE | Admitting: EMERGENCY MEDICINE
Payer: MEDICARE

## 2022-05-12 VITALS
SYSTOLIC BLOOD PRESSURE: 123 MMHG | RESPIRATION RATE: 16 BRPM | TEMPERATURE: 96.9 F | OXYGEN SATURATION: 96 % | WEIGHT: 293 LBS | BODY MASS INDEX: 43.4 KG/M2 | HEART RATE: 56 BPM | DIASTOLIC BLOOD PRESSURE: 78 MMHG | HEIGHT: 69 IN

## 2022-05-12 DIAGNOSIS — Z63.9 RELATIONSHIP DYSFUNCTION: ICD-10-CM

## 2022-05-12 PROCEDURE — 99282 EMERGENCY DEPT VISIT SF MDM: CPT

## 2022-05-12 SDOH — SOCIAL STABILITY - SOCIAL INSECURITY: PROBLEM RELATED TO PRIMARY SUPPORT GROUP, UNSPECIFIED: Z63.9

## 2022-05-12 NOTE — ED TRIAGE NOTES
Pt. Presents to ED by self with complaints of not liking the staff members  and room mates who work in her group home. She reports they are not being respectful of her.  Pt. Reports no one is hurting her but they are not getting along. JR on RA.

## 2022-05-12 NOTE — ED NOTES
Pt. Reports she lives at a  but does not have the number of the staff. Address is [address] [place] [address] [address] [place]  [address].  staff number is  but they are not answering.

## 2022-05-13 NOTE — DISCHARGE INSTRUCTIONS
What do you do next:   Continue your home medications unless we have specifically changed them  Follow up as indicated below    When do you return: If you have concerns for your safety, or any other symptoms that concern you, please return to the ED for reevaluation.    Thank you for allowing us to care for you today.

## 2022-05-13 NOTE — ED PROVIDER NOTES
History     Chief Complaint:    Social Work Services      HPI   Dionne Perez is a 37 year old female who presents stating that she does not like the group home staff at her group home.  The patient states that since moving into the group home in February, she has not been getting along with some of the staff.  She states that for example, on Sunday, she wanted to have her medication at 7 PM and be left alone to watch television in her room.  She states that she did not get this until the night staff got there at 10 PM.  She perceives that one of the group home staff was rude to her but she states that the staff also states the patient is rude to the staff.  The patient states that if they want her to respect them, they need to respect her.  The patient also notes that she was trying to help with another resident but the group home staff told her that she was not staff.  The patient took offense to this.    Review of Systems   Unable to perform ROS: Psychiatric disorder   Psychiatric/Behavioral: Positive for behavioral problems.   All other systems reviewed and are negative.        Allergies:    Ibuprofen    Medications:      acetaminophen (TYLENOL) 325 MG tablet  alum & mag hydroxide-simethicone (MAALOX  ES) 400-400-40 MG/5ML SUSP suspension  aspirin (ASA) 81 MG chewable tablet  calcium polycarbophil (FIBERCON) 625 MG tablet  clonazePAM (KLONOPIN) 0.5 MG tablet  cloNIDine (CATAPRES) 0.1 MG tablet  diclofenac (VOLTAREN) 1 % topical gel  escitalopram (LEXAPRO) 20 MG tablet  famotidine (PEPCID) 40 MG tablet  hydrocortisone 2.5 % cream  levothyroxine (SYNTHROID/LEVOTHROID) 50 MCG tablet  medroxyPROGESTERone (PROVERA) 10 MG tablet  melatonin 3 MG tablet  melatonin 3 MG tablet  menthol-zinc oxide (CALMOSEPTINE) 0.44-20.6 % OINT ointment  multivitamins w/minerals (CERTAVITE) liquid  nystatin (NYSTOP) 542263 UNIT/GM external powder  ondansetron (ZOFRAN-ODT) 4 MG ODT tab  polyethylene glycol (MIRALAX) 17 g  "packet  pravastatin (PRAVACHOL) 40 MG tablet  pseudoePHEDrine (SUDAFED) 30 MG tablet  Vitamins A & D (VITAMIN A & D) external ointment         Past Medical History:   Past Surgical History:     Past Medical History:   Diagnosis Date     Depressive disorder      Gastroesophageal reflux disease      High cholesterol      Knee pain      Thyroid disease     Past Surgical History:   Procedure Laterality Date     IR LUMBAR PUNCTURE  6/9/2020      Patient Active Problem List    Diagnosis Date Noted     Altered mental status 06/06/2020     Priority: Medium          Family History  No family history on file.    Social History:  PCP: [facility]  Presents to the ED alone    Physical Exam     Patient Vitals for the past 24 hrs:   BP Temp Temp src Pulse Resp SpO2 Height Weight   05/12/22 1741 -- 96.9  F (36.1  C) Temporal -- -- -- -- --   05/12/22 1739 123/78 -- -- 56 16 96 % 1.753 m (5' 9\") (!) 150.6 kg (332 lb)       Physical Exam  Constitutional: Vital signs reviewed as above.   HEENT:    Head: No external signs of trauma noted.    Eyes: Conjunctivae are normal. Pupils are equal, round, and reactive to light.    Cardiovascular: Normal rate, regular rhythm and normal heart sounds.    Pulmonary/Chest: Effort normal and breath sounds normal. No respiratory distress. Patient has no wheezes.   Gastrointestinal: Soft, non-tender, non-distended.  Musculoskeletal: No gross deformities noted. Normal tone  Skin: Skin is warm and dry. No diaphoresis noted.   Neurological: Patient is alert and oriented to person, place, and time.   Psychiatric: Patient has a normal mood and affect.       Emergency Department Course       Emergency Department Course:           Reviewed:    I reviewed nursing notes, vitals and past history    Assessments/Consults:   ED Course as of 05/12/22 2032   Thu May 12, 2022   1952 Dr. Huerta' evaluation       Interventions:  Medications - No data to display    Disposition:  The patient was " discharged to home.    Impression & Plan        CMS Diagnoses:         Medical Decision Making:    This 37-year-old female patient presents to the ED from her group home due to what sounds like a disagreement with staff.  Please see the HPI and exam for specifics.  I do not believe there is a reason to get laboratory studies or involve DEC as I do not think the patient needs inpatient mental health treatment.  She was, in fact, seen by mental health yesterday for similar ED presentation.  No inpatient mental treatment was recommended then I believe she can be discharged back to her group home.  We were able to make contact with the patient group home and they will send someone to come pick her up.      Critical Care time:  none    Covid-19  Dionne Perez was evaluated during a global COVID-19 pandemic, which necessitated consideration that the patient might be at risk for infection with the SARS-CoV-2 virus that causes COVID-19.  Applicable protocols for evaluation were followed during the patient's care. COVID-19 was considered as part of the patient's evaluation.       Diagnosis:    ICD-10-CM    1. Relationship dysfunction  Z63.9     with roommate and group home staff       Discharge Medications:  New Prescriptions    No medications on file              Chente Huerta, DO  05/12/22 2033       Chente Huerta,   05/12/22 2033

## 2022-05-13 NOTE — ED NOTES
Spoke with  staff who reports patient left around 1530 to get her glasses. Staff is coming to  patient.

## 2022-05-18 ENCOUNTER — MEDICAL CORRESPONDENCE (OUTPATIENT)
Dept: HEALTH INFORMATION MANAGEMENT | Facility: CLINIC | Age: 37
End: 2022-05-18

## 2022-06-10 ENCOUNTER — HOSPITAL ENCOUNTER (EMERGENCY)
Facility: CLINIC | Age: 37
Discharge: HOME | End: 2022-06-11
Attending: EMERGENCY MEDICINE | Admitting: EMERGENCY MEDICINE
Payer: MEDICARE

## 2022-06-10 VITALS
RESPIRATION RATE: 16 BRPM | DIASTOLIC BLOOD PRESSURE: 68 MMHG | TEMPERATURE: 98.1 F | HEART RATE: 58 BPM | OXYGEN SATURATION: 100 % | SYSTOLIC BLOOD PRESSURE: 139 MMHG

## 2022-06-10 DIAGNOSIS — R45.851 SUICIDAL IDEATION: ICD-10-CM

## 2022-06-10 PROCEDURE — 99285 EMERGENCY DEPT VISIT HI MDM: CPT

## 2022-06-10 ASSESSMENT — ENCOUNTER SYMPTOMS
ABDOMINAL PAIN: 0
VOMITING: 0
NERVOUS/ANXIOUS: 1
COUGH: 0
NAUSEA: 0
SHORTNESS OF BREATH: 0

## 2022-06-11 PROCEDURE — 90791 PSYCH DIAGNOSTIC EVALUATION: CPT

## 2022-06-11 NOTE — ED NOTES
Called guardian listed on Emergency contacts & was told they are no longer doing guardianship.  Spoke with registration so the phone number could be removed from chart.

## 2022-06-11 NOTE — ED NOTES
Patient in room waiting for group home staff to pick her up and her meal tray.  Attempting to call group home, no answer and did left message to call back back with call back number.

## 2022-06-11 NOTE — ED NOTES
Patient eating breakfast.  Continuing to call group home w/o answer.  Registration spoke with sister Sabra who now is the POA of patient then transferred the phone call to writer.  Sabra reports patient usual get transport from a medicab through her social security and gave a group home staff #.  Did call number given in which it was a staff members cellular phone who was on vacation.  Staff member willing to call/message Lexus Jimenez the  to call or have staff call.

## 2022-06-11 NOTE — ED TRIAGE NOTES
Pt reports wishing to be dead since Monday.  Pt resides in a group home and per pt she had and argument with her sister Monday.

## 2022-06-11 NOTE — DISCHARGE INSTRUCTIONS
"  Aftercare Plan  If I am feeling unsafe or I am in a crisis, I will:   Contact my established care providers   Call the National Suicide Prevention Lifeline: 313.547.7118   Go to the nearest emergency room   Call 911     Warning signs that I or other people might notice when a crisis is developing for me: when Im fighting with my sister, or having conflict with anyone.     Things I am able to do on my own to cope or help me feel better: Pt reports she likes to shop for food, watch movies, play on IPAD    Things that I am able to do with others to cope or help me feel better: Hang out with other [facility]    Things I can use or do for distraction: shopping, go see a movie.    Changes I can make to support my mental health and wellness: Talk to  staff before calling 9-1-1    People in my life that I can ask for help: guardian and cousin    Your [facility] has a mental health crisis team you can call 24/7: [facility] Crisis  [phone]      Crisis Lines  Crisis Text Line  Text 857572  You will be connected with a trained live crisis counselor to provide support.    Por espanol, texto  JONATHAN a 276505 o texto a 442-AYUDAME en WhatsApp    National Hope Line  1.800.SUICIDE [2965399]      Community Resources  Fast Tracker  Linking people to mental health and substance use disorder resources  fasttrackermn.org     [place] Mental Health Warm Line  Peer to peer support  Monday thru Saturday, 12 pm to 10 pm  729.837.1861 or 2.152.096.8526  Text \"Support\" to 84038    National [place] on Mental Illness (GULSHAN)  417.804.1444 or 1.888.GULSHAN.HELPS        Mental Health Apps  My3  https://my3app.org/    VirtualHopeBox  https://SoStupid.com.org/apps/virtual-hope-box/      Additional Information  Today you were seen by a licensed mental health professional through Triage and Transition services, Behavioral Healthcare Providers (BHP)  for a crisis assessment in the Emergency Department at [facility].  It is " recommended that you follow up with your established providers (psychiatrist, mental health therapist, and/or primary care doctor - as relevant) as soon as possible. Coordinators from [facility] will be calling you in the next 24-48 hours to ensure that you have the resources you need.  You can also contact [facility] coordinators directly at [phone]. You may have been scheduled for or offered an appointment with a mental health provider. [facility] maintains an extensive network of licensed behavioral health providers to connect patients with the services they need.  We do not charge providers a fee to participate in our referral network.  We match patients with providers based on a patient's specific needs, insurance coverage, and location.  Our first effort will be to refer you to a provider within your care system, and will utilize providers outside your care system as needed.

## 2022-06-11 NOTE — ED PROVIDER NOTES
History     Chief Complaint:  Suicidal       HPI   Dionne Perez is a 37 year old female with a history of depression presenting with suicidal ideations.  Patient is somewhat of a vague historian.  She reports presents from her group home with reported suicidal ideations.  She expresses wanting to jump off a staircase.  She reportedly got into an argument with her sister this evening prompting increasing sadness she reports.  Per EMS patient has called 911 every day this week between 10 PM to 11 PM reportedly at shift change expressing increased sadness.  She denies any fever, chills, nausea, vomiting or other physical symptoms.  She denies any alcohol or drug use.    ROS:  Review of Systems   Respiratory: Negative for cough and shortness of breath.    Gastrointestinal: Negative for abdominal pain, nausea and vomiting.   Psychiatric/Behavioral: Positive for suicidal ideas. Negative for self-injury. The patient is nervous/anxious.    All other systems reviewed and are negative.         Allergies:  Ibuprofen     Medications:    acetaminophen (TYLENOL) 325 MG tablet  alum & mag hydroxide-simethicone (MAALOX  ES) 400-400-40 MG/5ML SUSP suspension  aspirin (ASA) 81 MG chewable tablet  calcium polycarbophil (FIBERCON) 625 MG tablet  clonazePAM (KLONOPIN) 0.5 MG tablet  cloNIDine (CATAPRES) 0.1 MG tablet  diclofenac (VOLTAREN) 1 % topical gel  escitalopram (LEXAPRO) 20 MG tablet  famotidine (PEPCID) 40 MG tablet  hydrocortisone 2.5 % cream  levothyroxine (SYNTHROID/LEVOTHROID) 50 MCG tablet  medroxyPROGESTERone (PROVERA) 10 MG tablet  melatonin 3 MG tablet  melatonin 3 MG tablet  menthol-zinc oxide (CALMOSEPTINE) 0.44-20.6 % OINT ointment  multivitamins w/minerals (CERTAVITE) liquid  nystatin (NYSTOP) 892644 UNIT/GM external powder  ondansetron (ZOFRAN-ODT) 4 MG ODT tab  polyethylene glycol (MIRALAX) 17 g packet  pravastatin (PRAVACHOL) 40 MG tablet  pseudoePHEDrine (SUDAFED) 30 MG tablet  Vitamins A & D (VITAMIN A &  D) external ointment        Past Medical History:    Past Medical History:   Diagnosis Date     Depressive disorder      Gastroesophageal reflux disease      High cholesterol      Knee pain      Thyroid disease        Past Surgical History:    Past Surgical History:   Procedure Laterality Date     IR LUMBAR PUNCTURE  6/9/2020        Family History:    family history is not on file.    Social History:   reports that she has never smoked. She has never used smokeless tobacco. She reports that she does not drink alcohol and does not use drugs.  PCP: [facility], [facility]     Physical Exam     Patient Vitals for the past 24 hrs:   BP Temp Temp src Pulse Resp SpO2   06/10/22 2238 139/68 98.1  F (36.7  C) Oral 58 16 100 %        Physical Exam  Nursing note and vitals reviewed.  Constitutional: Well nourished.   Eyes: Conjunctiva normal.  Pupils are equal, round, and reactive to light.   ENT: Nose normal.   Neck: Normal range of motion.  CVS: Normal rate, regular rhythm.  Normal heart sounds.    Pulmonary: Lungs clear to auscultation bilaterally.  GI: Obese. Abdomen soft. Nontender, nondistended.  MSK: No calf tenderness; chronic venous stasis changes to b/l lower extremities  Neuro: Alert. Follows simple commands.  Skin: Skin is warm and dry. No rash noted.   Psychiatric: Flat affect, admits to suicidal ideations. Denies hallucinations      Emergency Department Course       Emergency Department Course:    Mental Health Risk Assessment      PSS-3    Date and Time Over the past 2 weeks have you felt down, depressed, or hopeless? Over the past 2 weeks have you had thoughts of killing yourself? Have you ever attempted to kill yourself? When did this last happen? User   06/10/22 2240 yes yes no -- TPF      C-SSRS ([place])    Date and Time Q1 Wished to be Dead (Past Month) Q2 Suicidal Thoughts (Past Month) Q3 Suicidal Thought Method Q4 Suicidal Intent without Specific Plan Q5 Suicide Intent with Specific Plan Q6  Suicide Behavior (Lifetime) Within the Past 3 Months? RETIRED: Level of Risk per Screen Screening Not Complete User   06/10/22 2240 yes no no no no no -- -- -- TPF              Suicide assessment completed by mental health (D.E.C., LCSW, etc.)    Reviewed:  I reviewed nursing notes, vitals, past medical history and Care Everywhere    Assessments:   I obtained history and examined the patient as noted above.    I rechecked the patient and explained findings       Consults:   12:35 AM  Spoke to DEC  Interventions:  Medications - No data to display     Disposition:  The patient is signed out to Dr. Rascon pending ride back to group [facility]    Impression & Plan        Medical Decision Making:  Patient is a 37-year-old female presenting with suicidal ideations.  She is cooperative on arrival and placed on an GABINO.  She was medically cleared and evaluated by DEC.  No indication for emergent hospitalization at this point in time.  She does not appear to be clinically decompensated and states that she said the statements because she was upset at her sister. She is not psychotic.  Plan to discharge back to group [facility].  Unfortunately group [facility] did not answer phone overnight so we will plan in the a.m.  Patient signed out to my partner Dr. Flores pending final disposition.      Diagnosis:    ICD-10-CM    1. Suicidal ideation  R45.851         Discharge Medications:  New Prescriptions    No medications on file        6/10/2022   Jennifer Urban, Jennifer Aguiar,   06/11/22 0705

## 2022-06-11 NOTE — ED NOTES
Spoke with Adina Jimenez .  Staff are on there way to  patient and did receive phone # for patient chart for group home and  #.

## 2022-06-11 NOTE — CONSULTS
"6/10/2022  Dionne Perez 1985     [facility] Crisis Assessment    Patient was assessed: remote  Patient location: [facility] ED  Was a release of information signed: No. Reason: unable to reach guardian    Referral Data and Chief Complaint  Dionne Perez is a 37 year old who uses She/Her pronouns. Patient presented to the ED via EMS and was referred to the ED by self. Patient is presenting to the ED for the following concerns: suicidal ideation.      Informed Consent and Assessment Methods    Patient is under the guardianship of Cynthia Reynolds who is listed as Guardian. .  Writer met with patient and explained the crisis assessment process, including applicable information disclosures and limits to confidentiality, assessed understanding of the process, and obtained consent to proceed with the assessment. Patient was observed to be able to participate in the assessment as evidenced by verbal consent Form Pt (unable to talk with guardian). Assessment methods included conducting a formal interview with patient, review of medical records, collaboration with medical staff, and obtaining relevant collateral information from family and community providers when available.    Narrative Summary   What led to the patient presenting for crisis services, factors that make the crisis life threatening or complex, stressors, how is this disrupting the patient's life, and how current functioning is in comparison to baseline. How is patient presenting during the assessment. Duration of the current crisis, coping skills attempted to reduce the crisis, community resources used, and past presentations.     Pt initially stated \"I want to be dead\".\"I think ill jump off the stair case or something like that\". Pt reports that her sister is not being nice to her which is causing the suicidal ideation. She reports that her sister has been rude since last night. Pt.'s sister has been hanging up on her since last night every time Pt calls her. " Pt reports that she lives in a group home, she moved in February 25th. Pt denies telling the staff that she was going to call 9-1-1, Pt reports that there was no staff at the house when she called. There is 1 hour every night when there is no staff. Pt reports she has spoken to the night staff since arriving in the ED. After talking about her hurt feelings regarding her sisters behavior Pt states that she can be safe and wants to return home. No previous Hx of attempts or self-harm per Pt.     Collateral Information  Attempted to reach Cynthia Renyolds who is listed as Guardian.   [employer] (GUARDIAN) (Other)   [phone], unable to leave message as Voicemail is full.     [facility] attempted to get in contact with the Pt.'s GH at [phone], Left VM.       Risk Assessment    Risk of Harm to Self     ESS-6  1.a. Over the past 2 weeks, have you had thoughts of killing yourself? Yes  1.b. Have you ever attempted to kill yourself and, if yes, when did this last happen? No   2. Recent or current suicide plan? No   3. Recent or current intent to act on ideation? No  4. Lifetime psychiatric hospitalization? No  5. Pattern of excessive substance use? No  6. Current irritability, agitation, or aggression? No  Scoring note: BOTH 1a and 1b must be yes for it to score 1 point, if both are not yes it is zero. All others are 1 point per number. If all questions 1a/1b - 6 are no, risk is negligible. If one of 1a/1b is yes, then risk is mild. If either question 2 or 3, but not both, is yes, then risk is automatically moderate regardless of total score. If both 2 and 3 are yes, risk is automatically high regardless of total score.     Score: 0, negligible risk    The patient has the following risk factors for suicide: poor decision making and poor impulse control    Is the patient experiencing current suicidal ideation: Yes. Passive wish to be dead without thoughts or plan.  Pt verbalizes more of feeling sad and  anxious about the fight with her sister rather then a desire to be dead.     Is the patient engaging in preparatory suicide behaviors (formulating how to act on plan, giving away possessions, saying goodbye, displaying dramatic behavior changes, etc)? No    Does the patient have access to firearms or other lethal means? no    The patient has the following protective factors: social support, voluntarily seeking mental health support, established relationship community mental health provider(s), expresses desire to engage in treatment, sense of obligation to people/pets and safe/stable housing    Support system information:  staff, cousin, sister, and guardian    Patient strengths: Pt is open, caring, and seeking care    Does the patient engage in non-suicidal self-injurious behavior (NSSI/SIB)? no    Is the patient vulnerable to sexual exploitation?  Yes Pt is a vulnerable adult    Is the patient experiencing abuse or neglect? no    Is the patient a vulnerable adult? Yes      Risk of Harm to Others  The patient has the following risk factors of harm to others: no risk factors identified    Does the patient have thoughts of harming others? No    Is the patient engaging in sexually inappropriate behavior?  no       Current Substance Abuse    Is there recent substance abuse? no    Was a urine drug screen or blood alcohol level obtained: No    CAGE AID  Have you felt you ought to cut down on your drinking or drug use?  No  Have people annoyed you by criticizing your drinking or drug use? No  Have you felt bad or guilty about your drinking or drug use? No  Have you ever had a drink or used drugs first thing in the morning to steady your nerves or to get rid of a hangover? No  Score: 0/4       Current Symptoms/Concerns    Symptoms  Attention, hyperactivity, and impulsivity symptoms present: No    Anxiety symptoms present: Yes: Generalized Symptoms: Excessive worry      Appetite symptoms present: No     Behavioral  difficulties present: No     Behavioral difficulties present: Yes: Displaces Blame, Impulsivity/Disinhibition and Negativistic/Defiant      Cognitive impairment symptoms present: Yes: Decision-Making, Judgment/Insight, Language Disturbance and Motor Disturbance     Depressive symptoms present: No     Eating disorder symptoms present: No     Learning disabilities, cognitive challenges, and/or developmental disorder symptoms present: Yes: Communication, Developmental Incidents, Functional Impairments, Mood, Self-Regulation and Sensory      Manic/hypomanic symptoms present: No     Personality and interpersonal functioning difficulties present : Yes: Displaces Blame, Emotional Deregulation, Impaired Impulse Control and Impaired Interpersonal Functioning     Psychosis symptoms present: No       Sleep difficulties present: Yes: Difficulty staying sleep      Substance abuse disorder symptoms present: No      Trauma and stressor related symptoms present: No     Mental Status Exam   Affect: Appropriate and engaged  Appearance: Appropriate    Attention Span/Concentration: Attentive?    Eye Contact: Variable   Fund of Knowledge: Delayed    Language /Speech Content: fluent   Language /Speech Volume: Normal    Language /Speech Rate/Productions: takes time to process and answer most questions    Recent Memory: Variable   Remote Memory: Variable   Mood: Normal    Orientation to Person: Yes    Orientation to Place: Yes   Orientation to Time of Day: Yes    Orientation to Date: Yes    Situation (Do they understand why they are here?): Yes    Psychomotor Behavior: Normal    Thought Content: Clear   Thought Form: Intact      Mental Health and Substance Abuse History  History  Current and historical diagnoses or mental health concerns: adjustment/conversion disorder, Developmental Delay, Depression     Prior  services (inpatient, programmatic care, outpatient, etc) : Yes Hx of Inpatient, DBT, Group Home     History of substance abuse:  No     Prior NICOLE services (inpatient, programmatic care, detox, outpatient, etc) : No     History of commitment: No     Family history of MH/NICOLE: Unknown     Trauma history: unknown    Medication  Psychotropic medications:   No current facility-administered medications for this encounter.     Current Outpatient Medications   Medication     acetaminophen (TYLENOL) 325 MG tablet     alum & mag hydroxide-simethicone (MAALOX  ES) 400-400-40 MG/5ML SUSP suspension     aspirin (ASA) 81 MG chewable tablet     calcium polycarbophil (FIBERCON) 625 MG tablet     clonazePAM (KLONOPIN) 0.5 MG tablet     cloNIDine (CATAPRES) 0.1 MG tablet     diclofenac (VOLTAREN) 1 % topical gel     escitalopram (LEXAPRO) 20 MG tablet     famotidine (PEPCID) 40 MG tablet     hydrocortisone 2.5 % cream     levothyroxine (SYNTHROID/LEVOTHROID) 50 MCG tablet     medroxyPROGESTERone (PROVERA) 10 MG tablet     melatonin 3 MG tablet     melatonin 3 MG tablet     menthol-zinc oxide (CALMOSEPTINE) 0.44-20.6 % OINT ointment     multivitamins w/minerals (CERTAVITE) liquid     nystatin (NYSTOP) 593373 UNIT/GM external powder     ondansetron (ZOFRAN-ODT) 4 MG ODT tab     polyethylene glycol (MIRALAX) 17 g packet     pravastatin (PRAVACHOL) 40 MG tablet     pseudoePHEDrine (SUDAFED) 30 MG tablet     Vitamins A & D (VITAMIN A & D) external ointment       Current Care Team  Primary Care Provider: Sarahi Babcock    Psychiatrist:Unknown, unable to reach guardian.     Therapist: No    :Unknown, unable to reach guardian.     CTSS or ARMHS: Unknown, unable to reach guardian.     ACT Team: Unknown, unable to reach guardian.     Other:Unknown, unable to reach guardian.     Biopsychosocial Information    Socioeconomic Information  [facility] situation[facility].   [facility]  [address], [place], [place], [address]  Phone: [phone]     Employment/income source:  unemployed     Relevant legal issues: no     Cultural, Holiness, or spiritual influences on mental health care: none noted     Is the patient active in the  or a : no        Relevant Medical Concerns   Patient identifies concerns with completing ADLs? No      Patient can ambulate independently? Yes      Other medical concerns? hypercholesterolemia, hypertension, obesity     History of concussion or TBI? 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 Patient response to intervention: Positive.      Disposition  Recommended disposition: Individual Therapy and Medication Management      Reviewed case and recommendations with attending provider. Attending Name: Dr. Urban      Attending concurs with disposition: Yes      Patient concurs with disposition: Yes      Guardian concurs with disposition: Unable to get in contact with guardian     Final disposition: Group home: return to previous living situation .         Clinical Substantiation of Recommendations   Rationale with supporting factors for disposition and diagnosis.     Pt reports that their is an hour time gap to when the evening staff leave and the night staff come to work. She was feeling upset during that time and called 9-1-1. Pt has a Hx of intellectual disabilities and is considered at baseline.  When asked about her plan to end her life she reported that she was thinking of jumping off the stairs but has never tried to harm herself. After talking with Pt, she reported she was not going to harm herself and that she wanted to go home. Pt participated in safety plan. Pt. Does not seem at any risk for suicide, she was very upset today about how her sister treated her and she struggles with emotional regulation. Pt will return to the  and previous services.       Assessment Details  Patient interview started at: 12:06 am and completed at: 12:22 am.    Total duration spent on the patient case in minutes: .50 hrs     CPT code(s) utilized: 57715 - Psychotherapy for Crisis - 60 (30-74*) min       Aftercare and Safety Planning  Follow up plans with MH/NICOLE services: No      Aftercare plan placed in the AVS and provided to patient: Yes. Given to patient by bedside RN    Patricia Queen, [facility]      Aftercare Plan  If I am feeling unsafe or I am in a crisis, I will:   Contact my established care providers   Call the [place] Suicide Prevention Lifeline: [phone]   Go to the nearest emergency room   Call 911     Warning signs that I or other people might notice when a crisis is developing for me: when Im fighting with my sister, or having conflict with anyone.     Things I am able to do on my own to cope or help me feel better: Pt reports she likes to shop for food, watch movies, play on IPAD    Things that I am able to do with others to cope or help me feel better: Hang out with other  residence    Things I can use or do for distraction: shopping, go see a movie.    Changes I can make to support my mental health and wellness: Talk to  staff before calling 9-1-1    People in my life that I can ask for help: guardian and cousin    Your [facility] has a mental health crisis team you can call 24/7: [facility] Crisis  [phone]      Crisis Lines  Crisis Text Line  Text 745059  You will be connected with a  "trained live crisis counselor to provide support.    Por stefano, yunio  JONATHAN a 476909 o texto a 442-AYUDAME en WhatsApp    National Hope Line  1.800.SUICIDE [7270670]      Community Resources  Fast Tracker  Linking people to mental health and substance use disorder resources  [employer].SiftyNet     [place] Mental Health Warm Line  Peer to peer support  Monday thru Saturday, 12 pm to 10 pm  972.643.2088 or 0.229.344.5167  Text \"Support\" to 69260    National [place] on Mental Illness (GULSHAN)  697.794.3700 or 1.888.GULSHAN.HELPS        Mental Health Apps  My3  https://Massage Envy.org/    VirtualHopeBox  https://SiTime/apps/virtual-hope-box/      Additional Information  Today you were seen by a licensed mental health professional through Triage and Transition services, Behavioral Healthcare Providers ([facility])  for a crisis assessment in the Emergency Department at [facility].  It is recommended that you follow up with your established providers (psychiatrist, mental health therapist, and/or primary care doctor - as relevant) as soon as possible. Coordinators from [facility] will be calling you in the next 24-48 hours to ensure that you have the resources you need.  You can also contact [facility] coordinators directly at [phone]. You may have been scheduled for or offered an appointment with a mental health provider. [facility] maintains an extensive network of licensed behavioral health providers to connect patients with the services they need.  We do not charge providers a fee to participate in our referral network.  We match patients with providers based on a patient's specific needs, insurance coverage, and location.  Our first effort will be to refer you to a provider within your care system, and will utilize providers outside your care system as needed.          "

## 2022-06-24 ENCOUNTER — HOSPITAL ENCOUNTER (EMERGENCY)
Facility: CLINIC | Age: 37
Discharge: LEFT BEFORE DISCHARGE | End: 2022-06-25
Payer: MEDICARE

## 2022-06-24 VITALS
SYSTOLIC BLOOD PRESSURE: 130 MMHG | OXYGEN SATURATION: 97 % | TEMPERATURE: 98.2 F | DIASTOLIC BLOOD PRESSURE: 75 MMHG | HEART RATE: 58 BPM | RESPIRATION RATE: 16 BRPM

## 2022-06-25 ENCOUNTER — HOSPITAL ENCOUNTER (EMERGENCY)
Facility: CLINIC | Age: 37
Discharge: HOME | End: 2022-06-25
Attending: INTERNAL MEDICINE | Admitting: INTERNAL MEDICINE
Payer: MEDICARE

## 2022-06-25 ENCOUNTER — APPOINTMENT (OUTPATIENT)
Dept: CT IMAGING | Facility: CLINIC | Age: 37
End: 2022-06-25
Attending: INTERNAL MEDICINE
Payer: MEDICARE

## 2022-06-25 VITALS
HEIGHT: 68 IN | DIASTOLIC BLOOD PRESSURE: 72 MMHG | SYSTOLIC BLOOD PRESSURE: 121 MMHG | TEMPERATURE: 98.7 F | OXYGEN SATURATION: 97 % | RESPIRATION RATE: 18 BRPM | BODY MASS INDEX: 50.48 KG/M2 | HEART RATE: 65 BPM

## 2022-06-25 DIAGNOSIS — R42 DIZZINESS: ICD-10-CM

## 2022-06-25 LAB
ALBUMIN UR-MCNC: NEGATIVE MG/DL
ANION GAP SERPL CALCULATED.3IONS-SCNC: 3 MMOL/L (ref 3–14)
APPEARANCE UR: CLEAR
BASOPHILS # BLD AUTO: 0 10E3/UL (ref 0–0.2)
BASOPHILS NFR BLD AUTO: 0 %
BILIRUB UR QL STRIP: NEGATIVE
BUN SERPL-MCNC: 27 MG/DL (ref 7–30)
CALCIUM SERPL-MCNC: 8.6 MG/DL (ref 8.5–10.1)
CHLORIDE BLD-SCNC: 107 MMOL/L (ref 94–109)
CO2 SERPL-SCNC: 30 MMOL/L (ref 20–32)
COLOR UR AUTO: ABNORMAL
CREAT SERPL-MCNC: 1.04 MG/DL (ref 0.52–1.04)
EOSINOPHIL # BLD AUTO: 0.1 10E3/UL (ref 0–0.7)
EOSINOPHIL NFR BLD AUTO: 1 %
ERYTHROCYTE [DISTWIDTH] IN BLOOD BY AUTOMATED COUNT: 13.1 % (ref 10–15)
GFR SERPL CREATININE-BSD FRML MDRD: 71 ML/MIN/1.73M2
GLUCOSE BLD-MCNC: 82 MG/DL (ref 70–99)
GLUCOSE UR STRIP-MCNC: NEGATIVE MG/DL
HCT VFR BLD AUTO: 41.1 % (ref 35–47)
HGB BLD-MCNC: 12.2 G/DL (ref 11.7–15.7)
HGB UR QL STRIP: NEGATIVE
HOLD SPECIMEN: NORMAL
HOLD SPECIMEN: NORMAL
IMM GRANULOCYTES # BLD: 0 10E3/UL
IMM GRANULOCYTES NFR BLD: 1 %
KETONES UR STRIP-MCNC: NEGATIVE MG/DL
LEUKOCYTE ESTERASE UR QL STRIP: ABNORMAL
LYMPHOCYTES # BLD AUTO: 1.7 10E3/UL (ref 0.8–5.3)
LYMPHOCYTES NFR BLD AUTO: 24 %
MCH RBC QN AUTO: 27.6 PG (ref 26.5–33)
MCHC RBC AUTO-ENTMCNC: 29.7 G/DL (ref 31.5–36.5)
MCV RBC AUTO: 93 FL (ref 78–100)
MONOCYTES # BLD AUTO: 0.4 10E3/UL (ref 0–1.3)
MONOCYTES NFR BLD AUTO: 6 %
NEUTROPHILS # BLD AUTO: 4.8 10E3/UL (ref 1.6–8.3)
NEUTROPHILS NFR BLD AUTO: 68 %
NITRATE UR QL: NEGATIVE
NRBC # BLD AUTO: 0 10E3/UL
NRBC BLD AUTO-RTO: 0 /100
PH UR STRIP: 6 [PH] (ref 5–7)
PLATELET # BLD AUTO: 161 10E3/UL (ref 150–450)
POTASSIUM BLD-SCNC: 4.5 MMOL/L (ref 3.4–5.3)
RBC # BLD AUTO: 4.42 10E6/UL (ref 3.8–5.2)
RBC URINE: <1 /HPF
SODIUM SERPL-SCNC: 140 MMOL/L (ref 133–144)
SP GR UR STRIP: 1.02 (ref 1–1.03)
TROPONIN I SERPL HS-MCNC: 4 NG/L
UROBILINOGEN UR STRIP-MCNC: NORMAL MG/DL
WBC # BLD AUTO: 7 10E3/UL (ref 4–11)
WBC URINE: 2 /HPF

## 2022-06-25 PROCEDURE — 85025 COMPLETE CBC W/AUTO DIFF WBC: CPT | Performed by: INTERNAL MEDICINE

## 2022-06-25 PROCEDURE — G1010 CDSM STANSON: HCPCS

## 2022-06-25 PROCEDURE — 250N000013 HC RX MED GY IP 250 OP 250 PS 637: Performed by: INTERNAL MEDICINE

## 2022-06-25 PROCEDURE — 80048 BASIC METABOLIC PNL TOTAL CA: CPT | Performed by: INTERNAL MEDICINE

## 2022-06-25 PROCEDURE — 36415 COLL VENOUS BLD VENIPUNCTURE: CPT | Performed by: INTERNAL MEDICINE

## 2022-06-25 PROCEDURE — 93005 ELECTROCARDIOGRAM TRACING: CPT

## 2022-06-25 PROCEDURE — 84484 ASSAY OF TROPONIN QUANT: CPT | Performed by: INTERNAL MEDICINE

## 2022-06-25 PROCEDURE — 99285 EMERGENCY DEPT VISIT HI MDM: CPT | Mod: 25

## 2022-06-25 PROCEDURE — 81001 URINALYSIS AUTO W/SCOPE: CPT | Performed by: INTERNAL MEDICINE

## 2022-06-25 RX ORDER — MECLIZINE HYDROCHLORIDE 25 MG/1
25 TABLET ORAL ONCE
Status: COMPLETED | OUTPATIENT
Start: 2022-06-25 | End: 2022-06-25

## 2022-06-25 RX ORDER — MECLIZINE HYDROCHLORIDE 25 MG/1
25 TABLET ORAL 3 TIMES DAILY PRN
Qty: 15 TABLET | Refills: 0 | Status: SHIPPED | OUTPATIENT
Start: 2022-06-25 | End: 2022-07-31

## 2022-06-25 RX ADMIN — MECLIZINE HYDROCHLORIDE 25 MG: 25 TABLET ORAL at 21:33

## 2022-06-25 NOTE — ED TRIAGE NOTES
"Pt resides in a Group Home, had an unwitnessed fall yesterday she reports \"feeling dizzy and hitting the back of her head the wall behind her then she landed on the ground right sided.\" She was brought in yesterday by Group Home staff and left due to the wait time. Dizziness persists today and she called EMS because no staff were present at the group home.      Triage Assessment     Row Name 06/25/22 0712       Triage Assessment (Adult)    Airway WDL WDL       Respiratory WDL    Respiratory WDL WDL       Skin Circulation/Temperature WDL    Skin Circulation/Temperature WDL WDL       Cardiac WDL    Cardiac WDL WDL       Peripheral/Neurovascular WDL    Peripheral Neurovascular WDL WDL       Cognitive/Neuro/Behavioral WDL    Cognitive/Neuro/Behavioral WDL WDL              "

## 2022-06-25 NOTE — ED TRIAGE NOTES
Pain in back of neck and ears, gerald breasts x1 week. Dizziness earlier today. Denies fevers. Pt poor historian. Tylenol taken just PTA. Denies N/V/D. Pt vaccinated for covid and influenza.

## 2022-06-25 NOTE — ED NOTES
Bed: ED27  Expected date: 6/25/22  Expected time: 6:37 PM  Means of arrival:   Comments:  BV3- 37yoF

## 2022-06-26 NOTE — ED NOTES
Writer spoke with Joanna, patient's guardian and sister. Joanna advised of patient's status in the ER, Joanna verbalized understanding and reports that patient has been increasing her attention seeking behavior recently. Joanna denied having any additional questions from writer and informed writer that the patient usually gets a medicab home through social security/insurance.

## 2022-06-26 NOTE — ED NOTES
Attempted to call group home, no answer. Attempted to call  listed in demographics and that number is not in service.

## 2022-06-26 NOTE — ED PROVIDER NOTES
History     Chief Complaint:  Dizziness and Fall       HPI   Dionne Perez is a 37 year old female who presents with a number of symptoms.  Patient says that yesterday she got dizzy and fell.  She cannot describe to me anymore what the dizziness was like.  She did hit her head and seems to says she got knocked out.  Since then she continues to not feel well.  She has a headache.  She does have pain in the chest.  She continues to feel dizzy.  She came to the emergency department last evening but left because the wait was too long.    ROS:  Review of Systems   All other systems reviewed and are negative.      Allergies:  Ibuprofen     Medications:    meclizine (ANTIVERT) 25 MG tablet  acetaminophen (TYLENOL) 325 MG tablet  alum & mag hydroxide-simethicone (MAALOX  ES) 400-400-40 MG/5ML SUSP suspension  aspirin (ASA) 81 MG chewable tablet  calcium polycarbophil (FIBERCON) 625 MG tablet  clonazePAM (KLONOPIN) 0.5 MG tablet  cloNIDine (CATAPRES) 0.1 MG tablet  diclofenac (VOLTAREN) 1 % topical gel  escitalopram (LEXAPRO) 20 MG tablet  famotidine (PEPCID) 40 MG tablet  hydrocortisone 2.5 % cream  levothyroxine (SYNTHROID/LEVOTHROID) 50 MCG tablet  medroxyPROGESTERone (PROVERA) 10 MG tablet  melatonin 3 MG tablet  melatonin 3 MG tablet  menthol-zinc oxide (CALMOSEPTINE) 0.44-20.6 % OINT ointment  multivitamins w/minerals (CERTAVITE) liquid  nystatin (NYSTOP) 185454 UNIT/GM external powder  ondansetron (ZOFRAN-ODT) 4 MG ODT tab  polyethylene glycol (MIRALAX) 17 g packet  pravastatin (PRAVACHOL) 40 MG tablet  pseudoePHEDrine (SUDAFED) 30 MG tablet  Vitamins A & D (VITAMIN A & D) external ointment        Past Medical History:    Past Medical History:   Diagnosis Date     Depressive disorder      Gastroesophageal reflux disease      High cholesterol      Knee pain      Thyroid disease      Mild mental retardation       History of hypercholesterolemia       History of hypothyroidism       Cellulitis of leg   hospitalized  "  Hx of obesity           Past Surgical History:    Past Surgical History:   Procedure Laterality Date     IR LUMBAR PUNCTURE  6/9/2020        Family History:    family history is not on file.    Social History:   reports that she has never smoked. She has never used smokeless tobacco. She reports that she does not drink alcohol and does not use drugs.  PCP: [facility], [facility] [place]     Physical Exam     Patient Vitals for the past 24 hrs:   BP Temp Temp src Pulse Resp SpO2 Height   06/25/22 2145 -- -- -- -- -- 97 % --   06/25/22 2130 119/64 -- -- -- -- -- --   06/25/22 1915 -- -- -- -- -- 100 % --   06/25/22 1900 -- -- -- -- -- 100 % --   06/25/22 1847 121/75 98.7  F (37.1  C) Oral 56 18 100 % 1.727 m (5' 8\")        Physical Exam  Constitutional:       Comments: Pleasant and cooperative   HENT:      Head:      Comments: Marked tenderness to even superficial palpation in the occipital area     Mouth/Throat:      Pharynx: No posterior oropharyngeal erythema.   Eyes:      Conjunctiva/sclera: Conjunctivae normal.   Cardiovascular:      Rate and Rhythm: Normal rate and regular rhythm.      Heart sounds: Normal heart sounds.   Pulmonary:      Effort: Pulmonary effort is normal.      Breath sounds: Normal breath sounds.   Abdominal:      General: Bowel sounds are normal. There is no distension.      Palpations: Abdomen is soft.      Tenderness: There is no abdominal tenderness. There is no guarding or rebound.   Musculoskeletal:         General: Normal range of motion.      Cervical back: Neck supple.   Skin:     General: Skin is warm and dry.      Comments: Venous stasis changes of the lower extremities   Neurological:      Mental Status: She is alert.         Emergency Department Course   ECG:  ECG results from 06/25/22   EKG 12 lead     Value    Systolic Blood Pressure     Diastolic Blood Pressure     Ventricular Rate 54    Atrial Rate 54    NE Interval 132    QRS Duration 90        QTc 443    P Axis 54    " R AXIS 21    T Axis 20    Interpretation ECG      Sinus bradycardia  Otherwise normal ECG  No previous ECGs available         Imaging:  Head CT w/o contrast   Final Result   IMPRESSION:   1.  No acute intracranial process.         Report per radiology    Laboratory:  Labs Ordered and Resulted from Time of ED Arrival to Time of ED Departure   ROUTINE UA WITH MICROSCOPIC REFLEX TO CULTURE - Abnormal       Result Value    Color Urine Light Yellow      Appearance Urine Clear      Glucose Urine Negative      Bilirubin Urine Negative      Ketones Urine Negative      Specific Gravity Urine 1.022      Blood Urine Negative      pH Urine 6.0      Protein Albumin Urine Negative      Urobilinogen Urine Normal      Nitrite Urine Negative      Leukocyte Esterase Urine Trace (*)     RBC Urine <1      WBC Urine 2     CBC WITH PLATELETS AND DIFFERENTIAL - Abnormal    WBC Count 7.0      RBC Count 4.42      Hemoglobin 12.2      Hematocrit 41.1      MCV 93      MCH 27.6      MCHC 29.7 (*)     RDW 13.1      Platelet Count 161      % Neutrophils 68      % Lymphocytes 24      % Monocytes 6      % Eosinophils 1      % Basophils 0      % Immature Granulocytes 1      NRBCs per 100 WBC 0      Absolute Neutrophils 4.8      Absolute Lymphocytes 1.7      Absolute Monocytes 0.4      Absolute Eosinophils 0.1      Absolute Basophils 0.0      Absolute Immature Granulocytes 0.0      Absolute NRBCs 0.0     BASIC METABOLIC PANEL - Normal    Sodium 140      Potassium 4.5      Chloride 107      Carbon Dioxide (CO2) 30      Anion Gap 3      Urea Nitrogen 27      Creatinine 1.04      Calcium 8.6      Glucose 82      GFR Estimate 71     TROPONIN I - Normal    Troponin I High Sensitivity 4          Emergency Department Course:    Mental Health Risk Assessment      PSS-3    Date and Time Over the past 2 weeks have you felt down, depressed, or hopeless? Over the past 2 weeks have you had thoughts of killing yourself? Have you ever attempted to kill yourself?  When did this last happen? User   06/25/22 1854 yes no yes within the last month (but not today) WMA      C-SSRS ([place])    Date and Time Q1 Wished to be Dead (Past Month) Q2 Suicidal Thoughts (Past Month) Q3 Suicidal Thought Method Q4 Suicidal Intent without Specific Plan Q5 Suicide Intent with Specific Plan Q6 Suicide Behavior (Lifetime) Within the Past 3 Months? RETIRED: Level of Risk per Screen Screening Not Complete User   06/25/22 1854 yes yes no no no yes -- -- -- WMA                Interventions:  Medications   meclizine (ANTIVERT) tablet 25 mg (25 mg Oral Given 6/25/22 2133)        Disposition:  The patient was discharged to home.     Impression & Plan      Medical Decision Making:    Dionne Perez is a 37 year old female who presents to the emergency department after a fall yesterday.  She had a number of concerns, mostly headache and dizziness.  Fortunately CT shows no evidence of intracranial injury.  She mentions pain in the chest.  There is no evidence that this is of cardiac etiology.  No indication of an acute infectious process.  Patient mentioned on screening that she had had suicidal ideation.  She was seen in this emergency department Elva 10 and assessed for that.  She indicates that she continues to be stable.  The nurse spoke with the patient's legal guardian.  We will discharge her back to her group home with meclizine as needed, close follow-up with primary care.    Diagnosis:    ICD-10-CM    1. Dizziness  R42         Discharge Medications:  New Prescriptions    MECLIZINE (ANTIVERT) 25 MG TABLET    Take 1 tablet (25 mg) by mouth 3 times daily as needed for dizziness        6/25/2022   Hoda Mrea MD [place], Susan Gail, MD  06/25/22 6036

## 2022-06-27 LAB
ATRIAL RATE - MUSE: 54 BPM
DIASTOLIC BLOOD PRESSURE - MUSE: NORMAL MMHG
INTERPRETATION ECG - MUSE: NORMAL
P AXIS - MUSE: 54 DEGREES
PR INTERVAL - MUSE: 132 MS
QRS DURATION - MUSE: 90 MS
QT - MUSE: 468 MS
QTC - MUSE: 443 MS
R AXIS - MUSE: 21 DEGREES
SYSTOLIC BLOOD PRESSURE - MUSE: NORMAL MMHG
T AXIS - MUSE: 20 DEGREES
VENTRICULAR RATE- MUSE: 54 BPM

## 2022-06-30 ENCOUNTER — HOSPITAL ENCOUNTER (EMERGENCY)
Facility: CLINIC | Age: 37
Discharge: HOME | End: 2022-06-30
Attending: EMERGENCY MEDICINE | Admitting: EMERGENCY MEDICINE
Payer: MEDICARE

## 2022-06-30 VITALS
OXYGEN SATURATION: 99 % | DIASTOLIC BLOOD PRESSURE: 51 MMHG | SYSTOLIC BLOOD PRESSURE: 113 MMHG | RESPIRATION RATE: 16 BRPM | HEART RATE: 50 BPM | TEMPERATURE: 99 F

## 2022-06-30 DIAGNOSIS — R45.851 VERBALIZES SUICIDAL THOUGHTS: ICD-10-CM

## 2022-06-30 PROCEDURE — 99285 EMERGENCY DEPT VISIT HI MDM: CPT | Mod: 25

## 2022-06-30 PROCEDURE — 90791 PSYCH DIAGNOSTIC EVALUATION: CPT

## 2022-06-30 ASSESSMENT — ENCOUNTER SYMPTOMS
HALLUCINATIONS: 1
SLEEP DISTURBANCE: 1

## 2022-06-30 NOTE — ED PROVIDER NOTES
History   Chief Complaint:  Suicidal    The history is provided by the patient and medical records.      Dionne Perez is a 37 year old female with history of intellectual disability and mental illness who presents via EMS with suicidal ideation. Last night Dionne got into an argument talking with her sister on the phone. Afterwards she thought about killing herself by jumping off of the staircase in the group home where she lives. Today she had a second visit with a new psychologist and discussed these feelings which prompted her visit. She thinks she is still suicidal but feels she can keep herself safe in the ER. She experiences hallucinations of someone talking to her, although she can remember what they said. She has some discomfort from a bug bite which made it difficult to sleep. Her group home helps her with her medications. She denies homicidal ideation.    Dionne has had therapy in the past but cannot recall ever learning coping strategies for when she gets frustrated with her sister.     Review of Systems   Psychiatric/Behavioral: Positive for hallucinations, sleep disturbance and suicidal ideas.   All other systems reviewed and are negative.    Allergies:  Ibuprofen    Medications:  Maalox Es  Clonazepam   Clonidine   Escitalopram  Famotidine  Docusate sodium  Famotidine   Trazodone   Levothyroxine  Meclizine  Medroxyprogesterone   Melatonin  Ondansetron   Omeprazole   Pravastatin   Pseudoephedrine     Past Medical History:     Altered mental status   Mild mental retardation  Hyperlipidemia   Hypothyroidism  Obesity  Suicidal ideation  Insomnia  Chronic abdominal pain  Anemia  Adjustment reaction with aggression  MDD  Gallstone pancreatitis  Biliary calculus   Sinus bradycardia, chronic  Hypertension  Anxiety   Amenorrhea  GERD    Past Surgical History:    IR lumbar puncture   Tonsillectomy     Family History:    Breast cancer  Lung cancer    Social History:  Living Situation: group home  Her  sister is her legal guardian  The patient presents via EMS.    Physical Exam     Patient Vitals for the past 24 hrs:   BP Temp Temp src Pulse Resp SpO2   06/30/22 1221 113/51 99  F (37.2  C) Oral 50 16 99 %       Physical Exam  General: WD/WN; well appearing young  woman; cooperative  Head:  Atraumatic  Eyes:  Conjunctivae, lids, and sclerae are normal  Neck:  Supple; normal ROM  Resp:  No respiratory distress  GI:  Nondistended    MS:  Normal ROM  Skin:  Warm; non-diaphoretic; no pallor; tiny papule/bug bite on the right lower abdominal wall without cellulitis or abscess  Neuro: Awake; A&Ox3  Psych:  Dysphoric mood and flat, blunted affect; normal speech; suicidal thought content; not responding to internal stimuli  Vitals reviewed.    Emergency Department Course   Emergency Department Course:  Reviewed:  I reviewed nursing notes, vitals, past medical history, and Care Everywhere.    Assessments:  1226 I obtained history and examined the patient as noted above.   1406 I rechecked the patient.  She reports the bug bite is bothering her. However, she is no longer feeling suicidal and feels she can keep herself safe at home.     Consults:  7232 I spoke with Alex from DEC after his assessment of the patient.     Disposition:  The patient was discharged to home.     Impression & Plan     Medical Decision Making:  Dionne is a 37-year-old woman with mild mental retardation and mental illness who got into an argument with her sister yesterday which made her feel like she wanted to kill herself by jumping off a group home staircase.  She did not act on this last night nor today but was sent here by her psychologist when she discussed these feelings in her therapy session.  On arrival she has no other complaints aside from a bug bite which is bothering her on her abdominal wall.  This is very small and there is no evidence of abscess or cellulitis.  We did place bacitracin and a Band-Aid.  She was placed on a  health officer hold in order to facilitate evaluation but contracted for safety while in the room.  She was seen by DEC who agrees she is appropriate for discharge as she can adhere to a safety contract and aftercare plan.  DEC did speak with her sister who is her guardian and agrees with plan for discharge.  Her group home is also in agreement.  I believe she is safe for discharge without imminent risk of harming herself or others.  However, I did instruct Dionne to return to the emergency department if she feels she cannot adhere to the aftercare plan that was outlined in her discharge summary or if she has any other concerns.  DEC did remind her of coping strategies she has been taught to use when she gets frustrated with her sister and I recommended she use these if they get into another argument.  All questions answered.  Amenable to discharge.    Diagnosis:    ICD-10-CM    1. Verbalizes suicidal thoughts  R45.851        Discharge Medications:  Discharge Medication List as of 6/30/2022  2:42 PM          Scribe Disclosure:  I, Sally Foreman, am serving as a scribe at 12:22 PM on 6/30/2022 to document services personally performed by Caroline Sellers MD based on my observations and the provider's statements to me.          Caroline Sellers MD  07/05/22 1014

## 2022-06-30 NOTE — CONSULTS
Diagnostic Evaluation Consultation  Crisis Assessment    Patient was assessed: remote  Patient location: [facility] ER  Was a release of information signed: No. Reason: Pt's legal guardian was not present in the ER.      Referral Data and Chief Complaint  Dionne Perez is a 37 year old, who uses she/her pronouns, and presents to the ED via EMS. Patient is referred to the ED by community provider(s). Patient is presenting to the ED for the following concerns: suicidal ideations.  Pt has a history of depression, anxiety, suicidal ideations and intellectual disability. Pt was brought to the ER today due to suicidal ideations. Pt had seen her therapist today whom referred PT to the ER for further evaluation of safety and mental health concerns. Pt endorsed increased depression but baseline anxiety. Pt reported having normal sleep and appetite. Pt denied having acute psychosis and anjel. Pt currently denied having suicidal ideations but reported having thoughts of suicide earlier today with plan to jump off from staircase at her group home.  Pt denied having HI, SIB, history of suicide attempt and access to firearms. Pt identified having argument and getting upset with her sister yesterday when her transportation got canceled.     Informed Consent and Assessment Methods     Patient is under the guardianship of her sister, Sabra [phone].  Writer met with patient and spoke with guardian  and explained the crisis assessment process, including applicable information disclosures and limits to confidentiality, assessed understanding of the process, and obtained consent to proceed with the assessment. Patient was observed to be able to participate in the assessment as evidenced by calm, alert, oriented, engaged and cooperative.. Assessment methods included conducting a formal interview with patient, review of medical records, collaboration with medical staff, and obtaining relevant collateral information from family and  community providers when available..     Over the course of this crisis assessment provided reassurance, offered validation, engaged patient in problem solving and disposition planning, worked with patient on safety and aftercare planning, assisted in processing patient's thoughts and feeling relating to conflicts with her sister and being upset with her transportation being canceled., provided psychoeducation and facilitated family communication. Patient's response to interventions was receptive     Summary of Patient Situation  Pt exchanged greetings and made appropriate eye contact with this writer.  Pt was calm, alert, oriented, engaged and cooperative in her DEC Assessment.  Pt presented with coherent, normal rate of speech, constricted, mildly depressed and anxious affect during her assessment.  Pt shared she has been living at [facility] since .  Pt reported she and her sister who is her current legal guardian have frequent arguments which often trigger her mental health crisis.  Pt reported she and her sister had another argument yesterday over Pt's transportation being canceled.  Pt shared she became upset when her sister canceled the rides which were scheduled for her.  Pt reported her suicidal ideations were triggered yesterday after having arguments with her sister.  Pt went to see her therapist today and was referred to the ER for further evaluation of safety concerns and mental health issues.    Brief Psychosocial History  Pt reported her parents were both  and survived by 3 sisters and 2 brothers.  Pt reported she was single and unemployed.  Pt reported she has been living at [facility] since  and she liked it.  Pt identified hypertension, high cholesterol and asthma as her medical conditions.  Pt denied having legal issues.    Significant Clinical History   Pt has a history of depression, anxiety, suicidal ideations and intellectual disability.  Pt  reported a history of psychiatric hospitalization but no CD treatment.  Pt denied using alcohol and other illicit substances.  Pt reported she has been taking her psychiatric medications consistently.  Pt has established outpatient psychiatry for medication management, individual therapist and case management services.  Pt has ongoing relationship conflicts with her sister, Sabra who was also her legal guardian.  Pt has a history of suicidal ideations and multiple ER visits after having arguments with her sister.     Collateral Information  Writer called and spoke to Pt's group home [phone] staff, Jatinder who reported Pt has been doing well lately and taking her medications consistently.  Jatinder reviewed and agreed with Pt returning to their group home facility.  Writer called and spoke to Pt's sister/legal guardian, Sabra [phone].  Sabra reported she had to cancel Pt's transportation yesterday as she preferred her to use medical transportation rather than metro transit due to safety concerns.  Sabra reported Pt has a history of getting lost out in the community.  Sabra shared Pt has a history of multiple ER visits and suicidal ideations after having arguments with her.  Sabra reported Pt often become suicidal if she did not get her way and has attention seeking behavior.  Sabra reviewed and agreed with Pt returning to her group home and follow up with current outpatient mental health services.     Risk Assessment  ESS-6  1.a. Over the past 2 weeks, have you had thoughts of killing yourself? Yes  1.b. Have you ever attempted to kill yourself and, if yes, when did this last happen? No   2. Recent or current suicide plan? Yes jumping off from staircase at group home.   3. Recent or current intent to act on ideation? Yes  4. Lifetime psychiatric hospitalization? Yes  5. Pattern of excessive substance use? No  6. Current irritability, agitation, or aggression? No  Scoring note: BOTH 1a and 1b must be yes for  it to score 1 point, if both are not yes it is zero. All others are 1 point per number. If all questions 1a/1b - 6 are no, risk is negligible. If one of 1a/1b is yes, then risk is mild. If either question 2 or 3, but not both, is yes, then risk is automatically moderate regardless of total score. If both 2 and 3 are yes, risk is automatically high regardless of total score.      Score: 3, moderate risk      Does the patient have access to lethal means? No     Does the patient engage in non-suicidal self-injurious behavior (NSSI/SIB)? no     Does the patient have thoughts of harming others? No     Is the patient engaging in sexually inappropriate behavior?  no        Current Substance Abuse     Is there recent substance abuse? no     Was a urine drug screen or blood alcohol level obtained: No       Mental Status Exam     Affect: Constricted   Appearance: Appropriate    Attention Span/Concentration: Attentive  Eye Contact: Engaged   Fund of Knowledge: Appropriate and Delayed    Language /Speech Content: Fluent   Language /Speech Volume: Normal    Language /Speech Rate/Productions: Normal    Recent Memory: Variable   Remote Memory: Variable   Mood: Anxious, Apathetic, Depressed and Sad    Orientation to Person: Yes    Orientation to Place: Yes   Orientation to Time of Day: Yes    Orientation to Date: Yes    Situation (Do they understand why they are here?): Yes    Psychomotor Behavior: Normal    Thought Content: Clear and Suicidal   Thought Form: Intact      History of commitment: No       Medication    Psychotropic medications: Yes. Pt is currently taking Klonopin and Lexapro. Medication compliant: Yes. Recent medication changes: No  Medication changes made in the last two weeks: No       Current Care Team    Primary Care Provider: Yes. Name: Mono Ray PA-C . Location: [facility]  . Date of last visit: Unknown. Frequency: Unknown. Perceived helpfulness: Unknown.  Psychiatrist: No  Therapist: Yes.  Name: Unknown. Location: Unknown. Date of last visit: today, 6/30/2022. Frequency: Unknown. Perceived helpfulness: Unknown.  : Yes. Name: Ricky. Location: Unknown. Date of last visit: Unknown. Frequency: Unknown. Perceived helpfulness: Unknown.     CTSS or ARMHS: No  ACT Team: No  Other: No      Diagnosis    296.30 (F33.9) Major Depressive Disorder, Recurrent Episode, Unspecified _ and With mixed features   300.02 (F41.1) Generalized Anxiety Disorder - primary   Intellectual Disabilites  319 (F79) Unspecified Intellectual Disability (Intellectual Developmental Disorder) - primary       Clinical Summary and Substantiation of Recommendations    Pt is a 37 year old White female a history of depression, anxiety, suicidal ideations and intellectual disability. Pt was brought to the ER today due to suicidal ideations. Pt had seen her therapist today whom referred PT to the ER for further evaluation of safety and mental health concerns.  Pt endorsed increased depression but baseline anxiety.  Pt reported having normal sleep and good appetite.  Pt denied having acute psychosis and anjel.  Pt currently denied having suicidal ideations but reported having thoughts of suicide earlier today with intent and plan to jump off from staircase at her group home.  Pt denied having HI, SIB, history of suicide attempt and access to firearms.  Pt has a history of multiple ER visits and 911 calls as she become suicidal after having arguments with her sister.  Pt's current mental health crisis and her suicidal ideations appeared to be circumstantial than premeditated.  Pt was able to develop her DEC Safety plan as she felt safe to return to her group home. Pt was not imminent danger to herself or others. Pt was appropriate for group home/residential service. Caroline Sellers MD reviewed and concurred with Group home/residential service disposition.    Disposition    Recommended disposition: Individual Therapy, Medication  Management and Group Home: [facility] group home/residential       Reviewed case and recommendations with attending provider. Attending Name: Caroline Sellers MD       Attending concurs with disposition: Yes       Patient concurs with disposition: Yes       Guardian concurs with disposition: Yes      Final disposition: Individual therapy , Medication management and Group home: [facility] group home/residential .     Outpatient Details (if applicable):   Aftercare plan and appointments placed in the AVS and provided to patient: Yes. Given to patient by ER staff   Writer encourage Pt to take her medications consistently as prescribed and keep all of scheduled appointments with her outpatient service providers.  Writer recommended Pt to return to her current group home and follow up with her current outpatient mental health service providers.  DEC coordinator will contact Pt within next 1 or 2 business days to ensure coordination of care and provide assistance with appointments.     Was lethal means counseling provided as a part of aftercare planning? No;       Assessment Details    Patient interview started at: 1pm and completed at: 1:35pm.     Total duration spent on the patient case in minutes: 2.50 hrs      CPT code(s) utilized: 19001 - Psychotherapy for Crisis - 60 (30-74*) min       Alex Brown, ROBERTO CARLOS, MA, LMFT  DEC - Triage & Transition Services

## 2022-06-30 NOTE — ED TRIAGE NOTES
"Patient presents via EMS with suicidal thoughts. Patient resides at group home and was at a scheduled psychologist appointment today. While at the appointment she endorsed feeling suicidal and wanting to die. Patient had an argument with her sister last night and that is when her feelings of self harm started. While at the appointment she had no plan in place. Upon arrival to ED, when asked if she had a plan, patient initially stated, \"I just want die\". When asked again if she had a method patient stated, \"I would jump off the staircase\".  Patient has a hx of developmental delay and suicidal ideation.   "

## 2022-06-30 NOTE — ED NOTES
Patient searched. Patient remains in street clothes. Shoes removed, placed in patient belonging bag and put in locker 1. Other than cell phone, patient has no additional belongings on her. Patient colleen to safety in ER. Provided juice and box lunch. Security monitoring.

## 2022-06-30 NOTE — DISCHARGE INSTRUCTIONS
Return if you cannot adhere to the below plan including if you are having thoughts of hurting yourself.  Use coping techniques you discussed today if you get in another fight with your sister.      Aftercare Plan  If I am feeling unsafe or I am in a crisis, I will: reach out to my group home staff, friends and [facility] crisis line for their support.  Contact my established care providers   Call the [place] Suicide Prevention Lifeline: [phone]   Go to the nearest emergency room   Call 848     Writer encourage Pt to take her medications consistently as prescribed and keep all of scheduled appointments with her outpatient service providers.  Writer recommended Pt to return to her current group home and follow up with her current outpatient mental health service providers.  DEC coordinator will contact Pt within next 1 or 2 business days to ensure coordination of care and provide assistance with appointments.      Warning signs that I or other people might notice when a crisis is developing for me: increased depression, stress, arguments with sister, anxiety, suicidal ideations, disrupted sleep and appetite.    Things I am able to do on my own to cope or help me feel better: deep breathing exercise, meditation, positive affirmations, taking walks, playing carts, and board games, cooking, shopping, watching TV, movies and listening to music as distraction, doing arts and crafts.    Things that I am able to do with others to cope or help me better: socializing with my friends, playing cards and boards games,    Things I can use or do for distraction: deep breathing exercise, meditation, positive affirmations, taking walks, playing carts, and board games, cooking, shopping, watching TV, movies and listening to music as distraction, doing arts and crafts.    Changes I can make to support my mental health and wellness: practice good self-care skills, getting adequate sleep/reset, healthy diet and regular exercise,  working with therapist to improve relationship, communication and coping skills, join a peer support group through [facility] to increase positive support system.  [facility] ([place] on Mental Illness) improves the lives of children and adults with mental illnesses and their families by providing free classes on mental illnesses and support groups for adults with mental illnesses, parents and family members. For more information:  Phone: 332.898.8960  Toll free: 2-068-HDQJ-HELPS  Website: www.[employer].Sweet Credhttp://www.[facility].org/     People in my life that I can ask for help: my friends, group home staff and [facility] crisis line.    Your [facility] has a mental health crisis team you can call 24/7: [facility]  [phone]    Other things that are important when I'm in crisis: support from my friends, family and group home staff.  National Suicide Prevention Lifeline at 1-089-068-SZXR (5414)  Throughout  [place]: call **CRISIS (**478155)  Crisis Text Line: is available for free, 24/7 by texting [place] to 359351  The FreakOut at 989-801-0786  [facility] Helpline at 214-950-6401     Additional resources and information: Pt will return to her current group home and continue follow up with her outpatient psychiatry for medication management and individual therapy service to improve coping skills.    Below is a list of FREE Mental Health Options in the [facility]:    [facility] ([facility])  Serves those in emotional crisis with 24-hour, seven-day-a-week crisis counseling, assessment, referral, and medication management.   Suicidal: [phone] Consultation: [phone]  [address] [place] [address] 24/7 Crisis Intervention Center     [facility]  [phone]  Serves those in need of free outpatient mental health care  Hours: Mon, Wed, Fri 1-3pm; Mon-Thurs 6:30-8:30pm    [facility]  [address] [place] [address]  "[id number]  [phone]        Crisis Lines  Crisis Text Line  Text 864633  You will be connected with a trained live crisis counselor to provide support.    Por amadouanol, texto  JONATHAN a 734608 o texto a 442-AYUDAME en WhatsApp    [employer] (LGBTQ Youth Crisis Line)  3.365.407.5034  text START to 672-678      Community Resources  Fast Tracker  Linking people to mental health and substance use disorder resources  [employer]     [place] Mental Health Warm Line  Peer to peer support  Monday thru Saturday, 12 pm to 10 pm  877.617.8918 or 3.873.379.1282  Text \"Support\" to 39432    National [place] on Mental Illness (GULSHAN)  515.645.0584 or 1.888.GULSHAN.HELPS      Mental Health Apps  My3  https://Qbox.io/    [address]  https://Screenmailer.[employer]/apps/virtual-hope-box/      Crisis Lines  Crisis Text Line  Text 806958  You will be connected with a trained live crisis counselor to provide support.    Por espanol, texto  JONATHAN a 520624 o texto a 442-AYUDAME en WhatsApp    National Hope Line  1.800.SUICIDE [1392071]      Community Resources  Fast Tracker  Linking people to mental health and substance use disorder resources  [employer]     [place] Mental Health Warm Line  Peer to peer support  Monday thru Saturday, 12 pm to 10 pm  182.964.0979 or 5.969.730.5967  Text \"Support\" to 73383    National [place] on Mental Illness (GULSHAN)  589.563.0606 or 1.888.GULSHAN.HELPS      Mental Health Apps  My3  https://[employer].org/    [address]  https://Screenmailer.org/apps/virtual-hope-box/      Additional Information  Today you were seen by a licensed mental health professional through Triage and Transition services, [facility] ([facility])  for a crisis assessment in the Emergency Department at [facility].  It is recommended that you follow up with your established providers (psychiatrist, mental health therapist, and/or primary care doctor - as relevant) as soon as " possible. Coordinators from [facility] will be calling you in the next 24-48 hours to ensure that you have the resources you need.  You can also contact [facility] coordinators directly at [phone]. You may have been scheduled for or offered an appointment with a mental health provider. [facility] maintains an extensive network of licensed behavioral health providers to connect patients with the services they need.  We do not charge providers a fee to participate in our referral network.  We match patients with providers based on a patient's specific needs, insurance coverage, and location.  Our first effort will be to refer you to a provider within your care system, and will utilize providers outside your care system as needed.

## 2022-07-06 ENCOUNTER — HOSPITAL ENCOUNTER (EMERGENCY)
Facility: CLINIC | Age: 37
Discharge: HOME | End: 2022-07-06
Attending: PHYSICIAN ASSISTANT | Admitting: PHYSICIAN ASSISTANT
Payer: MEDICARE

## 2022-07-06 ENCOUNTER — APPOINTMENT (OUTPATIENT)
Dept: GENERAL RADIOLOGY | Facility: CLINIC | Age: 37
End: 2022-07-06
Attending: PHYSICIAN ASSISTANT
Payer: MEDICARE

## 2022-07-06 VITALS
RESPIRATION RATE: 16 BRPM | SYSTOLIC BLOOD PRESSURE: 131 MMHG | OXYGEN SATURATION: 99 % | DIASTOLIC BLOOD PRESSURE: 75 MMHG | TEMPERATURE: 97.9 F | HEART RATE: 62 BPM

## 2022-07-06 DIAGNOSIS — M79.672 LEFT FOOT PAIN: ICD-10-CM

## 2022-07-06 PROCEDURE — 99283 EMERGENCY DEPT VISIT LOW MDM: CPT

## 2022-07-06 PROCEDURE — 73650 X-RAY EXAM OF HEEL: CPT | Mod: LT

## 2022-07-06 PROCEDURE — 250N000013 HC RX MED GY IP 250 OP 250 PS 637: Performed by: PHYSICIAN ASSISTANT

## 2022-07-06 RX ORDER — ACETAMINOPHEN 500 MG
1000 TABLET ORAL ONCE
Status: COMPLETED | OUTPATIENT
Start: 2022-07-06 | End: 2022-07-06

## 2022-07-06 RX ADMIN — ACETAMINOPHEN 1000 MG: 500 TABLET, FILM COATED ORAL at 22:06

## 2022-07-06 ASSESSMENT — ENCOUNTER SYMPTOMS
FEVER: 0
CHILLS: 0

## 2022-07-07 NOTE — ED TRIAGE NOTES
Pt arrives EMS from group home. L heel pain for last few months. Pt able to walk. Pain worse with ambulation. CMS in tact. A/Ox4

## 2022-07-07 NOTE — ED NOTES
Update to pt's group home. They are requesting medical transport as the staff is unable to leave to  pt.

## 2022-07-07 NOTE — ED TRIAGE NOTES
"Writer contacted Awa and Sabra which are sisters of pt and legal guardians. They both reported this is a pattern from the patient to seek care when no medical emergency is present and both state \"there wont be anything wrong. Sabra verifies consent to treat.       "

## 2022-07-07 NOTE — ED PROVIDER NOTES
History   Chief Complaint:  Foot Pain       HPI   Dionne Perez is a 37 year old female with history of intellectual disability who presents with foot pain. The patient reports she has had left heel pain over the last few months. She notes that it hurts to stand on it. The patient denies falls, trauma or twisting of her ankle. No fever or chills.     Review of Systems   Constitutional: Negative for chills and fever.   Musculoskeletal:        Left ankle pain   All other systems reviewed and are negative.    Allergies:  Ibuprofen    Medications:  Maalox Es  Clonazepam  Clonidine   Escitalopram   Famotidine  Trazodone  Levothyroxine  Meclizine  Medroxyprogesterone  Melatonin  Ondansetron  Omeprazole   Pravastatin  Pseudoepinephrine     Past Medical History:     Altered mental status  Mild mental retardation  Hyperlipidemia  Hypothyroidism  Obesity   SI  Insomnia  Chronic abdominal pain  Anemia  Adjustment reaction with aggression   MDD  Gallstone pancreatitis  Biliary calculus  Sinus bradycardia, chronic  Hypertension  Anxiety  Amenorrhea  GERD    Past Surgical History:    IR lumbar puncture  Tonsillectomy     Family History:    Breast cancer   Lung cancer     Social History:  The patient presents to the ED with EMS  Living Situation: Lives in a group home  PCP: Bryanna Vences MD    Physical Exam     Patient Vitals for the past 24 hrs:   BP Temp Temp src Pulse Resp SpO2   07/06/22 2016 131/75 97.9  F (36.6  C) Temporal 62 16 99 %       Physical Exam  Constitutional: Pleasant. Cooperative.  Eyes: Pupils equally round  HENT: Head is normal in appearance. Oropharynx is normal with moist mucus membranes.  Cardiovascular: Regular rate and rhythm without murmurs.  Respiratory: Normal respiratory effort, lungs clear to auscultation  Musculoskeletal: Full ROM of bilateral lower extremities. Pinpoint area of TTP to medial aspect of sole of foot around region of anterior calcaneous. <2 sec cap refill to all toes.  Skin:  Normal. No erythema or wound. No rash.   Neurologic: Cranial nerves grossly intact, normal cognition, no apparent deficits. Sensation intact to bilateral lower extremities.  Psychiatric: Normal affect.  Nursing notes and vital signs reviewed.    Emergency Department Course   Imaging:  XR Calcaneus Left G/E 2 Views   Final Result   IMPRESSION: No acute displaced left calcaneus fracture. No radiographic evidence for left calcaneus stress fracture. Normal subtalar joint space. Tiny heel spur with small enthesophyte at the distal Achilles tendon insertion on the calcaneus.        Emergency Department Course:    Reviewed:  I reviewed nursing notes, vitals, past medical history and Care Everywhere    Assessments:  2040 I obtained history and examined the patient as noted above.   2205 I rechecked the patient and explained findings.     Interventions:  Medications   acetaminophen (TYLENOL) tablet 1,000 mg (1,000 mg Oral Given 7/6/22 2206)       Disposition:  The patient was discharged to home.     Impression & Plan   Medical Decision Making:  Dionne Perez is a 37 year old female who presents to the ED for evaluation of foot pain that is present in the past few months.  No falls or trauma.  Pain is present when standing.  Patient cannot recall any puncture wounds to foot or stepping on anything.  See HPI as above for additional details.  Vitals and physical exam as above.  Imaging obtained as above is negative for acute bony abnormality.  Suspect soft tissue injury.  No evidence for cellulitis or any other puncture wound to the foot.  Patient provided postop shoe with resolution of her symptoms.  [place] patient was safe for discharged home. Discussed reasons to return. All questions answered. Patient discharged to home in stable condition.    Diagnosis:    ICD-10-CM    1. Left foot pain  M79.672        Discharge Medications:  New Prescriptions    No medications on file       Scribe Disclosure:  Sanchez BARKLEY am  serving as a scribe at 8:32 PM on 7/6/2022 to document services personally performed by Fei Mckeon PA-C based on my observations and the provider's statements to me.     This record was created at least in part using electronic voice recognition software, so please excuse any typographical errors.         Fei Mckeon PA-C  07/06/22 5966

## 2022-07-10 ENCOUNTER — HOSPITAL ENCOUNTER (EMERGENCY)
Facility: CLINIC | Age: 37
Discharge: HOME | End: 2022-07-10
Attending: EMERGENCY MEDICINE | Admitting: EMERGENCY MEDICINE
Payer: MEDICARE

## 2022-07-10 VITALS
HEART RATE: 56 BPM | SYSTOLIC BLOOD PRESSURE: 113 MMHG | DIASTOLIC BLOOD PRESSURE: 74 MMHG | RESPIRATION RATE: 16 BRPM | OXYGEN SATURATION: 97 %

## 2022-07-10 DIAGNOSIS — L30.4 INTERTRIGINOUS DERMATITIS ASSOCIATED WITH MOISTURE: ICD-10-CM

## 2022-07-10 PROCEDURE — 99283 EMERGENCY DEPT VISIT LOW MDM: CPT

## 2022-07-10 RX ORDER — ZINC OXIDE 200 MG/G
1 PASTE TOPICAL
Qty: 170 G | Refills: 0 | Status: SHIPPED | OUTPATIENT
Start: 2022-07-10 | End: 2022-07-31

## 2022-07-10 NOTE — ED TRIAGE NOTES
Came in via EMS. Was at [facility] and had a rash under the right pannus/groin that was more painful than usual so pt called EMS. Lives in [place] at [facility].     Triage Assessment     Row Name 07/10/22 0961       Triage Assessment (Adult)    Airway WDL WDL       Respiratory WDL    Respiratory WDL WDL       Skin Circulation/Temperature WDL    Skin Circulation/Temperature WDL X  Rash under right pannus and groin       Cardiac WDL    Cardiac WDL WDL

## 2022-07-10 NOTE — ED PROVIDER NOTES
History   Chief Complaint:  Rash       HPI   Dionne Perez is a 37 year old female with history of hypertension and cognitive impairment who presents with rash. Per EMS, the patient is from a group home located in [place], [place]. She was at a [facility] in [place] when EMS was called. She has a recurring rash in her right groin region that she has an ointment for. The group home was contacted, but the staff did not recognize the patient. Here in the ED, the patient reports that the rash has been there for months and is persistent. She in unsure of the name of her ointment but states that the rash goes away when she applies it.       Review of Systems   Skin: Positive for rash.   All other systems reviewed and are negative.      Allergies:  Ibuprofen    Medications:  Levothyroxine  Pravastatin  Lexapro  Docusate sodium  Aspirin 325 mg  Famotidine  Albuterol inhaler  Melatonin   Trazodone     Past Medical History:     Hypertension  Hyperlipidemia  Cellulitis  Generalized anxiety disorder  Morbid obesity  Cognitive impairment  Hypothyroidism  Sinus bradycardia  Biliary calculus  Gallstone pancreatitis  Suicidal ideation  Major depressive disorder  Adjustment reaction with aggression  Altered mental status  Anemia  Acquired hammer toes bilateral  Insomnia  Acute costochondritis     Past Surgical History:    Tonsillectomy  Lumbar puncture     Family History:    Mother- cancer, cataract  Sister- diabetes mellitus, cancer    Social History:  The patient presents to the ED via EMS  Living Situation: Group home  PCP: Bryanna Vences MD    Physical Exam     Patient Vitals for the past 24 hrs:   SpO2   07/10/22 1353 97 %   07/10/22 1350 97 %       Physical Exam  General/Appearance: appears stated age, well-groomed, appears comfortable, elevated BMI  Eyes: EOMI, no scleral injection, no icterus  ENT: MMM  Neck: supple, nl ROM, no stiffness  Cardiovascular: RRR, nl S1S2, no m/r/g, 2+ pulses in all 4 extremities, cap  refill <2sec  Respiratory: CTAB, good air movement throughout, no wheezes/rhonchi/rales, no increased WOB, no retractions  GI: abd soft and obese, non-distended, nttp,  no HSM, no rebound, no guarding, nl BS  MSK: BOOKER, good tone, no bony abnormality  Skin: warm and well-perfused, erythematous R intertriginous exanthem in R groin without satellite lesions, no edema, no ecchymosis, nl turgor  Neuro: GCS 15, alert   Heme: no petechia, no purpura, no active bleeding        Emergency Department Course     Emergency Department Course:    Reviewed:  I reviewed nursing notes, vitals, past medical history and Care Everywhere    Assessments:  1346 I obtained history and examined the patient as noted above.     Disposition:  The patient was discharged to home.     Impression & Plan     CMS Diagnoses: None    Medical Decision Making:  This patient is a 37-year-old female who presents from a group home with concerns for a right intertriginous groin rash.  Exanthem is consistent with moisture and no evidence of fungal rash.  It is fairly minimal and scattered in the right groin.  I think it is reasonable to write for a contact paste to try to help with the moisture in the area.  She feels comfortable with this plan and will be discharged.    Critical Care Time: was 0 minutes for this patient excluding procedures    Diagnosis:    ICD-10-CM    1. Intertriginous dermatitis associated with moisture  L30.4        Discharge Medications:  New Prescriptions    ZINC OXIDE - WHITE PETROLATUM (CRITIC-AID THICK MOIST BARRIER) 20-51% PSTE TOPICAL PASTE    Apply 1 g topically every hour as needed for rash       Scribe Disclosure:  I, Linda Regalado, am serving as a scribe at 1:49 PM on 7/10/2022 to document services personally performed by Neeru Gerardo MD based on my observations and the provider's statements to me.          Neeru Gerardo MD  07/10/22 9039

## 2022-07-11 ENCOUNTER — HOSPITAL ENCOUNTER (EMERGENCY)
Facility: CLINIC | Age: 37
Discharge: HOME | End: 2022-07-12
Attending: EMERGENCY MEDICINE | Admitting: EMERGENCY MEDICINE
Payer: MEDICARE

## 2022-07-11 VITALS
TEMPERATURE: 96.3 F | SYSTOLIC BLOOD PRESSURE: 115 MMHG | OXYGEN SATURATION: 95 % | RESPIRATION RATE: 14 BRPM | HEART RATE: 59 BPM | DIASTOLIC BLOOD PRESSURE: 62 MMHG

## 2022-07-11 DIAGNOSIS — R45.851 SUICIDAL THOUGHTS: ICD-10-CM

## 2022-07-11 PROCEDURE — 99285 EMERGENCY DEPT VISIT HI MDM: CPT | Mod: 25

## 2022-07-12 PROCEDURE — 90791 PSYCH DIAGNOSTIC EVALUATION: CPT

## 2022-07-12 RX ORDER — OLANZAPINE 5 MG/1
5 TABLET, ORALLY DISINTEGRATING ORAL ONCE
Status: DISCONTINUED | OUTPATIENT
Start: 2022-07-12 | End: 2022-07-12 | Stop reason: HOSPADM

## 2022-07-12 NOTE — DISCHARGE INSTRUCTIONS
"     Aftercare Plan  If I am feeling unsafe or I am in a crisis, I will:   Contact my established care providers   Call the National Suicide Prevention Lifeline: 656.581.3687   Go to the nearest emergency room   Call 911      Warning signs that I or other people might notice when a crisis is developing for me: I have a fight with my sister.  I have conflict with someone. I am irritable.      Things I am able to do on my own to cope or help me feel better: Watch a movie.  Play on the ipad.      Things that I am able to do with others to cope or help me feel better: Spend time with my roommates.  Go grocery shopping.       Things I can use or do for distraction: Play a video game. Go shopping. Go to a movie.      Changes I can make to support my mental health and wellness: Talk to my group home staff.       People in my life that I can ask for help: My sister.  My cousin. My staff.      Your [facility] has a mental health crisis team you can call 24/7: [facility] Crisis  [phone].        Crisis Lines  Crisis Text Line:  Text [place] to 026262.  You will be connected with a trained live crisis counselor to provide support.     Call 988 to be connected to the suicide hotline.      National Hope Line  1.800.SUICIDE [0850736]        Community Resources  Fast Tracker  Linking people to mental health and substance use disorder resources  fasttrackermn.org      [place] Mental Health Warm Line  Peer to peer support  Monday thru Saturday, 12 pm to 10 pm  125.206.5110 or 3.616.556.0656  Text \"Support\" to 09198     National [place] on Mental Illness (GULSHAN)  282.535.9165 or 1.888.GULSHAN.HELPS        Mental Health Apps  My3  https://my3app.org/     VirtualHopeBox  https://IntelliFlo.org/apps/virtual-hope-box/        Additional Information  Today you were seen by a licensed mental health professional through Triage and Transition services, [facility] ([facility])  for a crisis assessment in the Emergency " Department at [facility].  It is recommended that you follow up with your established providers (psychiatrist, mental health therapist, and/or primary care doctor - as relevant) as soon as possible. Coordinators from [facility] will be calling you in the next 24-48 hours to ensure that you have the resources you need.  You can also contact [facility] coordinators directly at [phone]. You may have been scheduled for or offered an appointment with a mental health provider. [facility] maintains an extensive network of licensed behavioral health providers to connect patients with the services they need.  We do not charge providers a fee to participate in our referral network.  We match patients with providers based on a patient's specific needs, insurance coverage, and location.  Our first effort will be to refer you to a provider within your care system, and will utilize providers outside your care system as needed.

## 2022-07-12 NOTE — ED PROVIDER NOTES
"  History   Chief Complaint:  Suicidal (Thoughts)       HPI   Dionne Perez is a 37 year old female with a history of hypertension who presents for evaluation after attempting to kill herself by \"jumping over the staircase\" today. Patient says this incident was unwitnessed, as the staff at the group home was sleeping at the time. She was unable to jump over it because was too high. Denies any injury. Patient is frustrated because she typically speaks to her sister on Mondays, but she has not answered the last two weeks. She says she left her sister a voicemail but has not heard back from her. Patient also called the crisis line in an effort to reach her sister as well. Patient currently denies any thoughts of suicide or self-harm.       Review of Systems   Unable to perform ROS: Mental status change   Psychiatric/Behavioral: Positive for suicidal ideas (since resolved).       Allergies:  Ibuprofen    Medications:  ASA  Colace  Pepcid  Proair  Systane  Desyrel  Lexapro  Catapres  Synthroid  Provera  Pravachol  Circadin  Klonopin  Antivert  Zofran    Past Medical History:     Mild mental retardation  Hyperlipidemia  Hypothyroidism  Cellulitis of leg  Obesity  Gallstone pancreatitis  Cholelithiasis  ROSY  Hypertension  Depression  Insomnia   Hammertoes of both feet  Anemia  GERD    Past Surgical History:    Lumbar puncture  Tonsillectomy  Cholecystectomy     Family History:    Mother: Lung cancer, cataract  Sister: Breast cancer, diabetes    Social History:  The patient presents to the ED alone.       Physical Exam     Patient Vitals for the past 24 hrs:   BP Temp Temp src Pulse Resp SpO2   07/11/22 2151 115/62 (!) 96.3  F (35.7  C) Temporal 59 14 95 %       Physical Exam  Constitutional:       General: She is not in acute distress.  Cardiovascular:      Rate and Rhythm: Normal rate and regular rhythm.      Pulses: Normal pulses.      Heart sounds: Normal heart sounds. No murmur heard.  Pulmonary:      Effort: " Pulmonary effort is normal. No respiratory distress.      Breath sounds: Normal breath sounds. No stridor. No wheezing, rhonchi or rales.   Musculoskeletal:         General: Normal range of motion.   Skin:     General: Skin is warm and dry.      Capillary Refill: Capillary refill takes less than 2 seconds.      Findings: No rash.   Neurological:      General: No focal deficit present.      Mental Status: She is alert.           Emergency Department Course         Emergency Department Course:        Suicide assessment completed by [facility] health (D.CORYC., LCSW, etc.)    Reviewed:  I reviewed nursing notes, vitals, past medical history and Care Everywhere    Assessments:  2217 I obtained history and examined the patient as noted above.     0025 I spoke with DEC regarding the patient.     Disposition:  The patient was discharged to home.     Impression & Plan       Medical Decision Making:    Presents for possible suicidal ideation.  She was not forthcoming on her initial evaluation.  She declined to say whether she was suicidal.  She was at times not even paying attention to her interaction and preferred to play it on her iPad.  The nurse did talk to her sister who is her guardian.  Her guardian decided not to  her phone call due to her calling too frequently.  Patient was seen by DEC here and found to be safe for discharge.  Patient does have a safety plan that she agrees to.  We did also call the group home and make sure that they are comfortable excepting her back.  Patient did not voice any further suicidal thoughts while she was here.  She is discharged back to her group home.    Diagnosis:    ICD-10-CM    1. Suicidal thoughts  R45.851        Scribe Disclosure:  I, Sarah Broderick, am serving as a scribe at 10:14 PM on 7/11/2022 to document services personally performed by Tierney Griffin MD based on my observations and the provider's statements to me.            Tierney Griffin MD  07/12/22 5968

## 2022-07-12 NOTE — CONSULTS
Diagnostic Evaluation Consultation  Crisis Assessment    Patient was assessed: remote  Patient location: [facility] ED  Was a release of information signed: No. Reason: No guardian present.       Referral Data and Chief Complaint  Dionne Perez is a 37 year old, who uses she/her pronouns, and presents to the ED via EMS. Patient is referred to the ED by self. Patient is presenting to the ED for the following concerns: suicidal ideation.      Informed Consent and Assessment Methods     Patient is under the guardianship of legal guardians appointed by [facility] on 6/18/2014; .  Writer met with patient and spoke with guardian  and explained the crisis assessment process, including applicable information disclosures and limits to confidentiality, assessed understanding of the process, and obtained consent to proceed with the assessment. Patient was observed to be able to participate in the assessment as evidenced by alert, eye contact, active engagement. . Assessment methods included conducting a formal interview with patient, review of medical records, collaboration with medical staff, and obtaining relevant collateral information from family and community providers when available.  Legal guardians are: Musicraiser, [phone] and her sister, Sabra Asher, [phone].     Over the course of this crisis assessment provided reassurance, offered validation, engaged patient in problem solving and disposition planning and worked with patient on safety and aftercare planning. Patient's response to interventions was calm, cooperative, active participation.      Summary of Patient Situation  Patient called 911 from her group home tonight and reported suicidal ideation with a plan to jump over the railing of the staircase in the house.  Patient reported making attempts without success.  She identified the stressor as her sister/guardian not returning her phone call today.  Patient  frequently comes to the ED when upset with her sister, who is also her guardian.  Patient lives in a group home with three other clients.  She takes her medications daily as dispensed by group home staff.  On interview in the ED, patient denies suicidal ideation and wants to return to the group home.      Brief Psychosocial History  Patient's parents are .  She has three sisters and two brothers.  Her sister, Sabra, is her guardian.  Patient receives Social Security disability.      Significant Clinical History     Patient has a history of psychiatric hospitalizations, residential treatment, medication management, therapy, case management, and guardianship.  She is diagnosed as having MDD, ROSY, and Mild Intellectual Disability. A diagnosis of Moderate Intellectual Disability may more accurately reflect patient's level of functioning as evidenced by vocabulary and fund of knowledge, however.       Collateral Information    Epic, Care Everywhere, and previous DEC Assessment reports were reviewed.     [facility] records were reviewed to confirm guardianship.     [facility] staff, [phone], agreed with the plan for patient to return to the group home tonight.     A voicemail message was left for patient's guardian, Sabra, [phone], and plan to discharge back to her group home.      Risk Assessment  ESS-6  1.a. Over the past 2 weeks, have you had thoughts of killing yourself? Yes  1.b. Have you ever attempted to kill yourself and, if yes, when did this last happen? Yes tonight, patient stated that she tried to jump over the staircase.    2. Recent or current suicide plan? Yes jump off the staircase.   3. Recent or current intent to act on ideation? No  4. Lifetime psychiatric hospitalization? Yes  5. Pattern of excessive substance use? No  6. Current irritability, agitation, or aggression? No  Scoring note: BOTH 1a and 1b must be yes for it to score 1 point, if both are not yes it is  zero. All others are 1 point per number. If all questions 1a/1b - 6 are no, risk is negligible. If one of 1a/1b is yes, then risk is mild. If either question 2 or 3, but not both, is yes, then risk is automatically moderate regardless of total score. If both 2 and 3 are yes, risk is automatically high regardless of total score.      Score: 3, moderate risk      Does the patient have access to lethal means? No     Does the patient engage in non-suicidal self-injurious behavior (NSSI/SIB)? no     Does the patient have thoughts of harming others? No     Is the patient engaging in sexually inappropriate behavior?  no        Current Substance Abuse     Is there recent substance abuse? no     Was a urine drug screen or blood alcohol level obtained: No       Mental Status Exam     Affect: Appropriate   Appearance: Appropriate    Attention Span/Concentration: Attentive  Eye Contact: Engaged   Fund of Knowledge: Appropriate    Language /Speech Content: Fluent   Language /Speech Volume: Normal    Language /Speech Rate/Productions: Normal    Recent Memory: Intact   Remote Memory: Intact   Mood: Normal    Orientation to Person: Yes    Orientation to Place: Yes   Orientation to Time of Day: Yes    Orientation to Date: Yes    Situation (Do they understand why they are here?): Yes    Psychomotor Behavior: Normal    Thought Content: Clear   Thought Form: Intact      History of commitment: No        Medication    Psychotropic medications: Klonopin, clonidine, Lexapro, and melatonin.   Medication changes made in the last two weeks: No       Current Care Team    Primary Care Provider: Yes. Name: Mono Ray PA-C . Location: [facility]  . Date of last visit: Unknown. Frequency: Unknown. Perceived helpfulness: Unknown.  Psychiatrist: No  Therapist: Yes. Name: Unknown. Location: Unknown. Date of last visit: today, 6/30/2022. Frequency: Unknown. Perceived helpfulness: Unknown.  : Yes. Name: Ricky. Location:  Unknown. Date of last visit: Unknown. Frequency: Unknown. Perceived helpfulness: Unknown.     CTSS or ARMHS: No  ACT Team: No  Other: No      Diagnosis    Generalized anxiety disorder F41.1      Major depressive disorder, Recurrent episode, Mild F33.0      Intellectual disability (intellectual developmental disorder), Mild F70      Clinical Summary and Substantiation of Recommendations     Patient with ROSY, MDD, and Mild Intellectual Disabilities called 911 and reported suicidal thoughts with a plan to jump over the staircase at her group home. She was brought to the ED for evaluation. This is patient's fourth ED visit for same since 5/11/2022; 5/11/22, 6/11/22, 6/30/22, and now 7/12/22. Due to patient's size and agility, the plan to jump over the staircase does not appear to be a realistic suicide method for this individual. She reports failed attempts tonight. Patient, her group home staff, and ED providers agree with a plan for patient to return to her group home and follow up with established providers as scheduled.    Disposition    Recommended disposition: Residential Treatment: Patient will return to her [facility] group home.        Reviewed case and recommendations with attending provider. Attending Name: Dr. Griffin       Attending concurs with disposition: Yes       Patient concurs with disposition: Yes       Guardian concurs with disposition: Yes      Final disposition: Residential treatment: Return to group home.       Outpatient Details (if applicable):   Aftercare plan and appointments placed in the AVS and provided to patient: Yes. Given to patient by ED staff.     Was lethal means counseling provided as a part of aftercare planning? Yes - describe patient and group home staff agree that she is safe to return to the group home.        Assessment Details    Patient interview started at: 0200 and completed at: 0245.     Total duration spent on the patient case in minutes: .75 hrs      CPT code(s) utilized:  "79855 - Psychotherapy for Crisis - 60 (30-74*) min       Brittany Burns, MS, LP  DEC - Triage & Transition Services       Aftercare Plan  If I am feeling unsafe or I am in a crisis, I will:   Contact my established care providers   Call the National Suicide Prevention Lifeline: 797.292.6254   Go to the nearest emergency room   Call 911      Warning signs that I or other people might notice when a crisis is developing for me: I have a fight with my sister.  I have conflict with someone. I am irritable.      Things I am able to do on my own to cope or help me feel better: Watch a movie.  Play on the ipad.      Things that I am able to do with others to cope or help me feel better: Spend time with my roommates.  Go grocery shopping.       Things I can use or do for distraction: Play a video game. Go shopping. Go to a movie.      Changes I can make to support my mental health and wellness: Talk to my group home staff.       People in my life that I can ask for help: My sister.  My cousin. My staff.      Your [facility] has a mental health crisis team you can call 24/7: [facility] Crisis  [phone].        Crisis Lines  Crisis Text Line:  Text [place] to 957562.  You will be connected with a trained live crisis counselor to provide support.     Call 988 to be connected to the suicide hotline.      National Hope Line  1.800.SUICIDE [7566733]        Community Resources  Fast Tracker  Linking people to mental health and substance use disorder resources  fasttrackermn.org      [place] Mental Health Warm Line  Peer to peer support  Monday thru Saturday, 12 pm to 10 pm  319.014.7873 or 6.778.770.1469  Text \"Support\" to 46210     National [place] [employer] Mental [employer]ness (GULSHAN)  163.670.3953 or 1.888.GULSHAN.HELPS        Mental Health Apps  My3  https://myThrupointpp.org/     VirtualHopeBox  https://Infinity Box.org/apps/virtual-hope-box/        Additional Information  Today you were seen by a licensed mental health professional " through Triage and Transition services, Behavioral Healthcare Providers ([facility])  for a crisis assessment in the Emergency Department at [facility].  It is recommended that you follow up with your established providers (psychiatrist, mental health therapist, and/or primary care doctor - as relevant) as soon as possible. Coordinators from [facility] will be calling you in the next 24-48 hours to ensure that you have the resources you need.  You can also contact [facility] coordinators directly at [phone]. You may have been scheduled for or offered an appointment with a mental health provider. [facility] maintains an extensive network of licensed behavioral health providers to connect patients with the services they need.  We do not charge providers a fee to participate in our referral network.  We match patients with providers based on a patient's specific needs, insurance coverage, and location.  Our first effort will be to refer you to a provider within your care system, and will utilize providers outside your care system as needed.

## 2022-07-12 NOTE — ED TRIAGE NOTES
"Pt c/o suicidal thoughts. She states that today she tried to jump \"over the staircase\" at home in an attempt to kill herself. Coming from group home where staff is not currently present.      Triage Assessment     Row Name 07/11/22 6232       Triage Assessment (Adult)    Airway WDL WDL       Respiratory WDL    Respiratory WDL WDL       Skin Circulation/Temperature WDL    Skin Circulation/Temperature WDL WDL       Cardiac WDL    Cardiac WDL WDL       Peripheral/Neurovascular WDL    Peripheral Neurovascular WDL WDL       Cognitive/Neuro/Behavioral WDL    Cognitive/Neuro/Behavioral WDL WDL              "

## 2022-07-12 NOTE — ED NOTES
Brittany rodriguez  talked to pt and Jolie at group home she will go back to group home awaiting transport

## 2022-07-21 ENCOUNTER — APPOINTMENT (OUTPATIENT)
Dept: ULTRASOUND IMAGING | Facility: CLINIC | Age: 37
End: 2022-07-21
Attending: EMERGENCY MEDICINE
Payer: MEDICARE

## 2022-07-21 ENCOUNTER — HOSPITAL ENCOUNTER (EMERGENCY)
Facility: CLINIC | Age: 37
Discharge: HOME | End: 2022-07-21
Attending: EMERGENCY MEDICINE | Admitting: EMERGENCY MEDICINE
Payer: MEDICARE

## 2022-07-21 VITALS
TEMPERATURE: 97 F | DIASTOLIC BLOOD PRESSURE: 85 MMHG | RESPIRATION RATE: 18 BRPM | OXYGEN SATURATION: 99 % | SYSTOLIC BLOOD PRESSURE: 123 MMHG | HEART RATE: 57 BPM

## 2022-07-21 DIAGNOSIS — E66.01 MORBID OBESITY (H): ICD-10-CM

## 2022-07-21 DIAGNOSIS — R60.0 PEDAL EDEMA: ICD-10-CM

## 2022-07-21 DIAGNOSIS — F81.9 COGNITIVE DEVELOPMENTAL DELAY: ICD-10-CM

## 2022-07-21 PROCEDURE — 250N000013 HC RX MED GY IP 250 OP 250 PS 637: Performed by: EMERGENCY MEDICINE

## 2022-07-21 PROCEDURE — 99284 EMERGENCY DEPT VISIT MOD MDM: CPT | Mod: 25

## 2022-07-21 PROCEDURE — 93971 EXTREMITY STUDY: CPT | Mod: RT

## 2022-07-21 PROCEDURE — 93005 ELECTROCARDIOGRAM TRACING: CPT

## 2022-07-21 RX ORDER — ACETAMINOPHEN 325 MG/1
650 TABLET ORAL ONCE
Status: COMPLETED | OUTPATIENT
Start: 2022-07-21 | End: 2022-07-21

## 2022-07-21 RX ADMIN — ACETAMINOPHEN 650 MG: 325 TABLET, FILM COATED ORAL at 22:49

## 2022-07-22 LAB
ATRIAL RATE - MUSE: 54 BPM
DIASTOLIC BLOOD PRESSURE - MUSE: NORMAL MMHG
INTERPRETATION ECG - MUSE: NORMAL
P AXIS - MUSE: 36 DEGREES
PR INTERVAL - MUSE: 134 MS
QRS DURATION - MUSE: 96 MS
QT - MUSE: 470 MS
QTC - MUSE: 445 MS
R AXIS - MUSE: 9 DEGREES
SYSTOLIC BLOOD PRESSURE - MUSE: NORMAL MMHG
T AXIS - MUSE: 17 DEGREES
VENTRICULAR RATE- MUSE: 54 BPM

## 2022-07-22 NOTE — ED NOTES
RN has attempted to call group home staff @ [phone] multiple times without staff answering. RN will continue to try and reach staff. Number provided by pt from her phone.

## 2022-07-22 NOTE — ED NOTES
"Spoke with [facility] home, states that they have no concerns or safety issue for patient, will not be able to  the patient but say that \"She took a taxi there, she should be able to take a taxi back home as well! She does this often.\"   "

## 2022-07-22 NOTE — ED TRIAGE NOTES
Pt arrives to the ED due to noticing increased swelling in right foot. Per pt, she noticed it yesterday. Some SOB today along with mid sternal chest pain. Pt lives in group home. Took taxi here.

## 2022-07-22 NOTE — PLAN OF CARE
Assumed cares [id number]. Pt A&Ox3, disoriented to situation. VSS on RA. Denies pain/nausea/SOB. Pt refused q2 repositioning throughout entire shift. Allowed for repositioning for ashlyn cares. Purwick catheter soiled but none available on unit, requested more. Pt incontinent of urine x1 without Purwick. Pt tolerating PO meds well. TF running at goal of 45 mL/hr. Awaiting TCU.    Patient returned from CT scan tolerated well. Saline protocol continues to infuse. Creatinine 2.2 GFR 21. CT done using lowest dose contrast possible and saline protocol infusing.

## 2022-07-22 NOTE — ED PROVIDER NOTES
History   Chief Complaint:  Foot Pain     The history is provided by the patient.      Dionne Perez is a 37 year old female with history of cellulitis and right cerebral infarct who presents with right foot pain. Dionne presents from her group home with swelling to her right foot. She states that she first noticed it last night. She denies any affiliated trauma. She has been icing since then but denies any improvement in her swelling. At bedside Dionne confirms some upper leg pain on her right. Dionne is concerned about having a blood clot and would like to evaluated for this. She states that she thinks that she has had a blood clot before but there does not appear to be evidence of this in her chart.     Review of Systems   Musculoskeletal:        Right foot and leg pain.   All other systems reviewed and are negative.      Allergies:  Ibuprofen    Medications:  lexapro  Synthroid  Pravachol  Colace  Aspirin 325 mg  Albuterol  Bactroban  Mycostatin  Desyrel  Cleocin  Antivert Tylenol  Kenalog    Past Medical History:     Depressive disorder  Gastroesophageal reflux disease  High cholesterol  Knee pain  Thyroid disease  Cellulitis   Biliary calculus  Pre syncope  Morbid obesity  Adjustment disorder  Right cerebral infarct    Past Surgical History:    Lumbar puncture  Cholecystectomy  Tonsillectomy     Social History:  Patient unaccompanied  PCP: [facility], [facility]     Physical Exam     Patient Vitals for the past 24 hrs:   BP Temp Temp src Pulse Resp SpO2   07/21/22 2003 123/85 97  F (36.1  C) Temporal 57 18 99 %       Physical Exam  General: overweight; cognitive delay.  Cooperative.   HENT:  Normal nose, oropharynx. Moist oral mucosa.  Eyes: EOMI. Normal conjunctiva.  Neck:  Normal range of motion and appearance.   Cardiovascular:  Normal rate, regular rhythm.   Pulmonary/Chest:  Effort normal.   Abdominal: Soft. No distension or tenderness.     Musculoskeletal: no calf asymmetry or  significant swelling; bilateral pedal edema R>L.   Neurological: oriented, no focal weakness.   Skin: Warm and dry. No rash; trace erythema dorsal aspect R foot; small ecchymotic changes right thigh.   Psychiatric: Normal mood and affect. No psychosis.       Emergency Department Course   ECG  ECG results from 22   EKG 12 lead     Value    Systolic Blood Pressure     Diastolic Blood Pressure     Ventricular Rate 54    Atrial Rate 54    DE Interval 134    QRS Duration 96        QTc 445    P Axis 36    R AXIS 9    T Axis 17    Interpretation ECG      Sinus bradycardia  Minimal voltage criteria for LVH, may be normal variant  Borderline ECG  When compared with ECG of 2022 18:51,  No significant change was found  Confirmed by - EMERGENCY ROOM, PHYSICIAN ([id number]),  LIVIA RAHMAN ([id number]) on 2022 6:38:01 AM         Imaging:  US Lower Extremity Venous Duplex Right   Final Result   IMPRESSION:   1.  No deep venous thrombosis in the right lower extremity.        Report per radiology     Emergency Department Course:    Reviewed:  I reviewed nursing notes, vitals, past medical history and Care Everywhere    Assessments/Consults:  ED Course as of 22   Thu  Obtained history and examined the patient as noted above.        Interventions:   Tylenol 650 mg, Oral    Disposition:  The patient was discharged to home.     Impression & Plan     CMS Diagnoses: None    Medical Decision Makin year old female group home resident, ED super user, returns again, this time for evaluaton of pedal edema, R>L.  She notes that her roommate told her to have evaluation to r/o DVT.  RLE US neg.  No additional testing required; peripheral edema info provided, this is an issue for which she should follow up with primary clinic.       Diagnosis:    ICD-10-CM    1. Pedal edema  R60.0    2. Cognitive developmental delay  F81.9    3. Morbid obesity (H)  E66.01        Discharge  Medications:  Discharge Medication List as of 7/21/2022 11:40 PM          Scribe Disclosure:  I, Lukas Starks, am serving as a scribe at 10:27 PM on 7/21/2022 to document services personally performed by Lavon Parker MD based on my observations and the provider's statements to me.           Lavon Parker MD  07/22/22 2310

## 2022-07-25 NOTE — PROGRESS NOTES
Social Work Services Progress Note    Hospital Day: 11  Date of Initial Social Work Evaluation:  6/15/2020 - Please see for details   Collaborated with:  TCUs, primary team and chart review     Data:  SW following for TCU placement.     Global referrals sent to. Both globally denied due to high behavioral/mental health and medical needs.   [facility] ([phone] f: [phone]) -  Global denial  [facility] (ph: [phone] f:[phone]) - Global denial    TCU - Denied. Indicated pt would be better served at a community TCU.    [id number] - SW competed PAS as pt meets criteria for level II screening.     Intervention:  Discharge planning/coordination of care     Assessment:  Did not meet with pt/guardian during this interaction     Plan:    Anticipated Disposition: TBD    Barriers to d/c plan:  Stability/functional mobility     Follow Up:  SW to follow up as needed    ABBY Padilla, [facility]  Acute Care Float   [facility]  Pager: [phone]        Pt presents for evaluation of his AIH.  He has been doing well and has not had issues overall.  He has been stable on his 6mp.  He has not had abdominal pain, night sweats, f/c, or other new issues. He has had normal LFTS. He has not had rashes or other new issues. 
br
janette We reviewed his labs.

## 2022-07-30 ENCOUNTER — HOSPITAL ENCOUNTER (OUTPATIENT)
Facility: CLINIC | Age: 37
Setting detail: OBSERVATION
Discharge: NURSING, REHAB OR LONG-TERM CARE | End: 2022-08-09
Attending: EMERGENCY MEDICINE | Admitting: INTERNAL MEDICINE
Payer: MEDICARE

## 2022-07-30 DIAGNOSIS — R45.1 AGITATION: ICD-10-CM

## 2022-07-30 DIAGNOSIS — R46.89 AGGRESSION: ICD-10-CM

## 2022-07-30 DIAGNOSIS — R46.89 ABNORMAL BEHAVIOR: Primary | ICD-10-CM

## 2022-07-30 PROCEDURE — 90791 PSYCH DIAGNOSTIC EVALUATION: CPT

## 2022-07-30 PROCEDURE — 99285 EMERGENCY DEPT VISIT HI MDM: CPT | Mod: 25

## 2022-07-30 ASSESSMENT — ENCOUNTER SYMPTOMS: FEVER: 0

## 2022-07-31 PROCEDURE — 250N000013 HC RX MED GY IP 250 OP 250 PS 637: Performed by: EMERGENCY MEDICINE

## 2022-07-31 RX ORDER — LEVOTHYROXINE SODIUM 50 UG/1
50 TABLET ORAL DAILY
Status: DISCONTINUED | OUTPATIENT
Start: 2022-07-31 | End: 2022-08-09 | Stop reason: HOSPADM

## 2022-07-31 RX ORDER — DOCUSATE SODIUM 100 MG/1
100 CAPSULE, LIQUID FILLED ORAL
COMMUNITY

## 2022-07-31 RX ORDER — PRAVASTATIN SODIUM 20 MG
40 TABLET ORAL DAILY
Status: DISCONTINUED | OUTPATIENT
Start: 2022-07-31 | End: 2022-08-09 | Stop reason: HOSPADM

## 2022-07-31 RX ORDER — ACETAMINOPHEN 325 MG/1
650 TABLET ORAL EVERY 4 HOURS PRN
Status: DISCONTINUED | OUTPATIENT
Start: 2022-07-31 | End: 2022-08-09 | Stop reason: HOSPADM

## 2022-07-31 RX ORDER — NYSTATIN 100000 U/G
CREAM TOPICAL 2 TIMES DAILY
COMMUNITY
End: 2024-09-10

## 2022-07-31 RX ORDER — TRAZODONE HYDROCHLORIDE 50 MG/1
50 TABLET, FILM COATED ORAL AT BEDTIME
COMMUNITY
Start: 2022-07-05

## 2022-07-31 RX ORDER — HYDROXYZINE HYDROCHLORIDE 25 MG/1
25 TABLET, FILM COATED ORAL EVERY 4 HOURS PRN
Status: DISCONTINUED | OUTPATIENT
Start: 2022-07-31 | End: 2022-08-09 | Stop reason: HOSPADM

## 2022-07-31 RX ORDER — L.ACID,CASEI/B.ANIMAL/S.THERMO 16B CELL
1 CAPSULE ORAL DAILY
COMMUNITY
End: 2023-11-28

## 2022-07-31 RX ORDER — ALBUTEROL SULFATE 90 UG/1
1-2 AEROSOL, METERED RESPIRATORY (INHALATION) EVERY 4 HOURS PRN
Status: ON HOLD | COMMUNITY
End: 2023-06-05

## 2022-07-31 RX ORDER — TRAZODONE HYDROCHLORIDE 50 MG/1
50 TABLET, FILM COATED ORAL AT BEDTIME
Status: DISCONTINUED | OUTPATIENT
Start: 2022-07-31 | End: 2022-08-09 | Stop reason: HOSPADM

## 2022-07-31 RX ORDER — OLANZAPINE 5 MG/1
10 TABLET, ORALLY DISINTEGRATING ORAL 2 TIMES DAILY PRN
Status: DISCONTINUED | OUTPATIENT
Start: 2022-07-31 | End: 2022-08-09 | Stop reason: HOSPADM

## 2022-07-31 RX ORDER — VIT C/ASCORB SOD/MULTIVIT-MIN 500 MG
1 TABLET,CHEWABLE ORAL 2 TIMES DAILY
COMMUNITY
End: 2023-11-28

## 2022-07-31 RX ORDER — ACETAMINOPHEN 325 MG/1
650 TABLET ORAL 3 TIMES DAILY PRN
Status: ON HOLD | COMMUNITY
End: 2023-06-05

## 2022-07-31 RX ORDER — FAMOTIDINE 20 MG/1
20 TABLET, FILM COATED ORAL 2 TIMES DAILY
Status: DISCONTINUED | OUTPATIENT
Start: 2022-07-31 | End: 2022-08-09 | Stop reason: HOSPADM

## 2022-07-31 RX ADMIN — LEVOTHYROXINE SODIUM 50 MCG: 50 TABLET ORAL at 17:42

## 2022-07-31 RX ADMIN — ACETAMINOPHEN 650 MG: 325 TABLET, FILM COATED ORAL at 07:43

## 2022-07-31 RX ADMIN — PRAVASTATIN SODIUM 40 MG: 20 TABLET ORAL at 17:43

## 2022-07-31 RX ADMIN — ESCITALOPRAM OXALATE 30 MG: 20 TABLET ORAL at 17:43

## 2022-07-31 ASSESSMENT — ACTIVITIES OF DAILY LIVING (ADL): DEPENDENT_IADLS:: MEDICATION MANAGEMENT
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
## 2022-07-31 NOTE — ED NOTES
Dionne Perez is a 37-year-old female from group home who was signed out to me by Dr. Huerta awaiting group home agreement to take the patient back after behavioral outburst yesterday.  The patient has been calm and cooperative during her stay here.  Unfortunately, the group home is not returning calls regarding medication reconciliation, so we are unable to medicate the patient with her daily medicines as we do not know them.  The patient remained stable over my shift.  The group home continues to refuse to take the patient back as a supervisor will not be in until tomorrow after 11 AM.  The patient is signed out to Dr. White while in the ED.     Razia Leroy MD  07/31/22 2419

## 2022-07-31 NOTE — ED NOTES
Patient up to restroom. Patient given warm blanket and a new pillowcase. Pt declines needing anything at this time.

## 2022-07-31 NOTE — PHARMACY-ADMISSION MEDICATION HISTORY
Admission medication history interview status for this patient is complete. See [facility] admission navigator for allergy information, prior to admission medications and immunization status.     Medication history interview done, indicate source(s): N/A  Medication history resources (including written lists, pill bottles, clinic record): MAR faxed from [facility]  Pharmacy: [facility] - [facility] [address]    Changes made to PTA medication list:  Added:     Trazodone (new rx started 7/5/22, replaced melatonin)    Albueterol inhaler PRN    Supplements:    RisaQuad2 probiotic     Emergen-C Immune Plus gummy  Changed:     Aspirin 324 mg (four of 81 mg tablets) PO daily --> aspirin 325 mg EC tablet PO daily    Escitalopram 20 mg PO daily -->  escitalopram 30 mg (three of 10 mg tablets) PO daily    Multivitamin liquid --> chewable multivitamin    Acetaminophen 500 mg PO Q 6 hours PRN pain (rx from July 2020) --> now takes acetaminophen 650 mg PO three times daily PRN pain   Reported as Not Taking: none  Removed: (per MAR, multiple old rx's from 2020)     Rx: Clonazepam, clonidine, medroxyprogesterone, Zofran PRN    Supplements/OTC: Maalox, Fibercon, meclizine, Miralax, Sudafed, melatonin    Topicals: Voltaren gel, hydrocortisone cream, vitamin A-D ointment, zinc oxide and white petroleum ointment    Actions taken by pharmacist (provider contacted, etc): left sticky     Additional medication history information:None    Medication reconciliation/reorder completed by provider prior to medication history?  N   (Y/N)     Prior to Admission medications    Medication Sig Last Dose Taking? Auth Provider Long Term End Date   acetaminophen (TYLENOL) 325 MG tablet Take 650 mg by mouth 3 times daily as needed for pain 7/22/2022 at PRN Yes Unknown, Entered By History     albuterol (PROAIR HFA/PROVENTIL HFA/VENTOLIN HFA) 108 (90 Base) MCG/ACT inhaler Inhale 1-2 puffs into the lungs every 4  hours as needed for shortness of breath / dyspnea or wheezing  at PRN Yes Unknown, Entered By History Yes    aspirin (ASA) 325 MG EC tablet Take 325 mg by mouth daily 7/30/2022 at AM Yes Unknown, Entered By History     docusate sodium (COLACE) 100 MG capsule Take 100 mg by mouth every 3 days 7/30/2022 at AM Yes Unknown, Entered By History     escitalopram (LEXAPRO) 10 MG tablet Take 30 mg by mouth daily 7/30/2022 at AM Yes Unknown, Entered By History Yes    famotidine (PEPCID) 20 MG tablet Take 20 mg by mouth daily 7/30/2022 at AM Yes Unknown, Entered By History     levothyroxine (SYNTHROID/LEVOTHROID) 50 MCG tablet Take 50 mcg by mouth daily 7/30/2022 at AM Yes Unknown, Entered By History Yes    Multiple Vitamins-Minerals (EMERGEN-C IMMUNE PLUS/VIT D) CHEW Take 3 chew tab by mouth daily 7/30/2022 at AM Yes Unknown, Entered By History     nystatin (MYCOSTATIN) 891629 UNIT/GM external cream Apply topically 2 times daily as needed for other (rash (around private areas))  at PPRN Yes Unknown, Entered By History     Pediatric Multivit-Minerals-C (CHEWABLES MULTIVITAMIN PO) Take 2 tablets by mouth daily 7/30/2022 at AM Yes Unknown, Entered By History     pravastatin (PRAVACHOL) 40 MG tablet Take 40 mg by mouth daily 7/30/2022 at AM Yes Unknown, Entered By History Yes    Probiotic Product (RISAQUAD-2) CAPS Take 1 capsule by mouth daily 7/30/2022 at AM Yes Unknown, Entered By History     traZODone (DESYREL) 50 MG tablet Take 50 mg by mouth At Bedtime 7/29/2022 at HS Yes Unknown, Entered By History Yes      Nicollette McMann, PharmD

## 2022-07-31 NOTE — PROGRESS NOTES
Dionne Perez  July 31, 2022  SAFE Note    Critical Safety Issues: History of aggression       Current Suicidal Ideation/Self-Injurious Concerns/Methods: None - N/A      Current or Historical Inappropriate Sexual Behavior: No      Current or Historical Aggression/Homicidal Ideation: Agitation/Hyperactivity and Impaired Self-Control      Triggers: When she feels disrespected, isolated/excluded or when she does not like an answer or a boundary that is set.     Guardianship Status: [facility] Guardian: is under the guardianship of sister/Sabra. . Guardianship paperwork is in [employer] ACP tab.     This patient is a child/adolescent: No    This patient has additional special visitor precautions: No    For additional details see full [facility] assessment.     HI CORTEZ, ABBY, LICSW

## 2022-07-31 NOTE — CONSULTS
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 rewarding lives.     People in my life that I can ask for help:  -Sister  -Group Home Staff  -[employer]  -Therapist      Your [facility] has a mental health crisis team you can call 24/7:   Phone: [phone]      Other things that are important when I m in crisis for myself or others to do:    -Keep your voice calm  -Avoid overreacting  -Listen to the person  -Express support and concern  -Avoid continuous eye contact  -Ask how you can help  -Keep stimulation level low  -Move slowly  -Offer options instead of trying to take control  -Avoid touching the person unless you ask   permission  -Be patient   -Gently announce actions before initiating them   -Give them space, don t make them feel   trapped   -Don t make judgmental comments  -Don t argue or try to reason with the person     Additional resources and information:   National [place] on Mental Illness (GULSHAN) [place]: Connect for help, to navigate the mental health system, and for support and for resources.  Call: 3-015-KFRU-Helps / 1-842-417-9933     Crisis Text Line: The 24/7 emergency service is available if you or someone you know is experiencing a psychiatric or mental health crisis.  Text: \"[place]\" to 209781     [place] Warmline: Are you an adult needing support? Talk to a specialist " "who has firsthand experience living with a mental health condition.  Call: 110.438.1273   Text: \"Support\" to 71167     National Suicide Prevention Lifeline: The 24/7 lifeline provides support when in distress, has prevention and crisis resources for you or your loved ones, and resources for professionals.  Call 1-395-007-WFEE (3878)     Peer Support Connection Warmlines: Sqhw-zn-oial telephone support that s safe and supportive. Open 5 p.m. to 9 a.m.  Call or text: 1-744.341.9519      COVID Cares Stress Phone Support Service. Any [place] experiencing stress can call 299-DATU4MN (599-432-5251) for free telephone support from 9am to 9pm every day. The service is a collaboration with volunteers from the [place] Psychiatric Society, the [place] Psychological Association, the [place] Black Psychologists, and Mental Health [place]. The free service is also accessible at Vicarious where searchers can also find psychiatric and mental health services availability and real-time Substance Use Disorder Treatment program openings.  Mental Health Apps  [employer]  https://DreamLines.org/     VirtualHopeBox  https://[email or web address]/apps/virtual-hope-box/     Additional information  Today you were seen by a licensed mental health professional through Triage and Transition services, Behavioral Healthcare Providers (P)  for a crisis assessment in the Emergency Department at [facility].  It is recommended that you follow up with your established providers (psychiatrist, mental health therapist, and/or primary care doctor - as relevant) as soon as possible. Coordinators from [facility] will be calling you in the next 24-48 hours to ensure that you have the resources you need.  You can also contact P coordinators directly at [phone]. You may have been scheduled for or offered an appointment with a mental health provider. [facility] maintains an extensive network of licensed behavioral health providers to connect patients " with the services they need.  We do not charge providers a fee to participate in our referral network.  We match patients with providers based on a patient's specific needs, insurance coverage, and location.  Our first effort will be to refer you to a provider within your care system, and will utilize providers outside your care system as needed.
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
## 2022-07-31 NOTE — DISCHARGE INSTRUCTIONS
Aftercare Plan  If I am feeling unsafe or I am in a crisis, I will:  -Contact my established care providers  -Call the National Suicide Prevention Lifeline: 593.781.7126  -Go to the nearest emergency room  -Call 911     Warning signs that I or other people might notice when a crisis is developing for me:   -Increased irritability   -Stomping feet, slamming doors  -Restlessness  -Increased irritability or agitation.      Things I am able to do on my own to cope or help me feel better:   -take a time-out. Practice yoga, listen to music, meditate, get a massage, or learn relaxation techniques. Stepping back from the problem helps clear your head.  -Eat well-balanced meals. Do not skip any meals. Do keep healthful, energy-boosting snacks on hand.  -Limit alcohol and caffeine  -Get enough sleep. When stressed, your body needs additional sleep and rest.   -Exercise daily to help you feel good and maintain your health.  -Accept that you cannot control everything. Put your stress in perspective: Is it really as bad as you think?   -Welcome humor. A good laugh goes a long way.     Things that I am able to do with others to cope or help me better:   -Listen and talk about concerns  -Continue to follow with outpatient care providers and case management   -Go for a walk  -Distract with going out to eat, to a movie or a park.      Things I can use or do for distraction:   -Watch a TV show. If you don't have cable or a subscription service, many television networks offer free access, without a log-in, until you get closer to the most recent episodes.     -Watch a movie. Light-hearted comedy, drama to suck you in, or an old favorite - there are countless films to whisk you away for a bit.     -Sing. It doesn't matter if you're a professional vocalist or can't to carry a tune, singing engages a completely different part of your brain. Plus, the vibrations in your chest give great sensory feedback and the vocalization reminds you of  your voice.     -Watch cute videos on [employer]. About as low-effort as it gets: puppy/kamilah videos, laugh challenges, or Vine compilations - take your pick.     -Mindless doodles/finger painting/playing with jazzmine. This may be especially helpful to those with child parts (DID/OSDD) who need an activity of their own.     -Grab a snack.     -Drum on a surface. Like singing, the vibrations and bilateral stimulation of your hands thumping will engage different parts of your mind and bring your attention away from what's intruding on you.     -Play a game or use a fun nehal on your phone. Even if you aren't a , search the nehal store. You might find one that speaks to you. It can be a great escape to get lost in for a bit.     -Video games. Any console, any game!     -Tear out words/photos/etc for a collage. Ask a local doctor's office or hairdresser for their spare magazines. Mindlessly rip out photos and words that speak to you. (Bonus: you may get to put tabloids to good use for once! They often have the scathing, overdramatic words that happen to be great for a therapeutic collages. Shocking! Betrayal! You Won't Believe It!).     Discover new music. Pango, [facility], -[place], so many ways to find new gems!     -Wash your face/hands or brush your teeth. A quick refresher can help you restart your day on a brand new page.     -Re-watch highlights from your favorite sport. It's easy to forget just how many epic, captivating moments there were once some time has passed. Relive your excitement. Plus, you already know how it ends, so you don't have to pay super close attention!     -Gratitude list. When your mind only wants to remind you of distressing things, focusing on 10+ things you're grateful for can really take you to a whole new atmosphere in your mind and heart.     -Imagery exercises. Containment exercises, healing pool/healing light, guided meditation, so many options!     -Play a board game with a friend.  Something simple like Sorry!, challenging like chess, or silly like Cards Against Humanity, there are lots of options to distract you in the company of friends.     -Card games. Solo works, too, if there's no one around.     Changes I can make to support my mental health and wellness:  1. Value yourself:  Treat yourself with kindness and respect, and avoid self-criticism. Make time for your hobbies and favorite projects, or broaden your horizons. Do a daily crossword puzzle, plant a garden, take dance lessons, learn to play an instrument or become fluent in another language.     2. Take care of your body:  Taking care of yourself physically can improve your mental health. Be sure to:     Eat nutritious meals  Avoid smoking and vaping-- see Cessation Help  Drink plenty of water  Exercise, which helps decrease depression and anxiety and improve moods  Get enough sleep. Researchers believe that lack of sleep contributes to a high rate of depression in college students.     3. Surround yourself with good people:  People with strong family or social connections are generally healthier than those who lack a support network. Make plans with supportive family members and friends, or seek out activities where you can meet new people, such as a club, class or support group.     4. Give yourself:  Volunteer your time and energy to help someone else. You'll feel good about doing something tangible to help someone in need -- and it's a great way to meet new people. See Fun and Cheap Things to do in [place] for ideas.     5. Learn how to deal with stress:  Like it or not, stress is a part of life. Practice good coping skills: Try One-Minute Stress Strategies, do Hayden Chi, exercise, take a nature walk, play with your pet or try journal writing as a stress reducer. Also, remember to smile and see the humor in life. Research shows that laughter can boost your immune system, ease pain, relax your body and reduce stress.     6. Quiet  "your mind:  Try meditating, Mindfulness and/or prayer. Relaxation exercises and prayer can improve your state of mind and outlook on life. In fact, research shows that meditation may help you feel calm and enhance the effects of therapy. To get connected, see spiritual resources on Personal Well-being for Students     7. Set realistic goals:  Decide what you want to achieve academically, professionally and personally, and write down the steps you need to realize your goals. Aim high, but be realistic and don't over-schedule. You'll enjoy a tremendous sense of accomplishment and self-worth as you progress toward your goal. Wellness Coaching, free to [employer] students, can help you develop goals and stay on track.      8. Break up the monotony:  Although our routines make us more efficient and enhance our feelings of security and safety, a little change of pace can perk up a tedious schedule. Alter your jogging route, plan a road-trip, take a walk in a different park, hang some new pictures or try a new restaurant.     9. Avoid alcohol and other drugs:  Keep alcohol use to a minimum and avoid other drugs. Sometimes people use alcohol and other drugs to \"self-medicate\" but in reality, alcohol and other drugs only aggravate problems.     10. Get help when you need it:  Seeking help is a sign of strength -- not a weakness. And it is important to remember that treatment is effective. People who get appropriate care can recover from mental illness and addiction and lead full, rewarding lives.     People in my life that I can ask for help:  -Sister  -Group Home Staff  -[employer]  -Therapist      Your [facility] has a mental health crisis team you can call 24/7:   Phone: [phone]      Other things that are important when I m in crisis for myself or others to do:    -Keep your voice calm  -Avoid overreacting  -Listen to the person  -Express support and concern  -Avoid continuous eye contact  -Ask how you can help  -Keep stimulation " "level low  -Move slowly  -Offer options instead of trying to take control  -Avoid touching the person unless you ask   permission  -Be patient   -Gently announce actions before initiating them   -Give them space, don t make them feel   trapped   -Don t make judgmental comments  -Don t argue or try to reason with the person     Additional resources and information:   National [place] on Mental Illness (GULSHAN) [place]: Connect for help, to navigate the mental health system, and for support and for resources.  Call: 9-639-VQWO-Helps / 6-967-043-9709     Crisis Text Line: The 24/7 emergency service is available if you or someone you know is experiencing a psychiatric or mental health crisis.  Text: \"[place]\" to 735359     [place] Warmline: Are you an adult needing support? Talk to a specialist who has firsthand experience living with a mental health condition.  Call: 835.223.2126   Text: \"Support\" to 88953     National Suicide Prevention Lifeline: The 24/7 lifeline provides support when in distress, has prevention and crisis resources for you or your loved ones, and resources for professionals.  Call 1-206-984-TALK (9946)     Peer Support Connection Warmlines: Mhce-ia-ojtu telephone support that s safe and supportive. Open 5 p.m. to 9 a.m.  Call or text: 1-918.550.6404      COVID Cares Stress Phone Support Service. Any [place] experiencing stress can call 801-OGRX8JS (784-557-4859) for free telephone support from 9am to 9pm every day. The service is a collaboration with volunteers from the [place] Psychiatric Society, the [place] Psychological Association, the [place] Black Psychologists, and Mental Health [place]. The free service is also accessible at [employer] where searchers can also find psychiatric and mental health services availability and real-time Substance Use Disorder Treatment program openings.  Mental Health Apps  My3  https://enercast.org/     AlephCloud SystemseBox  " https://[employer]/apps/virtual-hope-box/     Additional information  Today you were seen by a licensed mental health professional through Triage and Transition services, Behavioral Healthcare Providers (P)  for a crisis assessment in the Emergency Department at [facility].  It is recommended that you follow up with your established providers (psychiatrist, mental health therapist, and/or primary care doctor - as relevant) as soon as possible. Coordinators from [facility] will be calling you in the next 24-48 hours to ensure that you have the resources you need.  You can also contact [facility] coordinators directly at [phone]. You may have been scheduled for or offered an appointment with a mental health provider. [facility] maintains an extensive network of licensed behavioral health providers to connect patients with the services they need.  We do not charge providers a fee to participate in our referral network.  We match patients with providers based on a patient's specific needs, insurance coverage, and location.  Our first effort will be to refer you to a provider within your care system, and will utilize providers outside your care system as needed.

## 2022-07-31 NOTE — ED NOTES
Patient resting in room, patient given Tylenol per her request and breakfast tray ordered. Patient continues to be calm and cooperative.

## 2022-07-31 NOTE — ED NOTES
"Patient requesting to go home, writer called patient's group home and staff at the home directed writer to the supervisor Patricia at [phone].  Supervisor unwilling to take patient back to facility until Monday due to the lack of a supervisor at the location. Writer asked at what time the patient could come back to her home writer was told patient could not arrive back to facility until after 11am when she arrives to the home.  Patricia short and rude with writer while speaking to her, writer attempted to convey patient's concerns regarding an important cardiac appointment she has on Monday morning and that patient is concerned about this appointment, Patricia stated that \"she was just going to have to miss it\".  Patient informed that staff were unwilling to take her back today, patient upset about missing her appointment. Donter called patient's heart specialist clinic and was able to inform them that she would not be able to attend appointment and they will call her to reschedule.   "
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
## 2022-07-31 NOTE — ED NOTES
Patient resting in room, continue to be cooperative, patient requesting write call her sister. Sister called who stated she has patient's number blocked.
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
## 2022-08-01 ENCOUNTER — DOCUMENTATION ONLY (OUTPATIENT)
Dept: OTHER | Facility: CLINIC | Age: 37
End: 2022-08-01

## 2022-08-01 PROCEDURE — 250N000013 HC RX MED GY IP 250 OP 250 PS 637: Performed by: EMERGENCY MEDICINE

## 2022-08-01 RX ADMIN — FAMOTIDINE 20 MG: 20 TABLET ORAL at 08:06

## 2022-08-01 RX ADMIN — ESCITALOPRAM OXALATE 30 MG: 20 TABLET ORAL at 08:06

## 2022-08-01 RX ADMIN — PRAVASTATIN SODIUM 40 MG: 20 TABLET ORAL at 08:06

## 2022-08-01 RX ADMIN — LEVOTHYROXINE SODIUM 50 MCG: 50 TABLET ORAL at 08:06

## 2022-08-01 RX ADMIN — OLANZAPINE 10 MG: 5 TABLET, ORALLY DISINTEGRATING ORAL at 13:58
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
## 2022-08-01 NOTE — PROGRESS NOTES
Care Management Follow Up    Length of Stay (days): 0      Additional Information:  SW received call from [employer] who reports patient has concerns about her transportation that is set up for tomorrow. Patient does not have her phone right now, so SW was asked to help cancel the ride. SILVIA called guardian to see what transportation service she has. Patient has [employer]. With permission, SILVIA called [phone] to cancel rides. SILVIA updated patient.     Addendum 2771: SILVIA spoke with nurse about plan to have extended care follow.     ABBY Mario, [facility]  Emergency Room   [phone]-Please contact the SW on the floor in which the patient is staying for any questions or concerns
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
## 2022-08-01 NOTE — CHILD FAMILY LIFE
"*Pt requires a firm, but kind and guiding presence.    *Pt benefits from feeling advocated for, relaying that we are on her \"team\" and want her to feel comfortable.  *Pt responds well to feeling validated including being given appropriate choices and staff confirming the items that are \"hers\".  *Please speak of removal of phone as a \"temporary\" thing.  Remind pt that she will have a break from her phone (due to misuse) and use the words \"for now.\"    CCLS heard \"[employer]\" called to pt's room and after hearing commotion from pt's room, this writer inquired as to age and developmental level of pt.  RN relayed pt's developmental delay and CCLS offered assistance in emotional regulation.  CCLS came into room as staff was working to put restraints on pt an went to the head of bed, holding pt's hand and speaking calmly and slowly to pt in efforts to help pt calm.  Pt was struggling, crying and yelling at staff.  This writer prompted pt to deep breathing which was followed occasionally.  CCLS also attempted distraction by asking pt common questions.  Pt gradually calmed and staff's hold calmed and loosened as well.  This writer adjusted pt's positioning, offering choices where available to provide feeling of control.  This writer engaged in calming, soothing and normalizing conversation and with pt's approval, covered pt with fresh blankets and positioned tv with \"Lukas Tiger\" playing on it.    Pt requested phone back and this writer reviewed why pt was unable to have phone right now (misuse, calling 911 for non-emergent reasons.)  Pt became tearful again and relayed she was worried about missing her ride service tomorrow and be removed from the program.  This was identified by this writer as a potentially upsetting situation that could lead into dysregulation and escalation.  Social work and group home were called to cancel ride for tomorrow.  PlayDoh food toy provided to RN for pt when pt is ready.  No further needs " at this time.
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
## 2022-08-01 NOTE — ED NOTES
I received signout from my partner, Dr. Leroy.  The patient has remained stable.  There is hope that she will be able to return to her group home at 11 AM on 2022.  Patient's age is , but she is under guardianship of her sister and the sister wants her to remain here for evaluation.    No issues were experienced overnight.  She was signed over to my colleague, Dr. Gates at shift change.     Chente Huerta,   22 [phone]

## 2022-08-01 NOTE — PROGRESS NOTES
"Care Management Follow Up    Length of Stay (days): 0    Expected Discharge Date:       Concerns to be Addressed:       Patient plan of care discussed at interdisciplinary rounds: No    Anticipated Discharge Disposition: Group Home     Anticipated Discharge Services:    Anticipated Discharge DME:      Patient/family educated on Medicare website which has current facility and service quality ratings:    Education Provided on the Discharge Plan:    Patient/Family in Agreement with the Plan:      Referrals Placed by CM/SW:    Private pay costs discussed: Not applicable    Additional Information:  SILVIA spoke with Patricia ([phone]) from the patient's group home. They have issued a service suspension and will not take the patient back until the patient is stable on her medications for 1 week. Patricia asked about inpatien treatment. Per DEC \"After speaking with the guardian and provider this demonstration appears to be related to intellectual disability and personality of which IP care is not appropriate to address.\" SILVIA will update MD.     Addendum 1014: SILVIA updated MD. Patient lives at [facility] which is a [facility]. Ombudsman for mental health and developmental disabilities was contacted at [phone]. SILVIA [facility] requesting a call back.     Addendum 1110: SILVIA contacted Katherine Kearns to clarify who the guardian is. SILVIA also spoke with the DEC . They will talk with the GH and the patients sister.     Addendum 1210: Katherine Kearns confirms that the patient's sister Sabra is the guardian.     ABBY Mario, [facility]  Emergency Room   [phone]-Please contact the SW on the floor in which the patient is staying for any questions or concerns        "

## 2022-08-02 ENCOUNTER — PATIENT OUTREACH (OUTPATIENT)
Dept: CARE COORDINATION | Facility: CLINIC | Age: 37
End: 2022-08-02

## 2022-08-02 DIAGNOSIS — Z65.9 PSYCHOSOCIAL PROBLEM: Primary | ICD-10-CM

## 2022-08-02 PROCEDURE — 250N000013 HC RX MED GY IP 250 OP 250 PS 637: Performed by: EMERGENCY MEDICINE

## 2022-08-02 RX ADMIN — FAMOTIDINE 20 MG: 20 TABLET ORAL at 08:20

## 2022-08-02 RX ADMIN — ACETAMINOPHEN 650 MG: 325 TABLET, FILM COATED ORAL at 18:37

## 2022-08-02 RX ADMIN — ESCITALOPRAM OXALATE 30 MG: 20 TABLET ORAL at 08:20

## 2022-08-02 RX ADMIN — OLANZAPINE 10 MG: 5 TABLET, ORALLY DISINTEGRATING ORAL at 21:22

## 2022-08-02 RX ADMIN — PRAVASTATIN SODIUM 40 MG: 20 TABLET ORAL at 08:20

## 2022-08-02 RX ADMIN — FAMOTIDINE 20 MG: 20 TABLET ORAL at 21:22

## 2022-08-02 RX ADMIN — LEVOTHYROXINE SODIUM 50 MCG: 50 TABLET ORAL at 08:20

## 2022-08-02 RX ADMIN — TRAZODONE HYDROCHLORIDE 50 MG: 50 TABLET ORAL at 21:22
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
## 2022-08-02 NOTE — ED PROVIDER NOTES
Patient signed out to me by Dr. Silva.  Please see initial provider note for full details.  No acute events occurred overnight.    Current plan is extended care deck working on safety plan for group home discharge.  Mayte notified.    Spoke with RN.  Latest update from charge RN,10:39 AM is that group home was suspended patient services.  Care conference will be held tomorrow to discuss further steps.  Spoke with patient's RN extended-care DAC; filing appeal to have group home take patient back.  Patient has otherwise remained calm and cooperative overnight.    Patient signed out to the oncoming physician.  No acute events occurred during my shift.    Skyler Gates MD  Emergency Medicine     Yajaira, Skyler Yates MD  08/02/22 4156

## 2022-08-02 NOTE — PROGRESS NOTES
Clinic Care Coordination Contact  Care Team Conversations    [facility] received message from [facility] to begin working on Care Conference for pt.     Call to Pascual GONZALEZ, he is available any time today.    Call to cecilio Montaño, she is open anytime today. Sabra told CC that there is a  and a new guardian for pt, once CC emails she will email back with info.    Message to Bryanna , looking for availability, call back.     Email to team looking at meeting today, when works Bryanna? Everyone else is open. Also looking for new guardian info from Sabra.    Email from lawyer Sabra info for pt, Lindsay Tavraez and her number is [phone]. [facility] emailed back, CC wondering about guardian, not necessary to have  present unless you want to, inquired if guardian was still her at this point. She clarified that she is still the guardian now but  is working on getting her a new guardian. Email from Sabra, Info she sent to [facility] was wrong, that is DD person working on getting her DD waiver renewal, not .    Email from Bryanna, today doesn't work, only tomorrow. Email to team, what time tomorrow works? Between 12-3? Emails from team members about times, but dont match. Email to team suggesting new times. Email from Ricky, he will rearrange schedule to meet at 12.    [facility] created CC for team and sent out invites for 8/3 at 12.     Email from Sabra in response to invite, what time is meeting? Emailed meeting is at noon.    Astrid Guerrero, [facility]  Clinic Care Coordination  Pronouns: she/her/hers  Transitions and Extended Care Clinics  [facility]  [email or web address]  [phone]

## 2022-08-02 NOTE — ED NOTES
"Pt up to bathroom. Stopped to talk to writer on the way back to her room and asked writer to call her manager. Explained to pt that the care coordinator and other team members are working on getting her back to her group home and writer will update her whenever information is provided. Pt became agitated and yelled \"call my manager\" with clenched fists. Explained to pt that this is a frustrating situation and everything is being done to get her home safely and as soon as possible. Pt continued to  front of writer and stare. Security escorted pt back into her room. Pt resting in bed.   "
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
## 2022-08-02 NOTE — ED NOTES
RN obtained normal hospital bed to switch out with ER stretcher for pt comfort. Pt had been complaining about discomfort of ER stretcher during this extended stay in the ER. Pt reports increased comfort with hospital bed.

## 2022-08-03 PROBLEM — R46.89 ABNORMAL BEHAVIOR: Status: ACTIVE | Noted: 2022-08-03

## 2022-08-03 PROBLEM — R45.1 AGITATION: Status: ACTIVE | Noted: 2022-08-03

## 2022-08-03 PROBLEM — R46.89 AGGRESSION: Status: ACTIVE | Noted: 2022-08-03

## 2022-08-03 LAB
PLATELET # BLD AUTO: 169 10E3/UL (ref 150–450)
SARS-COV-2 RNA RESP QL NAA+PROBE: NEGATIVE

## 2022-08-03 PROCEDURE — 36416 COLLJ CAPILLARY BLOOD SPEC: CPT | Performed by: INTERNAL MEDICINE

## 2022-08-03 PROCEDURE — 250N000013 HC RX MED GY IP 250 OP 250 PS 637: Performed by: EMERGENCY MEDICINE

## 2022-08-03 PROCEDURE — 250N000013 HC RX MED GY IP 250 OP 250 PS 637: Performed by: PHYSICIAN ASSISTANT

## 2022-08-03 PROCEDURE — 99219 PR INITIAL OBSERVATION CARE,LEVEL II: CPT | Performed by: PHYSICIAN ASSISTANT

## 2022-08-03 PROCEDURE — U0003 INFECTIOUS AGENT DETECTION BY NUCLEIC ACID (DNA OR RNA); SEVERE ACUTE RESPIRATORY SYNDROME CORONAVIRUS 2 (SARS-COV-2) (CORONAVIRUS DISEASE [COVID-19]), AMPLIFIED PROBE TECHNIQUE, MAKING USE OF HIGH THROUGHPUT TECHNOLOGIES AS DESCRIBED BY CMS-2020-01-R: HCPCS | Performed by: EMERGENCY MEDICINE

## 2022-08-03 PROCEDURE — 85049 AUTOMATED PLATELET COUNT: CPT | Performed by: INTERNAL MEDICINE

## 2022-08-03 PROCEDURE — G0378 HOSPITAL OBSERVATION PER HR: HCPCS

## 2022-08-03 RX ORDER — ENOXAPARIN SODIUM 100 MG/ML
40 INJECTION SUBCUTANEOUS EVERY 12 HOURS
Status: DISCONTINUED | OUTPATIENT
Start: 2022-08-03 | End: 2022-08-04

## 2022-08-03 RX ORDER — ONDANSETRON 4 MG/1
4 TABLET, ORALLY DISINTEGRATING ORAL EVERY 6 HOURS PRN
Status: DISCONTINUED | OUTPATIENT
Start: 2022-08-03 | End: 2022-08-09 | Stop reason: HOSPADM

## 2022-08-03 RX ORDER — ONDANSETRON 2 MG/ML
4 INJECTION INTRAMUSCULAR; INTRAVENOUS EVERY 6 HOURS PRN
Status: DISCONTINUED | OUTPATIENT
Start: 2022-08-03 | End: 2022-08-09 | Stop reason: HOSPADM

## 2022-08-03 RX ORDER — ACETAMINOPHEN 650 MG/1
650 SUPPOSITORY RECTAL EVERY 6 HOURS PRN
Status: DISCONTINUED | OUTPATIENT
Start: 2022-08-03 | End: 2022-08-09 | Stop reason: HOSPADM

## 2022-08-03 RX ORDER — ACETAMINOPHEN 325 MG/1
650 TABLET ORAL EVERY 6 HOURS PRN
Status: DISCONTINUED | OUTPATIENT
Start: 2022-08-03 | End: 2022-08-03

## 2022-08-03 RX ADMIN — LEVOTHYROXINE SODIUM 50 MCG: 50 TABLET ORAL at 08:41

## 2022-08-03 RX ADMIN — TRAZODONE HYDROCHLORIDE 50 MG: 50 TABLET ORAL at 21:36

## 2022-08-03 RX ADMIN — PRAVASTATIN SODIUM 40 MG: 20 TABLET ORAL at 08:41

## 2022-08-03 RX ADMIN — FAMOTIDINE 20 MG: 20 TABLET ORAL at 08:40

## 2022-08-03 RX ADMIN — ESCITALOPRAM OXALATE 30 MG: 20 TABLET ORAL at 08:41

## 2022-08-03 RX ADMIN — FAMOTIDINE 20 MG: 20 TABLET ORAL at 19:26
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
## 2022-08-03 NOTE — ED NOTES
Patient upset and making several calls to group home and sister about leaving.  RN reinforced education about patient staying until safe to return to her group home.

## 2022-08-03 NOTE — ED NOTES
Triage & Transition Services, Extended Care      Participated in care conference with ED nurse manager, ROSELINE Vargas , Ricky, guardian, Sabra, guardian's , Bárbara, patient's , Rika, , Fernando, and group home staff Candice Gomez, and Conchis.     Guardian/Sister Sabra Asher: Home [phone] Cell [phone] she is more likely to answer on her home number.  Therapist Carmelo Albright [phone]   Ricky Clark [phone] [email or web address]  [facility] [employer] [phone]    Reviewed Pt's care and the ED, and overall stabilization, with recommendation to return back to group home. Guardian noted several helpful suggestions towards supporting Pt at home, including avoiding power struggles, as well as home modifications to eliminate conflict. Adding locks to the refrigerator in the home would be beneficial. Candice (supervisor) is to make this request today. Moving Fan's personal food to her room was also discussed. Fan would benefit from a day program, which Ricky is seeking. There have been concerns with Fan's unwillingness to sign ROIs to allow her guardian and group home staff to participate in her care; in order to return home, her group home would like her willingness to do this. [facility] would also like a behavior analyst involved, which Ricky noted ability to follow up on. Fan has psychiatry at [facility], and [facility] would like a PRN for agitation prescribed prior to her return home. Will work to facilitate psych consult. Ricky has already referred to [facility] and will complete a mental health case management referral, too. Ricky has also connected Fan with individualized home supports, and this agency has begun contacting Fan to begin their work.     At end of meeting, writer inquired about when Fan could return home. , Candice noted not until early next week, citing home maintenance and training pertaining to refrigerator locks.  Discussed ongoing emotional and medical trauma, attempting to facilitate discharge yet this week. Candice noted ability to terminate services all together. Per Pt's , she has already involved the Ombudsman. Writer will follow up with Lashawnman again today, too. Alerted Kathryn Dillon - [email or web address]., [phone], [facility] for Mental Health and Developmental Disabilities.      Next care conference on Friday, 8/5 at 12:30p. In interim, Candice to work on home maintenance. Extended Care team to work with Fan on signing relevant ROIs, can coordinate transitional psychiatry if necessary.     Grace Rivera MS, [facility]  Clinical Supervisor; Triage and Transition Services, Extended Care  [phone]

## 2022-08-03 NOTE — ED NOTES
Per sister Awa, she does not have the capability to get her and bring her to her house tonight despite patient's repeated requests. Patient updated.

## 2022-08-03 NOTE — ED NOTES
Patient ate breakfast, tolerated AM medications. VSS. Pt remains clam, cooperative. Pt denies any other needs at this time.
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
## 2022-08-03 NOTE — PROGRESS NOTES
Care Management Follow Up    Length of Stay (days): 0    Expected Discharge Date:       Concerns to be Addressed:       Patient plan of care discussed at interdisciplinary rounds: No    Anticipated Discharge Disposition: Group Home     Anticipated Discharge Services:    Anticipated Discharge DME:      Patient/family educated on Medicare website which has current facility and service quality ratings:    Education Provided on the Discharge Plan:    Patient/Family in Agreement with the Plan:      Referrals Placed by CM/SW:    Private pay costs discussed: Not applicable    Additional Information:  SILVIA received VM from [facility]. Mayte reports the  can give a service suspension but there are steps that the GH needs to take in the mean time. SILVIA called Mayte back to inquire about what steps the  needs to take and to see if the ED can be the s back up plan for placement. SILVIA is awaiting a return call.      Please contact [facility] at  [phone] for placement and outcome of the care conference, not the ED care coordinator.       ABBY Mario, [facility]  Emergency Room   [phone]-Please contact the SW on the floor in which the patient is staying for any questions or concerns

## 2022-08-03 NOTE — ED NOTES
Pt off phone and upset reports she is not leaving that her sister told her she is here until next wk. Pt yelling, slamming fit into bed while sitting on the side of bed, and crying. Pt stating she is upset because she is going to miss [employer] again.
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
## 2022-08-04 ENCOUNTER — PATIENT OUTREACH (OUTPATIENT)
Dept: CARE COORDINATION | Facility: CLINIC | Age: 37
End: 2022-08-04

## 2022-08-04 PROCEDURE — 96372 THER/PROPH/DIAG INJ SC/IM: CPT | Performed by: INTERNAL MEDICINE

## 2022-08-04 PROCEDURE — 250N000013 HC RX MED GY IP 250 OP 250 PS 637: Performed by: PHYSICIAN ASSISTANT

## 2022-08-04 PROCEDURE — G0378 HOSPITAL OBSERVATION PER HR: HCPCS

## 2022-08-04 PROCEDURE — 250N000011 HC RX IP 250 OP 636: Performed by: INTERNAL MEDICINE

## 2022-08-04 RX ORDER — ENOXAPARIN SODIUM 100 MG/ML
40 INJECTION SUBCUTANEOUS EVERY 12 HOURS
Status: DISCONTINUED | OUTPATIENT
Start: 2022-08-04 | End: 2022-08-09 | Stop reason: HOSPADM

## 2022-08-04 RX ADMIN — TRAZODONE HYDROCHLORIDE 50 MG: 50 TABLET ORAL at 22:36

## 2022-08-04 RX ADMIN — LEVOTHYROXINE SODIUM 50 MCG: 50 TABLET ORAL at 07:59

## 2022-08-04 RX ADMIN — PRAVASTATIN SODIUM 40 MG: 20 TABLET ORAL at 08:00

## 2022-08-04 RX ADMIN — FAMOTIDINE 20 MG: 20 TABLET ORAL at 08:00

## 2022-08-04 RX ADMIN — ENOXAPARIN SODIUM 40 MG: 40 INJECTION SUBCUTANEOUS at 19:39

## 2022-08-04 RX ADMIN — FAMOTIDINE 20 MG: 20 TABLET ORAL at 19:39

## 2022-08-04 RX ADMIN — ACETAMINOPHEN 650 MG: 325 TABLET, FILM COATED ORAL at 15:36

## 2022-08-04 RX ADMIN — ESCITALOPRAM OXALATE 30 MG: 20 TABLET ORAL at 07:59

## 2022-08-04 RX ADMIN — ENOXAPARIN SODIUM 40 MG: 40 INJECTION SUBCUTANEOUS at 07:59

## 2022-08-04 NOTE — ED NOTES
Pt complaining of discomfort near R sided groin in between stomach pannus, skin appears reddened. Antifungal barrier cream applied.

## 2022-08-04 NOTE — PROGRESS NOTES
08/04/22 1423   [facility]   Location ED   Intervention Referral/Consult   Patient sleeping. [facility] will engage in assessment and developmentally appropriate activity options later today.

## 2022-08-04 NOTE — ED NOTES
MD made aware that pt is not on an GABINO and that pt reported she would like to leave. Per MD pt was placed on GABINO rather than 72 hour hold due to being a vulnerable adult.

## 2022-08-04 NOTE — ED NOTES
"Pt call light answered. Pt tearful and stating \"I want to go to my sister's, I want to go home\". Pt informed that unfortunately she cannot go home and that she cannot go to her sister's. Pt appears very upset. States \"I don't have to stay here, I just can't go home\". Pt offered emotional support and encouragement. Informed that admission is best plan in order to find her somewhere safe to go home to.  "

## 2022-08-04 NOTE — PROGRESS NOTES
Clinic Care Coordination Contact  Care Team Conversations    [facility] emailed LISA García for pt, checking in about [facility] referral, behavioral analyst, other potential placements and day programming, [facility] can collaborate and assist with referrals, just let [facility] know.     Received email back from Ricky, conversed with [facility] and making referral to [facility] for behavioral analyst support. Day programming not having a lot of luck. For placement options in talking with [facility] feel isnt ideal for her to be moving place to place and would like to hopefully get more supports, ideas for referrals for MHCM?    Email to Ricky, explained that dont want her to move place to place, hopefully res care works out but best interest of pt to have a back up plan in case of termination. Gave # for [facility], has [facility] been contacted yet for MH worker?    [facility] received email from cecilio Montaño regarding request to weigh pt while at hospital. Responded to contact ED nursing staff with that request.    [facility] sent email to hussain Gould's email, add her here, looking for update on maintenance request.    KAYLA Rivera  Clinic Care Coordination  Pronouns: she/her/hers  Transitions and Extended Care Clinics  [facility]  [email or web address]  [phone]
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
## 2022-08-04 NOTE — CONSULTS
"Care Management Follow Up    Length of Stay (days): 0    Expected Discharge Date:       Concerns to be Addressed:       Patient plan of care discussed at interdisciplinary rounds: No    Anticipated Discharge Disposition: Group Home     Anticipated Discharge Services:    Anticipated Discharge DME:      Patient/family educated on Medicare website which has current facility and service quality ratings:    Education Provided on the Discharge Plan:    Patient/Family in Agreement with the Plan:      Referrals Placed by CM/SW:    Private pay costs discussed: Not applicable    Additional Information:  Extended care has been following this patient while she has been in the  ED. SW and [facility] have already contacted the [facility] and is awaiting a return call. Per Extended Care note from their care conference yesterday that the ED SW team was not apart of, there is a care conference planned for Friday and the group home has not given an exact date in which they will take the patient back.     Per chart review from [facility] \", Candice noted not until early next week, citing home maintenance and training pertaining to refrigerator locks. Next Care conference on Friday, 8/5 at 12:30p.\" The care conference was set up by [facility].     At this time, it appears that the plan is to still have the patient return to her group home when stable and when  gives the okay, not find a new placement.     [facility]-Kathryn [email or web address]., [phone], [facility] Mental Health and Developmental Disabilities.       Addendum 0814: SW attempted to call guardian for nova. LVM requesting call back.     Addendum 9212: Attempted to do moon again. No answer.     ABBY Mario, [facility]  Emergency Room   [phone]-Please contact the SW on the floor in which the patient is staying for any questions or concerns    "

## 2022-08-04 NOTE — PROGRESS NOTES
SPIRITUAL HEALTH SERVICES Progress Note  [facility] ED    Referral Source: Consult order request for support.    In consultation with unit RN, pt would benefit by needs assessment through [facility].    Plan: Will confer with [facility] regarding patient needs and request assistance.    Moreno Navarro MA  Staff   Pager: [phone] *[phone]

## 2022-08-04 NOTE — ED NOTES
"Pt shouting \"nurse\" from pt room. Went to pt's room. Pt is tearful and wanting me to call her sister, Awa, and have Awa pick her up to go home. Reports that Sabra, her other sister, stated that she can go back to her group home in a week. Pt informed that I was told in report that the group home stated that she cannot come back at this time and that we need to admit her to find a safe home for her. Pt stated this is not true. Pt wants to discharge to her sister's home. Pt informed that her sister may not be able to accommodate that at this time and that the best plan of care is to be admitted. No evidence of learning. Pt tearful when I exited room. Will speak w/ MD.   "

## 2022-08-04 NOTE — UTILIZATION REVIEW
"  [facility] Utilization Review  Admission Status; Secondary Review Determination     Admission Date: 7/30/2022  9:23 PM      Under the authority of the Utilization Management Committee, the utilization review process indicated a secondary review on the above patient.  The review outcome is based on review of the medical records, discussions with staff, and applying clinical experience noted on the date of the review.        (X) Observation Status Appropriate - This patient does not meet hospital inpatient criteria and is placed in observation status. If this patient's primary payer is Medicare and was admitted as an inpatient, Condition Code 44 should be used and patient status changed to \"observation\".   () Observation Status concurrent Review           RATIONALE FOR DETERMINATION   37-year-old female with history of developmental delay, depression, anxiety, GERD, hypothyroidism, CVA, obesity, admitted for disposition assistance.  Patient unable to return to prior group home, came into the ED on 7/30, , DEC have been following,  working into disposition Niall as well.  Patient has been medically cleared and awaiting return to group home, was in the ED since 7/30, then admitted as observation status on 8/3 for disposition planning.  Psychiatry evaluation pending, patient is on GABINO due to vulnerable adult, care conference scheduled for 8/5.  Patient with history of developmental delay, had episodes of agitation at the group home, who refused to take her back, now waiting for disposition planning, appears to be medically stable, does not meet criteria for inpatient stay, recommend continue observation status      The severity of illness, intensity of service provided, expected LOS make the care appropriate for observation status at this time.        The information on this document is developed by the utilization review team in order for the business office to ensure compliance. "  This only denotes the appropriateness of proper admission status and does not reflect the quality of care rendered.         The definitions of Inpatient Status and Observation Status used in making the determination above are those provided in the CMS Coverage Manual, Chapter 1 and Chapter 6, section 70.4.      Sincerely,       Silas Quevedo MD  Physician Advisor  Utilization Review-[place]    Phone: [phone]

## 2022-08-04 NOTE — ED NOTES
Pt given paperwork for GABINO, GABINO process explained to pt. Pt reports concern regarding GABINO and that she wants to go back to her group home. Informed that there will be another care meeting on Friday to better plan for the safest option for pt's discharge. Pt given apple juice per her request.

## 2022-08-04 NOTE — ED NOTES
"  Triage & Transition Services, Extended Care     Care Coordination Progress Note    Patient: Dionne goes by \"Dionne,\"  Date of Service: August 4, 2022  Site of Service: [place]    Individuals Present: Dionne & Yanelis Segovia    Session start: 3:10  Session end: 3:25  Session duration in minutes: 10  Patient was seen virtually ([employer] cart or other teleconferencing device).     Dionne is being followed by Extended Care.     Details of Therapeutic Interaction:   Writer Spoke w/ pts nurse, Rose. Rose states pt is doing well, no concerns today. Writer offered to speak w/ pt, pt accepted. Met via [employer] meeting nehal.   Writer introduced herself and role at [facility]. Pt asked, \"when am I going home? tomorrow or some time next week?\" Writer replied, \"You should know when I know\" and continued to explain that there is another care conference scheduled for 8/5 at 12:30 and are hoping for more information then. Writer asked if there is anything else pt wanted to talk about. Pt replied, \"I want them to know to respect me, don't call me names and respect my things.\" Writer validated feelings.           Plan:  Care Conference scheduled for 8/5 12:30    Yanelis Segovia 8/4/2022 3:32 PM  Extended Care Coordinator- [phone]          "

## 2022-08-05 ENCOUNTER — PATIENT OUTREACH (OUTPATIENT)
Dept: CARE COORDINATION | Facility: CLINIC | Age: 37
End: 2022-08-05

## 2022-08-05 PROCEDURE — 250N000011 HC RX IP 250 OP 636: Performed by: INTERNAL MEDICINE

## 2022-08-05 PROCEDURE — 250N000013 HC RX MED GY IP 250 OP 250 PS 637: Performed by: PHYSICIAN ASSISTANT

## 2022-08-05 PROCEDURE — 96372 THER/PROPH/DIAG INJ SC/IM: CPT | Performed by: INTERNAL MEDICINE

## 2022-08-05 PROCEDURE — G0378 HOSPITAL OBSERVATION PER HR: HCPCS

## 2022-08-05 PROCEDURE — 99225 PR SUBSEQUENT OBSERVATION CARE,LEVEL II: CPT | Performed by: STUDENT IN AN ORGANIZED HEALTH CARE EDUCATION/TRAINING PROGRAM

## 2022-08-05 RX ADMIN — ACETAMINOPHEN 650 MG: 325 TABLET, FILM COATED ORAL at 12:15

## 2022-08-05 RX ADMIN — ENOXAPARIN SODIUM 40 MG: 40 INJECTION SUBCUTANEOUS at 20:55

## 2022-08-05 RX ADMIN — ESCITALOPRAM OXALATE 30 MG: 20 TABLET ORAL at 08:41

## 2022-08-05 RX ADMIN — LEVOTHYROXINE SODIUM 50 MCG: 50 TABLET ORAL at 08:41

## 2022-08-05 RX ADMIN — FAMOTIDINE 20 MG: 20 TABLET ORAL at 08:42

## 2022-08-05 RX ADMIN — TRAZODONE HYDROCHLORIDE 50 MG: 50 TABLET ORAL at 22:12

## 2022-08-05 RX ADMIN — ENOXAPARIN SODIUM 40 MG: 40 INJECTION SUBCUTANEOUS at 08:41

## 2022-08-05 RX ADMIN — FAMOTIDINE 20 MG: 20 TABLET ORAL at 20:55

## 2022-08-05 RX ADMIN — PRAVASTATIN SODIUM 40 MG: 20 TABLET ORAL at 08:42

## 2022-08-05 NOTE — PROGRESS NOTES
Triage & Transition Services, [facility] Care      Participated in care conference with guardian, Sabra, Patient's , Rika, guardian's , ROSELINE Granger , Ricky, group home staff, Patricia Balderrama, and Extended Care staff, Astrid Lopez.    Sabra has signed ROIs for service providers and will be able to give infomraiton to [facility], on Fan's appointments. In care confernece, ongoing discussion occurred surrounding Fan's inability to sign for herself, given she is under guardianship.  wants Fan to agree to guidelines, similar to what she signed at move in, and both  note Fan's inability to sign and enter contractual agreements. Her , Rkia, is planning to come to the hospital on Monday to review house expectations with her.    Ricky, her , provided an update. He has completed an [facility] crisis bed referral and a [facility] behavioral analysis referral. He has found few openings at other group home providers for Sabra to review as a back up plan.    At this time, [facility] is agreeing to accept Fan back. Related to staffing and home maintenance, she cannot return until Tuesday 8/9. Anticipated discharge at 11a, with group home transporting home.     Grace Rivera MS, [facility]  Clinical Supervisor; Triage and Transition Services, [facility] Care  [phone]

## 2022-08-05 NOTE — PROGRESS NOTES
[facility]  Hospitalist Progress Note  Padilla Zaragoza,  08/05/22       Reason for Stay (Diagnosis): dispo         Assessment and Plan:      Dionne Perez is a 37 year old female with PMhx of developmental delay, depression, anxiety, GERD, hypothyrodism, CVA, obesity, who was admitted on 8/3/2022 for disposition assistance.    Problem List/Assessment and Plan:   Need for Disposition   Unable to currently return to prior group home. Brought into the ED on 7/30 where she has remained. SW, DEC have been following.  has also been involved. Medically cleared awaiting return to group home.      Developmental delay   At baseline. Cooperative.   Does have remote history of behaviors, lack of understanding of consequences. No aggression.   -Reorient as needed  -Resides in group home   -Bedside attendant PRN, discretion of staff     Hx of Depression, anxiety  -PTA trazodone, escitaloparam     Hx of CVA  Cerebellar infarct on MRI 6/2020   -PTA  mg, statin       Hypothyroidism  -PTA synthroid      GERD  -PTA pepcid     Obesity   Last recorded weight 332 lb.  Sister is concerned about portions with weight gain and eating behaviors at the group home, ordering door dash. Has requested a repeat weight.   -Continue outpatient follow up for weight management       DVT Prophylaxis: Enoxaparin (Lovenox) SQ  Code Status: Full Code  Discharge Dispo/Date: Pending dispo. Medically stable.        Interval History (Subjective):      Patient has no complaints today. Just wants help getting gown back on.     Called Sabra                  Physical Exam:      Last Vital Signs:  /48 (BP Location: Left arm)   Pulse 54   Temp 98.3  F (36.8  C) (Oral)   Resp 20   Wt (!) 164 kg (361 lb 9.6 oz)   LMP  (LMP Unknown)   SpO2 99%   BMI 54.98 kg/m      No intake or output data in the 24 hours ending 08/05/22 1511    General: Alert, awake, no acute distress.  HEENT: NC/AT, eyes anicteric, external occular  -ACP Only-, .(Attending) movements intact, face symmetric.  Dentition WNL, MM moist.  Cardiac: RRR, S1, S2.  No murmurs appreciated.  Pulmonary: Normal work of breathing.  Lungs CTAB.  Abdomen: soft, non-tender, non-distended.  Bowel sounds present.  No guarding.  Extremities: no deformities.  Warm, well perfused.  Skin: no rashes or lesions noted.  Warm and dry.  Neuro: No gross deficits noted.  Speech clear.    Psych: Appropriate affect.         Medications:      All current medications were reviewed with changes reflected in problem list.         Data:      All new lab and imaging data was reviewed.   Labs:       Lab Results   Component Value Date     06/25/2022     07/07/2020     06/29/2020     06/25/2020    Lab Results   Component Value Date    CHLORIDE 107 06/25/2022    CHLORIDE 107 07/07/2020    CHLORIDE 110 06/29/2020    CHLORIDE 108 06/25/2020    Lab Results   Component Value Date    BUN 27 06/25/2022    BUN 28 07/07/2020    BUN 27 06/29/2020    BUN 28 06/25/2020      Lab Results   Component Value Date    POTASSIUM 4.5 06/25/2022    POTASSIUM 5.1 07/07/2020    POTASSIUM 4.6 06/29/2020    POTASSIUM 4.2 06/25/2020    Lab Results   Component Value Date    CO2 30 06/25/2022    CO2 27 07/07/2020    CO2 25 06/29/2020    CO2 27 06/25/2020    Lab Results   Component Value Date    CR 1.04 06/25/2022    CR 0.53 07/07/2020    CR 0.54 06/29/2020    CR 0.52 06/25/2020        Recent Labs   Lab 08/03/22  2132         Imaging:   No results found for this or any previous visit (from the past 24 hour(s)).      Padilla Zaragoza DO

## 2022-08-05 NOTE — CONSULTS
See dictation.# [id number]  Psychiatry Service   Initial Consultation-completed    Roseann Carter MD  8/5/2022

## 2022-08-05 NOTE — CONSULTS
Consult Date: 08/05/2022    IDENTIFICATION:  The patient is a 37-year-old female with intellectual disorder, unspecified anxiety disorder, organic personality disorder, multiple medical issues who is seen at the request of Dr. Zaragoza for medication evaluation.    HISTORY OF PRESENT ILLNESS:  The patient had been brought to ED by EMS called by her group home on 07/30/2022 after she had taken a knife to some plastic and would not give it back to staff.  911 was called.  She apparently is restricted from having sharp objects.  There have been some other behaviors at times, also possibly some paranoia.  She is morbidly obese and may have also been ordering out for food such as [employer].  She had been observed in group home and attempts to return her, but she was medically admitted for further observation and placement.  Her sister had expressed some concern about ongoing weight gain.  Dietary has been consulted.  Her physical examination has been unremarkable.  Otherwise, she has been medically stable.  She had not had repeated suicidal thoughts since being evaluated in the Emergency Room twice in the last 2 months in June and July 2022.  She has been able to be returned at those times.  No use of alcohol or illicit substances.  She had expressed to nursing concerned that her sister was taking her money.  She has been redirectable.  No recurrence  aggression.  No clear vegetative symptoms or expressions of suicidal thoughts.    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  She is followed by in-house contracted staffing.  She was continued on her preadmission Lexapro and trazodone without change.  She has not required a p.r.n.  Zyprexa or Vistaril.   She has had recurrent suicidal ideation.  No life-threatening suicide attempts.  She has had some aggression towards group home staff in the past.    PAST MEDICAL HISTORY:   Morbid obesity, hypothyroidism, on replacement esophagitis behavior, history of CVA 2020, hypercholesterolemia on statin.    SOCIAL HISTORY:  Patient has legal guardianship. Group Home resident.    PHYSICAL EXAMINATION:    VITAL SIGNS:  Temperature 98, pulse 55, /52, weight 164 kilograms.    REVIEW OF SYSTEMS:  No recent febrile illness, fall injury, full symptoms and otherwise systems negative.    MENTAL STATUS EXAMINATION:  Morbidly obese female looking approximately stated age.  Cognition rather concrete.  She is oriented to person, place, situation, year.  Fund of knowledge consistent with intellectual level.  No apparent hallucinations, suicidal or homicidal ideation.  Insight and judgment memory are at baseline.  No tic or tremor noted.  Speech nonpressured. Cooperative and pleasant overall. Affect no distress. Insight and judgement are likely at baseline, no recent impulsivity.Calm affect. No expressed depressed mood.     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    PLAN/RECOMMENDATIONS:  The patient appears to be stable on preadmission medications and has not required use of p.r.n.  Zyprexa or Vistaril or further intervention by staff.  She is awaiting placement which apparently will take place 2022.  Given vulnerable adult status, will be on frequent checks.  She can have bedside attendant as needed or for comfort and return to group should not be delayed.  No medication changes indicated. Again, she is likely at or near baseline.    Roseann Carter MD        D: 2022   T: 2022   MT: CARLIN    Name:     KISHA CANCHOLA  MRN:      [id number]        Account:      [id number]   :      1985           Consult Date: 2022     Document: [id number]

## 2022-08-05 NOTE — PROGRESS NOTES
"Care Management Follow Up    Length of Stay (days): 0    Expected Discharge Date: 08/09/2022     Concerns to be Addressed:   discharge planning    Patient plan of care discussed at interdisciplinary rounds: Yes    Anticipated Discharge Disposition: Group Home     Anticipated Discharge Services:  None  Anticipated Discharge DME:  None    Patient/family educated on Medicare website which has current facility and service quality ratings:  N/A  Education Provided on the Discharge Plan:  yes  Patient/Family in Agreement with the Plan:  yes    Referrals Placed by CM/SW:  None  Private pay costs discussed: Not applicable    Additional Information:  Gloria was informed that the pt was upset because her sister took money from her and won't give it back.  Gloria spoke with Ricky P: [phone], Pt's CM, to discuss the pt's relationship with her sister Sabra and determine if this has been an ongoing issue.  Ricky has been assigned to the pt for a few months now.  He said that the relationship between the pt and her sister/guardian Sabra has not been the best.  Both of their  are working on finding the pt a new guardian, but have been unsuccessful at finding a new one.    Gloria met with the pt who said that Sabra had put money into the pt's account, but then she took it out.  The pt said that she was ordering Door Dash from the ED and that is where some of her money went.  She gave Sw permission to call her sister to determine where the pt's money is going.  The pt said that if Sabra does not give her money, she will \"take it from the office.\"  The pt said that she does have some money in the office at her GH.    Gloria called Sabra (also the pt's representative payee) who said that the pt ordered $50 worth of food through [employer] while in the ED.  Sabra said that the Pending sale to [facility] gives the pt $105/month for personal spending.  Sabra was putting $50 in the pt's account every 2 weeks.  However, with the pt not being able to control her " spending, Sabra has decided to put $25 into the pt's account weekly instead of $50 every 2 weeks.  She said that she will deposit the money every Tuesday.  Sabra said that she wants to make sure the pt has money for personal items when she needs them and is not out of money by the end of the month.    Sw met with the pt and updated her on what Sabra said.  The pt said that she understood.  Pt will return to her  on Tuesday, 8/9.  Pt's  staff will pick her up at 1100.    Sw will continue to be available as needed until discharge.      ABBY Fontanez, [facility]  Inpatient Care Coordination  [facility]  [phone]

## 2022-08-05 NOTE — ED NOTES
Triage & Transition Services, Extended Care     Dionne Perez  August 5, 2022    Dionne is followed related to Complex Care Plan which indicates group home. Please see initial DEC Crisis Assessment completed for complete assessment information. Medical record is reviewed. While patient is in the ED, care team is working towards Demonstrate Calm, Non Violent/Destructive Behavior for at least 24 hours. Additional notes include aggression/agitation.    There are not significant status changes.     Patient admitted to inpatient as of 7/30/2022. Patient had a care conference today. Group home is willing to take patient back pending agreement to follow rules and expectations.    Plan:    Extended Care will follow and meet with patient/family/care team as able or requested.     GONZALO CARRILLO, ABBY, LGSW, [facility], Extended Care   [phone]

## 2022-08-05 NOTE — PROGRESS NOTES
Clinic Care Coordination Contact  Care Team Conversations    [facility] attended Care Conference with team members. Took meeting minute notes and saved into teams files. [facility] plans to take pt back home Tuesday at 11am once maintenance has had time to install locks in home.    [facility] emailed [facility] CM and [facility] staff list of patients current meds.    Called [facility] and spoke with nursing staff, explained patients discharge plans for Tuesday at 11am, [facility] group home staff will be picking up, contact us if needed.    KAYLA Rivera  Clinic Care Coordination  Pronouns: she/her/hers  Transitions and Extended Care Clinics  [facility]  [email or web address]  [phone]

## 2022-08-05 NOTE — PLAN OF CARE
End of Shift Summary  For vital signs and complete assessments, please see documentation flowsheets.     Pertinent assessments: Pt calm and cooperative, denies any discomfort. Snack provided. Sleeping well.     Major Shift Events Uneventful    Treatment Plan: Discharge back to      Bedside Nurse: Mag Ward RN

## 2022-08-05 NOTE — PLAN OF CARE
Goal Outcome Evaluation:             To Do:  End of Shift Summary  For vital signs and complete assessments, please see documentation flowsheets.     Pertinent assessments:  Alert -- up in room -- appetite good --pleasant and cooperative   Major Shift Events  none  Treatment Plan:  discharge Tuesday  Bedside Nurse: Nadine Lemon RN

## 2022-08-05 NOTE — PLAN OF CARE
Pertinent assessments: pt arrived on unit at 1630 A/O denies pain.  Pt has a developmental delay. Sister is guardian.  No Benign assessment.  No behaviors.      Major Shift Events arrived on unit  Treatment Plan: will go back to group home when they are accepting.  Bedside Nurse: Nyla Huff RN

## 2022-08-06 LAB
CREAT SERPL-MCNC: 0.75 MG/DL (ref 0.51–0.95)
GFR SERPL CREATININE-BSD FRML MDRD: >90 ML/MIN/1.73M2
PLATELET # BLD AUTO: 134 10E3/UL (ref 150–450)

## 2022-08-06 PROCEDURE — G0378 HOSPITAL OBSERVATION PER HR: HCPCS

## 2022-08-06 PROCEDURE — 99224 PR SUBSEQUENT OBSERVATION CARE,LEVEL I: CPT | Performed by: STUDENT IN AN ORGANIZED HEALTH CARE EDUCATION/TRAINING PROGRAM

## 2022-08-06 PROCEDURE — 250N000013 HC RX MED GY IP 250 OP 250 PS 637: Performed by: PHYSICIAN ASSISTANT

## 2022-08-06 PROCEDURE — 250N000011 HC RX IP 250 OP 636: Performed by: INTERNAL MEDICINE

## 2022-08-06 PROCEDURE — 85049 AUTOMATED PLATELET COUNT: CPT | Performed by: PHYSICIAN ASSISTANT

## 2022-08-06 PROCEDURE — 96372 THER/PROPH/DIAG INJ SC/IM: CPT | Performed by: INTERNAL MEDICINE

## 2022-08-06 PROCEDURE — 36415 COLL VENOUS BLD VENIPUNCTURE: CPT | Performed by: PHYSICIAN ASSISTANT

## 2022-08-06 PROCEDURE — 82565 ASSAY OF CREATININE: CPT | Performed by: PHYSICIAN ASSISTANT

## 2022-08-06 RX ADMIN — PRAVASTATIN SODIUM 40 MG: 20 TABLET ORAL at 09:53

## 2022-08-06 RX ADMIN — FAMOTIDINE 20 MG: 20 TABLET ORAL at 20:06

## 2022-08-06 RX ADMIN — Medication 1 MG: at 22:20

## 2022-08-06 RX ADMIN — TRAZODONE HYDROCHLORIDE 50 MG: 50 TABLET ORAL at 22:20

## 2022-08-06 RX ADMIN — FAMOTIDINE 20 MG: 20 TABLET ORAL at 09:53

## 2022-08-06 RX ADMIN — ACETAMINOPHEN 650 MG: 325 TABLET, FILM COATED ORAL at 04:57

## 2022-08-06 RX ADMIN — ENOXAPARIN SODIUM 40 MG: 40 INJECTION SUBCUTANEOUS at 09:52

## 2022-08-06 RX ADMIN — ESCITALOPRAM OXALATE 30 MG: 20 TABLET ORAL at 09:52

## 2022-08-06 RX ADMIN — LEVOTHYROXINE SODIUM 50 MCG: 50 TABLET ORAL at 09:52

## 2022-08-06 RX ADMIN — OLANZAPINE 10 MG: 5 TABLET, ORALLY DISINTEGRATING ORAL at 18:19

## 2022-08-06 RX ADMIN — ENOXAPARIN SODIUM 40 MG: 40 INJECTION SUBCUTANEOUS at 20:06

## 2022-08-06 NOTE — PROGRESS NOTES
Care Management Follow Up    Length of Stay (days): 0    Expected Discharge Date: 08/09/2022     Concerns to be Addressed:       Patient plan of care discussed at interdisciplinary rounds: Yes    Additional Information:  Patient asking to speaks with . Patient has been trying to reach several people to have some personal items brought to the hospital. She asked that I try to reach out to them.  Left message for  Patricia.  Spoke with Lorraine staff member at the  who let us know that patients room is locked and they do not have permission or access to her room. She stated that patients guardian could come and get items but they cannot due to liability.  Spoke with Sabra (guardian) and she will not be driving  to  the items for her at this time.  Will inform patient of conversations.    Leana Mar RN BSN OCN  Care Coordinator  [facility]  [phone]

## 2022-08-06 NOTE — PLAN OF CARE
Goal Outcome Evaluation:                  To Do:  End of Shift Summary  For vital signs and complete assessments, please see documentation flowsheets.     Pertinent assessments:  up in room -- appetite good --becoming anxious meds given--denies pain and nausea   Major Shift Events  none  Treatment Plan: discharge Tuesday  Bedside Nurse: Nadine Lemon RN

## 2022-08-06 NOTE — PROGRESS NOTES
End of Shift Summary  For vital signs and complete assessments, please see documentation flowsheets.     Pertinent assessments: Pt A&O, calm, cooperative. On RA. C/O HA this morning Tylenol given     Major Shift Events: uneventful     Treatment Plan: Lovenox, continue to monitor,  discharge back to group home on Tuesday 8/9    Bedside Nurse: Cristina Toth RN

## 2022-08-06 NOTE — PROGRESS NOTES
[facility]  Hospitalist Progress Note  Daniel Nesbitt MD 08/06/22       Reason for Stay (Diagnosis): dispo         Assessment and Plan:      Dionne Perez is a 37 year old female with PMhx of developmental delay, depression, anxiety, GERD, hypothyrodism, CVA, obesity, who was admitted on 8/3/2022 for disposition assistance.    Problem List/Assessment and Plan:   Need for Disposition   Unable to currently return to prior group home until 8/9/22. Initially admitted due to group home refusing further cares as patient was reportedly not following rules. SW, DEC have been following.  has also been involved. Medically cleared awaiting return to group home.      Developmental delay   At baseline. Cooperative.   Does have remote history of behaviors, lack of understanding of consequences. No aggression.   -Reorient as needed  -Resides in group home   -Bedside attendant PRN, discretion of staff     Hx of Depression, anxiety  -PTA trazodone, escitaloparam     Hx of CVA  Cerebellar infarct on MRI 6/2020   -PTA  mg, statin       Hypothyroidism  -PTA synthroid      GERD  -PTA pepcid     Obesity   Last recorded weight 332 lb.  Sister is concerned about portions with weight gain and eating behaviors at the group home, ordering door dash. Has requested a repeat weight.   -Continue outpatient follow up for weight management       DVT Prophylaxis: Enoxaparin (Lovenox) SQ  Code Status: Full Code  Discharge Dispo/Date: Pending dispo. Medically stable.        Interval History (Subjective):      Patient has no complaints today. States she is feeling well.                  Physical Exam:      Last Vital Signs:  /44   Pulse 59   Temp 98.7  F (37.1  C) (Oral)   Resp 18   Wt (!) 164 kg (361 lb 9.6 oz)   LMP  (LMP Unknown)   SpO2 95%   BMI 54.98 kg/m      No intake or output data in the 24 hours ending 08/05/22 1511    General: Alert, awake, no acute distress.  HEENT: NC/AT, eyes anicteric, external  occular movements intact, face symmetric.  Dentition WNL, MM moist.  Cardiac: RRR, S1, S2.  No murmurs appreciated.  Pulmonary: Normal work of breathing.  Lungs CTAB.  Abdomen: soft, non-tender, non-distended.  Bowel sounds present.  No guarding.  Extremities: no deformities.  Warm, well perfused.  Skin: no rashes or lesions noted.  Warm and dry.  Neuro: No gross deficits noted.  Speech clear.    Psych: Appropriate affect.         Medications:      All current medications were reviewed with changes reflected in problem list.         Data:      All new lab and imaging data was reviewed.   Labs:       Lab Results   Component Value Date     06/25/2022     07/07/2020     06/29/2020     06/25/2020    Lab Results   Component Value Date    CHLORIDE 107 06/25/2022    CHLORIDE 107 07/07/2020    CHLORIDE 110 06/29/2020    CHLORIDE 108 06/25/2020    Lab Results   Component Value Date    BUN 27 06/25/2022    BUN 28 07/07/2020    BUN 27 06/29/2020    BUN 28 06/25/2020      Lab Results   Component Value Date    POTASSIUM 4.5 06/25/2022    POTASSIUM 5.1 07/07/2020    POTASSIUM 4.6 06/29/2020    POTASSIUM 4.2 06/25/2020    Lab Results   Component Value Date    CO2 30 06/25/2022    CO2 27 07/07/2020    CO2 25 06/29/2020    CO2 27 06/25/2020    Lab Results   Component Value Date    CR 1.04 06/25/2022    CR 0.53 07/07/2020    CR 0.54 06/29/2020    CR 0.52 06/25/2020        Recent Labs   Lab 08/06/22  0644   *      Imaging:   No results found for this or any previous visit (from the past 24 hour(s)).      Daniel Nesbitt MD  Hospitalist

## 2022-08-07 PROCEDURE — 250N000013 HC RX MED GY IP 250 OP 250 PS 637: Performed by: PHYSICIAN ASSISTANT

## 2022-08-07 PROCEDURE — G0378 HOSPITAL OBSERVATION PER HR: HCPCS

## 2022-08-07 PROCEDURE — 99224 PR SUBSEQUENT OBSERVATION CARE,LEVEL I: CPT | Performed by: STUDENT IN AN ORGANIZED HEALTH CARE EDUCATION/TRAINING PROGRAM

## 2022-08-07 PROCEDURE — 96372 THER/PROPH/DIAG INJ SC/IM: CPT | Mod: XU | Performed by: INTERNAL MEDICINE

## 2022-08-07 PROCEDURE — 250N000011 HC RX IP 250 OP 636: Performed by: INTERNAL MEDICINE

## 2022-08-07 RX ADMIN — TRAZODONE HYDROCHLORIDE 50 MG: 50 TABLET ORAL at 21:10

## 2022-08-07 RX ADMIN — ESCITALOPRAM OXALATE 30 MG: 20 TABLET ORAL at 08:24

## 2022-08-07 RX ADMIN — ENOXAPARIN SODIUM 40 MG: 40 INJECTION SUBCUTANEOUS at 19:49

## 2022-08-07 RX ADMIN — LEVOTHYROXINE SODIUM 50 MCG: 50 TABLET ORAL at 08:23

## 2022-08-07 RX ADMIN — Medication 1 MG: at 21:10

## 2022-08-07 RX ADMIN — ACETAMINOPHEN 650 MG: 325 TABLET, FILM COATED ORAL at 15:11

## 2022-08-07 RX ADMIN — OLANZAPINE 10 MG: 5 TABLET, ORALLY DISINTEGRATING ORAL at 15:54

## 2022-08-07 RX ADMIN — FAMOTIDINE 20 MG: 20 TABLET ORAL at 19:49

## 2022-08-07 RX ADMIN — PRAVASTATIN SODIUM 40 MG: 20 TABLET ORAL at 08:24

## 2022-08-07 RX ADMIN — ENOXAPARIN SODIUM 40 MG: 40 INJECTION SUBCUTANEOUS at 08:24

## 2022-08-07 RX ADMIN — FAMOTIDINE 20 MG: 20 TABLET ORAL at 08:24

## 2022-08-07 NOTE — PROGRESS NOTES
[facility]  Hospitalist Progress Note  Daniel Nesbitt MD 08/07/22       Reason for Stay (Diagnosis): dispo         Assessment and Plan:      Dionne Perez is a 37 year old female with PMhx of developmental delay, depression, anxiety, GERD, hypothyrodism, CVA, obesity, who was admitted on 8/3/2022 for disposition assistance.    Problem List/Assessment and Plan:   Need for Disposition   Unable to currently return to prior group home until 8/9/22. Initially admitted due to group home refusing further cares as patient was reportedly not following rules. SW, DEC have been following.  has also been involved. Medically cleared awaiting return to group home.      Developmental delay   At baseline. Cooperative.   Does have remote history of behaviors, lack of understanding of consequences. No aggression.   -Reorient as needed  -Resides in group home   -Bedside attendant PRN, discretion of staff     Hx of Depression, anxiety  -PTA trazodone, escitaloparam     Hx of CVA  Cerebellar infarct on MRI 6/2020   -PTA  mg, statin       Hypothyroidism  -PTA synthroid      GERD  -PTA pepcid     Obesity   Last recorded weight 332 lb.  Sister is concerned about portions with weight gain and eating behaviors at the group home, ordering door dash. Has requested a repeat weight.   -Continue outpatient follow up for weight management       DVT Prophylaxis: Enoxaparin (Lovenox) SQ  Code Status: Full Code  Discharge Dispo/Date: Pending dispo. Medically stable.        Interval History (Subjective):      Patient has no complaints today. States she is feeling well.                  Physical Exam:      Last Vital Signs:  /41 (BP Location: Right arm)   Pulse (!) 46   Temp 98.8  F (37.1  C) (Oral)   Resp 18   Wt (!) 164 kg (361 lb 9.6 oz)   LMP  (LMP Unknown)   SpO2 97%   BMI 54.98 kg/m      No intake or output data in the 24 hours ending 08/05/22 1511    General: Alert, awake, no acute distress. Resting in  bed.  HEENT: NC/AT, eyes anicteric, external occular movements intact, face symmetric.  Dentition WNL, MM moist.  Cardiac: Skin warm and well perfused.  Pulmonary: Normal work of breathing.    Abdomen: soft, non-tender  Extremities: no deformities.    Skin: no rashes or lesions noted.  Warm and dry.  Neuro: No gross deficits noted.  Speech clear.    Psych: Appropriate affect.         Medications:      All current medications were reviewed with changes reflected in problem list.         Data:      All new lab and imaging data was reviewed.   Labs:       Lab Results   Component Value Date     06/25/2022     07/07/2020     06/29/2020     06/25/2020    Lab Results   Component Value Date    CHLORIDE 107 06/25/2022    CHLORIDE 107 07/07/2020    CHLORIDE 110 06/29/2020    CHLORIDE 108 06/25/2020    Lab Results   Component Value Date    BUN 27 06/25/2022    BUN 28 07/07/2020    BUN 27 06/29/2020    BUN 28 06/25/2020      Lab Results   Component Value Date    POTASSIUM 4.5 06/25/2022    POTASSIUM 5.1 07/07/2020    POTASSIUM 4.6 06/29/2020    POTASSIUM 4.2 06/25/2020    Lab Results   Component Value Date    CO2 30 06/25/2022    CO2 27 07/07/2020    CO2 25 06/29/2020    CO2 27 06/25/2020    Lab Results   Component Value Date    CR 1.04 06/25/2022    CR 0.53 07/07/2020    CR 0.54 06/29/2020    CR 0.52 06/25/2020        Recent Labs   Lab 08/06/22  0644   *      Imaging:   No results found for this or any previous visit (from the past 24 hour(s)).      Daniel Nesbitt MD  Hospitalist

## 2022-08-07 NOTE — PROGRESS NOTES
PRIMARY DIAGNOSIS: AGITATION/AGGRESSION  OUTPATIENT/OBSERVATION GOALS TO BE MET BEFORE DISCHARGE:  1. ADLs back to baseline: Yes    2. Activity and level of assistance: Ambulating independently.    3. Pain status: Pain free.    4. Return to near baseline physical activity: Yes     Discharge Planner Nurse   Safe discharge environment identified: Yes  Barriers to discharge: Yes       Entered by: Francisco Hagan RN 08/07/2022     End of Shift Summary  For vital signs and complete assessments, please see documentation flowsheets.  Pertinent assessments:  A/Ox4 and Independent in room. Regular diet, tolerating well. Denies pain. VSS on RA. LLE van/bruised.  Major Shift Events: None.  Treatment Plan: Plan to discharge Tuesday 8/9 back to group home via  staff @ 1100 per SW note.  Please review provider order for any additional goals.   Nurse to notify provider when observation goals have been met and patient is ready for discharge.

## 2022-08-07 NOTE — PLAN OF CARE
Goal Outcome Evaluation:                    To Do:  End of Shift Summary  For vital signs and complete assessments, please see documentation flowsheets.     Pertinent assessments:A&O-- Up in room-- appetite good -- medicated  x1 for headache --denies nausea --   Major Shift Events  resting quietly   Treatment Plan:  return to group home  Bedside Nurse: Nadine Lemon RN

## 2022-08-08 PROCEDURE — 99224 PR SUBSEQUENT OBSERVATION CARE,LEVEL I: CPT | Performed by: INTERNAL MEDICINE

## 2022-08-08 PROCEDURE — G0378 HOSPITAL OBSERVATION PER HR: HCPCS

## 2022-08-08 PROCEDURE — 96372 THER/PROPH/DIAG INJ SC/IM: CPT | Mod: XU | Performed by: INTERNAL MEDICINE

## 2022-08-08 PROCEDURE — 250N000011 HC RX IP 250 OP 636: Performed by: INTERNAL MEDICINE

## 2022-08-08 PROCEDURE — 250N000013 HC RX MED GY IP 250 OP 250 PS 637: Performed by: PHYSICIAN ASSISTANT

## 2022-08-08 RX ADMIN — ENOXAPARIN SODIUM 40 MG: 40 INJECTION SUBCUTANEOUS at 19:51

## 2022-08-08 RX ADMIN — ENOXAPARIN SODIUM 40 MG: 40 INJECTION SUBCUTANEOUS at 08:08

## 2022-08-08 RX ADMIN — FAMOTIDINE 20 MG: 20 TABLET ORAL at 19:51

## 2022-08-08 RX ADMIN — TRAZODONE HYDROCHLORIDE 50 MG: 50 TABLET ORAL at 21:28

## 2022-08-08 RX ADMIN — FAMOTIDINE 20 MG: 20 TABLET ORAL at 08:08

## 2022-08-08 RX ADMIN — ACETAMINOPHEN 650 MG: 325 TABLET, FILM COATED ORAL at 05:08

## 2022-08-08 RX ADMIN — LEVOTHYROXINE SODIUM 50 MCG: 50 TABLET ORAL at 08:08

## 2022-08-08 RX ADMIN — ESCITALOPRAM OXALATE 30 MG: 20 TABLET ORAL at 08:08

## 2022-08-08 RX ADMIN — PRAVASTATIN SODIUM 40 MG: 20 TABLET ORAL at 08:08

## 2022-08-08 RX ADMIN — ACETAMINOPHEN 650 MG: 325 TABLET, FILM COATED ORAL at 09:39

## 2022-08-08 RX ADMIN — ACETAMINOPHEN 650 MG: 325 TABLET, FILM COATED ORAL at 19:51

## 2022-08-08 NOTE — PLAN OF CARE
For vital signs and complete assessments, please see documentation flowsheets.     Pertinent assessments:Pt up ind. VSS A&O. PRN tylenol given for headache. Antifungal cream applied to abdominal folds.     Major Shift Events: Came up with safety plan with behavior health clinical coordinator. Did have one loud outburst, was upset about a phone call. Able to talk patient down and she calmed down shortly after.    Treatment Plan:  return to group home, scheduled for tomorrow at 11am per SW note

## 2022-08-08 NOTE — PROGRESS NOTES
Care Management Follow Up    Length of Stay (days): 0    Expected Discharge Date: 08/09/2022     Concerns to be Addressed:  discharge planning  Patient plan of care discussed at interdisciplinary rounds: Yes    Anticipated Discharge Disposition: Group Home     Anticipated Discharge Services:  None  Anticipated Discharge DME:  None    Patient/family educated on Medicare website which has current facility and service quality ratings:  N/A  Education Provided on the Discharge Plan:  yes  Patient/Family in Agreement with the Plan:  yes    Referrals Placed by CM/SW:  None  Private pay costs discussed: Not applicable    Additional Information:  Sw received a message from the pt's , Rika [phone], who wants to come to the hospital to see the pt and discuss documentation.  Sw left a message for the pt's sister/guardian, Sabra, to make sure she is aware of this.  Sabra called Sw back and said that she is fully aware of the  wanting to see the pt and there are no concerns.  Sw left a vm for Rika, letting her know that she can see the pt when she needs to at the hospital.    Releases of Information (ROIs) were faxed to the unit for information to be released from [facility], [facility], and [facility] to the pt's Sharan SCHWARTZ.  Sw placed them on the pt's chart.    Sw will continue with discharge planning and will be available as needed until discharge.      ABBY Fontanez, [facility]  Inpatient Care Coordination  [facility]  [phone]

## 2022-08-08 NOTE — PROGRESS NOTES
[facility]  Hospitalist Progress Note  Car Krishnan MD 08/07/22       Reason for Stay (Diagnosis): dispo         Assessment and Plan:      Dionne Perez is a 37 year old female with PMhx of developmental delay, depression, anxiety, GERD, hypothyrodism, CVA, obesity, who was admitted on 8/3/2022 for disposition assistance.    Problem List/Assessment and Plan:   Need for Disposition   Unable to currently return to prior group home until 8/9/22. Initially admitted due to group home refusing further cares as patient was reportedly not following rules. SW, DEC have been following.  has also been involved. Medically cleared awaiting return to group home.      Developmental delay   At baseline. Cooperative.   Does have remote history of behaviors, lack of understanding of consequences. No aggression.   -Reorient as needed  -Resides in group home   -Bedside attendant PRN, discretion of staff     Hx of Depression, anxiety  -PTA trazodone, escitaloparam     Hx of CVA  Cerebellar infarct on MRI 6/2020   -PTA  mg, statin       Hypothyroidism  -PTA synthroid      GERD  -PTA pepcid     Obesity   Last recorded weight 332 lb.  Sister is concerned about portions with weight gain and eating behaviors at the group home, ordering door dash. Has requested a repeat weight.   -Continue outpatient follow up for weight management       DVT Prophylaxis: Enoxaparin (Lovenox) SQ  Code Status: Full Code  Discharge Dispo/Date: Pending dispo. Medically stable.        Interval History (Subjective):      Patient care assumed today.  Patient was seen and examined by me at bedside.  Electronic medical records reviewed.  Care plan discussed with bedside nursing team.  No new event overnight.                  Physical Exam:      Last Vital Signs:  /67 (BP Location: Right arm)   Pulse 51   Temp 98.6  F (37  C) (Oral)   Resp 20   Wt (!) 164 kg (361 lb 9.6 oz)   LMP  (LMP Unknown)   SpO2 96%   BMI 54.98  kg/m      General: Alert, awake, no acute distress. Resting in bed.  HEENT: NC/AT, eyes anicteric, external occular movements intact, face symmetric.  Dentition WNL, MM moist.  Cardiac: Skin warm and well perfused.  Pulmonary: Normal work of breathing.    Abdomen: soft, non-tender  Extremities: no deformities.    Skin: no rashes or lesions noted.  Warm and dry.  Neuro: No gross deficits noted.  Speech clear.    Psych: Appropriate affect.         Medications:      All current medications were reviewed with changes reflected in problem list.         Data:      All new lab and imaging data was reviewed.   Labs:       Lab Results   Component Value Date     06/25/2022     07/07/2020     06/29/2020     06/25/2020    Lab Results   Component Value Date    CHLORIDE 107 06/25/2022    CHLORIDE 107 07/07/2020    CHLORIDE 110 06/29/2020    CHLORIDE 108 06/25/2020    Lab Results   Component Value Date    BUN 27 06/25/2022    BUN 28 07/07/2020    BUN 27 06/29/2020    BUN 28 06/25/2020      Lab Results   Component Value Date    POTASSIUM 4.5 06/25/2022    POTASSIUM 5.1 07/07/2020    POTASSIUM 4.6 06/29/2020    POTASSIUM 4.2 06/25/2020    Lab Results   Component Value Date    CO2 30 06/25/2022    CO2 27 07/07/2020    CO2 25 06/29/2020    CO2 27 06/25/2020    Lab Results   Component Value Date    CR 1.04 06/25/2022    CR 0.53 07/07/2020    CR 0.54 06/29/2020    CR 0.52 06/25/2020        Recent Labs   Lab 08/06/22  0644   *      Imaging:   No results found for this or any previous visit (from the past 24 hour(s)).

## 2022-08-08 NOTE — PROGRESS NOTES
"  Triage & Transition Services, Extended Care     Care Coordination Progress Note    Patient: Dionne goes by \"Dionne,\" uses she/her pronouns  Date of Service: August 8, 2022  Site of Service: [place] [address]    Individuals Present: Dionne & Monae Saldana    Session start: 1:15 PM  Session end: 2:00 PM  Session duration in minutes: 45  Patient was seen virtually ([employer] cart or other teleconferencing device).     Dionne is being followed by Extended Care. Please see full DEC Assessment done by Dulce Andrade on 7/30/2022 for further detail.     Details of Therapeutic Interaction:   Spoke with patient via the IPad, she was doing well and smiling. Patient and writer discussed what coping skills she uses, warning signs of decline, and other activities that bring her roderick. Writer showed patient her puppies and patient enjoyed that very much and smiled very big. Patient was engaged, upbeat, and sitting up in bed.    Therapeutic Goals Addressed During Session:   Writer and patient discussed what patient can do if she starts struggling again and who she can ask for help. Patient was very thoughtful in her responses and was able to answer the questions thoroughly. Patient had no questions or concerns at this time.    Plan:  Patient will remain boarding until the discharge plan of going back to her Group Home can be facilitated.    Monae Saldana MA 8/8/2022 2:06 PM  Extended Care Coordinator- [phone]          "

## 2022-08-08 NOTE — PLAN OF CARE
"Goal Outcome Evaluation:    Plan of Care Reviewed With: patient     Overall Patient Progress: improving     PRIMARY DIAGNOSIS: \"GENERIC\" NURSING  OUTPATIENT/OBSERVATION GOALS TO BE MET BEFORE DISCHARGE:  1. ADLs back to baseline: Yes    2. Activity and level of assistance: Ambulating independently.    3. Pain status: Improved-controlled with oral pain medications-taking tylenol for headache    4. Return to near baseline physical activity: Yes     Discharge Planner Nurse   Safe discharge environment identified: Yes  Barriers to discharge: No- should discharge back to detention tomorrow.       Entered by: Ayse Salas RN on 8/8/2022 at 0800         Please review provider order for any additional goals.   Nurse to notify provider when observation goals have been met and patient is ready for discharge.      " Past Medical History:   Diagnosis Date    A-fib     Acute CHF 9/25/2013    Anticoagulant long-term use     Arthritis     Blood transfusion     Branch retinal vein occlusion of right eye     Cellulitis and abscess of lower extremity     Cerebral aneurysm     S/p repair    CKD (chronic kidney disease)     Followed by Dr. Rey Muhammad    Elevated serum creatinine     HTN (hypertension)     Lymphedema     Macular degeneration - Right Eye 1/31/2013    Nuclear sclerosis - Both Eyes 1/31/2013    Done OU    Squamous cell carcinoma excised 11/16/16    R forearm       Past Surgical History:   Procedure Laterality Date    ABDOMINAL SURGERY      APPENDECTOMY      APPENDECTOMY      CATARACT EXTRACTION W/  INTRAOCULAR LENS IMPLANT Right 04/25/2017    [facility]    CATARACT EXTRACTION W/  INTRAOCULAR LENS IMPLANT Left 06/13/2017    [facility]    CEREBRAL ANEURYSM REPAIR  10/16/1994    RIGHT FRONTOTEMPORAL CRANIOTOMY WITH LIPPING OF SUPRACLINOID CAROTID ARTERY ANEURYSM      CEREBRAL ANEURYSM REPAIR  10/27/1994    RIGHT FRONTOTEMPORAL CRANIOTOMY WITH CLIPPING OF RIGHT MIDDLE CEREBRAL ARTERY ANEURYSM     COMPLICATED TOTAL HIP PROSTHESIS AND METHYLMETHACRALATE REMOVAL W/ SPACER INSERTION  8/29/2013    left    EYE SURGERY      Focal laser      OD    ADOLFO FILTER PLACEMENT  3/22/2010    HIP FRACTURE SURGERY  11/2009    left    JOINT REPLACEMENT Left     hip    Patent PI      Both Eye     SKIN BIOPSY Right     foream    TONSILLECTOMY      TOTAL ABDOMINAL HYSTERECTOMY      TOTAL HIP ARTHROPLASTY  3/19/2010    left    VENTRICULOPERITONEAL SHUNT  11/15/1994    RIGHT       Review of patient's allergies indicates:   Allergen Reactions    Penicillins Hives     Other reaction(s): Hives       No current facility-administered medications on file prior to encounter.      Current Outpatient Prescriptions on File Prior to Encounter   Medication Sig    albuterol-ipratropium 2.5mg-0.5mg/3mL (DUO-NEB) 0.5 mg-3 mg(2.5  mg base)/3 mL nebulizer solution Take 3 mLs by nebulization every 4 (four) hours as needed for Wheezing. Rescue (Patient taking differently: Take 3 mLs by nebulization every 8 (eight) hours. Rescue)    [DISCONTINUED] diltiaZEM (CARDIZEM CD) 300 MG 24 hr capsule Take 1 capsule (300 mg total) by mouth once daily.    [DISCONTINUED] lisinopril (PRINIVIL,ZESTRIL) 20 MG tablet TAKE 1 TABLET EVERY DAY    [DISCONTINUED] warfarin (COUMADIN) 2.5 MG tablet Take 1-2 tablets (2.5-5 mg total) by mouth Daily. (Patient taking differently: Take 2.5 mg by mouth every other day. Takes as directed by coumadin clInic / Th, Sat, Mon)    [DISCONTINUED] warfarin (COUMADIN) 5 MG tablet TAKE 1 TABLET EVERY EVENING AS INSTRUCTED BY COUMADIN CLINIC (Patient taking differently: TAKE 1 TABLET EVERY  OTHER EVENING AS INSTRUCTED BY COUMADIN CLINIC Wed, Fri, Sun, Tues)     Family History     Problem Relation (Age of Onset)    Aneurysm Mother,     Coronary artery disease     Glaucoma Daughter    Hypertension     Stroke         Social History Main Topics    Smoking status: Former Smoker     Quit date: 11/7/1992    Smokeless tobacco: Never Used    Alcohol use Yes      Comment: socially    Drug use: No    Sexual activity: No     Review of Systems   Unable to perform ROS: Mental status change     Objective:     Vital Signs (Most Recent):  Temp: 97.2 °F (36.2 °C) (12/21/17 2130)  Pulse: 106 (12/21/17 2130)  Resp: (!) 30 (12/21/17 2130)  BP: 106/73 (12/21/17 2130)  SpO2: (!) 93 % (12/21/17 2130) Vital Signs (24h Range):  Temp:  [97.2 °F (36.2 °C)-97.8 °F (36.6 °C)] 97.2 °F (36.2 °C)  Pulse:  [] 106  Resp:  [17-30] 30  SpO2:  [79 %-100 %] 93 %  BP: ()/(51-91) 106/73     Weight: 131 kg (288 lb 12.8 oz)  Body mass index is 45.23 kg/m².    Physical Exam   Constitutional: She appears lethargic. She is sleeping. She appears ill.   BiPAP applied.  Cushingoid appearance.   HENT:   Head: Normocephalic and atraumatic.   Right Ear: External ear  normal.   Left Ear: External ear normal.   Eyes: Conjunctivae are normal. Right eye exhibits no discharge. Left eye exhibits no discharge.   Neck: Neck supple.   Fat tissue in neck impedes my view of JVD   Cardiovascular: An irregularly irregular rhythm present. Tachycardia present.  Exam reveals distant heart sounds.    Pulmonary/Chest: No accessory muscle usage or stridor. No tachypnea. She has decreased breath sounds. She has rales.   Abdominal: Soft. Normal appearance and bowel sounds are normal. She exhibits no distension and no mass.   Genitourinary:   Genitourinary Comments: Cloudy yellow urine in collection bag   Musculoskeletal: She exhibits no deformity.        Right upper leg: She exhibits edema.        Left upper leg: She exhibits edema.   Neurological: She appears lethargic.   Skin: Skin is warm. There is erythema (lower half of both lower legs).   Dry, thickened skin in lower legs           Significant Labs:   ABGs:   Recent Labs  Lab 12/21/17  1825   PH 7.318*   PCO2 70.7*   HCO3 36.2*   POCSATURATED 97   BE 10     CBC:   Recent Labs  Lab 12/21/17  1226   WBC 7.90   HGB 11.9*   HCT 37.6        CMP:   Recent Labs  Lab 12/21/17  1226   *   K 4.1      CO2 36*   *   BUN 84*   CREATININE 2.1*   CALCIUM 9.5   PROT 6.4   ALBUMIN 2.9*   BILITOT 0.9   ALKPHOS 110   AST 30   ALT 39   ANIONGAP 8   EGFRNONAA 21*     Urine Studies:   Recent Labs  Lab 12/21/17  1422   COLORU Yellow  Yellow   APPEARANCEUA Cloudy*  Cloudy*   PHUR 6.0  6.0   SPECGRAV 1.010  1.010   PROTEINUA Negative  Negative   GLUCUA Negative  Negative   KETONESU Negative  Negative   BILIRUBINUA Negative  Negative   OCCULTUA 2+*  2+*   NITRITE Positive*  Positive*   UROBILINOGEN Negative  Negative   LEUKOCYTESUR 3+*  3+*   RBCUA 5*   WBCUA >100*   BACTERIA Many*       Significant Imaging: I have reviewed all pertinent imaging results/findings within the past 24 hours.

## 2022-08-08 NOTE — PROGRESS NOTES
PRIMARY DIAGNOSIS: AGITATION/AGGRESSION  OUTPATIENT/OBSERVATION GOALS TO BE MET BEFORE DISCHARGE:  1. ADLs back to baseline: Yes    2. Activity and level of assistance: Ambulating independently.    3. Pain status: Pain free.    4. Return to near baseline physical activity: Yes     Discharge Planner Nurse   Safe discharge environment identified: Yes  Barriers to discharge: Yes       Entered by: Francisco Hagan RN 08/07/22    End of Shift Summary  For vital signs and complete assessments, please see documentation flowsheets.  Pertinent assessments:  A/Ox4 and Independent in room. Regular diet, tolerating well. Denies pain. VSS on RA. LLE van/bruised.  Major Shift Events: None.  Treatment Plan: Plan to discharge Tuesday 8/9 back to group home via  staff @ 1100 per SW note.  Please review provider order for any additional goals.   Nurse to notify provider when observation goals have been met and patient is ready for discharge.

## 2022-08-09 VITALS
SYSTOLIC BLOOD PRESSURE: 105 MMHG | OXYGEN SATURATION: 96 % | RESPIRATION RATE: 19 BRPM | HEART RATE: 55 BPM | DIASTOLIC BLOOD PRESSURE: 61 MMHG | WEIGHT: 293 LBS | BODY MASS INDEX: 54.98 KG/M2 | TEMPERATURE: 98.6 F

## 2022-08-09 LAB
CREAT SERPL-MCNC: 0.7 MG/DL (ref 0.51–0.95)
GFR SERPL CREATININE-BSD FRML MDRD: >90 ML/MIN/1.73M2
PLATELET # BLD AUTO: 102 10E3/UL (ref 150–450)

## 2022-08-09 PROCEDURE — G0378 HOSPITAL OBSERVATION PER HR: HCPCS

## 2022-08-09 PROCEDURE — 96372 THER/PROPH/DIAG INJ SC/IM: CPT | Performed by: INTERNAL MEDICINE

## 2022-08-09 PROCEDURE — 250N000013 HC RX MED GY IP 250 OP 250 PS 637: Performed by: PHYSICIAN ASSISTANT

## 2022-08-09 PROCEDURE — 99217 PR OBSERVATION CARE DISCHARGE: CPT | Performed by: INTERNAL MEDICINE

## 2022-08-09 PROCEDURE — 85049 AUTOMATED PLATELET COUNT: CPT | Performed by: PHYSICIAN ASSISTANT

## 2022-08-09 PROCEDURE — 250N000011 HC RX IP 250 OP 636: Performed by: INTERNAL MEDICINE

## 2022-08-09 PROCEDURE — 82565 ASSAY OF CREATININE: CPT | Performed by: PHYSICIAN ASSISTANT

## 2022-08-09 PROCEDURE — 36415 COLL VENOUS BLD VENIPUNCTURE: CPT | Performed by: PHYSICIAN ASSISTANT

## 2022-08-09 RX ORDER — OLANZAPINE 10 MG/1
10 TABLET, ORALLY DISINTEGRATING ORAL 2 TIMES DAILY PRN
Qty: 30 TABLET | Refills: 0 | Status: SHIPPED | OUTPATIENT
Start: 2022-08-09 | End: 2022-08-10

## 2022-08-09 RX ORDER — OLANZAPINE 10 MG/1
10 TABLET, ORALLY DISINTEGRATING ORAL 2 TIMES DAILY PRN
Qty: 30 TABLET | Refills: 0 | Status: SHIPPED | OUTPATIENT
Start: 2022-08-09 | End: 2022-08-09

## 2022-08-09 RX ADMIN — ENOXAPARIN SODIUM 40 MG: 40 INJECTION SUBCUTANEOUS at 08:04

## 2022-08-09 RX ADMIN — ACETAMINOPHEN 650 MG: 325 TABLET, FILM COATED ORAL at 04:09

## 2022-08-09 RX ADMIN — PRAVASTATIN SODIUM 40 MG: 20 TABLET ORAL at 08:03

## 2022-08-09 RX ADMIN — FAMOTIDINE 20 MG: 20 TABLET ORAL at 08:04

## 2022-08-09 RX ADMIN — ESCITALOPRAM OXALATE 30 MG: 20 TABLET ORAL at 08:04

## 2022-08-09 RX ADMIN — LEVOTHYROXINE SODIUM 50 MCG: 50 TABLET ORAL at 08:04

## 2022-08-09 ASSESSMENT — ACTIVITIES OF DAILY LIVING (ADL)
ADLS_ACUITY_SCORE: 37
ADLS_ACUITY_SCORE: 37

## 2022-08-09 NOTE — PROGRESS NOTES
Care Management Discharge Note    Discharge Date: 08/09/2022       Discharge Disposition: Group Home    Discharge Services: None    Discharge DME: None    Discharge Transportation: family or friend will provide ( staff)    Private pay costs discussed: Not applicable    PAS Confirmation Code:  (N/A)  Patient/family educated on Medicare website which has current facility and service quality ratings:  (N/A)    Education Provided on the Discharge Plan:    Persons Notified of Discharge Plans: Pt, pt's sister/guardian Sabra [facility]  Patient/Family in Agreement with the Plan: yes    Handoff Referral Completed: No    Additional Information:  The pt will return to [facility] today at 1100.  Sw left a vm with the  manager, Patricia, confirming the pt's return today.    Sw will continue to be available as needed until discharge.      ABBY Fontanez, [facility]  Inpatient Care Coordination  [facility]  [phone]

## 2022-08-09 NOTE — PLAN OF CARE
"Goal Outcome Evaluation:    Pertinent assessments: [facility] [id number]. Pt A&Ox4, with some delayed response. Patient has been calm and cooperative. C/o headache and requested Tylenol twice with relief. Vitals were not checked since ordered \"daily\", and patient was sleeping. Denies difficulty breathing. Voiding in the bathroom. Up independently.  Major Shift Events .  Treatment Plan: .  Bedside Nurse: Beth Jalloh RN                           "

## 2022-08-09 NOTE — PLAN OF CARE
Goal Outcome Evaluation:    Plan of Care Reviewed With: patient     Overall Patient Progress: improving    Gave discharge instructions and packet to  group home staff. Questions answered. Patient was escorted downstairs in a wheelchair with RN. Patient discharged to back to group home with discharge instructions, and belongings, including phone and ipad. No further questions at this time.

## 2022-08-10 RX ORDER — OLANZAPINE 10 MG/1
10 TABLET, ORALLY DISINTEGRATING ORAL 2 TIMES DAILY PRN
Qty: 30 TABLET | Refills: 0 | Status: SHIPPED | OUTPATIENT
Start: 2022-08-10 | End: 2023-11-28

## 2022-08-10 NOTE — PROGRESS NOTES
Progress Note    I was called by the emergency room physician because the patient had called the emergency room stating that the medications she was discharged with were sent to the wrong pharmacy.  I was not involved with this patient's care, but I can see that the provider who was the discharging physician is not here in the hospital.  I tried calling the patient and there was no answer.  I called her group home and they did inform me that the Zyprexa was sent to the [facility] which is not the right pharmacy.  They indicated that it should be sent to the [facility] on [address].  I have resent the Zyprexa to this pharmacy.    Leopoldo Garcia MD

## 2022-08-15 ENCOUNTER — PATIENT OUTREACH (OUTPATIENT)
Dept: CARE COORDINATION | Facility: CLINIC | Age: 37
End: 2022-08-15

## 2022-08-15 NOTE — PROGRESS NOTES
Clinic Care Coordination Contact  Care Team Conversations    [facility] reviewed chart and patient discharge 8/9. [facility] will no longer follow pt due to discharge. No further outreaches will be made at this time unless a new referral is made or a change in the pt's status occurs.     Astrid Guerrero TERRIE  Clinic Care Coordination  Pronouns: she/her/hers  Transitions and Extended Care Clinics  [facility]  [email or web address]  [phone]

## 2022-08-29 ENCOUNTER — HOSPITAL ENCOUNTER (EMERGENCY)
Facility: CLINIC | Age: 37
Discharge: HOME | End: 2022-08-30
Attending: EMERGENCY MEDICINE | Admitting: EMERGENCY MEDICINE
Payer: MEDICARE

## 2022-08-29 DIAGNOSIS — S30.1XXA ABDOMINAL WALL HEMATOMA, INITIAL ENCOUNTER: ICD-10-CM

## 2022-08-29 PROCEDURE — 99284 EMERGENCY DEPT VISIT MOD MDM: CPT | Mod: 25

## 2022-08-29 PROCEDURE — 76882 US LMTD JT/FCL EVL NVASC XTR: CPT

## 2022-08-30 VITALS
RESPIRATION RATE: 18 BRPM | OXYGEN SATURATION: 96 % | SYSTOLIC BLOOD PRESSURE: 110 MMHG | TEMPERATURE: 97.5 F | HEART RATE: 53 BPM | DIASTOLIC BLOOD PRESSURE: 63 MMHG

## 2022-08-30 LAB
ANION GAP SERPL CALCULATED.3IONS-SCNC: 5 MMOL/L (ref 7–15)
BASOPHILS # BLD AUTO: 0 10E3/UL (ref 0–0.2)
BASOPHILS NFR BLD AUTO: 0 %
BUN SERPL-MCNC: 19.4 MG/DL (ref 6–20)
CALCIUM SERPL-MCNC: 8.9 MG/DL (ref 8.6–10)
CHLORIDE SERPL-SCNC: 101 MMOL/L (ref 98–107)
CREAT SERPL-MCNC: 0.84 MG/DL (ref 0.51–0.95)
DEPRECATED HCO3 PLAS-SCNC: 34 MMOL/L (ref 22–29)
EOSINOPHIL # BLD AUTO: 0.1 10E3/UL (ref 0–0.7)
EOSINOPHIL NFR BLD AUTO: 1 %
ERYTHROCYTE [DISTWIDTH] IN BLOOD BY AUTOMATED COUNT: 13 % (ref 10–15)
GFR SERPL CREATININE-BSD FRML MDRD: >90 ML/MIN/1.73M2
GLUCOSE SERPL-MCNC: 99 MG/DL (ref 70–99)
HCT VFR BLD AUTO: 39.8 % (ref 35–47)
HGB BLD-MCNC: 11.8 G/DL (ref 11.7–15.7)
IMM GRANULOCYTES # BLD: 0.1 10E3/UL
IMM GRANULOCYTES NFR BLD: 1 %
LYMPHOCYTES # BLD AUTO: 1.9 10E3/UL (ref 0.8–5.3)
LYMPHOCYTES NFR BLD AUTO: 22 %
MCH RBC QN AUTO: 27.8 PG (ref 26.5–33)
MCHC RBC AUTO-ENTMCNC: 29.6 G/DL (ref 31.5–36.5)
MCV RBC AUTO: 94 FL (ref 78–100)
MONOCYTES # BLD AUTO: 0.6 10E3/UL (ref 0–1.3)
MONOCYTES NFR BLD AUTO: 6 %
NEUTROPHILS # BLD AUTO: 6.1 10E3/UL (ref 1.6–8.3)
NEUTROPHILS NFR BLD AUTO: 70 %
NRBC # BLD AUTO: 0 10E3/UL
NRBC BLD AUTO-RTO: 0 /100
PLATELET # BLD AUTO: 166 10E3/UL (ref 150–450)
POTASSIUM SERPL-SCNC: 4.6 MMOL/L (ref 3.4–5.3)
PROCALCITONIN SERPL IA-MCNC: 0.04 NG/ML
RBC # BLD AUTO: 4.25 10E6/UL (ref 3.8–5.2)
SODIUM SERPL-SCNC: 140 MMOL/L (ref 136–145)
WBC # BLD AUTO: 8.7 10E3/UL (ref 4–11)

## 2022-08-30 PROCEDURE — 84145 PROCALCITONIN (PCT): CPT | Performed by: EMERGENCY MEDICINE

## 2022-08-30 PROCEDURE — 85025 COMPLETE CBC W/AUTO DIFF WBC: CPT | Performed by: EMERGENCY MEDICINE

## 2022-08-30 PROCEDURE — 36415 COLL VENOUS BLD VENIPUNCTURE: CPT | Performed by: EMERGENCY MEDICINE

## 2022-08-30 PROCEDURE — 80048 BASIC METABOLIC PNL TOTAL CA: CPT | Performed by: EMERGENCY MEDICINE

## 2022-08-30 ASSESSMENT — ACTIVITIES OF DAILY LIVING (ADL)
ADLS_ACUITY_SCORE: 35

## 2022-08-30 NOTE — ED PROVIDER NOTES
History     Chief Complaint:  Abdominal Pain       HPI   Dionne Perez is a 37 year old female past medical history significant for cognitive impairment, depression, anxiety, GERD hypothyroidism stroke and obesity who was admitted to the hospital for several days after sent in from her group home for aggressive behavior presenting for evaluation of abdominal pain and bruising at the site of her Lovenox  injections while in the hospital.  Denies any fevers or chills.     ROS:  Review of Systems  10 point ROS completed, negative except as indicated in the HPI.     Allergies:  Ibuprofen     Medications:    acetaminophen (TYLENOL) 325 MG tablet  albuterol (PROAIR HFA/PROVENTIL HFA/VENTOLIN HFA) 108 (90 Base) MCG/ACT inhaler  aspirin (ASA) 325 MG EC tablet  docusate sodium (COLACE) 100 MG capsule  escitalopram (LEXAPRO) 10 MG tablet  famotidine (PEPCID) 20 MG tablet  levothyroxine (SYNTHROID/LEVOTHROID) 50 MCG tablet  Multiple Vitamins-Minerals (EMERGEN-C IMMUNE PLUS/VIT D) CHEW  nystatin (MYCOSTATIN) 435525 UNIT/GM external cream  OLANZapine zydis (ZYPREXA) 10 MG ODT  Pediatric Multivit-Minerals-C (CHEWABLES MULTIVITAMIN PO)  pravastatin (PRAVACHOL) 40 MG tablet  Probiotic Product (RISAQUAD-2) CAPS  traZODone (DESYREL) 50 MG tablet        Past Medical History:    Past Medical History:   Diagnosis Date     Depressive disorder      Gastroesophageal reflux disease      High cholesterol      Knee pain      Thyroid disease        Past Surgical History:    Past Surgical History:   Procedure Laterality Date     IR LUMBAR PUNCTURE  6/9/2020        Family History:    family history is not on file.    Social History:   reports that she has never smoked. She has never used smokeless tobacco. She reports that she does not drink alcohol and does not use drugs.  PCP: [facility]     Physical Exam     Patient Vitals for the past 24 hrs:   BP Temp Temp src Pulse Resp SpO2   08/29/22 2331 127/68 97.5  F (36.4  C)  Oral 57 18 97 %        Physical Exam  Constitutional: Alert, attentive, GCS 15   Eyes: EOM are normal, anicteric, conjugate gaze  CV: distal extremities warm, well perfused  Chest: Non-labored breathing on RA  GI: Left lower abdominal wall ecchymosis, indurated area underneath the area of ecchymosis no distension. No guarding or rebound.    Neurological: Alert, attentive, moving all extremities equally.   Skin: Skin is warm and dry.        Emergency Department Course   ECG:  ECG results from 07/21/22   EKG 12 lead     Value    Systolic Blood Pressure     Diastolic Blood Pressure     Ventricular Rate 54    Atrial Rate 54    DC Interval 134    QRS Duration 96        QTc 445    P Axis 36    R AXIS 9    T Axis 17    Interpretation ECG      Sinus bradycardia  Minimal voltage criteria for LVH, may be normal variant  Borderline ECG  When compared with ECG of 25-JUN-2022 18:51,  No significant change was found  Confirmed by - EMERGENCY ROOM, PHYSICIAN ([id number]),  LIVIA RAHMAN ([id number]) on 7/22/2022 6:38:01 AM         Imaging:  POC US SOFT TISSUE   Final Result   Abnormal   Procedure Note       Emergency Medicine Limited Ultrasound: Abscess      PERFORMED BY: Carmelo Mistry MD   INDICATIONS/SYMPTOM:  Soft tissue swelling, pain. Evaluate for abscess   PROBE: Linear probe   BODY LOCATION: The ultrasound was performed abdominal wall.   FINDINGS: No evidence of cobblestoning, long fluid collection measuring greater than 2 x 4 cm.   INTERPRETATION: No evidence of abscess, ultrasound suspicious for hematoma.   IMAGE DOCUMENTATION: Images saved digitally to US machine hard-drive/PACS.                 Laboratory:  Labs Ordered and Resulted from Time of ED Arrival to Time of ED Departure   BASIC METABOLIC PANEL - Abnormal       Result Value    Creatinine 0.84      Sodium 140      Potassium 4.6      Urea Nitrogen 19.4      Chloride 101      Carbon Dioxide (CO2) 34 (*)     Anion Gap 5 (*)     Glucose 99      GFR Estimate >90       Calcium 8.9     CBC WITH PLATELETS AND DIFFERENTIAL - Abnormal    WBC Count 8.7      RBC Count 4.25      Hemoglobin 11.8      Hematocrit 39.8      MCV 94      MCH 27.8      MCHC 29.6 (*)     RDW 13.0      Platelet Count 166      % Neutrophils 70      % Lymphocytes 22      % Monocytes 6      % Eosinophils 1      % Basophils 0      % Immature Granulocytes 1      NRBCs per 100 WBC 0      Absolute Neutrophils 6.1      Absolute Lymphocytes 1.9      Absolute Monocytes 0.6      Absolute Eosinophils 0.1      Absolute Basophils 0.0      Absolute Immature Granulocytes 0.1      Absolute NRBCs 0.0     PROCALCITONIN - Normal    Procalcitonin 0.04           Disposition:  The patient was discharged to home.     Impression & Plan      Medical Decision Makin-year-old woman past medical history seen for mild cognitive impairment, depression, anxiety presenting from her group home for evaluation of abdominal wall bruising and pain in the site of her Lovenox injections after she was discharged from the hospital 20 days prior.  No evidence of infection on exam, Pro-Antonio is low, had concern for potential infected hematoma.  Ultrasound shows large fluid collection I suspect to be hematoma, hemoglobin, blood pressure and heart rate are stable.  I do not suspect active bleeding at this time.  She is not on blood thinners.  I recommended heat packs, firm pressure and follow-up with PCP.  Return precautions reviewed she was discharged home.     Diagnosis:    ICD-10-CM    1. Abdominal wall hematoma, initial encounter  S30.1XXA         Carmelo Mistry MD  [employer]  7:35 AM 22           Carmelo Mistry MD  [phone]

## 2022-08-30 NOTE — ED NOTES
"Sister, cecilio, is called. No answer. Writer requests pt call guardian, pt states \"I can't, she blocked me.\" Will continue to monitor.  "

## 2022-08-30 NOTE — ED TRIAGE NOTES
Low abdominal pain at insulin injection sites. Some bruisng noted.H/o abscess. VSS. ABC's intact.

## 2022-08-30 NOTE — DISCHARGE INSTRUCTIONS
I recommended heat packs for comfort, the swelling in your abdominal wall is a collection of blood.  Holding firm direct pressure several times a day can also help.  Should you notice increasing swelling, develop fever or redness of the skin, you should return.  This bruises in the area where they were injecting medicine to help you not form blood clots while you are in the hospital so bruising is not unexpected.

## 2022-08-30 NOTE — ED NOTES
Bed: ED28  Expected date: 8/29/22  Expected time: 11:17 PM  Means of arrival:   Comments:  BV5 37 F Abd pain

## 2022-09-01 ENCOUNTER — HOSPITAL ENCOUNTER (EMERGENCY)
Facility: CLINIC | Age: 37
Discharge: HOME | End: 2022-09-02
Attending: EMERGENCY MEDICINE | Admitting: EMERGENCY MEDICINE
Payer: MEDICARE

## 2022-09-01 DIAGNOSIS — S30.1XXA ABDOMINAL WALL HEMATOMA, INITIAL ENCOUNTER: ICD-10-CM

## 2022-09-01 DIAGNOSIS — R10.84 ABDOMINAL PAIN, GENERALIZED: ICD-10-CM

## 2022-09-01 PROCEDURE — 85025 COMPLETE CBC W/AUTO DIFF WBC: CPT | Performed by: EMERGENCY MEDICINE

## 2022-09-01 PROCEDURE — 84703 CHORIONIC GONADOTROPIN ASSAY: CPT | Performed by: EMERGENCY MEDICINE

## 2022-09-01 PROCEDURE — 250N000013 HC RX MED GY IP 250 OP 250 PS 637: Performed by: EMERGENCY MEDICINE

## 2022-09-01 PROCEDURE — 99283 EMERGENCY DEPT VISIT LOW MDM: CPT

## 2022-09-01 PROCEDURE — 36415 COLL VENOUS BLD VENIPUNCTURE: CPT | Performed by: EMERGENCY MEDICINE

## 2022-09-01 PROCEDURE — 83690 ASSAY OF LIPASE: CPT | Performed by: EMERGENCY MEDICINE

## 2022-09-01 PROCEDURE — 80053 COMPREHEN METABOLIC PANEL: CPT | Performed by: EMERGENCY MEDICINE

## 2022-09-01 RX ORDER — ACETAMINOPHEN 325 MG/1
975 TABLET ORAL ONCE
Status: COMPLETED | OUTPATIENT
Start: 2022-09-01 | End: 2022-09-01

## 2022-09-01 RX ADMIN — ACETAMINOPHEN 975 MG: 325 TABLET, FILM COATED ORAL at 23:10

## 2022-09-01 ASSESSMENT — ACTIVITIES OF DAILY LIVING (ADL): ADLS_ACUITY_SCORE: 35

## 2022-09-01 ASSESSMENT — ENCOUNTER SYMPTOMS: ABDOMINAL PAIN: 1

## 2022-09-02 VITALS
TEMPERATURE: 97.1 F | HEART RATE: 24 BPM | RESPIRATION RATE: 20 BRPM | OXYGEN SATURATION: 98 % | DIASTOLIC BLOOD PRESSURE: 72 MMHG | SYSTOLIC BLOOD PRESSURE: 128 MMHG

## 2022-09-02 LAB
ALBUMIN SERPL BCG-MCNC: 3.8 G/DL (ref 3.5–5.2)
ALP SERPL-CCNC: 98 U/L (ref 35–104)
ALT SERPL W P-5'-P-CCNC: 27 U/L (ref 10–35)
ANION GAP SERPL CALCULATED.3IONS-SCNC: 7 MMOL/L (ref 7–15)
AST SERPL W P-5'-P-CCNC: 22 U/L (ref 10–35)
BASOPHILS # BLD AUTO: 0 10E3/UL (ref 0–0.2)
BASOPHILS NFR BLD AUTO: 0 %
BILIRUB SERPL-MCNC: 0.4 MG/DL
BUN SERPL-MCNC: 21.7 MG/DL (ref 6–20)
CALCIUM SERPL-MCNC: 8.7 MG/DL (ref 8.6–10)
CHLORIDE SERPL-SCNC: 104 MMOL/L (ref 98–107)
CREAT SERPL-MCNC: 0.77 MG/DL (ref 0.51–0.95)
DEPRECATED HCO3 PLAS-SCNC: 28 MMOL/L (ref 22–29)
EOSINOPHIL # BLD AUTO: 0 10E3/UL (ref 0–0.7)
EOSINOPHIL NFR BLD AUTO: 0 %
ERYTHROCYTE [DISTWIDTH] IN BLOOD BY AUTOMATED COUNT: 13 % (ref 10–15)
GFR SERPL CREATININE-BSD FRML MDRD: >90 ML/MIN/1.73M2
GLUCOSE SERPL-MCNC: 97 MG/DL (ref 70–99)
HCG SERPL QL: NEGATIVE
HCT VFR BLD AUTO: 38.8 % (ref 35–47)
HGB BLD-MCNC: 11.7 G/DL (ref 11.7–15.7)
IMM GRANULOCYTES # BLD: 0.2 10E3/UL
IMM GRANULOCYTES NFR BLD: 2 %
LIPASE SERPL-CCNC: 66 U/L (ref 13–60)
LYMPHOCYTES # BLD AUTO: 2.1 10E3/UL (ref 0.8–5.3)
LYMPHOCYTES NFR BLD AUTO: 19 %
MCH RBC QN AUTO: 27.6 PG (ref 26.5–33)
MCHC RBC AUTO-ENTMCNC: 30.2 G/DL (ref 31.5–36.5)
MCV RBC AUTO: 92 FL (ref 78–100)
MONOCYTES # BLD AUTO: 0.6 10E3/UL (ref 0–1.3)
MONOCYTES NFR BLD AUTO: 5 %
NEUTROPHILS # BLD AUTO: 7.8 10E3/UL (ref 1.6–8.3)
NEUTROPHILS NFR BLD AUTO: 74 %
NRBC # BLD AUTO: 0 10E3/UL
NRBC BLD AUTO-RTO: 0 /100
PLATELET # BLD AUTO: 191 10E3/UL (ref 150–450)
POTASSIUM SERPL-SCNC: 4.5 MMOL/L (ref 3.4–5.3)
PROT SERPL-MCNC: 6.7 G/DL (ref 6.4–8.3)
RBC # BLD AUTO: 4.24 10E6/UL (ref 3.8–5.2)
SODIUM SERPL-SCNC: 139 MMOL/L (ref 136–145)
WBC # BLD AUTO: 10.8 10E3/UL (ref 4–11)

## 2022-09-02 ASSESSMENT — ACTIVITIES OF DAILY LIVING (ADL): ADLS_ACUITY_SCORE: 35

## 2022-09-02 NOTE — ED TRIAGE NOTES
Pt arrives via EMS from [facility].    Per patient states she is here for abdominal pain and points the bruises on her stomach. States a doctor told her to come back if she had abdominal pain.    Per EMS report patient called every night to emergency services between 9pm and 10pm when no group home staff are present. Tonight say she had abdominal pain but really she is just upset that someone else at [place] group home [place]. Sister is her guardian and EMS attempted to call her 30 times with no answer.   Per EMS staff at [facility] refuse to come and pick her and she takes a taxi in the morning back to [facility].          Triage Assessment     Row Name 09/01/22 [id number]       Triage Assessment (Adult)    Airway WDL WDL       Respiratory WDL    Respiratory WDL WDL       Skin Circulation/Temperature WDL    Skin Circulation/Temperature WDL WDL       Cardiac WDL    Cardiac WDL WDL       Peripheral/Neurovascular WDL    Peripheral Neurovascular WDL WDL       Cognitive/Neuro/Behavioral WDL    Cognitive/Neuro/Behavioral WDL WDL

## 2022-09-02 NOTE — ED PROVIDER NOTES
History   Chief Complaint:  Abdominal Pain       The history is provided by the patient and the EMS personnel.      Dionne Perez is a 37 year old female with history of cognitive impairment, depression, anxiety, GERD hypothyroidism stroke and obesity who presents with lower abdominal pain. She has had bruising to her lower abdomen since receiving Lovenox injections while in the hospital last month. She was previously seen for this 3 days ago at this ED, and did not have any significant findings. Then earlier tonight, she claimed to have abdominal pain again, but really she just go upset that someone else at the group home got to go to the [facility]. EMS reports that she has called every night to emergency services between 2100 and 2200 when no group home staff are available.     Review of Systems   Gastrointestinal: Positive for abdominal pain.   All other systems reviewed and are negative.    Allergies:  Ibuprofen    Medications:  Albuterol   ASA   Lexapro   Pepcid   Synthroid   Zyprexa   Pravastatin   Desyrel   Deltasone   Norco   Flexeril     Past Medical History:     Altered mental status   Agitation   Aggression   Abnormal behavior   Mild mental retardation   Hypothyroidism   Morbid obesity   Insomnia  Anemia, iron deficiency  Adjustment reaction with aggression  Developmental disability  Major depressive disorder, recurrent severe without psychotic features   Suicidal ideations  Gallstone pancreatitis   Syncope   Essential hypertension   ROSY     Past Surgical History:    Tonsillectomy   Unspecified leg surgery     Family History:    Mother - lung cancer, cataract   Sister - breast cancer, DM     Social History:  The patient presents to the ED alone via EMS   PCP: [facility]   The patient lives in a group home     Physical Exam     Patient Vitals for the past 24 hrs:   BP Temp Temp src Pulse Resp SpO2   09/01/22 2241 131/79 97.1  F (36.2  C) Temporal (!) 49 20 96 %       Physical  Exam  Constitutional:  Oriented to person, place, and time. Elevate BMI. Well appearing   HENT:   Head:    Normocephalic.   Mouth/Throat:   Oropharynx is clear and moist.   Eyes:    EOM are normal. Pupils are equal, round, and reactive to light.   Neck:    Neck supple.   Cardiovascular:  Normal rate, regular rhythm and normal heart sounds.      Exam reveals no gallop and no friction rub.       No murmur heard.  Pulmonary/Chest:  Effort normal and breath sounds normal.      No respiratory distress. No wheezes. No rales.      No reproducible chest wall pain.  Abdominal:   Soft. No distension. Mild tenderness to LLQ abdominal wall hematoma. No rebound and no guarding.   Musculoskeletal:  Normal range of motion.   Neurological:   Alert and oriented to person, place, and time.           Moves all 4 extremities spontaneously    Skin:    No rash noted. No pallor. Abdominal wall hematoma noted to LLQ abdomen. No warmth of erythema or fluctuation.     Emergency Department Course   Laboratory:  Labs Ordered and Resulted from Time of ED Arrival to Time of ED Departure   COMPREHENSIVE METABOLIC PANEL - Abnormal       Result Value    Sodium 139      Potassium 4.5      Creatinine 0.77      Urea Nitrogen 21.7 (*)     Chloride 104      Carbon Dioxide (CO2) 28      Anion Gap 7      Glucose 97      Calcium 8.7      Protein Total 6.7      Albumin 3.8      Bilirubin Total 0.4      Alkaline Phosphatase 98      AST 22      ALT 27      GFR Estimate >90     LIPASE - Abnormal    Lipase 66 (*)    CBC WITH PLATELETS AND DIFFERENTIAL - Abnormal    WBC Count 10.8      RBC Count 4.24      Hemoglobin 11.7      Hematocrit 38.8      MCV 92      MCH 27.6      MCHC 30.2 (*)     RDW 13.0      Platelet Count 191      % Neutrophils 74      % Lymphocytes 19      % Monocytes 5      % Eosinophils 0      % Basophils 0      % Immature Granulocytes 2      NRBCs per 100 WBC 0      Absolute Neutrophils 7.8      Absolute Lymphocytes 2.1      Absolute Monocytes  0.6      Absolute Eosinophils 0.0      Absolute Basophils 0.0      Absolute Immature Granulocytes 0.2      Absolute NRBCs 0.0     HCG QUALITATIVE PREGNANCY - Normal    hCG Serum Qualitative Negative        Emergency Department Course:    Reviewed:  I reviewed nursing notes, vitals and past medical history    Assessments:   I obtained history and examined the patient as noted above.   45 I rechecked the patient and explained findings. Her pain is now improved.     Interventions:  2310 Tylenol 975 mg PO    Disposition:  The patient was discharged to home.     Impression & Plan   Medical Decision Makin-year-old female who was just seen a few days ago for abdominal wall hematoma due to Lovenox use.  Returning here complaining of continued pain along the site  She has reproducible tenderness along her abdominal wall hematoma.  Differential includes abdominal hematoma, obstruction, constipation, acute appendicitis, ectopic Pregnancy, or other causes.  Laboratory work here is otherwise reassuring.  Has no elevated white counts no hemoglobin suggest anemia.  She has no signs that would be concerning for secondary cellulitis.  Hematoma is superficial and not leave needs imaging or further management here.  After Tylenol she is currently pain-free.  She will continue with Tylenol warm compresses to follow-up with her primary care doctor return for any worsening healthy fever vomiting  P.o.    Diagnosis:    ICD-10-CM    1. Abdominal pain, generalized  R10.84    2. Abdominal wall hematoma, initial encounter  S30.1XXA        Discharge Medications:  New Prescriptions    No medications on file       Scribe Disclosure:  I, Damien Gaytan, am serving as a scribe at 10:49 PM on 2022 to document services personally performed by Fei Schwarz MD based on my observations and the provider's statements to me.        Fei Schwarz MD  [phone]

## 2022-09-02 NOTE — ED NOTES
Attempted to call guardian for permission to treat  Left message at number provided by [phone]  Left message at number in chart [phone]    We currently do not have permission to treat.

## 2022-09-19 NOTE — DISCHARGE SUMMARY
Physician Discharge Summary           [facility]  Hospitalist Discharge Summary-[facility]    Name: Dionne Perez    MRN: [id number]     YOB: 1985    Age: 37 year old                                                     Primary care provider: [facility]    Admit date:  7/30/2022    Discharge date and time: 8/9/2022 11:25 AM    Discharge Physician: Car Krishnan M.D., M.B.A.       Primary Discharge Diagnosis      Developmental delay   Disposition need    Secondary Diagnosis /chronic medical conditions     Past Medical History:   Diagnosis Date     Depressive disorder      Gastroesophageal reflux disease      High cholesterol      Knee pain      Thyroid disease      Past Surgical History:  Past Surgical History:   Procedure Laterality Date     IR LUMBAR PUNCTURE  6/9/2020           Brief Summary of Hospital stay :       Please refer to  Admission H&P note  and subsequent progress notes in EMR for full details of patient care.    Reason for Hospitalization(C/C,HPI and brief patient summary):for disposition assistance      Significant findings(Primary diagnosis )Procedures and treatments provided(Hospital course ,consults, procedures):Please see below for details      Dionne Perez is a 37 year old female with PMhx of developmental delay, depression, anxiety, GERD, hypothyrodism, CVA, obesity, who was admitted on 8/3/2022 for disposition assistance.     Problem List/Assessment and Plan:   Need for Disposition   Unable to currently return to prior group home until 8/9/22. Initially admitted due to group home refusing further cares as patient was reportedly not following rules. SW, DEC  Consulted for discharge plan        Developmental delay   At baseline. Cooperative.   Does have remote history of behaviors, lack of understanding of consequences. No aggression.   -Reorient as needed  -Resides in group home   -Bedside attendant PRN, discretion of staff     Hx of  Depression, anxiety  -PTA trazodone, escitaloparam     Hx of CVA  Cerebellar infarct on MRI 6/2020   -PTA  mg, statin       Hypothyroidism  -PTA synthroid      GERD  -PTA pepcid      Obesity   Last recorded weight 332 lb.  Sister is concerned about portions with weight gain and eating behaviors at the group home, ordering door dash. Has requested a repeat weight.   -Continue outpatient follow up for weight management           Consultations during hospital stay:       MEDICATION HISTORY IP PHARMACY CONSULT  CARE MANAGEMENT / SOCIAL WORK IP CONSULT  CARE MANAGEMENT / SOCIAL WORK IP CONSULT  SPIRITUAL HEALTH SERVICES IP CONSULT  PSYCHIATRY IP CONSULT      Patient discharge Condition:     stable    /61 (BP Location: Left arm, Patient Position: Right side)   Pulse 55   Temp 98.6  F (37  C) (Oral)   Resp 19   Wt (!) 164 kg (361 lb 9.6 oz)   LMP  (LMP Unknown)   SpO2 96%   BMI 54.98 kg/m             Discharge Instructions:       Patient/family instructions: Written discharge instruction given to patient/family    Discharge Medications:       Review of your medicines      START taking      Dose / Directions   OLANZapine zydis 10 MG ODT  Commonly known as: zyPREXA  Used for: Abnormal behavior      Dose: 10 mg  Take 1 tablet (10 mg) by mouth 2 times daily as needed (associated with psychosis or anjel.)  Quantity: 30 tablet  Refills: 0        CONTINUE these medicines which have NOT CHANGED      Dose / Directions   acetaminophen 325 MG tablet  Commonly known as: TYLENOL      Dose: 650 mg  Take 650 mg by mouth 3 times daily as needed for pain  Refills: 0     albuterol 108 (90 Base) MCG/ACT inhaler  Commonly known as: PROAIR HFA/PROVENTIL HFA/VENTOLIN HFA      Dose: 1-2 puff  Inhale 1-2 puffs into the lungs every 4 hours as needed for shortness of breath / dyspnea or wheezing  Refills: 0     aspirin 325 MG EC tablet  Commonly known as: ASA      Dose: 325 mg  Take 325 mg by mouth daily  Refills: 0      CHEWABLES MULTIVITAMIN PO      Dose: 2 tablet  Take 2 tablets by mouth daily  Refills: 0     docusate sodium 100 MG capsule  Commonly known as: COLACE      Dose: 100 mg  Take 100 mg by mouth every 3 days  Refills: 0     Emergen-C Immune Plus/Vit D Chew      Dose: 3 chew tab  Take 3 chew tab by mouth daily  Refills: 0     escitalopram 10 MG tablet  Commonly known as: LEXAPRO      Dose: 30 mg  Take 30 mg by mouth daily  Refills: 0     famotidine 20 MG tablet  Commonly known as: PEPCID      Dose: 20 mg  Take 20 mg by mouth daily  Refills: 0     levothyroxine 50 MCG tablet  Commonly known as: SYNTHROID/LEVOTHROID      Dose: 50 mcg  Take 50 mcg by mouth daily  Refills: 0     nystatin 166609 UNIT/GM external cream  Commonly known as: MYCOSTATIN      Apply topically 2 times daily as needed for other (rash (around private areas))  Refills: 0     pravastatin 40 MG tablet  Commonly known as: PRAVACHOL      Dose: 40 mg  Take 40 mg by mouth daily  Refills: 0     RisaQuad-2 Caps      Dose: 1 capsule  Take 1 capsule by mouth daily  Refills: 0     traZODone 50 MG tablet  Commonly known as: DESYREL      Dose: 50 mg  Take 50 mg by mouth At Bedtime  Refills: 0           Where to get your medicines      These medications were sent to [id number] - [address] AT [place] [address]  [address] [place] [address] [address], [facility] [address]    Phone: [phone]     OLANZapine zydis 10 MG ODT          Discharge diet:Orders Placed This Encounter      Diet    regular diet      Discharge activity:Activity as tolerated      Discharge follow-up:    Follow up with primary care provider in 7 days or earlier if symptoms return or gets worse.          Other instructions:    We discussed with patient/family about detail discharge instructions as well as discharge medications above including potential risks,side effects and benefits.Patient/family understood benefits and potential serious side effects of taking these  medications and need to follow up with PCP if the patient develops complications.  Patient is also advised to see a doctor immediately for severe symptoms.        Major procedure performed/  Significant Diagnostic Studies:           Results for orders placed or performed during the hospital encounter of 07/21/22   US Lower Extremity Venous Duplex Right    Narrative    EXAM: US LOWER EXTREMITY VENOUS DUPLEX RIGHT  LOCATION: [facility]  DATE/TIME: 7/21/2022 10:56 PM    INDICATION: rle swelling  COMPARISON: None.  TECHNIQUE: Venous Duplex ultrasound of the right lower extremity with and without compression, augmentation and duplex. Color flow and spectral Doppler with waveform analysis performed.    FINDINGS: Exam includes the common femoral, femoral, popliteal, and contralateral common femoral veins as well as segmentally visualized deep calf veins and greater saphenous vein.     RIGHT: No deep vein thrombosis. No superficial thrombophlebitis. No popliteal cyst.      Impression    IMPRESSION:  1.  No deep venous thrombosis in the right lower extremity.       No results for input(s): WBC, HGB, HCT, MCV, PLT in the last 168 hours.  No results for input(s): CULT in the last 168 hours.  No results for input(s): NA, POTASSIUM, CHLORIDE, CO2, ANIONGAP, GLC, BUN, CR, GFRESTIMATED, GFRESTBLACK, SANDRA, MAG, PHOS, PROTTOTAL, ALBUMIN, BILITOTAL, ALKPHOS, AST, ALT in the last 168 hours.    No results for input(s): GLC, BGM in the last 168 hours.    No results for input(s): INR in the last 168 hours.      Pending Results:       Unresulted Labs Ordered in the Past 30 Days of this Admission     No orders found from 6/30/2022 to 7/31/2022.             Patient Allergies:       Allergies   Allergen Reactions     Ibuprofen          Disposition:     Disposition: group home        I saw and evaluated the patient on day of discharge and  discharge instructions reviewed  and  all the patient's questions and concerns  addressed. Over 30 minutes spent on discharge and coordination of discharge process for this patient.      Disclaimer: This note consists of symbols derived from keyboarding, dictation and/or voice recognition software. As a result, there may be errors in the script that have gone undetected. Please consider this when interpreting information found in this chart

## 2022-10-10 ENCOUNTER — HOSPITAL ENCOUNTER (EMERGENCY)
Facility: CLINIC | Age: 37
Discharge: HOME | End: 2022-10-11
Attending: EMERGENCY MEDICINE | Admitting: EMERGENCY MEDICINE
Payer: MEDICARE

## 2022-10-10 DIAGNOSIS — R10.9 ABDOMINAL PAIN, UNSPECIFIED ABDOMINAL LOCATION: ICD-10-CM

## 2022-10-10 PROCEDURE — 99283 EMERGENCY DEPT VISIT LOW MDM: CPT
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
## 2022-10-11 VITALS
SYSTOLIC BLOOD PRESSURE: 130 MMHG | HEART RATE: 68 BPM | RESPIRATION RATE: 18 BRPM | TEMPERATURE: 98.8 F | OXYGEN SATURATION: 98 % | DIASTOLIC BLOOD PRESSURE: 69 MMHG

## 2022-10-11 PROCEDURE — 250N000013 HC RX MED GY IP 250 OP 250 PS 637: Performed by: EMERGENCY MEDICINE

## 2022-10-11 RX ORDER — ACETAMINOPHEN 500 MG
1000 TABLET ORAL ONCE
Status: COMPLETED | OUTPATIENT
Start: 2022-10-11 | End: 2022-10-11

## 2022-10-11 RX ADMIN — ACETAMINOPHEN 1000 MG: 500 TABLET, FILM COATED ORAL at 00:42

## 2022-10-11 ASSESSMENT — ENCOUNTER SYMPTOMS
VOMITING: 0
NAUSEA: 0
CHILLS: 0
ABDOMINAL PAIN: 1
SHORTNESS OF BREATH: 0
DYSURIA: 0
FEVER: 0

## 2022-10-11 ASSESSMENT — ACTIVITIES OF DAILY LIVING (ADL): ADLS_ACUITY_SCORE: 35
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
## 2022-10-11 NOTE — ED NOTES
Pt up to the bathroom without assistance. Pt updated on transportation home. Will continue to monitor.

## 2022-10-11 NOTE — ED NOTES
Staff from [facility], Krzysztof, answers phone at [phone]. Writer requests transportation home for pt. Krzysztof states pt takes medical transportation home and that staff is unable to pick pt up. Sister, Sabra, is called x3. No answer. MD notified. Will continue to monitor.

## 2022-12-19 ENCOUNTER — APPOINTMENT (OUTPATIENT)
Dept: GENERAL RADIOLOGY | Facility: CLINIC | Age: 37
End: 2022-12-19
Attending: EMERGENCY MEDICINE
Payer: MEDICARE

## 2022-12-19 ENCOUNTER — HOSPITAL ENCOUNTER (EMERGENCY)
Facility: CLINIC | Age: 37
Discharge: HOME | End: 2022-12-19
Attending: EMERGENCY MEDICINE | Admitting: EMERGENCY MEDICINE
Payer: MEDICARE

## 2022-12-19 VITALS
HEART RATE: 56 BPM | OXYGEN SATURATION: 97 % | DIASTOLIC BLOOD PRESSURE: 84 MMHG | TEMPERATURE: 98.9 F | SYSTOLIC BLOOD PRESSURE: 130 MMHG | RESPIRATION RATE: 22 BRPM

## 2022-12-19 DIAGNOSIS — J45.41 MODERATE PERSISTENT ASTHMA WITH EXACERBATION: ICD-10-CM

## 2022-12-19 DIAGNOSIS — R06.02 SOB (SHORTNESS OF BREATH): ICD-10-CM

## 2022-12-19 DIAGNOSIS — U07.1 INFECTION DUE TO 2019 NOVEL CORONAVIRUS: ICD-10-CM

## 2022-12-19 LAB
ANION GAP SERPL CALCULATED.3IONS-SCNC: 10 MMOL/L (ref 7–15)
BASOPHILS # BLD AUTO: 0 10E3/UL (ref 0–0.2)
BASOPHILS NFR BLD AUTO: 0 %
BUN SERPL-MCNC: 19.6 MG/DL (ref 6–20)
CALCIUM SERPL-MCNC: 8.9 MG/DL (ref 8.6–10)
CHLORIDE SERPL-SCNC: 102 MMOL/L (ref 98–107)
CREAT SERPL-MCNC: 0.8 MG/DL (ref 0.51–0.95)
DEPRECATED HCO3 PLAS-SCNC: 29 MMOL/L (ref 22–29)
EOSINOPHIL # BLD AUTO: 0.1 10E3/UL (ref 0–0.7)
EOSINOPHIL NFR BLD AUTO: 2 %
ERYTHROCYTE [DISTWIDTH] IN BLOOD BY AUTOMATED COUNT: 13.2 % (ref 10–15)
FLUAV AG SPEC QL IA: NEGATIVE
FLUBV AG SPEC QL IA: NEGATIVE
GFR SERPL CREATININE-BSD FRML MDRD: >90 ML/MIN/1.73M2
GLUCOSE SERPL-MCNC: 97 MG/DL (ref 70–99)
HCT VFR BLD AUTO: 41.7 % (ref 35–47)
HGB BLD-MCNC: 12.5 G/DL (ref 11.7–15.7)
IMM GRANULOCYTES # BLD: 0.1 10E3/UL
IMM GRANULOCYTES NFR BLD: 1 %
LYMPHOCYTES # BLD AUTO: 1.9 10E3/UL (ref 0.8–5.3)
LYMPHOCYTES NFR BLD AUTO: 21 %
MCH RBC QN AUTO: 27.2 PG (ref 26.5–33)
MCHC RBC AUTO-ENTMCNC: 30 G/DL (ref 31.5–36.5)
MCV RBC AUTO: 91 FL (ref 78–100)
MONOCYTES # BLD AUTO: 0.6 10E3/UL (ref 0–1.3)
MONOCYTES NFR BLD AUTO: 7 %
NEUTROPHILS # BLD AUTO: 6.2 10E3/UL (ref 1.6–8.3)
NEUTROPHILS NFR BLD AUTO: 69 %
NRBC # BLD AUTO: 0 10E3/UL
NRBC BLD AUTO-RTO: 0 /100
PLATELET # BLD AUTO: 185 10E3/UL (ref 150–450)
POTASSIUM SERPL-SCNC: 3.9 MMOL/L (ref 3.4–5.3)
RBC # BLD AUTO: 4.6 10E6/UL (ref 3.8–5.2)
SODIUM SERPL-SCNC: 141 MMOL/L (ref 136–145)
WBC # BLD AUTO: 8.9 10E3/UL (ref 4–11)

## 2022-12-19 PROCEDURE — 94640 AIRWAY INHALATION TREATMENT: CPT

## 2022-12-19 PROCEDURE — 36415 COLL VENOUS BLD VENIPUNCTURE: CPT | Performed by: EMERGENCY MEDICINE

## 2022-12-19 PROCEDURE — 82374 ASSAY BLOOD CARBON DIOXIDE: CPT | Performed by: EMERGENCY MEDICINE

## 2022-12-19 PROCEDURE — 250N000009 HC RX 250: Performed by: EMERGENCY MEDICINE

## 2022-12-19 PROCEDURE — 99285 EMERGENCY DEPT VISIT HI MDM: CPT | Mod: CS,25

## 2022-12-19 PROCEDURE — 250N000012 HC RX MED GY IP 250 OP 636 PS 637: Performed by: EMERGENCY MEDICINE

## 2022-12-19 PROCEDURE — 85004 AUTOMATED DIFF WBC COUNT: CPT | Performed by: EMERGENCY MEDICINE

## 2022-12-19 PROCEDURE — 71045 X-RAY EXAM CHEST 1 VIEW: CPT

## 2022-12-19 PROCEDURE — 93005 ELECTROCARDIOGRAM TRACING: CPT

## 2022-12-19 PROCEDURE — 87804 INFLUENZA ASSAY W/OPTIC: CPT | Performed by: EMERGENCY MEDICINE

## 2022-12-19 RX ORDER — PREDNISONE 20 MG/1
60 TABLET ORAL ONCE
Status: COMPLETED | OUTPATIENT
Start: 2022-12-19 | End: 2022-12-19

## 2022-12-19 RX ORDER — IPRATROPIUM BROMIDE AND ALBUTEROL SULFATE 2.5; .5 MG/3ML; MG/3ML
3 SOLUTION RESPIRATORY (INHALATION)
Status: COMPLETED | OUTPATIENT
Start: 2022-12-19 | End: 2022-12-19

## 2022-12-19 RX ORDER — PREDNISONE 20 MG/1
TABLET ORAL
Qty: 10 TABLET | Refills: 0 | Status: SHIPPED | OUTPATIENT
Start: 2022-12-19 | End: 2023-03-24

## 2022-12-19 RX ADMIN — PREDNISONE 60 MG: 20 TABLET ORAL at 18:27

## 2022-12-19 RX ADMIN — IPRATROPIUM BROMIDE AND ALBUTEROL SULFATE 3 ML: 2.5; .5 SOLUTION RESPIRATORY (INHALATION) at 19:15

## 2022-12-19 RX ADMIN — IPRATROPIUM BROMIDE AND ALBUTEROL SULFATE 3 ML: 2.5; .5 SOLUTION RESPIRATORY (INHALATION) at 19:35

## 2022-12-19 RX ADMIN — IPRATROPIUM BROMIDE AND ALBUTEROL SULFATE 3 ML: 2.5; .5 SOLUTION RESPIRATORY (INHALATION) at 18:28

## 2022-12-19 ASSESSMENT — ACTIVITIES OF DAILY LIVING (ADL)
ADLS_ACUITY_SCORE: 35

## 2022-12-19 ASSESSMENT — ENCOUNTER SYMPTOMS
COUGH: 1
SHORTNESS OF BREATH: 1

## 2022-12-19 NOTE — ED NOTES
Bed: [facility]  Expected date: 12/19/22  Expected time:   Means of arrival:   Comments:  BV-3  37F  Asthma  +covid   Retention Suture Bite Size: 3 mm
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
## 2022-12-20 LAB
ATRIAL RATE - MUSE: 54 BPM
DIASTOLIC BLOOD PRESSURE - MUSE: NORMAL MMHG
INTERPRETATION ECG - MUSE: NORMAL
P AXIS - MUSE: 37 DEGREES
PR INTERVAL - MUSE: 156 MS
QRS DURATION - MUSE: 92 MS
QT - MUSE: 458 MS
QTC - MUSE: 434 MS
R AXIS - MUSE: 10 DEGREES
SYSTOLIC BLOOD PRESSURE - MUSE: NORMAL MMHG
T AXIS - MUSE: -12 DEGREES
VENTRICULAR RATE- MUSE: 54 BPM

## 2022-12-20 NOTE — ED PROVIDER NOTES
History     Chief Complaint:  Asthma and Covid Concern      HPI   Dionne Perez is a 37 year old female with history of asthma and last Wednesday was diagnosed with Covid. She had cough and elevated temperatures and shortness of breath at that time, which was not helped by albuterol. She reports feeling lightheaded, but hasn't fallen. No tremble or current hormones. No chest pain.     Review of Systems   Respiratory: Positive for cough and shortness of breath.    Cardiovascular: Negative for chest pain.   All other systems reviewed and are negative.      Allergies:  Ibuprofen    Medications:    Albuterol  Lexapro  Levothyroxine  Zyprexa  Pravastatin  Desyrel  Prilosec  Antivert    Past Medical History:    MDD  GERD  Hypothyroid  ROSY  Cholelithiasis  Suicidal Ideation  Hypertension  Hyperlipemia  Obesity  Sinus bradycardia  Asthma  Gallstone pancreatitis    Past Surgical History:    IR Lumbar Puncture  Cholecystectomy  Tonsillectomy    Family History:    Breast cancer    Social History:  The patient presents to the ED alone.  The patient arrived by ambulance.    Physical Exam     Patient Vitals for the past 24 hrs:   BP Temp Temp src Pulse Resp SpO2   12/19/22 2030 137/78 -- -- 56 -- 95 %   12/19/22 2015 -- -- -- -- -- 96 %   12/19/22 2000 130/85 -- -- 59 -- --   12/19/22 1930 130/78 -- -- 56 -- --   12/19/22 1910 109/68 -- -- -- -- --   12/19/22 1900 -- -- -- -- -- 99 %   12/19/22 1855 -- -- -- -- -- 100 %   12/19/22 1850 137/80 -- -- -- -- 99 %   12/19/22 1740 106/73 98.9  F (37.2  C) Temporal 72 22 98 %       Physical Exam  General: The patient is alert, in no respiratory distress.    HENT: Mucous membranes moist.    Cardiovascular: Regular rate and rhythm. Good pulses in all four extremities. Normal capillary refill and skin turgor.     Respiratory: Lungs are clear. No nasal flaring. No retractions.     Gastrointestinal: Abdomen soft. No guarding, no rebound. No palpable hernias.     Musculoskeletal: No gross  deformity.     Skin: No rashes or petechiae.     Neurologic: The patient is alert and oriented x3. GCS 15. No testable cranial nerve deficit. Follows commands with clear and appropriate speech. Gives appropriate answers. Good strength in all extremities. No gross neurologic deficit. Gross sensation intact. Pupils are round and reactive. No meningismus.     Lymphatic: No cervical adenopathy. No lower extremity swelling.    Psychiatric: The patient is non-tearful.      Emergency Department Course   ECG:  ECG taken at 1953, ECG read at 2000  Sinus bradycardia  Nonspecific ST abnormality   Rate 54 bpm. TX interval 156 ms. QRS duration 92 ms. QT/QTc 458/434 ms. P-R-T axes 37 10 -12.     Imaging:  XR Chest Port 1 View   Final Result   IMPRESSION: Cardiomegaly. The lungs are clear.        Report per radiology    Laboratory:  Labs Ordered and Resulted from Time of ED Arrival to Time of ED Departure   CBC WITH PLATELETS AND DIFFERENTIAL - Abnormal       Result Value    WBC Count 8.9      RBC Count 4.60      Hemoglobin 12.5      Hematocrit 41.7      MCV 91      MCH 27.2      MCHC 30.0 (*)     RDW 13.2      Platelet Count 185      % Neutrophils 69      % Lymphocytes 21      % Monocytes 7      % Eosinophils 2      % Basophils 0      % Immature Granulocytes 1      NRBCs per 100 WBC 0      Absolute Neutrophils 6.2      Absolute Lymphocytes 1.9      Absolute Monocytes 0.6      Absolute Eosinophils 0.1      Absolute Basophils 0.0      Absolute Immature Granulocytes 0.1      Absolute NRBCs 0.0     BASIC METABOLIC PANEL - Normal    Sodium 141      Potassium 3.9      Chloride 102      Carbon Dioxide (CO2) 29      Anion Gap 10      Urea Nitrogen 19.6      Creatinine 0.80      Calcium 8.9      Glucose 97      GFR Estimate >90     INFLUENZA A/B ANTIGEN - Normal    Influenza A antigen Negative      Influenza B antigen Negative         Emergency Department Course:       Reviewed:  I reviewed nursing notes, vitals, past medical history and  Care Everywhere    Assessments:  1800 I obtained history and examined the patient as noted above.   2031 I rechecked the patient and explained findings. The patient is feeling better and wheezing is improved.   2130 I rechecked the patient, her sister Sabra is here. I discussed discharge.      Interventions:  Medications   ipratropium - albuterol 0.5 mg/2.5 mg/3 mL (DUONEB) neb solution 3 mL (3 mLs Nebulization Given 12/19/22 1935)   predniSONE (DELTASONE) tablet 60 mg (60 mg Oral Given 12/19/22 1827)       Disposition:  The patient was discharged to home.     Impression & Plan      Medical Decision Making:  The patient has a history of asthma and I think likely this on top of her current COVID diagnosis is leading to her wheezing as well as shortness of breath.  She is not hypoxic and does not have tachycardia.  I did consider causes such as PE CHF anemia and infections other than those discussed above his potential causes but felt that is unlikely.  SVT was also considered however her EKG looks reassuring.  The patient also has wheezing making a primary lung process most likely.  She was treated with nebs steroids and improved.  The patient was ambulated and did not become hypoxic.  At this point I feel comfortable letting her follow-up as an outpatient on steroid she will continue to use albuterol but I stressed if she is to worsen she would need to return.      Covid-19  Dionne Perez was evaluated during a global COVID-19 pandemic, which necessitated consideration that the patient might be at risk for infection with the SARS-CoV-2 virus that causes COVID-19.   Applicable protocols for evaluation were followed during the patient's care.   COVID-19 was considered as part of the patient's evaluation.    Diagnosis:    ICD-10-CM    1. SOB (shortness of breath)  R06.02       2. Moderate persistent asthma with exacerbation  J45.41       3. Infection due to 2019 novel coronavirus  U07.1           Discharge  Medications:  New Prescriptions    PREDNISONE (DELTASONE) 20 MG TABLET    Take two tablets (= 40mg) each day for 5 (five) days         Scribe Disclosure:  I, Mil Vinod, am serving as a scribe at 6:13 PM on 12/19/2022 to document services personally performed by Sylvester Ramirez MD, based on my observations and the provider's statements to me.      Sylvester Ramirez MD  12/19/22 2874

## 2022-12-20 NOTE — DISCHARGE INSTRUCTIONS
Discharge Instructions  Asthma    Asthma is a condition causing narrowing and inflammation of the airways that can make it hard to breathe.  Asthma can also cause cough, wheezing, noisy breathing and tightness in the chest.  Asthma can be brought on or  triggered  by many things, including dust, mold, pollen, cigarette smoke, exercise, stress and infections, like the common cold.     Return to the Emergency Department if:  Your breathing gets worse.  You need to use your inhaler more often than every 4 hours, or can t get relief from your inhaler.  You are very weak, or feel much more ill.  You develop new symptoms, such as chest pain.  You cough up blood.  You are vomiting enough that you can t keep fluids or your medicine down.    What can I do to help myself?  Fill any prescriptions the doctor gave you and take them right away--especially antibiotics. Be sure to finish the whole antibiotic prescription.  You may be given a prescription for an inhaler, which can help loosen tight air passages.  Use this as needed, but not more often than directed. Inhalers work much better when used with a spacer.   You may be given a prescription for a steroid to reduce inflammation. Used long-term, these can have many serious side effects, but for short courses these do not happen. You may notice restlessness or increased appetite.      You may use non-prescription cough or cold medicines. Cough medicines may help, but don t make the cough go away completely.   Avoid smoke, because this can make your symptoms worse. If you smoke, this may be a good time to quit! Consider using nicotine lozenges, gum, or patches to reduce cravings.   If you have a fever, Tylenol  (acetaminophen), Motrin  (ibuprofen), or Advil  (ibuprofen), may help bring fever down and may help you feel more comfortable. Be sure to read and follow the package directions, and ask your doctor if you have questions.  Be sure to get your flu shot each year.  The  pneumonia shot can help prevent pneumonia.  It is important that you follow up with your regular doctor, to be sure that you are improving from this spell (an acute asthma exacerbation), and also to do what you can to keep from having trouble again. Sometimes you need long-term medicines to keep your asthma under control.   If you were given a prescription for medicine here today, be sure to read all of the information (including the package insert) that comes with your prescription.  This will include important information about the medicine, its side effects, and any warnings that you need to know about.  The pharmacist who fills the prescription can provide more information and answer questions you may have about the medicine.  If you have questions or concerns that the pharmacist cannot address, please call or return to the Emergency Department.   Opioid Medication Information    Pain medications are among the most commonly prescribed medicines, so we are including this information for all our patients. If you did not receive pain medication or get a prescription for pain medicine, you can ignore it.     You may have been given a prescription for an opioid (narcotic) pain medicine and/or have received a pain medicine while here in the Emergency Department. These medicines can make you drowsy or impaired. You must not drive, operate dangerous equipment, or engage in any other dangerous activities while taking these medications. If you drive while taking these medications, you could be arrested for DUI, or driving under the influence. Do not drink any alcohol while you are taking these medications.     Opioid pain medications can cause addiction. If you have a history of chemical dependency of any type, you are at a higher risk of becoming addicted to pain medications.  Only take these prescribed medications to treat your pain when all other options have been tried. Take it for as short a time and as few doses as  possible. Store your pain pills in a secure place, as they are frequently stolen and provide a dangerous opportunity for children or visitors in your house to start abusing these powerful medications. We will not replace any lost or stolen medicine.  As soon as your pain is better, you should flush all your remaining medication.     Many prescription pain medications contain Tylenol  (acetaminophen), including Vicodin , Tylenol #3 , Norco , Lortab , and Percocet .  You should not take any extra pills of Tylenol  if you are using these prescription medications or you can get very sick.  Do not ever take more than 3000 mg of acetaminophen in any 24 hour period.    All opioids tend to cause constipation. Drink plenty of water and eat foods that have a lot of fiber, such as fruits, vegetables, prune juice, apple juice and high fiber cereal.  Take a laxative if you don t move your bowels at least every other day. Miralax , Milk of Magnesia, Colace , or Senna  can be used to keep you regular.      Remember that you can always come back to the Emergency Department if you are not able to see your regular doctor in the amount of time listed above, if you get any new symptoms, or if there is anything that worries you.        Discharge Instructions  COVID-19    COVID-19 is the disease caused by a new coronavirus. The virus spreads from person-to-person primarily by droplets when an infected person coughs or sneezes and the droplets are then breathed in by another person.    Symptoms of COVID-19  Many people have no symptoms or mild symptoms.  Symptoms usually appear within a few days, but up to 14-days, after contact with a person with COVID-19.    A mild COVID-19 illness is like a cold and can have fever, cough, sneezing, sore throat, tiredness, headache, and muscle pain.    A moderate COVID-19 illness might include shortness of breath or pneumonia on a chest x-ray.    A severe COVID-19 illness causes significant breathing  problems such as low oxygen levels or more serious pneumonia.  Some patients experience loss of taste or smell which is somewhat unique to COVID-19.      Isolation and Quarantine  Testing is recommended for any person with symptoms that could be COVID-19 and often for those exposed to COVID-19. The best way to stop the spread of the virus is to avoid contact with others.    A close contact exposure is being within 6 feet of someone with COVID for 15 minutes.    Isolation refers to sick people staying away from people who are not sick.    A person in quarantine is limiting activity because they were exposed and are waiting to see if they might become sick.    If you test positive for COVID and have no symptoms, you should stay home (isolation) for 5 full days after the day of the test. You should then wear a mask when around others for another 5 days.    If you test positive for COVID and have mild symptoms, you should stay home (isolation) for at least 5 days after your symptoms began. You can return to normal activities at that time, wearing a mask when around others, for another 5 days as long as your symptoms are improving/resolving and you have been without a fever for 24 hours (without using fever-reducing medicine).    If you test positive for COVID and have more than mild symptoms, you should stay home (isolation) for at least 10 days after your symptoms began. You can return to normal activities at that time as long as your symptoms are improving and you have been without a fever for 24 hours (without using fever-reducing medicine).  For example, if you have a fever and cough for 6 days, you need to stay home 4 more days with no fever for a total of 10 days. Or, if you have a fever and cough for 10 days, you need to stay home one more day with no fever for a total of 11 days.    If you were exposed to COVID and are not vaccinated (or it has been more than six months from your Pfizer or Moderna vaccine or two  months from J&J vaccine), you should stay home (quarantine) for 5 days and then wear a mask around others for 5 additional days. A COVID test at day 5 is recommended.    If you were exposed to COVID and are vaccinated (had a booster, had two shots of Pfizer or Moderna vaccine in the last five months, or had J&J vaccine within two months), you do not need to quarantine but should wear a mask around others for 10 days and get a COVID test on day 5.    If you have symptoms but a negative test, you should stay at home until you have mild/improving symptoms and are without fever for 24 hours, using the same judgment you would for when it is safe to return to work/school from strep throat, influenza, or the common cold. If you worsen, you should consider being re-evaluated.    If you are being tested for COVID because of symptoms and your test is pending, you should stay home until you know your test result.  More details on isolation and quarantine can be found on this website from the CDC:  https://www.cdc.gov/coronavirus/2019-ncov/your-health/quarantine-isolation.html    If I have COVID, how should I protect myself and others?    Do not go to work or school. Have a friend or relative do your shopping. Do not use public transportation (bus, train) or ridesharing (Lyft, Uber).    Separate yourself from other people in your home. As much as possible, you should stay in one room and away from other people in your home. Also, use a separate bathroom, if possible. Avoid handling pets or other animals while sick.     Wear a facemask if you need to be around other people and cover your mouth and nose with a tissue when you cough or sneeze.     Avoid sharing personal household items. You should not share dishes, drinking glasses, forks/knives/spoons, towels, or bedding with other people in your home. After using these items, they should be washed with soap and water. Clean parts of your home that are touched often (doorknobs,  faucets, countertops, etc.) daily.     Wash your hands often with soap and water for at least 20 seconds or use an alcohol-based hand  containing at least 60% alcohol.     Avoid touching your face.    Treat your symptoms. You can take Acetaminophen (Tylenol) to treat body aches and fever as needed for comfort. Ibuprofen (Advil or Motrin) can be used as well if you still have symptoms after taking Tylenol. Drink fluids. Rest.    Watch for worsening symptoms such as shortness of breath/difficulty breathing or very severe weakness.    Employers/workplaces are being asked by the Centers for Disease Control (CDC) to not request notes/documentation for you to return to work or prove that you were ill. You may choose to show your employer this paperwork. Also, repeat testing should not be required to return to work.    Exercise/Sports in rare cases, COVID could affect your heart in a way that makes exercise or participation in sports dangerous.  If you have a mild COVID illness (fever, cough, sore throat, and similar symptoms but no difficulty breathing or abnormalities of the lung): After your COVID symptoms have resolved, wait 14-days before returning to activity.  If you have more than a mild illness (meaning that you have problems with your breathing or lungs) or if you participate in competitive or strenuous activity or have a history of heart disease: Please see your primary doctor/provider prior to return to activity/competition.    COVID treatments such as antiviral and antibody medications are available. They are recommended for those patients who have a risk for developing more severe COVID illness. Importantly, the treatments must be started early in the illness (within 5-7 days, depending on which treatment). These treatments may have been considered today during your visit. If you have other questions, contact your primary doctor/clinic.     You can learn more about COVID treatments from the [place]  Department of Health:  https://www.health.[facility].[place]/diseases/coronavirus/meds.html    Return to the Emergency Department if:    If you are developing worsening breathing, shortness of breath, or feel worse you should seek medical attention.  If you are uncertain, contact your health care provider/clinic. If you need emergency medical attention, call 911 and tell them you have been ill.

## 2023-01-18 NOTE — ED NOTES
Bed: ED24  Expected date:   Expected time:   Means of arrival:   Comments:  Edaina 1 Hip pain 32 female ETA [id number]     Virtual Visit: Established Patient   This visit was conducted via Zoom using secure and encrypted videoconferencing technology.   The patient was in their home in the [facility].    The patient's identity was confirmed and verbal consent was obtained for this virtual visit.    Subjective:   CC:   Chief Complaint   Patient presents with    Lab Results     Tamela Stern is a 47 y.o. female presenting for evaluation and management of:    She is here to follow-up on her labs.  Labs were obtained due to chronic pain.  I reviewed the labs which showed a normal CBC, CMP, rheumatoid factor, TSH, CCP antibody, sed rate, CRP, uric acid, B12, and negative CHARMAINE.  She also suffers from chronic recurrent infections in the sinus.  She has had surgeries in the past  typically feels like has ended up in the hospital needing IV antibiotics due to severity of her sinus infections.  She is beginning to feel sinus pressure and congestion and is hoping for an antibiotic to prevent further progression.    ROS       Current medicines (including changes today)  Current Outpatient Medications   Medication Sig Dispense Refill    promethazine (PHENERGAN) 12.5 MG Suppos Rectal      FLUoxetine (PROZAC) 10 MG Cap Oral      doxycycline (VIBRAMYCIN) 100 MG Tab Take 1 Tablet by mouth 2 times a day for 7 days. 14 Tablet 0    PREMARIN 0.625 MG Tab Take 0.625 mg by mouth every day.      ondansetron (ZOFRAN) 4 MG Tab tablet Take 4 mg by mouth every 8 hours as needed.      pregabalin (LYRICA) 50 MG capsule PLEASE SEE ATTACHED FOR DETAILED DIRECTIONS      LORazepam (ATIVAN) 1 MG Tab Take 0.5 Tablets by mouth 1 time a day as needed (seizure) for up to 30 days. 10 Tablet 0    prochlorperazine (COMPAZINE) 10 MG Tab Take 1 Tablet by mouth every 6 hours as needed for Nausea/Vomiting. 30 Tablet 3    promethazine (PHENERGAN) 25 MG Suppos Insert 1 Suppository into the rectum every 6 hours as needed for Nausea/Vomiting. 12 Suppository 0     "DULoxetine (CYMBALTA) 60 MG Cap DR Particles delayed-release capsule Take 60 mg by mouth every day.      busPIRone (BUSPAR) 10 MG Tab tablet Take 10 mg by mouth 3 times a day.      sumatriptan (IMITREX) 100 MG tablet Take 100 mg by mouth one time as needed for Migraine.      levETIRAcetam (KEPPRA) 100 MG/ML Solution Take 500-1,000 mg by mouth 2 times a day. 500 mg in am and 1000 mg in pm      cyanocobalamin (VITAMIN B-12) 1000 MCG/ML Solution INJECT 1 ML INTRAMUSCULARLY ONCE EVERY 30 DAYS (Patient taking differently: Inject 1,000 mcg under the skin every 7 days.) 1 Vial 1    fluticasone (FLONASE) 50 MCG/ACT nasal spray Spray 1 Spray in nose every day. 16 g 0    levothyroxine (SYNTHROID) 50 MCG Tab Take 1 Tab by mouth Every morning on an empty stomach. 90 Tab 0    sumatriptan (IMITREX) 100 MG tablet Oral      levothyroxine (SYNTHROID) 100 MCG Tab Oral (Patient not taking: Reported on 1/18/2023)       No current facility-administered medications for this visit.       Patient Active Problem List    Diagnosis Date Noted    Other chronic pain 01/18/2023    Pineal gland cyst 01/04/2023    S/P gastric bypass 04/12/2022    Iron deficiency anemia 04/12/2022    Marijuana abuse 04/08/2022    Cyclical vomiting with nausea 04/08/2022    Hemorrhagic cyst of left ovary 04/08/2022    Weight loss, unintentional 04/08/2022    Neoplasm of uncertain behavior of skin 05/02/2017    Vitamin D deficiency 02/06/2017    Postmenopausal 10/06/2016    Cervical postlaminectomy syndrome 07/13/2016    Chronic neck pain 12/11/2015    Seizure disorder (HCC) 01/20/2015    Vitamin B 12 deficiency 01/20/2015    Nausea 01/20/2015    Moderate episode of recurrent major depressive disorder (HCC) 01/20/2015    Hypothyroidism due to acquired atrophy of thyroid 01/20/2015    Fibromyalgia 09/02/2014    Migraine 09/01/2014    Anxiety 08/13/2010        Objective:   Ht 1.473 m (4' 10\")   Wt 52.6 kg (116 lb)   LMP 05/15/2017 Comment: irregular for the last 3 " months  BMI 24.24 kg/m²     Physical Exam:  Constitutional: Alert, no distress, well-groomed.  Skin: No rashes in visible areas.  Eye: Round. Conjunctiva clear, lids normal. No icterus.   ENMT: Lips pink without lesions, good dentition, moist mucous membranes. Phonation normal.  Neck: No masses, no thyromegaly. Moves freely without pain.  Respiratory: Unlabored respiratory effort, no cough or audible wheeze  Psych: Alert and oriented x3, normal affect and mood.     Assessment and Plan:   The following treatment plan was discussed:     1. Acute recurrent maxillary sinusitis  Acute recurrent maxillary sinusitis with previous progression to severe disease requiring hospitalization as well as previous sinus surgeries.  To prevent sequela, doxycycline sent to pharmacy  - doxycycline (VIBRAMYCIN) 100 MG Tab; Take 1 Tablet by mouth 2 times a day for 7 days.  Dispense: 14 Tablet; Refill: 0    2. Other chronic pain  Unclear cause of chronic pain on labs.  She does follow with pain clinic and should continue.  The goal is to decrease pain and alleviate symptoms.    Follow-up: No follow-ups on file.

## 2023-01-23 ENCOUNTER — HOSPITAL ENCOUNTER (EMERGENCY)
Facility: CLINIC | Age: 38
Discharge: HOME | End: 2023-01-23
Attending: EMERGENCY MEDICINE | Admitting: EMERGENCY MEDICINE
Payer: MEDICARE

## 2023-01-23 VITALS
BODY MASS INDEX: 45.99 KG/M2 | OXYGEN SATURATION: 96 % | WEIGHT: 293 LBS | HEIGHT: 67 IN | TEMPERATURE: 98.1 F | HEART RATE: 50 BPM | RESPIRATION RATE: 18 BRPM | SYSTOLIC BLOOD PRESSURE: 100 MMHG | DIASTOLIC BLOOD PRESSURE: 61 MMHG

## 2023-01-23 DIAGNOSIS — F43.0 ACUTE REACTION TO STRESS: ICD-10-CM

## 2023-01-23 PROCEDURE — 250N000013 HC RX MED GY IP 250 OP 250 PS 637: Performed by: EMERGENCY MEDICINE

## 2023-01-23 PROCEDURE — 90791 PSYCH DIAGNOSTIC EVALUATION: CPT

## 2023-01-23 PROCEDURE — 99285 EMERGENCY DEPT VISIT HI MDM: CPT | Mod: 25

## 2023-01-23 RX ORDER — ACETAMINOPHEN 500 MG
1000 TABLET ORAL ONCE
Status: COMPLETED | OUTPATIENT
Start: 2023-01-23 | End: 2023-01-23

## 2023-01-23 RX ADMIN — ACETAMINOPHEN 1000 MG: 500 TABLET ORAL at 18:44

## 2023-01-23 ASSESSMENT — ACTIVITIES OF DAILY LIVING (ADL)
ADLS_ACUITY_SCORE: 35

## 2023-01-23 ASSESSMENT — COLUMBIA-SUICIDE SEVERITY RATING SCALE - C-SSRS
2. HAVE YOU ACTUALLY HAD ANY THOUGHTS OF KILLING YOURSELF?: NO
1. HAVE YOU WISHED YOU WERE DEAD OR WISHED YOU COULD GO TO SLEEP AND NOT WAKE UP?: NO
ATTEMPT LIFETIME: NO

## 2023-01-23 NOTE — CONSULTS
Diagnostic Evaluation Consultation  Crisis Assessment    Patient was assessed: I-Pad  Patient location: [facility]  Was a release of information signed: No. Reason: No guaridan present     Group home - [facility]   Main phone - [phone]  Manager Conchis - [phone]   Address - [address] [place] [address]    Legal Guardian - [facility] [place]  Main number - [phone]  Guardian is Yessica Dudley - [phone]    Sister is Sabra Asher [phone] (has been the guardian in the past but is not currently the guardian)     Referral Data and Chief Complaint  The patient is a 37 year old, who uses she/her pronouns, and presents to the ED via EMS. Today the patient scheduled her own clinic appointment and her own [employer] ride. While at the clinic she started hitting herself and was sent via EMS to [facility] ED.     Informed Consent and Assessment Methods     Patient is under the guardianship of Yessica Andersonar [phone]. Messages were left for guardian and guardian did not call back. Writer met with patient and explained the crisis assessment process, including applicable information disclosures and limits to confidentiality, assessed understanding of the process, and obtained consent to proceed with the assessment. Patient was observed to be able to participate in the assessment as evidenced by appeared to understand. Assessment methods included conducting a formal interview with patient, review of medical records, collaboration with medical staff, and obtaining relevant collateral information from family and community providers when available. Spoke with sister, Sabra Asher [phone], who has been the guardian in the past but is not currently the guardian    Over the course of this crisis assessment provided reassurance, offered validation, engaged patient in problem solving and disposition planning, worked with patient on safety and aftercare  "planning, provided psychoeducation and facilitated family communication. Patient's response to interventions was happy.     Summary of Patient Situation    The patient has an intellectual disability and impaired judgement. She made her own primary care appointment at a clinic and scheduled her own metro mobility ride and went there today without staff. It is recommended that she not be alone in the community for safety but staff can not stop her from using the phone and can not stop her from leaving the house.  While at the appointment the patient started hitting herself and was transported via EMS to [facility] ED.     The patient makes daily 911 calls from her house but the [facility] reportedly know her well enough to de-escalate and they rarely transport her to the ED.    Staff says patient is \"kind of able\" to be alone in the community but also say that when she attends appointments by herself she loses the paperwork/insturctions, doesn't remember the medicine changes, and doesn't understand the follow up. When she is alone at stores she reportedly steals or begs customers to buy her things. Her judgement and insight appear too impaired to be alone in the community and group home staff was asked ot discuss a plan with the guardian. Guardian was not available today.    Currently, the patient is calm and smiling. Her diagnosis is mild intellectual disability but diagnosis of Moderate Intellectual Disability may more accurately reflect patient's level of functioning as evidenced by vocabulary and fund of knowledge, however.  She states she \"got upset\" at the doctor but \"don't remember\" why she was upset. She now describes her mood as \"better.\" She has no concerns. She did say she doesn't like her group home because \"rules I don't like\" but is not able to describe them and agrees to return there. She has plans to \"relax\" for the rest of the day. She denies any suicidal or homicidal ideation, " "intent, or plan. She feels safe returning to her group home.       Brief Psychosocial History    Patient's parents are .  She has three sisters and two brothers.  Patient receives Social Security disability.       Group home - [facility], Main phone - [phone], Manager Conchis - [phone]   Address - [address] [place] [address]    Legal Guardian - [facility] [place] [place], [facility] number - [phone]  Guardian is Yessica Dudley - [phone]       Significant Clinical History    Patient has a history of psychiatric hospitalizations, residential treatment, medication management, therapy, case management, and guardianship.  She is diagnosed as having MDD, ROSY, and Mild Intellectual Disability. A diagnosis of Moderate Intellectual Disability may more accurately reflect patient's level of functioning as evidenced by vocabulary and fund of knowledge, however.      Collateral Information    Spoke with  Conchis - [phone]. Conchis reports that the patient makes daily 911 calls from her house but the [facility] reportedly know her well enough to de-escalate and they rarely transport her to the ED.  Conchis says patient is \"kind of able\" to be alone in the community but also say that when she attends appointments by herself she loses the paperwork/insturctions, doesn't remember the medicine changes, and doesn't understand the follow up. When she is alone at stores she reportedly steals or begs customers to buy her things. Her judgement and insight appear too impaired to be alone in the community and group home staff was asked ot discuss a plan with the guardian. Guardian was not available today. Conchis has accepted the patient back to the group home.     Spoke with sister Sabra Lb, [phone], via phone. Sabra reports that the patient leaves the group home alone every day and is \"always looking for attention.\" Sabra does not think it is safe for the patient to be " alone in the community because of her intellectual disability and impaired judgment. Sabra states the the patient isn't supposed to go out alone but the group home can't stop her from using the phone or from leaving. Sabra used to be her guardian but it has now changed to Yessica Octavia - [phone], a professional guardian. When Sabra was the guardian she was able to stop metro mobility from taking direct requests from the patient but the current guardian appears to be allowing it and is not available for Sabra or group home staff to engage in collaboration. Sabra states the patient frequently hits staff. Sabra stopped being her guardian because the patient was verbally abusive to Sabra. Sabra does not believe the patient is a danger to herself but is concerned about her being in the community alone.     Left several messages for guardian Yessica Octavia - [phone] who did not return our call.      Risk Assessment    Martin Suicide Severity Rating Scale Full Clinical Version: 1/23/2023  Suicidal Ideation  1. Wish to be Dead (Lifetime): No  2. Non-Specific Active Suicidal Thoughts (Lifetime): No     Suicidal Behavior  Actual Attempt (Lifetime): No  C-SSRS Risk (Lifetime/Recent)  Calculated C-SSRS Risk Score (Lifetime/Recent): No Risk Indicated     Validity of evaluation is not impacted by presenting factors during interview.   Comments regarding subjective versus objective responses to Martin tool: Patient's report, presentation, and collateral, are consistent with her objective score.    Environmental or Psychosocial Events: challenging interpersonal relationships and impulsivity/recklessness  Chronic Risk Factors: serious, persistent mental illness and intellectual disability   Warning Signs: seeking access to means to hurt or kill self  Protective Factors: lives in a responsibly safe and stable environment, has outpatient providers, has a guardian, has a group home  Interpretation of Risk Scoring, Risk  Mitigation Interventions and Safety Plan:  The patient scored a low risk rating on the C-SSRS which appears consistent with assessment and collateral. The patient does have risk factors of unintentionally putting herself in risky situations with poor judgement and insight by leaving her group home unaccompanied and being alone in the community. However, this is a concern that needs to be addressed by her group home and guardian. The riisk is mitigated by the patient being safe at the group home, has a guardian, having outpatient providers, future thinking, and is currently calm.       Does the patient have thoughts of harming others? No     Is the patient engaging in sexually inappropriate behavior?  no        Current Substance Abuse     Is there recent substance abuse? no     Was a urine drug screen or blood alcohol level obtained: No       Mental Status Exam     Affect: Appropriate   Appearance: Appropriate    Attention Span/Concentration: Inattentive  Eye Contact: Variable   Fund of Knowledge: Delayed    Language /Speech Content: Fluent   Language /Speech Volume: Normal    Language /Speech Rate/Productions: Minimally Responsive    Recent Memory: Variable   Remote Memory: Variable   Mood: Normal    Orientation to Person: Yes    Orientation to Place: Yes   Orientation to Time of Day: Yes    Orientation to Date: No    Situation (Do they understand why they are here?): Yes    Psychomotor Behavior: Normal    Thought Content: Clear   Thought Form: Intact      History of commitment: No    Medication    Psychotropic medications: Yes  Medication changes made in the last two weeks: No       Current Care Team    Primary Care Provider: Mono Ray PA-C . Location: [facility]  Psychiatrist: Yes  Therapist: No  [facility]: No  ACT Team: No  Other: No    Group home - [facility].  Main phone - [phone].  Manager Conchis - [phone]   Address - [address] [place] [address]    Legal Guardian - [facility]. Main number - [phone]  Guardian is Yessica Dudley - [phone]    Diagnosis    319 (F79) Unspecified Intellectual Disability, primary, by history   300.00 (F41.9) Unspecified Anxiety Disorder - by history      Clinical Summary and Substantiation of Recommendations    The patient scored a low risk rating on the C-SSRS which appears consistent with assessment and collateral. The patient does have risk factors of unintentionally putting herself in risky situations with poor judgement and insight by leaving her group home unaccompanied and being alone in the community. However, this is a concern that needs to be addressed by her group home and guardian. The riisk is mitigated by the patient being safe at the group home, has a guardian, having outpatient providers, future thinking, and is currently calm. Collaborated with , patient's sister Sabra, and MD Belinda, and patient will be discharged back to her group home via EMS.   Disposition    Recommended disposition: Group Home: [facility]       Reviewed case and recommendations with attending provider. Attending Name: MD Miguelina       Attending concurs with disposition: Yes       Patient concurs with disposition: Yes        Guardian concurs with disposition: Guardian did not return calls. [facility] staff and patient's sister have not been able to reach her. Collaborated with sister and [facility] who agree with discharge.      Final disposition: Group home: [facility]     Outpatient Details (if applicable):   Aftercare plan and appointments placed in the AVS and provided to patient: Yes. Given to patient by ED Staff    Was lethal means counseling provided as a part of aftercare planning? [facility] has a safety plan. Group home staff was encouraged to meet with tg about patient's unsafe behavior in the community and make a plan for her to have staff  with her when she is in the community.       Assessment Details    Patient interview started at: 1320 and completed at: 1330. Collateral obtained from 1330 to 1343 and 1400 to 1410.      Total duration spent on the patient case in minutes: 1.25 hours     CPT code(s) utilized: 65657 - Psychotherapy for Crisis - 60 (30-74*) min       Paige Benavides [place], [facility]  DEC - Triage & Transition Services  Callback: [phone]          Aftercare Plan  If I am feeling unsafe or I am in a crisis, I will:   Contact my established care providers   Call the National Suicide Prevention Lifeline: 988  Go to the nearest emergency room   Call 911     Return to your group home - [facility]   Main phone - [phone]  Manager Conchis - [phone]   Address - [address] [id number]    Legal Guardian - [facility]  Main number - [phone]  Guardian is Yessica Octavia - [phone]    Sister is Sabra Asher [phone]      The legal guardian needs to call [facility] and make sure that only the guardian or person's appointed by the guardian can make appointments for rides.  Fan should not be in the community unaccompanied.      Warning signs that I or other people might notice when a crisis is developing for me:  Suicidal thoughts or thoughts of physical self harm  Sudden increase in anxiety or depression  Increase in racing thoughts/impulsive feelings  Substance use  Increase in stress or anger     Things I am able to do on my own to cope or help me feel better:    Grounding Techniques:    Try to notice where you are, your surroundings including the people, the sounds like the TV or radio.    Concentrate on your breathing. Take a deep cleansing breath from your diaphragm. Count the breaths as you exhale. Make sure you breath slowly.    Hold something that you find comforting, for some it may be a stuffed animal or a blanket. Notice how it feels in your hands. Is it hard or  soft?    During a non-crisis time make a list of positive affirmations. Print them out and keep them handy for times of intense anxiety. At those times, read them aloud.  Try the Cardiovascular Decisions game:    Name 5 things you can see in the room with you    Name 4 things you can feel ( chair on my back  or  feet on floor )     Name 3 things you can hear right now ( people talking  or  tv )     Name 2 things you can smell right now (or, 2 things you like the smell of)     Name 1 good thing about yourself  Create A Safe Place    Image a safe place -- it can be a real or imaginary place:     What do you see -- especially colors?     What sounds do you hear?     What sensations do you feel?     What smells do you smell?     What people or animals would you want in your safe place?     Imagine a protective bubble, wall or boundary around your safe place.     Imagine a door or gate with a guard at your safe place.     Image a lock and key to your safe place and only you can unlock it.    You can draw or make a collage that represents your safe place.     Choose a souvenir of your safe place -- a color, an object, a song.   Keep your image of your safe place so you can come back to it when you need to.     Things that I am able to do with others to cope or help me better:   Creating daily structure and accountability   Follow recommendations of outpatient providers  Continue to practice coping skills for anxiety and depression     Things I can use or do for distraction:   Reduce Extreme Emotion  QUICKLY:  Changing Your Body Chemistry      T:  Change your body Temperature to change your autonomic nervous system     Use Ice Water to calm yourself down FAST     Put your face in a bowl of ice water (this is the best way; have the person keep his/her face in ice water for 30-45 seconds - initial research is showing that the longer s/he can hold her/his face in the water, the better the response), or     Splash ice water on your face, or hold  an ice pack on your face      I:  Intensely exercise to calm down a body revved up by emotion     Examples: running, walking fast, jumping, playing basketball, weight lifting, swimming, calisthenics, etc.     Engage in exercises that DO NOT include violent behaviors. Exercises that utilize violent behaviors tend to function as  behavioral rehearsal,  and rather than calming the person down, may actually  rev  the person up more, increasing the likelihood of violence, and lessening the likelihood that they will  burn off  energy     P:  Progressively relax your muscles     Starting with your hands, moving to your forearms, upper arms, shoulders, neck, forehead, eyes, cheeks and lips, tongue and teeth, chest, upper back, stomach, buttocks, thighs, calves, ankles, feet     Tense (10 seconds,   of the way), then relax each muscle (all the way)     Notice the tension     Notice the difference when relaxed (by tensing first, and then relaxing, you are able to get a more thorough relaxation than by simply relaxing)      P: Paced breathing to relax     The standard technique is to begin with counting the number of steps one takes for a typical inhale, then counting the steps one takes for a typical exhale, and then lengthening the amount of steps for the exhalation by one or two steps.  OR    Repeat this pattern for 1-2 minutes    Inhale for four (4) seconds     Exhale for six (6) to eight (8) seconds   Research demonstrated that one can change one's overall level of anxiety by doing this exercise for even a few minutes per day     Changes I can make to support my mental health and wellness:   Talk to staff when you need help    People in my life that I can ask for help: Staff     Your [facility] has a mental health crisis team you can call 24/7: [facility]  [phone]    Crisis Lines  Crisis Text Line  Text 748712  You will be connected with a trained live crisis counselor to provide support.    [facility]  "Resources  Fast Tracker  Linking people to mental health and substance use disorder resources  fasttrackermn.org     [place] Mental Health Warm Line  Peer to peer support  Monday thru Saturday, 12 pm to 10 pm  193.978.4356 or 9.054.825.3644  Text \"Support\" to 74703    National [employer] on Mental Illness (GULSHAN)  947.940.3913 or 1.888.GULSHAN.HELPS    Mental Health Apps  My3  https://[employer].org/    VirtualHopeBox  https://[employer]/apps/virtual-hope-box/    Additional Information  Today you were seen by a licensed mental health professional through Triage and Transition services, Behavioral Healthcare Providers ([facility])  for a crisis assessment in the Emergency Department at [facility].  It is recommended that you follow up with your established providers (psychiatrist, mental health therapist, and/or primary care doctor - as relevant) as soon as possible. Coordinators from [facility] will be calling you in the next 24-48 hours to ensure that you have the resources you need.  You can also contact [facility] coordinators directly at [phone]. You may have been scheduled for or offered an appointment with a mental health provider. [facility] maintains an extensive network of licensed behavioral health providers to connect patients with the services they need.  We do not charge providers a fee to participate in our referral network.  We match patients with providers based on a patient's specific needs, insurance coverage, and location.  Our first effort will be to refer you to a provider within your care system, and will utilize providers outside your care system as needed.              "

## 2023-01-23 NOTE — ED PROVIDER NOTES
"  History     Chief Complaint:  Suicidal     HPI   Dionne Perez is a 37 year old female with a history of ROSY, MDD, adjustment disorder, and major neurocognitive disorder who presents via EMS from her group home with thoughts of self harm. The patient states she has been hitting herself, and that \"I've been doing it all week\" due to increased stress that staff at her group home have reportedly caused her. Presently in ED, she states she feels safe and feels otherwise well aside from being hungry. No homicidal ideation or other thoughts of harm to others.    Independent Historian: EMS        ROS:  Review of Systems   Psychiatric/Behavioral: Positive for self-injury (hitting self) and suicidal ideas (no HI).   All other systems reviewed and are negative.    Allergies:  Ibuprofen     Medications:    Albuterol  Aspirin 325 mg  Docusate sodium  Buspirone  Escitalopram  Famotidine  Medroxyprogesterone  Flexeril  Meclizine  Levothyroxine  Lexapro  Nystatin  Olanzapine zydis  Pravastatin  Prednisone  Trazodone    Past Medical History:    Recurrent major depressive disorder  GERD  Hypercholesterolemia  Thyroid disease  Major neurocognitive disorder  Chronic abdominal pain  Insomnia  Iron deficiency anemia  Adjustment reaction with aggression  Developmental disability  Suicidal ideations  Gallstone pancreatitis  Biliary calculus  Cholelithiasis  Adjustment disorder  Hypothyroidism  ROSY  Chronic sinus bradycardia  Hypertension  Syncope  Hyperlipidemia  Morbid obesity    Past Surgical History:    Tonsillectomy  Leg surgery    Family History:    Lung cancer  Breast cancer  Cataracts  Diabetes mellitus    Social History:  The patient presented alone.  The patient arrived via EMS.  Patient lives in a group home.  PCP: [facility], [facility]     Physical Exam     Patient Vitals for the past 24 hrs:   BP Temp Temp src Pulse Resp Height Weight   01/23/23 1211 100/61 98.1  F (36.7  C) Oral 50 18 1.702 m (5' 7\") (!) 158.8 kg " (350 lb)      Physical Exam  Constitutional: Alert, attentive  HENT:    Nose: Nose normal.    Mouth/Throat: Oropharynx is clear, mucous membranes are moist   Eyes: EOM are normal.   CV: regular rate and rhythm; no murmurs, rubs or gallups  Chest: Effort normal and breath sounds normal.   GI:  There is no tenderness. No distension. Normal bowel sounds  MSK: Normal range of motion.   Neurological: Alert, attentive  Skin: Skin is warm and dry.  PSYCH:  Appearance:  awake, alert, adequately groomed, dressed in street clothes and appeared as age stated  Attitude:  cooperative  Eye Contact:  good  Mood:  good  Affect:  flat  Speech:  clear, coherent  Psychomotor Behavior:  no evidence of tardive dyskinesia, dystonia, or tics  Thought Process:  logical, linear and goal oriented  Associations:  no loose associations  Thought Content:  no evidence of suicidal ideation or homicidal ideation; no auditory or visual hallucinations       Emergency Department Course     Emergency Department Course & Assessments:    PSS-3    Date and Time Over the past 2 weeks have you felt down, depressed, or hopeless? Over the past 2 weeks have you had thoughts of killing yourself? Have you ever attempted to kill yourself? When did this last happen? User   01/23/23 1213 yes yes no -- SAB      C-SSRS ([place])    Date and Time Q1 Wished to be Dead (Past Month) Q2 Suicidal Thoughts (Past Month) Q3 Suicidal Thought Method Q4 Suicidal Intent without Specific Plan Q5 Suicide Intent with Specific Plan Q6 Suicide Behavior (Lifetime) Within the Past 3 Months? RETIRED: Level of Risk per Screen Screening Not Complete User   01/23/23 1213 no yes no yes no no -- -- -- SAB              Suicide assessment completed by mental health (D.E.C., LCSW, etc.)     Assessments:  1235 I obtained history and examined the patient as noted above.    Consultations/Discussion of Management or Tests:  DEC      Disposition:  The patient was discharged to home.     Impression  & Plan      Medical Decision Making:  This a pleasant 37-year-old female with history as per above who presents for evaluation of reported suicidal ideation.  She attributes this to frustration with group home staff primarily, this is resolved shortly after by evaluation in the ED.  She is calm, cooperative, can contract for safety.  No medication changes needed at this time.  Patient be transported home via ambulance to group home staff who are comfortable with her returning.  Return precautions for thoughts of self-harm or harm to others or for any other concerns.    Diagnosis:    ICD-10-CM    1. Acute reaction to stress  F43.0             Scribe Disclosure:  I, Myrna Connell, am serving as a scribe at 12:40 PM on 1/23/2023 to document services personally performed by Carmelo Trevino MD based on my observations and the provider's statements to me.    1/23/2023   Carmelo Trevino MD Houghland, John Eric, MD  01/23/23 1547

## 2023-01-23 NOTE — ED TRIAGE NOTES
At clinic for annual physical started to hit self in the head told staff that she wanted to hurt herself.Ambulanced transported to ED. Unable to get hold of anyone from group home or guardian. States she has no plan     Triage Assessment     Row Name 01/23/23 1212       Triage Assessment (Adult)    Airway WDL WDL       Respiratory WDL    Respiratory WDL WDL       Skin Circulation/Temperature WDL    Skin Circulation/Temperature WDL WDL       Cardiac WDL    Cardiac WDL WDL       Peripheral/Neurovascular WDL    Peripheral Neurovascular WDL WDL

## 2023-01-23 NOTE — PROGRESS NOTES
Group Home -  [facility]   Main phone - [phone]  Manager [facility] - [phone]   Address - [address] [place] [id number]    Legal Guardian - [employer] [facility] [place]  Main number - [phone]  Guardian is Yessica Octavia  [phone]    Sister is Sabra Lb [phone] has been patient's legal guardian in the past but is no longer the legal guardian.

## 2023-01-23 NOTE — ED NOTES
Bed: ED07  Expected date: 1/23/23  Expected time: 11:43 AM  Means of arrival:   Comments:  [id number]

## 2023-01-23 NOTE — DISCHARGE INSTRUCTIONS
Aftercare Plan  If I am feeling unsafe or I am in a crisis, I will:   Contact my established care providers   Call the National Suicide Prevention Lifeline: 988  Go to the nearest emergency room   Call 911     Return to your group home - [facility]   Main phone - [phone]  Manager [facility] - [phone]   Address - [address] [place] [address]    Legal Guardian - [employer]  Main number - [phone]  Guardian is Yessica Palomino [phone]    Sister is Sabra Asher [phone]      ****The legal guardian needs to call [facility] and make sure that only the guardian or person's appointed by the guardian can make appointments for rides.  Fan should not be in the community unaccompanied.***      Warning signs that I or other people might notice when a crisis is developing for me:  Suicidal thoughts or thoughts of physical self harm  Sudden increase in anxiety or depression  Increase in racing thoughts/impulsive feelings  Substance use  Increase in stress or anger     Things I am able to do on my own to cope or help me feel better:    Grounding Techniques:  Try to notice where you are, your surroundings including the people, the sounds like the TV or radio.  Concentrate on your breathing. Take a deep cleansing breath from your diaphragm. Count the breaths as you exhale. Make sure you breath slowly.  Hold something that you find comforting, for some it may be a stuffed animal or a blanket. Notice how it feels in your hands. Is it hard or soft?  During a non-crisis time make a list of positive affirmations. Print them out and keep them handy for times of intense anxiety. At those times, read them aloud.  Try the [employer] game:  Name 5 things you can see in the room with you  Name 4 things you can feel ( chair on my back  or  feet on floor )   Name 3 things you can hear right now ( people talking  or  tv )   Name 2 things you can smell right now (or, 2 things you like the  smell of)   Name 1 good thing about yourself  Create A Safe Place  Image a safe place -- it can be a real or imaginary place:   What do you see -- especially colors?   What sounds do you hear?   What sensations do you feel?   What smells do you smell?   What people or animals would you want in your safe place?   Imagine a protective bubble, wall or boundary around your safe place.   Imagine a door or gate with a guard at your safe place.   Image a lock and key to your safe place and only you can unlock it.  You can draw or make a collage that represents your safe place.   Choose a souvenir of your safe place -- a color, an object, a song.   Keep your image of your safe place so you can come back to it when you need to.     Things that I am able to do with others to cope or help me better:   Creating daily structure and accountability   Follow recommendations of outpatient providers  Continue to practice coping skills for anxiety and depression     Things I can use or do for distraction:   Reduce Extreme Emotion  QUICKLY:  Changing Your Body Chemistry      T:  Change your body Temperature to change your autonomic nervous system   Use Ice Water to calm yourself down FAST   Put your face in a bowl of ice water (this is the best way; have the person keep his/her face in ice water for 30-45 seconds - initial research is showing that the longer s/he can hold her/his face in the water, the better the response), or   Splash ice water on your face, or hold an ice pack on your face      I:  Intensely exercise to calm down a body revved up by emotion   Examples: running, walking fast, jumping, playing basketball, weight lifting, swimming, calisthenics, etc.   Engage in exercises that DO NOT include violent behaviors. Exercises that utilize violent behaviors tend to function as  behavioral rehearsal,  and rather than calming the person down, may actually  rev  the person up more, increasing the likelihood of violence, and  "lessening the likelihood that they will  burn off  energy     P:  Progressively relax your muscles   Starting with your hands, moving to your forearms, upper arms, shoulders, neck, forehead, eyes, cheeks and lips, tongue and teeth, chest, upper back, stomach, buttocks, thighs, calves, ankles, feet   Tense (10 seconds,   of the way), then relax each muscle (all the way)   Notice the tension   Notice the difference when relaxed (by tensing first, and then relaxing, you are able to get a more thorough relaxation than by simply relaxing)      P: Paced breathing to relax   The standard technique is to begin with counting the number of steps one takes for a typical inhale, then counting the steps one takes for a typical exhale, and then lengthening the amount of steps for the exhalation by one or two steps.  OR  Repeat this pattern for 1-2 minutes  Inhale for four (4) seconds   Exhale for six (6) to eight (8) seconds   Research demonstrated that one can change one's overall level of anxiety by doing this exercise for even a few minutes per day     Changes I can make to support my mental health and wellness:   Talk to staff when you need help    People in my life that I can ask for help: Staff     Your [facility] has a mental health crisis team you can call 24/7: [facility] Crisis  [phone]    Crisis Lines  Crisis Text Line  Text 275120  You will be connected with a trained live crisis counselor to provide support.    Community Resources  Fast Tracker  Linking people to mental health and substance use disorder resources  fasttrackermn.org     [place] Mental [facility] Warm Line  Peer to peer support  Monday thru Saturday, 12 pm to 10 pm  905.108.3634 or 0.667.711.4469  Text \"Support\" to 19608    National [place] on Mental Illness (GULSHAN)  003.940.0328 or 1.888.GULSHAN.HELPS    Mental Health Apps  My3  https://myCommercialTribepp.org/    VirtualHopeBox  https://ViViFi.org/apps/virtual-hope-box/    Additional " Information  Today you were seen by a licensed mental health professional through Triage and Transition services, Behavioral Healthcare Providers ([facility])  for a crisis assessment in the Emergency Department at [facility].  It is recommended that you follow up with your established providers (psychiatrist, mental health therapist, and/or primary care doctor - as relevant) as soon as possible. Coordinators from [facility] will be calling you in the next 24-48 hours to ensure that you have the resources you need.  You can also contact [facility] coordinators directly at [phone]. You may have been scheduled for or offered an appointment with a mental health provider. [facility] maintains an extensive network of licensed behavioral health providers to connect patients with the services they need.  We do not charge providers a fee to participate in our referral network.  We match patients with providers based on a patient's specific needs, insurance coverage, and location.  Our first effort will be to refer you to a provider within your care system, and will utilize providers outside your care system as needed.

## 2023-01-23 NOTE — ED NOTES
Patient searched when she arrived . Did get placed in scrubs due to she is obese and we don't have scrubs large enough.

## 2023-02-13 ENCOUNTER — HOSPITAL ENCOUNTER (EMERGENCY)
Facility: CLINIC | Age: 38
Discharge: HOME | End: 2023-02-14
Attending: EMERGENCY MEDICINE | Admitting: EMERGENCY MEDICINE
Payer: MEDICARE

## 2023-02-13 DIAGNOSIS — Z86.59 HISTORY OF PSYCHIATRIC DISORDER: ICD-10-CM

## 2023-02-13 DIAGNOSIS — F43.0 ACUTE REACTION TO STRESS: ICD-10-CM

## 2023-02-13 PROCEDURE — 99283 EMERGENCY DEPT VISIT LOW MDM: CPT

## 2023-02-13 ASSESSMENT — ACTIVITIES OF DAILY LIVING (ADL): ADLS_ACUITY_SCORE: 33

## 2023-02-14 VITALS
HEART RATE: 70 BPM | TEMPERATURE: 97 F | SYSTOLIC BLOOD PRESSURE: 143 MMHG | RESPIRATION RATE: 18 BRPM | DIASTOLIC BLOOD PRESSURE: 76 MMHG | OXYGEN SATURATION: 99 %

## 2023-02-14 ASSESSMENT — ACTIVITIES OF DAILY LIVING (ADL): ADLS_ACUITY_SCORE: 35

## 2023-02-14 NOTE — ED TRIAGE NOTES
"    Pt BIBA from  after leaving  today. Pt states  staff \"don't care about me\" and \"are meant to me\". Pt states she called sister to tell her about coming to ED. Pt states she \"emailed my  today but he isn't helping me\". Pt upset about wait in ED, wants to go back to .   "

## 2023-02-14 NOTE — ED PROVIDER NOTES
"  History     Chief Complaint:  \"I got upset\"     The history is provided by the patient.   History supplemented by electronic chart review     Dionne Perez is a 38 year old female with a history of ROSY, MDD, adjustment disorder, and unspecified neurocognitive disorder who presents via EMS home after becoming upset with the staff. Patient states \"they're not helping me like they are supposed to,\" so she left the group home. She went to the  but it was closing, so she was advised to be seen in the ED.  Currently, she denies any medical complaints and wants to return to her group home.  She denies any suicidal or homicidal thoughts, no hallucinations.  No drugs or alcohol.  She states that her sister is no longer her guardian, and that she makes her own decisions.  Patient states that she has no pain, no injuries, no fevers, vomiting, diarrhea, chest pain, urinary symptoms, or concern for possible pregnancy.  She states that she is confident she will be able to get the basic cares she needs at her group home and feels safe there at this time.    Group home was contacted by phone, they are able to take her back tonight, they do not have any additional concerns.    I personally performed electronic chart review, I see she was evaluated in the emergency department in January 23 for suicidal ideation, diagnosed with a stress reaction and ultimately discharged.    [facility] lists her guardian as Sabra Asher, sister, whose phone number is [phone].  I called this number to attempt to get more information but was only able to leave a message.    ROS:  Review of Systems   All other systems reviewed and are negative.    Allergies:  Ibuprofen     Medications:    Albuterol  Aspirin 325 mg  Docusate sodium  Buspirone  Escitalopram  Famotidine  Medroxyprogesterone  Flexeril  Meclizine  Levothyroxine  Lexapro  Nystatin  Olanzapine zydis  Pravastatin  Prednisone  Trazodone    Past Medical History:    Recurrent major depressive " disorder  GERD  Hypercholesterolemia  Thyroid disease  Major neurocognitive disorder  Chronic abdominal pain  Insomnia  Iron deficiency anemia  Adjustment reaction with aggression  Developmental disability  Suicidal ideations  Gallstone pancreatitis  Biliary calculus  Cholelithiasis  Adjustment disorder  Hypothyroidism  ROSY  Chronic sinus bradycardia  Hypertension  Syncope  Hyperlipidemia  Morbid obesity    Past Surgical History:    Tonsillectomy  Leg surgery    Family History:    Lung cancer  Breast cancer  Cataracts  Diabetes mellitus    Social History:  The patient presents via EMS  The patient presents alone   PCP: [facility]     Physical Exam     Patient Vitals for the past 24 hrs:   BP Temp Temp src Pulse Resp SpO2   02/14/23 0143 (!) 143/76 -- -- 70 18 99 %   02/13/23 2035 (!) 151/77 -- -- -- -- --   02/13/23 2034 -- 97  F (36.1  C) Temporal 71 20 96 %      Physical Exam  General: Sitting upright in a chair in fast-track 6  HENT: mucous membranes moist  Eyes: PERRL without nystagmus  CV: extremities well perfused, regular rhythm  Resp: normal effort, speaks in full phrases, no stridor, no cough observed  GI: abdomen soft and nontender, no guarding  MSK: no bony tenderness   Skin: appropriately warm and dry  Neuro: alert, clear speech, oriented, normal tone in extremities, ambulatory  Psych: calm, cooperative, denies feeling suicidal, no evidence of hallucinations    Emergency Department Course   Emergency Department Course & Assessments:    Social Determinants of Health affecting care:   Education/Literacy and Stress/Adjustment Disorders    Assessments:  2336 I gathered history and examined the patient as noted above. Because they do not have any medical complaints, I believe that they are safe for discharge at this time.   2339 I attempted to call the patient's sister, Sabra, who is her guardian. I left her a voicemail as she did not answer.     ED RN spoke with the manager of the  patient's group home, manager was informed that the patient would like to go home. Ride arrangements are being made to bring patient back to group home tonight.  0130 EMS in ED to take patient home.    Disposition:  The patient was discharged via EMS back to her group home.     Impression & Plan    Medical Decision Making:  She eloped from her group and this evening after expressing concern that they were not helping her as promptly as she would like.  She denies any injuries, no medical concerns, no evidence of intoxication or withdrawal.  She is underlying comorbidities but no evidence of decompensated psychiatric illness that would qualify her to be held against her will based on my evaluation at this time.  She expresses remorse about what happened, and her only desire at this time is simply to be taken back to her group home for ongoing routine cares.  I did not feel that DEC evaluation would likely change her plan of care nor that it was obligatory, and patient does not want to talk with the mental health team.  Patient states she no longer has a guardian, I did reach out to her guardian listed in epic, not knowing whether this information was up-to-date, and was only able to leave a message at this late evening hour.  We did not receive a call back, though we were able to reach her group home staff who states that they are comfortable taking her back tonight.  Ultimately we arranged a ride for her back to her facility, given consideration that she is a potentially vulnerable adult, though without reason to suspect foul play or neglect or abuse at this time.  Patient is content with this plan and was transferred by EMS to her residence.  She is going back for crisis at any hour.    Diagnosis:    ICD-10-CM    1. Acute reaction to stress  F43.0       2. History of psychiatric disorder  Z86.59          Scribe Disclosure:  I, Sally Foreman, am serving as a scribe at 11:44 PM on 2/13/2023 to document services  personally performed by Mono Casillas MD based on my observations and the provider's statements to me.   2/13/2023   Mono Casillas MD Reitsema, Jeffrey Alan, MD  02/14/23 0207

## 2023-02-16 ENCOUNTER — HOSPITAL ENCOUNTER (EMERGENCY)
Facility: CLINIC | Age: 38
Discharge: HOME | End: 2023-02-16
Attending: EMERGENCY MEDICINE | Admitting: EMERGENCY MEDICINE
Payer: MEDICARE

## 2023-02-16 VITALS
RESPIRATION RATE: 20 BRPM | HEART RATE: 95 BPM | OXYGEN SATURATION: 100 % | TEMPERATURE: 97 F | SYSTOLIC BLOOD PRESSURE: 141 MMHG | DIASTOLIC BLOOD PRESSURE: 90 MMHG

## 2023-02-16 DIAGNOSIS — H92.03 OTALGIA, BILATERAL: ICD-10-CM

## 2023-02-16 PROCEDURE — 94640 AIRWAY INHALATION TREATMENT: CPT

## 2023-02-16 PROCEDURE — 250N000013 HC RX MED GY IP 250 OP 250 PS 637: Performed by: EMERGENCY MEDICINE

## 2023-02-16 PROCEDURE — 99283 EMERGENCY DEPT VISIT LOW MDM: CPT | Mod: 25

## 2023-02-16 RX ORDER — ACETAMINOPHEN 325 MG/1
975 TABLET ORAL ONCE
Status: COMPLETED | OUTPATIENT
Start: 2023-02-16 | End: 2023-02-16

## 2023-02-16 RX ORDER — ALBUTEROL SULFATE 90 UG/1
2 AEROSOL, METERED RESPIRATORY (INHALATION) ONCE
Status: COMPLETED | OUTPATIENT
Start: 2023-02-16 | End: 2023-02-16

## 2023-02-16 RX ADMIN — ACETAMINOPHEN 975 MG: 325 TABLET ORAL at 20:59

## 2023-02-16 RX ADMIN — ALBUTEROL SULFATE 2 PUFF: 90 AEROSOL, METERED RESPIRATORY (INHALATION) at 21:49

## 2023-02-16 ASSESSMENT — ACTIVITIES OF DAILY LIVING (ADL): ADLS_ACUITY_SCORE: 35

## 2023-02-16 ASSESSMENT — ENCOUNTER SYMPTOMS
SHORTNESS OF BREATH: 0
SORE THROAT: 0

## 2023-02-17 NOTE — DISCHARGE INSTRUCTIONS
What do you do next:   Continue your home medications unless we have specifically changed them  You can use over-the-counter acetaminophen (Tylenol ) for pain control for the next 2 to 3 days.  Acetaminophen (Tylenol): Take 500 to 1000 mg by mouth every 6 hours as needed for fever or pain.  Do not take more than 4000 total milligrams of acetaminophen-containing products in a 24-hour timeframe.  Ibuprofen: Do not take this as you note a history of hives  Follow up as indicated below    When do you return: If you have uncontrollable pain, uncontrollable fevers, or any other symptoms that concern you, please return to the ED for reevaluation.    Thank you for allowing us to care for you today.

## 2023-02-17 NOTE — ED PROVIDER NOTES
History     Chief Complaint:  Otalgia       HPI   Dionne Perez is a 38 year old female with a history of asthma who presents with B/L ear pain since last night.  The patient denies any sick contacts.  She notes that she has had pain like this before from ear infections.  There is no reported fever.  The patient states that she does use a nasal spray regularly.  She states that she has asthma and has a cough.  There is no productive cough noted.      Independent Historian:   None - Patient Only    Review of External Notes: ED Visit from 2/13/2023     ROS:  Review of Systems   HENT: Positive for ear pain. Negative for ear discharge and sore throat.    Respiratory: Negative for shortness of breath.        Allergies:  Ibuprofen     Medications:    acetaminophen (TYLENOL) 325 MG tablet  albuterol (PROAIR HFA/PROVENTIL HFA/VENTOLIN HFA) 108 (90 Base) MCG/ACT inhaler  aspirin (ASA) 325 MG EC tablet  docusate sodium (COLACE) 100 MG capsule  escitalopram (LEXAPRO) 10 MG tablet  famotidine (PEPCID) 20 MG tablet  levothyroxine (SYNTHROID/LEVOTHROID) 50 MCG tablet  Multiple Vitamins-Minerals (EMERGEN-C IMMUNE PLUS/VIT D) CHEW  nystatin (MYCOSTATIN) 197359 UNIT/GM external cream  OLANZapine zydis (ZYPREXA) 10 MG ODT  Pediatric Multivit-Minerals-C (CHEWABLES MULTIVITAMIN PO)  pravastatin (PRAVACHOL) 40 MG tablet  predniSONE (DELTASONE) 20 MG tablet  Probiotic Product (RISAQUAD-2) CAPS  traZODone (DESYREL) 50 MG tablet        Past Medical History:    Past Medical History:   Diagnosis Date     Depressive disorder      Gastroesophageal reflux disease      High cholesterol      Knee pain      Thyroid disease        Past Surgical History:    Past Surgical History:   Procedure Laterality Date     IR LUMBAR PUNCTURE  6/9/2020        Family History:    family history is not on file.    Social History:   reports that she has never smoked. She has never used smokeless tobacco. She reports that she does not drink alcohol and does not  use drugs.  PCP: [facility], [facility] [place]     Physical Exam     Patient Vitals for the past 24 hrs:   BP Temp Temp src Pulse Resp SpO2   02/16/23 2039 (!) 141/90 97  F (36.1  C) Temporal 95 20 100 %        Physical Exam  Constitutional: Vital signs reviewed as above.   Head: No external signs of trauma. No lesions noted.  Eyes: PEERL, EOMI B/L, no pain or limitation of superior gaze  ENT:   Ears: Normal B/L TM and external canals   Nose: Noncongested, no exudates. No rhinorrhea. No FB noted   Mouth/Throat:     Mucous membranes are moist and normal.     No Oropharyngeal exudate. No oropharyngeal erythema noted.    No tonsilar swelling noted.     No uvular deviation noted.    No swelling noted on the floor of the mouth  Neck: FROM. Neck is supple  Lymphatic: No posterior cervical LAD noted.   Cardiovascular: normal rate, Regular rhythm and normal heart sounds.  No murmur heard. Equal B/L peripheral pulses.  Pulmonary/Chest: Effort normal and breath sounds normal. No respiratory distress. Patient has no wheezes. Patient has no rales.   Gastrointestinal: Soft. There is no tenderness.   Musculoskeletal/Extremities: No deformities noted. Normal tone.  Neurological: Patient is alert and oriented to person, place, and time.   Skin: Skin is warm and dry. There is no diaphoresis noted.   Psychiatric: The patient appears calm.        Emergency Department Course       Emergency Department Course & Assessments:         Interventions:  Medications   albuterol (PROVENTIL HFA/VENTOLIN HFA) inhaler (2 puffs Inhalation Given 2/16/23 2149)   acetaminophen (TYLENOL) tablet 975 mg (975 mg Oral Given 2/16/23 2059)        Independent Interpretation (X-rays, CTs, rhythm strip):  See ED Course     Consultations/Discussion of Management or Tests:     ED Course as of 02/16/23 2350   Thu Feb 16, 2023 2046 Dr. Huerta' evaluation.   2222 Rechecked s/p MDI. No wheezing.       Social Determinants of Health affecting care:    None    Disposition:  The patient was discharged to home.     Impression & Plan    CMS Diagnoses: None    Medical Decision Making:  This 38-year-old female patient presents the ED due to ear pain.  Please see the HPI and exam for specifics.  The patient remained well in the ED.  Her physical exam did not reveal any evidence of otitis externa or otitis media.  There are no physical exam findings to suggest mastoiditis either.  The patient does not seem to have a sore throat and there are no oropharyngeal findings on exam.  She was somewhat wheezy but no rales were noted.  The patient was given albuterol and had improvement of her airway sounds. She has this medication at home. I do not think steroids are needed at this time.    At this time, I think the patient can be discharged.  She should follow closely in the outpatient setting and continue to use over-the-counter treatments for her symptoms.  She can always be reevaluated at any time.    Critical Care time:  was 0 minutes for this patient excluding procedures.    Diagnosis:    ICD-10-CM    1. Otalgia, bilateral  H92.03            Discharge Medications:  Discharge Medication List as of 2/16/2023  9:47 PM               2/16/2023   Chente Huerta DO Burns, Bradley Joseph, DO  02/16/23 1834

## 2023-02-25 ENCOUNTER — APPOINTMENT (OUTPATIENT)
Dept: ULTRASOUND IMAGING | Facility: CLINIC | Age: 38
End: 2023-02-25
Attending: EMERGENCY MEDICINE
Payer: MEDICARE

## 2023-02-25 ENCOUNTER — HOSPITAL ENCOUNTER (EMERGENCY)
Facility: CLINIC | Age: 38
Discharge: NURSING, REHAB OR LONG-TERM CARE | End: 2023-02-25
Attending: EMERGENCY MEDICINE | Admitting: EMERGENCY MEDICINE
Payer: MEDICARE

## 2023-02-25 ENCOUNTER — APPOINTMENT (OUTPATIENT)
Dept: GENERAL RADIOLOGY | Facility: CLINIC | Age: 38
End: 2023-02-25
Attending: EMERGENCY MEDICINE
Payer: MEDICARE

## 2023-02-25 VITALS
SYSTOLIC BLOOD PRESSURE: 101 MMHG | HEART RATE: 80 BPM | OXYGEN SATURATION: 97 % | RESPIRATION RATE: 20 BRPM | TEMPERATURE: 97.8 F | DIASTOLIC BLOOD PRESSURE: 67 MMHG

## 2023-02-25 DIAGNOSIS — J45.40 MODERATE PERSISTENT ASTHMA, UNSPECIFIED WHETHER COMPLICATED: ICD-10-CM

## 2023-02-25 DIAGNOSIS — M79.605 MUSCULOSKELETAL PAIN OF LEFT LOWER EXTREMITY: ICD-10-CM

## 2023-02-25 DIAGNOSIS — J06.9 UPPER RESPIRATORY TRACT INFECTION, UNSPECIFIED TYPE: ICD-10-CM

## 2023-02-25 LAB
FLUAV RNA SPEC QL NAA+PROBE: NEGATIVE
FLUBV RNA RESP QL NAA+PROBE: NEGATIVE
RSV RNA SPEC NAA+PROBE: NEGATIVE
SARS-COV-2 RNA RESP QL NAA+PROBE: NEGATIVE

## 2023-02-25 PROCEDURE — 71046 X-RAY EXAM CHEST 2 VIEWS: CPT

## 2023-02-25 PROCEDURE — 93971 EXTREMITY STUDY: CPT | Mod: LT

## 2023-02-25 PROCEDURE — C9803 HOPD COVID-19 SPEC COLLECT: HCPCS

## 2023-02-25 PROCEDURE — 250N000013 HC RX MED GY IP 250 OP 250 PS 637: Performed by: EMERGENCY MEDICINE

## 2023-02-25 PROCEDURE — 99285 EMERGENCY DEPT VISIT HI MDM: CPT | Mod: 25,CS

## 2023-02-25 PROCEDURE — 87637 SARSCOV2&INF A&B&RSV AMP PRB: CPT | Performed by: EMERGENCY MEDICINE

## 2023-02-25 RX ORDER — ACETAMINOPHEN 500 MG
500 TABLET ORAL EVERY 4 HOURS PRN
Status: DISCONTINUED | OUTPATIENT
Start: 2023-02-25 | End: 2023-02-25 | Stop reason: HOSPADM

## 2023-02-25 RX ADMIN — ACETAMINOPHEN 500 MG: 500 TABLET ORAL at 12:41

## 2023-02-25 ASSESSMENT — ACTIVITIES OF DAILY LIVING (ADL)
ADLS_ACUITY_SCORE: 35
ADLS_ACUITY_SCORE: 35

## 2023-02-25 NOTE — ED TRIAGE NOTES
Left leg pain. Pt thinks she pulled a muscle and thats why she has pain. Pain has been going on for a couple days. Using tylenol and ice without improvement.   Pt also c/o asthma exacerbation. States it feels worse than normal. Has not used any inhalers this morning.   Pt not able to provide much history for herself.   Pt has a legal guardian who they were unable to reach prior to coming to the hospital.

## 2023-02-25 NOTE — ED NOTES
Gave group home staff report. Address confirmed. No stairs, no keys needed. Pt able to walk and dress self.

## 2023-02-25 NOTE — ED PROVIDER NOTES
History     Chief Complaint:  Leg Pain and Shortness of Breath     HPI   Dionne Perez is a 38 year old female with a history of asthma, hypertension, and thyroid disease who presents with 2 complaints.  The first is shortness of breath she says has been going on for some time longer than a weeks.  The patient says that when she uses her albuterol and or nebulizer that does seem to help.  She has had a cough cannot tell if it is productive.  There is been no pain.  The patient came into the ER today because of pain over her left shin over the last several days.  She denies travel trauma think she has had a clot in the past but is unsure of the location.  The patient has not noticed any rash or fevers.  Patient does report that the discomfort is been present over the left shin and constant.      Review of External Notes: Review of previous records    ROS:  Review of Systems  Six-point review of systems all negative except as in HPI    Allergies:  Ibuprofen     Medications:    Albuterol  Aspirin  Docusate sodium  Lexapro  Famotidine  Levothyroxine  Multivitamins and minerals  Nystatin  Zyprexa  Pravastatin  Prednisone  Risaquad  Trazodone     Past Medical History:    Depressive disorder  GERD  High cholesterol  Thyroid disease   Suicidal ideation  Adjustment disorder  ROSY  Chronic sinus bradycardia  Essential hypertension  Hypothyroidism due to acquired atrophy of thyroid  Hyperlipidemia  Asthma     Past Surgical History:    IR lumbar puncture   Cholecystectomy  Tonsillectomy    Family History:    Breast cancer    Social History:  Reports that she has never smoked. She has never used smokeless tobacco. She reports that she does not drink alcohol and does not use drugs.  PCP: [facility], [facility]     Physical Exam     Patient Vitals for the past 24 hrs:   BP Temp Pulse Resp SpO2   02/25/23 1147 -- -- 86 -- 96 %   02/25/23 1144 131/71 97.8  F (36.6  C) -- 24 --        Physical Exam  General: The patient is  alert, in no respiratory distress.    HENT: Mucous membranes moist.  No lip or tongue swelling.  No significant erythema over the posterior pharynx no drooling    Cardiovascular: Regular rate and rhythm. Good pulses in lower extremities    Respiratory: Frequent cough and nasal congestion this is referred to the lungs.  There may be some wheezing present but good air movement throughout she speaks in full sentences    Gastrointestinal: Abdomen soft.  No focal tenderness elicited    Musculoskeletal: No gross deformity.     Skin: No rashes or petechiae.  Old discoloration over the left shin    Neurologic: The patient is alert.  Slow to answer questions    Lymphatic: No cervical adenopathy. No lower extremity swelling.    Psychiatric: The patient is non-tearful.    Emergency Department Course     Imaging:  US Lower Extremity Venous Duplex Left   Final Result   IMPRESSION:   1.  No deep venous thrombosis in the left lower extremity.      Chest XR,  PA & LAT   Final Result   IMPRESSION: Negative chest.        Report per radiology    Laboratory:  Labs Ordered and Resulted from Time of ED Arrival to Time of ED Departure   INFLUENZA A/B & SARS-COV2 PCR MULTIPLEX - Normal       Result Value    Influenza A PCR Negative      Influenza B PCR Negative      RSV PCR Negative      SARS CoV2 PCR Negative        Emergency Department Course & Assessments:       Interventions:  Medications   acetaminophen (TYLENOL) tablet 500 mg (500 mg Oral $Given 2/25/23 1241)      Independent Interpretation (X-rays, CTs, rhythm strip):  I reviewed the chest x-ray did not see signs of obvious abnormality such as pneumothorax pulmonary edema or obvious pneumonia.       Social Determinants of Health affecting care:  Social Connections/Isolation       Assessments:  1218 Entered the patient's room and obtained history  1410 I rechecked the patient and explained findings. I believe that they are safe for discharge at this time.    Disposition:  The patient  was discharged to home.     Impression & Plan        Medical Decision Making:  The patient does have a history of asthma and was concerned because she also presented with leg pain.  Theoretically a DVT could lead to a PE however her location of pain and description sounds atypical for DVT.  It is over her left shin where she had previous cellulitis.  I do not see current cellulitis.  I did consider other vascular abnormalities asthma ACS pneumothorax pneumonia.  She has significant signs of a URI I think that likely the cause of her symptoms I will Doppler her leg to see if there is a DVT.  Thankfully the patient's Doppler was negative I discussed she may need a repeat ultrasound through her primary doctor.  I think more likely is musculoskeletal.  She has clear signs of a URI triggering her asthma.  I did not want to start her on steroids unnecessarily and that her albuterol been helping her she is not hypoxic.  I do not think this is likely a PE.  Her chest x-ray is clear without signs of pneumonia.  I did look for COVID and flu which were negative the patient is on Eliquis plan appears comfortable and was discharged to close outpatient follow-up    Diagnosis:    ICD-10-CM    1. Musculoskeletal pain of left lower extremity  M79.605       2. Upper respiratory tract infection, unspecified type  J06.9       3. Moderate persistent asthma, unspecified whether complicated  J45.40            Discharge Medications:  New Prescriptions    No medications on file      Scribe Disclosure:  Na BARKLEY Hired, am serving as a scribe at 2:18 PM on 2/25/2023 to document services personally performed by Sylvester Ramirez MD, based on my observations and the provider's statements to me.    2/25/2023   Sylvester Ramirez MD Farnan, Christopher M, MD  02/25/23 3415

## 2023-02-25 NOTE — DISCHARGE INSTRUCTIONS
Discharge Instructions  Upper Respiratory Infection    The upper respiratory tract includes the sinuses, nasal passages, pharynx, and larynx. A URI, or upper respiratory infection, is an infection of any of the parts of the upper airway. Symptoms include runny nose, congestion, sore throat, cough, and fever. URIs are almost always caused by a virus. Antibiotics do not help with virus infections, so are not used for an ordinary URI. A URI is very contagious through coughing and nasal secretions; make sure you wash your hands often and clean surfaces after sneezing, coughing or touching them.  Viruses can live on surfaces for up to 3 days.      Return to the Emergency Department if:  Any of the symptoms you have get much worse.  You seem very sick, like being too weak to get up.  You have any new symptoms, especially serious things like chest pain.   You are short of breath.   You have a severe headache.  You are vomiting so much you can t keep fluids or medicines down.  You have confusion or seem unusually drowsy.  You have a seizure or convulsion.    Follow-up:    You should start to improve in 3 - 5 days.  A cough can linger for up to six weeks, but overall you should be feeling much better.  See your doctor if you have a fever for more than 3 days, or if you are not feeling better within 5 days.      What can I do to help myself?  Fill any prescriptions the doctor gave you and take them right away  If you have a fever, get plenty of rest and drink lots of fluids, especially water. Using a humidifier or saline nose spray will also help loosen secretions.   What clothes or blankets you have on won t change your fever. Do what is comfortable for you.  Bathing or sponging in lukewarm water may help you feel better.  Tylenol  (acetaminophen), Motrin  (ibuprofen), or Advil  (ibuprofen) help bring fever down and may help you feel more comfortable. Be sure to read and follow the package directions, and ask your doctor if  you have questions.  Do not drink alcohol.  Decongestants may help you feel better. You may use decongestant nose sprays Afrin  (oxymetazoline) or Juan-Synephrine  (phenylephrine hydrochloride) for up to 3 days, or may use a decongestant tablet like Sudafed  (pseudoephedrine).  If you were given a prescription for medicine here today, be sure to read all of the information (including the package insert) that comes with your prescription.  This will include important information about the medicine, its side effects, and any warnings that you need to know about.  The pharmacist who fills the prescription can provide more information and answer questions you may have about the medicine.  If you have questions or concerns that the pharmacist cannot address, please call or return to the Emergency Department.   Opioid Medication Information    Pain medications are among the most commonly prescribed medicines, so we are including this information for all our patients. If you did not receive pain medication or get a prescription for pain medicine, you can ignore it.     You may have been given a prescription for an opioid (narcotic) pain medicine and/or have received a pain medicine while here in the Emergency Department. These medicines can make you drowsy or impaired. You must not drive, operate dangerous equipment, or engage in any other dangerous activities while taking these medications. If you drive while taking these medications, you could be arrested for DUI, or driving under the influence. Do not drink any alcohol while you are taking these medications.     Opioid pain medications can cause addiction. If you have a history of chemical dependency of any type, you are at a higher risk of becoming addicted to pain medications.  Only take these prescribed medications to treat your pain when all other options have been tried. Take it for as short a time and as few doses as possible. Store your pain pills in a secure  place, as they are frequently stolen and provide a dangerous opportunity for children or visitors in your house to start abusing these powerful medications. We will not replace any lost or stolen medicine.  As soon as your pain is better, you should flush all your remaining medication.     Many prescription pain medications contain Tylenol  (acetaminophen), including Vicodin , Tylenol #3 , Norco , Lortab , and Percocet .  You should not take any extra pills of Tylenol  if you are using these prescription medications or you can get very sick.  Do not ever take more than 3000 mg of acetaminophen in any 24 hour period.    All opioids tend to cause constipation. Drink plenty of water and eat foods that have a lot of fiber, such as fruits, vegetables, prune juice, apple juice and high fiber cereal.  Take a laxative if you don t move your bowels at least every other day. Miralax , Milk of Magnesia, Colace , or Senna  can be used to keep you regular.      Remember that you can always come back to the Emergency Department if you are not able to see your regular doctor in the amount of time listed above, if you get any new symptoms, or if there is anything that worries you.

## 2023-02-25 NOTE — PROGRESS NOTES
Attempted to call numbers for  listed in chart, no answer at first number and one number states its no longer in service.   Called and spoke with sister sabra, who is listed as legal guardian. She states that she has a new legal guardian Rodolfo.   Phone number provided is [phone]    Sabra notes that she is concerned that the patient has not been using her inhaler at home and is not encouraged to do so by  when she becomes SOB and continues to have troubles with controlling her asthma.

## 2023-02-25 NOTE — ED NOTES
Report given to EMS transporting the pt. Questions asked and answered.    Copy of discharge paperwork sent with pt back home.

## 2023-02-25 NOTE — ED NOTES
Respiratory (Adult) Additional Documentation:  (ot comes in with increased SOB and wheezing for last few days. + cough. + congestion. using inhalers and nebs as prescribed.)     Musculoskeletal (Adult) Musculoskeletal WDL:  (left leg/ankle pain for last few days, no injury noted.)

## 2023-03-15 ENCOUNTER — APPOINTMENT (OUTPATIENT)
Dept: GENERAL RADIOLOGY | Facility: CLINIC | Age: 38
End: 2023-03-15
Attending: EMERGENCY MEDICINE
Payer: MEDICARE

## 2023-03-15 ENCOUNTER — HOSPITAL ENCOUNTER (EMERGENCY)
Facility: CLINIC | Age: 38
Discharge: NURSING, REHAB OR LONG-TERM CARE | End: 2023-03-15
Attending: EMERGENCY MEDICINE | Admitting: EMERGENCY MEDICINE
Payer: MEDICARE

## 2023-03-15 VITALS
HEART RATE: 85 BPM | RESPIRATION RATE: 20 BRPM | DIASTOLIC BLOOD PRESSURE: 81 MMHG | TEMPERATURE: 97 F | SYSTOLIC BLOOD PRESSURE: 141 MMHG | OXYGEN SATURATION: 96 %

## 2023-03-15 DIAGNOSIS — M79.672 LEFT FOOT PAIN: ICD-10-CM

## 2023-03-15 DIAGNOSIS — M77.32 CALCANEAL SPUR OF LEFT FOOT: ICD-10-CM

## 2023-03-15 PROCEDURE — 99283 EMERGENCY DEPT VISIT LOW MDM: CPT

## 2023-03-15 PROCEDURE — 73630 X-RAY EXAM OF FOOT: CPT | Mod: LT

## 2023-03-15 PROCEDURE — 250N000013 HC RX MED GY IP 250 OP 250 PS 637: Performed by: EMERGENCY MEDICINE

## 2023-03-15 RX ORDER — ACETAMINOPHEN 325 MG/1
650 TABLET ORAL ONCE
Status: COMPLETED | OUTPATIENT
Start: 2023-03-15 | End: 2023-03-15

## 2023-03-15 RX ADMIN — ACETAMINOPHEN 650 MG: 325 TABLET ORAL at 04:25

## 2023-03-15 ASSESSMENT — ENCOUNTER SYMPTOMS
SHORTNESS OF BREATH: 0
WEAKNESS: 0
NAUSEA: 0
ABDOMINAL PAIN: 0
NUMBNESS: 0
CHILLS: 0
FEVER: 0

## 2023-03-15 ASSESSMENT — ACTIVITIES OF DAILY LIVING (ADL)
ADLS_ACUITY_SCORE: 35
ADLS_ACUITY_SCORE: 35

## 2023-03-15 NOTE — DISCHARGE INSTRUCTIONS
Keep your leg elevated is much as possible.  Use Tylenol for pain.    Return to the ER for any further problems.

## 2023-03-15 NOTE — ED NOTES
Attempted to call sister for permission to treat. No answers at both phone numbers   [phone]

## 2023-03-15 NOTE — ED PROVIDER NOTES
History     Chief Complaint:  Dental Pain       HPI   Dionne Perez is a 38 year old female who presents from her group home for evaluation of pain over the left heel.  She denies any trauma.  She is never had similar pain in the past.  She says she stepped out of bed and felt pain in the heel.  She went to notify staff at her group home but reportedly was not able to find anyone to give her anything for pain.  She came into the ED for further evaluation.  She denies any recent falls.  She denies any other joint pain.  No fever.  She has not noticed any skin wounds or redness.    It should be noted that the chief complaint initially was dental pain.  The patient adamantly denies having any pain in her teeth or mouth though.      Review of External Notes: Outpatient clinic note reviewed from Dr. Mi on March 8, 2023 at which point the patient was seen for osteoarthritis.    ROS:  Review of Systems   Constitutional: Negative for chills and fever.   Respiratory: Negative for shortness of breath.    Cardiovascular: Negative for chest pain and leg swelling.   Gastrointestinal: Negative for abdominal pain and nausea.   Musculoskeletal:        Left foot pain   Neurological: Negative for weakness and numbness.   All other systems reviewed and are negative.      Allergies:  Ibuprofen     Medications:    acetaminophen (TYLENOL) 325 MG tablet  albuterol (PROAIR HFA/PROVENTIL HFA/VENTOLIN HFA) 108 (90 Base) MCG/ACT inhaler  aspirin (ASA) 325 MG EC tablet  docusate sodium (COLACE) 100 MG capsule  escitalopram (LEXAPRO) 10 MG tablet  famotidine (PEPCID) 20 MG tablet  levothyroxine (SYNTHROID/LEVOTHROID) 50 MCG tablet  Multiple Vitamins-Minerals (EMERGEN-C IMMUNE PLUS/VIT D) CHEW  nystatin (MYCOSTATIN) 376095 UNIT/GM external cream  OLANZapine zydis (ZYPREXA) 10 MG ODT  Pediatric Multivit-Minerals-C (CHEWABLES MULTIVITAMIN PO)  pravastatin (PRAVACHOL) 40 MG tablet  predniSONE (DELTASONE) 20 MG tablet  Probiotic Product  (RISAQUAD-2) CAPS  traZODone (DESYREL) 50 MG tablet        Past Medical History:    Past Medical History:   Diagnosis Date     Depressive disorder      Gastroesophageal reflux disease      High cholesterol      Knee pain      Thyroid disease        Past Surgical History:    Past Surgical History:   Procedure Laterality Date     IR LUMBAR PUNCTURE  6/9/2020        Family History:    family history is not on file.    Social History:   reports that she has never smoked. She has never used smokeless tobacco. She reports that she does not drink alcohol and does not use drugs.  PCP: [facility], [facility]     Physical Exam     Patient Vitals for the past 24 hrs:   BP Temp Temp src Pulse Resp SpO2   03/15/23 0142 (!) 141/81 97  F (36.1  C) Temporal 71 20 97 %        Physical Exam  Constitutional:       General: She is not in acute distress.     Appearance: She is obese. She is not ill-appearing.   HENT:      Mouth/Throat:      Pharynx: Oropharynx is clear. No oropharyngeal exudate or posterior oropharyngeal erythema.      Comments: No swelling of the face or lips.  Tongue is normal.  Uvula midline.  She does have poor dentition.  No tenderness to percussion of her teeth or evidence of gingival inflammation or swelling.  Eyes:      Pupils: Pupils are equal, round, and reactive to light.   Cardiovascular:      Rate and Rhythm: Normal rate and regular rhythm.   Pulmonary:      Effort: Pulmonary effort is normal.      Breath sounds: Normal breath sounds.   Abdominal:      General: There is no distension.      Tenderness: There is no abdominal tenderness.   Musculoskeletal:      Comments: Minimal tenderness over the plantar surface of the left heel and sole of the foot.  No swelling of the calf areas.  No erythema or evidence of cellulitis.  Pulses in the feet normal.   Skin:     General: Skin is warm.      Capillary Refill: Capillary refill takes less than 2 seconds.      Findings: No rash.   Neurological:       Comments: Strength and sensation to the toes bilaterally intact.   Psychiatric:         Mood and Affect: Mood normal.         Behavior: Behavior normal.           Emergency Department Course       Imaging:  XR Foot Left 3 Views   Final Result   IMPRESSION: Normal joint spaces and alignment. No fracture. Tiny plantar and dorsal surface calcaneal spurs.         Report per radiology    Emergency Department Course & Assessments:             Interventions:  Medications   acetaminophen (TYLENOL) tablet 650 mg (650 mg Oral $Given 3/15/23 1591)        Independent Interpretation (X-rays, CTs, rhythm strip):  X-ray left foot.  No fracture.    Disposition:  The patient was discharged to home.     Impression & Plan      Medical Decision Making:  This patient is a 38-year-old woman who presents from her group home with left foot pain.  She does have a heel spur which likely is contributing to her pain as this is the area that hurts.  She is stable and appropriate for discharge.  She will follow this up through her primary care clinic.      Diagnosis:    ICD-10-CM    1. Left foot pain  M79.672       2. Calcaneal spur of left foot  M77.32              3/15/2023   Ok Scruggs MD McRoberts, Sean Edward, MD  03/15/23 2477

## 2023-03-15 NOTE — ED NOTES
Spoke with  Bryanna. Patient is ready to return. No staff was at house. No one is available to pick patient up. Wondering if we could arrange for ride home today.   Staff will be available at group    [phone]   [place]

## 2023-03-15 NOTE — ED NOTES
Attempted to phone . No answer.     [phone] did not leave voicemail  [phone] number not in service

## 2023-03-15 NOTE — ED TRIAGE NOTES
Pt to ER with c/o left lower tooth pain pt from group home and awoke to find no staff there to give her meds

## 2023-03-15 NOTE — ED NOTES
[facility] IS NO LONGER  [phone]. Do not call.      Gave 2 additional numbers to call   Patricia [phone]- answered stated there should have been staff at group home to provide tylenol for pain.  Call Patricia for discharges from [facility]    [place]iden [phone]

## 2023-03-15 NOTE — ED NOTES
SHIRA  Phone number rings and beeps [phone]  No answer    [phone] another number in notes in chart. Violeta no answer    Found social work note with some information, but no one is answering any number at this time.    Sabra is no longer legal guardian.

## 2023-03-17 ENCOUNTER — HOSPITAL ENCOUNTER (EMERGENCY)
Facility: CLINIC | Age: 38
Discharge: NURSING, REHAB OR LONG-TERM CARE | End: 2023-03-17
Attending: EMERGENCY MEDICINE | Admitting: EMERGENCY MEDICINE
Payer: MEDICARE

## 2023-03-17 VITALS
TEMPERATURE: 98 F | SYSTOLIC BLOOD PRESSURE: 128 MMHG | HEART RATE: 60 BPM | DIASTOLIC BLOOD PRESSURE: 65 MMHG | OXYGEN SATURATION: 100 % | RESPIRATION RATE: 18 BRPM

## 2023-03-17 DIAGNOSIS — R06.02 SHORTNESS OF BREATH: ICD-10-CM

## 2023-03-17 PROCEDURE — 250N000013 HC RX MED GY IP 250 OP 250 PS 637: Performed by: EMERGENCY MEDICINE

## 2023-03-17 PROCEDURE — 94640 AIRWAY INHALATION TREATMENT: CPT

## 2023-03-17 PROCEDURE — 99283 EMERGENCY DEPT VISIT LOW MDM: CPT | Mod: 25,CS

## 2023-03-17 PROCEDURE — 87637 SARSCOV2&INF A&B&RSV AMP PRB: CPT | Performed by: EMERGENCY MEDICINE

## 2023-03-17 PROCEDURE — 250N000009 HC RX 250: Performed by: EMERGENCY MEDICINE

## 2023-03-17 RX ORDER — ACETAMINOPHEN 325 MG/1
650 TABLET ORAL ONCE
Status: COMPLETED | OUTPATIENT
Start: 2023-03-17 | End: 2023-03-17

## 2023-03-17 RX ORDER — IPRATROPIUM BROMIDE AND ALBUTEROL SULFATE 2.5; .5 MG/3ML; MG/3ML
3 SOLUTION RESPIRATORY (INHALATION) ONCE
Status: COMPLETED | OUTPATIENT
Start: 2023-03-17 | End: 2023-03-17

## 2023-03-17 RX ADMIN — IPRATROPIUM BROMIDE AND ALBUTEROL SULFATE 3 ML: .5; 3 SOLUTION RESPIRATORY (INHALATION) at 19:59

## 2023-03-17 RX ADMIN — ACETAMINOPHEN 650 MG: 325 TABLET ORAL at 21:48

## 2023-03-17 ASSESSMENT — ENCOUNTER SYMPTOMS
ABDOMINAL PAIN: 0
SHORTNESS OF BREATH: 1
COUGH: 1
CHEST TIGHTNESS: 1
HEADACHES: 1
FEVER: 0

## 2023-03-17 ASSESSMENT — ACTIVITIES OF DAILY LIVING (ADL)
ADLS_ACUITY_SCORE: 35
ADLS_ACUITY_SCORE: 35

## 2023-03-17 NOTE — ED TRIAGE NOTES
Pt presents via EMS for concern of shortness of breath. Pt states she began feeling short of breath at approx 1700. Describes attempting to use her inhaler at home without relief. EMS called by patient, they state they attempted to contact the patient's state appointed guardian without success and transported per their protocols. EMS describes exertional shortness of breath. ABC intact, A&Ox4.

## 2023-03-17 NOTE — ED NOTES
Pt refusing to wear a mask and it is upsetting other patients in the lobby due to pt's cough. Will have pt wait is private intake area.

## 2023-03-18 NOTE — ED PROVIDER NOTES
History     Chief Complaint:  Shortness of Breath     The history is provided by the patient.      Dionne Perez is a 38 year old female with a history of depression who presents with EMS alone for shortness of breath. Here, patient reports to have chest pain, chest tightness, a headache, cough, and leg pain. She did use her inhaler today due to her shortness of breath. However she denies having any abdominal pain or fevers. Denies the use of drinking alcohol or smoking. Patient states her [facility] is treating her well.    Independent Historian:   None - Patient Only    Review of External Notes: None.     ROS:  Review of Systems   Constitutional: Negative for fever.   Respiratory: Positive for cough, chest tightness and shortness of breath.    Cardiovascular: Positive for chest pain.   Gastrointestinal: Negative for abdominal pain.   Musculoskeletal:        (+) Leg pain   Neurological: Positive for headaches.   All other systems reviewed and are negative.    Allergies:  Ibuprofen     Medications:    albuterol  aspirin   docusate sodium   escitalopram   famotidine   levothyroxine   Multiple Vitamins-Minerals  OLANZapine zydis   Pediatric Multivit-Minerals-C   pravastatin  predniSONE   Probiotic Product  traZODone     Past Medical History:    Depressive disorder   Gastroesophageal reflux disease   High cholesterol   Thyroid disease     Past Surgical History:    Lumbar puncture   Tonsillectomy    Family History:    Mother: Lung Cancer, cataract   Sister: Breast Cancer. diabetes    Social History:  Patient presents with EMS alone.  Patient presents from [facility].  PCP: [facility], [facility]     Physical Exam     Patient Vitals for the past 24 hrs:   BP Temp Temp src Pulse Resp SpO2   03/17/23 2055 -- -- -- -- -- 100 %   03/17/23 2054 128/67 -- -- 63 -- --   03/17/23 1930 (!) 141/93 -- -- 61 -- 96 %   03/17/23 1924 (!) 139/100 -- -- 64 -- 98 %   03/17/23 1805 -- 98  F (36.7  C) Temporal 67 20 --       Physical Exam  Constitutional: Well developed, nontox appearance, comfortable, eating upon my initial evaluation  Head: Atraumatic.   Neck:  no stridor  Eyes: no scleral icterus  Cardiovascular: RRR, 2+ right radial pulses  Pulmonary/Chest: nml resp effort, Clear BS bilat  Abdominal: ND, soft, NT, no rebound or guarding   Ext: Warm, well perfused, no edema  Neurological: A&O, symmetric facies, moves ext x4  Skin: Skin is warm and dry.   Psychiatric: Behavior is normal. Thought content normal.   Nursing note and vitals reviewed.    Emergency Department Course     Laboratory:  Labs Ordered and Resulted from Time of ED Arrival to Time of ED Departure   INFLUENZA A/B, RSV, & SARS-COV2 PCR - Normal       Result Value    Influenza A PCR Negative      Influenza B PCR Negative      RSV PCR Negative      SARS CoV2 PCR Negative       Emergency Department Course & Assessments:     Interventions:  Medications   ipratropium - albuterol 0.5 mg/2.5 mg/3 mL (DUONEB) neb solution 3 mL (3 mLs Nebulization $Given 3/17/23 1959)   acetaminophen (TYLENOL) tablet 650 mg (650 mg Oral $Given 3/17/23 2148)      Assessments:   I met with patient after reviewing notes, past charts, and vitals.    Independent Interpretation (X-rays, CTs, rhythm strip):  None    Consultations/Discussion of Management or Tests:  None     Social Determinants of Health affecting care:   Stress/Adjustment Disorders    Disposition:  The patient was discharged to home.     Impression & Plan      CMS Diagnoses: None    Medical Decision Makin year old female presenting w/ shortness of breath     Social determinants affecting patient's health include:  Patient lives in a group home potentially increasing risk for recurrent ED visits     I reviewed medical records from  telemedicine office visit on 3/8/2023 for an overview of the patient's health, urgent care office visit from 2023     Differential diagnosis includes asthma exacerbation, malingering,  shortness of breath NOS.  Given benign thoracic exam, imaging deferred in context of normal vital signs.  DuoNeb was trialed in the emergency department with resolution of symptoms.  Presentation seems consistent with mild asthma exacerbation versus malingering.  At feel further work-up is indicated.  Doubt cardiac etiology such as CHF, ACS.   Doubt foreign body, pneumothorax, bacterial pneumonia, PE, CHF therefore imaging deferred at this time.   Labs sig for negative viral studies.  Pt looks improved after interventions here in ED.  There are no signs at this point of any serious etiologies including those mentioned above.  No indication for hospitalization at this time including no hypoxia, no marked increase in respiratory rate, minimal to no retractions.  Outpatient steroids deferred given no wheezing on initial exam.  At this time I feel the patient is safe for discharge.  Recommendations given regarding follow up with PCP and return to the emergency department as needed for new or worsening symptoms.  Pt counseled on all results, diagnosis and disposition and this was provided in written form as well for the patient's group home.  They are understanding and agreeable to plan. Patient discharged in stable condition.      Diagnosis:    ICD-10-CM    1. Shortness of breath  R06.02          Discharge Medications:  New Prescriptions    No medications on file      Scribe Disclosure:  I, Damaso Clark, am serving as a scribe at 9:28 PM on 3/17/2023 to document services personally performed by Sylvester Rascon MD based on my observations and the provider's statements to me.      3/17/2023   Sylvester Rascon MD Vaughn, Christopher E, MD  03/18/23 0138

## 2023-03-18 NOTE — ED NOTES
Patient very disruptive before and after triage.  Patient placed in fast track after yelling at another patient in the lobby.  She then proceeded to yell at nursing staff in fast track area.  She then grabbed a cane that was in the fast track area and starting banging it against the wall.  Security then came and talked with patient.

## 2023-03-19 ENCOUNTER — HOSPITAL ENCOUNTER (EMERGENCY)
Facility: CLINIC | Age: 38
Discharge: NURSING, REHAB OR LONG-TERM CARE | End: 2023-03-19
Attending: EMERGENCY MEDICINE | Admitting: EMERGENCY MEDICINE
Payer: MEDICARE

## 2023-03-19 VITALS
OXYGEN SATURATION: 99 % | HEART RATE: 74 BPM | TEMPERATURE: 97.4 F | RESPIRATION RATE: 24 BRPM | SYSTOLIC BLOOD PRESSURE: 134 MMHG | DIASTOLIC BLOOD PRESSURE: 72 MMHG

## 2023-03-19 DIAGNOSIS — R10.2 VAGINAL PAIN: ICD-10-CM

## 2023-03-19 PROCEDURE — 250N000013 HC RX MED GY IP 250 OP 250 PS 637: Performed by: EMERGENCY MEDICINE

## 2023-03-19 PROCEDURE — 99283 EMERGENCY DEPT VISIT LOW MDM: CPT

## 2023-03-19 RX ORDER — ACETAMINOPHEN 500 MG
1000 TABLET ORAL EVERY 4 HOURS PRN
Status: DISCONTINUED | OUTPATIENT
Start: 2023-03-19 | End: 2023-03-20 | Stop reason: HOSPADM

## 2023-03-19 RX ADMIN — ACETAMINOPHEN 1000 MG: 500 TABLET, FILM COATED ORAL at 21:05

## 2023-03-19 ASSESSMENT — ENCOUNTER SYMPTOMS
VOMITING: 0
DYSURIA: 0
FEVER: 0

## 2023-03-19 ASSESSMENT — ACTIVITIES OF DAILY LIVING (ADL): ADLS_ACUITY_SCORE: 33

## 2023-03-20 NOTE — ED NOTES
"Staff member in triage asked pt to wear mask.  Pt raised a fist in a threatening manner and shouted \"shut the fuck up and get away from me\".  Security spoke with pt and deescalated behavior.    "

## 2023-03-20 NOTE — ED PROVIDER NOTES
History     Chief Complaint:  Vaginal Pain       The history is provided by the patient.      Dionne Perez is a 38 year old female with a history of GERD, depression who presents with vaginal pain. Patient reports she believes there is a boil down in the posterior vaginal area, as it is causing her pain. Denies fevers, vomiting, dysuria.     Independent Historian:   None - Patient Only    Review of External Notes: None     ROS:  Review of Systems   Constitutional: Negative for fever.   Gastrointestinal: Negative for vomiting.   Genitourinary: Positive for vaginal pain. Negative for dysuria.   All other systems reviewed and are negative.    Allergies:  Ibuprofen      Medications:    albuterol  aspirin   docusate sodium   escitalopram   famotidine   levothyroxine   Multiple Vitamins-Minerals  OLANZapine zydis   Pediatric Multivit-Minerals-C   pravastatin  predniSONE   Probiotic Product  traZODone      Past Medical History:    Depressive disorder      Gastroesophageal reflux disease         High cholesterol            Thyroid disease     Altered mental status agitation  Aggression           Past Surgical History:    Lumbar puncture   Tonsillectomy     Family History:    Mother: Lung Cancer, cataract   Sister: Breast Cancer. diabetes     Social History:  Presents alone   PCP: [facility], [facility] [place]     Physical Exam     Patient Vitals for the past 24 hrs:   BP Temp Pulse Resp SpO2   03/19/23 1928 (!) 140/82 97.4  F (36.3  C) 66 24 97 %        Physical Exam  Constitutional: Vital signs reviewed as above  General: Alert, pleasant  HEENT: Moist mucous membranes  Eyes: Pupils are equal, round, and reactive to light.   Neck: Normal range of motion  Cardiovascular: normal rate, Regular rhythm and normal heart sounds.  No MRG  Pulmonary/Chest: Effort normal and breath sounds normal. No respiratory distress. Patient has no wheezes. Patient has no rales.   Gastrointestinal: Soft. Positive bowel sounds. No MRG.  :  No abscess or cyst to the labia, perirectal area, or rectal area. Copious stool soaking her undergarments. No erythema, warmth or breakdown of the skin.   Musculoskeletal/Extremities: Full ROM.  Endo: No pitting edema  Neurological: Alert, no focal deficits.  Skin: Skin is warm and dry.   Psychiatric: Pleasant    Emergency Department Course     Laboratory:  Labs Ordered and Resulted from Time of ED Arrival to Time of ED Departure - No data to display     Emergency Department Course & Assessments:    Interventions:  Medications   acetaminophen (TYLENOL) tablet 1,000 mg (1,000 mg Oral $Given 3/19/23 2105)      Independent Interpretation (X-rays, CTs, rhythm strip):  None    Assessments/Consultations/Discussion of Management or Tests:  ED Course as of 03/19/23 2145   Sun Mar 19, 2023   2102 Obtained history and examined the patient.      Social Determinants of Health affecting care:   Patient lives in a group home and is a pinto of the [facility]    Disposition:  The patient was discharged to home.     Impression & Plan      CMS Diagnoses: None    Medical Decision Making:  Patient represents to the emergency department after being seen here couple days for different concerns.  Today she is concerned that she may have an abscess near the vagina.  She states it has not been draining but there is some pain down there.  No dysuria or diarrhea.  No fevers or other complaints.  Physical exam was unremarkable for any signs of an abscess, cyst or skin infection.  She was given Tylenol and transported back to her group home.    Diagnosis:    ICD-10-CM    1. Vaginal pain  R10.2          Scribe Disclosure:  ALEX BARKLEY, am serving as a scribe at 9:02 PM on 3/19/2023 to document services personally performed by Hernan Tineo MD based on my observations and the provider's statements to me.   3/19/2023   Hernan Tineo MD Walters, Brent Aaron, MD  03/19/23 2145

## 2023-03-20 NOTE — ED TRIAGE NOTES
Vaginal pain that started this evening.  Pt denies urinary symptoms.  Pt concerned she has a cyst or boil that is causing her pain.

## 2023-03-23 ENCOUNTER — HOSPITAL ENCOUNTER (EMERGENCY)
Facility: CLINIC | Age: 38
Discharge: HOME | End: 2023-03-23
Attending: EMERGENCY MEDICINE | Admitting: EMERGENCY MEDICINE
Payer: MEDICARE

## 2023-03-23 VITALS
HEART RATE: 68 BPM | OXYGEN SATURATION: 97 % | RESPIRATION RATE: 20 BRPM | TEMPERATURE: 96.7 F | SYSTOLIC BLOOD PRESSURE: 137 MMHG | DIASTOLIC BLOOD PRESSURE: 56 MMHG

## 2023-03-23 DIAGNOSIS — Z86.59 HISTORY OF PSYCHIATRIC DISORDER: ICD-10-CM

## 2023-03-23 DIAGNOSIS — R06.00 DYSPNEA, UNSPECIFIED TYPE: ICD-10-CM

## 2023-03-23 PROCEDURE — 99285 EMERGENCY DEPT VISIT HI MDM: CPT

## 2023-03-23 ASSESSMENT — ACTIVITIES OF DAILY LIVING (ADL): ADLS_ACUITY_SCORE: 35

## 2023-03-24 ENCOUNTER — HOSPITAL ENCOUNTER (EMERGENCY)
Facility: CLINIC | Age: 38
Discharge: HOME | End: 2023-03-25
Attending: EMERGENCY MEDICINE | Admitting: EMERGENCY MEDICINE
Payer: MEDICARE

## 2023-03-24 ENCOUNTER — APPOINTMENT (OUTPATIENT)
Dept: GENERAL RADIOLOGY | Facility: CLINIC | Age: 38
End: 2023-03-24
Attending: EMERGENCY MEDICINE
Payer: MEDICARE

## 2023-03-24 VITALS
HEART RATE: 62 BPM | TEMPERATURE: 97.9 F | DIASTOLIC BLOOD PRESSURE: 50 MMHG | SYSTOLIC BLOOD PRESSURE: 127 MMHG | OXYGEN SATURATION: 99 % | RESPIRATION RATE: 24 BRPM

## 2023-03-24 DIAGNOSIS — R04.2 HEMOPTYSIS: ICD-10-CM

## 2023-03-24 DIAGNOSIS — J45.901 EXACERBATION OF ASTHMA, UNSPECIFIED ASTHMA SEVERITY, UNSPECIFIED WHETHER PERSISTENT: ICD-10-CM

## 2023-03-24 DIAGNOSIS — R59.0 MEDIASTINAL LYMPHADENOPATHY: ICD-10-CM

## 2023-03-24 LAB
ALBUMIN UR-MCNC: NEGATIVE MG/DL
ANION GAP SERPL CALCULATED.3IONS-SCNC: 8 MMOL/L (ref 7–15)
APPEARANCE UR: ABNORMAL
BILIRUB UR QL STRIP: NEGATIVE
BUN SERPL-MCNC: 18 MG/DL (ref 6–20)
CALCIUM SERPL-MCNC: 9 MG/DL (ref 8.6–10)
CHLORIDE SERPL-SCNC: 100 MMOL/L (ref 98–107)
COLOR UR AUTO: YELLOW
CREAT SERPL-MCNC: 0.87 MG/DL (ref 0.51–0.95)
D DIMER PPP FEU-MCNC: 0.72 UG/ML FEU (ref 0–0.5)
DEPRECATED HCO3 PLAS-SCNC: 30 MMOL/L (ref 22–29)
ERYTHROCYTE [DISTWIDTH] IN BLOOD BY AUTOMATED COUNT: 13.6 % (ref 10–15)
FLUAV RNA SPEC QL NAA+PROBE: NEGATIVE
FLUBV RNA RESP QL NAA+PROBE: NEGATIVE
GFR SERPL CREATININE-BSD FRML MDRD: 88 ML/MIN/1.73M2
GLUCOSE SERPL-MCNC: 96 MG/DL (ref 70–99)
GLUCOSE UR STRIP-MCNC: NEGATIVE MG/DL
HCT VFR BLD AUTO: 41.6 % (ref 35–47)
HGB BLD-MCNC: 12.8 G/DL (ref 11.7–15.7)
HGB UR QL STRIP: NEGATIVE
KETONES UR STRIP-MCNC: NEGATIVE MG/DL
LEUKOCYTE ESTERASE UR QL STRIP: ABNORMAL
MCH RBC QN AUTO: 27.4 PG (ref 26.5–33)
MCHC RBC AUTO-ENTMCNC: 30.8 G/DL (ref 31.5–36.5)
MCV RBC AUTO: 89 FL (ref 78–100)
MUCOUS THREADS #/AREA URNS LPF: PRESENT /LPF
NITRATE UR QL: NEGATIVE
PH UR STRIP: 5.5 [PH] (ref 5–7)
PLATELET # BLD AUTO: 191 10E3/UL (ref 150–450)
POTASSIUM SERPL-SCNC: 4.1 MMOL/L (ref 3.4–5.3)
RBC # BLD AUTO: 4.67 10E6/UL (ref 3.8–5.2)
RBC URINE: 5 /HPF
RSV RNA SPEC NAA+PROBE: NEGATIVE
SARS-COV-2 RNA RESP QL NAA+PROBE: NEGATIVE
SODIUM SERPL-SCNC: 138 MMOL/L (ref 136–145)
SP GR UR STRIP: 1.03 (ref 1–1.03)
SQUAMOUS EPITHELIAL: 7 /HPF
TRANSITIONAL EPI: 4 /HPF
UROBILINOGEN UR STRIP-MCNC: NORMAL MG/DL
WBC # BLD AUTO: 9.1 10E3/UL (ref 4–11)
WBC URINE: 89 /HPF

## 2023-03-24 PROCEDURE — 85027 COMPLETE CBC AUTOMATED: CPT | Performed by: EMERGENCY MEDICINE

## 2023-03-24 PROCEDURE — 80048 BASIC METABOLIC PNL TOTAL CA: CPT | Performed by: EMERGENCY MEDICINE

## 2023-03-24 PROCEDURE — 87637 SARSCOV2&INF A&B&RSV AMP PRB: CPT | Performed by: EMERGENCY MEDICINE

## 2023-03-24 PROCEDURE — 250N000012 HC RX MED GY IP 250 OP 636 PS 637: Performed by: EMERGENCY MEDICINE

## 2023-03-24 PROCEDURE — 85379 FIBRIN DEGRADATION QUANT: CPT | Performed by: EMERGENCY MEDICINE

## 2023-03-24 PROCEDURE — 36415 COLL VENOUS BLD VENIPUNCTURE: CPT | Performed by: EMERGENCY MEDICINE

## 2023-03-24 PROCEDURE — 87088 URINE BACTERIA CULTURE: CPT | Performed by: EMERGENCY MEDICINE

## 2023-03-24 PROCEDURE — 94640 AIRWAY INHALATION TREATMENT: CPT

## 2023-03-24 PROCEDURE — 250N000009 HC RX 250: Performed by: EMERGENCY MEDICINE

## 2023-03-24 PROCEDURE — C9803 HOPD COVID-19 SPEC COLLECT: HCPCS

## 2023-03-24 PROCEDURE — 71046 X-RAY EXAM CHEST 2 VIEWS: CPT

## 2023-03-24 PROCEDURE — 99285 EMERGENCY DEPT VISIT HI MDM: CPT | Mod: 25,CS

## 2023-03-24 PROCEDURE — 81001 URINALYSIS AUTO W/SCOPE: CPT | Performed by: EMERGENCY MEDICINE

## 2023-03-24 PROCEDURE — 250N000013 HC RX MED GY IP 250 OP 250 PS 637: Performed by: EMERGENCY MEDICINE

## 2023-03-24 RX ORDER — PREDNISONE 20 MG/1
40 TABLET ORAL ONCE
Status: COMPLETED | OUTPATIENT
Start: 2023-03-24 | End: 2023-03-24

## 2023-03-24 RX ORDER — ALBUTEROL SULFATE 90 UG/1
4 AEROSOL, METERED RESPIRATORY (INHALATION) ONCE
Status: COMPLETED | OUTPATIENT
Start: 2023-03-24 | End: 2023-03-24

## 2023-03-24 RX ORDER — IPRATROPIUM BROMIDE AND ALBUTEROL SULFATE 2.5; .5 MG/3ML; MG/3ML
3 SOLUTION RESPIRATORY (INHALATION) ONCE
Status: COMPLETED | OUTPATIENT
Start: 2023-03-24 | End: 2023-03-24

## 2023-03-24 RX ORDER — PREDNISONE 20 MG/1
TABLET ORAL
Qty: 8 TABLET | Refills: 0 | Status: SHIPPED | OUTPATIENT
Start: 2023-03-25 | End: 2023-04-20

## 2023-03-24 RX ADMIN — IPRATROPIUM BROMIDE AND ALBUTEROL SULFATE 3 ML: .5; 3 SOLUTION RESPIRATORY (INHALATION) at 21:29

## 2023-03-24 RX ADMIN — PREDNISONE 40 MG: 20 TABLET ORAL at 21:28

## 2023-03-24 RX ADMIN — PREDNISONE 40 MG: 20 TABLET ORAL at 19:43

## 2023-03-24 RX ADMIN — ALBUTEROL SULFATE 4 PUFF: 90 AEROSOL, METERED RESPIRATORY (INHALATION) at 19:43

## 2023-03-24 RX ADMIN — IPRATROPIUM BROMIDE AND ALBUTEROL SULFATE 3 ML: .5; 3 SOLUTION RESPIRATORY (INHALATION) at 22:57

## 2023-03-24 ASSESSMENT — ACTIVITIES OF DAILY LIVING (ADL)
ADLS_ACUITY_SCORE: 35
ADLS_ACUITY_SCORE: 35

## 2023-03-24 NOTE — ED NOTES
"Patient back to door cussing and yelling at people in the ferraro. Patient continues to cough and refuses to wear a mask. Writer asked patient to stay in her room and if she is going to come out in the ferraro she will need to wear a mask. Patient states \"fuck you, I can do what I want\".  Patient asked again to stay in her room, and writer explained that patient was being discharged and an a ride was being arranged for patient.  Patient states \"this is fucking stupid, and I am just going to go home and call 911\".    "

## 2023-03-24 NOTE — ED NOTES
Pt was refusing to wear mask in lobby after multiple attempts to get pt to wear it. Security called. Pt put mask on with security present. Pt educated that if she will not wear mask in ED, she will be asked to leave.

## 2023-03-24 NOTE — ED TRIAGE NOTES
Patient brought in with [facility]- medic report says she's been c/o 'coughing up blood' for a couple of days. She showed medics her kleenex and said it was 'mucous streaked with tiny blood'.   [place]: Rodolfo Baldwin [phone]    Patient states every time she coughs she coughs up blood. Patient isn't able to tell the quantity or color. Patient states her breathing status is baseline- a little tachypnea with deep inspirations. States she used her inhaler today once.   Patient also c/o vaginal pain. She isn't sure if she has any discharge or odor.

## 2023-03-24 NOTE — ED NOTES
Patient in doorway coughing at people as they walk by, patient again asked to stay in her room. Patient's door closed.

## 2023-03-24 NOTE — ED TRIAGE NOTES
Pt BIBA for SOB and vaginal pain. EMS was called out to  - pt was cleared medically. Pt called EMS back an hour later for same. Pt states she coughed up blood but EMS noted that pt was drinking red koolaid at the time. Pt also c/o vaginal pain.

## 2023-03-24 NOTE — ED NOTES
Patient heard yelling in her room, patient had various items that she wanted including multiple meal trays and beverages. Writer informed patient that she was discharged and just waiting for a ride back to her group home, patient again yelling in room

## 2023-03-24 NOTE — ED PROVIDER NOTES
History   Chief Complaint:  Shortness of Breath and Vaginal Pain     The history is provided by the patient and medical records.      Dionne Perez is a 38 year old female with a history of agitation and psychiatric disorders who presents by EMS with shortness of breath and vaginal pain. The patient reports that she has been having vaginal issues for quite a while.  She states that her trouble breathing is a dry cough.  She is a poor historian.    Review of External Notes:   I personally reviewed extensive prior records in her electronic chart, she had a negative chest x-ray in December, another 1 on February 25.  She had nother negative chest X-ray yesterday, and had a pelvic exam on the 19th of March. She had a wet prep yesterday through  that was negative.  She had an ultrasound of her legs yesterday that was negative.     ROS:  Review of Systems   All other systems reviewed and are negative.    Allergies:  Ibuprofen     Medications:    Desyrel   Pravachol   Zyprexa   Mycostatin   Levothyroxine   Lexapro   Pepcid   Aspirin     Past Medical History:    Depression   Thyroid disease   High cholesterol   GERD   Gallstone pancreatitis   Anxiety   Adjustment reaction   Hypertension   Cellulitis   Cognitive impairment     Past Surgical History:    Lumbar puncture  Tonsillectomy   Leg surgery     Social History:  The patient lives at a group home.   PCP: [facility], [facility]     Physical Exam     Patient Vitals for the past 24 hrs:   BP Temp Temp src Pulse Resp SpO2   03/23/23 1902 137/56 (!) 96.7  F (35.9  C) Temporal 68 20 97 %      Physical Exam  General: Nontoxic-appearing, recumbent in the gurney in room 15, later seen ambulating around the emergency department without any apparent respiratory distress  HENT: mucous membranes moist  CV: rate as above, regular rhythm, thick legs bilaterally but no pitting lower extremity edema  Resp: Clear throughout, unlabored, no cough observed, no stridor  GI:  abdomen soft and nontender, no guarding  MSK: no bony tenderness  Skin: appropriately warm and dry  Neuro: alert, clear speech, oriented though poor historian, ambulates with steady gait  Psych: cooperative with basic cares, no apparent hallucinations    Emergency Department Course   Emergency Department Course & Assessments:  Assessments:  1940 I obtained history and examined the patient as reported above.     Disposition:  The patient was discharged to home.     Impression & Plan    Medical Decision Making:  The patient has had many recent visits for related concerns, with significant recent work-up such I did not feel that repeat imaging was indicated, having considered variety potentially dangerous causes.  No fever or hypoxia nor focal abnormalities on lung exam to suggest a bacterial pneumonia.  Similarly lung exam is normal, highly doubt significant pneumothorax.  No chest pain to suggest a cardiac condition.  Patient had a pelvic exam done recently, no wet prep just yesterday, I do not think that repeating this examination is indicated.  Patient is very vague regarding the vaginal concerns.  Secondary gain was considered but the patient ultimately excepted transfer back to the group home once we reached her guardian and care facility, all of whom are very comfortable with her going back there for ongoing care and returning here for crisis.  While recent history would suggest that she may return to care with similar concerns in the near future, at this time I do not think that further emergent work-up or treatment is indicated, nor did she wish to have any treatment or testing done here either.  Underlying psychiatric illness may be playing a role though no signs of decompensated illness such that she would require formal evaluation by DEC or psychiatric hospitalization at this time.    Diagnosis:    ICD-10-CM    1. Dyspnea, unspecified type  R06.00       2. History of psychiatric disorder  Z86.59             Scribe Disclosure:  I, Torrey Escobar, am serving as a scribe at 7:34 PM on 3/23/2023 to document services personally performed by Mono Casillas MD based on my observations and the provider's statements to me.      3/23/2023   Mono Casillas MD Reitsema, Jeffrey Alan, MD  03/23/23 2659

## 2023-03-24 NOTE — ED NOTES
Patient out in hallway continuing to refuse to wear a mask. Patient asked by another staff member to step back in her room, patient refused, patient began showing aggressive behavior with staff member, stepping into staff members space and refusing to stay in room.

## 2023-03-25 ENCOUNTER — APPOINTMENT (OUTPATIENT)
Dept: CT IMAGING | Facility: CLINIC | Age: 38
End: 2023-03-25
Attending: EMERGENCY MEDICINE
Payer: MEDICARE

## 2023-03-25 PROCEDURE — 71275 CT ANGIOGRAPHY CHEST: CPT | Mod: MA

## 2023-03-25 PROCEDURE — 250N000011 HC RX IP 250 OP 636: Performed by: EMERGENCY MEDICINE

## 2023-03-25 PROCEDURE — 250N000009 HC RX 250: Performed by: EMERGENCY MEDICINE

## 2023-03-25 RX ORDER — IOPAMIDOL 755 MG/ML
500 INJECTION, SOLUTION INTRAVASCULAR ONCE
Status: COMPLETED | OUTPATIENT
Start: 2023-03-25 | End: 2023-03-25

## 2023-03-25 RX ORDER — ALBUTEROL SULFATE 0.83 MG/ML
2.5 SOLUTION RESPIRATORY (INHALATION) ONCE
Status: COMPLETED | OUTPATIENT
Start: 2023-03-25 | End: 2023-03-25

## 2023-03-25 RX ADMIN — ALBUTEROL SULFATE 2.5 MG: 2.5 SOLUTION RESPIRATORY (INHALATION) at 00:49

## 2023-03-25 RX ADMIN — IOPAMIDOL 90 ML: 755 INJECTION, SOLUTION INTRAVENOUS at 01:40

## 2023-03-25 ASSESSMENT — ACTIVITIES OF DAILY LIVING (ADL)
ADLS_ACUITY_SCORE: 35
ADLS_ACUITY_SCORE: 35

## 2023-03-25 NOTE — DISCHARGE INSTRUCTIONS
What do you do next:   Continue your home medications unless we have specifically changed them  Take the steroid medication we have prescribed for your asthma flareup.  Continue to use your inhaler medication at home.  The lymph node seen on your CT could certainly be reactive.  You should follow with your primary care clinic for further monitoring of this.  Follow up as indicated below    When do you return: If you have severe shortness of breath, lightheadedness/fainting, uncontrollable fevers, or any other symptoms that concern you, please return to the ED for reevaluation.    Thank you for allowing us to care for you today.

## 2023-03-25 NOTE — ED PROVIDER NOTES
"  History     Chief Complaint:  Hemoptysis and Vaginal Pain     HPI   Dionne Perez is a 38 year old female who presents with hemoptysis and vaginal pain. Patient reports persisting coughing over the last week with new hemoptysis that's occurred once per day on Monday, Tuesday, and again today. States this is dark red and reports bringing up \"lots\" of blood mixed with mucus. She does feel increased shortness of breath with exertion over the last week as well. No dyspnea at rest. She does use an albuterol inhaler and nebulizer at home. Denies similar symptoms in the past. The patient has second complaint of constant, \"shooting\" vaginal pain ongoing for weeks now. Reports vaginal itching and states her pain is worsened when walking and alleviated when talking a shower. Denies abdominal pain, dysuria, pain with bowel movements, or vaginal bleeding/discharge. No fever, nausea, vomiting, sore throat, or known sick contacts. The patient reportedly does not menstruate and is not sexually active. No recent antibiotic use. Reports a history of thyroid disease, for which she takes synthroid.    Independent Historian:  None - Patient Only    Review of External Notes:  3/22/23 urgent care note: Bloody nose last few days, some blood in cough. Vaginal itching/pain. Negative GC/Chlamy/Candida.    ROS:  See HPI    Allergies:  Ibuprofen     Physical Exam     Patient Vitals for the past 24 hrs:   BP Temp Temp src Pulse Resp SpO2   03/24/23 2022 127/50 97.9  F (36.6  C) Oral 62 24 99 %   03/24/23 1639 122/83 97.6  F (36.4  C) Temporal 66 28 95 %      Physical Exam  Constitutional: Vital signs reviewed as above.   Head: No external signs of trauma noted.  Eyes: Pupils are equal, round, and reactive to light.   Neck: No JVD noted  Cardiovascular: normal rate, Regular rhythm and normal heart sounds.  No murmur heard. Equal B/L peripheral pulses.  Pulmonary/Chest: Tachypnea with B/L inspiratory and expiratory wheezing  Gastrointestinal: " Soft. There is no tenderness.   Musculoskeletal/Extremities: No pitting edema noted. Normal tone.  Neurological: Patient is alert and oriented to person, place, and time.   Skin: Skin is warm and dry. There is no diaphoresis noted.   Psychiatric: The patient appears calm.        Emergency Department Course     Imaging:  CT Chest Pulmonary Embolism w Contrast   Preliminary Result   IMPRESSION:    1.  No visualized pulmonary embolus.   2.  No evidence of active pulmonary disease.   3.  Nonspecific mildly enlarged mediastinal lymph node.   4.  Partial visualization of mild splenomegaly.       XR Chest 2 Views   Final Result   IMPRESSION: Negative chest.         Report per radiology    Laboratory:  Labs Ordered and Resulted from Time of ED Arrival to Time of ED Departure   ROUTINE UA WITH MICROSCOPIC - Abnormal       Result Value    Color Urine Yellow      Appearance Urine Slightly Cloudy (*)     Glucose Urine Negative      Bilirubin Urine Negative      Ketones Urine Negative      Specific Gravity Urine 1.026      Blood Urine Negative      pH Urine 5.5      Protein Albumin Urine Negative      Urobilinogen Urine Normal      Nitrite Urine Negative      Leukocyte Esterase Urine Large (*)     Mucus Urine Present (*)     RBC Urine 5 (*)     WBC Urine 89 (*)     Squamous Epithelials Urine 7 (*)     Transitional Epithelials Urine 4 (*)    CBC WITH PLATELETS - Abnormal    WBC Count 9.1      RBC Count 4.67      Hemoglobin 12.8      Hematocrit 41.6      MCV 89      MCH 27.4      MCHC 30.8 (*)     RDW 13.6      Platelet Count 191     BASIC METABOLIC PANEL - Abnormal    Sodium 138      Potassium 4.1      Chloride 100      Carbon Dioxide (CO2) 30 (*)     Anion Gap 8      Urea Nitrogen 18.0      Creatinine 0.87      Calcium 9.0      Glucose 96      GFR Estimate 88     D DIMER QUANTITATIVE - Abnormal    D-Dimer Quantitative 0.72 (*)    INFLUENZA A/B, RSV, & SARS-COV2 PCR - Normal    Influenza A PCR Negative      Influenza B PCR  Negative      RSV PCR Negative      SARS CoV2 PCR Negative     URINE CULTURE      Procedures       Emergency Department Course & Assessments:     Interventions:  Medications   predniSONE (DELTASONE) tablet 40 mg (40 mg Oral $Given 3/24/23 1943)   albuterol (PROVENTIL HFA/VENTOLIN HFA) inhaler (4 puffs Inhalation $Given 3/24/23 1943)   ipratropium - albuterol 0.5 mg/2.5 mg/3 mL (DUONEB) neb solution 3 mL (3 mLs Nebulization $Given 3/24/23 2129)   predniSONE (DELTASONE) tablet 40 mg (40 mg Oral $Given 3/24/23 2128)   ipratropium - albuterol 0.5 mg/2.5 mg/3 mL (DUONEB) neb solution 3 mL (3 mLs Nebulization $Given 3/24/23 2257)   albuterol (PROVENTIL) neb solution 2.5 mg (2.5 mg Nebulization $Given 3/25/23 0049)   sodium chloride (PF) 0.9% PF flush 100 mL (90 mLs Intravenous $Given 3/25/23 0140)   iopamidol (ISOVUE-370) solution 500 mL (90 mLs Intravenous $Given 3/25/23 0140)      Assessments, Independent Interpretation, Consult/Discussion of ManagementTests:     ED Course as of 03/25/23 0206   Fri Mar 24, 2023   2043 XR Chest 2 Views  NAD   2118 I obtained history and examined the patient as noted above.   2227 Rechecked and updated.   Sat Mar 25, 2023   0206 Updated patient on CT result. Ok for DC.     Social Determinants of Health affecting care:  None       Disposition:  The patient was discharged to home.    Impression & Plan    CMS Diagnoses: None      Medical Decision Making:  This 38-year-old female patient presents the ED due to cough and concern for hemoptysis.  Please see the HPI and exam for specifics.  The patient also mentions some vague abdominal pain but record review notes that she was seen for both this and some degree of a bloody nose and coughing up blood at an urgent care visit recently.  Vaginal exam did not reveal anything acute.  The patient has had no vaginal bleeding.    The patient was notably wheezy in the emergency department and that did seem to improve with bronchodilator treatment.   D-dimer had been ordered and was elevated and subsequent CT is pending at this time.    The patient was signed over to my colleague, Dr. Nguyen, for reevaluation and disposition pending CT results.  I anticipate discharge.    Critical Care time:  was 0 minutes for this patient excluding procedures.    Diagnosis:    ICD-10-CM    1. Exacerbation of asthma, unspecified asthma severity, unspecified whether persistent  J45.901       2. Hemoptysis  R04.2       3. Mediastinal lymphadenopathy  R59.0            Discharge Medications:  New Prescriptions    PREDNISONE (DELTASONE) 20 MG TABLET    Take two tablets (= 40mg) each day for 4 (four) days      3/24/2023   Chente Huerta DO     Scribe Disclosure:  I, Myrna Connell, am serving as a scribe at 7:57 PM on 3/24/2023 to document services personally performed by Chente Huerta DO based on my observations and the provider's statements to me.    Historical Data:  ______________________________________________________________________  Medications:    Albuterol  Aspirin 325 mg  Docusate sodium  Escitalopram  Famotidine  Levothyroxine  Nystatin  Olanzapine zydis  Pravastatin   Cyclobenzaprine  Medroxyprogesterone  Meclizine  Prednisone  Trazodone  Flonase  Risperidone    Past Medical History:   Past Surgical History:     Past Medical History:   Diagnosis Date     Depressive disorder      Gastroesophageal reflux disease      High cholesterol      Knee pain      Thyroid disease     Past Surgical History:   Procedure Laterality Date     IR LUMBAR PUNCTURE  6/9/2020   tonsillectomy  Leg surgery   Patient Active Problem List    Diagnosis Date Noted     Agitation 08/03/2022     Priority: Medium     Aggression 08/03/2022     Priority: Medium     Abnormal behavior 08/03/2022     Priority: Medium     Altered mental status 06/06/2020     Priority: Medium   Major neurocognitive disorder  Insomnia  Iron deficiency anemia  Chronic abdominal  pain  Hypercholesterolemia  Adjustment reaction with aggression  Suicidal ideations  Chronic sinus bradycardia  Hypertension  Syncope  Gallstone pancreatitis  Recurrent major depressive disorder  Biliary calculus  Hypothyroidism  Morbid obesity  ROSY  Developmental disability      Family History:    Cataract  Diabetes mellitus  Breast cancer  Lung cancer Social History:   reports that she has never smoked. She has never used smokeless tobacco. She reports that she does not drink alcohol and does not use drugs.     PCP: [facility] [place], Bradley Joseph,   03/25/23 0119       Chente Huerta, DO  03/25/23 0206       Chente Huerta, DO  03/25/23 0206

## 2023-03-25 NOTE — ED NOTES
Spoke with  Patricia [phone]. Did confirm that there would be staff at  to received patient on discharge.

## 2023-03-25 NOTE — ED NOTES
Rapid Assessment Note    History:   Dionne Perez is a 38 year old female who presents with EMS from [facility] for a cough with blood and vaginal bleeding. Patient reports she has been coughing up blood for a week. She did use her inhaler once today. In addition, she reports to have vaginal bleeding. She is unsure if her vaginal area is red or swollen as she is unable to look. However, she endorses dysuria. Yet denies having vaginal discharge. Denies being sexually active as well.     Exam:   General:  Alert, interactive  Cardiovascular:  Well perfused  Lungs:  No respiratory distress, no accessory muscle use  Neuro:  Moving all 4 extremities  Skin:  Warm, dry  Psych:  Normal affect  Mild expiratory wheezing no significant tachypnea    Plan of Care:       Cough shortness of breath and reported hemoptysis.  She is PERC criteria negative.  Also complains of vaginal pain and dysuria for a week.  Review care everywhere.  She had a chest x-ray that was negative on the 22nd.  She also had a wet prep that was negative recently as well.  In January had a D-dimer that was negative.  We will do a chest x-ray basic blood test albuterol dose of prednisone given the clear asthma exacerbation.  Suspicion given recent history story and exam is very low that this would be secondary to a pulmonary embolism.  On an visit in Care Everywhere she mentions some epistaxis certainly could cause some blood-tinged sputum.      I evaluated the patient and developed an initial plan of care. I discussed this plan and explained that I, or one of my partners, would be returning to complete the evaluation.         I, Damaso Clark, am serving as a scribe to document services personally performed by Dr. Liang MD, based on my observations and the provider's statements to me.    3/24/2023  [employer]    Portions of this medical record were completed by a scribe. UPON MY REVIEW AND AUTHENTICATION BY ELECTRONIC  SIGNATURE, this confirms (a) I performed the applicable clinical services, and (b) the record is accurate.      Zachery Tavera MD  03/24/23 1935

## 2023-03-26 ENCOUNTER — TELEPHONE (OUTPATIENT)
Dept: EMERGENCY MEDICINE | Facility: CLINIC | Age: 38
End: 2023-03-26
Payer: MEDICARE

## 2023-03-26 LAB
BACTERIA UR CULT: ABNORMAL
BACTERIA UR CULT: ABNORMAL

## 2023-03-26 RX ORDER — CEFPODOXIME PROXETIL 100 MG/1
100 TABLET, FILM COATED ORAL 2 TIMES DAILY
Qty: 10 TABLET | Refills: 0 | Status: CANCELLED | OUTPATIENT
Start: 2023-03-26 | End: 2023-03-31

## 2023-03-26 NOTE — TELEPHONE ENCOUNTER
[facility] Emergency Department/Urgent Care Lab result notification  [Note:  ED Lab Results RN will reference the [facility] Emergency Dept visit note prior to contacting patient AND/OR prior to consulting Emergency Dept Provider.  Highlights of Emergency Dept visit in information summary at the bottom of this telephone note]    1. Reason for call    Notify the patient/parent of lab results    Assess patient symptoms    Review ED providers recommendations/discharge instructions (if necessary)    Advise per [facility] ED lab result protocol    2. Lab Result (including Rx patient on, if applicable).  Copy of lab result from [employer] at bottom of charting  Final urine culture on 3/26/23 shows the presence of bacteria(s): 50,000-100,000 CFU/mL Streptococcus agalactiae (Group B Streptococcus  [facility] Emergency Dept discharge antibiotic: None  Recommendations in treatment per [facility] lab result Urine Culture protocol.    3. RN Assessment (Patient's current Symptoms):    Time of call: 9:59a    Assessment: await call back    4. RN Recommendations/Instructions per [facility] ED lab result protocol    [facility] ED lab result protocol used: Urine Culture    RN will consult with [facility] Emergency Dept Provider and then call patient back with recommendations (Yes/No/NA):     Patient/parent notified of lab result and treatment recommendations (Yes/No):         RN consultation note with [facility] Emergency Dept Provider   Why consultation necessary, See SBAR below:    S (situation, reason for consult):     B (background):     A (assessment): See RN assessment    R (RN's recommendations):       [facility] Emergency Department Provider:     Consultation Time:     Provider Recommendation:      Patient/parent notified of Providers recommendations (YES/NO):        5. Please Contact your PCP clinic or return to the Emergency department if your:    Symptoms return.    Symptoms do  "not improve after 3 days on antibiotic.    Symptoms do not resolve after completing antibiotic.    Symptoms worsen or other concerning symptoms.    6. Information summary from Emergency Dept/Urgent Care visit on 3/24/23  Symptoms reported at ED visit (Chief complaint, HPI) Hemoptysis and Vaginal Pain     HPI   Dionne Perez is a 38 year old female who presents with hemoptysis and vaginal pain. Patient reports persisting coughing over the last week with new hemoptysis that's occurred once per day on Monday, Tuesday, and again today. States this is dark red and reports bringing up \"lots\" of blood mixed with mucus. She does feel increased shortness of breath with exertion over the last week as well. No dyspnea at rest. She does use an albuterol inhaler and nebulizer at home. Denies similar symptoms in the past. The patient has second complaint of constant, \"shooting\" vaginal pain ongoing for weeks now. Reports vaginal itching and states her pain is worsened when walking and alleviated when talking a shower. Denies abdominal pain, dysuria, pain with bowel movements, or vaginal bleeding/discharge. No fever, nausea, vomiting, sore throat, or known sick contacts. The patient reportedly does not menstruate and is not sexually active. No recent antibiotic use. Reports a history of thyroid disease, for which she takes synthroid.   Significant Medical hx, if applicable (i.e. CKD, diabetes) Reviewed   Allergies Allergies   Allergen Reactions     Ibuprofen       Weight, if applicable Wt Readings from Last 2 Encounters:   01/23/23 (!) 158.8 kg (350 lb)   08/05/22 (!) 164 kg (361 lb 9.6 oz)      Coumadin/Warfarin [Yes /No] No   Creatinine Level (mg/dl) Creatinine   Date Value Ref Range Status   03/24/2023 0.87 0.51 - 0.95 mg/dL Final   07/07/2020 0.53 0.52 - 1.04 mg/dL Final      Creatinine clearance (ml/min), if applicable Serum creatinine: 0.87 mg/dL 03/24/23 1943  Estimated creatinine clearance: 140.5 mL/min   Pregnant " (Yes/No/NA) ?   Breastfeeding (Yes/No/NA) ?   ED providers Impression and Plan (applicable information) This 38-year-old female patient presents the ED due to cough and concern for hemoptysis.  Please see the HPI and exam for specifics.  The patient also mentions some vague abdominal pain but record review notes that she was seen for both this and some degree of a bloody nose and coughing up blood at an urgent care visit recently.  Vaginal exam did not reveal anything acute.  The patient has had no vaginal bleeding.     The patient was notably wheezy in the emergency department and that did seem to improve with bronchodilator treatment.  D-dimer had been ordered and was elevated and subsequent CT is pending at this time.     The patient was signed over to my colleague, Dr. Nguyen, for reevaluation and disposition pending CT results.  I anticipate discharge.      ED diagnosis  Exacerbation of asthma, unspecified asthma severity, unspecified whether persistent     Hemoptysis     Mediastinal lymphadenopathy     ED provider Chente Huerta, DO        7. Copy of Lab result       Aurora Bradford RN  [facility]  Emergency Dept Lab Result RN  Ph# [phone]

## 2023-03-27 ENCOUNTER — HOSPITAL ENCOUNTER (EMERGENCY)
Facility: CLINIC | Age: 38
Discharge: NURSING, REHAB OR LONG-TERM CARE | End: 2023-03-27
Attending: EMERGENCY MEDICINE | Admitting: EMERGENCY MEDICINE
Payer: MEDICARE

## 2023-03-27 VITALS
OXYGEN SATURATION: 96 % | RESPIRATION RATE: 20 BRPM | SYSTOLIC BLOOD PRESSURE: 127 MMHG | HEART RATE: 62 BPM | WEIGHT: 293 LBS | BODY MASS INDEX: 54.83 KG/M2 | TEMPERATURE: 99.1 F | DIASTOLIC BLOOD PRESSURE: 74 MMHG

## 2023-03-27 DIAGNOSIS — M79.605 PAIN OF LEFT LOWER EXTREMITY: ICD-10-CM

## 2023-03-27 DIAGNOSIS — Z79.52 CURRENT USE OF STEROID MEDICATION: ICD-10-CM

## 2023-03-27 DIAGNOSIS — R06.00 DYSPNEA, UNSPECIFIED TYPE: ICD-10-CM

## 2023-03-27 LAB
ANION GAP SERPL CALCULATED.3IONS-SCNC: 12 MMOL/L (ref 7–15)
ATRIAL RATE - MUSE: 47 BPM
BASOPHILS # BLD AUTO: 0 10E3/UL (ref 0–0.2)
BASOPHILS NFR BLD AUTO: 0 %
BUN SERPL-MCNC: 19.2 MG/DL (ref 6–20)
CALCIUM SERPL-MCNC: 9.1 MG/DL (ref 8.6–10)
CHLORIDE SERPL-SCNC: 101 MMOL/L (ref 98–107)
CREAT SERPL-MCNC: 0.77 MG/DL (ref 0.51–0.95)
DEPRECATED HCO3 PLAS-SCNC: 25 MMOL/L (ref 22–29)
DIASTOLIC BLOOD PRESSURE - MUSE: NORMAL MMHG
EOSINOPHIL # BLD AUTO: 0 10E3/UL (ref 0–0.7)
EOSINOPHIL NFR BLD AUTO: 0 %
ERYTHROCYTE [DISTWIDTH] IN BLOOD BY AUTOMATED COUNT: 13.7 % (ref 10–15)
GFR SERPL CREATININE-BSD FRML MDRD: >90 ML/MIN/1.73M2
GLUCOSE SERPL-MCNC: 131 MG/DL (ref 70–99)
HCT VFR BLD AUTO: 43.3 % (ref 35–47)
HGB BLD-MCNC: 13.3 G/DL (ref 11.7–15.7)
IMM GRANULOCYTES # BLD: 0.2 10E3/UL
IMM GRANULOCYTES NFR BLD: 1 %
INTERPRETATION ECG - MUSE: NORMAL
LYMPHOCYTES # BLD AUTO: 1.3 10E3/UL (ref 0.8–5.3)
LYMPHOCYTES NFR BLD AUTO: 8 %
MCH RBC QN AUTO: 27.3 PG (ref 26.5–33)
MCHC RBC AUTO-ENTMCNC: 30.7 G/DL (ref 31.5–36.5)
MCV RBC AUTO: 89 FL (ref 78–100)
MONOCYTES # BLD AUTO: 0.5 10E3/UL (ref 0–1.3)
MONOCYTES NFR BLD AUTO: 3 %
NEUTROPHILS # BLD AUTO: 14 10E3/UL (ref 1.6–8.3)
NEUTROPHILS NFR BLD AUTO: 88 %
NRBC # BLD AUTO: 0 10E3/UL
NRBC BLD AUTO-RTO: 0 /100
P AXIS - MUSE: 47 DEGREES
PLATELET # BLD AUTO: 237 10E3/UL (ref 150–450)
POTASSIUM SERPL-SCNC: 4.6 MMOL/L (ref 3.4–5.3)
PR INTERVAL - MUSE: 122 MS
QRS DURATION - MUSE: 100 MS
QT - MUSE: 480 MS
QTC - MUSE: 424 MS
R AXIS - MUSE: 15 DEGREES
RBC # BLD AUTO: 4.88 10E6/UL (ref 3.8–5.2)
SODIUM SERPL-SCNC: 138 MMOL/L (ref 136–145)
SYSTOLIC BLOOD PRESSURE - MUSE: NORMAL MMHG
T AXIS - MUSE: 16 DEGREES
TROPONIN T SERPL HS-MCNC: <6 NG/L
VENTRICULAR RATE- MUSE: 47 BPM
WBC # BLD AUTO: 16.1 10E3/UL (ref 4–11)

## 2023-03-27 PROCEDURE — 85025 COMPLETE CBC W/AUTO DIFF WBC: CPT | Performed by: EMERGENCY MEDICINE

## 2023-03-27 PROCEDURE — 93005 ELECTROCARDIOGRAM TRACING: CPT

## 2023-03-27 PROCEDURE — 99284 EMERGENCY DEPT VISIT MOD MDM: CPT

## 2023-03-27 PROCEDURE — 80048 BASIC METABOLIC PNL TOTAL CA: CPT | Performed by: EMERGENCY MEDICINE

## 2023-03-27 PROCEDURE — 36415 COLL VENOUS BLD VENIPUNCTURE: CPT | Performed by: EMERGENCY MEDICINE

## 2023-03-27 PROCEDURE — 84484 ASSAY OF TROPONIN QUANT: CPT | Performed by: EMERGENCY MEDICINE

## 2023-03-27 ASSESSMENT — ACTIVITIES OF DAILY LIVING (ADL)
ADLS_ACUITY_SCORE: 35
ADLS_ACUITY_SCORE: 33

## 2023-03-27 NOTE — ED PROVIDER NOTES
"  History     Chief Complaint:  Knee Pain, Chest Pain, and Shortness of Breath     The history is provided by the patient.   History limited as patient is a poor historian, supplemented by extensive electronic chart review    Dionne Perez is a 38 year old female with a history of adjustment disorder, cognitive impairment, hypertension, and hyperlipidemia who presents with bilateral knee pain. She was seen yesterday for a breathing problem and discharged with no new prescriptions. She was also seen at Urgent Care earlier today.    Patient has been seen almost daily for about a week for various concerns, often breathing, sometimes vaginal concerns and others.  She apparently called EMS today from her group home with concern for left knee pain that she has had for \"a while\", she went to [facility] for evaluation of this, and subsequently walked over to [facility] urgent care to discuss her breathing issues.  She was brought here from there by EMS.    During one of her recent visits here several days ago, she was prescribed prednisone to treat an asthma exacerbation.  Viral panel was negative on March 24.    Independent Historian:   EMS    Review of External Notes: PE study 3/24/23, impression below.  I also reviewed prior electrocardiograms showing heart rates consistently in the low 50s.  She had an echocardiogram in 2020 showing a preserved ejection fraction.    CT Chest Pulmonary Embolism 3/24/23 IMPRESSION:   1.  No visualized pulmonary embolus.  2.  No evidence of active pulmonary disease.  3.  Nonspecific mildly enlarged mediastinal lymph node.  4.  Partial visualization of mild splenomegaly.      ROS:  Review of Systems   All other systems reviewed and are negative.    Allergies:  Ibuprofen     Medications:    Albuterol inhaler   Aspirin 325 mg   Colace  Lexapro  Pepcid  Levothyroxine   Zyprexa  Pravachol  Desyrel  Provera    Past Medical History:    Depression   GERD  Hyperlipidemia   Knee pain  Hypothyroidism " "  Iron deficiency anemia   Adjustment reaction with aggression  Gallstone pancreatitis   Generalized anxiety disorder   Acute costochondritis  Hypertension   Cognitive impairment  Morbid obesity    Past Surgical History:    Lumbar puncture  Cholecystectomy   Tonsillectomy      Family History:   Mother- lung cancer, cataract  Sister- breast cancer, diabetes mellitus     Social History:  The patient presents to the ED alone via EMS.  Patient lives in a group home.  PCP: [facility], [facility]     Physical Exam     Patient Vitals for the past 24 hrs:   BP Temp Temp src Pulse Resp SpO2 Weight   03/27/23 1508 127/74 99.1  F (37.3  C) Oral 62 20 96 % (!) 158.8 kg (350 lb 1.5 oz)      Physical Exam  General: Nontoxic-appearing woman recumbent in room 19, laughing along with a TV show on the screen and telling me \"this is my favorite show!\"  HENT: mucous membranes moist  CV: rate as above, regular rhythm, thick legs bilaterally but no pitting lower extremity edema  Resp: Clear throughout, no wheezing, speaks in full phrases, ambulates out to the lobby to pay for and retrieve her pizza and walks back without any apparent respiratory distress, no cough observed  GI: abdomen soft and nontender, no guarding  MSK: no bony tenderness, no pain with axial loading of either knee, patient able to ambulate independently without apparent discomfort  Skin: appropriately warm and dry, no acute rash to the knee  Neuro: alert, clear speech, oriented though somewhat poor historian  Psych: cooperative with basic cares, no apparent hallucinations      Emergency Department Course   ECG:  ECG results from 03/27/23   EKG 12-lead, tracing only     Value    Systolic Blood Pressure     Diastolic Blood Pressure     Ventricular Rate 47    Atrial Rate 47    NM Interval 122    QRS Duration 100        QTc 424    P Axis 47    R AXIS 15    T Axis 16    Interpretation ECG      Sinus bradycardia  Otherwise normal ECG  When compared with ECG of " 19-DEC-2022 19:53,  No significant change was found  Confirmed by - EMERGENCY ROOM, PHYSICIAN ([id number]),  DELORES DAVIS ([id number]) on 3/27/2023 3:36:15 PM       Laboratory:  Labs Ordered and Resulted from Time of ED Arrival to Time of ED Departure   BASIC METABOLIC PANEL - Abnormal       Result Value    Sodium 138      Potassium 4.6      Chloride 101      Carbon Dioxide (CO2) 25      Anion Gap 12      Urea Nitrogen 19.2      Creatinine 0.77      Calcium 9.1      Glucose 131 (*)     GFR Estimate >90     CBC WITH PLATELETS AND DIFFERENTIAL - Abnormal    WBC Count 16.1 (*)     RBC Count 4.88      Hemoglobin 13.3      Hematocrit 43.3      MCV 89      MCH 27.3      MCHC 30.7 (*)     RDW 13.7      Platelet Count 237      % Neutrophils 88      % Lymphocytes 8      % Monocytes 3      % Eosinophils 0      % Basophils 0      % Immature Granulocytes 1      NRBCs per 100 WBC 0      Absolute Neutrophils 14.0 (*)     Absolute Lymphocytes 1.3      Absolute Monocytes 0.5      Absolute Eosinophils 0.0      Absolute Basophils 0.0      Absolute Immature Granulocytes 0.2      Absolute NRBCs 0.0     TROPONIN T, HIGH SENSITIVITY - Normal    Troponin T, High Sensitivity <6       Emergency Department Course & Assessments:  ED Course as of 03/27/23 2203   Mon Mar 27, 2023   1903 I obtained the history and examined the patient as noted above.    1941 I rechecked and updated the patient. She was eating pizza and appeared well.       Independent Interpretation (X-rays, CTs, rhythm strip):  N/A    Consultations/Discussion of Management or Tests:  None     Social Determinants of Health affecting care:  Access to care, resident of group home, chronic neuropsychiatric illness    Disposition:  The patient was discharged to home.     Impression & Plan    Medical Decision Making:  The cause of the patient's ongoing dyspnea is unclear, though at this time there is simply no evidence of bronchospasm to suggest ongoing significant reactive airway  disease flare, and in light of multiple recent evaluations including a CT of the chest with pulmonary embolism protocol just a few days ago, in combination with no respiratory distress or hypoxia nor fever, I do not think that antibiotics or further emergent work-up is indicated at this time.  I do not think that repeat formal assessment for pulmonary embolism is indicated.  Patient has mild bradycardia here, consistent with baseline condition.  Patient had been evaluated in intake by my colleague, was told that blood test will be done, this was performed with no evidence of acute coronary syndrome.  There is no evidence of severely decompensated psychiatric illness or acute neurologic process though I considered whether there might be some secondary gain involved, noting that the patient seemed to derive great pleasure from watching her favorite TV show in the ED room as well as purchasing, retrieving from the lobby and then consuming an entire pizza while here in the ED.  Her leukocytosis can be attributed to recent use of steroids, I do not think this represents an indicator of serious bacterial infection at this time.  She is recumbent without any hint of orthopnea, highly doubt CHF exacerbation.  Patient ambulates without difficulty, which we witnessed, I see no evidence of an acute threat to life or limb with regard to her leg pain, and I consider all of her symptoms appropriate for outpatient management.  As before, her recent pattern of care suggests that return to care in the near future is fairly likely, though with normal vitals, benign exam, and the behaviors exhibited above, I cannot justify further work-up or treatment at this time and after some delays in reaching her group home staff by phone, as well as calling her guardian, we have arranged for her to be transferred back to her group home for ongoing outpatient care.    Diagnosis:    ICD-10-CM    1. Dyspnea, unspecified type  R06.00       2.  Current use of steroid medication  Z79.52       3. Pain of left lower extremity  M79.605          Scribe Disclosure:  I, Gladis Hoffmann, am serving as a scribe at 7:14 PM on 3/27/2023 to document services personally performed by Mono Casillas MD based on my observations and the provider's statements to me.     3/27/2023   Mono Casillas MD Reitsema, Jeffrey Alan, MD  03/27/23 2202

## 2023-03-27 NOTE — ED NOTES
PIT/Triage Evaluation    Patient presented with a history of cognitive delay.  She does use an inhaler at home.  She came from urgent care after visiting [facility] for a visit for knee pain.  The patient said that she has had 6 months of shortness of breath her inhaler has not been working.  She has had some lower chest pain that she describes as sharp but that has been there for a while as well.  The patient said that what it changed was that she was coughing up blood in her phlegm sounds like small clots.  She did states she had nosebleeds but cannot state the timing of this.  She was therefore sent via EMS to the ER.    Exam is notable for:  General:  No respiratory distress, patient speaks in full sentences    Cardiovascular: Good cap refill.    Respiratory: Breathing non labored.  There are some wheezing present    Musculoskeletal: No tenderness. No bony deformity.     Skin: No rashes or petechiae apparent dried blood on her shirt    Neurologic: non focal alert and answering questions        Appropriate interventions for symptom management were initiated if applicable.  Appropriate diagnostic tests were initiated if indicated.    Important information for subsequent clinician:  The patient has been a difficult history and with the longstanding history of shortness of breath this could be underlying pulmonary disease however with the blood and chest pain the possibly of a PE must be considered.    I briefly evaluated the patient and developed an initial plan of care. I discussed this plan and explained that this brief interaction does not constitute a full evaluation. Patient/family understands that they should wait to be fully evaluated and discuss any test results with another clinician prior to leaving the hospital.       Sylvester Ramirez MD  03/27/23 9511

## 2023-03-27 NOTE — ED TRIAGE NOTES
"Pt presents via EMS for evaluation of left knee pain for \"a while\". Also c/o midsternal chest pain for \"a while\" and c/o an asthma exacerbation. Used her inhaler and nebulizer, which helped. Doesn't remember what time she used them. Pt resides in a . Per EMS, pt called a cab to [facility] and walked to [facility] for her shortness of breath. Pt then called 911 and was brought to the ED for further evaluation. Pt states she had no testing done today. Pt also c/o pain in the \"private area for a while\".       "

## 2023-03-28 ENCOUNTER — TELEPHONE (OUTPATIENT)
Dept: EMERGENCY MEDICINE | Facility: CLINIC | Age: 38
End: 2023-03-28
Payer: MEDICARE

## 2023-03-28 NOTE — TELEPHONE ENCOUNTER
Since ED visit on 3/24 Patient has been reevaluated in the Emergency Dept again on 3/26 and 3/27.    No further call to patient.    Prakash Salter RN  [facility]  Emergency Dept Lab Result RN  Ph# [phone]

## 2023-03-28 NOTE — TELEPHONE ENCOUNTER
[facility] Emergency Department Lab result notification     Patient/parent Name  Dionne    Reason for call  Patient requesting lab result    Lab Result  Component      Latest Ref Rng & Units 3/24/2023   D-Dimer Quantitative      0.00 - 0.50 ug/mL FEU 0.72 (H)     Component      Latest Ref Rng & Units 3/24/2023   Sodium      136 - 145 mmol/L 138   Potassium      3.4 - 5.3 mmol/L 4.1   Chloride      98 - 107 mmol/L 100   Carbon Dioxide (CO2)      22 - 29 mmol/L 30 (H)   Anion Gap      7 - 15 mmol/L 8   Urea Nitrogen      6.0 - 20.0 mg/dL 18.0   Creatinine      0.51 - 0.95 mg/dL 0.87   Calcium      8.6 - 10.0 mg/dL 9.0   Glucose      70 - 99 mg/dL 96   GFR Estimate      >60 mL/min/1.73m2 88   WBC      4.0 - 11.0 10e3/uL 9.1   RBC Count      3.80 - 5.20 10e6/uL 4.67   Hemoglobin      11.7 - 15.7 g/dL 12.8   Hematocrit      35.0 - 47.0 % 41.6   MCV      78 - 100 fL 89   MCH      26.5 - 33.0 pg 27.4   MCHC      31.5 - 36.5 g/dL 30.8 (L)   RDW      10.0 - 15.0 % 13.6   Platelet Count      150 - 450 10e3/uL 191     Current symptoms  Patient calling asking for her labs from ER visit 3/24/23  Reviewed results as above  Discussed with patient these results were known by provider at time of her exam. She has followed up with 2 other ER visits as well as urgent care visits between that date and today and discussed with patient that the visits are viewable to the providers she has seen . She asked if she needs to keep her primary care appointment on 4/6/23 and did encourage patient to keep this appointment.  Recommendations/Instructions      Contact your PCP clinic or return to the Emergency department if your:    Symptoms return.    Symptoms do not improve after 3 days on antibiotic.    Symptoms do not resolve after completing antibiotic.    Symptoms worsen or other concerning symptom's.        Aurora Bradford RN  [facility][1]  Emergency Dept Lab Result RN  Ph# [phone]      Copy of Lab result

## 2023-03-28 NOTE — ED NOTES
Just before going to assess the Pt. I witnessed the Pt calmly get up out of the room and go to the front lobby, pay for a pizza she ordered and then come back to her room. Steady gait was noted on the Pt.

## 2023-03-31 ENCOUNTER — HOSPITAL ENCOUNTER (EMERGENCY)
Facility: CLINIC | Age: 38
Discharge: HOME | End: 2023-04-01
Attending: EMERGENCY MEDICINE | Admitting: EMERGENCY MEDICINE
Payer: MEDICARE

## 2023-03-31 DIAGNOSIS — R04.2 COUGHING BLOOD: ICD-10-CM

## 2023-03-31 DIAGNOSIS — R04.0 EPISTAXIS: ICD-10-CM

## 2023-03-31 PROCEDURE — 99284 EMERGENCY DEPT VISIT MOD MDM: CPT | Mod: 25

## 2023-03-31 NOTE — ED TRIAGE NOTES
Pt BIBA for SOB and coughing up blood. Pt has been seen here in this hospital multiple times for same. No wheezing or stridor heard in triage. Pt has noisy breathing when breathing with mouth open. When writer used thermometer PO, no noisy breathing.

## 2023-04-01 ENCOUNTER — APPOINTMENT (OUTPATIENT)
Dept: GENERAL RADIOLOGY | Facility: CLINIC | Age: 38
End: 2023-04-01
Attending: EMERGENCY MEDICINE
Payer: MEDICARE

## 2023-04-01 VITALS
OXYGEN SATURATION: 99 % | TEMPERATURE: 98.4 F | RESPIRATION RATE: 21 BRPM | HEART RATE: 77 BPM | DIASTOLIC BLOOD PRESSURE: 65 MMHG | SYSTOLIC BLOOD PRESSURE: 103 MMHG

## 2023-04-01 LAB
BASOPHILS # BLD AUTO: 0.1 10E3/UL (ref 0–0.2)
BASOPHILS NFR BLD AUTO: 0 %
EOSINOPHIL # BLD AUTO: 0.2 10E3/UL (ref 0–0.7)
EOSINOPHIL NFR BLD AUTO: 1 %
ERYTHROCYTE [DISTWIDTH] IN BLOOD BY AUTOMATED COUNT: 14 % (ref 10–15)
HCT VFR BLD AUTO: 42.8 % (ref 35–47)
HGB BLD-MCNC: 13.6 G/DL (ref 11.7–15.7)
IMM GRANULOCYTES # BLD: 0.2 10E3/UL
IMM GRANULOCYTES NFR BLD: 2 %
LYMPHOCYTES # BLD AUTO: 2.5 10E3/UL (ref 0.8–5.3)
LYMPHOCYTES NFR BLD AUTO: 18 %
MCH RBC QN AUTO: 27.3 PG (ref 26.5–33)
MCHC RBC AUTO-ENTMCNC: 31.8 G/DL (ref 31.5–36.5)
MCV RBC AUTO: 86 FL (ref 78–100)
MONOCYTES # BLD AUTO: 0.8 10E3/UL (ref 0–1.3)
MONOCYTES NFR BLD AUTO: 6 %
NEUTROPHILS # BLD AUTO: 10.1 10E3/UL (ref 1.6–8.3)
NEUTROPHILS NFR BLD AUTO: 73 %
NRBC # BLD AUTO: 0 10E3/UL
NRBC BLD AUTO-RTO: 0 /100
PLATELET # BLD AUTO: 208 10E3/UL (ref 150–450)
RBC # BLD AUTO: 4.98 10E6/UL (ref 3.8–5.2)
WBC # BLD AUTO: 13.9 10E3/UL (ref 4–11)

## 2023-04-01 PROCEDURE — 85025 COMPLETE CBC W/AUTO DIFF WBC: CPT | Performed by: EMERGENCY MEDICINE

## 2023-04-01 PROCEDURE — 71046 X-RAY EXAM CHEST 2 VIEWS: CPT

## 2023-04-01 PROCEDURE — 36416 COLLJ CAPILLARY BLOOD SPEC: CPT | Performed by: EMERGENCY MEDICINE

## 2023-04-01 ASSESSMENT — ENCOUNTER SYMPTOMS
FEVER: 0
COLOR CHANGE: 0
COUGH: 1

## 2023-04-01 ASSESSMENT — ACTIVITIES OF DAILY LIVING (ADL)
ADLS_ACUITY_SCORE: 35

## 2023-04-01 NOTE — DISCHARGE INSTRUCTIONS
"Discharge Instructions  Epistaxis    Today you were seen for a nosebleed.   Nosebleeds (the medical term is \"epistaxis\") are very common. Almost every person has had at least one in their lifetime.  Although the amount of blood loss can appear dramatic, nosebleeds rarely cause serious problems.  The most common causes are dry air or nose picking, but they also are common in people who have allergies, high blood pressure, or are on blood thinners (such as Coumadin, Aspirin or Plavix). If you or your child gets a nosebleed, the important thing is to know how to take care of it. With the right self-care, most nosebleeds will stop on their own.    Generally, every Emergency Department visit should have a follow-up clinic visit with either a primary or a specialty clinic/provider. Please follow-up as instructed by your emergency provider today.    Return to the Emergency Department if:  Your nose is bleeding a very large amount of blood and you are unable to stop it.  You get very pale, faint, or tired.  You cannot get the bleeding to stop after following these instructions.    Treatment:  Your provider may tell you to use a decongestant nose spray, like Afrin  (oxymetazoline), in both nostrils in the morning and at night for the next three days. Do not use this medicine for more than three days at a time.  If you do, it will cause nasal congestion.   Use a moisturizer. A small amount of Vaseline  to the inside of your nostrils for moisture before bed is one option. There are nasal sprays available over-the-counter for this purpose as well.  Using a humidifier in your bedroom or home will help as well when the air is dry.  For the next three days, do not blow your nose or put anything in your nose. You may sniffle, or dab the outside of your nose.  Do not bend with your head below your waist for the next three days. Do not lift anything so heavy that you have to strain.   If you received nasal packing, please do not " remove the packing until seen by an Ear, Nose, and Throat (ENT) specialist.  If antibiotics have been given with the packing, please take as directed.    If your nose starts to bleed again:  Blow your nose to get rid of some of the clots that have formed inside your nostrils. This may increase the bleeding temporarily, but that is okay.  Spray decongestant nose spray (like Afrin ) into both nostrils to constrict the blood vessels.  Sit or stand while bending forward slightly at the waist. Do not lie down or tilt your head back. This may cause you to swallow blood and can lead to vomiting.   the soft part of BOTH nostrils at the bottom of your nose and squeeze your nose closed for at least 5 minutes (for children) or 10 to 15 minutes (for adults). Use a clock to time yourself. Do not release the pressure every so often to check whether the bleeding has stopped. Many people hurt their chances of stopping the bleeding by releasing the pressure too soon or too often.    If you follow the steps outlined above, and your nose continues to bleed, repeat all the steps once more. Apply pressure for a total of at least 30 minutes. If you continue to bleed even then, seek medical attention.  If you were given a prescription for medicine here today, be sure to read all of the information (including the package insert) that comes with your prescription.  This will include important information about the medicine, its side effects, and any warnings that you need to know about.  The pharmacist who fills the prescription can provide more information and answer questions you may have about the medicine.  If you have questions or concerns that the pharmacist cannot address, please call or return to the Emergency Department.   Remember that you can always come back to the Emergency Department if you are not able to see your regular provider in the amount of time listed above, if you get any new symptoms, or if there is anything  that worries you.
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
## 2023-04-01 NOTE — ED NOTES
Attempted to call report to [facility]. Called [phone] and [phone] with no answer.    5437 Left message, will attempt to call again.   7020 Called again, no answer

## 2023-04-07 ENCOUNTER — HOSPITAL ENCOUNTER (EMERGENCY)
Facility: CLINIC | Age: 38
Discharge: HOME | End: 2023-04-08
Attending: EMERGENCY MEDICINE | Admitting: EMERGENCY MEDICINE
Payer: MEDICARE

## 2023-04-07 DIAGNOSIS — R06.02 SHORTNESS OF BREATH: ICD-10-CM

## 2023-04-07 PROCEDURE — 87637 SARSCOV2&INF A&B&RSV AMP PRB: CPT | Performed by: EMERGENCY MEDICINE

## 2023-04-07 PROCEDURE — C9803 HOPD COVID-19 SPEC COLLECT: HCPCS

## 2023-04-07 PROCEDURE — 99283 EMERGENCY DEPT VISIT LOW MDM: CPT | Mod: CS

## 2023-04-07 ASSESSMENT — ACTIVITIES OF DAILY LIVING (ADL): ADLS_ACUITY_SCORE: 35

## 2023-04-08 VITALS
DIASTOLIC BLOOD PRESSURE: 54 MMHG | TEMPERATURE: 97.5 F | WEIGHT: 293 LBS | SYSTOLIC BLOOD PRESSURE: 110 MMHG | HEART RATE: 62 BPM | RESPIRATION RATE: 20 BRPM | BODY MASS INDEX: 65 KG/M2 | OXYGEN SATURATION: 96 %

## 2023-04-08 NOTE — DISCHARGE INSTRUCTIONS
Please return to the ED if you have worsening cough, fevers >101, chest pain, shortness of breath, intractable vomiting, or other acute changes.  Please follow up with your PCP in the next 2-3 days.

## 2023-04-08 NOTE — ED PROVIDER NOTES
History     Chief Complaint:  Shortness of Breath       HPI   Dionne Perez is a 38 year old female with a history of asthma who presents with shortness of breath.  Patient reports that she had shortness of breath at home.  The group home staff was not there to help give her albuterol which was locked up.  Therefore she called 911.  She currently feels improved without any intervention.  She denies any chest pain, fevers, nausea, vomiting.  Denies any current symptoms.      Independent Historian:   None - Patient Only    Review of External Notes: I reviewed patient's primary care physician visit yesterday. Patient also has numerous ED visits over the past couple months for shortness of breath with XR imaging and CT imaging done.     ROS:  Review of Systems   All other systems reviewed and are negative.        Allergies:  Ibuprofen     Medications:    Albuterol     Aspirin   Escitalopram   Famotidine   Levothyroxine   Zyprexa  Pravastatin   Deltasone  Desyrel     Past Medical History:    Depression   GERD   Hyperlipidemia   Thyroid disease   Asthma     Past Surgical History:    IR lumbar puncture     Social History:  The patient presents alone.     reports that she has never smoked. She has never used smokeless tobacco. She reports that she does not drink alcohol and does not use drugs.  PCP: Clinic, [facility]     Physical Exam     Patient Vitals for the past 24 hrs:   BP Temp Temp src Pulse Resp SpO2 Weight   04/07/23 2250 -- -- -- -- -- 96 % --   04/07/23 2247 110/54 -- -- 61 -- 97 % --   04/07/23 1918 (!) 116/94 97.5  F (36.4  C) Temporal 71 20 96 % (!) 188.2 kg (415 lb)        Physical Exam  General: Resting on the bed.  Head: No obvious trauma to head.  Ears, Nose, Throat:  External ears normal.  Nose normal.    Eyes:  Conjunctivae clear.  Pupils are equal, round, and reactive.   Neck: Normal range of motion.  Neck supple.   CV: Regular rate and rhythm.  No murmurs.      Respiratory: Effort  normal and breath sounds normal.  No wheezing or crackles.   Gastrointestinal: Soft.  No distension. There is no tenderness.  Musculoskeletal: Non tender non edematous calves  Neuro: Alert. Moving all extremities appropriately.  Normal speech.    Skin: Skin is warm and dry.  No rash noted.       Emergency Department Course     Laboratory:  Labs Ordered and Resulted from Time of ED Arrival to Time of ED Departure   INFLUENZA A/B, RSV, & SARS-COV2 PCR - Normal       Result Value    Influenza A PCR Negative      Influenza B PCR Negative      RSV PCR Negative      SARS CoV2 PCR Negative            Emergency Department Course & Assessments:    Assessments:  ED Course as of 23 2308   0 I assessed the patient   2241 I tried to get a hold of guardian.  She did not answer.       Independent Interpretation (X-rays, CTs, rhythm strip):  None    Consultations/Discussion of Management or Tests:  ED Course as of 23 2347   0 I assessed the patient   2241 I tried to get a hold of guardian.  She did not answer.          Social Determinants of Health affecting care:   None    Disposition:  The patient was discharged to home.     Impression & Plan    CMS Diagnoses: None    Medical Decision Makin-year-old female presents to the ER with shortness of breath.  Vital signs are reassuring.  Broad differentials pursued include not limited to asthma, reactive airway disease, pneumonia, pneumothorax, effusion, COVID-19, influenza, RSV, PE, ACS, etc.  Overall patient is well-appearing nontoxic.  She has no symptoms at present.  Her vitals are reassuring.  She has had numerous work-ups including x-rays, CTs etc.  I do have very low suspicion for PE at this time given reassuring vital signs and asymptomatic.  I considered reactive airway disease/asthma given her history but she has no evidence of wheezing or retractions on examination.  COVID, RSV, influenza was negative.  Lung sounds  are clear there is no focal crackles or wheezing to indicate pneumonia.  Bilateral breath sounds no signs of pneumothorax.  Patient has no chest pain indicate ACS, arrhythmia etc.  Patient is otherwise well-appearing nontoxic.  At this point I do not see any indication for additional work-up.  She feels comfortable going home.  I attempted to get a hold of her guardian but was unable to.  Patient will be transported back to her group home.    Diagnosis:    ICD-10-CM    1. Shortness of breath  R06.02              Scribe Disclosure:  I, Faustina Romo, am serving as a scribe at 11:08 PM on 4/7/2023 to document services personally performed by Khushi Bone MD based on my observations and the provider's statements to me.     4/7/2023   Khushi Bone MD Bennett, Jennifer L, MD  04/07/23 7195       Khushi Bone MD  04/07/23 3070

## 2023-04-08 NOTE — ED TRIAGE NOTES
Arrives vie EMS from [facility] home. States complaints of SOB. VSS en route. Speaking rapidly and without issues. States SOBE.

## 2023-04-14 ENCOUNTER — HOSPITAL ENCOUNTER (EMERGENCY)
Facility: CLINIC | Age: 38
Discharge: NURSING, REHAB OR LONG-TERM CARE | End: 2023-04-15
Attending: PHYSICIAN ASSISTANT | Admitting: PHYSICIAN ASSISTANT
Payer: MEDICARE

## 2023-04-14 DIAGNOSIS — R39.198 DIFFICULTY IN URINATION: ICD-10-CM

## 2023-04-14 LAB
ALBUMIN UR-MCNC: 10 MG/DL
APPEARANCE UR: CLEAR
BILIRUB UR QL STRIP: NEGATIVE
COLOR UR AUTO: YELLOW
GLUCOSE UR STRIP-MCNC: NEGATIVE MG/DL
HGB UR QL STRIP: NEGATIVE
HYALINE CASTS: 1 /LPF
KETONES UR STRIP-MCNC: NEGATIVE MG/DL
LEUKOCYTE ESTERASE UR QL STRIP: NEGATIVE
MUCOUS THREADS #/AREA URNS LPF: PRESENT /LPF
NITRATE UR QL: NEGATIVE
PH UR STRIP: 5.5 [PH] (ref 5–7)
RBC URINE: 2 /HPF
SP GR UR STRIP: 1.03 (ref 1–1.03)
SQUAMOUS EPITHELIAL: <1 /HPF
UROBILINOGEN UR STRIP-MCNC: 2 MG/DL
WBC URINE: 1 /HPF

## 2023-04-14 PROCEDURE — 99284 EMERGENCY DEPT VISIT MOD MDM: CPT | Mod: 25

## 2023-04-14 PROCEDURE — 51798 US URINE CAPACITY MEASURE: CPT

## 2023-04-14 PROCEDURE — 81001 URINALYSIS AUTO W/SCOPE: CPT | Performed by: EMERGENCY MEDICINE

## 2023-04-14 ASSESSMENT — ACTIVITIES OF DAILY LIVING (ADL)
ADLS_ACUITY_SCORE: 35
ADLS_ACUITY_SCORE: 33

## 2023-04-14 ASSESSMENT — ENCOUNTER SYMPTOMS
BACK PAIN: 0
ABDOMINAL PAIN: 1
DYSURIA: 1
HEMATURIA: 0
DIFFICULTY URINATING: 1

## 2023-04-15 VITALS
RESPIRATION RATE: 18 BRPM | DIASTOLIC BLOOD PRESSURE: 83 MMHG | HEART RATE: 70 BPM | SYSTOLIC BLOOD PRESSURE: 124 MMHG | OXYGEN SATURATION: 99 % | TEMPERATURE: 97.5 F

## 2023-04-15 ASSESSMENT — ACTIVITIES OF DAILY LIVING (ADL): ADLS_ACUITY_SCORE: 35

## 2023-04-15 NOTE — ED PROVIDER NOTES
History     Chief Complaint:  Urinary Retention     The history is provided by the patient.      Dionne Perez is a 38 year old female with a history of asthma and hyperlipidemia who presents with urinary retention. The patient reports yesterday onset of urinary retention and blood in her urine. She notes having abdominal pain with having the urge to urinate but not being able to. She states that yesterday she had dysuria and lower abdominal pain. She denies back pain. She adds she has not been able to urinate since yesterday.     Independent Historian:   None - Patient Only    Review of External Notes:   None    ROS:  Review of Systems   Gastrointestinal: Positive for abdominal pain.   Genitourinary: Positive for difficulty urinating and dysuria. Negative for hematuria.   Musculoskeletal: Negative for back pain.   All other systems reviewed and are negative.    Allergies:  Ibuprofen      Medications:    Albuterol     Aspirin   Escitalopram   Famotidine   Levothyroxine   Zyprexa  Pravastatin   Deltasone  Desyrel      Past Medical History:    Depression   GERD   Hyperlipidemia   Thyroid disease   Asthma      Past Surgical History:    IR lumbar puncture      Social History:  The patient presents alone via ambulance.    reports that she has never smoked. She has never used smokeless tobacco. She reports that she does not drink alcohol and does not use drugs.  PCP: [facility], [facility] [place]     Physical Exam     Patient Vitals for the past 24 hrs:   BP Temp Temp src Pulse Resp SpO2   04/14/23 1916 122/81 -- -- -- -- --   04/14/23 1915 -- 97.5  F (36.4  C) Temporal 75 18 95 %      Physical Exam  Constitutional: Pleasant. Cooperative.   Eyes: Pupils equally round and reactive  HENT: Head is normal in appearance. Oropharynx is normal with moist mucus membranes.  Cardiovascular: Regular rate and rhythm and without murmurs.  Respiratory: Normal respiratory effort, lungs are clear bilaterally.  GI: Abdomen is soft,  non-tender, non-distended. No guarding, rebound, or rigidity.  Musculoskeletal: No asymmetry of the lower extremities.  Skin: Normal, without rash.  Neurologic: Cranial nerves grossly intact, normal cognition, no focal deficits. Alert and oriented x 3.   Psychiatric: Normal affect.  Nursing notes and vital signs reviewed.    Emergency Department Course     Laboratory:  Labs Ordered and Resulted from Time of ED Arrival to Time of ED Departure   ROUTINE UA WITH MICROSCOPIC REFLEX TO CULTURE - Abnormal       Result Value    Color Urine Yellow      Appearance Urine Clear      Glucose Urine Negative      Bilirubin Urine Negative      Ketones Urine Negative      Specific Gravity Urine 1.033      Blood Urine Negative      pH Urine 5.5      Protein Albumin Urine 10 (*)     Urobilinogen Urine 2.0      Nitrite Urine Negative      Leukocyte Esterase Urine Negative      Mucus Urine Present (*)     RBC Urine 2      WBC Urine 1      Squamous Epithelials Urine <1      Hyaline Casts Urine 1        Emergency Department Course & Assessments:     Interventions:  Medications - No data to display     Assessments:  2129 I obtained patient's history and examined patient as noted above.   2229 I rechecked the patient.     Independent Interpretation (X-rays, CTs, rhythm strip):  None    Consultations/Discussion of Management or Tests:  None     Social Determinants of Health affecting care:   None    Disposition:  The patient was discharged to home.     Impression & Plan      Medical Decision Making:  Dionne Perez is a 38 year old female who presents to the ED stating that she has been unable to urinate today.  Last urination was last night and she did note some blood in her urine at that time. RN staff notes that patient's underwear are soaked in urine. See HPI as above for additional details.  Vitals and physical exam as above.  Initial bladder scan showed less than 100 cc of urine in the bladder.  Patient had a nontender abdomen.  She  did not appear in any overt distress to suggest for urinary retention.  With shared decision making, patient requested catheterization.  Straight cath performed with 95cc output.  No evidence for blood or suggestion for infection on UA.  Etiology of patient's inability to urinate is unclear.  On recheck, we discussed that should she be able to urinate she could go home as she felt improved.  She is able to urinate immediately thereafter without difficulty.  Did not feel additional work-up was indicated at this time.  Tamiment patient was safe for discharge to home.    Diagnosis:    ICD-10-CM    1. Difficulty in urination  R39.198          Scribe Disclosure:  I, Emeli Turner, am serving as a scribe at 9:17 PM on 4/14/2023 to document services personally performed by Fei Mckeon PA-C based on my observations and the provider's statements to me.     4/14/2023   Fei Mckeon PA-C     This record was created at least in part using electronic voice recognition software, so please excuse any typographical errors.       Fei Mckeon PA-C  04/14/23 5638

## 2023-04-15 NOTE — DISCHARGE INSTRUCTIONS
The cause of your symptoms is unclear at this time. Continue to stay hydrated and urinate on a regular basis. There is no evidence for a urinary tract infection nor other evidence for blood at this time. Follow up with primary doctor with persistent urinary difficulty.

## 2023-04-15 NOTE — ED NOTES
Writer straight cathed patient and was able to get about 95 ml out of bladder when draining stopped. Pt states relief.

## 2023-04-15 NOTE — ED NOTES
Writer bladder scanned pt, on assessment underwear are soaked in urine with a strong odor. Writer was only able to see 19 ml on multiple different attempts.

## 2023-04-15 NOTE — ED TRIAGE NOTES
Arrives from [facility]. Called 911 due to 'being unable to pee'   VSS en route.

## 2023-04-19 VITALS
OXYGEN SATURATION: 97 % | RESPIRATION RATE: 18 BRPM | HEART RATE: 84 BPM | TEMPERATURE: 98.1 F | SYSTOLIC BLOOD PRESSURE: 141 MMHG | DIASTOLIC BLOOD PRESSURE: 81 MMHG

## 2023-04-19 PROCEDURE — 99282 EMERGENCY DEPT VISIT SF MDM: CPT

## 2023-04-19 PROCEDURE — 99283 EMERGENCY DEPT VISIT LOW MDM: CPT

## 2023-04-20 ENCOUNTER — HOSPITAL ENCOUNTER (EMERGENCY)
Facility: CLINIC | Age: 38
Discharge: HOME | End: 2023-04-20
Attending: EMERGENCY MEDICINE | Admitting: EMERGENCY MEDICINE
Payer: MEDICARE

## 2023-04-20 DIAGNOSIS — H65.03 NON-RECURRENT ACUTE SEROUS OTITIS MEDIA OF BOTH EARS: ICD-10-CM

## 2023-04-20 DIAGNOSIS — J06.9 VIRAL URI: ICD-10-CM

## 2023-04-20 DIAGNOSIS — H93.13 TINNITUS, BILATERAL: ICD-10-CM

## 2023-04-20 ASSESSMENT — ACTIVITIES OF DAILY LIVING (ADL)
ADLS_ACUITY_SCORE: 33
ADLS_ACUITY_SCORE: 33

## 2023-04-20 NOTE — ED NOTES
Subjective:       Patient ID: Brittny Urisa is a 54 y.o. female.    Chief Complaint: Malignant neoplasm of central portion of right breast in fe    HPI 54-year-old postmenopausal female who returns for follow-up of a diagnosis of right breast cancer, T1cN1a, ER positive, HER2 negative.  She is on letrozole hormonal therapy.    Overall she is doing well.   She is still following with her PCP for blood pressure, she was seen yesterday.  She was having hot flashes and was very emotional - PCP doubled her medication with success.   Appetite and bowel movements are  good.  Energy level is good.   She has been exercising (walking 4-5 times a week) and is feeling well.       Per Dr. Woods's previous note: Breast History: Screening mammogram on August 3, 2018 showed architectural distortion in the retroareolar right breast.  Follow-up diagnostic mammogram on August 18th showed architectural distortion and nipple retraction on the right.  Ultrasound showed no definite abnormality.    August 21, 2018 a biopsy was performed which showed infiltrating lobular carcinoma (histologic grade 3, nuclear grade 1, mitotic index 1).  ER was 90% positive, NH was 20% positive and HER2 was 0.  Ki-67 was less than 1%.    MRI of the breast on September 4, 2018 showed a 3.2 x 1.8 x 1.4 cm mass with spiculated margins in the subareolar region of the right breast.  The left breast was unremarkable.    On November 14, 2018 bilateral mastectomies were performed.  The right breast showed a 2 cm intermediate grade carcinoma (3+ 2+ 1) 2 of 3 sentinel lymph nodes were positive with some extracapsular extension.  Completion axillary lymph node dissection showed 0 of 12 additional nodes were involved.  Margins were negative.  The left breast showed no abnormalities.     Mammaprint was low risk at + 0.362    She completed radiation on February 26, 2019.    Letrozole was started in March 2019.    Review of Systems   Constitutional: Negative.   Writer called  to give report. No answer.  left.   Negative for activity change, appetite change, chills, diaphoresis, fatigue, fever and unexpected weight change.   HENT: Negative.  Negative for nosebleeds.    Eyes:  Negative for visual disturbance.   Respiratory: Negative.  Negative for cough and shortness of breath.    Cardiovascular: Negative.  Negative for chest pain, palpitations and leg swelling.   Gastrointestinal: Negative.  Negative for abdominal distention, abdominal pain, blood in stool, constipation, diarrhea, nausea and vomiting.   Endocrine: Positive for heat intolerance (hot flashes).   Genitourinary: Negative.  Negative for frequency, hematuria and vaginal bleeding.   Musculoskeletal:  Negative for arthralgias, back pain and myalgias.   Skin:  Negative for pallor and rash.   Allergic/Immunologic: Negative for immunocompromised state.   Neurological:  Negative for dizziness, weakness, light-headedness (minimal), numbness and headaches.   Hematological:  Negative for adenopathy. Does not bruise/bleed easily.   Psychiatric/Behavioral: Negative.  Negative for confusion. The patient is not nervous/anxious.         Improved emotions     Objective:      Physical Exam  Constitutional:       Appearance: Normal appearance. She is normal weight.   HENT:      Head: Normocephalic.      Nose: Nose normal.   Pulmonary:      Effort: Pulmonary effort is normal.   Neurological:      General: No focal deficit present.      Mental Status: She is alert and oriented to person, place, and time.   Psychiatric:         Mood and Affect: Mood normal.         Behavior: Behavior normal.         Thought Content: Thought content normal.         Judgment: Judgment normal.     Limited due to virtual visit    Assessment:       1. Malignant neoplasm of central portion of right breast in female, estrogen receptor positive    2. History of mastectomy, left        Plan:       1. She will continue letrozole and return to clinic in 6 months time  - DXA normal BMD in Jan 2021, repeat in  Jan 2023.     Return to clinic in 6 months with MD appointment.     Patient is in agreement with the proposed treatment plan. All questions were answered to the patient's satisfaction. Patient knows to call clinic for any new or worsening symptoms and if anything is needed before the next clinic visit.          Lubna Jamison, FNP-C  Hematology & Medical Oncology   [address] [place] [address]  ph. [phone]  Fax. [phone]    Collaborating physician, Dr. Woods.    Approximately 7 minutes were spent face-to-face with the patient.  Approximately 15 minutes in total were spent on this encounter, which includes face-to-face time and non-face-to-face time preparing to see the patient (e.g., review of tests), obtaining and/or reviewing separately obtained history, documenting clinical information in the electronic or other health record, independently interpreting results (not separately reported) and communicating results to the patient/family/caregiver, or care coordination (not separately reported).     Route Chart for Scheduling    Med Onc Chart Routing      Follow up with physician 6 months.   Follow up with GARY    Infusion scheduling note    Injection scheduling note    Labs    Imaging DXA scan   due in Jan   Pharmacy appointment    Other referrals           The patient location is: her home  The chief complaint leading to consultation is: History of breast cancer    Visit type: audiovisual    Face to Face time with patient: 7  15 minutes of total time spent on the encounter, which includes face to face time and non-face to face time preparing to see the patient (eg, review of tests), Obtaining and/or reviewing separately obtained history, Documenting clinical information in the electronic or other health record, Independently interpreting results (not separately reported) and communicating results to the patient/family/caregiver, or Care coordination (not separately reported).         Each  patient to whom he or she provides medical services by telemedicine is:  (1) informed of the relationship between the physician and patient and the respective role of any other health care provider with respect to management of the patient; and (2) notified that he or she may decline to receive medical services by telemedicine and may withdraw from such care at any time.    Notes: see above      .

## 2023-04-20 NOTE — ED PROVIDER NOTES
History     Chief Complaint:  Tinnitus  Congestion     HPI   Dionne Perez is a 38 year old female with a history of mild cognitive delay who presents with tinnitus. The patient reports that starting yesterday she had onset of bilateral ear pain and ringing. She states she has had a head cold for awhile with associated cough and congestion, but the patient denies any fever. The patient denies any vomiting or diarrhea. The patient states she tried tylenol without relief in symptoms. The patient notes she uses Flonase at night before bed.     Independent Historian:   None - Patient Only    Review of External Notes: I independently reviewed care everywhere and updated epic medical history    Review of Systems   ROS: ROS neg other than the symptoms noted above in the HPI.    Allergies:  Ibuprofen     Medications:    Albuterol   Aspirin   Escitalopram   Levothyroxine   Zyprexa  Pravastatin   Desyrel     Past Medical History:    Diagnosis   Anxiety   Asthma   Cholelithiasis   Depressive disorder   Gastroesophageal reflux disease   Hypercholesterolemia   Hypothyroidism   Mild mental retardation   Obesity     Past Surgical History:    Tonsillectomy and adenoidectomy     Social History:  The patient presents alone.   She lives in a group home.   Nonsmoker  PCP: [facility], [facility]     Physical Exam     Patient Vitals for the past 24 hrs:   BP Temp Pulse Resp SpO2   04/19/23 2309 141/81 98.1  F (36.7  C) 84 18 97 %      Physical Exam  Nursing note and vitals reviewed.  Constitutional: Cooperative. Comfortable appearing.   HENT:   Mouth/Throat: Mucous membranes are normal. Posterior oropharynx normal. No abscess.   Ears: External auditory canals normal. Small clear fluid deep to tympanic membranes. No mastoid tenderness.    Eyes: No discharge.   Cardiovascular: Normal rate, regular rhythm and normal heart sounds.  No murmur.  Pulmonary/Chest: Effort normal and breath sounds normal. No respiratory distress. No  wheezes.   Neurological: Alert.   Skin: Skin is warm and dry.   Psychiatric: Normal mood and affect.     Emergency Department Course     Assessments:  0250 I obtained history and examined the patient as noted above.     Independent Interpretation (X-rays, CTs, rhythm strip):  None    Social Determinants of Health affecting care:   None    Disposition:  The patient was discharged to home.     Impression & Plan      Medical Decision Making:  Dionne Perez is a 38 year old female who presents for evaluation of otalgia.  The patient has an exam consistent with acute serous otitis media bilaterally.  This is likely viral but cannot rule out bacterial.   Has a concurrent URI but no fever. There is no sign of mastoiditis, meningitis, perforation, mass, dental abscess, or peritonsillar abscess. There is no evidence of otitis externa. Also complaining of mild tinnitus.  Clinical concern for vascular or cerebral emergency is low.  No indication for advanced imaging.   The patient may take Tylenol or Ibuprofen for pain.  Return if increasing pain, fever, decrease in hearing or ear discharge that persists.  Follow-up with primary physician in 7-10 days, if symptoms persist.    Diagnosis:    ICD-10-CM    1. Non-recurrent acute serous otitis media of both ears  H65.03       2. Viral URI  J06.9       3. Tinnitus, bilateral  H93.13            Scribe Disclosure:  FILIPPO, Faustina Romo, am serving as a scribe at 2:57 AM on 4/20/2023 to document services personally performed by Carmelo Gross MD based on my observations and the provider's statements to me.   4/20/2023   Carmelo Gross MD Amdahl, John, MD  04/20/23 0357

## 2023-04-20 NOTE — ED TRIAGE NOTES
Patient arrives via EMS with tinnitus that started around 11am, patient took ibuprofen this morning which helped this sensation. Patient was vitally stable for EMS.       Triage Assessment     Row Name 04/19/23 2237       Triage Assessment (Adult)    Airway WDL WDL       Respiratory WDL    Respiratory WDL WDL       Skin Circulation/Temperature WDL    Skin Circulation/Temperature WDL WDL       Cardiac WDL    Cardiac WDL WDL       Peripheral/Neurovascular WDL    Peripheral Neurovascular WDL WDL       Cognitive/Neuro/Behavioral WDL    Cognitive/Neuro/Behavioral WDL WDL

## 2023-04-23 ENCOUNTER — HOSPITAL ENCOUNTER (EMERGENCY)
Facility: CLINIC | Age: 38
Discharge: HOME | End: 2023-04-23
Payer: MEDICARE

## 2023-04-23 VITALS
SYSTOLIC BLOOD PRESSURE: 119 MMHG | TEMPERATURE: 98.7 F | RESPIRATION RATE: 22 BRPM | OXYGEN SATURATION: 97 % | DIASTOLIC BLOOD PRESSURE: 77 MMHG | HEART RATE: 78 BPM

## 2023-04-23 DIAGNOSIS — S40.022A ARM CONTUSION, LEFT, INITIAL ENCOUNTER: ICD-10-CM

## 2023-04-23 PROCEDURE — 99283 EMERGENCY DEPT VISIT LOW MDM: CPT

## 2023-04-23 PROCEDURE — 250N000013 HC RX MED GY IP 250 OP 250 PS 637

## 2023-04-23 RX ORDER — ACETAMINOPHEN 325 MG/1
650 TABLET ORAL ONCE
Status: COMPLETED | OUTPATIENT
Start: 2023-04-23 | End: 2023-04-23

## 2023-04-23 RX ADMIN — ACETAMINOPHEN 650 MG: 325 TABLET ORAL at 20:02

## 2023-04-23 ASSESSMENT — ACTIVITIES OF DAILY LIVING (ADL)
ADLS_ACUITY_SCORE: 35
ADLS_ACUITY_SCORE: 33
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
## 2023-04-24 NOTE — ED NOTES
Patient pressing call light multiple times asking for snacks and lunch boxes, snack box provided to patient.

## 2023-04-27 ENCOUNTER — APPOINTMENT (OUTPATIENT)
Dept: GENERAL RADIOLOGY | Facility: CLINIC | Age: 38
End: 2023-04-27
Attending: EMERGENCY MEDICINE
Payer: MEDICARE

## 2023-04-27 ENCOUNTER — HOSPITAL ENCOUNTER (EMERGENCY)
Facility: CLINIC | Age: 38
Discharge: HOME | End: 2023-04-28
Attending: EMERGENCY MEDICINE | Admitting: EMERGENCY MEDICINE
Payer: MEDICARE

## 2023-04-27 DIAGNOSIS — J45.41 MODERATE PERSISTENT ASTHMA WITH ACUTE EXACERBATION: ICD-10-CM

## 2023-04-27 DIAGNOSIS — R22.2 NODULE OF ANTERIOR CHEST WALL: ICD-10-CM

## 2023-04-27 DIAGNOSIS — R06.02 SHORTNESS OF BREATH: ICD-10-CM

## 2023-04-27 DIAGNOSIS — R07.9 CHEST PAIN, UNSPECIFIED TYPE: ICD-10-CM

## 2023-04-27 LAB
ANION GAP SERPL CALCULATED.3IONS-SCNC: 9 MMOL/L (ref 7–15)
BASOPHILS # BLD AUTO: 0 10E3/UL (ref 0–0.2)
BASOPHILS NFR BLD AUTO: 0 %
BUN SERPL-MCNC: 22.8 MG/DL (ref 6–20)
CALCIUM SERPL-MCNC: 9 MG/DL (ref 8.6–10)
CHLORIDE SERPL-SCNC: 101 MMOL/L (ref 98–107)
CREAT SERPL-MCNC: 0.83 MG/DL (ref 0.51–0.95)
DEPRECATED HCO3 PLAS-SCNC: 29 MMOL/L (ref 22–29)
EOSINOPHIL # BLD AUTO: 0.1 10E3/UL (ref 0–0.7)
EOSINOPHIL NFR BLD AUTO: 1 %
ERYTHROCYTE [DISTWIDTH] IN BLOOD BY AUTOMATED COUNT: 13.5 % (ref 10–15)
GFR SERPL CREATININE-BSD FRML MDRD: >90 ML/MIN/1.73M2
GLUCOSE SERPL-MCNC: 127 MG/DL (ref 70–99)
HCG SER QL IA.RAPID: NEGATIVE
HCT VFR BLD AUTO: 36.7 % (ref 35–47)
HGB BLD-MCNC: 11.3 G/DL (ref 11.7–15.7)
IMM GRANULOCYTES # BLD: 0.1 10E3/UL
IMM GRANULOCYTES NFR BLD: 1 %
LYMPHOCYTES # BLD AUTO: 1.8 10E3/UL (ref 0.8–5.3)
LYMPHOCYTES NFR BLD AUTO: 20 %
MCH RBC QN AUTO: 27.6 PG (ref 26.5–33)
MCHC RBC AUTO-ENTMCNC: 30.8 G/DL (ref 31.5–36.5)
MCV RBC AUTO: 90 FL (ref 78–100)
MONOCYTES # BLD AUTO: 0.5 10E3/UL (ref 0–1.3)
MONOCYTES NFR BLD AUTO: 5 %
NEUTROPHILS # BLD AUTO: 6.8 10E3/UL (ref 1.6–8.3)
NEUTROPHILS NFR BLD AUTO: 73 %
NRBC # BLD AUTO: 0 10E3/UL
NRBC BLD AUTO-RTO: 0 /100
PLATELET # BLD AUTO: 189 10E3/UL (ref 150–450)
POTASSIUM SERPL-SCNC: 4.4 MMOL/L (ref 3.4–5.3)
RBC # BLD AUTO: 4.09 10E6/UL (ref 3.8–5.2)
SODIUM SERPL-SCNC: 139 MMOL/L (ref 136–145)
TROPONIN T SERPL HS-MCNC: <6 NG/L
TROPONIN T SERPL HS-MCNC: <6 NG/L
WBC # BLD AUTO: 9.3 10E3/UL (ref 4–11)

## 2023-04-27 PROCEDURE — 36415 COLL VENOUS BLD VENIPUNCTURE: CPT | Performed by: EMERGENCY MEDICINE

## 2023-04-27 PROCEDURE — 84703 CHORIONIC GONADOTROPIN ASSAY: CPT

## 2023-04-27 PROCEDURE — 250N000009 HC RX 250

## 2023-04-27 PROCEDURE — 82310 ASSAY OF CALCIUM: CPT | Performed by: EMERGENCY MEDICINE

## 2023-04-27 PROCEDURE — 85379 FIBRIN DEGRADATION QUANT: CPT | Performed by: EMERGENCY MEDICINE

## 2023-04-27 PROCEDURE — 250N000011 HC RX IP 250 OP 636: Performed by: EMERGENCY MEDICINE

## 2023-04-27 PROCEDURE — 84484 ASSAY OF TROPONIN QUANT: CPT | Mod: 91 | Performed by: EMERGENCY MEDICINE

## 2023-04-27 PROCEDURE — 85025 COMPLETE CBC W/AUTO DIFF WBC: CPT | Performed by: EMERGENCY MEDICINE

## 2023-04-27 PROCEDURE — 96374 THER/PROPH/DIAG INJ IV PUSH: CPT | Mod: 59

## 2023-04-27 PROCEDURE — 93005 ELECTROCARDIOGRAM TRACING: CPT

## 2023-04-27 PROCEDURE — 250N000013 HC RX MED GY IP 250 OP 250 PS 637: Performed by: EMERGENCY MEDICINE

## 2023-04-27 PROCEDURE — 99285 EMERGENCY DEPT VISIT HI MDM: CPT | Mod: 25

## 2023-04-27 PROCEDURE — 71046 X-RAY EXAM CHEST 2 VIEWS: CPT

## 2023-04-27 PROCEDURE — 84484 ASSAY OF TROPONIN QUANT: CPT | Performed by: EMERGENCY MEDICINE

## 2023-04-27 RX ORDER — ALBUTEROL SULFATE 0.83 MG/ML
SOLUTION RESPIRATORY (INHALATION)
Status: COMPLETED
Start: 2023-04-27 | End: 2023-04-27

## 2023-04-27 RX ORDER — METHYLPREDNISOLONE SODIUM SUCCINATE 125 MG/2ML
125 INJECTION, POWDER, LYOPHILIZED, FOR SOLUTION INTRAMUSCULAR; INTRAVENOUS ONCE
Status: COMPLETED | OUTPATIENT
Start: 2023-04-27 | End: 2023-04-27

## 2023-04-27 RX ORDER — ACETAMINOPHEN 500 MG
1000 TABLET ORAL EVERY 4 HOURS PRN
Status: DISCONTINUED | OUTPATIENT
Start: 2023-04-27 | End: 2023-04-28 | Stop reason: HOSPADM

## 2023-04-27 RX ADMIN — ALBUTEROL SULFATE 5 MG: 2.5 SOLUTION RESPIRATORY (INHALATION) at 19:08

## 2023-04-27 RX ADMIN — ACETAMINOPHEN 1000 MG: 500 TABLET ORAL at 20:03

## 2023-04-27 RX ADMIN — METHYLPREDNISOLONE SODIUM SUCCINATE 125 MG: 125 INJECTION, POWDER, FOR SOLUTION INTRAMUSCULAR; INTRAVENOUS at 20:37

## 2023-04-27 RX ADMIN — ALBUTEROL SULFATE 2.5 MG: 2.5 SOLUTION RESPIRATORY (INHALATION) at 19:07

## 2023-04-27 ASSESSMENT — ACTIVITIES OF DAILY LIVING (ADL)
ADLS_ACUITY_SCORE: 35

## 2023-04-27 NOTE — ED TRIAGE NOTES
Presents for 10/10 chest pain that began at 1700  Audible wheezing heard in triage, hx asthma   Triage Assessment     Row Name 04/27/23 9617       Triage Assessment (Adult)    Airway WDL WDL       Cognitive/Neuro/Behavioral WDL    Cognitive/Neuro/Behavioral WDL WDL

## 2023-04-27 NOTE — ED PROVIDER NOTES
History     Chief Complaint:  Chest Pain and Shortness of Breath       HPI   Dionne Perez is a 38 year old female who presents to the emergency department for evaluation of chest pain and shortness of breath.  Patient reports her symptoms began a few hours ago.  Her pain is located in the center of her chest, and she notes it radiates throughout her whole body.  She also notes feeling of shortness of breath.  She acknowledges hearing herself wheezing, and notes a history of asthma.  She notes associated coughing.  Her chest pain is exacerbated with palpation and with coughing.  She has been without fever.  Patient notes prior history of blood clot, though currently off anticoagulation.  Per chart review, patient has been seen multiple times in various ED's in the [facility], and has undergone previous evaluations.    History is somewhat limited given patient's neurocognitive delay and supplemental information obtained on review of external records through EMR.    Independent Historian:   None - Patient Only    Review of External Notes: I reviewed patient's office visit note from 4/6/2023 has follow-up from an ED visit.  PMH is notable for cognitive impairment, neurocognitive disorder, hypothyroidism, pancreatitis, and asthma.  With regards to patient's asthma, she has been prescribed Advair, and has a rescue inhaler.    ROS:  Review of Systems  See HPI    Allergies:  Ibuprofen     Medications:    acetaminophen (TYLENOL) 325 MG tablet  albuterol (PROAIR HFA/PROVENTIL HFA/VENTOLIN HFA) 108 (90 Base) MCG/ACT inhaler  aspirin (ASA) 325 MG EC tablet  docusate sodium (COLACE) 100 MG capsule  escitalopram (LEXAPRO) 10 MG tablet  famotidine (PEPCID) 20 MG tablet  levothyroxine (SYNTHROID/LEVOTHROID) 50 MCG tablet  Multiple Vitamins-Minerals (EMERGEN-C IMMUNE PLUS/VIT D) CHEW  nystatin (MYCOSTATIN) 142476 UNIT/GM external cream  OLANZapine zydis (ZYPREXA) 10 MG ODT  Pediatric Multivit-Minerals-C (CHEWABLES MULTIVITAMIN  PO)  pravastatin (PRAVACHOL) 40 MG tablet  Probiotic Product (RISAQUAD-2) CAPS  traZODone (DESYREL) 50 MG tablet        Past Medical History:    Past Medical History:   Diagnosis Date     Anxiety      Asthma      Cholelithiasis      Depressive disorder      Gastroesophageal reflux disease      Hypercholesterolemia      Hypothyroidism      Mild mental retardation      Obesity        Past Surgical History:    Past Surgical History:   Procedure Laterality Date     IR LUMBAR PUNCTURE  06/09/2020     TONSILLECTOMY          Family History:    family history is not on file.    Social History:   reports that she has never smoked. She has never used smokeless tobacco. She reports that she does not drink alcohol and does not use drugs.  PCP: [facility]     Physical Exam     Patient Vitals for the past 24 hrs:   BP Temp Temp src Pulse Resp SpO2   04/28/23 0608 (!) 143/78 -- -- -- -- --   04/28/23 0600 (!) 143/78 -- -- -- -- 95 %   04/28/23 0200 (!) 142/80 -- -- -- -- 97 %   04/27/23 2349 134/81 -- -- 58 -- 97 %   04/27/23 2145 -- -- -- -- -- 96 %   04/27/23 2012 -- -- -- -- -- 96 %   04/27/23 1912 -- -- -- -- -- 96 %   04/27/23 1853 (!) 141/77 97.4  F (36.3  C) Temporal 66 30 97 %        Physical Exam  General:   Well-nourished   Speaking in full sentences  Eyes:   Conjunctiva without injection or scleral icterus  ENT:   Moist mucous membranes   Nares patent   Pinnae normal  Neck:   Full ROM   No stiffness appreciated  Resp:   Diffuse expiratory wheezing bilaterally   Prolonged expiratory phase noted   Intermittent dry cough  CV:    Normal rate, regular rhythm   S1 and S2 present   No murmur, gallop or rub  GI:   BS present   Abdomen soft without distention   Non-tender to light and deep palpation   No guarding or rebound tenderness  Skin:   Warm, dry, well perfused   No rashes or open wounds on exposed skin  MSK:   Moves all extremities   No focal deformities or swelling  Neuro:   Alert   Answers questions  appropriately   Moves all extremities equally   Gait stable  Psych:   Normal affect, normal mood    Emergency Department Course     ECG results from 04/27/23   EKG 12 lead     Value    Systolic Blood Pressure     Diastolic Blood Pressure     Ventricular Rate 66    Atrial Rate 66    NV Interval 126    QRS Duration 92        QTc 434    P Axis 55    R AXIS 38    T Axis 26    Interpretation ECG      Sinus rhythm  Increased R/S ratio in V1, consider early transition or posterior infarct  Abnormal ECG  When compared with ECG of 27-MAR-2023 15:28,  No significant change was found         Imaging:  CT Chest Pulmonary Embolism w Contrast   Final Result   IMPRESSION:   1.  Negative for pulmonary embolism.      2.  No evidence of pneumonia.      3.  Stable mediastinal lymph node or nodule of indeterminate etiology or significance.      XR Chest 2 Views   Final Result   IMPRESSION: Cardiac enlargement. Calcified granuloma right midlung stable. Lungs clear. Normal pulmonary vascularity. No effusion.         Report per radiology    Laboratory:  Labs Ordered and Resulted from Time of ED Arrival to Time of ED Departure   BASIC METABOLIC PANEL - Abnormal       Result Value    Sodium 139      Potassium 4.4      Chloride 101      Carbon Dioxide (CO2) 29      Anion Gap 9      Urea Nitrogen 22.8 (*)     Creatinine 0.83      Calcium 9.0      Glucose 127 (*)     GFR Estimate >90     CBC WITH PLATELETS AND DIFFERENTIAL - Abnormal    WBC Count 9.3      RBC Count 4.09      Hemoglobin 11.3 (*)     Hematocrit 36.7      MCV 90      MCH 27.6      MCHC 30.8 (*)     RDW 13.5      Platelet Count 189      % Neutrophils 73      % Lymphocytes 20      % Monocytes 5      % Eosinophils 1      % Basophils 0      % Immature Granulocytes 1      NRBCs per 100 WBC 0      Absolute Neutrophils 6.8      Absolute Lymphocytes 1.8      Absolute Monocytes 0.5      Absolute Eosinophils 0.1      Absolute Basophils 0.0      Absolute Immature Granulocytes 0.1       Absolute NRBCs 0.0     D DIMER QUANTITATIVE - Abnormal    D-Dimer Quantitative 0.60 (*)    TROPONIN T, HIGH SENSITIVITY - Normal    Troponin T, High Sensitivity <6     ISTAT HCG QUALITATIVE PREGNANCY POCT - Normal    HCG Qualitative POCT Negative     TROPONIN T, HIGH SENSITIVITY - Normal    Troponin T, High Sensitivity <6          Procedures   None    Emergency Department Course & Assessments:      Interventions:  Medications   albuterol (PROVENTIL) (2.5 MG/3ML) 0.083% neb solution (2.5 mg  $Given 4/27/23 1907)   albuterol (PROVENTIL) (2.5 MG/3ML) 0.083% neb solution (5 mg  $Given 4/27/23 1908)   methylPREDNISolone sodium succinate (solu-MEDROL) injection 125 mg (125 mg Intravenous $Given 4/27/23 2037)   iopamidol (ISOVUE-370) solution 500 mL (90 mLs Intravenous $Given 4/28/23 0131)   sodium chloride (PF) 0.9% PF flush 100 mL (90 mLs Intravenous $Given 4/28/23 0131)        Assessments:  See below    Independent Interpretation (X-rays, CTs, rhythm strip):  Reviewed XR and see no infiltrate    Consultations/Discussion of Management or Tests:   ED Course as of 04/28/23 0927   Thu Apr 27, 2023   1936 Patient re-assessed   2310 Patient re-assessed   Fri Apr 28, 2023   0033 Patient re-assessed        Social Determinants of Health affecting care:   Healthcare Access/Compliance and Education/Literacy    Disposition:  The patient was discharged to home.     Impression & Plan      Medical Decision Making:  Dionne Perez is a 38-year-old female with a PMH significant for neurocognitive delay who presents to the emergency department for evaluation of acute onset chest pain and shortness of breath.  VS on presentation reveal elevated BP, though otherwise are unremarkable.  Differential diagnosis includes acute asthma exacerbation, pneumothorax, pneumonia, PE, ACS, musculoskeletal pain, among others.  History, exam, and ED course as outlined above.  Based on the above presenting symptoms and evaluation, I suspect symptoms most  likely secondary to acute asthma exacerbation.  Patient did exhibit wheezing on initial presentation, with improvements in symptoms following bronchodilators as well as Solu-Medrol.  Given prior history of venous thromboembolic disease, and abrupt onset of symptoms, PE considered.  Patient low risk by Wells criteria, though not PERC negative.  D-dimer subsequently obtained which returned mildly elevated.  CT chest obtained fortunately revealing no evidence of acute pulmonary embolism, infiltrate or other acute process.  Patient informed of mediastinal lymph node enlargement, stable compared with previous, the recommendations to follow-up with PCP regarding this.  She verbalized understanding of this.  ACS considered though felt unlikely.  EKG demonstrates a sinus rhythm without findings of acute ischemia compared with previous.  Note made of prominent R wave in lead V1, which has been noted previously.  Initial and repeat high-sensitivity troponin returned undetectable.  Patient observed in the ED, with further improvement in symptoms.  Serial pulmonary exams demonstrates ultimate resolution of wheezing.  She is otherwise felt stable for discharge to her group home.  We will initiate 4 additional days of oral prednisone, and provide refill of albuterol inhaler.  Recommend follow-up with PCP in 2 to 3 days for recheck.  Ongoing surveillance of mediastinal lymph node indicated.  Return to ED with worsening shortness of breath, chest pain, or any other new or troubling symptoms.  Attempts made to contact guardian via telephone.    Diagnosis:    ICD-10-CM    1. Chest pain, unspecified type  R07.9       2. Shortness of breath  R06.02       3. Moderate persistent asthma with acute exacerbation  J45.41       4. Nodule of anterior chest wall  R22.2     mediastinal nodule           4/27/2023   Lavon Marin MD Roach, Brian Donald, MD  04/28/23 0942

## 2023-04-28 ENCOUNTER — HOSPITAL ENCOUNTER (EMERGENCY)
Facility: CLINIC | Age: 38
Discharge: NURSING, REHAB OR LONG-TERM CARE | End: 2023-04-29
Attending: EMERGENCY MEDICINE
Payer: MEDICARE

## 2023-04-28 ENCOUNTER — APPOINTMENT (OUTPATIENT)
Dept: CT IMAGING | Facility: CLINIC | Age: 38
End: 2023-04-28
Attending: EMERGENCY MEDICINE
Payer: MEDICARE

## 2023-04-28 VITALS
HEART RATE: 58 BPM | SYSTOLIC BLOOD PRESSURE: 143 MMHG | TEMPERATURE: 97.4 F | DIASTOLIC BLOOD PRESSURE: 78 MMHG | RESPIRATION RATE: 30 BRPM | OXYGEN SATURATION: 95 %

## 2023-04-28 VITALS
RESPIRATION RATE: 20 BRPM | DIASTOLIC BLOOD PRESSURE: 70 MMHG | OXYGEN SATURATION: 95 % | HEART RATE: 63 BPM | TEMPERATURE: 98.7 F | SYSTOLIC BLOOD PRESSURE: 106 MMHG

## 2023-04-28 DIAGNOSIS — F43.0 ACUTE REACTION TO STRESS: ICD-10-CM

## 2023-04-28 LAB
ATRIAL RATE - MUSE: 66 BPM
D DIMER PPP FEU-MCNC: 0.6 UG/ML FEU (ref 0–0.5)
DIASTOLIC BLOOD PRESSURE - MUSE: NORMAL MMHG
INTERPRETATION ECG - MUSE: NORMAL
P AXIS - MUSE: 55 DEGREES
PR INTERVAL - MUSE: 126 MS
QRS DURATION - MUSE: 92 MS
QT - MUSE: 414 MS
QTC - MUSE: 434 MS
R AXIS - MUSE: 38 DEGREES
SYSTOLIC BLOOD PRESSURE - MUSE: NORMAL MMHG
T AXIS - MUSE: 26 DEGREES
VENTRICULAR RATE- MUSE: 66 BPM

## 2023-04-28 PROCEDURE — 90791 PSYCH DIAGNOSTIC EVALUATION: CPT

## 2023-04-28 PROCEDURE — 99285 EMERGENCY DEPT VISIT HI MDM: CPT | Mod: 25

## 2023-04-28 PROCEDURE — 71275 CT ANGIOGRAPHY CHEST: CPT | Mod: MA

## 2023-04-28 PROCEDURE — 250N000011 HC RX IP 250 OP 636: Performed by: EMERGENCY MEDICINE

## 2023-04-28 RX ORDER — PREDNISONE 20 MG/1
40 TABLET ORAL DAILY
Qty: 8 TABLET | Refills: 0 | Status: SHIPPED | OUTPATIENT
Start: 2023-04-28 | End: 2023-05-08

## 2023-04-28 RX ORDER — IOPAMIDOL 755 MG/ML
500 INJECTION, SOLUTION INTRAVASCULAR ONCE
Status: COMPLETED | OUTPATIENT
Start: 2023-04-28 | End: 2023-04-28

## 2023-04-28 RX ORDER — ALBUTEROL SULFATE 90 UG/1
2 AEROSOL, METERED RESPIRATORY (INHALATION) EVERY 6 HOURS PRN
Qty: 18 G | Refills: 0 | Status: ON HOLD | OUTPATIENT
Start: 2023-04-28 | End: 2023-06-05

## 2023-04-28 RX ORDER — ALBUTEROL SULFATE 90 UG/1
2 AEROSOL, METERED RESPIRATORY (INHALATION) EVERY 6 HOURS PRN
Qty: 18 G | Refills: 0 | Status: SHIPPED | OUTPATIENT
Start: 2023-04-28 | End: 2023-04-28

## 2023-04-28 RX ORDER — PREDNISONE 20 MG/1
40 TABLET ORAL DAILY
Qty: 8 TABLET | Refills: 0 | Status: SHIPPED | OUTPATIENT
Start: 2023-04-28 | End: 2023-04-28

## 2023-04-28 RX ADMIN — IOPAMIDOL 90 ML: 755 INJECTION, SOLUTION INTRAVENOUS at 01:31

## 2023-04-28 ASSESSMENT — ENCOUNTER SYMPTOMS
HALLUCINATIONS: 1
DYSPHORIC MOOD: 1

## 2023-04-28 ASSESSMENT — ACTIVITIES OF DAILY LIVING (ADL)
ADLS_ACUITY_SCORE: 35

## 2023-04-28 NOTE — ED NOTES
Called with no answer at the following numbers to notify group home of discharge.  [phone] (business line)  [phone] (consumer phone line, redirected to staff line)  [phone] (staff line)  [phone] (Patricia)    0400: Recalled staff line phone numbers and Patricia line, no answer at this time. Will reattempt.    0430: Placed a third call to staff line, Patricia line, and business line with no answer.     0615: Spoke with Patricia regarding pt discharge. [facility] arrived to transport pt home. discharge instructions and handouts given to [facility].

## 2023-04-28 NOTE — ED NOTES
[facility] who picked up pt recommended calling the below numbers when needing to reach the house after hours:  [phone] (Patricia)  [phone] ([facility])

## 2023-04-28 NOTE — DISCHARGE INSTRUCTIONS
Begin prednisone as discussed    Continue with inhaler as needed    Follow-up with primary care provider in 2-3 days for re-check.    Follow-up regarding the chest nodule noted on your CT    Discharge Instructions  Asthma    Asthma is a condition causing narrowing and inflammation of the airways that can make it hard to breathe.  Asthma can also cause cough, wheezing, noisy breathing and tightness in the chest.  Asthma can be brought on or  triggered  by many things, including dust, mold, pollen, cigarette smoke, exercise, stress and infections (like the common cold).     Generally, every Emergency Department visit should have a follow-up clinic visit with either a primary or a specialty clinic/provider. Please follow-up as instructed by your emergency provider today.    Return to the Emergency Department if:  Your breathing gets worse.  You need to use your inhaler more often than every 4 hours, or cannot get relief from your inhaler.  You are very weak, or feel much more ill.  You develop new symptoms, such as chest pain.  You cough up blood.  You are vomiting (throwing up) enough that you cannot keep fluids or your medicine down.    What can I do to help myself?  Fill any prescriptions the provider gave you and take them right away--especially antibiotics. Be sure to finish the whole antibiotic prescription.  You may be given a prescription for an inhaler, which can help loosen tight air passages.  Use this as needed, but not more often than directed. Inhalers work much better when used with a spacer.   You may be given a prescription for a steroid to reduce inflammation. Used long-term, these can have some side effects, but for short-term use they are safe. You may notice restlessness or increased appetite (eating more).      You may use non-prescription cough or cold medicines. Cough medicines may help, but do not make the cough go away completely.   Avoid smoke, because this can make you feel worse. If you  smoke, this may be a good time to quit! Consider using nicotine lozenges, gum, or patches to reduce cravings.   If you have a fever, Tylenol  (acetaminophen), Motrin  (ibuprofen), or Advil  (ibuprofen), may help bring fever down and may help you feel more comfortable. Be sure to read and follow the package directions, and ask your provider if you have questions.  Be sure to get your flu shot each year.  For certain age groups, the pneumonia shot can help prevent pneumonia.  It is important that you follow up with your regular provider, to be sure that you are improving from this spell (an acute asthma exacerbation), and also to do what you can to keep from having trouble again. Sometimes you need long-term medicines to keep your asthma under control.   If you were given a prescription for medicine here today, be sure to read all of the information (including the package insert) that comes with your prescription.  This will include important information about the medicine, its side effects, and any warnings that you need to know about.  The pharmacist who fills the prescription can provide more information and answer questions you may have about the medicine.  If you have questions or concerns that the pharmacist cannot address, please call or return to the Emergency Department.   Remember that you can always come back to the Emergency Department if you are not able to see your regular provider in the amount of time listed above, if you get any new symptoms, or if there is anything that worries you.

## 2023-04-29 ENCOUNTER — HOSPITAL ENCOUNTER (EMERGENCY)
Facility: CLINIC | Age: 38
Discharge: NURSING, REHAB OR LONG-TERM CARE | End: 2023-04-29
Attending: EMERGENCY MEDICINE | Admitting: EMERGENCY MEDICINE
Payer: MEDICARE

## 2023-04-29 VITALS
OXYGEN SATURATION: 99 % | TEMPERATURE: 97.9 F | SYSTOLIC BLOOD PRESSURE: 133 MMHG | DIASTOLIC BLOOD PRESSURE: 63 MMHG | HEART RATE: 55 BPM | RESPIRATION RATE: 20 BRPM

## 2023-04-29 DIAGNOSIS — F43.0 ACUTE REACTION TO STRESS: ICD-10-CM

## 2023-04-29 PROCEDURE — 93005 ELECTROCARDIOGRAM TRACING: CPT

## 2023-04-29 PROCEDURE — 99283 EMERGENCY DEPT VISIT LOW MDM: CPT

## 2023-04-29 RX ORDER — ALBUTEROL SULFATE 90 UG/1
2 AEROSOL, METERED RESPIRATORY (INHALATION) EVERY 6 HOURS PRN
Qty: 18 G | Refills: 0 | Status: SHIPPED | OUTPATIENT
Start: 2023-04-29

## 2023-04-29 ASSESSMENT — ACTIVITIES OF DAILY LIVING (ADL)
ADLS_ACUITY_SCORE: 35

## 2023-04-29 NOTE — ED TRIAGE NOTES
"Pt arrived via EMS from [facility] home where she stated \"she did not get her iced coffee this morning, became agitated, and had thoughts of hitting myself.\" Pt calm and cooporative on arrival. VSS.      Triage Assessment     Row Name 04/29/23 6921       Triage Assessment (Adult)    Airway WDL WDL       Respiratory WDL    Respiratory WDL WDL       Cardiac WDL    Cardiac WDL X;chest pain       Chest Pain Assessment    Chest Pain Location midsternal       Cognitive/Neuro/Behavioral WDL    Cognitive/Neuro/Behavioral WDL WDL              "

## 2023-04-29 NOTE — DISCHARGE INSTRUCTIONS
Follow-up with primary care.      Aftercare Plan  If I am feeling unsafe or I am in a crisis, I will:   Contact my established care providers   Call the National Suicide Prevention Lifeline: 767.236.6582   Go to the nearest emergency room   Call 911     Warning signs that I or other people might notice when a crisis is developing for me:     I am having increasing suicidal thoughts that turn to plans with intent or means  I am having additional urges to self-harm    My emotions are of hopelessness; feeling like there's no way out.  Rage or anger.  Engaging in risky activities without thinking  Withdrawing from family/friends  Dramatic mood swings  Drastic personality changes   Use of alcohol or drugs  Postings on social media  Neglect of personal hygiene or cares      Things I am able to do on my own to cope or help me feel better:    Other things to Try:  Spending quality time with loved ones  Staying hydrated  Eating balanced meals  Going for a walk every day  Take care of daily responsibilities/needs  Focus on positive self-talk vs negative self-talk     Things that I am able to do with others to cope or help me better:   Other things to Try:  Exercise  Music  Deep breathing  Meditations  Journal  Self-regulate  Self check-in  Ask for help     Things I can use or do for distraction:   Other things to Try:  Reach out to/spend time with family, friends  Shower  Exercise  Chores or do a project  Listen to music  Watch movie/TV  Listening to music  Journaling  Reading a book  Meditating  Call a friend     Changes I can make to support my mental health and wellness:    -I will abstain from all mood altering chemicals not currently prescribed to me   -I will attend scheduled mental health therapy and psychiatric appointments and follow all   recommendations  -I will commit to 30 minutes of self care daily - this can be as simple as taking a shower, going for a   walk, cooking a meal, read, writing, etc  -I will  "practice square breathing when I begin to feel anxious - in breath through the nose for the count   of 4 and the first line on the square. Out breath through the mouth for the count of 4 for the second line   of the square. Repeat to complete the square. Repeat the square as many times as needed.  - I will use distraction skills of: going for walks, watching TV, spending time outside, calling a friend or   family member  -Use community resources, including hotline numbers, [facility] crisis and support meetings  -Maintain a daily schedule/routine  -Practice deep breathing skills  -Download a meditation nehal and spend 15-20 minutes per day mediating/relaxing. Some apps to   download include: Calm, Headspace and Insight Timer. All 3 of these apps have free version     People in my life that I can ask for help:   Family  Friends      Your [facility] has a mental health crisis team you can call 24/7: [facility] Crisis  [phone]    Crisis Lines  Crisis Text Line  Text 071244  You will be connected with a trained live crisis counselor to provide support.    guille Colbert a 919149 o texto a 442-AYUDAME en WhatsApp    The [employer] Project (LGBTQ Youth Crisis Line)  7.623.973.9749  text START to 449-302      [employer]  Fast Tracker  Linking people to mental health and substance use disorder resources  [employer].AboutMyStar     [place] Mental Health Warm Line  Peer to peer support  Monday thru Saturday, 12 pm to 10 pm  760.696.3576 or 8.741.712.5324  Text \"Support\" to 76907    National [place] on Mental Illness (GULSHAN)  583.983.7230 or 1.888.GULSHAN.HELPS      Mental Health Apps  My3  https://my3app.org/    VirtualHopeBox  https://Han grass [employer].org/apps/virtual-hope-box/      Crisis Lines  Crisis Text Line  Text 394040  You will be connected with a trained live crisis counselor to provide support.    yuni Colberto  JONATHAN a 924431 o texto a 442-AYUDAME en WhatsApp    National Hope Line  " "1.800.SUICIDE [3042560]      Community Resources  Fast Tracker  Linking people to mental health and substance use disorder resources  [employer].org     [place] Mental Health Warm Line  Peer to peer support  Monday thru Saturday, 12 pm to 10 pm  633.304.4228 or 0.996.089.0635  Text \"Support\" to 97299    National [place] on Mental Illness (GULSHAN)  321.105.3282 or 1.888.GULSHAN.HELPS      Mental Health Apps  My3  https://Publons.org/    VirtualHopeBox  https://grabHalo/apps/virtual-hope-box/      Additional Information  Today you were seen by a licensed mental health professional through Triage and Transition services, Behavioral Healthcare Providers ([facility])  for a crisis assessment in the Emergency Department at [facility].  It is recommended that you follow up with your established providers (psychiatrist, mental health therapist, and/or primary care doctor - as relevant) as soon as possible. Coordinators from [facility] will be calling you in the next 24-48 hours to ensure that you have the resources you need.  You can also contact [facility] coordinators directly at [phone]. You may have been scheduled for or offered an appointment with a mental health provider. [facility] maintains an extensive network of licensed behavioral health providers to connect patients with the services they need.  We do not charge providers a fee to participate in our referral network.  We match patients with providers based on a patient's specific needs, insurance coverage, and location.  Our first effort will be to refer you to a provider within your care system, and will utilize providers outside your care system as needed.               "

## 2023-04-29 NOTE — ED PROVIDER NOTES
"  History     Chief Complaint:  Suicidal     The history is provided by the patient.      Dionne Perez is a 38 year old female with a history of mental illness and intellectual disability who presents with suicidal ideation.  She tells me she found out she had a \"tumor in my chest\" yesterday (this is not substantiated on review of chart) so she has been thinking about hurting herself.  When asked her plan she tells me she is going to \"use my hand.\"  She is also thinking about hurting her roommates by hitting them.  She endorses auditory and visual hallucinations.  She denies drug or alcohol use.  She feels she can keep herself safe in the emergency department.    Independent Historian: Patient, as above    Review of External Notes: I reviewed her pattern of visits, indicating 20 visits in the past 6 months including ED visit yesterday with CT, as below.    ROS:  Review of Systems   Psychiatric/Behavioral: Positive for dysphoric mood, hallucinations and suicidal ideas.   All other systems reviewed and are negative.    Allergies:  Ibuprofen     Medications:    Albuterol  Aspirin  Colace  Lexapro  Pepcid  Levothyroxine  Mycostatin  Zyprexa  Pravachol  Prednisone  Desyrel    Past Medical History:    Anxiety  Asthma  Cholelithiasis  Depression  GERD  Hypercholesterolemia  Hypothyroidism  Mental retardation  Obesity  Anemia  Suicidal ideation  Gallstone pancreatitis  Biliary calculus  Hyperlipidemia  Cellulitis   Costochondritis  Hypertension    Past Surgical History:     IR lumbar puncture  tonsillectomy    Family History:    Mother: cancer, cataract  Sister: cancer, diabetes    Social History:  The patient presents alone  Lives in a group home    Physical Exam     Patient Vitals for the past 24 hrs:   BP Temp Temp src Pulse Resp SpO2   04/28/23 1906 -- -- -- -- -- 95 %   04/28/23 1905 -- -- -- -- -- 95 %   04/28/23 1857 106/70 98.7  F (37.1  C) Oral 63 20 95 %      Physical Exam  General: Well-developed, morbidly " obese; well appearing young  woman; cooperative  Head:  Atraumatic  Eyes:  Conjunctivae, lids, and sclerae are normal  ENT:    Normal nose; MMM  Neck:  Supple; normal ROM  Resp:  No respiratory distress  GI:  Nondistended    MS:  Normal ROM  Skin:  Warm; non-diaphoretic; no pallor  Neuro: Awake; A&Ox3  Psych:  Dysphoric mood and flat affect; normal speech; avoids eye contact; not responding to internal stimuli; suicidal thought content  Vitals reviewed.    Emergency Department Course   Emergency Department Course & Assessments:    PSS-3    Date and Time Over the past 2 weeks have you felt down, depressed, or hopeless? Over the past 2 weeks have you had thoughts of killing yourself? Have you ever attempted to kill yourself? When did this last happen? User   04/28/23 1925 yes yes no -- ANB      C-SSRS ([place])    Date and Time Q1 Wished to be Dead (Past Month) Q2 Suicidal Thoughts (Past Month) Q3 Suicidal Thought Method Q4 Suicidal Intent without Specific Plan Q5 Suicide Intent with Specific Plan Q6 Suicide Behavior (Lifetime) Within the Past 3 Months? RETIRED: Level of Risk per Screen Screening Not Complete User   04/28/23 1925 yes no no yes no no -- -- -- ANB        Suicide assessment completed by mental health (D.E.C., LCSW, etc.)    Independent Interpretation (X-rays, CTs, rhythm strip):  Not applicable    Consultations/Discussion of Management or Tests:   ED Course as of 04/28/23 2311 Fri Apr 28, 2023 1940 I greeted the patient and obtained relevant information.   2233 I spoke with DENAE Gomez, after her assessment of the patient.   2305 I reassessed the patient who is feeling better and comfortable with plan for discharge.    Social Determinants of Health affecting care:   Lives in a group home.  Low health literacy.  Mental illness.  Intellectual disability.  All of the above increase likelihood of return visit.    Disposition:  Discharged.    Impression & Plan    Medical Decision Making:  Fan is  "a 38 year old woman with mental illness and intellectual disability presenting with suicidal ideation.  She simply tells me she is thinking about hurting or killing herself with \"my hand\" after finding out about a \"tumor in my chest\".  Notably, she does not have a tumor in her chest.  She is well-known to this emergency department with 20 visits in the last 6 months alone.  She had a CT PE study yesterday.  She denies other concerns.    Fan was seen by [facility] and her suicidal ideation had resolved.  As such, she is appropriate for return to her group home.  With a reasonable degree of certainty there is low risk of self-harm here.  Given her previous pattern of visits, she will undoubtably seek care if she does have recurrent symptoms.  I encouraged her to follow-up with her primary care provider and answered all her questions.  She verbalized understanding and was discharged to her group home with aftercare plan from [facility].     Diagnosis:    ICD-10-CM    1. Acute reaction to stress  F43.0          Discharge Medications:  New Prescriptions    No medications on file      Scribe Disclosure:  I, Franklinsheila Raineyarmond, am serving as a scribe at 7:47 PM on 4/28/2023 to document services personally performed by Caroline Sellers MD based on my observations and the provider's statements to me.     4/28/2023   Carolien Sellers MD Dixson, Kylie S, MD  05/17/23 1452    "
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
## 2023-04-30 ENCOUNTER — HOSPITAL ENCOUNTER (EMERGENCY)
Facility: CLINIC | Age: 38
Discharge: HOME | End: 2023-04-30
Attending: EMERGENCY MEDICINE | Admitting: EMERGENCY MEDICINE
Payer: MEDICARE

## 2023-04-30 VITALS
DIASTOLIC BLOOD PRESSURE: 74 MMHG | TEMPERATURE: 97.9 F | RESPIRATION RATE: 18 BRPM | OXYGEN SATURATION: 100 % | HEART RATE: 67 BPM | SYSTOLIC BLOOD PRESSURE: 123 MMHG

## 2023-04-30 DIAGNOSIS — J98.01 BRONCHOSPASM: ICD-10-CM

## 2023-04-30 PROCEDURE — 93005 ELECTROCARDIOGRAM TRACING: CPT

## 2023-04-30 PROCEDURE — 250N000009 HC RX 250

## 2023-04-30 PROCEDURE — 99283 EMERGENCY DEPT VISIT LOW MDM: CPT

## 2023-04-30 PROCEDURE — 250N000012 HC RX MED GY IP 250 OP 636 PS 637: Performed by: EMERGENCY MEDICINE

## 2023-04-30 RX ORDER — IPRATROPIUM BROMIDE AND ALBUTEROL SULFATE 2.5; .5 MG/3ML; MG/3ML
6 SOLUTION RESPIRATORY (INHALATION) ONCE
Status: DISCONTINUED | OUTPATIENT
Start: 2023-04-30 | End: 2023-04-30 | Stop reason: HOSPADM

## 2023-04-30 RX ORDER — PREDNISONE 20 MG/1
60 TABLET ORAL ONCE
Status: COMPLETED | OUTPATIENT
Start: 2023-04-30 | End: 2023-04-30

## 2023-04-30 RX ADMIN — PREDNISONE 60 MG: 20 TABLET ORAL at 11:19

## 2023-04-30 RX ADMIN — IPRATROPIUM BROMIDE 1 MG: 0.5 SOLUTION RESPIRATORY (INHALATION) at 11:20

## 2023-04-30 ASSESSMENT — ACTIVITIES OF DAILY LIVING (ADL)
ADLS_ACUITY_SCORE: 35
ADLS_ACUITY_SCORE: 35

## 2023-04-30 NOTE — ED TRIAGE NOTES
"Patient arrives via EMS. Patient has seen in this ED the past 2 days. Patient states she is SOB and \"can't breathe\". Patient 100% on RA and able to ambulate to room with no difficulty. Patient's group told EMS that they feel she does this as a attention seeking behavior.      Triage Assessment     Row Name 04/30/23 1046       Triage Assessment (Adult)    Airway WDL WDL       Respiratory WDL    Respiratory WDL WDL       Skin Circulation/Temperature WDL    Skin Circulation/Temperature WDL WDL       Cardiac WDL    Cardiac WDL WDL       Peripheral/Neurovascular WDL    Peripheral Neurovascular WDL WDL       Cognitive/Neuro/Behavioral WDL    Cognitive/Neuro/Behavioral WDL WDL              "

## 2023-04-30 NOTE — ED NOTES
"Upon discharge, pt stated she was \"out of her inhalers at home.\" MD notified and prescription printed.   "

## 2023-04-30 NOTE — ED NOTES
"Writer went to patient's room to administer medications. Patient yelling about wanting a menu. Writer explained that patient was here for medical treatment and shortness of breath and that those things needed to be addressed first. Patient continued to yell and stated \"I only came here because I wanted food, so give me a menu\".  Writer again stated that patient's medical needs needed to be addressed first. Patient eventually agreed to take her medications and do her breathing treatment.   "

## 2023-04-30 NOTE — ED PROVIDER NOTES
History     Chief Complaint:  Shortness of Breath       HPI   Dionne Perez is a 38 year old female with a history of asthma cognitive impairment who presents with wheezing, shortness of breath.    Per review of the medical record, patient presented to the ER on 4/27/2023 for evaluation of chest pain and shortness of breath that onset prior to arrival on that day.  A CT scan of the chest did not show pulmonary embolism or other acute process.  Serial troponin testing was negative.  Apparently she did have some wheezing on initial presentation which improved after breathing treatments. She was discharged with inhaler and steroid burst.  She returned the next day on 4/28 with suicidal ideations and then again yesterday on 4/29 after behavioral outburst.    Patient reports that her wheezing worsened this morning.  She has associated chest discomfort and shortness of breath and mild cough.  No fever or vomiting or diarrhea.  She has 1 more day left of her prednisone burst.  She corroborates that her symptoms started about 4 days ago when she first presented to the ER on the 27th.    Review of External Notes: ER notes for THIS hospital over the last 3 days per above     ROS:  Review of Systems  A 10 point ROS was obtained and negative except as noted here and in HPI    Allergies:  Ibuprofen     Medications:    acetaminophen (TYLENOL) 325 MG tablet  albuterol (PROAIR HFA/PROVENTIL HFA/VENTOLIN HFA) 108 (90 Base) MCG/ACT inhaler  albuterol (PROAIR HFA/PROVENTIL HFA/VENTOLIN HFA) 108 (90 Base) MCG/ACT inhaler  albuterol (PROAIR HFA/PROVENTIL HFA/VENTOLIN HFA) 108 (90 Base) MCG/ACT inhaler  aspirin (ASA) 325 MG EC tablet  docusate sodium (COLACE) 100 MG capsule  escitalopram (LEXAPRO) 10 MG tablet  famotidine (PEPCID) 20 MG tablet  levothyroxine (SYNTHROID/LEVOTHROID) 50 MCG tablet  Multiple Vitamins-Minerals (EMERGEN-C IMMUNE PLUS/VIT D) CHEW  nystatin (MYCOSTATIN) 162767 UNIT/GM external cream  OLANZapine zydis  (ZYPREXA) 10 MG ODT  Pediatric Multivit-Minerals-C (CHEWABLES MULTIVITAMIN PO)  pravastatin (PRAVACHOL) 40 MG tablet  predniSONE (DELTASONE) 20 MG tablet  Probiotic Product (RISAQUAD-2) CAPS  traZODone (DESYREL) 50 MG tablet        Past Medical History:    Past Medical History:   Diagnosis Date     Anxiety      Asthma      Cholelithiasis      Depressive disorder      Gastroesophageal reflux disease      Hypercholesterolemia      Hypothyroidism      Mild mental retardation      Obesity        Past Surgical History:    Past Surgical History:   Procedure Laterality Date     IR LUMBAR PUNCTURE  06/09/2020     TONSILLECTOMY          Family History:    family history is not on file.    Social History:   reports that she has never smoked. She has never used smokeless tobacco. She reports that she does not drink alcohol and does not use drugs.  PCP: [facility], [facility] [place]     Physical Exam     Patient Vitals for the past 24 hrs:   BP Temp Pulse Resp SpO2   04/30/23 1334 123/74 -- 67 18 100 %   04/30/23 1047 121/69 97.9  F (36.6  C) 51 20 100 %        Physical Exam  VS: Reviewed per above  HENT: normal speech  EYES: sclera anicteric  CV: Rate as noted, regular rhythm.   RESP: Effort normal. Inspiratory and expiratory wheezing  GI: no tenderness/rebound/guarding, not distended.  NEURO: Alert, moving all extremities  MSK: No deformity of the extremities  SKIN: Warm and dry    Emergency Department Course     Emergency Department Course & Assessments:       Interventions:  Medications   predniSONE (DELTASONE) tablet 60 mg (60 mg Oral $Given 4/30/23 1119)   ipratropium (ATROVENT) 0.02 % neb solution (1 mg  $Given 4/30/23 1120)          Disposition:  The patient was discharged to home.     Impression & Plan        Medical Decision Making:  Patient presents to the ER for evaluation of wheezing and shortness of breath.  Vital signs reassuring.  On exam she does have inspiratory and expiratory wheezing.  Has history of  asthma.  Wheezing resolved with breathing treatment and oral prednisone.  Low suspicion for pneumonia, pneumothorax, PE, CHF.  Patient was just seen in the ER earlier this week for similar symptoms and had broad evaluation including CT scan of the chest which was negative.  She is currently taking prednisone burst and has albuterol inhaler at home.  Encouraged her to continue these medications.  Recommended primary care follow-up and return precautions discussed prior to discharge.    Diagnosis:    ICD-10-CM    1. Bronchospasm  J98.01            Discharge Medications:  Discharge Medication List as of 4/30/2023  1:29 PM              Gonzalo Oconnell MD  04/30/23 5105

## 2023-05-01 ENCOUNTER — HOSPITAL ENCOUNTER (EMERGENCY)
Facility: CLINIC | Age: 38
Discharge: HOME | End: 2023-05-01
Attending: EMERGENCY MEDICINE | Admitting: EMERGENCY MEDICINE
Payer: MEDICARE

## 2023-05-01 VITALS
HEART RATE: 60 BPM | SYSTOLIC BLOOD PRESSURE: 150 MMHG | RESPIRATION RATE: 18 BRPM | TEMPERATURE: 96.9 F | OXYGEN SATURATION: 94 % | DIASTOLIC BLOOD PRESSURE: 82 MMHG

## 2023-05-01 DIAGNOSIS — F43.0 ACUTE REACTION TO STRESS: ICD-10-CM

## 2023-05-01 PROCEDURE — 99285 EMERGENCY DEPT VISIT HI MDM: CPT | Mod: 25

## 2023-05-01 PROCEDURE — 90791 PSYCH DIAGNOSTIC EVALUATION: CPT

## 2023-05-01 ASSESSMENT — COLUMBIA-SUICIDE SEVERITY RATING SCALE - C-SSRS
5. HAVE YOU STARTED TO WORK OUT OR WORKED OUT THE DETAILS OF HOW TO KILL YOURSELF? DO YOU INTEND TO CARRY OUT THIS PLAN?: NO
2. HAVE YOU ACTUALLY HAD ANY THOUGHTS OF KILLING YOURSELF?: NO
1. SINCE LAST CONTACT, HAVE YOU WISHED YOU WERE DEAD OR WISHED YOU COULD GO TO SLEEP AND NOT WAKE UP?: YES
REASONS FOR IDEATION SINCE LAST CONTACT: DOES NOT APPLY
TOTAL  NUMBER OF INTERRUPTED ATTEMPTS SINCE LAST CONTACT: NO
SUICIDE, SINCE LAST CONTACT: NO
6. HAVE YOU EVER DONE ANYTHING, STARTED TO DO ANYTHING, OR PREPARED TO DO ANYTHING TO END YOUR LIFE?: NO
ATTEMPT SINCE LAST CONTACT: NO
TOTAL  NUMBER OF ABORTED OR SELF INTERRUPTED ATTEMPTS SINCE LAST CONTACT: NO

## 2023-05-01 ASSESSMENT — ACTIVITIES OF DAILY LIVING (ADL): ADLS_ACUITY_SCORE: 33

## 2023-05-01 NOTE — ED TRIAGE NOTES
Patient reports SI.  She reports generalized feelings of trying to hurt herself with no plan.  Patient seen recently in ED.  ABCs intact, A&Ox4.     Triage Assessment     Row Name 05/01/23 8970       Triage Assessment (Adult)    Airway WDL WDL       Respiratory WDL    Respiratory WDL WDL       Skin Circulation/Temperature WDL    Skin Circulation/Temperature WDL WDL       Cardiac WDL    Cardiac WDL WDL       Peripheral/Neurovascular WDL    Peripheral Neurovascular WDL WDL       Cognitive/Neuro/Behavioral WDL    Cognitive/Neuro/Behavioral WDL WDL

## 2023-05-02 ENCOUNTER — DOCUMENTATION ONLY (OUTPATIENT)
Dept: OTHER | Facility: CLINIC | Age: 38
End: 2023-05-02
Payer: MEDICARE

## 2023-05-02 LAB
ATRIAL RATE - MUSE: 58 BPM
DIASTOLIC BLOOD PRESSURE - MUSE: NORMAL MMHG
INTERPRETATION ECG - MUSE: NORMAL
P AXIS - MUSE: 41 DEGREES
PR INTERVAL - MUSE: 134 MS
QRS DURATION - MUSE: 96 MS
QT - MUSE: 436 MS
QTC - MUSE: 428 MS
R AXIS - MUSE: 20 DEGREES
SYSTOLIC BLOOD PRESSURE - MUSE: NORMAL MMHG
T AXIS - MUSE: 16 DEGREES
VENTRICULAR RATE- MUSE: 58 BPM

## 2023-05-02 NOTE — CONSULTS
..Diagnostic Evaluation Consultation  Crisis Assessment    Patient was assessed: Vishal  Patient location: [facility] 5  Was a release of information signed: No. Reason: No guardian present      Referral Data and Chief Complaint  Fan is a    38 year old, who uses she/her pronouns, and presents to the ED via EMS. Patient is referred to the ED by self. Patient is presenting to the ED for the following concerns: Patient reported she was having suicidal ideations with no plan.  It should be noted the patient has presented at the ED the last 5 days with various complaints.  She was assessed by DEC on 5/28/23 for suicidal ideation with no plan and was discharged back to her group home.      Informed Consent and Assessment Methods     Patient is reported to be under the guardianship of [facility] [place] [place] [facility]  : verified by [facility] and documented in the ACP Tab . Writer met with patient and explained the crisis assessment process, including applicable information disclosures and limits to confidentiality, assessed understanding of the process, and obtained consent to proceed with the assessment. Patient was observed to be able to participate in the assessment as evidenced by participating in assessment. Assessment methods included conducting a formal interview with patient, review of medical records, collaboration with medical staff, and obtaining relevant collateral information from family and community providers when available..     Over the course of this crisis assessment provided reassurance, offered validation and engaged patient in problem solving and disposition planning. Patient's response to interventions was receptive     Summary of Patient Situation       Fan has presented to [facility] ED 8 times in April 2023( for multiple complaints.   She was assessed by DEC on 4/28 for suicidal ideation and discharged back to her group home.  Today patient called 911 to report she was  having suicidal thoughts.  She reports the thoughts began today when she became upset about not being able to go to her sisters this weekend.  She denies a plan.   She admits that at times she feels like hitting herself but hasn't done so.    At ED, patient is calm, cooperative, smiling and talking about shopping and animals.   Patient is cognitively impaired and lives in group home.   During assessment patient reports she is feeling better and ready to be discharged to group home.    Brief Psychosocial History       Patient lives in a group home.   She reports she has three sisters. Her parent are .  Patient reports she is not working at this time.  She enjoys shopping, animals and spending time with her sisters.  She has a guardian.      Significant Clinical History    Patient has a history of psychiatric hospitalizations, residential treatment, medication management, therapy, case management, and guardianship.  She is diagnosed as having MDD, ROSY, and Mild Intellectual Disability. A diagnosis of Moderate Intellectual Disability may more accurately reflect patient's level of functioning as evidenced by vocabulary and fund of knowledge     Collateral Information    ..The following information was received from Bryanna from [facility] whose relationship to the patient is  . Information was obtained via phone. Their phone number is # **[phone]* and they last had contact with patient on today.       How long have they been a resident: Did not assess    Why does patient live in the facility: Mental Health and Intellectual Disablity    Significant changes to environment: None    Legal status (Commitment, probation, guardian, etc.): Under Guardianship     Has the patient made any comments about wanting to kill themselves/others:  Not today    What happened today: Patient was upset because she didn't complete her chores and did not get her incentive(ice cream and coffee).  She is also mad that  she isn't able to go to sisters this weekend.   Patient calls 911 when she doesn't get her way.       What is different about patient's functioning: Nothing    Concern about alcohol/drug use: No    If d/c is recommended, can patient return to current living situation: Yes.    If no, what needs to happen in order for patient to return: N/A    Additional information: None         Risk Assessment  .[place] Suicide Severity Rating Scale Full Clinical Version:                [place] Suicide Severity Rating Scale Since Last Contact:   Suicidal Ideation (Since Last Contact)  1. Wish to be Dead (Since Last Contact): Yes  2. Non-Specific Active Suicidal Thoughts (Since Last Contact): No  3. Active Suicidal Ideation with any Methods (Not Plan) Without Intent to Act (Since Last Contact): No  4. Active Suicidal Ideation with Some Intent to Act, Without Specific Plan (Since Last Contact): No  5. Active Suicidal Ideation with Specific Plan and Intent (Since Last Contact): No  Suicidal Behavior (Since Last Contact)  Actual Attempt (Since Last Contact): No  Has subject engaged in non-suicidal self-injurious behavior? (Since Last Contact): No  Interrupted Attempts (Since Last Contact): No  Aborted or Self-Interrupted Attempt (Since Last Contact): No  Preparatory Acts or Behavior (Since Last Contact): No  Suicide (Since Last Contact): No     C-SSRS Risk (Since Last Contact)  Calculated C-SSRS Risk Score (Since Last Contact): Low Risk    Validity of evaluation is impacted by presenting factors during interview patients cognitive ablity.   Comments regarding subjective versus objective responses to [place] tool:   Environmental or Psychosocial Events: helplessness/hopelessness and neither working nor attending school  Chronic Risk Factors: history of psychiatric hospitalization   Warning Signs: other: multiple ED visits  Protective Factors: strong bond to family unit, community support, or employment, lives in a responsibly safe and  stable environment, able to access care without barriers and supportive ongoing medical and mental health care relationships  Interpretation of Risk Scoring, Risk Mitigation Interventions and Safety Plan:  The patient scored a low risk rating on the C-SSRS which appears consistent with assessment and collateral. The patient does have risk factors of unintentionally putting herself in risky situations with poor judgement and insight by leaving her group home unaccompanied and being alone in the community. However, this is a concern that needs to be addressed by her group home and guardian. The riisk is mitigated by the patient being safe at the group home, has a guardian, having outpatient providers, future thinking, and is currently calm       Does the patient have thoughts of harming others? No     Is the patient engaging in sexually inappropriate behavior?  no        Current Substance Abuse     Is there recent substance abuse? no     Was a urine drug screen or blood alcohol level obtained: No       Mental Status Exam     Affect: Flat   Appearance: Disheveled    Attention Span/Concentration: Attentive  Eye Contact: Engaged   Fund of Knowledge: Delayed    Language /Speech Content: Fluent   Language /Speech Volume: Normal    Language /Speech Rate/Productions: Normal    Recent Memory: Poor   Remote Memory: Poor   Mood: Normal    Orientation to Person: Yes    Orientation to Place: Yes   Orientation to Time of Day: Yes    Orientation to Date: Yes    Situation (Do they understand why they are here?): Yes    Psychomotor Behavior: Normal    Thought Content: Clear   Thought Form: Intact      History of commitment: No           Medication    Psychotropic medications: Yes. Pt is currently taking see below. Medication compliant: Yes. Recent medication changes: No  Medication changes made in the last two weeks: No  No current facility-administered medications for this encounter.     Current Outpatient Medications   Medication      acetaminophen (TYLENOL) 325 MG tablet     albuterol (PROAIR HFA/PROVENTIL HFA/VENTOLIN HFA) 108 (90 Base) MCG/ACT inhaler     albuterol (PROAIR HFA/PROVENTIL HFA/VENTOLIN HFA) 108 (90 Base) MCG/ACT inhaler     albuterol (PROAIR HFA/PROVENTIL HFA/VENTOLIN HFA) 108 (90 Base) MCG/ACT inhaler     aspirin (ASA) 325 MG EC tablet     docusate sodium (COLACE) 100 MG capsule     escitalopram (LEXAPRO) 10 MG tablet     famotidine (PEPCID) 20 MG tablet     levothyroxine (SYNTHROID/LEVOTHROID) 50 MCG tablet     Multiple Vitamins-Minerals (EMERGEN-C IMMUNE PLUS/VIT D) CHEW     nystatin (MYCOSTATIN) 369405 UNIT/GM external cream     OLANZapine zydis (ZYPREXA) 10 MG ODT     Pediatric Multivit-Minerals-C (CHEWABLES MULTIVITAMIN PO)     pravastatin (PRAVACHOL) 40 MG tablet     predniSONE (DELTASONE) 20 MG tablet     Probiotic Product (RISAQUAD-2) CAPS     traZODone (DESYREL) 50 MG tablet        Current Care Team    Primary Care Provider: Yes  Psychiatrist: Yes  Therapist: No  : Yes- [facility] or [facility]: No  ACT Team: No  Other: No   Group home - [facility].  Main phone - [phone].  Manager Conchis - [phone]   Address - [address] [place] [address]     Legal Guardian - [facility]. Main number - [phone]  Guardian is Yessica Octavia - [phone]      Diagnosis    Intellectual Disabilites  319 (F79) Unspecified Intellectual Disability (Intellectual Developmental Disorder)  300.00 (F41.9) Unspecified Anxiety Disorder       Clinical Summary and Substantiation of Recommendations    Patient appears to call EMS daily due to multiple complaints and uses the ED as a place to go when she doesn't get her way and wants attention(per group home staff).  Patient has presented at [facility] ED 8 times in April.     Today she reports she called 911 because she felt like harming self by hitting.   At ED she is calm and currently denies thoughts of harming her self  or others.   It is recommended the patient be discharged back to group home where she has supports in place.       Disposition    Recommended disposition: Group Home: [facility]       Reviewed case and recommendations with attending provider. Attending Name: Julian       Attending concurs with disposition: Yes       Patient and/or validated legal guardian concurs with disposition: Yes       Final disposition: Group home: [facility] .     Inpatient Details (if applicable):   Outpatient Details (if applicable):   Aftercare plan and appointments placed in the AVS and provided to patient: Yes. Given to patient by ED staff    Was lethal means counseling provided as a part of aftercare planning? No;       Assessment Details    Patient interview started at: 7:20 and completed at: 7:40.     Total duration spent on the patient case in minutes: .50 hrs      CPT code(s) utilized: 55785 - Psychotherapy for Crisis - 60 (30-74*) min       Khushi Fishman, LICSW, MSW, LICSW, Psychotherapist  DEC - Triage & Transition Services  Callback: [phone]    .Aftercare Plan  If I am feeling unsafe or I am in a crisis, I will:   Contact my established care providers   Call the National Suicide Prevention Lifeline: 988  Go to the nearest emergency room   Call 911     Warning signs that I or other people might notice when a crisis is developing for me: feeling sad     Things I am able to do on my own to cope or help me feel better: talk to staff, watch TV, listen to music, deep breathing      Things that I am able to do with others to cope or help me better: talk to staff, play a game with others, go shopping or on a outing     Things I can use or do for distraction: watch TV, Listen to music, call my sisters     Changes I can make to support my mental health and wellness: see providers      People in my life that I can ask for help: staff, my sisters     Your [facility] has a mental health crisis team you can call 24/7: [facility] Crisis   "[phone]          Crisis Lines  Crisis Text Line  Text 865815  You will be connected with a trained live crisis counselor to provide support.    Por espanol, texto  JONATHAN a 077561 o texto a 442-AYUDAME en Whatpp    The [employer]ylan Project (LGBTQ Youth Crisis Line)  9.264.990.6629  text START to 670-028      Community Resources  Fast Tracker  Linking people to mental health and substance use disorder resources  [employer]     [place] Mental Health Warm Line  Peer to peer support  Monday thru Saturday, 12 pm to 10 pm  652.582.1114 or 7.535.641.0879  Text \"Support\" to 70526    National [place] on Mental Illness (GULSHAN)  563.735.8888 or 1.888.GULSHAN.HELPS      Mental Health Apps  My3  https://Wheelzpp.org/    VirtualHopeBox  https://Razz/apps/virtual-hope-box/      Additional Information  Today you were seen by a licensed mental health professional through Triage and Transition services, [facility] ([facility])  for a crisis assessment in the Emergency Department at [facility].  It is recommended that you follow up with your established providers (psychiatrist, mental health therapist, and/or primary care doctor - as relevant) as soon as possible. Coordinators from [facility] will be calling you in the next 24-48 hours to ensure that you have the resources you need.  You can also contact [facility] coordinators directly at [phone]. You may have been scheduled for or offered an appointment with a mental health provider. [facility] maintains an extensive network of licensed behavioral health providers to connect patients with the services they need.  We do not charge providers a fee to participate in our referral network.  We match patients with providers based on a patient's specific needs, insurance coverage, and location.  Our first effort will be to refer you to a provider within your care system, and will utilize providers outside your care system as needed.                "

## 2023-05-02 NOTE — DISCHARGE INSTRUCTIONS
"  .Aftercare Plan  If I am feeling unsafe or I am in a crisis, I will:   Contact my established care providers   Call the National Suicide Prevention Lifeline: 988  Go to the nearest emergency room   Call 911     Warning signs that I or other people might notice when a crisis is developing for me: feeling sad     Things I am able to do on my own to cope or help me feel better: talk to staff, watch TV, listen to music, deep breathing      Things that I am able to do with others to cope or help me better: talk to staff, play a game with others, go shopping or on a outing     Things I can use or do for distraction: watch TV, Listen to music, call my sisters     Changes I can make to support my mental health and wellness: see providers      People in my life that I can ask for help: staff, my sisters     Your [facility] has a mental health crisis team you can call 24/7: [facility] Crisis  [phone]          Crisis Lines  Crisis Text Line  Text 170500  You will be connected with a trained live crisis counselor to provide support.    Por espanol, texto  JONATHAN a 385658 o texto a 442-AYUDAME en WhatsApp    The Froylan Project (LGBTQ Youth Crisis Line)  8.435.641.2273  text START to 567-539      Community Resources  Fast Tracker  Linking people to mental health and substance use disorder resources  fasttrackPrice Interactiven.org     [place] Mental Health Warm Line  Peer to peer support  Monday thru Saturday, 12 pm to 10 pm  345.573.6239 or 9.154.579.3345  Text \"Support\" to 67741    National [place] on Mental Illness (GULSHAN)  816.567.0695 or 1.888.GULSHAN.HELPS      Mental Health Apps  My3  https://my3app.org/    VirtualHopeBox  https://Apex Learning.org/apps/virtual-hope-box/      Additional Information  Today you were seen by a licensed mental health professional through Triage and Transition services, [facility] ([facility])  for a crisis assessment in the Emergency Department at [facility].  It is " recommended that you follow up with your established providers (psychiatrist, mental health therapist, and/or primary care doctor - as relevant) as soon as possible. Coordinators from [facility] will be calling you in the next 24-48 hours to ensure that you have the resources you need.  You can also contact [facility] coordinators directly at [phone]. You may have been scheduled for or offered an appointment with a mental health provider. [facility] maintains an extensive network of licensed behavioral health providers to connect patients with the services they need.  We do not charge providers a fee to participate in our referral network.  We match patients with providers based on a patient's specific needs, insurance coverage, and location.  Our first effort will be to refer you to a provider within your care system, and will utilize providers outside your care system as needed.

## 2023-05-05 ENCOUNTER — HOSPITAL ENCOUNTER (EMERGENCY)
Facility: CLINIC | Age: 38
Discharge: HOME | End: 2023-05-05
Attending: EMERGENCY MEDICINE | Admitting: EMERGENCY MEDICINE
Payer: MEDICARE

## 2023-05-05 VITALS
RESPIRATION RATE: 22 BRPM | TEMPERATURE: 99.8 F | DIASTOLIC BLOOD PRESSURE: 92 MMHG | HEART RATE: 71 BPM | SYSTOLIC BLOOD PRESSURE: 111 MMHG | OXYGEN SATURATION: 98 %

## 2023-05-05 DIAGNOSIS — J98.01 BRONCHOSPASM: ICD-10-CM

## 2023-05-05 LAB
ANION GAP SERPL CALCULATED.3IONS-SCNC: 10 MMOL/L (ref 7–15)
ATRIAL RATE - MUSE: 63 BPM
BASOPHILS # BLD AUTO: 0 10E3/UL (ref 0–0.2)
BASOPHILS NFR BLD AUTO: 0 %
BUN SERPL-MCNC: 21.4 MG/DL (ref 6–20)
CALCIUM SERPL-MCNC: 9.5 MG/DL (ref 8.6–10)
CHLORIDE SERPL-SCNC: 99 MMOL/L (ref 98–107)
CREAT SERPL-MCNC: 0.81 MG/DL (ref 0.51–0.95)
DEPRECATED HCO3 PLAS-SCNC: 29 MMOL/L (ref 22–29)
DIASTOLIC BLOOD PRESSURE - MUSE: NORMAL MMHG
EOSINOPHIL # BLD AUTO: 0.1 10E3/UL (ref 0–0.7)
EOSINOPHIL NFR BLD AUTO: 1 %
ERYTHROCYTE [DISTWIDTH] IN BLOOD BY AUTOMATED COUNT: 13.7 % (ref 10–15)
GFR SERPL CREATININE-BSD FRML MDRD: >90 ML/MIN/1.73M2
GLUCOSE SERPL-MCNC: 114 MG/DL (ref 70–99)
HCT VFR BLD AUTO: 42.1 % (ref 35–47)
HGB BLD-MCNC: 13.3 G/DL (ref 11.7–15.7)
HOLD SPECIMEN: NORMAL
HOLD SPECIMEN: NORMAL
IMM GRANULOCYTES # BLD: 0.1 10E3/UL
IMM GRANULOCYTES NFR BLD: 1 %
INTERPRETATION ECG - MUSE: NORMAL
LYMPHOCYTES # BLD AUTO: 1.8 10E3/UL (ref 0.8–5.3)
LYMPHOCYTES NFR BLD AUTO: 16 %
MCH RBC QN AUTO: 27.9 PG (ref 26.5–33)
MCHC RBC AUTO-ENTMCNC: 31.6 G/DL (ref 31.5–36.5)
MCV RBC AUTO: 88 FL (ref 78–100)
MONOCYTES # BLD AUTO: 0.5 10E3/UL (ref 0–1.3)
MONOCYTES NFR BLD AUTO: 5 %
NEUTROPHILS # BLD AUTO: 8.8 10E3/UL (ref 1.6–8.3)
NEUTROPHILS NFR BLD AUTO: 77 %
NRBC # BLD AUTO: 0 10E3/UL
NRBC BLD AUTO-RTO: 0 /100
P AXIS - MUSE: 55 DEGREES
PLATELET # BLD AUTO: 201 10E3/UL (ref 150–450)
POTASSIUM SERPL-SCNC: 4.5 MMOL/L (ref 3.4–5.3)
PR INTERVAL - MUSE: 126 MS
QRS DURATION - MUSE: 92 MS
QT - MUSE: 430 MS
QTC - MUSE: 440 MS
R AXIS - MUSE: 26 DEGREES
RBC # BLD AUTO: 4.76 10E6/UL (ref 3.8–5.2)
SODIUM SERPL-SCNC: 138 MMOL/L (ref 136–145)
SYSTOLIC BLOOD PRESSURE - MUSE: NORMAL MMHG
T AXIS - MUSE: 27 DEGREES
TROPONIN T SERPL HS-MCNC: 6 NG/L
VENTRICULAR RATE- MUSE: 63 BPM
WBC # BLD AUTO: 11.4 10E3/UL (ref 4–11)

## 2023-05-05 PROCEDURE — 250N000012 HC RX MED GY IP 250 OP 636 PS 637: Performed by: EMERGENCY MEDICINE

## 2023-05-05 PROCEDURE — 80048 BASIC METABOLIC PNL TOTAL CA: CPT | Performed by: EMERGENCY MEDICINE

## 2023-05-05 PROCEDURE — 84484 ASSAY OF TROPONIN QUANT: CPT | Performed by: EMERGENCY MEDICINE

## 2023-05-05 PROCEDURE — 93005 ELECTROCARDIOGRAM TRACING: CPT

## 2023-05-05 PROCEDURE — 94640 AIRWAY INHALATION TREATMENT: CPT

## 2023-05-05 PROCEDURE — 99284 EMERGENCY DEPT VISIT MOD MDM: CPT | Mod: 25

## 2023-05-05 PROCEDURE — 36415 COLL VENOUS BLD VENIPUNCTURE: CPT | Performed by: EMERGENCY MEDICINE

## 2023-05-05 PROCEDURE — 85025 COMPLETE CBC W/AUTO DIFF WBC: CPT | Performed by: EMERGENCY MEDICINE

## 2023-05-05 PROCEDURE — 250N000009 HC RX 250: Performed by: EMERGENCY MEDICINE

## 2023-05-05 RX ORDER — PREDNISONE 20 MG/1
40 TABLET ORAL ONCE
Status: COMPLETED | OUTPATIENT
Start: 2023-05-05 | End: 2023-05-05

## 2023-05-05 RX ORDER — ALBUTEROL SULFATE 0.83 MG/ML
2.5 SOLUTION RESPIRATORY (INHALATION) ONCE
Status: COMPLETED | OUTPATIENT
Start: 2023-05-05 | End: 2023-05-05

## 2023-05-05 RX ADMIN — PREDNISONE 40 MG: 20 TABLET ORAL at 14:28

## 2023-05-05 RX ADMIN — IPRATROPIUM BROMIDE 0.5 MG: 0.5 SOLUTION RESPIRATORY (INHALATION) at 14:28

## 2023-05-05 RX ADMIN — ALBUTEROL SULFATE 2.5 MG: 2.5 SOLUTION RESPIRATORY (INHALATION) at 14:27

## 2023-05-05 ASSESSMENT — ACTIVITIES OF DAILY LIVING (ADL): ADLS_ACUITY_SCORE: 35

## 2023-05-05 NOTE — ED NOTES
RN attempted to call  several times. On the third try, she answered the phone but told RN there is no RN available to speak with for report. This RN asked for any nurse stating it is required by law for group home to have nurse available 24/7.  got upset and ended the call. This RN attempted to call back, but she would not answer. Pt stated she wanted to leave and called a group home staff member to come pick her up. RN spoke with charge nurse and charge nurse agreed to let her go. Discharge paperwork sent with pt and prescription.

## 2023-05-05 NOTE — ED TRIAGE NOTES
Pt complains of chest pain, sob, & dizziness that started this morning while laying flat. Pt has hx of asthma - pt has auditory wheezing in triage room - pt at first states that it is normal & then states it isn't - pt poor historian.

## 2023-05-05 NOTE — ED NOTES
Rapid Assessment Note    History:   Dionne Perez is a 38 year old female with a history of asthma who presents with shortness of breath and dizziness. She reports she doesn't fell well with symptoms of shortness of breath, wheezing, and dizziness. She feels that these symptoms improve somewhat when laying down.  This is her seventh visit in the last 2 weeks.    Exam:   General:  Alert, interactive, morbidly obese  woman laying prone on the floor in fast track 8  Cardiovascular:  Well perfused  Lungs:  No respiratory distress, no accessory muscle use, occasional expiratory wheezes  Neuro:  Moving all 4 extremities  Skin:  Warm, dry  Psych:  Normal affect, intermittently tearful    Plan of Care:   I evaluated the patient and developed an initial plan of care. I discussed this plan and explained that I, or one of my partners, would be returning to complete the evaluation.     38-year-old woman who frequents this emergency department for complaints of chest pain, dyspnea, and suicidal ideation including 6 visits in the last 2 weeks presenting with dyspnea, wheezing, dizziness.  She had a recent thorough investigation including CT PE which was negative and would have completed a prednisone burst 4 days ago.  Intermittent wheezing noted.  Neb/corticosteroid ordered.  EKG ordered.    I, Eagle Mccray, am serving as a scribe to document services personally performed by Dr. Sellers, based on my observations and the provider's statements to me.    5/5/2023  [employer]    Portions of this medical record were completed by a scribe. UPON MY REVIEW AND AUTHENTICATION BY ELECTRONIC SIGNATURE, this confirms (a) I performed the applicable clinical services, and (b) the record is accurate.       Caroline Sellers MD  05/05/23 1103

## 2023-05-05 NOTE — ED PROVIDER NOTES
History     Chief Complaint:  Shortness of Breath and Dizziness       HPI   Dionne Perez is a 38 year old female with a history of asthma who presents with shortness of breath.  The patient has had several recent visits to the ER including a CT scan looking for PE.  The patient said that she is concerned that the tumor inside of her lungs is giving her problems.  She says it feels like that gets in the way have been noted at the pit area to complain of dizziness as well she currently does not have that symptom currently says she feels better when her with her symptoms lying down no fevers chills cough runny nose sore throat said she felt like there was some pressure behind her left ear.          Review of External Notes: Previous ER visits reviewed  ROS:  Review of Systems  8 point review of systems all negative except as in HPI    Allergies:  Ibuprofen     Medications:    Albuterol inhaler  Aspirin  Colace  Lexapro  Pepcid  Levothyroxine  Zyprexa   Pravastatin  Prednisone  Trazodone     Past Medical History:    Anxiety  Asthma  Cholelithiasis   GERD   Depression  Hypercholesterolemia  Mild mental retardation  Hypothyroid   Obesity     Past Surgical History:    Tonsillectomy  IR lumbar puncture     Social History:   reports that she has never smoked. She has never used smokeless tobacco. She reports that she does not drink alcohol and does not use drugs.  PCP: [facility], [facility] [place]     Physical Exam     Patient Vitals for the past 24 hrs:   BP Temp Temp src Pulse Resp SpO2   05/05/23 1326 (!) 111/92 99.8  F (37.7  C) Oral 71 22 98 %        Physical Exam  General: The patient is alert, in no respiratory distress.    HENT: Mucous membranes moist.    Cardiovascular: Regular rate and rhythm. Good pulses in all four extremities. Normal capillary refill and skin turgor.     Respiratory: Speaks in full sentences faint end expiratory wheezing    Gastrointestinal: Abdomen soft.     Musculoskeletal: No gross  deformity.     Skin: No rashes or petechiae.     Neurologic: The patient is alert and conversant    Lymphatic: No cervical adenopathy.     Psychiatric: The patient is non-tearful.    Emergency Department Course   EKG performed at 1402 read at 1529 normal sinus rhythm normal axis no pathologic ST ovation but could have 63.  1/1/2026 QRS duration of 92 QTc of 440    Laboratory:  Labs Ordered and Resulted from Time of ED Arrival to Time of ED Departure   BASIC METABOLIC PANEL - Abnormal       Result Value    Sodium 138      Potassium 4.5      Chloride 99      Carbon Dioxide (CO2) 29      Anion Gap 10      Urea Nitrogen 21.4 (*)     Creatinine 0.81      Calcium 9.5      Glucose 114 (*)     GFR Estimate >90     CBC WITH PLATELETS AND DIFFERENTIAL - Abnormal    WBC Count 11.4 (*)     RBC Count 4.76      Hemoglobin 13.3      Hematocrit 42.1      MCV 88      MCH 27.9      MCHC 31.6      RDW 13.7      Platelet Count 201      % Neutrophils 77      % Lymphocytes 16      % Monocytes 5      % Eosinophils 1      % Basophils 0      % Immature Granulocytes 1      NRBCs per 100 WBC 0      Absolute Neutrophils 8.8 (*)     Absolute Lymphocytes 1.8      Absolute Monocytes 0.5      Absolute Eosinophils 0.1      Absolute Basophils 0.0      Absolute Immature Granulocytes 0.1      Absolute NRBCs 0.0     TROPONIN T, HIGH SENSITIVITY - Normal    Troponin T, High Sensitivity 6          Procedures       Emergency Department Course & Assessments:     Interventions:  Medications   albuterol (PROVENTIL) neb solution 2.5 mg (2.5 mg Nebulization $Given 5/5/23 1428)   ipratropium (ATROVENT) 0.02 % neb solution 0.5 mg (0.5 mg Nebulization $Given 5/5/23 1428)   predniSONE (DELTASONE) tablet 40 mg (40 mg Oral $Given 5/5/23 1428)        Assessments:      Independent Interpretation (X-rays, CTs, rhythm strip):  I reviewed the patient's recent CT looking for the pulmonary nodule         Social Determinants of Health affecting care:   None and  Stress/Adjustment Disorders    Disposition:  The patient was discharged to home.     Impression & Plan        Medical Decision Making:  This is a patient who is well-known to me who does have an underlying history of asthma.  I did not see them at the intake area therefore I cannot assess if their wheezing was worse at that point the patient currently only has an expiratory wheezing speaking in full sentences not appear to be distressed.  It did look like the patient was more concerned and that she been told about the pulmonary nodule.  I did not order imaging because she just had a CT scan the patient was concerned about her ear but there is no ear infection she otherwise is stable.  I reviewed the labs that have been ordered by triage under my name the white blood cell count is likely mildly elevated secondary to the recent use of steroids she is in no respiratory distress this visit might of been prompted by stress but I will start her on a steroid inhaler to ensure we do not need to give her oral steroids and that she had just coming off to come off of those.  I do not think is a primary cardiac event PE and she was discharged home in good condition.  I did attempt to reassure her and she does not appear to be in distress.    Diagnosis:    ICD-10-CM    1. Bronchospasm  J98.01        2.  Pulmonary nodule    Discharge Medications:  New Prescriptions    BUDESONIDE (PULMICORT FLEXHALER) 90 MCG/ACT INHALER    Inhale 2 puffs into the lungs 2 times daily for 7 days          Scribe Disclosure:  I, Eagle Mccray, am serving as a scribe at 3:32 PM on 5/5/2023 to document services personally performed by Sylvester Ramirez MD based on my observations and the provider's statements to me.   5/5/2023   Sylvester Ramirez MD Farnan, Christopher M, MD  05/05/23 1545       Sylvester Ramirez MD  05/05/23 1546

## 2023-05-08 ENCOUNTER — HOSPITAL ENCOUNTER (EMERGENCY)
Facility: CLINIC | Age: 38
Discharge: HOME | End: 2023-05-08
Attending: EMERGENCY MEDICINE | Admitting: EMERGENCY MEDICINE
Payer: MEDICARE

## 2023-05-08 VITALS
RESPIRATION RATE: 20 BRPM | TEMPERATURE: 97.4 F | HEART RATE: 58 BPM | SYSTOLIC BLOOD PRESSURE: 135 MMHG | DIASTOLIC BLOOD PRESSURE: 66 MMHG | OXYGEN SATURATION: 98 %

## 2023-05-08 DIAGNOSIS — J45.21 MILD INTERMITTENT ASTHMA WITH EXACERBATION: ICD-10-CM

## 2023-05-08 PROCEDURE — 99283 EMERGENCY DEPT VISIT LOW MDM: CPT | Mod: 25

## 2023-05-08 PROCEDURE — 250N000009 HC RX 250

## 2023-05-08 PROCEDURE — 250N000009 HC RX 250: Performed by: EMERGENCY MEDICINE

## 2023-05-08 PROCEDURE — 94640 AIRWAY INHALATION TREATMENT: CPT

## 2023-05-08 PROCEDURE — 250N000012 HC RX MED GY IP 250 OP 636 PS 637: Performed by: EMERGENCY MEDICINE

## 2023-05-08 RX ORDER — ALBUTEROL SULFATE 0.83 MG/ML
2.5 SOLUTION RESPIRATORY (INHALATION) ONCE
Status: COMPLETED | OUTPATIENT
Start: 2023-05-08 | End: 2023-05-08

## 2023-05-08 RX ORDER — PREDNISONE 20 MG/1
40 TABLET ORAL ONCE
Status: COMPLETED | OUTPATIENT
Start: 2023-05-08 | End: 2023-05-08

## 2023-05-08 RX ORDER — IPRATROPIUM BROMIDE AND ALBUTEROL SULFATE 2.5; .5 MG/3ML; MG/3ML
SOLUTION RESPIRATORY (INHALATION)
Status: COMPLETED
Start: 2023-05-08 | End: 2023-05-08

## 2023-05-08 RX ORDER — PREDNISONE 20 MG/1
TABLET ORAL
Qty: 10 TABLET | Refills: 0 | Status: SHIPPED | OUTPATIENT
Start: 2023-05-09 | End: 2023-05-13

## 2023-05-08 RX ADMIN — IPRATROPIUM BROMIDE AND ALBUTEROL SULFATE 3 ML: .5; 3 SOLUTION RESPIRATORY (INHALATION) at 20:06

## 2023-05-08 RX ADMIN — PREDNISONE 40 MG: 20 TABLET ORAL at 20:32

## 2023-05-08 RX ADMIN — ALBUTEROL SULFATE 2.5 MG: 2.5 SOLUTION RESPIRATORY (INHALATION) at 20:32

## 2023-05-08 ASSESSMENT — ACTIVITIES OF DAILY LIVING (ADL)
ADLS_ACUITY_SCORE: 35
ADLS_ACUITY_SCORE: 35

## 2023-05-09 ENCOUNTER — HOSPITAL ENCOUNTER (EMERGENCY)
Facility: CLINIC | Age: 38
Discharge: HOME | End: 2023-05-09
Attending: EMERGENCY MEDICINE | Admitting: EMERGENCY MEDICINE
Payer: MEDICARE

## 2023-05-09 VITALS
DIASTOLIC BLOOD PRESSURE: 67 MMHG | RESPIRATION RATE: 18 BRPM | OXYGEN SATURATION: 99 % | HEART RATE: 55 BPM | TEMPERATURE: 98.6 F | SYSTOLIC BLOOD PRESSURE: 120 MMHG

## 2023-05-09 DIAGNOSIS — R30.0 DYSURIA: ICD-10-CM

## 2023-05-09 DIAGNOSIS — N39.0 ACUTE LOWER UTI (URINARY TRACT INFECTION): ICD-10-CM

## 2023-05-09 LAB
ALBUMIN UR-MCNC: NEGATIVE MG/DL
ANION GAP SERPL CALCULATED.3IONS-SCNC: 10 MMOL/L (ref 7–15)
APPEARANCE UR: CLEAR
BASOPHILS # BLD AUTO: 0 10E3/UL (ref 0–0.2)
BASOPHILS NFR BLD AUTO: 0 %
BILIRUB UR QL STRIP: NEGATIVE
BUN SERPL-MCNC: 18.8 MG/DL (ref 6–20)
CALCIUM SERPL-MCNC: 8.9 MG/DL (ref 8.6–10)
CHLORIDE SERPL-SCNC: 102 MMOL/L (ref 98–107)
COLOR UR AUTO: ABNORMAL
CREAT SERPL-MCNC: 0.63 MG/DL (ref 0.51–0.95)
DEPRECATED HCO3 PLAS-SCNC: 24 MMOL/L (ref 22–29)
EOSINOPHIL # BLD AUTO: 0 10E3/UL (ref 0–0.7)
EOSINOPHIL NFR BLD AUTO: 0 %
ERYTHROCYTE [DISTWIDTH] IN BLOOD BY AUTOMATED COUNT: 13.4 % (ref 10–15)
GFR SERPL CREATININE-BSD FRML MDRD: >90 ML/MIN/1.73M2
GLUCOSE SERPL-MCNC: 140 MG/DL (ref 70–99)
GLUCOSE UR STRIP-MCNC: NEGATIVE MG/DL
HCG UR QL: NEGATIVE
HCT VFR BLD AUTO: 41.4 % (ref 35–47)
HGB BLD-MCNC: 12.5 G/DL (ref 11.7–15.7)
HGB UR QL STRIP: NEGATIVE
IMM GRANULOCYTES # BLD: 0.3 10E3/UL
IMM GRANULOCYTES NFR BLD: 2 %
KETONES UR STRIP-MCNC: NEGATIVE MG/DL
LEUKOCYTE ESTERASE UR QL STRIP: ABNORMAL
LYMPHOCYTES # BLD AUTO: 0.8 10E3/UL (ref 0.8–5.3)
LYMPHOCYTES NFR BLD AUTO: 5 %
MCH RBC QN AUTO: 27.2 PG (ref 26.5–33)
MCHC RBC AUTO-ENTMCNC: 30.2 G/DL (ref 31.5–36.5)
MCV RBC AUTO: 90 FL (ref 78–100)
MONOCYTES # BLD AUTO: 0.3 10E3/UL (ref 0–1.3)
MONOCYTES NFR BLD AUTO: 2 %
NEUTROPHILS # BLD AUTO: 14.7 10E3/UL (ref 1.6–8.3)
NEUTROPHILS NFR BLD AUTO: 91 %
NITRATE UR QL: NEGATIVE
NRBC # BLD AUTO: 0 10E3/UL
NRBC BLD AUTO-RTO: 0 /100
PH UR STRIP: 6.5 [PH] (ref 5–7)
PLATELET # BLD AUTO: 206 10E3/UL (ref 150–450)
POTASSIUM SERPL-SCNC: 5.5 MMOL/L (ref 3.4–5.3)
RBC # BLD AUTO: 4.59 10E6/UL (ref 3.8–5.2)
RBC URINE: 0 /HPF
SODIUM SERPL-SCNC: 136 MMOL/L (ref 136–145)
SP GR UR STRIP: 1.02 (ref 1–1.03)
SQUAMOUS EPITHELIAL: 7 /HPF
UROBILINOGEN UR STRIP-MCNC: NORMAL MG/DL
WBC # BLD AUTO: 16.1 10E3/UL (ref 4–11)
WBC URINE: 8 /HPF

## 2023-05-09 PROCEDURE — 36415 COLL VENOUS BLD VENIPUNCTURE: CPT | Performed by: EMERGENCY MEDICINE

## 2023-05-09 PROCEDURE — 85025 COMPLETE CBC W/AUTO DIFF WBC: CPT | Performed by: EMERGENCY MEDICINE

## 2023-05-09 PROCEDURE — 99284 EMERGENCY DEPT VISIT MOD MDM: CPT

## 2023-05-09 PROCEDURE — 81001 URINALYSIS AUTO W/SCOPE: CPT | Performed by: EMERGENCY MEDICINE

## 2023-05-09 PROCEDURE — 81001 URINALYSIS AUTO W/SCOPE: CPT | Performed by: PHYSICIAN ASSISTANT

## 2023-05-09 PROCEDURE — 81025 URINE PREGNANCY TEST: CPT | Performed by: EMERGENCY MEDICINE

## 2023-05-09 PROCEDURE — 80048 BASIC METABOLIC PNL TOTAL CA: CPT | Performed by: EMERGENCY MEDICINE

## 2023-05-09 PROCEDURE — 51798 US URINE CAPACITY MEASURE: CPT

## 2023-05-09 PROCEDURE — 250N000013 HC RX MED GY IP 250 OP 250 PS 637: Performed by: EMERGENCY MEDICINE

## 2023-05-09 RX ORDER — CEPHALEXIN 500 MG/1
500 CAPSULE ORAL 2 TIMES DAILY
Qty: 14 CAPSULE | Refills: 0 | Status: SHIPPED | OUTPATIENT
Start: 2023-05-09 | End: 2023-05-09

## 2023-05-09 RX ORDER — CEPHALEXIN 500 MG/1
500 CAPSULE ORAL 2 TIMES DAILY
Qty: 14 CAPSULE | Refills: 0 | Status: SHIPPED | OUTPATIENT
Start: 2023-05-09 | End: 2023-05-10

## 2023-05-09 RX ORDER — CEPHALEXIN 500 MG/1
500 CAPSULE ORAL ONCE
Status: COMPLETED | OUTPATIENT
Start: 2023-05-09 | End: 2023-05-09

## 2023-05-09 RX ADMIN — CEPHALEXIN 500 MG: 500 CAPSULE ORAL at 15:51

## 2023-05-09 ASSESSMENT — ENCOUNTER SYMPTOMS
CHILLS: 0
FREQUENCY: 0
FEVER: 0
DYSURIA: 1
FLANK PAIN: 0
NAUSEA: 0
SHORTNESS OF BREATH: 0
VOMITING: 0
DIARRHEA: 0

## 2023-05-09 ASSESSMENT — ACTIVITIES OF DAILY LIVING (ADL)
ADLS_ACUITY_SCORE: 35
ADLS_ACUITY_SCORE: 35

## 2023-05-09 NOTE — ED PROVIDER NOTES
History     Chief Complaint:  Shortness of Breath       HPI   Dionne Perez is a 38 year old female who is well-known to the emergency department presents with concerns over shortness of breath.  She has been seen here several times over the past few weeks for bronchospasm and asthma.  She states that she ran out of her albuterol nebs and her insurance will not cover them anymore.  She does have an albuterol MDI at home and she also has Pulmicort.  She states she is having difficult time using the Pulmicort.  She is not currently on prednisone.  She denies any fevers, productive cough, runny nose, sore throat, or chest pain.  She did recently have a PE rule out as well as cardiac rule outs    Independent Historian: The patient    Review of External Notes: I reviewed her most recent visits to the emergency department    ROS:  Review of Systems as in HPI    Allergies:  Ibuprofen     Medications:    [START ON 5/9/2023] predniSONE (DELTASONE) 20 MG tablet  acetaminophen (TYLENOL) 325 MG tablet  albuterol (PROAIR HFA/PROVENTIL HFA/VENTOLIN HFA) 108 (90 Base) MCG/ACT inhaler  albuterol (PROAIR HFA/PROVENTIL HFA/VENTOLIN HFA) 108 (90 Base) MCG/ACT inhaler  albuterol (PROAIR HFA/PROVENTIL HFA/VENTOLIN HFA) 108 (90 Base) MCG/ACT inhaler  aspirin (ASA) 325 MG EC tablet  budesonide (PULMICORT FLEXHALER) 90 MCG/ACT inhaler  docusate sodium (COLACE) 100 MG capsule  escitalopram (LEXAPRO) 10 MG tablet  famotidine (PEPCID) 20 MG tablet  levothyroxine (SYNTHROID/LEVOTHROID) 50 MCG tablet  Multiple Vitamins-Minerals (EMERGEN-C IMMUNE PLUS/VIT D) CHEW  nystatin (MYCOSTATIN) 981724 UNIT/GM external cream  OLANZapine zydis (ZYPREXA) 10 MG ODT  Pediatric Multivit-Minerals-C (CHEWABLES MULTIVITAMIN PO)  pravastatin (PRAVACHOL) 40 MG tablet  Probiotic Product (RISAQUAD-2) CAPS  traZODone (DESYREL) 50 MG tablet        Past Medical History:    Past Medical History:   Diagnosis Date     Anxiety      Asthma      Cholelithiasis       Depressive disorder      Gastroesophageal reflux disease      Hypercholesterolemia      Hypothyroidism      Mild mental retardation      Obesity        Past Surgical History:    Past Surgical History:   Procedure Laterality Date     IR LUMBAR PUNCTURE  06/09/2020     TONSILLECTOMY          Family History:    family history is not on file.    Social History:   reports that she has never smoked. She has never used smokeless tobacco. She reports that she does not drink alcohol and does not use drugs.  PCP: [facility], [facility] [place]     Physical Exam     Patient Vitals for the past 24 hrs:   BP Temp Temp src Pulse Resp SpO2   05/08/23 1956 (!) 148/97 97.4  F (36.3  C) Temporal 63 28 100 %        Physical Exam  Constitutional: Vital signs reviewed.  Pleasant.  HEENT: Moist mucous membranes  Cardiovascular: Regular rate and rhythm  Pulmonary/Chest: Tachypneic, bilateral expiratory wheezing, diminished breath sounds diffusely, speaking in full sentences.  Musculoskeletal/Extremities: No bony deformities.  Moves all 4 extremities without difficulty.  Neurological: Alert.  No focal deficits.  Endo: No pitting edema  Skin: No visible rash.  Psychiatric: Pleasant.      Emergency Department Course     Emergency Department Course & Assessments:      Interventions:  Medications   ipratropium - albuterol 0.5 mg/2.5 mg/3 mL (DUONEB) 0.5-2.5 (3) MG/3ML neb solution (3 mLs  $Given 5/8/23 2006)   albuterol (PROVENTIL) neb solution 2.5 mg (2.5 mg Nebulization $Given 5/8/23 2032)   predniSONE (DELTASONE) tablet 40 mg (40 mg Oral $Given 5/8/23 2032)        Social Determinants of Health affecting care:  Patient lives in a group home and frequents emergency department.  Regularly      Disposition:  The patient was discharged to home.     Impression & Plan        Medical Decision Making:  Patient presents emergency department with concerns over shortness of breath.  She does have history of asthma bronchospasm.  Prior to my seeing her  she had a DuoNeb and she is feeling somewhat better but was still very tight.  I then gave her an albuterol neb and 40 of prednisone and she is feeling better.  I do not feel any further work-up is necessary as she is recently had cardiac rule outs which were negative as well as a PE study which was normal.  She will continue with her albuterol MDI at home.  I will give her a prescription for 5 more days of prednisone.  Follow-up with her primary care doctor as needed      Diagnosis:    ICD-10-CM    1. Mild intermittent asthma with exacerbation  J45.21            Discharge Medications:  New Prescriptions    PREDNISONE (DELTASONE) 20 MG TABLET    Take two tablets (= 40mg) each day for 5 (five) days          Hernan Tineo MD    5/8/2023   Hernan Tineo MD Walters, Brent Aaron, MD  05/08/23 2051

## 2023-05-09 NOTE — ED TRIAGE NOTES
Pt reports shortness of breath with midsternal chest pain.  Pt wheezing in triage.      Triage Assessment     Row Name 05/08/23 1957       Triage Assessment (Adult)    Airway WDL WDL       Respiratory WDL    Respiratory WDL rhythm/pattern  audible wheezing    Rhythm/Pattern, Respiratory shortness of breath;tachypneic;labored       Skin Circulation/Temperature WDL    Skin Circulation/Temperature WDL WDL       Cardiac WDL    Cardiac WDL chest pain       Chest Pain Assessment    Chest Pain Location midsternal    Associated Signs/Symptoms dyspnea       Peripheral/Neurovascular WDL    Peripheral Neurovascular WDL WDL       Cognitive/Neuro/Behavioral WDL    Cognitive/Neuro/Behavioral WDL WDL

## 2023-05-09 NOTE — ED TRIAGE NOTES
Unable to urinate. Last urination this morning 0300. Denies fever, abdominal pain, dysuria. VSS. ABCs intact. A/Ox4.

## 2023-05-09 NOTE — ED PROVIDER NOTES
History     Chief Complaint:  Urinary Retention       HPI   Dionne Perez is a 38 year old female group home has frequent ER visits says today due to problems feeling like she cant get her urine out.  Bladder scan prior to my visit 45ml.  She states days no other description.  Denies pain.  No blood.  No flank pain.  No fever.  No NVD.     Independent Historian:        Review of External Notes: Many ER visits weekly     ROS:  Review of Systems   Constitutional: Negative for chills and fever.   Respiratory: Negative for shortness of breath.    Cardiovascular: Negative for chest pain.   Gastrointestinal: Negative for diarrhea, nausea and vomiting.   Genitourinary: Positive for decreased urine volume, dysuria and urgency. Negative for flank pain and frequency.   All other systems reviewed and are negative.      Allergies:  Ibuprofen     Medications:    cephALEXin (KEFLEX) 500 MG capsule  acetaminophen (TYLENOL) 325 MG tablet  albuterol (PROAIR HFA/PROVENTIL HFA/VENTOLIN HFA) 108 (90 Base) MCG/ACT inhaler  albuterol (PROAIR HFA/PROVENTIL HFA/VENTOLIN HFA) 108 (90 Base) MCG/ACT inhaler  albuterol (PROAIR HFA/PROVENTIL HFA/VENTOLIN HFA) 108 (90 Base) MCG/ACT inhaler  aspirin (ASA) 325 MG EC tablet  budesonide (PULMICORT FLEXHALER) 90 MCG/ACT inhaler  docusate sodium (COLACE) 100 MG capsule  escitalopram (LEXAPRO) 10 MG tablet  famotidine (PEPCID) 20 MG tablet  levothyroxine (SYNTHROID/LEVOTHROID) 50 MCG tablet  Multiple Vitamins-Minerals (EMERGEN-C IMMUNE PLUS/VIT D) CHEW  nystatin (MYCOSTATIN) 411725 UNIT/GM external cream  OLANZapine zydis (ZYPREXA) 10 MG ODT  Pediatric Multivit-Minerals-C (CHEWABLES MULTIVITAMIN PO)  pravastatin (PRAVACHOL) 40 MG tablet  predniSONE (DELTASONE) 20 MG tablet  Probiotic Product (RISAQUAD-2) CAPS  traZODone (DESYREL) 50 MG tablet        Past Medical History:    Past Medical History:   Diagnosis Date     Anxiety      Asthma      Cholelithiasis      Depressive disorder       Gastroesophageal reflux disease      Hypercholesterolemia      Hypothyroidism      Mild mental retardation      Obesity        Past Surgical History:    Past Surgical History:   Procedure Laterality Date     IR LUMBAR PUNCTURE  06/09/2020     TONSILLECTOMY          Family History:    family history is not on file.    Social History:   reports that she has never smoked. She has never used smokeless tobacco. She reports that she does not drink alcohol and does not use drugs.  PCP: [facility], [facility]     Physical Exam     Patient Vitals for the past 24 hrs:   BP Temp Temp src Pulse Resp SpO2   05/09/23 1553 120/67 -- -- 55 -- 99 %   05/09/23 1403 (!) 158/93 98.6  F (37  C) Temporal 69 18 100 %        Physical Exam  General: Patient is well appearing. No distress.  Obese  Head: Atraumatic.  Neck: Normal range of motion. Neck supple.   Cardiovascular: Normal rate, regular rhythm, normal heart sounds and intact distal pulses.   Pulmonary/Chest: Breath sounds normal. No respiratory distress.  Abdominal: Soft. Bowel sounds are normal. No distension. No tenderness. No rebound or guarding.   Musculoskeletal: Normal range of motion.  Skin: Warm and dry. No rash noted. Not diaphoretic.     Emergency Department Course       Laboratory:  Labs Ordered and Resulted from Time of ED Arrival to Time of ED Departure   ROUTINE UA WITH MICROSCOPIC REFLEX TO CULTURE - Abnormal       Result Value    Color Urine Light Yellow      Appearance Urine Clear      Glucose Urine Negative      Bilirubin Urine Negative      Ketones Urine Negative      Specific Gravity Urine 1.023      Blood Urine Negative      pH Urine 6.5      Protein Albumin Urine Negative      Urobilinogen Urine Normal      Nitrite Urine Negative      Leukocyte Esterase Urine Trace (*)     RBC Urine 0      WBC Urine 8 (*)     Squamous Epithelials Urine 7 (*)    BASIC METABOLIC PANEL - Abnormal    Sodium 136      Potassium 5.5 (*)     Chloride 102      Carbon Dioxide  (CO2) 24      Anion Gap 10      Urea Nitrogen 18.8      Creatinine 0.63      Calcium 8.9      Glucose 140 (*)     GFR Estimate >90     CBC WITH PLATELETS AND DIFFERENTIAL - Abnormal    WBC Count 16.1 (*)     RBC Count 4.59      Hemoglobin 12.5      Hematocrit 41.4      MCV 90      MCH 27.2      MCHC 30.2 (*)     RDW 13.4      Platelet Count 206      % Neutrophils 91      % Lymphocytes 5      % Monocytes 2      % Eosinophils 0      % Basophils 0      % Immature Granulocytes 2      NRBCs per 100 WBC 0      Absolute Neutrophils 14.7 (*)     Absolute Lymphocytes 0.8      Absolute Monocytes 0.3      Absolute Eosinophils 0.0      Absolute Basophils 0.0      Absolute Immature Granulocytes 0.3      Absolute NRBCs 0.0     HCG QUALITATIVE URINE - Normal    hCG Urine Qualitative Negative          Procedures       Emergency Department Course & Assessments:             Interventions:  Medications   cephALEXin (KEFLEX) capsule 500 mg (500 mg Oral $Given 5/9/23 8990)        Independent Interpretation (X-rays, CTs, rhythm strip):       Consultations/Discussion of Management or Tests:          Social Determinants of Health affecting care:  Group home developmental disabilities.    Assessments:      Disposition:  The patient was discharged to home.     Impression & Plan        Medical Decision Making:  This patient has symptoms consistent with a urinary tract infection.  Urinalysis confirms the infection.  There has been no fever, back/flank pain or significant abdominal pain.  There is no clinical evidence of pyelonephritis, appendicitis,  Colitis, diverticulitis or any intraabdominal catastrophe.  The patient will be started on antibiotics for the infection. Return if increasing pain, vomiting, fever, or inability to tolerate the oral antibiotic.  Follow up with primary physician is indicated if not improving in 2-3 days.     Diagnosis:    ICD-10-CM    1. Dysuria  R30.0       2. Acute lower UTI (urinary tract infection)  N39.0             Discharge Medications:  New Prescriptions    CEPHALEXIN (KEFLEX) 500 MG CAPSULE    Take 1 capsule (500 mg) by mouth 2 times daily for 7 days              5/9/2023   Sanchez Brown MD Stevens, Andrew C, MD  05/09/23 5866

## 2023-05-10 RX ORDER — CEPHALEXIN 500 MG/1
500 CAPSULE ORAL 2 TIMES DAILY
Qty: 14 CAPSULE | Refills: 0 | Status: ON HOLD | OUTPATIENT
Start: 2023-05-10 | End: 2023-06-05

## 2023-05-12 ENCOUNTER — HOSPITAL ENCOUNTER (EMERGENCY)
Facility: CLINIC | Age: 38
Discharge: HOME | End: 2023-05-12
Attending: EMERGENCY MEDICINE | Admitting: EMERGENCY MEDICINE
Payer: MEDICARE

## 2023-05-12 ENCOUNTER — APPOINTMENT (OUTPATIENT)
Dept: GENERAL RADIOLOGY | Facility: CLINIC | Age: 38
End: 2023-05-12
Attending: EMERGENCY MEDICINE
Payer: MEDICARE

## 2023-05-12 VITALS
TEMPERATURE: 97.1 F | DIASTOLIC BLOOD PRESSURE: 84 MMHG | RESPIRATION RATE: 22 BRPM | HEART RATE: 66 BPM | SYSTOLIC BLOOD PRESSURE: 144 MMHG | OXYGEN SATURATION: 97 %

## 2023-05-12 DIAGNOSIS — J45.909 UNCOMPLICATED ASTHMA, UNSPECIFIED ASTHMA SEVERITY, UNSPECIFIED WHETHER PERSISTENT: ICD-10-CM

## 2023-05-12 LAB
ANION GAP SERPL CALCULATED.3IONS-SCNC: 12 MMOL/L (ref 7–15)
BASOPHILS # BLD AUTO: 0 10E3/UL (ref 0–0.2)
BASOPHILS NFR BLD AUTO: 0 %
BUN SERPL-MCNC: 23.8 MG/DL (ref 6–20)
CALCIUM SERPL-MCNC: 8.9 MG/DL (ref 8.6–10)
CHLORIDE SERPL-SCNC: 100 MMOL/L (ref 98–107)
CREAT SERPL-MCNC: 1.2 MG/DL (ref 0.51–0.95)
DEPRECATED HCO3 PLAS-SCNC: 26 MMOL/L (ref 22–29)
EOSINOPHIL # BLD AUTO: 0 10E3/UL (ref 0–0.7)
EOSINOPHIL NFR BLD AUTO: 0 %
ERYTHROCYTE [DISTWIDTH] IN BLOOD BY AUTOMATED COUNT: 13.5 % (ref 10–15)
GFR SERPL CREATININE-BSD FRML MDRD: 59 ML/MIN/1.73M2
GLUCOSE SERPL-MCNC: 138 MG/DL (ref 70–99)
HCT VFR BLD AUTO: 43.3 % (ref 35–47)
HGB BLD-MCNC: 13.4 G/DL (ref 11.7–15.7)
HOLD SPECIMEN: NORMAL
HOLD SPECIMEN: NORMAL
IMM GRANULOCYTES # BLD: 0.2 10E3/UL
IMM GRANULOCYTES NFR BLD: 1 %
LYMPHOCYTES # BLD AUTO: 0.8 10E3/UL (ref 0.8–5.3)
LYMPHOCYTES NFR BLD AUTO: 5 %
MCH RBC QN AUTO: 27.5 PG (ref 26.5–33)
MCHC RBC AUTO-ENTMCNC: 30.9 G/DL (ref 31.5–36.5)
MCV RBC AUTO: 89 FL (ref 78–100)
MONOCYTES # BLD AUTO: 0.3 10E3/UL (ref 0–1.3)
MONOCYTES NFR BLD AUTO: 2 %
NEUTROPHILS # BLD AUTO: 13.1 10E3/UL (ref 1.6–8.3)
NEUTROPHILS NFR BLD AUTO: 92 %
NRBC # BLD AUTO: 0 10E3/UL
NRBC BLD AUTO-RTO: 0 /100
PLATELET # BLD AUTO: 190 10E3/UL (ref 150–450)
POTASSIUM SERPL-SCNC: 4.6 MMOL/L (ref 3.4–5.3)
RBC # BLD AUTO: 4.87 10E6/UL (ref 3.8–5.2)
SODIUM SERPL-SCNC: 138 MMOL/L (ref 136–145)
TROPONIN T SERPL HS-MCNC: <6 NG/L
WBC # BLD AUTO: 14.3 10E3/UL (ref 4–11)

## 2023-05-12 PROCEDURE — 36415 COLL VENOUS BLD VENIPUNCTURE: CPT | Performed by: EMERGENCY MEDICINE

## 2023-05-12 PROCEDURE — 80048 BASIC METABOLIC PNL TOTAL CA: CPT | Performed by: EMERGENCY MEDICINE

## 2023-05-12 PROCEDURE — 96361 HYDRATE IV INFUSION ADD-ON: CPT

## 2023-05-12 PROCEDURE — 250N000009 HC RX 250: Performed by: EMERGENCY MEDICINE

## 2023-05-12 PROCEDURE — 93005 ELECTROCARDIOGRAM TRACING: CPT

## 2023-05-12 PROCEDURE — 84484 ASSAY OF TROPONIN QUANT: CPT | Performed by: EMERGENCY MEDICINE

## 2023-05-12 PROCEDURE — 258N000003 HC RX IP 258 OP 636: Performed by: EMERGENCY MEDICINE

## 2023-05-12 PROCEDURE — 85025 COMPLETE CBC W/AUTO DIFF WBC: CPT | Performed by: EMERGENCY MEDICINE

## 2023-05-12 PROCEDURE — 82310 ASSAY OF CALCIUM: CPT | Performed by: EMERGENCY MEDICINE

## 2023-05-12 PROCEDURE — 99285 EMERGENCY DEPT VISIT HI MDM: CPT | Mod: 25

## 2023-05-12 PROCEDURE — 71046 X-RAY EXAM CHEST 2 VIEWS: CPT

## 2023-05-12 PROCEDURE — 96374 THER/PROPH/DIAG INJ IV PUSH: CPT

## 2023-05-12 PROCEDURE — 94640 AIRWAY INHALATION TREATMENT: CPT

## 2023-05-12 PROCEDURE — 250N000011 HC RX IP 250 OP 636: Performed by: EMERGENCY MEDICINE

## 2023-05-12 RX ORDER — IPRATROPIUM BROMIDE AND ALBUTEROL SULFATE 2.5; .5 MG/3ML; MG/3ML
3 SOLUTION RESPIRATORY (INHALATION) ONCE
Status: COMPLETED | OUTPATIENT
Start: 2023-05-12 | End: 2023-05-12

## 2023-05-12 RX ORDER — METHYLPREDNISOLONE SODIUM SUCCINATE 125 MG/2ML
125 INJECTION, POWDER, LYOPHILIZED, FOR SOLUTION INTRAMUSCULAR; INTRAVENOUS ONCE
Status: COMPLETED | OUTPATIENT
Start: 2023-05-12 | End: 2023-05-12

## 2023-05-12 RX ADMIN — METHYLPREDNISOLONE SODIUM SUCCINATE 125 MG: 125 INJECTION, POWDER, FOR SOLUTION INTRAMUSCULAR; INTRAVENOUS at 16:48

## 2023-05-12 RX ADMIN — SODIUM CHLORIDE 1000 ML: 9 INJECTION, SOLUTION INTRAVENOUS at 16:48

## 2023-05-12 RX ADMIN — IPRATROPIUM BROMIDE AND ALBUTEROL SULFATE 3 ML: .5; 3 SOLUTION RESPIRATORY (INHALATION) at 16:39

## 2023-05-12 ASSESSMENT — ACTIVITIES OF DAILY LIVING (ADL): ADLS_ACUITY_SCORE: 35

## 2023-05-12 NOTE — ED TRIAGE NOTES
"Arrives via EMS from group home for evaluation of dizziness and chest pain. States chest pain has been killing her \"for awhile.\" Pt wheezy at baseline. ABCs intact. A&OX4.      Triage Assessment     Row Name 05/12/23 8990       Triage Assessment (Adult)    Airway WDL WDL       Respiratory WDL    Respiratory WDL WDL       Skin Circulation/Temperature WDL    Skin Circulation/Temperature WDL WDL       Cardiac WDL    Cardiac WDL X;chest pain       Chest Pain Assessment    Chest Pain Location midsternal       Peripheral/Neurovascular WDL    Peripheral Neurovascular WDL WDL       Cognitive/Neuro/Behavioral WDL    Cognitive/Neuro/Behavioral WDL WDL              "

## 2023-05-12 NOTE — ED PROVIDER NOTES
History     Chief Complaint:  Dizziness       HPI   Dionne Perez is a 38 year old female who is well-known to this emergency department.  She Kurtis presents for shortness of breath.  I saw her a few days ago for similar.  She was also recent seen for URI.  She comes in and was sitting in triage and was breathing staff stating she needed meds for asthma because she is not short of breath today.  I know that I prescribed her prednisone 4 days ago so she is still have that.  She states that she has been taking it.  She says she has been coughing without pain.  No fever.  She wanted a neb treatment here.  No new symptoms otherwise today.      Independent Historian: The patient    Review of External Notes: I reviewed her last 2 ED visits including the one that I saw her at.    ROS:  Review of Systems as in HPI    Allergies:  Ibuprofen     Medications:    acetaminophen (TYLENOL) 325 MG tablet  albuterol (PROAIR HFA/PROVENTIL HFA/VENTOLIN HFA) 108 (90 Base) MCG/ACT inhaler  albuterol (PROAIR HFA/PROVENTIL HFA/VENTOLIN HFA) 108 (90 Base) MCG/ACT inhaler  albuterol (PROAIR HFA/PROVENTIL HFA/VENTOLIN HFA) 108 (90 Base) MCG/ACT inhaler  aspirin (ASA) 325 MG EC tablet  budesonide (PULMICORT FLEXHALER) 90 MCG/ACT inhaler  cephALEXin (KEFLEX) 500 MG capsule  docusate sodium (COLACE) 100 MG capsule  escitalopram (LEXAPRO) 10 MG tablet  famotidine (PEPCID) 20 MG tablet  levothyroxine (SYNTHROID/LEVOTHROID) 50 MCG tablet  Multiple Vitamins-Minerals (EMERGEN-C IMMUNE PLUS/VIT D) CHEW  nystatin (MYCOSTATIN) 510433 UNIT/GM external cream  OLANZapine zydis (ZYPREXA) 10 MG ODT  Pediatric Multivit-Minerals-C (CHEWABLES MULTIVITAMIN PO)  pravastatin (PRAVACHOL) 40 MG tablet  predniSONE (DELTASONE) 20 MG tablet  Probiotic Product (RISAQUAD-2) CAPS  traZODone (DESYREL) 50 MG tablet        Past Medical History:    Past Medical History:   Diagnosis Date     Anxiety      Asthma      Cholelithiasis      Depressive disorder       Gastroesophageal reflux disease      Hypercholesterolemia      Hypothyroidism      Mild mental retardation      Obesity        Past Surgical History:    Past Surgical History:   Procedure Laterality Date     IR LUMBAR PUNCTURE  06/09/2020     TONSILLECTOMY          Family History:    family history is not on file.    Social History:   reports that she has never smoked. She has never used smokeless tobacco. She reports that she does not drink alcohol and does not use drugs.  PCP: [facility], [facility]     Physical Exam     Patient Vitals for the past 24 hrs:   BP Temp Temp src Pulse Resp SpO2   05/12/23 1357 (!) 144/84 97.1  F (36.2  C) Temporal 66 22 97 %        Physical Exam  Constitutional: Vital signs reviewed.  Agitated  HEENT: Moist mucous membranes  Cardiovascular: Regular rate and rhythm  Pulmonary/Chest: Tachypneic, CTA B, no wheezing or rhonchi.  Musculoskeletal/Extremities: No bony deformities.  Moves all 4 extremities without difficulty.  Neurological: Alert.  No focal deficits.  Endo: No pitting edema  Skin: No visible rash.  Psychiatric: Somewhat agitated      Emergency Department Course   ECG  Sinus bradycardia, rate of 59, no acute concerning ST or T wave changes    Imaging:  XR Chest 2 Views   Final Result   IMPRESSION: Cardiac enlargement. Patient rotated to the right. Chest otherwise negative. No change.        Report per radiology    Laboratory:  Labs Ordered and Resulted from Time of ED Arrival to Time of ED Departure   BASIC METABOLIC PANEL - Abnormal       Result Value    Sodium 138      Potassium 4.6      Chloride 100      Carbon Dioxide (CO2) 26      Anion Gap 12      Urea Nitrogen 23.8 (*)     Creatinine 1.20 (*)     Calcium 8.9      Glucose 138 (*)     GFR Estimate 59 (*)    CBC WITH PLATELETS AND DIFFERENTIAL - Abnormal    WBC Count 14.3 (*)     RBC Count 4.87      Hemoglobin 13.4      Hematocrit 43.3      MCV 89      MCH 27.5      MCHC 30.9 (*)     RDW 13.5      Platelet Count  190      % Neutrophils 92      % Lymphocytes 5      % Monocytes 2      % Eosinophils 0      % Basophils 0      % Immature Granulocytes 1      NRBCs per 100 WBC 0      Absolute Neutrophils 13.1 (*)     Absolute Lymphocytes 0.8      Absolute Monocytes 0.3      Absolute Eosinophils 0.0      Absolute Basophils 0.0      Absolute Immature Granulocytes 0.2      Absolute NRBCs 0.0     TROPONIN T, HIGH SENSITIVITY - Normal    Troponin T, High Sensitivity <6          Emergency Department Course & Assessments:    Interventions:  Medications   methylPREDNISolone sodium succinate (solu-MEDROL) injection 125 mg (125 mg Intravenous $Given 5/12/23 1648)   ipratropium - albuterol 0.5 mg/2.5 mg/3 mL (DUONEB) neb solution 3 mL (3 mLs Nebulization $Given 5/12/23 1639)   0.9% sodium chloride BOLUS (0 mLs Intravenous Stopped 5/12/23 5655)        Independent Interpretation (X-rays, CTs, rhythm strip):  Chest x-ray negative per my interpreted      Social Determinants of Health affecting care:  Patient lives in a group home and is a frequent utilizer of ED services.  She is frequently aggressive and demeaning to staff members.    Disposition:  The patient was discharged to home.     Impression & Plan        Medical Decision Making:  Patient returns to the emergency part for the third time this week and the fifth time already in the month of May with concerns over asthma.  She states she wants a nebulizer treatment here and she has a hard time getting her nebs at home because of insurance issues.  She did receive 1 DuoNeb here.  She was seen in fast track prior to my seeing her and a chest x-ray, EKG and blood work were all ordered as well.  She does have a persistent leukocytosis, EKG showed no signs of ischemia.  Her troponin was normal.  The rest of her basic labs are unremarkable.  She is in no respiratory distress here today.  I discussed that further care is needed to be completed with her primary.  She states she is seeing someone for  pulmonary function test on Thursday and is seeing the doctor after that.  In the meantime she will continue with her albuterol MDI, Pulmicort, and her prednisone.    Diagnosis:    ICD-10-CM    1. Uncomplicated asthma, unspecified asthma severity, unspecified whether persistent  J45.909            Discharge Medications:  Discharge Medication List as of 5/12/2023  5:39 PM             Hernan Tineo MD    5/12/2023   Hernan Tineo MD Walters, Brent Aaron, MD  05/12/23 1758

## 2023-05-12 NOTE — ED PROVIDER NOTES
Rapid Assessment Note    History:   Dionne Perez is a 38 year old female who presents with shortness of breath. History is limited by condition of patient, berating staff. She explains she needs medications for asthma and she has been short of breath today. Reports bloody cough, chest pain. No fever. Wants nebulizer.     Exam:   Constitutional:       Appearance: Normal appearance.   HENT:      Head: Normocephalic and atraumatic.   Eyes:      Extraocular Movements: Extraocular movements intact.      Conjunctiva/sclera: Conjunctivae normal.   Cardiovascular:      Rate and Rhythm: Normal rate and regular rhythm.   Pulmonary:      Effort: Pulmonary effort is mildly tachypneic. Not in respiratory distress.      Breath sounds: Diminished lung sounds bilaterally with mild end expiratory wheezing.  Abdominal:      General: Abdomen is flat. There is no distension.      Palpations: Abdomen is soft.      Tenderness: There is no abdominal tenderness.   Musculoskeletal:      Cervical back: Normal range of motion. No rigidity.       Right lower leg: No edema.      Left lower leg: No edema.   Skin:     General: Skin is warm and dry.   Neurological:      General: No focal deficit present.      Mental Status: Alert and oriented to person, place, and time.   Psychiatric:         Mood and Affect: Mood normal.         Behavior: Behavior normal.    Plan of Care:   I evaluated the patient and developed an initial plan of care. I discussed this plan and explained that I, or one of my partners, would be returning to complete the evaluation.       I, SHARRON HARPER, am serving as a scribe to document services personally performed by Sy Daigle DO, based on my observations and the provider's statements to me.    5/12/2023  [facility]    Portions of this medical record were completed by a scribe. UPON MY REVIEW AND AUTHENTICATION BY ELECTRONIC SIGNATURE, this confirms (a) I performed the applicable  clinical services, and (b) the record is accurate.        Sy Daigle DO  05/12/23 2980

## 2023-05-12 NOTE — ED NOTES
"AVS reviewed w/ pt. Pt requested to stay in room and to finish eating her box meal. Informed pt that there are other pt's waiting to be seen and she is free to eat her box meal in the lobby while she awaits her ride. Pt then reported that she was supposed to be prescribed discharge medications. Spoke w/ Dr. Tineo and he stated no discharge medications are to be ordered. Informed patient and she replied \"what a fucking liar.\" Pt gathered her box lunch and belongings and ambulated off unit to wait for her ride.   "

## 2023-05-13 ENCOUNTER — HOSPITAL ENCOUNTER (EMERGENCY)
Facility: CLINIC | Age: 38
Discharge: HOME | End: 2023-05-13
Attending: EMERGENCY MEDICINE | Admitting: EMERGENCY MEDICINE
Payer: MEDICARE

## 2023-05-13 VITALS
OXYGEN SATURATION: 96 % | DIASTOLIC BLOOD PRESSURE: 74 MMHG | TEMPERATURE: 98.3 F | SYSTOLIC BLOOD PRESSURE: 128 MMHG | RESPIRATION RATE: 20 BRPM | HEART RATE: 69 BPM

## 2023-05-13 DIAGNOSIS — J45.901 EXACERBATION OF ASTHMA, UNSPECIFIED ASTHMA SEVERITY, UNSPECIFIED WHETHER PERSISTENT: ICD-10-CM

## 2023-05-13 PROBLEM — R55 SYNCOPE: Status: ACTIVE | Noted: 2018-05-15

## 2023-05-13 PROBLEM — F03.90 MAJOR NEUROCOGNITIVE DISORDER (H): Status: ACTIVE | Noted: 2022-09-06

## 2023-05-13 PROBLEM — G89.29 CHRONIC ABDOMINAL PAIN: Status: ACTIVE | Noted: 2021-10-22

## 2023-05-13 PROBLEM — K80.20 CHOLELITHIASIS: Status: ACTIVE | Noted: 2019-10-08

## 2023-05-13 PROBLEM — F69 BEHAVIOR PROBLEM, ADULT: Status: ACTIVE | Noted: 2020-05-26

## 2023-05-13 PROBLEM — R10.9 CHRONIC ABDOMINAL PAIN: Status: ACTIVE | Noted: 2021-10-22

## 2023-05-13 PROBLEM — R00.1 SINUS BRADYCARDIA, CHRONIC: Status: ACTIVE | Noted: 2018-05-15

## 2023-05-13 PROBLEM — F33.2 MAJOR DEPRESSIVE DISORDER, RECURRENT SEVERE WITHOUT PSYCHOTIC FEATURES (H): Status: ACTIVE | Noted: 2019-12-05

## 2023-05-13 PROBLEM — F33.9 RECURRENT MAJOR DEPRESSIVE DISORDER (H): Status: ACTIVE | Noted: 2023-01-22

## 2023-05-13 PROBLEM — G47.00 INSOMNIA: Status: ACTIVE | Noted: 2021-10-22

## 2023-05-13 PROBLEM — K59.00 CONSTIPATION: Status: ACTIVE | Noted: 2021-01-04

## 2023-05-13 PROBLEM — F41.1 GENERALIZED ANXIETY DISORDER: Status: ACTIVE | Noted: 2019-03-22

## 2023-05-13 PROBLEM — J45.40 ASTHMA, MODERATE PERSISTENT: Status: ACTIVE | Noted: 2023-04-06

## 2023-05-13 PROBLEM — I10 ESSENTIAL HYPERTENSION: Status: ACTIVE | Noted: 2018-05-06

## 2023-05-13 PROBLEM — R45.851 SUICIDAL IDEATIONS: Status: ACTIVE | Noted: 2019-11-02

## 2023-05-13 PROBLEM — M94.0 ACUTE COSTOCHONDRITIS: Status: ACTIVE | Noted: 2018-05-15

## 2023-05-13 PROBLEM — K85.10 GALLSTONE PANCREATITIS: Status: ACTIVE | Noted: 2019-10-10

## 2023-05-13 PROBLEM — S70.02XA CONTUSION OF LEFT HIP: Status: ACTIVE | Noted: 2017-11-16

## 2023-05-13 PROBLEM — F89 DEVELOPMENTAL DISABILITY: Status: ACTIVE | Noted: 2019-12-05

## 2023-05-13 PROBLEM — F43.29: Status: ACTIVE | Noted: 2019-12-05

## 2023-05-13 PROCEDURE — 93005 ELECTROCARDIOGRAM TRACING: CPT

## 2023-05-13 PROCEDURE — 250N000013 HC RX MED GY IP 250 OP 250 PS 637: Performed by: EMERGENCY MEDICINE

## 2023-05-13 PROCEDURE — 250N000009 HC RX 250: Performed by: EMERGENCY MEDICINE

## 2023-05-13 PROCEDURE — 94640 AIRWAY INHALATION TREATMENT: CPT

## 2023-05-13 PROCEDURE — 99283 EMERGENCY DEPT VISIT LOW MDM: CPT | Mod: 25

## 2023-05-13 RX ORDER — IPRATROPIUM BROMIDE AND ALBUTEROL SULFATE 2.5; .5 MG/3ML; MG/3ML
3 SOLUTION RESPIRATORY (INHALATION) ONCE
Status: COMPLETED | OUTPATIENT
Start: 2023-05-13 | End: 2023-05-13

## 2023-05-13 RX ORDER — ACETAMINOPHEN 500 MG
1000 TABLET ORAL ONCE
Status: COMPLETED | OUTPATIENT
Start: 2023-05-13 | End: 2023-05-13

## 2023-05-13 RX ORDER — PREDNISONE 10 MG/1
TABLET ORAL
Qty: 13 TABLET | Refills: 0 | Status: SHIPPED | OUTPATIENT
Start: 2023-05-13 | End: 2023-05-13

## 2023-05-13 RX ORDER — PREDNISONE 10 MG/1
TABLET ORAL
Qty: 13 TABLET | Refills: 0 | Status: SHIPPED | OUTPATIENT
Start: 2023-05-13 | End: 2023-05-23

## 2023-05-13 RX ADMIN — IPRATROPIUM BROMIDE AND ALBUTEROL SULFATE 3 ML: .5; 3 SOLUTION RESPIRATORY (INHALATION) at 13:59

## 2023-05-13 RX ADMIN — ACETAMINOPHEN 1000 MG: 500 TABLET ORAL at 13:59

## 2023-05-13 ASSESSMENT — ACTIVITIES OF DAILY LIVING (ADL): ADLS_ACUITY_SCORE: 35

## 2023-05-13 NOTE — ED NOTES
Pt given paper prescription for prednisone, as the pharmacy that it was sent to called back and said it is not in her network. Attempted to call group home twice with no response. Pt was able to call staff yesterday while here and got her own ride home. Pt agreeable to sit in lobby and wait for ride.

## 2023-05-13 NOTE — ED TRIAGE NOTES
Presents to ED via EMS for SOB. Pt was at lunch when she had an exacerbation. EMS gave 1 DuoNeb, 2 albuterol nebs, and solumedrol. Pt here frequently - seen here yesterday. C/o chest pain as well. ABCs intact. A&OX4.      Triage Assessment     Row Name 05/13/23 4410       Triage Assessment (Adult)    Airway WDL WDL       Respiratory WDL    Respiratory WDL X;rhythm/pattern    Rhythm/Pattern, Respiratory shortness of breath       Skin Circulation/Temperature WDL    Skin Circulation/Temperature WDL WDL       Cardiac WDL    Cardiac WDL X;chest pain       Chest Pain Assessment    Chest Pain Location midsternal       Peripheral/Neurovascular WDL    Peripheral Neurovascular WDL WDL       Cognitive/Neuro/Behavioral WDL    Cognitive/Neuro/Behavioral WDL WDL

## 2023-05-13 NOTE — ED PROVIDER NOTES
History   Chief Complaint:  Shortness of Breath    The history is provided by the patient and the EMS personnel.      Dionne Perez is a 38 year old female who presents to the ED for evaluation of shortness of breath. Dionne was seen here yesterday for similar shortness of breath and discharged to home after receiving a Duoneb and 125mg solumedrol. Dionne has been seen here in the ED five times in the past two weeks. Dionne reports dyspnea and midline chest pain as well as dizziness. She attempted using her inhaler this morning without relief. Denies fevers. Denies recent changes to medications. Denies history of sleep apnea. Denies use of CPAP and BIPAP. She reports that she previously had a sleep study which was normal. Dionne does have an appointment with her pulmonologist scheduled for six days from now. She states that she finished her Prednisone prescription five days ago, although this history does not match with review of the medical records. No fevers. She denies any other symptoms.    Independent Historian:   EMS - They report administration of 1 DuoNeb, 2 albuterol nebulizers, and 125 MG Solu-Medrol en route.     Review of External Notes:  None    ROS:  See HPI    Allergies:  Ibuprofen     Physical Exam     Patient Vitals for the past 24 hrs:   BP Temp Temp src Pulse Resp SpO2   05/13/23 1328 136/79 98.3  F (36.8  C) Temporal 71 20 97 %     Physical Exam  General: Well appearing, nontoxic. Resting comfortably  Head:  Scalp, face, and head appear normal  Eyes:  Pupils equal, round.    Conjunctivae noninjected and sclera white  ENT:    The nose is normal    Ears/pinnae are normal  Neck:  Normal range of motion  CV:  RRR, no M/R/G  Resp:  Mild expiratory wheezing bilaterally. No increased work of breathing, accessory muscle use. Able to speak full sentences.   MSK:  Normal tone. No lower extremity edema.   Skin:  No rash or lesions noted.  Neuro: Speech is normal and fluent    Moves all  extremities spontaneously  Psych:  Awake, Alert. Normal affect      Appropriate interactions    Emergency Department Course   ECG:  ECG taken at 1412, ECG read at 1427  Normal sinus rhythm  Normal ECG  Rate 62 bpm. MA interval 130 ms. QRS duration 108 ms. QT/QTc 442/448 ms. P-R-T axes 56 22 12.    Emergency Department Course & Assessments:    Interventions:  Medications   ipratropium - albuterol 0.5 mg/2.5 mg/3 mL (DUONEB) neb solution 3 mL (3 mLs Nebulization $Given 5/13/23 0542)   acetaminophen (TYLENOL) tablet 1,000 mg (1,000 mg Oral $Given 5/13/23 1355)     Assessments, Independent Interpretation, Consult/Discussion of ManagementTests:  ED Course as of 05/13/23 1457   Sat May 13, 2023   1336 I obtained history and examined the patient as noted above.    1452 Patient rechecked.  She is asleep and resting comfortably.  Breathing pattern is normal.  No distress.     Social Determinants of Health affecting care:  None    Disposition:  The patient was discharged to home.     Impression & Plan      Medical Decision Making:  Dionne Perez is a 38 year old female who presents to the ED for evaluation of shortness of breath, chest pain and wheezing. Patient lives in a group home and has a history of developmental delay as well as asthma. She has been seen in this emergency department frequently over the last few weeks. Most recently yesterday where she had a work-up which was reassuring and included a chest x-ray, EKG, troponin and laboratory studies. No evidence of pneumonia, pneumothorax pulmonary edema or other concerning findings. On my evaluation today the patient is well-appearing, nontoxic. She has mild expiratory wheezing but no hypoxia or respiratory distress. No significant increased work of breathing. Patient finished a 5-day burst of steroids that were prescribed to her from the ED on May 8. Yesterday and today she received 125 mg of Solu-Medrol (given today by EMS prior to arrival). Certainly her morbid  obesity is contributing to her respiratory symptoms. No fever or evidence to suggest indication for pneumonia. Patient treated with duoneb. Will prescribe a longer taper of prenisone to help hopefully prevent further bouncebacks. Patient needs close follow up with PCP and reports she has an appointment coming up this week. Patients symptoms improved after the above interventions. She is stable for discharge to home and close outpatient follow up.    Diagnosis:    ICD-10-CM    1. Exacerbation of asthma, unspecified asthma severity, unspecified whether persistent  J45.901            Discharge Medications:  New Prescriptions    PREDNISONE (DELTASONE) 10 MG TABLET    Take 2 tablets (20 mg) by mouth daily for 4 days, THEN 1 tablet (10 mg) daily for 3 days, THEN 0.5 tablets (5 mg) daily for 3 days.     Scribe Disclosure:  Dana BARKLEY, am serving as a scribe at 1:28 PM on 5/13/2023 to document services personally performed by Fei Dowell MD based on my observations and the provider's statements to me.    5/13/2023   Fei Dowell MD     Historical Data:  ______________________________________________________________________  Medications:    predniSONE (DELTASONE) 10 MG tablet  acetaminophen (TYLENOL) 325 MG tablet  albuterol (PROAIR HFA/PROVENTIL HFA/VENTOLIN HFA) 108 (90 Base) MCG/ACT inhaler  albuterol (PROAIR HFA/PROVENTIL HFA/VENTOLIN HFA) 108 (90 Base) MCG/ACT inhaler  albuterol (PROAIR HFA/PROVENTIL HFA/VENTOLIN HFA) 108 (90 Base) MCG/ACT inhaler  aspirin (ASA) 325 MG EC tablet  cephALEXin (KEFLEX) 500 MG capsule  docusate sodium (COLACE) 100 MG capsule  escitalopram (LEXAPRO) 10 MG tablet  famotidine (PEPCID) 20 MG tablet  levothyroxine (SYNTHROID/LEVOTHROID) 50 MCG tablet  Multiple Vitamins-Minerals (EMERGEN-C IMMUNE PLUS/VIT D) CHEW  nystatin (MYCOSTATIN) 990835 UNIT/GM external cream  OLANZapine zydis (ZYPREXA) 10 MG ODT  Pediatric Multivit-Minerals-C (CHEWABLES MULTIVITAMIN PO)  pravastatin  (PRAVACHOL) 40 MG tablet  Probiotic Product (RISAQUAD-2) CAPS  traZODone (DESYREL) 50 MG tablet      Past Medical History:   Past Surgical History:     Past Medical History:   Diagnosis Date     Anxiety      Asthma      Cholelithiasis      Depressive disorder      Gastroesophageal reflux disease      Hypercholesterolemia      Hypothyroidism      Mild mental retardation      Obesity     Past Surgical History:   Procedure Laterality Date     IR LUMBAR PUNCTURE  06/09/2020     TONSILLECTOMY        Patient Active Problem List    Diagnosis Date Noted     Asthma, moderate persistent 04/06/2023     Priority: Medium     Recurrent major depressive disorder (H) 01/22/2023     Priority: Medium     Major neurocognitive disorder (H) 09/06/2022     Priority: Medium     Last Assessment & Plan:   Formatting of this note might be different from the original.  Decrease Lexapro to 20 mg daily       Agitation 08/03/2022     Priority: Medium     Aggression 08/03/2022     Priority: Medium     Abnormal behavior 08/03/2022     Priority: Medium     Chronic abdominal pain 10/22/2021     Priority: Medium     Insomnia 10/22/2021     Priority: Medium     Constipation 01/04/2021     Priority: Medium     Altered mental status 06/06/2020     Priority: Medium     Behavior problem, adult 05/26/2020     Priority: Medium     Adjustment reaction with aggression 12/05/2019     Priority: Medium     Developmental disability 12/05/2019     Priority: Medium     Major depressive disorder, recurrent severe without psychotic features (H) 12/05/2019     Priority: Medium     Suicidal ideations 11/02/2019     Priority: Medium     Gallstone pancreatitis 10/10/2019     Priority: Medium     Cholelithiasis 10/08/2019     Priority: Medium     Adjustment reaction 07/28/2019     Priority: Medium     Generalized anxiety disorder 03/22/2019     Priority: Medium     Last Assessment & Plan:   Formatting of this note might be different from the original.  INCREASE Buspar to  15 mg BID       Acute costochondritis 05/15/2018     Priority: Medium     Sinus bradycardia, chronic 05/15/2018     Priority: Medium     Syncope 05/15/2018     Priority: Medium     Essential hypertension 05/06/2018     Priority: Medium     Contusion of left hip 11/16/2017     Priority: Medium     Hypothyroidism due to acquired atrophy of thyroid 05/16/2016     Priority: Medium     Cognitive impairment 04/10/2014     Priority: Medium     Hyperlipemia 04/10/2014     Priority: Medium     Morbid obesity (H) 04/10/2014     Priority: Medium          Family History:    family history is not on file. Social History:   reports that she has never smoked. She has never used smokeless tobacco. She reports that she does not drink alcohol and does not use drugs.     PCP: [facility] Daro, Ryan Clay, MD  05/13/23 0364

## 2023-05-13 NOTE — ED NOTES
Bed: ED31  Expected date: 5/13/23  Expected time:   Means of arrival: Ambulance  Comments:  [facility] 31[facility]

## 2023-05-14 ENCOUNTER — HOSPITAL ENCOUNTER (EMERGENCY)
Facility: CLINIC | Age: 38
Discharge: HOME | End: 2023-05-14
Attending: EMERGENCY MEDICINE | Admitting: EMERGENCY MEDICINE
Payer: MEDICARE

## 2023-05-14 VITALS
OXYGEN SATURATION: 98 % | DIASTOLIC BLOOD PRESSURE: 78 MMHG | RESPIRATION RATE: 18 BRPM | SYSTOLIC BLOOD PRESSURE: 142 MMHG | TEMPERATURE: 98 F | HEART RATE: 62 BPM

## 2023-05-14 DIAGNOSIS — E66.01 MORBID OBESITY (H): ICD-10-CM

## 2023-05-14 DIAGNOSIS — R06.09 CHRONIC DYSPNEA: ICD-10-CM

## 2023-05-14 DIAGNOSIS — F03.90 MAJOR NEUROCOGNITIVE DISORDER (H): ICD-10-CM

## 2023-05-14 DIAGNOSIS — F41.1 GENERALIZED ANXIETY DISORDER: ICD-10-CM

## 2023-05-14 DIAGNOSIS — R41.89 COGNITIVE IMPAIRMENT: ICD-10-CM

## 2023-05-14 PROCEDURE — 99283 EMERGENCY DEPT VISIT LOW MDM: CPT | Mod: 25

## 2023-05-14 PROCEDURE — 94640 AIRWAY INHALATION TREATMENT: CPT

## 2023-05-14 PROCEDURE — 250N000009 HC RX 250: Performed by: EMERGENCY MEDICINE

## 2023-05-14 RX ORDER — IPRATROPIUM BROMIDE AND ALBUTEROL SULFATE 2.5; .5 MG/3ML; MG/3ML
1 SOLUTION RESPIRATORY (INHALATION) EVERY 6 HOURS PRN
Qty: 90 ML | Refills: 0 | Status: SHIPPED | OUTPATIENT
Start: 2023-05-14

## 2023-05-14 RX ORDER — IPRATROPIUM BROMIDE AND ALBUTEROL SULFATE 2.5; .5 MG/3ML; MG/3ML
3 SOLUTION RESPIRATORY (INHALATION) ONCE
Status: COMPLETED | OUTPATIENT
Start: 2023-05-14 | End: 2023-05-14

## 2023-05-14 RX ADMIN — IPRATROPIUM BROMIDE AND ALBUTEROL SULFATE 3 ML: .5; 3 SOLUTION RESPIRATORY (INHALATION) at 16:17

## 2023-05-14 ASSESSMENT — ACTIVITIES OF DAILY LIVING (ADL): ADLS_ACUITY_SCORE: 35

## 2023-05-14 NOTE — ED PROVIDER NOTES
History     Chief Complaint:  Shortness of Breath       The history is provided by the patient.      Dionne Perez is a 38 year old female who presents to the ED for evaluation of shortness of breath. Earlier today, she was at a [employer] and hitting herself, which prompted the staff to call 911. When EMS arrived at the scene, she reported that she was feeling shortness of breath and was transported to the ED. She has been in the ED daily over the last two days with similar concerns. She was given a nebulizer en route, and feels improved at bedside. She was previously seen at this ED yesterday for an asthma exacerbation and was prescribed Deltasone. She has yet to fill this prescription because her pharmacy was closed. She denies any other concerns.     Independent Historian:   None - Patient Only    Review of External Notes:  None     ROS:  See HPI    Allergies:  Ibuprofen     Physical Exam     Patient Vitals for the past 24 hrs:   BP Temp Pulse Resp SpO2   05/14/23 1630 (!) 142/78 -- -- -- 98 %   05/14/23 1615 (!) 146/80 -- 62 -- 100 %   05/14/23 1545 139/77 -- -- -- --   05/14/23 1528 127/79 98  F (36.7  C) 76 18 99 %        Physical Exam  General: Well appearing, nontoxic. Resting comfortably  Head:  Scalp, face, and head appear normal  Eyes:  Pupils are equal, round    Conjunctivae non-injected and sclerae white  ENT:    The external nose is normal    MMM. Posterior pharynx clear without swelling, exudates or erythema. No mucosal lesions, tongue or lip swelling. No tongue elevation. Uvula normal without deviation. Voice normal. No drooling or trismus. Baseline upper airway noisy breathing. No stridor.    Pinnae are normal  Neck:  Normal range of motion    There is no rigidity noted    Trachea is in the midline  CV:  Regular rate and rhythm     Normal S1/S2, no S3/S4    No murmur or rub. Radial pulses 2+ bilaterally.  Resp:  Lungs are clear and equal bilaterally  There is no tachypnea    No increased work of  breathing    No rales, wheezing, or rhonchi  GI:  Abdomen is soft, morbidly obese, no rigidity or guarding    No distension, or mass    No tenderness or rebound tenderness   MS:  Normal muscular tone    Symmetric motor strength    No lower extremity edema  Skin:  No rash or acute skin lesions noted  Neuro: Awake and alert  Speech is normal and fluent  Moves all extremities spontaneously  Psych:  Normal affect. Appropriate interactions.     Emergency Department Course   Emergency Department Course & Assessments:    Interventions:  1617 Duoneb 3 ml INH     Assessments, Independent Interpretation, Consult/Discussion of ManagementTests:  ED Course as of 05/14/23 1722   Sun May 14, 2023   1558 I obtained history and examined the patient as noted above. I explained findings and discussed plan for discharge home.     Social Determinants of Health affecting care:  Healthcare Access/Compliance and intellectual disability -makes understanding of her health conditions difficult and makes close follow-up and adherence to medications difficult for the patient.    Disposition:  The patient was discharged to home.     Impression & Plan      Medical Decision Making:  Dionne Perez is a 38 year old female who presents to the ED for evaluation of persistent/chronic dyspnea. Patient frequently calls 911 anytime she feels shortness of breath.  She has a history of cognitive impairment which limits her ability to understand her underlying health conditions.  She has been seen now 3 days in a row in the emergency department.  I saw her yesterday for the same complaint.  Work-ups have been unremarkable.  Patient reports she has a nebulizer machine at home but no nebulizer solution for it.  She does report she has her inhalers.  Patient has significant morbid obesity with patulous upper airway soft tissues and I feel that it is highly likely that she is having some transient upper airway obstruction intermittently due to her morbid  obesity contributing to her anxiety and chronic dyspnea. She would also likely benefit from a sleep study for FARRUKH if she has never had one. Patient reports that she has a follow-up appointment this week with her outpatient physician as well as some lung testing.  On clinical examination today she has significant upper airway noisy breathing but no wheezing in the lung fields.  Air movement is good.  Yesterday she was prescribed a prednisone taper which she has not yet filled.  I stressed the importance of her filling this prescription and have also provided her with a prescription for DuoNeb solution to have at home although it is unclear that bronchospasm is really contributing much to her chronic dyspnea symptoms.  On my evaluation today there is no evidence of any acute life-threatening emergency.  She is stable for discharge to home with close outpatient follow-up.  I stressed the importance that she should try to use her inhalers and nebulizer at home prior to calling 911.  Patient was discharged in stable condition.      Diagnosis:    ICD-10-CM    1. Chronic dyspnea  R06.09 Primary Care - Care Coordination Referral      2. Generalized anxiety disorder  F41.1 Primary Care - Care Coordination Referral      3. Morbid obesity (H)  E66.01 Primary Care - Care Coordination Referral      4. Major neurocognitive disorder (H)  F03.90 Primary Care - Care Coordination Referral      5. Cognitive impairment  R41.89 Primary Care - Care Coordination Referral           Discharge Medications:  New Prescriptions    IPRATROPIUM - ALBUTEROL 0.5 MG/2.5 MG/3 ML (DUONEB) 0.5-2.5 (3) MG/3ML NEB SOLUTION    Take 1 vial (3 mLs) by nebulization every 6 hours as needed for shortness of breath, wheezing or cough        5/14/2023   Fei Dowell MD       Historical Data:  ______________________________________________________________________  Medications:    Albuterol   ASA  Keflex   Colace   Lexapro   Pepcid   Synthroid   Zyprexa    Pravachol   Deltasone  Risaquad   Desyrel     Past Medical History:   Past Surgical History:     Anxiety  Asthma  Cholelithiasis  Depression  GERD  Hypercholesterolemia  Hypothyroidism  Mental retardation  Obesity  Anemia  Suicidal ideation  Gallstone pancreatitis  Biliary calculus  Hyperlipidemia  Cellulitis   Costochondritis  Hypertension Tonsillectomy  IR lumbar puncture           Family History:    Mother: cancer, cataract  Sister: cancer, diabetes   Social History:  The patient presents to the ED alone via EMS     PCP: Kailyn, [facility] Daro, Ryan Clay, MD  05/14/23 6869

## 2023-05-14 NOTE — ED NOTES
"While placing vitals machine on the patient. This writer inquired about what was bringing her in to see us. Patient initially responded that she has chest pain and has asthma. RN asked if she has a rescue inhaler that she uses patient did not respond. RN asked again and patient stated she doesn't like her group home. RN inquired about if it was asthma and chest pain that brought her into the ER or the fact she does not like her group home. Patient stated, \"I'm not explaining it again.\" RN explained that the doctor would come in and ask the same questions and clarification would be helpful. Patient again stated, I'm not explaining it again.\"   "

## 2023-05-14 NOTE — ED TRIAGE NOTES
Patient presents to the ED via ambulance. Medics state they were called because patient was hitting herself at [employer]. Upon arrival, medics report patient was very wheezy. Administered 1 neb en route. Patient states she feels better.

## 2023-05-14 NOTE — DISCHARGE INSTRUCTIONS
It is very important that you follow up with your lung specialist as scheduled this week. When you feel short of breath use your home nebulizer BEFORE YOU CALL AN AMBULANCE. If your breathing does not get better after using your home nebulizer or inhaler then call 911. Take the prednisone medication that was prescribed to you yesterday.

## 2023-05-15 LAB
ATRIAL RATE - MUSE: 59 BPM
ATRIAL RATE - MUSE: 62 BPM
DIASTOLIC BLOOD PRESSURE - MUSE: NORMAL MMHG
DIASTOLIC BLOOD PRESSURE - MUSE: NORMAL MMHG
INTERPRETATION ECG - MUSE: NORMAL
INTERPRETATION ECG - MUSE: NORMAL
P AXIS - MUSE: 44 DEGREES
P AXIS - MUSE: 56 DEGREES
PR INTERVAL - MUSE: 130 MS
PR INTERVAL - MUSE: 90 MS
QRS DURATION - MUSE: 108 MS
QRS DURATION - MUSE: 90 MS
QT - MUSE: 400 MS
QT - MUSE: 442 MS
QTC - MUSE: 396 MS
QTC - MUSE: 448 MS
R AXIS - MUSE: 22 DEGREES
R AXIS - MUSE: 66 DEGREES
SYSTOLIC BLOOD PRESSURE - MUSE: NORMAL MMHG
SYSTOLIC BLOOD PRESSURE - MUSE: NORMAL MMHG
T AXIS - MUSE: 12 DEGREES
T AXIS - MUSE: 54 DEGREES
VENTRICULAR RATE- MUSE: 59 BPM
VENTRICULAR RATE- MUSE: 62 BPM

## 2023-05-17 ENCOUNTER — HOSPITAL ENCOUNTER (EMERGENCY)
Facility: CLINIC | Age: 38
Discharge: NURSING, REHAB OR LONG-TERM CARE | End: 2023-05-17
Attending: EMERGENCY MEDICINE | Admitting: EMERGENCY MEDICINE
Payer: MEDICARE

## 2023-05-17 VITALS
TEMPERATURE: 98.7 F | OXYGEN SATURATION: 99 % | HEART RATE: 70 BPM | RESPIRATION RATE: 16 BRPM | SYSTOLIC BLOOD PRESSURE: 120 MMHG | DIASTOLIC BLOOD PRESSURE: 86 MMHG

## 2023-05-17 DIAGNOSIS — R04.2 HEMOPTYSIS: ICD-10-CM

## 2023-05-17 PROCEDURE — 99282 EMERGENCY DEPT VISIT SF MDM: CPT

## 2023-05-17 ASSESSMENT — ACTIVITIES OF DAILY LIVING (ADL): ADLS_ACUITY_SCORE: 33

## 2023-05-17 NOTE — ED PROVIDER NOTES
History   Chief Complaint:  Cough    HPI   Dionne Perez is a 38 year old female with a history of asthma, obesity, GERD, anxiety, developmental delay who presents with cough with bloody mucus which she states has been present for couple of weeks.  She was just evaluated at the urgent care just prior to arrival and given a DuoNeb and had a chest x-ray performed which was negative.  She was evaluated in the emergency department a few days ago and prescribed prednisone which she is still taking.  She was also evaluated in the emergency department less than 2 weeks ago and had a chest CT which was negative for pulmonary embolism.  She states she has not had any new symptoms since that time.  She denies any chest pain, shortness of breath, fevers or chills, leg pain or swelling.    Independent Historian:   None - Patient Only    Review of External Notes: Urgent care visit from earlier today.    ROS:  Review of Systems    Allergies:  Ibuprofen     Medications:    Albuterol   Aspirin   Keflex   Lexapro   Famotidine   Duoneb   Levothyroxine   Zyprexa   Pravastatin   Trazodone      Past Medical History:    Anxiety   Asthma   Cholelithiasis   Depressive disorder   GERD  Hyperlipidemia   Hypothyroidism   Mild mental retardation   Obesity      Past Surgical History:    Lumbar puncture  Tonsillectomy     Social History:  Presents to the emergency department alone.   PCP: Alexsander Pittman, MARY, CNP      Physical Exam     Patient Vitals for the past 24 hrs:   BP Temp Pulse Resp SpO2   05/17/23 1145 120/86 -- 70 16 99 %   05/17/23 1043 (!) 139/115 98.7  F (37.1  C) 77 16 99 %     Physical Exam  Nursing note and vitals reviewed.  Constitutional:  Appears well-developed and well-nourished.  No visible respiratory distress.  Swallowing and speaking normally.  High BMI.  HENT:   Head:    Atraumatic.   Mouth/Throat:   Oropharynx is clear and moist. No oropharyngeal exudate.   Eyes:    Pupils are equal, round, and reactive to  light.   Neck:    Normal range of motion. Neck supple.      No tracheal deviation present. No thyromegaly present.   Cardiovascular:  Normal rate, regular rhythm, no murmur   Pulmonary/Chest: Breath sounds are clear and equal without wheezes or crackles.  Abdominal:   Soft. Bowel sounds are normal. Exhibits no distension and      no mass. There is no tenderness.      There is no rebound and no guarding.   Musculoskeletal:  Exhibits no edema.   Lymphadenopathy:  No cervical adenopathy.   Neurological:   Alert and oriented to person, place, and time.   Skin:    Skin is warm and dry. No rash noted. No pallor.       Emergency Department Course     Emergency Department Course & Assessments:    Interventions:  Medications - No data to display     Assessments:  1057 I obtained history and examined the patient as noted above.   1137 I rechecked the patient and explained findings. I discussed discharge with the patient. All questions answered.     Independent Interpretation (X-rays, CTs, rhythm strip):  None    Consultations/Discussion of Management or Tests:  None     Social Determinants of Health affecting care:   Education/Literacy and Social Connections/Isolation    Disposition:  The patient was discharged to home.     Impression & Plan      Medical Decision Making:  This patient arrives due to a complaint of hemoptysis however she is not coughing here and had a negative chest x-ray a few hours ago at the urgent care.  There is no sign of pneumonia.  She has had a negative chest CT PE study recently and she is not having any chest pain or shortness of breath or any DVT symptoms so I did not feel this need to be repeated.  There is no sign of tuberculosis on her chest x-ray and I doubt that she has any tuberculosis.  She is not hypoxic or tachycardic I feel she can be safely discharged home.  Her lungs sound clear without wheezes so she was told to continue and finish the prednisone she is taking.  She was told to return  as needed for any symptoms worsen or for any new symptoms.  She was told to follow-up in clinic within the next few days.    Diagnosis:    ICD-10-CM    1. Hemoptysis  R04.2         Scribe Disclosure:  I, Dana Estes, am serving as a scribe at 3:54 PM on 5/17/2023 to document services personally performed by Tish Smith MD based on my observations and the provider's statements to me.    5/17/2023   Tish Smith MD [place], Cheri Lee, MD  05/18/23 1914

## 2023-05-17 NOTE — ED TRIAGE NOTES
Patient arrives from urgent care after being evaluated for a cough. Patient was given a duoneb and chest xray then discharged. Patient to the ED arrives 20 minutes after discharge from the  with the same complaint. Contacted Justina (). Informed her that patient is now in the emergency department. Justina was told patient should go to the ER for her symptoms. Discharge paperwork does not advise ER for follow up care. ABCs intact in triage. No cough noted.

## 2023-05-21 ENCOUNTER — HOSPITAL ENCOUNTER (EMERGENCY)
Facility: CLINIC | Age: 38
Discharge: HOME | End: 2023-05-21
Attending: EMERGENCY MEDICINE | Admitting: EMERGENCY MEDICINE
Payer: MEDICARE

## 2023-05-21 VITALS
OXYGEN SATURATION: 99 % | HEART RATE: 71 BPM | RESPIRATION RATE: 26 BRPM | SYSTOLIC BLOOD PRESSURE: 141 MMHG | DIASTOLIC BLOOD PRESSURE: 79 MMHG | TEMPERATURE: 98.2 F

## 2023-05-21 DIAGNOSIS — J45.41 MODERATE PERSISTENT ASTHMA WITH ACUTE EXACERBATION: ICD-10-CM

## 2023-05-21 PROCEDURE — 96372 THER/PROPH/DIAG INJ SC/IM: CPT | Performed by: EMERGENCY MEDICINE

## 2023-05-21 PROCEDURE — 99284 EMERGENCY DEPT VISIT MOD MDM: CPT | Mod: 25

## 2023-05-21 PROCEDURE — 250N000009 HC RX 250: Performed by: EMERGENCY MEDICINE

## 2023-05-21 PROCEDURE — 94640 AIRWAY INHALATION TREATMENT: CPT

## 2023-05-21 PROCEDURE — 250N000011 HC RX IP 250 OP 636: Performed by: EMERGENCY MEDICINE

## 2023-05-21 RX ORDER — DEXAMETHASONE SODIUM PHOSPHATE 10 MG/ML
10 INJECTION, SOLUTION INTRAMUSCULAR; INTRAVENOUS ONCE
Status: COMPLETED | OUTPATIENT
Start: 2023-05-21 | End: 2023-05-21

## 2023-05-21 RX ORDER — IPRATROPIUM BROMIDE AND ALBUTEROL SULFATE 2.5; .5 MG/3ML; MG/3ML
3 SOLUTION RESPIRATORY (INHALATION) ONCE
Status: COMPLETED | OUTPATIENT
Start: 2023-05-21 | End: 2023-05-21

## 2023-05-21 RX ADMIN — DEXAMETHASONE SODIUM PHOSPHATE 10 MG: 10 INJECTION, SOLUTION INTRAMUSCULAR; INTRAVENOUS at 13:23

## 2023-05-21 RX ADMIN — IPRATROPIUM BROMIDE AND ALBUTEROL SULFATE 3 ML: .5; 3 SOLUTION RESPIRATORY (INHALATION) at 13:23

## 2023-05-21 ASSESSMENT — ACTIVITIES OF DAILY LIVING (ADL): ADLS_ACUITY_SCORE: 35

## 2023-05-21 NOTE — ED PROVIDER NOTES
History     Chief Complaint:  No chief complaint on file.           HPI   Dionne Perez is a 38 year old female past medical history significant for asthma, anxiety, depression, mild MR, high frequency ER use, behavioral issues and malingering who presents for repeat evaluation of shortness of breath, cough and reported scant hemoptysis.  She was climbing onto the bus when the  was she was short of breath and called 911 per her report.  She denies any chest pain, chest pressure or heaviness, no infectious symptoms including fevers chills, runny nose sore throat, but does have dry cough.  The symptoms are the exact same as several previous ER visits (see below).    Patient was seen in the emergency department 4 days prior, for several months of cough, hemoptysis, and had a negative chest x-ray done in urgent care earlier in the day.  She has been seen in this emergency department 9 times alone this month and has undergone CT PE study twice in the last 2 months that were negative.  As well as multiple trips to clinic.    Independent Historian:   None - Patient Only    Review of External Notes:   5/17/203 -urgent care visit, seen for hemoptysis, negative chest x-ray, plan to continue her salmeterol, and prednisone.      Medications:    acetaminophen (TYLENOL) 325 MG tablet  albuterol (PROAIR HFA/PROVENTIL HFA/VENTOLIN HFA) 108 (90 Base) MCG/ACT inhaler  albuterol (PROAIR HFA/PROVENTIL HFA/VENTOLIN HFA) 108 (90 Base) MCG/ACT inhaler  albuterol (PROAIR HFA/PROVENTIL HFA/VENTOLIN HFA) 108 (90 Base) MCG/ACT inhaler  aspirin (ASA) 325 MG EC tablet  cephALEXin (KEFLEX) 500 MG capsule  docusate sodium (COLACE) 100 MG capsule  escitalopram (LEXAPRO) 10 MG tablet  famotidine (PEPCID) 20 MG tablet  ipratropium - albuterol 0.5 mg/2.5 mg/3 mL (DUONEB) 0.5-2.5 (3) MG/3ML neb solution  levothyroxine (SYNTHROID/LEVOTHROID) 50 MCG tablet  Multiple Vitamins-Minerals (EMERGEN-C IMMUNE PLUS/VIT D) CHEW  nystatin  (MYCOSTATIN) 406742 UNIT/GM external cream  OLANZapine zydis (ZYPREXA) 10 MG ODT  Pediatric Multivit-Minerals-C (CHEWABLES MULTIVITAMIN PO)  pravastatin (PRAVACHOL) 40 MG tablet  predniSONE (DELTASONE) 10 MG tablet  Probiotic Product (RISAQUAD-2) CAPS  traZODone (DESYREL) 50 MG tablet        Past Medical History:    Past Medical History:   Diagnosis Date     Anxiety      Asthma      Cholelithiasis      Depressive disorder      Gastroesophageal reflux disease      Hypercholesterolemia      Hypothyroidism      Mild mental retardation      Obesity        Past Surgical History:    Past Surgical History:   Procedure Laterality Date     IR LUMBAR PUNCTURE  06/09/2020     TONSILLECTOMY          Physical Exam     Patient Vitals for the past 24 hrs:   BP Temp Temp src Pulse Resp SpO2   05/21/23 1310 (!) 141/79 98.2  F (36.8  C) Oral -- 26 100 %   05/21/23 1307 (!) 144/71 -- -- 72 -- 99 %        Physical Exam  Constitutional: Alert, attentive, GCS 15   HENT:    Nose: Nose normal.    Mouth/Throat: Oropharynx is clear, mucous membranes are moist  Eyes: EOM are normal, anicteric, conjugate gaze  CV: regular rate and rhythm; no murmurs  Chest: Effort normal and breath sounds clear without wheezing or rales, symmetric bilaterally   GI:  non tender. No distension. No guarding or rebound.    MSK: No LE edema, no tenderness to palpation of BLE.  Neurological: Alert, attentive, moving all extremities equally.   Skin: Skin is warm and dry.      Emergency Department Course     Emergency Department Course & Assessments:    Interventions:  Medications - No data to display     Assessments:  1:19 PM patient evaluated, in no distress.    Independent Interpretation (X-rays, CTs, rhythm strip):  None    Consultations/Discussion of Management or Tests:  None        Social Determinants of Health affecting care:   Malingering and ED  Recidivism.     Disposition:  The patient was discharged to home.     Impression & Plan      Medical Decision  [place]akin-year-old woman past medical history significant for asthma, anxiety, depression, mild MR, high-frequency ear use, behavioral issues and malingering who presents for repeated evaluation of mild cough, scant hemoptysis.  She reports her  called 911 when they noticed she was short of breath.  She got a DuoNeb in route and reports significant improvement, she was given additional DuoNeb here and I gave her a dose of Decadron.  Do not hear any overt wheezing however she is satting 99%, is afebrile.  This appears to be extensively evaluated over the last few weeks including multiple CT PE is, chest x-ray is ED and office visits.  I do not suspect PE, pneumonia, collapsed lung.  Repeat chest imaging deferred.  I recommended close follow-up with her PCP and/or pulmonology and patient was discharged back to her group home.    Diagnosis:    ICD-10-CM    1. Moderate persistent asthma with acute exacerbation  J45.41            Carmelo Mistry MD  [employer]  1:07 PM 23          Carmelo Mistry MD  23 1417

## 2023-05-21 NOTE — ED TRIAGE NOTES
Triage Assessment     Row Name 05/21/23 6173       Triage Assessment (Adult)    Airway WDL WDL       Respiratory WDL    Respiratory WDL X;rhythm/pattern    Rhythm/Pattern, Respiratory shortness of breath

## 2023-05-21 NOTE — ED TRIAGE NOTES
Pt presents to ED with asthma exacerbation. States she was up in [place] for [employer], stopped and had lunch and while going up the steps on the bus developed SOB. Takes a rescue inhaler for asthma but did not have it with her as it stays in a locked area at her group home. Duo neb and albuterol neb given by EMS. Pt reports relief after nebs. Pt also reports 1 week of lower abdominal pain and burning with urination.      Triage Assessment     Row Name 05/21/23 6006       Triage Assessment (Adult)    Airway WDL WDL       Respiratory WDL    Respiratory WDL X;rhythm/pattern    Rhythm/Pattern, Respiratory shortness of breath

## 2023-05-25 ENCOUNTER — HOSPITAL ENCOUNTER (EMERGENCY)
Facility: CLINIC | Age: 38
Discharge: HOME | End: 2023-05-25
Attending: EMERGENCY MEDICINE | Admitting: EMERGENCY MEDICINE
Payer: MEDICARE

## 2023-05-25 ENCOUNTER — APPOINTMENT (OUTPATIENT)
Dept: GENERAL RADIOLOGY | Facility: CLINIC | Age: 38
End: 2023-05-25
Attending: EMERGENCY MEDICINE
Payer: MEDICARE

## 2023-05-25 VITALS
OXYGEN SATURATION: 98 % | TEMPERATURE: 97 F | DIASTOLIC BLOOD PRESSURE: 67 MMHG | SYSTOLIC BLOOD PRESSURE: 136 MMHG | RESPIRATION RATE: 22 BRPM | HEART RATE: 75 BPM

## 2023-05-25 DIAGNOSIS — R06.02 SHORTNESS OF BREATH: ICD-10-CM

## 2023-05-25 PROCEDURE — 94640 AIRWAY INHALATION TREATMENT: CPT

## 2023-05-25 PROCEDURE — 250N000009 HC RX 250: Performed by: EMERGENCY MEDICINE

## 2023-05-25 PROCEDURE — 71046 X-RAY EXAM CHEST 2 VIEWS: CPT

## 2023-05-25 PROCEDURE — 99283 EMERGENCY DEPT VISIT LOW MDM: CPT | Mod: 25

## 2023-05-25 RX ORDER — IPRATROPIUM BROMIDE AND ALBUTEROL SULFATE 2.5; .5 MG/3ML; MG/3ML
3 SOLUTION RESPIRATORY (INHALATION) ONCE
Status: COMPLETED | OUTPATIENT
Start: 2023-05-25 | End: 2023-05-25

## 2023-05-25 RX ADMIN — IPRATROPIUM BROMIDE AND ALBUTEROL SULFATE 3 ML: .5; 3 SOLUTION RESPIRATORY (INHALATION) at 10:20

## 2023-05-25 ASSESSMENT — ACTIVITIES OF DAILY LIVING (ADL): ADLS_ACUITY_SCORE: 35

## 2023-05-25 NOTE — DISCHARGE INSTRUCTIONS
Please continue to monitor symptoms closely    Return to ER with any new or worsening symptoms.    Discharge Instructions  Asthma    Asthma is a condition causing narrowing and inflammation of the airways that can make it hard to breathe.  Asthma can also cause cough, wheezing, noisy breathing and tightness in the chest.  Asthma can be brought on or  triggered  by many things, including dust, mold, pollen, cigarette smoke, exercise, stress and infections (like the common cold).     Generally, every Emergency Department visit should have a follow-up clinic visit with either a primary or a specialty clinic/provider. Please follow-up as instructed by your emergency provider today.    Return to the Emergency Department if:  Your breathing gets worse.  You need to use your inhaler more often than every 4 hours, or cannot get relief from your inhaler.  You are very weak, or feel much more ill.  You develop new symptoms, such as chest pain.  You cough up blood.  You are vomiting (throwing up) enough that you cannot keep fluids or your medicine down.    What can I do to help myself?  Fill any prescriptions the provider gave you and take them right away--especially antibiotics. Be sure to finish the whole antibiotic prescription.  You may be given a prescription for an inhaler, which can help loosen tight air passages.  Use this as needed, but not more often than directed. Inhalers work much better when used with a spacer.   You may be given a prescription for a steroid to reduce inflammation. Used long-term, these can have some side effects, but for short-term use they are safe. You may notice restlessness or increased appetite (eating more).      You may use non-prescription cough or cold medicines. Cough medicines may help, but do not make the cough go away completely.   Avoid smoke, because this can make you feel worse. If you smoke, this may be a good time to quit! Consider using nicotine lozenges, gum, or patches to  reduce cravings.   If you have a fever, Tylenol  (acetaminophen), Motrin  (ibuprofen), or Advil  (ibuprofen), may help bring fever down and may help you feel more comfortable. Be sure to read and follow the package directions, and ask your provider if you have questions.  Be sure to get your flu shot each year.  For certain age groups, the pneumonia shot can help prevent pneumonia.  It is important that you follow up with your regular provider, to be sure that you are improving from this spell (an acute asthma exacerbation), and also to do what you can to keep from having trouble again. Sometimes you need long-term medicines to keep your asthma under control.   If you were given a prescription for medicine here today, be sure to read all of the information (including the package insert) that comes with your prescription.  This will include important information about the medicine, its side effects, and any warnings that you need to know about.  The pharmacist who fills the prescription can provide more information and answer questions you may have about the medicine.  If you have questions or concerns that the pharmacist cannot address, please call or return to the Emergency Department.   Remember that you can always come back to the Emergency Department if you are not able to see your regular provider in the amount of time listed above, if you get any new symptoms, or if there is anything that worries you.

## 2023-05-25 NOTE — ED PROVIDER NOTES
History     Chief Complaint:  Shortness of Breath       HPI   Dionne Perez is a 38 year old female presenting to ER for evaluation of shortness of breath.  Patient reports that earlier today, while she was sitting at rest, she noted the feeling of shortness of breath and some wheezing.  EMS was called.  In route she was provided 1 DuoNeb treatment.  She reports improvement in symptoms thereafter.  She denies fevers or chills, though notes feeling warm earlier. No CP.  Per chart review, patient has been seen 10 times in this ED since 4/30/23, seeking care for concerns for cough, shortness of breath, hemoptysis, diagnosed with bronchospasm, asthma exacerbation and shortness of breath.  She has undergone prior evaluations including CT PE imaging on 4/28 and 3/25 both of which were negative for PE.      Independent Historian:   None - Patient Only    Review of External Notes:   I reviewed patient's emergency care plan, including frequent visits for asthma.  Previous evaluations have raise concern for possible volitional upper airway sounds versus true bronchospasm.      Medications:    acetaminophen (TYLENOL) 325 MG tablet  albuterol (PROAIR HFA/PROVENTIL HFA/VENTOLIN HFA) 108 (90 Base) MCG/ACT inhaler  albuterol (PROAIR HFA/PROVENTIL HFA/VENTOLIN HFA) 108 (90 Base) MCG/ACT inhaler  albuterol (PROAIR HFA/PROVENTIL HFA/VENTOLIN HFA) 108 (90 Base) MCG/ACT inhaler  aspirin (ASA) 325 MG EC tablet  cephALEXin (KEFLEX) 500 MG capsule  docusate sodium (COLACE) 100 MG capsule  escitalopram (LEXAPRO) 10 MG tablet  famotidine (PEPCID) 20 MG tablet  ipratropium - albuterol 0.5 mg/2.5 mg/3 mL (DUONEB) 0.5-2.5 (3) MG/3ML neb solution  levothyroxine (SYNTHROID/LEVOTHROID) 50 MCG tablet  Multiple Vitamins-Minerals (EMERGEN-C IMMUNE PLUS/VIT D) CHEW  nystatin (MYCOSTATIN) 194754 UNIT/GM external cream  OLANZapine zydis (ZYPREXA) 10 MG ODT  Pediatric Multivit-Minerals-C (CHEWABLES MULTIVITAMIN PO)  pravastatin (PRAVACHOL) 40 MG  tablet  Probiotic Product (RISAQUAD-2) CAPS  traZODone (DESYREL) 50 MG tablet        Past Medical History:    Past Medical History:   Diagnosis Date     Anxiety      Asthma      Cholelithiasis      Depressive disorder      Gastroesophageal reflux disease      Hypercholesterolemia      Hypothyroidism      Mild mental retardation      Obesity        Past Surgical History:    Past Surgical History:   Procedure Laterality Date     IR LUMBAR PUNCTURE  06/09/2020     TONSILLECTOMY          Physical Exam     Patient Vitals for the past 24 hrs:   BP Temp Temp src Pulse Resp SpO2   05/25/23 1251 -- -- -- 75 22 98 %   05/25/23 1015 -- -- -- -- -- 97 %   05/25/23 1004 -- -- -- -- -- 96 %   05/25/23 0949 -- -- -- -- -- 97 %   05/25/23 0936 136/67 97  F (36.1  C) Temporal 70 24 97 %        Physical Exam  General:   Well-nourished   Speaking in full sentences   Resting comfortably on gurney   Sleeping on subsequent re-evaluations  Eyes:   Conjunctiva without injection or scleral icterus  ENT:   Moist mucous membranes   Nares patent   Pinnae normal  Neck:   Full ROM   No stiffness appreciated  Resp:   Lungs CTAB   Faint, minimal end expiratory wheezing   Work of breathing is unremarkable   No retractions  CV:    Normal rate, regular rhythm   S1 and S2 present   No murmur, gallop or rub  GI:   BS present   Abdomen soft without distention   Non-tender to light and deep palpation   No guarding or rebound tenderness  Skin:   Warm, dry, well perfused   No rashes or open wounds on exposed skin  MSK:   Moves all extremities   No focal deformities or swelling  Neuro:   Alert   Answers questions appropriately   Moves all extremities equally   Gait stable  Psych:   Normal affect, normal mood        Emergency Department Course     Imaging:  Chest XR,  PA & LAT   Final Result   IMPRESSION: The patient is rotated to the right. Stable cardiac   enlargement. Pulmonary vascularity is within normal limits. Lungs   clear. No pleural effusions. No  pneumothorax.      BENJAMÍN STOUT MD            SYSTEM ID:  [id number]         Report per radiology        Emergency Department Course & Assessments:    Interventions:  Medications   ipratropium - albuterol 0.5 mg/2.5 mg/3 mL (DUONEB) neb solution 3 mL (3 mLs Nebulization $Given 5/25/23 1020)        Assessments:  See below    Independent Interpretation (X-rays, CTs, rhythm strip):  I reviewed CXR and see no acute changes compared with prior XR    Consultations/Discussion of Management or Tests:   ED Course as of 05/25/23 2008   Thu May 25, 2023   1227 Patient re-assessed.  Resting comfortably on gurney       Social Determinants of Health affecting care:   Healthcare Access/Compliance and Education/Literacy    Disposition:  The patient was discharged to home.     Impression & Plan        Medical Decision Making:  Dionne Perez is a 38 yr old F with a history of cognitive delay, asthma, anxiety who presents to the ER for evaluation of shortness of breath.  VS on presentation unremarkable, including absence of hypoxia nor tachycardia.  Exam revealed faint end expiratory wheezing, for which patient was provided 2nd duo-neb treatment.  She was feeling improved following these interventions.  CXR is without acute infiltrate or acute change compared with previous.  Prior records have been reviewed extensively, including multiple prior ED visits for similar concerns, as well as ED care plan.  Other etiologies were certainly considered though felt unlikely at this point.  In absence of chest pain, tachycardia, as well as given 2 prior recent CT imaging studies, doubt PE.  Additionally, do not feel presenting evaluation is consistent with ACS, nor CHF exacerbation.  Previous labs reviewed and reveal stable anemia, and patient without history of acute blood loss.  With reasonable clinical certainty, do feel patient can be safely discharged from the ED with ongoing supportive cares.  Given minimal wheezing on exam, as well as  recent courses of prednisone, do feel additional steroid therapy can be deferred at this time.  Additionally, she reports she has enough of her home inhaler available to her.  She is welcome to return to the ED at any point with new or worsening symptoms.  Questions have been answered prior to discharge.        Diagnosis:    ICD-10-CM    1. Shortness of breath  R06.02              5/25/2023   Lavon Marin MD Roach, Brian Donald, MD  05/25/23 2009

## 2023-05-25 NOTE — ED NOTES
Bed: ED05  Expected date:   Expected time:   Means of arrival:   Comments:  Pancho richey S.L. when clean

## 2023-05-25 NOTE — ED NOTES
[facility]  Expected date:   Expected time:   Means of arrival:   Comments:  ALIZA BV2

## 2023-05-25 NOTE — ED TRIAGE NOTES
Pt comes from group home, pt reports increasing SOB sine this morning,  EMS gave duoneb en route. Audible expiratory wheezes. VSS and ABC's intact

## 2023-05-27 ENCOUNTER — HOSPITAL ENCOUNTER (EMERGENCY)
Facility: CLINIC | Age: 38
Discharge: HOME | End: 2023-05-27
Attending: EMERGENCY MEDICINE | Admitting: EMERGENCY MEDICINE
Payer: MEDICARE

## 2023-05-27 VITALS
HEART RATE: 86 BPM | TEMPERATURE: 98.6 F | SYSTOLIC BLOOD PRESSURE: 121 MMHG | OXYGEN SATURATION: 99 % | DIASTOLIC BLOOD PRESSURE: 83 MMHG | RESPIRATION RATE: 18 BRPM

## 2023-05-27 DIAGNOSIS — R06.02 SHORTNESS OF BREATH: ICD-10-CM

## 2023-05-27 DIAGNOSIS — R06.89 ABNORMAL BREATH SOUNDS: ICD-10-CM

## 2023-05-27 PROCEDURE — 99284 EMERGENCY DEPT VISIT MOD MDM: CPT | Mod: 25

## 2023-05-27 PROCEDURE — 94640 AIRWAY INHALATION TREATMENT: CPT

## 2023-05-27 PROCEDURE — 250N000009 HC RX 250: Performed by: EMERGENCY MEDICINE

## 2023-05-27 RX ORDER — IPRATROPIUM BROMIDE AND ALBUTEROL SULFATE 2.5; .5 MG/3ML; MG/3ML
3 SOLUTION RESPIRATORY (INHALATION) ONCE
Status: COMPLETED | OUTPATIENT
Start: 2023-05-27 | End: 2023-05-27

## 2023-05-27 RX ADMIN — IPRATROPIUM BROMIDE AND ALBUTEROL SULFATE 3 ML: .5; 3 SOLUTION RESPIRATORY (INHALATION) at 18:09

## 2023-05-27 RX ADMIN — IPRATROPIUM BROMIDE AND ALBUTEROL SULFATE 3 ML: .5; 3 SOLUTION RESPIRATORY (INHALATION) at 18:34

## 2023-05-27 ASSESSMENT — ACTIVITIES OF DAILY LIVING (ADL): ADLS_ACUITY_SCORE: 33

## 2023-05-27 NOTE — ED PROVIDER NOTES
"    History     Chief Complaint:  Shortness of Breath       HPI   Dionne Perez is a 38 year old female who presents with shortness of breath. The patient has had shortness of breath and chest pain for the past several days. She denies sore throat. She states that previous interventions in the ED have not been helpful.     EMERGENCY CARE PLAN  Acute care plan for the following conditions: ED recidivism and concern for malingering behavior     Brief History of Condition (1-2 sentences): Patient is a  resident with extensive BH history, asthma, with numerous recent ED visits. These include:  1. Asthma: Visits for nebulized therapies with report of having run out of inhalers/steroids and/or not filling prescriptions. Exam on these evaluations has at times raised concern for volitional upper airway sounds vs. true bronchospasm.   2. Behavioral concerns: Frequently evaluated for reported SI/HI and hitting self, typically safe to discharge home with safety planning already in place. DEC consultation at times needed.  3. Long-standing ED recidivism: The patient has had 25+ visit to the ED in the first 5 months of 2023, typically for dyspnea or BH concerns, and without any admissions. At times, corroborating information collected from DEC would suggest the patient will \"call EMS daily due to multiple complaints and uses the [facility] a place to go when she doesn't get her way and wants attention (per  staff).\" Previous care coordination in other health systems note that \"Fan has the mental age of around 7-9 years of age. She is impulsive and sucks her thumb. She likes to go to the ER by ambulance.\" Previous community paramedic efforts were initiated to attempt to reduce ED visits.     Authorized treatments in the Emergency Department (with specific medications and doses):   1. Asthma: Standard measures recommended for asthma exacerbations, be sure to consider volitional upper airway sounds.  2. BH concerns: Standard " evaluation recommended, although DEC evaluation may be unnecessary at times if presentation is consistent with acute stress reaction.  3. ED Recidivism: Given longstanding recidivism and concern for malingering, recommend avoid measures that would reinforce ED visits (ie, avoid iPad, coffee, ordering meals, etc.) that are unnecessary but may be part of secondary gain. Consult SW when present and discuss with  home early in ED evaluation.     Expected home rescue plan: home medications for asthma, safety planning in place     Initiated: 5/17/23       Review of External Notes:  Numerous recent ER visit over the past few months.     Medications:    albuterol  aspirin  cephalexin   docusate sodium   escitalopram  famotidine   ipratropium - albuterol   levothyroxine   nystatin   Olanzapine   pravastatin   trazodone     Past Medical History:    Anxiety  Asthma  Cholelithiasis  Depression  GERD  hypercholesterolemia  Hypothyroidism  Mild mental retardation  Obesity  Altered mental status  Acute costochondritis  Gallstone pancreatitis  Depression  Major neurocognitive disorder  Suicidal ideation     Past Surgical History:    Tonsillectomy  Lumbar puncture     Physical Exam     Patient Vitals for the past 24 hrs:   BP Temp Temp src Pulse Resp SpO2   05/27/23 2028 121/83 -- -- 86 18 99 %   05/27/23 1736 (!) 124/90 98.6  F (37  C) Temporal 84 (!) 32 97 %        Physical Exam  VS: Reviewed per above  HENT: Mucous membranes moist, intermittent upper airway noises of varying intensity in both inspiratory and expiratory phase.  Patient is tolerating secretions, normal phonation, no nuchal rigidity.  EYES: sclera anicteric  CV: Rate as noted, regular rhythm.   RESP: Effort normal. Breath sounds are seemingly normal bilaterally although assessment is limited due to upper airway noises  GI: no tenderness/rebound/guarding, not distended.  NEURO: Alert, moving all extremities  MSK: No deformity of the extremities  SKIN: Warm and  dry      Emergency Department Course   Emergency Department Course & Assessments:        Interventions:  Medications   ipratropium - albuterol 0.5 mg/2.5 mg/3 mL (DUONEB) neb solution 3 mL (3 mLs Nebulization $Given 5/27/23 1809)   ipratropium - albuterol 0.5 mg/2.5 mg/3 mL (DUONEB) neb solution 3 mL (3 mLs Nebulization $Given 5/27/23 1834)        Assessments:  1753 I obtained and examined patient.  1830 I rechecked the patient.  1945 I rechecked the patient.      Disposition:  The patient was discharged to home.     Impression & Plan    Medical Decision Making:  Patient presents to the ER for evaluation of shortness of breath.  Vital signs are reassuring.  Patient has been seen in the ER numerous times over the past few months for this symptom with extensive evaluation.  With symmetric lung sounds, low suspicion for pneumothorax.  Also history not supportive of underlying CHF.  Patient has previously been evaluated for PE with CT imaging and none was found.  As she is not hypoxic or tachycardic or complaining of DVT like symptoms, further PE evaluation not pursued as it was felt to be low likelihood.  No chest pain to suggest ACS.  After 2 breathing treatments, her subjective shortness of breath resolved.  She did have residual upper airway noises.  She tells me this has been there for some time but cannot quantify.  She has no throat tightness or sensation of shortness of breath in the neck region.  Per review of the emergency care plan, there is concern that she may have volitional upper airway sounds during her assessments.  I think it is prudent to have the patient follow-up with ENT for specialty evaluation of these upper airway noises.  I did offer a CT imaging of the neck, as it has been at least a few years since neck was imaged as far as I can tell from the ERM, in order to look for possible anatomical contribution to her upper airway noises.  Patient preferred to hold off at this juncture and follow-up  with the ENT clinic.  Given chronicity of these noises, I think this is reasonable.  Overall low suspicion for PTA, RPA, epiglottitis.  Patient was discharged back to her living facility.  Return precautions discussed prior to discharge.    Diagnosis:    ICD-10-CM    1. Shortness of breath  R06.02       2. Abnormal breath sounds  R06.89            Discharge Medications:  Discharge Medication List as of 5/27/2023  7:58 PM         Scribe Disclosure:  IVick Hired, am serving as a scribe at 5:58 PM on 5/27/2023 to document services personally performed by Gonzalo Oconnell MD based on my observations and the provider's statements to me.  5/27/2023   Gonzalo Oconnell MD Lindenbaum, Elan, MD  05/27/23 2106

## 2023-05-27 NOTE — ED TRIAGE NOTES
Pt arrives from group home via EMS for SOB that has been going on the past several days. Per patient, she was seen yesterday for the same thing- feels like it is worse today. ABCs intact.      Triage Assessment     Row Name 05/27/23 4785       Triage Assessment (Adult)    Airway WDL WDL       Respiratory WDL    Respiratory WDL X;rhythm/pattern    Rhythm/Pattern, Respiratory shortness of breath       Cognitive/Neuro/Behavioral WDL    Cognitive/Neuro/Behavioral WDL WDL

## 2023-05-28 ENCOUNTER — APPOINTMENT (OUTPATIENT)
Dept: GENERAL RADIOLOGY | Facility: CLINIC | Age: 38
End: 2023-05-28
Attending: EMERGENCY MEDICINE
Payer: MEDICARE

## 2023-05-28 ENCOUNTER — HOSPITAL ENCOUNTER (EMERGENCY)
Facility: CLINIC | Age: 38
Discharge: HOME | End: 2023-05-28
Attending: EMERGENCY MEDICINE | Admitting: EMERGENCY MEDICINE
Payer: MEDICARE

## 2023-05-28 VITALS
DIASTOLIC BLOOD PRESSURE: 57 MMHG | SYSTOLIC BLOOD PRESSURE: 116 MMHG | TEMPERATURE: 98.2 F | RESPIRATION RATE: 18 BRPM | OXYGEN SATURATION: 100 % | HEART RATE: 64 BPM

## 2023-05-28 DIAGNOSIS — J45.901 EXACERBATION OF ASTHMA, UNSPECIFIED ASTHMA SEVERITY, UNSPECIFIED WHETHER PERSISTENT: ICD-10-CM

## 2023-05-28 LAB
ANION GAP SERPL CALCULATED.3IONS-SCNC: 8 MMOL/L (ref 7–15)
BASOPHILS # BLD AUTO: 0 10E3/UL (ref 0–0.2)
BASOPHILS NFR BLD AUTO: 0 %
BUN SERPL-MCNC: 21.2 MG/DL (ref 6–20)
CALCIUM SERPL-MCNC: 9.1 MG/DL (ref 8.6–10)
CHLORIDE SERPL-SCNC: 100 MMOL/L (ref 98–107)
CREAT SERPL-MCNC: 0.96 MG/DL (ref 0.51–0.95)
DEPRECATED HCO3 PLAS-SCNC: 31 MMOL/L (ref 22–29)
EOSINOPHIL # BLD AUTO: 0.1 10E3/UL (ref 0–0.7)
EOSINOPHIL NFR BLD AUTO: 1 %
ERYTHROCYTE [DISTWIDTH] IN BLOOD BY AUTOMATED COUNT: 14 % (ref 10–15)
FLUAV RNA SPEC QL NAA+PROBE: NEGATIVE
FLUBV RNA RESP QL NAA+PROBE: NEGATIVE
GFR SERPL CREATININE-BSD FRML MDRD: 78 ML/MIN/1.73M2
GLUCOSE SERPL-MCNC: 99 MG/DL (ref 70–99)
HCG SERPL QL: NEGATIVE
HCT VFR BLD AUTO: 42.3 % (ref 35–47)
HGB BLD-MCNC: 12.9 G/DL (ref 11.7–15.7)
IMM GRANULOCYTES # BLD: 0.1 10E3/UL
IMM GRANULOCYTES NFR BLD: 1 %
LYMPHOCYTES # BLD AUTO: 1.4 10E3/UL (ref 0.8–5.3)
LYMPHOCYTES NFR BLD AUTO: 18 %
MCH RBC QN AUTO: 27.8 PG (ref 26.5–33)
MCHC RBC AUTO-ENTMCNC: 30.5 G/DL (ref 31.5–36.5)
MCV RBC AUTO: 91 FL (ref 78–100)
MONOCYTES # BLD AUTO: 0.5 10E3/UL (ref 0–1.3)
MONOCYTES NFR BLD AUTO: 6 %
NEUTROPHILS # BLD AUTO: 5.9 10E3/UL (ref 1.6–8.3)
NEUTROPHILS NFR BLD AUTO: 74 %
NRBC # BLD AUTO: 0 10E3/UL
NRBC BLD AUTO-RTO: 0 /100
PLATELET # BLD AUTO: 138 10E3/UL (ref 150–450)
POTASSIUM SERPL-SCNC: 4.1 MMOL/L (ref 3.4–5.3)
RBC # BLD AUTO: 4.64 10E6/UL (ref 3.8–5.2)
RSV RNA SPEC NAA+PROBE: NEGATIVE
SARS-COV-2 RNA RESP QL NAA+PROBE: NEGATIVE
SODIUM SERPL-SCNC: 139 MMOL/L (ref 136–145)
TROPONIN T SERPL HS-MCNC: 7 NG/L
WBC # BLD AUTO: 8 10E3/UL (ref 4–11)

## 2023-05-28 PROCEDURE — 250N000009 HC RX 250: Performed by: EMERGENCY MEDICINE

## 2023-05-28 PROCEDURE — 250N000011 HC RX IP 250 OP 636: Performed by: EMERGENCY MEDICINE

## 2023-05-28 PROCEDURE — 93005 ELECTROCARDIOGRAM TRACING: CPT

## 2023-05-28 PROCEDURE — 84703 CHORIONIC GONADOTROPIN ASSAY: CPT | Performed by: EMERGENCY MEDICINE

## 2023-05-28 PROCEDURE — 36415 COLL VENOUS BLD VENIPUNCTURE: CPT | Performed by: EMERGENCY MEDICINE

## 2023-05-28 PROCEDURE — 84484 ASSAY OF TROPONIN QUANT: CPT | Performed by: EMERGENCY MEDICINE

## 2023-05-28 PROCEDURE — 94640 AIRWAY INHALATION TREATMENT: CPT

## 2023-05-28 PROCEDURE — 80048 BASIC METABOLIC PNL TOTAL CA: CPT | Performed by: EMERGENCY MEDICINE

## 2023-05-28 PROCEDURE — 85025 COMPLETE CBC W/AUTO DIFF WBC: CPT | Performed by: EMERGENCY MEDICINE

## 2023-05-28 PROCEDURE — 87637 SARSCOV2&INF A&B&RSV AMP PRB: CPT | Performed by: EMERGENCY MEDICINE

## 2023-05-28 PROCEDURE — 99285 EMERGENCY DEPT VISIT HI MDM: CPT | Mod: 25

## 2023-05-28 PROCEDURE — 93005 ELECTROCARDIOGRAM TRACING: CPT | Mod: 76

## 2023-05-28 PROCEDURE — 71046 X-RAY EXAM CHEST 2 VIEWS: CPT

## 2023-05-28 PROCEDURE — 96374 THER/PROPH/DIAG INJ IV PUSH: CPT

## 2023-05-28 PROCEDURE — C9803 HOPD COVID-19 SPEC COLLECT: HCPCS

## 2023-05-28 RX ORDER — ALBUTEROL SULFATE 0.83 MG/ML
2.5 SOLUTION RESPIRATORY (INHALATION)
Status: COMPLETED | OUTPATIENT
Start: 2023-05-28 | End: 2023-05-28

## 2023-05-28 RX ORDER — METHYLPREDNISOLONE SODIUM SUCCINATE 125 MG/2ML
125 INJECTION, POWDER, LYOPHILIZED, FOR SOLUTION INTRAMUSCULAR; INTRAVENOUS ONCE
Status: COMPLETED | OUTPATIENT
Start: 2023-05-28 | End: 2023-05-28

## 2023-05-28 RX ORDER — PREDNISONE 20 MG/1
40 TABLET ORAL DAILY
Qty: 8 TABLET | Refills: 0 | Status: SHIPPED | OUTPATIENT
Start: 2023-05-28 | End: 2023-06-01

## 2023-05-28 RX ADMIN — ALBUTEROL SULFATE 2.5 MG: 2.5 SOLUTION RESPIRATORY (INHALATION) at 07:37

## 2023-05-28 RX ADMIN — ALBUTEROL SULFATE 2.5 MG: 2.5 SOLUTION RESPIRATORY (INHALATION) at 07:39

## 2023-05-28 RX ADMIN — METHYLPREDNISOLONE SODIUM SUCCINATE 125 MG: 125 INJECTION INTRAMUSCULAR; INTRAVENOUS at 07:36

## 2023-05-28 RX ADMIN — ALBUTEROL SULFATE 2.5 MG: 2.5 SOLUTION RESPIRATORY (INHALATION) at 07:38

## 2023-05-28 ASSESSMENT — ACTIVITIES OF DAILY LIVING (ADL)
ADLS_ACUITY_SCORE: 35

## 2023-05-28 NOTE — ED TRIAGE NOTES
Patient comes to ED via EMS or evaluation of shortness of breath and cough, patient has asthma, reports symptoms getting worse over the last week. Endorses increased wheezing, chest discomfort and dry cough, home inhalers and nebs not helping. EMS gave Neb x 1 enroute with some improvement of symptoms. Alert and oriented to baseline on arrival.     Triage Assessment     Row Name 05/28/23 0617       Triage Assessment (Adult)    Airway WDL WDL       Respiratory WDL    Respiratory WDL X;rhythm/pattern;cough    Rhythm/Pattern, Respiratory shortness of breath;tachypneic;shallow    Cough Frequency infrequent    Cough Type dry       Skin Circulation/Temperature WDL    Skin Circulation/Temperature WDL WDL       Cardiac WDL    Cardiac WDL WDL       Peripheral/Neurovascular WDL    Peripheral Neurovascular WDL WDL       Cognitive/Neuro/Behavioral WDL    Cognitive/Neuro/Behavioral WDL WDL

## 2023-05-28 NOTE — ED PROVIDER NOTES
"  History     Chief Complaint:  Shortness of breath    HPI   Dionne Perez is a 38 year old female with history of asthma, mild mental retardation presenting with shortness of breath.  Patient was seen in the ED yesterday for similar complaints.  She was given breathing treatments during her time in the ED and reported symptom improvement and was discharged back to her group home.  She presents stating having increased shortness of breath this morning.  She was given a nebulizer treatment by EMS and did report some symptom improvement.  She reports slight nonproductive cough though denies any fever, chills, sore throat, significant chest pain, nausea, vomiting, abdominal pain or other symptoms.  No history of blood clots.  No sick contacts.      Independent Historian:   None - Patient Only    Review of External Notes: ED note 5/27/23     EMERGENCY CARE PLAN  Acute care plan for the following conditions: ED recidivism and concern for malingering behavior     Brief History of Condition (1-2 sentences): Patient is a  resident with extensive BH history, asthma, with numerous recent ED visits. These include:  1. Asthma: Visits for nebulized therapies with report of having run out of inhalers/steroids and/or not filling prescriptions. Exam on these evaluations has at times raised concern for volitional upper airway sounds vs. true bronchospasm.   2. Behavioral concerns: Frequently evaluated for reported SI/HI and hitting self, typically safe to discharge home with safety planning already in place. DEC consultation at times needed.  3. Long-standing ED recidivism: The patient has had 25+ visit to the ED in the first 5 months of 2023, typically for dyspnea or BH concerns, and without any admissions. At times, corroborating information collected from DEC would suggest the patient will \"call EMS daily due to multiple complaints and uses the [facility] a place to go when she doesn't get her way and wants attention (per  " "staff).\" Previous care coordination in other health systems note that \"Fan has the mental age of around 7-9 years of age. She is impulsive and sucks her thumb. She likes to go to the ER by ambulance.\" Previous community paramedic efforts were initiated to attempt to reduce ED visits.     Authorized treatments in the Emergency Department (with specific medications and doses):   1. Asthma: Standard measures recommended for asthma exacerbations, be sure to consider volitional upper airway sounds.  2. BH concerns: Standard evaluation recommended, although DEC evaluation may be unnecessary at times if presentation is consistent with acute stress reaction.  3. ED Recidivism: Given longstanding recidivism and concern for malingering, recommend avoid measures that would reinforce ED visits (ie, avoid iPad, coffee, ordering meals, etc.) that are unnecessary but may be part of secondary gain. Consult SW when present and discuss with  home early in ED evaluation.     Expected home rescue plan: home medications for asthma, safety planning in place     Initiated: 5/17/23       Medications:    acetaminophen (TYLENOL) 325 MG tablet  albuterol (PROAIR HFA/PROVENTIL HFA/VENTOLIN HFA) 108 (90 Base) MCG/ACT inhaler  albuterol (PROAIR HFA/PROVENTIL HFA/VENTOLIN HFA) 108 (90 Base) MCG/ACT inhaler  albuterol (PROAIR HFA/PROVENTIL HFA/VENTOLIN HFA) 108 (90 Base) MCG/ACT inhaler  aspirin (ASA) 325 MG EC tablet  cephALEXin (KEFLEX) 500 MG capsule  docusate sodium (COLACE) 100 MG capsule  escitalopram (LEXAPRO) 10 MG tablet  famotidine (PEPCID) 20 MG tablet  ipratropium - albuterol 0.5 mg/2.5 mg/3 mL (DUONEB) 0.5-2.5 (3) MG/3ML neb solution  levothyroxine (SYNTHROID/LEVOTHROID) 50 MCG tablet  Multiple Vitamins-Minerals (EMERGEN-C IMMUNE PLUS/VIT D) CHEW  nystatin (MYCOSTATIN) 656490 UNIT/GM external cream  OLANZapine zydis (ZYPREXA) 10 MG ODT  Pediatric Multivit-Minerals-C (CHEWABLES MULTIVITAMIN PO)  pravastatin (PRAVACHOL) 40 MG " tablet  Probiotic Product (RISAQUAD-2) CAPS  traZODone (DESYREL) 50 MG tablet        Past Medical History:    Past Medical History:   Diagnosis Date     Anxiety      Asthma      Cholelithiasis      Depressive disorder      Gastroesophageal reflux disease      Hypercholesterolemia      Hypothyroidism      Mild mental retardation      Obesity        Past Surgical History:    Past Surgical History:   Procedure Laterality Date     IR LUMBAR PUNCTURE  06/09/2020     TONSILLECTOMY          Physical Exam     Patient Vitals for the past 24 hrs:   BP Temp Temp src Pulse Resp SpO2   05/28/23 0700 120/51 -- -- 62 -- 100 %   05/28/23 0630 118/57 -- -- 61 -- 98 %   05/28/23 0614 136/83 98.2  F (36.8  C) Oral 68 26 96 %        Physical Exam  Nursing note and vitals reviewed.  Constitutional: Well nourished.   Eyes: Conjunctiva normal.  Pupils are equal, round, and reactive to light.   ENT: Nose normal. Mucous membranes pink and moist.  TM normal. No posterior oropharyngeal erythema/exudate. Uvula midline.   Neck: Normal range of motion.  CVS: Normal rate, regular rhythm.  Normal heart sounds.  No murmur.  Pulmonary: Lungs with expiratory wheezing bilaterally  GI: Morbidly obese. Abdomen soft. Nontender, nondistended. No rigidity or guarding.    MSK: No calf tenderness or swelling.  Neuro: Alert. Follows simple commands.  Skin: Skin is warm and dry. No rash noted.   Psychiatric: Flat affect      Emergency Department Course   ECG (07:37:55):  Rate 56 bpm. MN interval 146. QRS duration 104. QT/QTc 432/416. P-R-T axes 56 25 8. Sinus bradycardia, no STEMI Interpreted at 0738 by Jennifer Urban DO.    ECG (08:28:55):  Rate 57 bpm. MN interval 142. QRS duration 108. QT/QTc 444/432. P-R-T axes 57 34 13. Sinus bradycardia, no STEMI. Interpreted at 0828 by Jennifer Urban DO.    Imaging:  XR Chest 2 Views   Final Result   IMPRESSION: No acute cardiopulmonary abnormality. Unchanged enlarged cardiac silhouette.         Report per  radiology    Laboratory:  Labs Ordered and Resulted from Time of ED Arrival to Time of ED Departure   BASIC METABOLIC PANEL - Abnormal       Result Value    Sodium 139      Potassium 4.1      Chloride 100      Carbon Dioxide (CO2) 31 (*)     Anion Gap 8      Urea Nitrogen 21.2 (*)     Creatinine 0.96 (*)     Calcium 9.1      Glucose 99      GFR Estimate 78     CBC WITH PLATELETS AND DIFFERENTIAL - Abnormal    WBC Count 8.0      RBC Count 4.64      Hemoglobin 12.9      Hematocrit 42.3      MCV 91      MCH 27.8      MCHC 30.5 (*)     RDW 14.0      Platelet Count 138 (*)     % Neutrophils 74      % Lymphocytes 18      % Monocytes 6      % Eosinophils 1      % Basophils 0      % Immature Granulocytes 1      NRBCs per 100 WBC 0      Absolute Neutrophils 5.9      Absolute Lymphocytes 1.4      Absolute Monocytes 0.5      Absolute Eosinophils 0.1      Absolute Basophils 0.0      Absolute Immature Granulocytes 0.1      Absolute NRBCs 0.0     INFLUENZA A/B, RSV, & SARS-COV2 PCR - Normal    Influenza A PCR Negative      Influenza B PCR Negative      RSV PCR Negative      SARS CoV2 PCR Negative     HCG QUALITATIVE PREGNANCY - Normal    hCG Serum Qualitative Negative     TROPONIN T, HIGH SENSITIVITY - Normal    Troponin T, High Sensitivity 7          Emergency Department Course & Assessments:             Interventions:  Medications   albuterol (PROVENTIL) neb solution 2.5 mg (2.5 mg Nebulization $Given 5/28/23 0739)   methylPREDNISolone sodium succinate (solu-MEDROL) injection 125 mg (125 mg Intravenous $Given 5/28/23 0736)      Independent Interpretation (X-rays, CTs, rhythm strip):  CXR without focal pneumonia    Consultations/Discussion of Management or Tests:  None        Social Determinants of Health affecting care:   Healthcare Access/Compliance    Disposition:  The patient was discharged to group home.     Impression & Plan      Medical Decision Making:  Patient is a 38-year-old female presenting with predominately  complaints of shortness of breath.  She has audible wheezing on exam though is nontoxic, clinically well-hydrated.  EKG without STEMI.  High-sensitivity screening troponin negative.  She denies any active chest pain and I doubt ACS.  PERC negative, clinically doubt PE.  Chest x-ray without focal pneumonia, fluid overload, widened mediastinum or pneumothorax.  Labs without profound anemia or significant electrolyte derangement.  She tested negative for COVID-19/influenza/RSV.  She was given breathing treatments in addition to IV steroids and reported significant symptom improvement.  Review of records shows a history of possible concern for volitional upper airway sounds versus true bronchospasm based on her emergency care plan.  She did seem to improve however with treatment of her underlying asthma so I will continue recommendations for albuterol as well as initiate prednisone burst on discharge.  No clinical evidence to suggest pharyngitis, peritonsillar abscess, retropharyngeal abscess or epiglottitis.  She is agreeable to plan of care with close PCP follow-up.  Return precautions given.    Diagnosis:    ICD-10-CM    1. Exacerbation of asthma, unspecified asthma severity, unspecified whether persistent  J45.901            Discharge Medications:  New Prescriptions    PREDNISONE (DELTASONE) 20 MG TABLET    Take 2 tablets (40 mg) by mouth daily for 4 days        5/28/2023   Jennifer Urban, Jennifer Aguiar,   05/28/23 0957

## 2023-05-28 NOTE — ED NOTES
Pt called police in lobby.  was called by charge to set up ride home due to no answer or call back from the facility. Manager setting up ride home. Education given to pt by charge.

## 2023-05-28 NOTE — ED NOTES
Karina  updated on pts discharge status and follow up appointment. Manager states understanding and excepts patient back. States facility should be able to  pt, phone number [phone]. Message left with facility in attempts to get pt ride home.

## 2023-05-28 NOTE — ED NOTES
Pt given education and paperwork, pt brought out to the lobby per charge to wait for ride home. Pt given facility phone number and educated that she is able to use the phone in attempt to get a ride home. Facility was called again in attempts to get a ride home, message left.

## 2023-05-28 NOTE — DISCHARGE INSTRUCTIONS
You came to the ER with a flare of shortness of breath, for which you been seen in the ER frequently over the past few months.  You felt better after breathing treatments.  We discussed that your breathing is noisy.  You describe that this has been going on for some time.  I could not find any recent imaging of your neck region to look for possible narrowing of the upper airway to explain your abnormal breathing sounds.  We discussed ENT follow-up as outpatient to evaluate this further.  You preferred to hold off on CT of the neck today.  As your abnormal breaths sounds have been stable for some time and you do not have any throat tightness or ongoing shortness of breath, I think this approach is reasonable.

## 2023-05-30 LAB
ATRIAL RATE - MUSE: 56 BPM
ATRIAL RATE - MUSE: 57 BPM
DIASTOLIC BLOOD PRESSURE - MUSE: NORMAL MMHG
DIASTOLIC BLOOD PRESSURE - MUSE: NORMAL MMHG
INTERPRETATION ECG - MUSE: NORMAL
INTERPRETATION ECG - MUSE: NORMAL
P AXIS - MUSE: 56 DEGREES
P AXIS - MUSE: 57 DEGREES
PR INTERVAL - MUSE: 142 MS
PR INTERVAL - MUSE: 146 MS
QRS DURATION - MUSE: 104 MS
QRS DURATION - MUSE: 108 MS
QT - MUSE: 432 MS
QT - MUSE: 444 MS
QTC - MUSE: 416 MS
QTC - MUSE: 432 MS
R AXIS - MUSE: 25 DEGREES
R AXIS - MUSE: 34 DEGREES
SYSTOLIC BLOOD PRESSURE - MUSE: NORMAL MMHG
SYSTOLIC BLOOD PRESSURE - MUSE: NORMAL MMHG
T AXIS - MUSE: 13 DEGREES
T AXIS - MUSE: 8 DEGREES
VENTRICULAR RATE- MUSE: 56 BPM
VENTRICULAR RATE- MUSE: 57 BPM

## 2023-06-01 ENCOUNTER — TRANSCRIBE ORDERS (OUTPATIENT)
Dept: OTHER | Age: 38
End: 2023-06-01

## 2023-06-01 DIAGNOSIS — R06.02 SOB (SHORTNESS OF BREATH): Primary | ICD-10-CM

## 2023-06-01 DIAGNOSIS — J38.6 SUBGLOTTIC STENOSIS: ICD-10-CM

## 2023-06-04 ENCOUNTER — HOSPITAL ENCOUNTER (EMERGENCY)
Facility: CLINIC | Age: 38
Discharge: OTHER INSTITUTION | DRG: 144 | End: 2023-06-04
Attending: EMERGENCY MEDICINE | Admitting: EMERGENCY MEDICINE
Payer: MEDICARE

## 2023-06-04 VITALS
OXYGEN SATURATION: 97 % | HEART RATE: 61 BPM | RESPIRATION RATE: 22 BRPM | SYSTOLIC BLOOD PRESSURE: 135 MMHG | DIASTOLIC BLOOD PRESSURE: 59 MMHG | TEMPERATURE: 98 F

## 2023-06-04 DIAGNOSIS — J38.6 SUBGLOTTIC STENOSIS: ICD-10-CM

## 2023-06-04 LAB
ANION GAP SERPL CALCULATED.3IONS-SCNC: 9 MMOL/L (ref 7–15)
BASOPHILS # BLD AUTO: 0 10E3/UL (ref 0–0.2)
BASOPHILS NFR BLD AUTO: 0 %
BUN SERPL-MCNC: 22.2 MG/DL (ref 6–20)
CALCIUM SERPL-MCNC: 9.4 MG/DL (ref 8.6–10)
CHLORIDE SERPL-SCNC: 100 MMOL/L (ref 98–107)
CREAT SERPL-MCNC: 0.81 MG/DL (ref 0.51–0.95)
DEPRECATED HCO3 PLAS-SCNC: 27 MMOL/L (ref 22–29)
EOSINOPHIL # BLD AUTO: 0.1 10E3/UL (ref 0–0.7)
EOSINOPHIL NFR BLD AUTO: 1 %
ERYTHROCYTE [DISTWIDTH] IN BLOOD BY AUTOMATED COUNT: 14 % (ref 10–15)
GFR SERPL CREATININE-BSD FRML MDRD: >90 ML/MIN/1.73M2
GLUCOSE SERPL-MCNC: 98 MG/DL (ref 70–99)
HCT VFR BLD AUTO: 44.1 % (ref 35–47)
HGB BLD-MCNC: 13.8 G/DL (ref 11.7–15.7)
IMM GRANULOCYTES # BLD: 0.2 10E3/UL
IMM GRANULOCYTES NFR BLD: 2 %
LYMPHOCYTES # BLD AUTO: 2.2 10E3/UL (ref 0.8–5.3)
LYMPHOCYTES NFR BLD AUTO: 20 %
MCH RBC QN AUTO: 27.6 PG (ref 26.5–33)
MCHC RBC AUTO-ENTMCNC: 31.3 G/DL (ref 31.5–36.5)
MCV RBC AUTO: 88 FL (ref 78–100)
MONOCYTES # BLD AUTO: 0.6 10E3/UL (ref 0–1.3)
MONOCYTES NFR BLD AUTO: 5 %
NEUTROPHILS # BLD AUTO: 7.9 10E3/UL (ref 1.6–8.3)
NEUTROPHILS NFR BLD AUTO: 72 %
NRBC # BLD AUTO: 0 10E3/UL
NRBC BLD AUTO-RTO: 0 /100
PLATELET # BLD AUTO: 181 10E3/UL (ref 150–450)
POTASSIUM SERPL-SCNC: 4.9 MMOL/L (ref 3.4–5.3)
RBC # BLD AUTO: 5 10E6/UL (ref 3.8–5.2)
SODIUM SERPL-SCNC: 136 MMOL/L (ref 136–145)
WBC # BLD AUTO: 11.1 10E3/UL (ref 4–11)

## 2023-06-04 PROCEDURE — 94640 AIRWAY INHALATION TREATMENT: CPT

## 2023-06-04 PROCEDURE — 250N000013 HC RX MED GY IP 250 OP 250 PS 637: Performed by: EMERGENCY MEDICINE

## 2023-06-04 PROCEDURE — 99284 EMERGENCY DEPT VISIT MOD MDM: CPT | Mod: 25

## 2023-06-04 PROCEDURE — 250N000011 HC RX IP 250 OP 636: Performed by: EMERGENCY MEDICINE

## 2023-06-04 PROCEDURE — 80048 BASIC METABOLIC PNL TOTAL CA: CPT | Performed by: EMERGENCY MEDICINE

## 2023-06-04 PROCEDURE — 96374 THER/PROPH/DIAG INJ IV PUSH: CPT

## 2023-06-04 PROCEDURE — 85025 COMPLETE CBC W/AUTO DIFF WBC: CPT | Performed by: EMERGENCY MEDICINE

## 2023-06-04 PROCEDURE — 36415 COLL VENOUS BLD VENIPUNCTURE: CPT | Performed by: EMERGENCY MEDICINE

## 2023-06-04 RX ORDER — DEXAMETHASONE 4 MG/1
4 TABLET ORAL EVERY 12 HOURS
Status: DISCONTINUED | OUTPATIENT
Start: 2023-06-05 | End: 2023-06-05 | Stop reason: HOSPADM

## 2023-06-04 RX ORDER — DEXAMETHASONE SODIUM PHOSPHATE 4 MG/ML
4 INJECTION, SOLUTION INTRA-ARTICULAR; INTRALESIONAL; INTRAMUSCULAR; INTRAVENOUS; SOFT TISSUE ONCE
Status: COMPLETED | OUTPATIENT
Start: 2023-06-04 | End: 2023-06-04

## 2023-06-04 RX ADMIN — DEXAMETHASONE SODIUM PHOSPHATE 4 MG: 4 INJECTION, SOLUTION INTRAMUSCULAR; INTRAVENOUS at 15:24

## 2023-06-04 RX ADMIN — RACEPINEPHRINE HYDROCHLORIDE 0.5 ML: 11.25 SOLUTION RESPIRATORY (INHALATION) at 14:00

## 2023-06-04 ASSESSMENT — ACTIVITIES OF DAILY LIVING (ADL)
ADLS_ACUITY_SCORE: 35

## 2023-06-04 NOTE — PROGRESS NOTES
Brief interventional pulmonology note    Chart reviewed, bronchoscopy images and CTs reviewed, case discussed with emergency department MD and [facility] MD.    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  On retrospective review of CT images this has been present in the past.    She is breathing comfortably at rest.    I recommend starting Decadron 4 mg twice daily.    Although this is not emergent as she has been evaluated many times in the last month and is clearly not doing well as an outpatient.  I recommend she be transferred to the [facility] for bronchoscopy by the interventional pulmonology service.  We will also consult with thoracic surgery and ENT as needed.    I updated the transfer center with this information.  I have asked that the [facility] emergency department follow their normal protocols for transfer to the [facility].    IP team will keep an eye in her chart.  Please notify us when we have a clear transfer time.    Thank you    Torrey Cerna MD

## 2023-06-04 NOTE — ED PROVIDER NOTES
History     Chief Complaint:  Shortness of Breath and Dizziness     HPI   Dionne Perez is a 38 year old female with a history of asthma and major neurocognitive disorder who presents with shortness of breath. She explains that she is having shortness of breath consistent with her history of asthma. She claims to be unable to breath when she goes to the bathroom. She also endorses unproductive cough. Denies chest pain, fever, or chills. She has an ENT appointment scheduled for 6/22 to address her persisting symptoms.     Independent Historian:   None - Patient Only    Review of External Notes:   I reviewed patients existing care plan.  I also reviewed a pulmonology note from May 31st 2023 where she was evaluated for shortness of breath.     Bronchoscopy Flexible 5/31/2023  Impression:              - Stridor   - Partially obstructing airway abnormality in the subglottic area.   - No specimens collected.     Medications:    Tylenol  Albuterol  Aspirin 325mg  Keflex  Colace  Lexapro  Pepcid  Duoneb  Synthroid  Mycostatin  Zyprexa  Pravachol  Risaquad  Desyrel    Past Medical History:    Anxiety  Asthma  Cholelithiasis  Depressive disorder  Hypercholesterolemia  Hypothyroidism  Mild mental retardation  Morbid obesity  Agitation and aggression  Costochondritis   Chronic abdominal pain  Hypertension  Gallstone pancreatitis  Hyperlipidemia  Insomnia  Suicidal ideations    Past Surgical History:    Lumbar puncture  Tonsillectomy  Bronchoscopy     Physical Exam     Patient Vitals for the past 24 hrs:   BP Temp Temp src Pulse Resp SpO2   06/04/23 1528 -- 98  F (36.7  C) Oral -- -- --   06/04/23 1515 114/74 -- -- -- 22 98 %   06/04/23 1445 -- -- -- 64 -- 95 %   06/04/23 1415 123/71 -- -- -- 22 99 %        Physical Exam  General:              Well-nourished              Speaking in full sentences   Inspiratory stridor noted   Resting comfortably on gurney  Eyes:              Conjunctiva without injection or scleral  icterus  ENT:              Moist mucous membranes              Nares patent              Pinnae normal  Neck:              Full ROM              No stiffness appreciated   Faint inspiratory stridor noted  Resp:              Lungs CTAB              No crackles, wheezing or audible rubs              Good air movement  CV:                    Normal rate, regular rhythm              S1 and S2 present              No murmur, gallop or rub  GI:              BS present              Abdomen soft without distention              Non-tender to light and deep palpation              No guarding or rebound tenderness  Skin:              Warm, dry, well perfused              No rashes or open wounds on exposed skin  MSK:              Moves all extremities              No focal deformities or swelling  Neuro:              Alert              Answers questions appropriately              Moves all extremities equally              Gait stable  Psych:              Normal affect, normal mood    Emergency Department Course     Laboratory:  Labs Ordered and Resulted from Time of ED Arrival to Time of ED Departure   BASIC METABOLIC PANEL - Abnormal       Result Value    Sodium 136      Potassium 4.9      Chloride 100      Carbon Dioxide (CO2) 27      Anion Gap 9      Urea Nitrogen 22.2 (*)     Creatinine 0.81      Calcium 9.4      Glucose 98      GFR Estimate >90     CBC WITH PLATELETS AND DIFFERENTIAL - Abnormal    WBC Count 11.1 (*)     RBC Count 5.00      Hemoglobin 13.8      Hematocrit 44.1      MCV 88      MCH 27.6      MCHC 31.3 (*)     RDW 14.0      Platelet Count 181      % Neutrophils 72      % Lymphocytes 20      % Monocytes 5      % Eosinophils 1      % Basophils 0      % Immature Granulocytes 2      NRBCs per 100 WBC 0      Absolute Neutrophils 7.9      Absolute Lymphocytes 2.2      Absolute Monocytes 0.6      Absolute Eosinophils 0.1      Absolute Basophils 0.0      Absolute Immature Granulocytes 0.2      Absolute NRBCs 0.0           Procedures   None    Emergency Department Course & Assessments:    Interventions:  Medications   dexamethasone (DECADRON) tablet 4 mg (has no administration in time range)   racEPINEPHrine neb solution 0.5 mL (0.5 mLs Nebulization $Given 6/4/23 1400)   dexamethasone (DECADRON) injection 4 mg (4 mg Intravenous $Given 6/4/23 1524)      Assessments:  1350 I obtained history and examined the patient as noted above.  1451 I rechecked the patient and explained findings.    Independent Interpretation (X-rays, CTs, rhythm strip):  None    Consultations/Discussion of Management or Tests:  1416 I spoke with Dr. Nico Mayorga of [facility] regarding the patient.   1445 I spoke with Dr. Cerna of Pulmonology regarding the patient.    Social Determinants of Health affecting care:   None    Disposition:  Will plan transfer to [facility] for interventional pulmonology evaluation    Impression & Plan      Medical Decision Making:  Dionne Perez is a 38-year-old female well-known to this emergency department who presents to the emergency department for evaluation of shortness of breath and dizziness.  VS on presentation reveal no acute abnormalities.  Previous records reviewed including multiple ED visits earlier this month for evaluation of similar symptoms.  She has undergone interventional evaluation with pulmonology including bronchoscopy on 5/31/2023 demonstrating subglottic stenosis.  I suspect this certainly may be contributing to patient's abnormal breath sounds.  I reviewed the procedure with pulmonology from [facility], who noted this was fairly significant stenosis.  We also subsequently discussed the case with pulmonology through the [facility] who recommended transfer to the [facility] for interventional pulmonology evaluation.  Patient at this time is hemodynamically stable and without evidence of respiratory distress.  She was provided a racemic epi neb, with mild  improvement in symptoms.  Order has been placed for oral Decadron 4 mg every 12 hours should patient remain in the ED while awaiting transfer to [facility].  Should patient require prolonged boarding, patient will require pharmacy medication reconciliation to ensure home medications are continued while awaiting transfer.  Patient updated and in agreement with outlined plan of care.  Patient will be signed out to my partner, Dr. Huerta, of the evening shift, who will follow-up on disposition.    Diagnosis:    ICD-10-CM    1. 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        Lavon Marin MD  06/04/23 9751

## 2023-06-04 NOTE — ED TRIAGE NOTES
Patient presents to the ED via ambulance with shortness of breath. States patient attempted to use inhaler at home with no relief. Paramedics note that they watched patient use her inhaler and does not use it properly.

## 2023-06-04 NOTE — ED NOTES
I received signout on this patient from my partner, Dr. Marin.  Please see his H&P for full specifics.  Briefly, the patient was found to have subglottic stenosis and is recommended to be transferred to the [facility] for pulmonology evaluation.    1633: D/W Dr. Calvo (hospitalist at the [facility]). Will call us back with a bed.     Chente Huerta,   06/04/23 1630

## 2023-06-04 NOTE — PROGRESS NOTES
Transfer Type: [facility]  Transfer Triage Note    Date of call: 06/04/23  Time of call: 4:26 PM    Current Patient Location:  [facility]  Current Level of Care: ED    Vitals: Temp: 98  F (36.7  C) Temp src: Oral BP: 114/74 Pulse: 64   Resp: 22 SpO2: 98 %        at    Diagnosis: Subglottic stenosis  Is COVID-19 a concern? No  Reason for requested transfer: Procedure can be done here and not at referring hospital   Isolation Needs: None    Outside Records: Available  Additional records may be faxed to [phone].    Transfer accepted: Yes  Stability of Patient: Patient is vitally stable, with no critical labs, and will likely remain stable throughout the transfer process  Level of Care Needed: Med Surg  Telemetry Needed:  None  Expected Time of Arrival for Transfer: 0-8 hours  Arrival Location:  [facility]    Recommendations for Management and Stabilization: Not needed    Additional Comments: 38F with pmh of neurocognitive disorder, asthma, and recently identified distal subglottic stenosis who has presented to [facility] ED many times in the last month for difficulty breathing. She has been evaluated by pulmonology at many of these visits, but has continued to struggle at home. Today the ED discussed with Dr. Cerna of interventional pulm who recommended that the patient transfer to [facility] for IP evaluation and bronchoscopy (+/- thoracic surgery, ENT evals). Not currently requiring supplemental oxygen currently, started on decadron by the ED at Dr. Cerna's recommendation.    Mag Vaughan MD

## 2023-06-05 ENCOUNTER — ANESTHESIA EVENT (OUTPATIENT)
Dept: SURGERY | Facility: CLINIC | Age: 38
DRG: 144 | End: 2023-06-05
Payer: MEDICARE

## 2023-06-05 ENCOUNTER — ANESTHESIA (OUTPATIENT)
Dept: SURGERY | Facility: CLINIC | Age: 38
DRG: 144 | End: 2023-06-05
Payer: MEDICARE

## 2023-06-05 ENCOUNTER — HOSPITAL ENCOUNTER (INPATIENT)
Facility: CLINIC | Age: 38
LOS: 2 days | Discharge: NURSING, REHAB OR LONG-TERM CARE | DRG: 144 | End: 2023-06-07
Attending: STUDENT IN AN ORGANIZED HEALTH CARE EDUCATION/TRAINING PROGRAM | Admitting: STUDENT IN AN ORGANIZED HEALTH CARE EDUCATION/TRAINING PROGRAM
Payer: MEDICARE

## 2023-06-05 DIAGNOSIS — J38.6 SUBGLOTTIC STENOSIS: Primary | ICD-10-CM

## 2023-06-05 DIAGNOSIS — J38.6 SUBGLOTTIC STENOSIS: ICD-10-CM

## 2023-06-05 PROBLEM — M20.41 ACQUIRED HAMMER TOES OF BOTH FEET: Status: ACTIVE | Noted: 2021-10-22

## 2023-06-05 PROBLEM — L84 CALLUS: Status: ACTIVE | Noted: 2021-10-22

## 2023-06-05 PROBLEM — R55 PRE-SYNCOPE: Status: ACTIVE | Noted: 2018-05-04

## 2023-06-05 PROBLEM — R06.00 DYSPNEA: Status: ACTIVE | Noted: 2023-06-05

## 2023-06-05 PROBLEM — Z78.9 HEALTH CARE HOME, ACTIVE CARE COORDINATION: Status: ACTIVE | Noted: 2018-10-30

## 2023-06-05 PROBLEM — M20.42 ACQUIRED HAMMER TOES OF BOTH FEET: Status: ACTIVE | Noted: 2021-10-22

## 2023-06-05 PROBLEM — Z91.199 NO-SHOW FOR APPOINTMENT: Status: ACTIVE | Noted: 2023-04-10

## 2023-06-05 PROBLEM — M17.12 PRIMARY OSTEOARTHRITIS OF LEFT KNEE: Status: ACTIVE | Noted: 2023-03-27

## 2023-06-05 PROBLEM — D50.9 ANEMIA, IRON DEFICIENCY: Status: ACTIVE | Noted: 2020-09-01

## 2023-06-05 PROBLEM — R23.8 SCALP IRRITATION: Status: ACTIVE | Noted: 2021-10-22

## 2023-06-05 LAB
ANION GAP SERPL CALCULATED.3IONS-SCNC: 10 MMOL/L (ref 7–15)
APTT PPP: 25 SECONDS (ref 22–38)
BASOPHILS # BLD AUTO: 0 10E3/UL (ref 0–0.2)
BASOPHILS NFR BLD AUTO: 0 %
BUN SERPL-MCNC: 18.1 MG/DL (ref 6–20)
CALCIUM SERPL-MCNC: 9.1 MG/DL (ref 8.6–10)
CHLORIDE SERPL-SCNC: 100 MMOL/L (ref 98–107)
CREAT SERPL-MCNC: 0.76 MG/DL (ref 0.51–0.95)
DEPRECATED HCO3 PLAS-SCNC: 27 MMOL/L (ref 22–29)
EOSINOPHIL # BLD AUTO: 0 10E3/UL (ref 0–0.7)
EOSINOPHIL NFR BLD AUTO: 0 %
ERYTHROCYTE [DISTWIDTH] IN BLOOD BY AUTOMATED COUNT: 13.5 % (ref 10–15)
GFR SERPL CREATININE-BSD FRML MDRD: >90 ML/MIN/1.73M2
GLUCOSE SERPL-MCNC: 119 MG/DL (ref 70–99)
HCT VFR BLD AUTO: 45.4 % (ref 35–47)
HGB BLD-MCNC: 13.8 G/DL (ref 11.7–15.7)
HOLD SPECIMEN: NORMAL
IMM GRANULOCYTES # BLD: 0.2 10E3/UL
IMM GRANULOCYTES NFR BLD: 1 %
INR PPP: 1.18 (ref 0.85–1.15)
LYMPHOCYTES # BLD AUTO: 0.8 10E3/UL (ref 0.8–5.3)
LYMPHOCYTES NFR BLD AUTO: 6 %
MCH RBC QN AUTO: 27 PG (ref 26.5–33)
MCHC RBC AUTO-ENTMCNC: 30.4 G/DL (ref 31.5–36.5)
MCV RBC AUTO: 89 FL (ref 78–100)
MONOCYTES # BLD AUTO: 0.2 10E3/UL (ref 0–1.3)
MONOCYTES NFR BLD AUTO: 2 %
NEUTROPHILS # BLD AUTO: 12 10E3/UL (ref 1.6–8.3)
NEUTROPHILS NFR BLD AUTO: 91 %
NRBC # BLD AUTO: 0 10E3/UL
NRBC BLD AUTO-RTO: 0 /100
PLATELET # BLD AUTO: 202 10E3/UL (ref 150–450)
POTASSIUM SERPL-SCNC: 5.2 MMOL/L (ref 3.4–5.3)
RBC # BLD AUTO: 5.11 10E6/UL (ref 3.8–5.2)
SODIUM SERPL-SCNC: 137 MMOL/L (ref 136–145)
WBC # BLD AUTO: 13.2 10E3/UL (ref 4–11)

## 2023-06-05 PROCEDURE — 36415 COLL VENOUS BLD VENIPUNCTURE: CPT

## 2023-06-05 PROCEDURE — 99222 1ST HOSP IP/OBS MODERATE 55: CPT | Mod: AI | Performed by: STUDENT IN AN ORGANIZED HEALTH CARE EDUCATION/TRAINING PROGRAM

## 2023-06-05 PROCEDURE — 85730 THROMBOPLASTIN TIME PARTIAL: CPT

## 2023-06-05 PROCEDURE — 85025 COMPLETE CBC W/AUTO DIFF WBC: CPT | Performed by: STUDENT IN AN ORGANIZED HEALTH CARE EDUCATION/TRAINING PROGRAM

## 2023-06-05 PROCEDURE — 120N000002 HC R&B MED SURG/OB UMMC

## 2023-06-05 PROCEDURE — 31575 DIAGNOSTIC LARYNGOSCOPY: CPT | Performed by: PHYSICIAN ASSISTANT

## 2023-06-05 PROCEDURE — 99221 1ST HOSP IP/OBS SF/LOW 40: CPT | Mod: 25 | Performed by: PHYSICIAN ASSISTANT

## 2023-06-05 PROCEDURE — 250N000012 HC RX MED GY IP 250 OP 636 PS 637

## 2023-06-05 PROCEDURE — 80048 BASIC METABOLIC PNL TOTAL CA: CPT | Performed by: STUDENT IN AN ORGANIZED HEALTH CARE EDUCATION/TRAINING PROGRAM

## 2023-06-05 PROCEDURE — 0CJY8ZZ INSPECTION OF MOUTH AND THROAT, VIA NATURAL OR ARTIFICIAL OPENING ENDOSCOPIC: ICD-10-PCS | Performed by: PHYSICIAN ASSISTANT

## 2023-06-05 PROCEDURE — 85610 PROTHROMBIN TIME: CPT

## 2023-06-05 RX ORDER — FLUTICASONE PROPIONATE 50 MCG
2 SPRAY, SUSPENSION (ML) NASAL DAILY
COMMUNITY
Start: 2023-05-08

## 2023-06-05 RX ORDER — HYDROCORTISONE 2.5 %
CREAM (GRAM) TOPICAL
COMMUNITY
Start: 2023-04-25 | End: 2023-11-28

## 2023-06-05 RX ORDER — DEXTROMETHORPHAN HBR. AND GUAIFENESIN 10; 100 MG/5ML; MG/5ML
10 SOLUTION ORAL
COMMUNITY
Start: 2022-12-21 | End: 2023-11-28

## 2023-06-05 RX ORDER — ENOXAPARIN SODIUM 100 MG/ML
40 INJECTION SUBCUTANEOUS EVERY 12 HOURS
Status: DISCONTINUED | OUTPATIENT
Start: 2023-06-05 | End: 2023-06-07 | Stop reason: HOSPADM

## 2023-06-05 RX ORDER — LANOLIN ALCOHOL/MO/W.PET/CERES
3 CREAM (GRAM) TOPICAL
COMMUNITY
Start: 2022-04-26

## 2023-06-05 RX ORDER — DEXAMETHASONE 4 MG/1
4 TABLET ORAL EVERY 12 HOURS SCHEDULED
Status: DISCONTINUED | OUTPATIENT
Start: 2023-06-05 | End: 2023-06-07

## 2023-06-05 RX ORDER — MULTIVITAMIN
2 TABLET,CHEWABLE ORAL
COMMUNITY
End: 2023-11-28

## 2023-06-05 RX ORDER — ESCITALOPRAM OXALATE 20 MG/1
20 TABLET ORAL DAILY
COMMUNITY
Start: 2023-05-18

## 2023-06-05 RX ORDER — SALMETEROL XINAFOATE 50 MCG
1 BLISTER, WITH INHALATION DEVICE INHALATION 2 TIMES DAILY
COMMUNITY
Start: 2023-05-11 | End: 2023-08-01

## 2023-06-05 RX ORDER — BUDESONIDE 90 UG/1
AEROSOL, POWDER RESPIRATORY (INHALATION)
COMMUNITY
Start: 2023-05-05 | End: 2023-08-01

## 2023-06-05 RX ORDER — FLUTICASONE PROPIONATE AND SALMETEROL XINAFOATE 115; 21 UG/1; UG/1
2 AEROSOL, METERED RESPIRATORY (INHALATION) 2 TIMES DAILY
COMMUNITY
Start: 2023-05-31

## 2023-06-05 RX ORDER — TRIAMCINOLONE ACETONIDE 5 MG/G
CREAM TOPICAL
Status: ON HOLD | COMMUNITY
Start: 2022-07-14 | End: 2023-06-05

## 2023-06-05 RX ORDER — BUSPIRONE HYDROCHLORIDE 15 MG/1
15 TABLET ORAL 2 TIMES DAILY
COMMUNITY
Start: 2023-05-18

## 2023-06-05 RX ORDER — MEDROXYPROGESTERONE ACETATE 10 MG
10 TABLET ORAL
COMMUNITY
End: 2023-11-28

## 2023-06-05 RX ORDER — LIDOCAINE 40 MG/G
CREAM TOPICAL
Status: DISCONTINUED | OUTPATIENT
Start: 2023-06-05 | End: 2023-06-07 | Stop reason: HOSPADM

## 2023-06-05 RX ORDER — RISPERIDONE 0.25 MG/1
0.25 TABLET ORAL 2 TIMES DAILY
COMMUNITY
Start: 2023-05-01 | End: 2024-09-10

## 2023-06-05 RX ADMIN — DEXAMETHASONE 4 MG: 2 TABLET ORAL at 19:48

## 2023-06-05 RX ADMIN — DEXAMETHASONE 4 MG: 2 TABLET ORAL at 08:49

## 2023-06-05 ASSESSMENT — ACTIVITIES OF DAILY LIVING (ADL)
WALKING_OR_CLIMBING_STAIRS_DIFFICULTY: NO
FALL_HISTORY_WITHIN_LAST_SIX_MONTHS: NO
DOING_ERRANDS_INDEPENDENTLY_DIFFICULTY: YES
ADLS_ACUITY_SCORE: 33
DRESS: 1-->ASSISTANCE (EQUIPMENT/PERSON) NEEDED
WEAR_GLASSES_OR_BLIND: NO
DRESSING/BATHING: BATHING DIFFICULTY, ASSISTANCE 1 PERSON
DRESSING/BATHING_DIFFICULTY: YES
CONCENTRATING,_REMEMBERING_OR_MAKING_DECISIONS_DIFFICULTY: YES
ADLS_ACUITY_SCORE: 33
CHANGE_IN_FUNCTIONAL_STATUS_SINCE_ONSET_OF_CURRENT_ILLNESS/INJURY: NO
TOILETING_ISSUES: NO
DEPENDENT_IADLS:: TRANSPORTATION;MEDICATION MANAGEMENT;MEAL PREPARATION
ADLS_ACUITY_SCORE: 34
DRESS: 0-->ASSISTANCE NEEDED (DEVELOPMENTALLY APPROPRIATE)
ADLS_ACUITY_SCORE: 35
BATHING: 1-->ASSISTANCE NEEDED
ADLS_ACUITY_SCORE: 33
DIFFICULTY_EATING/SWALLOWING: NO
ADLS_ACUITY_SCORE: 33
ADLS_ACUITY_SCORE: 33

## 2023-06-05 NOTE — CONSULTS
Otolaryngology Consult Note  June 5, 2023      CC: subglottic stenosis    HPI: Dionne Perez is a 38 year old female with a past medical history of neurocognitive disorder, asthma, and recently diagnosed subglottic stenosis who was transferred to [facility] for SOB and stridor in the setting of subglottic stenosis. Patient reports she has had worsening issues with breathing over the past several months. She now has shortness of breath with activity, such as walking and cooking dinner. She had a bronchoscopy on 5/31/23 by an outside pulmonologist that noted a partially obstructing airway abnormality in the subglottic area. Yesterday, she was unable to breath when she was going to the bathroom and presented to [facility] ED for evaluation. She lives in a group home. She denies any history of intubations, no known rheumatologic conditions. No recent illness. She states she may have noticed some voice changes. She has noticed noisy breathing. Denies any issues with swallowing, no throat pain. She uses inhalers to manage her asthma.    Patient reports her guardian is Rodolfo, who can be reached at [phone]. She also provided her sister, Jessica, number: (home) [phone] or (cell) [phone].     Past Medical History:   Diagnosis Date     Anxiety      Asthma      Cholelithiasis      Depressive disorder      Gastroesophageal reflux disease      Hypercholesterolemia      Hypothyroidism      Mild mental retardation      Obesity        Past Surgical History:   Procedure Laterality Date     IR LUMBAR PUNCTURE  06/09/2020     TONSILLECTOMY         No current outpatient medications on file.          Allergies   Allergen Reactions     Ibuprofen        Social History     Socioeconomic History     Marital status: Single     Spouse name: Not on file     Number of children: Not on file     Years of education: Not on file     Highest education level: Not on file   Occupational History     Not on file   Tobacco Use     Smoking status: Never      Smokeless tobacco: Never   Vaping Use     Vaping status: Not on file   Substance and Sexual Activity     Alcohol use: No     Drug use: No     Sexual activity: Not on file   Other Topics Concern     Not on file   Social History Narrative     Not on file     Social Determinants of Health     Financial Resource Strain: Not on file   Food Insecurity: Not on file   Transportation Needs: Not on file   Physical Activity: Not on file   Stress: Not on file   Social Connections: Not on file   Intimate Partner Violence: Not on file   Housing Stability: Not on file       No family history on file.    ROS: 12 point review of systems is negative unless noted in HPI.    PHYSICAL EXAM:  General: obese, laying in bed, no acute distress  /71 (BP Location: Right arm)   Pulse 51   Temp 97.4  F (36.3  C) (Oral)   Resp 20   Wt (!) 186.2 kg (410 lb 7.9 oz)   SpO2 95%   BMI 64.29 kg/m    HEAD: normocephalic, atraumatic  Face: symmetrical, CN VII intact bilaterally (HB 1), no swelling, edema, or erythema. Sensation V1-V3 intact and equal bilaterally.   Eyes: EOMI without spontaneous or gaze evoked nystagmus, clear sclera  Ears: external auricles appear normal, no otorrhea  Nose: no anterior drainage, intact and midline septum without perforation or hematoma   Mouth: missing front maxillary incisors, mucosa moist, no ulcers, no jaw or tooth tenderness, tongue midline and symmetric  Oropharynx: uvula midline, no oropharyngeal erythema  Neck:  redundant soft tissue, no palpable masses or LAD, trachea midline  Neuro: cranial nerves 2-12 grossly intact  Respiratory: breathing non-labored on RA, audible biphasic stridor, O2 sats in mid 90s  Skin: no rashes or skin lesions of the face/neck  Psych: pleasant affect  Cardio: extremities warm and well perfused     FIBEROPTIC ENDOSCOPY:  Due to stridor and newly diagnosed subglottic stenosis, fiberoptic laryngoscopy was indicated. After obtaining verbal consent, the nose was topically  decongested and anesthetized. The fiberoptic laryngoscope was passed under endoscopic vision through the right nasal passage. The turbinates were normal. The inferior and middle meati were clear bilaterally without purulence, masses, or polyps. The nasopharynx was clear. The eustachian tubes were clear. The soft palate appeared normal with good mobility. The epiglottis was sharp, and the visualized portion of the vallecula was clear. The larynx was clear with mobile cords. The arytenoids were clear, and there was no pooling in the hypopharynx. There is symmetric circumferential narrowing of the trachea just below the level of the cricoid, airway narrowed by ~70%. No visible granulation tissue, no purulence or erythema.     ROUTINE IP LABS (Last four results)  BMP  Recent Labs   Lab 06/04/23  1509      POTASSIUM 4.9   CHLORIDE 100   SANDRA 9.4   CO2 27   BUN 22.2*   CR 0.81   GLC 98     CBC  Recent Labs   Lab 06/04/23  1509   WBC 11.1*   RBC 5.00   HGB 13.8   HCT 44.1   MCV 88   MCH 27.6   MCHC 31.3*   RDW 14.0        INRNo lab results found in last 7 days.    Imaging:  Results for orders placed or performed during the hospital encounter of 05/28/23   XR Chest 2 Views    Narrative    EXAM: XR CHEST 2 VIEWS  LOCATION: [facility]  DATE/TIME: 5/28/2023 9:02 AM CDT    INDICATION: sob  COMPARISON: 05/25/2023 and prior      Impression    IMPRESSION: No acute cardiopulmonary abnormality. Unchanged enlarged cardiac silhouette.     CT pulmonary embolism w/ contrast 4/28/23:  FINDINGS:  ANGIOGRAM CHEST: Pulmonary arteries are normal caliber and negative for pulmonary emboli. Thoracic aorta is negative for dissection. No CT evidence of right heart strain.  LUNGS AND PLEURA: No focal airspace infiltrate. No pneumothorax or pleural effusion.  MEDIASTINUM/AXILLAE: Mild cardiomegaly. No pericardial effusion. A nodule in the anterior mediastinum measures 2.0 x 1.6 cm on image 82, unchanged compared  to prior CT over short interval.  CORONARY ARTERY CALCIFICATION: None.  UPPER ABDOMEN: Absent gallbladder. Mildly enlarged spleen.  MUSCULOSKELETAL: Normal.                                                                   IMPRESSION:  1.  Negative for pulmonary embolism.  2.  No evidence of pneumonia.  3.  Stable mediastinal lymph node or nodule of indeterminate etiology or significance.    Assessment and Plan  Dionne Perez is a 38 year old female with a past medical history of neurocognitive disorder, asthma, and recently diagnosed subglottic stenosis who was transferred to [facility] for SOB and stridor in the setting of subglottic stenosis. Laryngoscopy reveals circumferential narrowing of the trachea just distal to the cricoid by ~70%. No signs of acute infection/tracheitis or granulation tissue.     - IP is planning to take her for bronchoscopy with possible interventions later today, will defer further management to their team at this point. Please do not hesitate to reach out to ENT if further assistance is needed  - Recommend rheumatology workup for possible underlying cause of subglottic stenosis  - Agree with steroids for now  - Remainder of care per primary team    -- Patient and above plan to be discussed with Dr. Mak.     Yanelis Greene PA-C  Otolaryngology-Head & Neck Surgery  Please page ENT with questions by dialing [phone] and entering job code [phone] when prompted.

## 2023-06-05 NOTE — CONSULTS
"         Interventional Pulmonology          Initial Inpatient Consultation Note                                     June 5, 2023            Patient: Dionne Perez    Date of Admission: 6/5/2023  Reason for Consultation: Tracheal stenosis  Requesting Physician: Lavon Marin MD  [facility]  [address] [place] [address] [place] [id number]      Assessment:   Dionne Perez is a 38 year old admitted on 6/5/2023 with stridor and shortness of breath. She states her symptoms have been ongoing for a few months. She was transferred from [facility] to the [facility] interventional pulmonary intervention.     On my exam, she has inspiratory and expiratory stridor and feels short of breath. She is not on any oxygen. Patient lives in a group home currently.     Plan for bronchoscopy with tissue debulking and balloon dilation. Consent obtained from [facility].     Patient seen and discussed with Dr. Son Pride  Interventional Pulmonary Fellow            Chief Complaint: \"Shortness of breath\"    History of Present Illness:   Dionne Perez is a 38 year old female who lives in a group home due to her mental state. Her guardian is [phone] ([facility]). She has been dealing with dyspnea for the past few months requiring her to visit the emergency department several times. She was at [facility] ED this weekend and recommended transfer to the [facility] ED for IP intervention. On my evaluation, there is audible stridor with inspiration and expiration. She has a history of asthma according to Rodolfo. No known history of rheumatologic issues or prior intubations.            Data:   All pertinent laboratory and imaging data reviewed.           Past Medical History:     Past Medical History:   Diagnosis Date     Anxiety      Asthma      Cholelithiasis      Depressive disorder      Gastroesophageal reflux disease      Hypercholesterolemia      Hypothyroidism      Mild mental retardation      Obesity  "            Past Surgical History:     Past Surgical History:   Procedure Laterality Date     IR LUMBAR PUNCTURE  06/09/2020     TONSILLECTOMY              Social History:     Social History     Socioeconomic History     Marital status: Single     Spouse name: Not on file     Number of children: Not on file     Years of education: Not on file     Highest education level: Not on file   Occupational History     Not on file   Tobacco Use     Smoking status: Never     Smokeless tobacco: Never   Vaping Use     Vaping status: Not on file   Substance and Sexual Activity     Alcohol use: No     Drug use: No     Sexual activity: Not on file   Other Topics Concern     Not on file   Social History Narrative     Not on file     Social Determinants of Health     Financial Resource Strain: Not on file   Food Insecurity: Not on file   Transportation Needs: Not on file   Physical Activity: Not on file   Stress: Not on file   Social Connections: Not on file   Intimate Partner Violence: Not on file   Housing Stability: Not on file            Family History:   No family history on file.         Allergies:     Allergies   Allergen Reactions     Ibuprofen             Medications:       dexamethasone  4 mg Oral Q12H [facility] (08/20)     [Held by provider] enoxaparin ANTICOAGULANT  40 mg Subcutaneous Q12H     lidocaine 3%, phenylephrine 0.25% solution for irrigation  5 mL Irrigation Once     sodium chloride (PF)  3 mL Intracatheter Q8H            Physical Exam:   Temp:  [97.4  F (36.3  C)-98.4  F (36.9  C)] 97.4  F (36.3  C)  Pulse:  [51-64] 51  Resp:  [20-22] 20  BP: (114-137)/(57-87) 128/71  SpO2:  [90 %-99 %] 95 %  General: Awake, alert and in no apparent distress. Audible stridor.   Neck: Trachea supple/midline  Abdomen: Soft, non-tender, non-distended   MSK: No edema, no clubbing   Neurologic: Answering questions appropriately. Alert.   Skin: No obvious rash. Warm and dry

## 2023-06-05 NOTE — PLAN OF CARE
Goal Outcome Evaluation:    Neuro: A&Ox4, at base line, has guardian.   Cardiac: SR. VSS.   Respiratory: Sating 98% on RA.  GI/: Adequate urine output. No BM yet.   Diet/appetite: NPO for IR.   Activity: stand by assist up to chair and using the bathroom.   Pain: At acceptable level on current regimen.   Skin: No new deficits noted.  LDA's: PIV patent and functioning.     Plan: Continue with POC. Notify primary team with changes.

## 2023-06-05 NOTE — ANESTHESIA PREPROCEDURE EVALUATION
Anesthesia Pre-Procedure Evaluation    Patient: Dionne Perez   MRN: [id number] : 1985        Procedure : Procedure(s):  Bronchoscopy, rigid, laser resection and ballon dilation          Past Medical History:   Diagnosis Date     Anxiety      Asthma      Cholelithiasis      Depressive disorder      Gastroesophageal reflux disease      Hypercholesterolemia      Hypothyroidism      Mild mental retardation      Obesity       Past Surgical History:   Procedure Laterality Date     IR LUMBAR PUNCTURE  2020     TONSILLECTOMY        Allergies   Allergen Reactions     Ibuprofen       Social History     Tobacco Use     Smoking status: Never     Smokeless tobacco: Never   Vaping Use     Vaping status: Not on file   Substance Use Topics     Alcohol use: No      Wt Readings from Last 1 Encounters:   23 (!) 186.2 kg (410 lb 7.9 oz)        Anesthesia Evaluation            ROS/MED HX  ENT/Pulmonary:     (+) Moderate Persistent, asthma Treatment: Inhaler prn,   (-) tobacco use and sleep apnea   Neurologic:    (-) no seizures   Cardiovascular:     (+) hypertension-----    METS/Exercise Tolerance:     Hematologic: Comments: 23 06:28  WBC: 14.7 (H)  Hemoglobin: 12.8  Hematocrit: 41.6  Platelet Count: 193  RBC Count: 4.76  MCV: 87  MCH: 26.9  MCHC: 30.8 (L)  RDW: 13.6      (H): Data is abnormally high  (L): Data is abnormally low      Musculoskeletal:       GI/Hepatic: Comment: Hx of cholelithiasis    (+) GERD,     Renal/Genitourinary:       Endo:     (+) thyroid problem, Obesity,     Psychiatric/Substance Use: Comment: Cognitive impairment    (+) psychiatric history (Adjustment disorder with Aggression.) other (comment) and depression     Infectious Disease:       Malignancy:       Other:            Physical Exam    Airway        Mallampati: II   TM distance: > 3 FB   Neck ROM: full   Mouth opening: > 3 cm    Respiratory Devices and Support         Dental     Comment: Missing upper incisors         Cardiovascular          Rhythm and rate: regular     Pulmonary           (+) wheezes           OUTSIDE LABS:  CBC:   Lab Results   Component Value Date    WBC 11.1 (H) 06/04/2023    WBC 8.0 05/28/2023    HGB 13.8 06/04/2023    HGB 12.9 05/28/2023    HCT 44.1 06/04/2023    HCT 42.3 05/28/2023     06/04/2023     (L) 05/28/2023     BMP:   Lab Results   Component Value Date     06/04/2023     05/28/2023    POTASSIUM 4.9 06/04/2023    POTASSIUM 4.1 05/28/2023    CHLORIDE 100 06/04/2023    CHLORIDE 100 05/28/2023    CO2 27 06/04/2023    CO2 31 (H) 05/28/2023    BUN 22.2 (H) 06/04/2023    BUN 21.2 (H) 05/28/2023    CR 0.81 06/04/2023    CR 0.96 (H) 05/28/2023    GLC 98 06/04/2023    GLC 99 05/28/2023     COAGS:   Lab Results   Component Value Date    INR 1.16 (H) 06/08/2020     POC:   Lab Results   Component Value Date    BGM 93 07/05/2020    HCG Negative 05/09/2023    HCGS Negative 05/28/2023     HEPATIC:   Lab Results   Component Value Date    ALBUMIN 3.8 09/01/2022    PROTTOTAL 6.7 09/01/2022    ALT 27 09/01/2022    AST 22 09/01/2022    GGT 66 (H) 06/08/2020    ALKPHOS 98 09/01/2022    BILITOTAL 0.4 09/01/2022    CLOTILDE 15 06/05/2020     OTHER:   Lab Results   Component Value Date    LACT 1.2 06/07/2020    A1C 5.0 06/06/2020    SANDRA 9.4 06/04/2023    PHOS 3.5 06/13/2020    MAG 2.2 07/07/2020    LIPASE 66 (H) 09/01/2022    TSH 0.34 (L) 06/06/2020    T4 0.98 06/06/2020    CRP 3.6 06/07/2020    SED 12 06/08/2020       Anesthesia Plan    ASA Status:  3      Anesthesia Type: General.     - Airway: LMA              Consents    Anesthesia Plan(s) and associated risks, benefits, and realistic alternatives discussed. Questions answered and patient/representative(s) expressed understanding.     - Discussed: Risks, Benefits and Alternatives for BOTH SEDATION and the PROCEDURE were discussed     - Discussed with:  Patient      - Extended Intubation/Ventilatory Support Discussed: No.      - Patient is  DNR/DNI Status: No    Use of blood products discussed: No .     Postoperative Care    Pain management: IV analgesics.   PONV prophylaxis: Dexamethasone or Solumedrol, Ondansetron (or other 5HT-3)     Comments:                Leslie Goldberg, MD

## 2023-06-05 NOTE — CONSULTS
Care Management Initial Consult    General Information  Assessment completed with: Patient,    Type of CM/SW Visit: Initial Assessment    Primary Care Provider verified and updated as needed: Yes     Readmission within the last 30 days: no previous admission in last 30 days   Reason for Consult: discharge planning    Advance Care Planning: Advance Care Planning Reviewed: no concerns identified     Communication Assessment  Patient's communication style: spoken language (English or Bilingual)    Hearing Difficulty or Deaf: no   Wear Glasses or Blind: no    Cognitive  Cognitive/Neuro/Behavioral: WDL                    Living Environment:   People in home: facility resident       Current living Arrangements: group home        Able to return to prior arrangements: yes     Family/Social Support:  Care provided by: self, other (see comments) (long term Staff)    Provides care for: no one, unable/limited ability to care for self  Marital Status: Single  Guardian, Facility resident(s)/Staff, Sibling(s)            Description of Support System: Supportive, Involved    Support Assessment: Adequate family and caregiver support    Current Resources:  Patient receiving home care services: No     Community Resources: Group Home, , County Worker     Equipment currently used at home: None    Supplies currently used at home: None    Employment/Financial:  Employment Status: unemployed        Financial Concerns: No concerns identified   Referral to Financial Worker: No     Does the patient's insurance plan have a 3 day qualifying hospital stay waiver?  No    Lifestyle & Psychosocial Needs:  Social Determinants of Health     Tobacco Use: Low Risk  (6/5/2023)    Patient History      Smoking Tobacco Use: Never      Smokeless Tobacco Use: Never      Passive Exposure: Not on file   Alcohol Use: Not on file   Financial Resource Strain: Not on file   Food Insecurity: Not on file   Transportation Needs: Not on file   Physical  Activity: Not on file   Stress: Not on file   Social Connections: Not on file   Intimate Partner Violence: Not on file   Depression: Not on file   Housing Stability: Not on file     Functional Status:  Prior to admission patient needed assistance:   Dependent ADLs:: Independent  Dependent IADLs:: Transportation, Medication Management, Meal Preparation  Assesssment of Functional Status: At functional baseline    Mental Health Status:  Mental Health Status: Current Concern  Mental Health Management: Other (see comment) (Anxiety and Depressive Disorder)    Chemical Dependency Status:  Chemical Dependency Status: No Current Concerns           Values/Beliefs:  Spiritual, Cultural Beliefs, Church Practices, Values that affect care: no             Additional Information:  Per H&P, patient is a 38 year old female with history of neurocognitive disorder, asthma, and distal subglottic stenosis who was directly admitted from [facility] ED for possible bronchoscopy.    Writer completed initial assessment due to elevated risk score and discharge planning. Writer completed assessment through conversation with patient, her guardian, group home and chart review. Patient is independent at baseline with ADLs and IADLs. She receives assistance with medication management and transportation from group home staff. Goal is to return to Group Home. SW/RNCC to continue to follow for support and discharge planning needs that arise.     [facility])  [id number]  [facility] [address]  [phone]    [facility] Director  [phone]    Karina Tsang  Manager  [phone]    [facility]   [address] [place] [address]  [phone]    ROBERTO CARLOS Cleary, MSW  Adult Acute Care Float   Pager [phone]

## 2023-06-05 NOTE — PROGRESS NOTES
VS /87 (BP Location: Right arm, Patient Position: Semi-Cintron's, Cuff Size: Adult Regular)   Pulse 64   Temp 98.4  F (36.9  C) (Oral)   Resp 22   Wt (!) 186.2 kg (410 lb 7.9 oz)   SpO2 97%   BMI 64.29 kg/m     Activity: SBA  Neuros: A&O x4. Neurocognitive disorder. Able to make needs known.  Cardiac: WDL. Denies cardiac chest pain.  Respiratory: Stridor LS, infrequent dry cough. RA. Denies SOB  GI/: Voiding spon. No BM this shift  Diet: NPO  Skin/Incisions: Admitted/transferred from: [place] [id number] RN full   skin assessment completed by Sylvester Montes, ÓSCAR and ÓSCAR Elizalde.  Skin assessment finding: Minor reddness in abd skin fold, discoloration of skin on bilateral shins   Interventions/actions: Educated on importance of ambulation, hydration, hygiene, and frequent repositioning   Lines/Drains: (L) PIV - SL  Labs: Reviewed.  Pain/nausea: Denies   New changes this shift: Transferred from [facility] around 0100.

## 2023-06-05 NOTE — PROGRESS NOTES
[facility]    Progress Note - Medicine Service, ANGELA TEAM 4       Date of Admission:  6/5/2023    Assessment & Plan   Dionne Perez is a 38 year old female with history of neurocognitive disorder, asthma, and distal subglottic stenosis who was directly admitted from [facility] ED for possible bronchoscopy, plan for 06/06.       # Subglottic stenosis  Presented to ED multiple times for shortness of breath. Bronchoscopy 5/31 with distal subglottic stenosis. Presented to ED again on 6/4 and interventional pulmonology who recommended transfer for possible bronchoscopy. Currently on room air. ENT consulted, deferring management to intervnetional pulm.  -Interventional pulm consulted and following, appreciate assistance  - NPO midnight  - bronchoscopy 6/6 with tissue debulking and balloon dilation with IP  - decadron 4 mg po bid     # Asthma  - albuterol prn    # Leukocytosis   WBC 11.1 on admission, 8.0 at baseline. Patient is afebrile and clinically stable, no concerns for infection. Leukocytosis likely secondary to steroids.   -Daily CBC        # Unspecified anxiety disorder  # Organic personality disorder  - PTA was on olanzapine and lexapro, not in our records    - pharmacy consult for med reconcile     Diet: Regular Diet Adult  NPO for Medical/Clinical Reasons Except for: No Exceptions    DVT Prophylaxis: Low Risk/Ambulatory with no VTE prophylaxis indicated  Davila Catheter: Not present  Lines: None     Cardiac Monitoring: None  Code Status: Full Code      Clinically Significant Risk Factors Present on Admission               # Coagulation Defect: INR = 1.18 (Ref range: 0.85 - 1.15) and/or PTT = 25 Seconds (Ref range: 22 - 38 Seconds), will monitor for bleeding  # Drug Induced Platelet Defect: home medication list includes an antiplatelet medication   # Hypertension: Noted on problem list               Disposition Plan      Expected Discharge Date: 06/09/2023       Destination: group home  Discharge Comments: Pending evaluation for stridor, stenosis. Likely IP intervention,        The patient's care was discussed with the Dr. Kevin DAVIS  Medical Student  Medicine Service, [facility] TEAM [address] [place] [address]  Securely message with [employer] (more info)  Text page via [facility] Paging/Directory   See signed in provider for up to date coverage information       Resident/Fellow Attestation   I, Cassy Fry MD, was present with the medical/LINDA student who participated in the service and in the documentation of the note.  I have verified the history and personally performed the physical exam and medical decision making.  I agree with the assessment and plan of care as documented in the note.      Cassy Fry MD  Internal Medicine-Pediatrics, PGY-2  ______________________________________________________________________    Interval History   Patient admitted overnight. No acute concerns. Had questions about procedure and wanting to eat. Discussed plan of care with IP at bedside.     Physical Exam   Vital Signs: Temp: 98.1  F (36.7  C) Temp src: Oral BP: 120/68 Pulse: 51   Resp: 20 SpO2: 100 % O2 Device: None (Room air)    Weight: 410 lbs 7.94 oz    Constitutional: awake, alert, cooperative, no apparent distress, and appears stated age  Respiratory: no increased work of breathing, audible stridor, diffuse wheezing appreciated   Cardiovascular: Normal apical impulse, regular rate and rhythm, normal S1 and S2, no S3 or S4, and no murmur noted  Abdomen: soft, non-tender  MSK: moving all extremities equally and symmetrically  Skin: no rash visualized

## 2023-06-05 NOTE — LETTER
Transition Communication Hand-off for Care Transitions to Next Level of Care Provider    Name: Dionne Perez  : 1985  MRN #: [id number]  Primary Care Provider: [facility]     Primary Clinic: [address] [facility] [address]     Reason for Hospitalization:  Dyspnea [R06.00]  Admit Date/Time: 2023 12:39 AM  Discharge Date: 23  Payor Source: Payor: MEDICARE / Plan: MEDICARE / Product Type: Medicare /          Reason for Communication Hand-off Referral: Other Continuation of Care    Discharge Plan: Return to Group Home    Concern for non-adherence with plan of care:   Y/N No  Discharge Needs Assessment:  Needs      Flowsheet Row Most Recent Value   Equipment Currently Used at Home none          Follow-up specialty is recommended: No    Follow-up plan:    Future Appointments   Date Time Provider Department Center   2023 10:30 AM Rocco Mccormack MD [facility]     Any outstanding tests or procedures:        Referrals       Future Labs/Procedures    Adult Rheumatology  Referral     Comments:    Please be aware that coverage of these services is subject to the terms and limitations of your health insurance plan.  Call member services at your health plan with any benefit or coverage questions.  [facility]  will call you to coordinate your care as prescribed by your provider. A representative will call you within 1 business day to help schedule your appointment, or you may contact the  Representative at [phone].              Key Recommendations:      ROBERTO CARLOS Cleary    AVS/Discharge Summary is the source of truth; this is a helpful guide for improved communication of patient story

## 2023-06-05 NOTE — H&P
[facility]    History and Physical - Medicine Service, [facility] TEAM        Date of Admission:  6/5/2023    Assessment & Plan      Dionne Perez is a 38 year old female with history of neurocognitive disorder, asthma, and distal subglottic stenosis who was directly admitted from [facility] ED for possible bronchoscopy.    # Subglottic stenosis  Presented to ED multiple times for shortness of breath. Bronchoscopy 5/31 with distal subglottic stenosis. Presented to ED again on 6/4 and interventional pulmonology who recommended transfer for possible bronchoscopy. Currently on room air.   - NPO  - decadron 4 mg po bid  - interventional pulmonologist consulted (notified, will evaluate in AM)  - consider thoracic surgery and ENT consult tomorrow    # Asthma  - albuterol prn    # Unspecified anxiety disorder  # Organic personality disorder  - PTA was on olanzapine and lexapro  - pharmacy consult for med reconcile       Diet: NPO per Anesthesia Guidelines for Procedure/Surgery Except for: Meds    DVT Prophylaxis: Enoxaparin (Lovenox) SQ  Davila Catheter: Not present  Fluids: none  Lines: None     Cardiac Monitoring: None  Code Status: Full Code      Clinically Significant Risk Factors Present on Admission                # Drug Induced Platelet Defect: home medication list includes an antiplatelet medication   # Hypertension: Noted on problem list                Patient to be staffed in AM.    Edgar Cochran MD   PGY-1 Internal Medicine  Medicine Service, [facility] TEAM   [facility]  Securely message with [employer] (more info)  Text page via [employer] Paging/Directory   See signed in provider for up to date coverage information  ______________________________________________________________________    Chief Complaint   Shortness of breath    History is obtained from the patient    History of Present Illness   Dionne Perez is a  38 year old female with history of neurocognitive disorder, asthma, and distal subglottic stenosis who was directly admitted from [facility] ED for shortness of breath evaluation.    She states that she has been having shortness of breath and intermittent cough for 3-4 months and recently had blood in the phlegm. She also reports that she heard stridor. She denies fever, chills, chest pain. She is still able to walk around the house.    She went to the ED multiple times regarding this issue. Eventually, she was seen and evaluated by pulmonology and underwent bronchoscopy on 5/31/2023 which showed subglottic stenosis (no specimen collected). ENT appointment was scheduled on 6/22.     She presented to the ED at [facility] on 6/4 for the same reason. She is on room air. ED discussed with interventional pulmonology who recommended transfer to here for evaluation given that she is not doing well as an outpatient. She was given epinephrine nebulizer with mild improvement.    Of note, patient has mild mental retardation. She lives at group home and manages her own medications. Rodolfo, her brother, is her current guardian.     Past Medical History    Past Medical History:   Diagnosis Date     Anxiety      Asthma      Cholelithiasis      Depressive disorder      Gastroesophageal reflux disease      Hypercholesterolemia      Hypothyroidism      Mild mental retardation      Obesity        Past Surgical History   Past Surgical History:   Procedure Laterality Date     IR LUMBAR PUNCTURE  06/09/2020     TONSILLECTOMY         Prior to Admission Medications   Prior to Admission Medications   Prescriptions Last Dose Informant Patient Reported? Taking?   Multiple Vitamins-Minerals (EMERGEN-C IMMUNE PLUS/VIT D) CHEW  Care Giver Yes No   Sig: Take 3 chew tab by mouth daily   OLANZapine zydis (ZYPREXA) 10 MG ODT   No No   Sig: Take 1 tablet (10 mg) by mouth 2 times daily as needed (associated with psychosis or anjel.)   Pediatric  Multivit-Minerals-C (CHEWABLES MULTIVITAMIN PO)  Care Giver Yes No   Sig: Take 2 tablets by mouth daily   Probiotic Product (RISAQUAD-2) CAPS  Care Giver Yes No   Sig: Take 1 capsule by mouth daily   acetaminophen (TYLENOL) 325 MG tablet  Care Giver Yes No   Sig: Take 650 mg by mouth 3 times daily as needed for pain   albuterol (PROAIR HFA/PROVENTIL HFA/VENTOLIN HFA) 108 (90 Base) MCG/ACT inhaler  Care Giver Yes No   Sig: Inhale 1-2 puffs into the lungs every 4 hours as needed for shortness of breath / dyspnea or wheezing   albuterol (PROAIR HFA/PROVENTIL HFA/VENTOLIN HFA) 108 (90 Base) MCG/ACT inhaler   No No   Sig: Inhale 2 puffs into the lungs every 6 hours as needed for shortness of breath, wheezing or cough   albuterol (PROAIR HFA/PROVENTIL HFA/VENTOLIN HFA) 108 (90 Base) MCG/ACT inhaler   No No   Sig: Inhale 2 puffs into the lungs every 6 hours as needed for shortness of breath, wheezing or cough   aspirin (ASA) 325 MG EC tablet  Care Giver Yes No   Sig: Take 325 mg by mouth daily   cephALEXin (KEFLEX) 500 MG capsule   No No   Sig: Take 1 capsule (500 mg) by mouth 2 times daily   docusate sodium (COLACE) 100 MG capsule  Care Giver Yes No   Sig: Take 100 mg by mouth every 3 days   escitalopram (LEXAPRO) 10 MG tablet  Care Giver Yes No   Sig: Take 30 mg by mouth daily   famotidine (PEPCID) 20 MG tablet  Care Giver Yes No   Sig: Take 20 mg by mouth daily   ipratropium - albuterol 0.5 mg/2.5 mg/3 mL (DUONEB) 0.5-2.5 (3) MG/3ML neb solution   No No   Sig: Take 1 vial (3 mLs) by nebulization every 6 hours as needed for shortness of breath, wheezing or cough   levothyroxine (SYNTHROID/LEVOTHROID) 50 MCG tablet  Care Giver Yes No   Sig: Take 50 mcg by mouth daily   nystatin (MYCOSTATIN) 094415 UNIT/GM external cream  Care Giver Yes No   Sig: Apply topically 2 times daily as needed for other (rash (around private areas))   pravastatin (PRAVACHOL) 40 MG tablet  Care Giver Yes No   Sig: Take 40 mg by mouth daily    traZODone (DESYREL) 50 MG tablet  Care Giver Yes No   Sig: Take 50 mg by mouth At Bedtime      Facility-Administered Medications: None        Physical Exam   Vital Signs: Temp: 98.4  F (36.9  C) Temp src: Oral BP: 137/87 Pulse: 64   Resp: 22 SpO2: 97 % O2 Device: None (Room air)    Weight: 410 lbs 7.94 oz    Constitutional: awake, alert, cooperative, no apparent distress, obese  Eyes: Lids and lashes normal, pupils equal, round and reactive to light, extra ocular muscles intact, sclera clear, conjunctiva normal  ENT: Normocephalic, without obvious abnormality, atraumatic  Hematologic / Lymphatic: no cervical lymphadenopathy  Respiratory: No increased work of breathing, occasional inspiratory and expiratory stridor, and transmitted sound bilateral lungs  Cardiovascular: Normal apical impulse, regular rate and rhythm, normal S1 and S2, no S3 or S4, and no murmur noted  GI: No scars, normal bowel sounds, soft, non-distended, non-tender, no masses palpated, no hepatosplenomegally  Skin: no bruising or bleeding  Musculoskeletal: There is no redness, warmth, or swelling of the joints.  Full range of motion noted.  Motor strength is 5 out of 5 all extremities bilaterally.  Tone is normal.  Neurologic: Awake, alert, oriented to name, place and time.  Motor is 5 out of 5 bilaterally.       Data     I have personally reviewed the following data over the past 24 hrs:    11.1 (H)  \   13.8   / 181     136 100 22.2 (H) /  98   4.9 27 0.81 \

## 2023-06-06 ENCOUNTER — PATIENT OUTREACH (OUTPATIENT)
Dept: ONCOLOGY | Facility: CLINIC | Age: 38
End: 2023-06-06
Payer: MEDICARE

## 2023-06-06 DIAGNOSIS — J38.6 SUBGLOTTIC STENOSIS: Primary | ICD-10-CM

## 2023-06-06 DIAGNOSIS — I77.6 VASCULITIS (H): ICD-10-CM

## 2023-06-06 LAB
ANION GAP SERPL CALCULATED.3IONS-SCNC: 16 MMOL/L (ref 7–15)
BUN SERPL-MCNC: 22 MG/DL (ref 6–20)
CALCIUM SERPL-MCNC: 9.1 MG/DL (ref 8.6–10)
CHLORIDE SERPL-SCNC: 104 MMOL/L (ref 98–107)
CREAT SERPL-MCNC: 0.78 MG/DL (ref 0.51–0.95)
DEPRECATED HCO3 PLAS-SCNC: 19 MMOL/L (ref 22–29)
ERYTHROCYTE [DISTWIDTH] IN BLOOD BY AUTOMATED COUNT: 13.6 % (ref 10–15)
GFR SERPL CREATININE-BSD FRML MDRD: >90 ML/MIN/1.73M2
GLUCOSE BLDC GLUCOMTR-MCNC: 115 MG/DL (ref 70–99)
GLUCOSE SERPL-MCNC: 128 MG/DL (ref 70–99)
HCT VFR BLD AUTO: 41.6 % (ref 35–47)
HGB BLD-MCNC: 12.8 G/DL (ref 11.7–15.7)
MCH RBC QN AUTO: 26.9 PG (ref 26.5–33)
MCHC RBC AUTO-ENTMCNC: 30.8 G/DL (ref 31.5–36.5)
MCV RBC AUTO: 87 FL (ref 78–100)
PLATELET # BLD AUTO: 193 10E3/UL (ref 150–450)
POTASSIUM BLD-SCNC: 4.5 MMOL/L (ref 3.4–5.3)
POTASSIUM SERPL-SCNC: 6 MMOL/L (ref 3.4–5.3)
RBC # BLD AUTO: 4.76 10E6/UL (ref 3.8–5.2)
SODIUM SERPL-SCNC: 139 MMOL/L (ref 136–145)
WBC # BLD AUTO: 14.7 10E3/UL (ref 4–11)

## 2023-06-06 PROCEDURE — 370N000017 HC ANESTHESIA TECHNICAL FEE, PER MIN: Performed by: INTERNAL MEDICINE

## 2023-06-06 PROCEDURE — 999N000157 HC STATISTIC RCP TIME EA 10 MIN

## 2023-06-06 PROCEDURE — 84132 ASSAY OF SERUM POTASSIUM: CPT | Performed by: STUDENT IN AN ORGANIZED HEALTH CARE EDUCATION/TRAINING PROGRAM

## 2023-06-06 PROCEDURE — 36415 COLL VENOUS BLD VENIPUNCTURE: CPT | Performed by: STUDENT IN AN ORGANIZED HEALTH CARE EDUCATION/TRAINING PROGRAM

## 2023-06-06 PROCEDURE — 258N000003 HC RX IP 258 OP 636: Performed by: STUDENT IN AN ORGANIZED HEALTH CARE EDUCATION/TRAINING PROGRAM

## 2023-06-06 PROCEDURE — 360N000076 HC SURGERY LEVEL 3, PER MIN: Performed by: INTERNAL MEDICINE

## 2023-06-06 PROCEDURE — 0B718ZZ DILATION OF TRACHEA, VIA NATURAL OR ARTIFICIAL OPENING ENDOSCOPIC: ICD-10-PCS | Performed by: INTERNAL MEDICINE

## 2023-06-06 PROCEDURE — 250N000009 HC RX 250

## 2023-06-06 PROCEDURE — 85027 COMPLETE CBC AUTOMATED: CPT | Performed by: STUDENT IN AN ORGANIZED HEALTH CARE EDUCATION/TRAINING PROGRAM

## 2023-06-06 PROCEDURE — 999N000141 HC STATISTIC PRE-PROCEDURE NURSING ASSESSMENT: Performed by: INTERNAL MEDICINE

## 2023-06-06 PROCEDURE — 710N000010 HC RECOVERY PHASE 1, LEVEL 2, PER MIN: Performed by: INTERNAL MEDICINE

## 2023-06-06 PROCEDURE — 31630 BRONCHOSCOPY DILATE/FX REPR: CPT | Performed by: INTERNAL MEDICINE

## 2023-06-06 PROCEDURE — 250N000013 HC RX MED GY IP 250 OP 250 PS 637: Performed by: ANESTHESIOLOGY

## 2023-06-06 PROCEDURE — 272N000001 HC OR GENERAL SUPPLY STERILE: Performed by: INTERNAL MEDICINE

## 2023-06-06 PROCEDURE — 250N000012 HC RX MED GY IP 250 OP 636 PS 637

## 2023-06-06 PROCEDURE — 250N000025 HC SEVOFLURANE, PER MIN: Performed by: INTERNAL MEDICINE

## 2023-06-06 PROCEDURE — 250N000011 HC RX IP 250 OP 636

## 2023-06-06 PROCEDURE — 250N000013 HC RX MED GY IP 250 OP 250 PS 637: Performed by: STUDENT IN AN ORGANIZED HEALTH CARE EDUCATION/TRAINING PROGRAM

## 2023-06-06 PROCEDURE — 120N000002 HC R&B MED SURG/OB UMMC

## 2023-06-06 PROCEDURE — C1726 CATH, BAL DIL, NON-VASCULAR: HCPCS | Performed by: INTERNAL MEDICINE

## 2023-06-06 PROCEDURE — 99232 SBSQ HOSP IP/OBS MODERATE 35: CPT | Mod: GC | Performed by: STUDENT IN AN ORGANIZED HEALTH CARE EDUCATION/TRAINING PROGRAM

## 2023-06-06 PROCEDURE — 80048 BASIC METABOLIC PNL TOTAL CA: CPT | Performed by: STUDENT IN AN ORGANIZED HEALTH CARE EDUCATION/TRAINING PROGRAM

## 2023-06-06 PROCEDURE — 258N000003 HC RX IP 258 OP 636

## 2023-06-06 PROCEDURE — 250N000013 HC RX MED GY IP 250 OP 250 PS 637

## 2023-06-06 PROCEDURE — 94640 AIRWAY INHALATION TREATMENT: CPT

## 2023-06-06 PROCEDURE — 999N000248 HC STATISTIC IV INSERT WITH US BY RN

## 2023-06-06 RX ORDER — ONDANSETRON 2 MG/ML
INJECTION INTRAMUSCULAR; INTRAVENOUS PRN
Status: DISCONTINUED | OUTPATIENT
Start: 2023-06-06 | End: 2023-06-06

## 2023-06-06 RX ORDER — SODIUM CHLORIDE, SODIUM LACTATE, POTASSIUM CHLORIDE, CALCIUM CHLORIDE 600; 310; 30; 20 MG/100ML; MG/100ML; MG/100ML; MG/100ML
INJECTION, SOLUTION INTRAVENOUS CONTINUOUS PRN
Status: DISCONTINUED | OUTPATIENT
Start: 2023-06-06 | End: 2023-06-06

## 2023-06-06 RX ORDER — FENTANYL CITRATE 50 UG/ML
25 INJECTION, SOLUTION INTRAMUSCULAR; INTRAVENOUS EVERY 5 MIN PRN
Status: DISCONTINUED | OUTPATIENT
Start: 2023-06-06 | End: 2023-06-06 | Stop reason: HOSPADM

## 2023-06-06 RX ORDER — ALBUTEROL SULFATE 90 UG/1
2 AEROSOL, METERED RESPIRATORY (INHALATION) EVERY 6 HOURS PRN
Status: DISCONTINUED | OUTPATIENT
Start: 2023-06-06 | End: 2023-06-07 | Stop reason: HOSPADM

## 2023-06-06 RX ORDER — LEVOTHYROXINE SODIUM 50 UG/1
50 TABLET ORAL DAILY
Status: DISCONTINUED | OUTPATIENT
Start: 2023-06-06 | End: 2023-06-07 | Stop reason: HOSPADM

## 2023-06-06 RX ORDER — FENTANYL CITRATE 50 UG/ML
INJECTION, SOLUTION INTRAMUSCULAR; INTRAVENOUS PRN
Status: DISCONTINUED | OUTPATIENT
Start: 2023-06-06 | End: 2023-06-06

## 2023-06-06 RX ORDER — LIDOCAINE 40 MG/G
CREAM TOPICAL
Status: DISCONTINUED | OUTPATIENT
Start: 2023-06-06 | End: 2023-06-07 | Stop reason: HOSPADM

## 2023-06-06 RX ORDER — PROPOFOL 10 MG/ML
INJECTION, EMULSION INTRAVENOUS CONTINUOUS PRN
Status: DISCONTINUED | OUTPATIENT
Start: 2023-06-06 | End: 2023-06-06

## 2023-06-06 RX ORDER — FAMOTIDINE 20 MG/1
20 TABLET, FILM COATED ORAL DAILY
Status: DISCONTINUED | OUTPATIENT
Start: 2023-06-06 | End: 2023-06-07 | Stop reason: HOSPADM

## 2023-06-06 RX ORDER — ONDANSETRON 2 MG/ML
4 INJECTION INTRAMUSCULAR; INTRAVENOUS EVERY 30 MIN PRN
Status: DISCONTINUED | OUTPATIENT
Start: 2023-06-06 | End: 2023-06-06 | Stop reason: HOSPADM

## 2023-06-06 RX ORDER — ONDANSETRON 4 MG/1
4 TABLET, ORALLY DISINTEGRATING ORAL EVERY 30 MIN PRN
Status: DISCONTINUED | OUTPATIENT
Start: 2023-06-06 | End: 2023-06-06 | Stop reason: HOSPADM

## 2023-06-06 RX ORDER — KETAMINE HYDROCHLORIDE 10 MG/ML
INJECTION INTRAMUSCULAR; INTRAVENOUS PRN
Status: DISCONTINUED | OUTPATIENT
Start: 2023-06-06 | End: 2023-06-06

## 2023-06-06 RX ORDER — SODIUM CHLORIDE, SODIUM LACTATE, POTASSIUM CHLORIDE, CALCIUM CHLORIDE 600; 310; 30; 20 MG/100ML; MG/100ML; MG/100ML; MG/100ML
INJECTION, SOLUTION INTRAVENOUS CONTINUOUS
Status: DISCONTINUED | OUTPATIENT
Start: 2023-06-06 | End: 2023-06-06 | Stop reason: HOSPADM

## 2023-06-06 RX ORDER — DEXAMETHASONE SODIUM PHOSPHATE 4 MG/ML
INJECTION, SOLUTION INTRA-ARTICULAR; INTRALESIONAL; INTRAMUSCULAR; INTRAVENOUS; SOFT TISSUE PRN
Status: DISCONTINUED | OUTPATIENT
Start: 2023-06-06 | End: 2023-06-06

## 2023-06-06 RX ADMIN — DEXAMETHASONE SODIUM PHOSPHATE 10 MG: 4 INJECTION, SOLUTION INTRA-ARTICULAR; INTRALESIONAL; INTRAMUSCULAR; INTRAVENOUS; SOFT TISSUE at 15:35

## 2023-06-06 RX ADMIN — CIPROFLOXACIN AND DEXAMETHASONE 2 ML: 3; 1 SUSPENSION/ DROPS AURICULAR (OTIC) at 21:55

## 2023-06-06 RX ADMIN — FENTANYL CITRATE 25 MCG: 50 INJECTION, SOLUTION INTRAMUSCULAR; INTRAVENOUS at 15:42

## 2023-06-06 RX ADMIN — PROPOFOL 80 MCG/KG/MIN: 10 INJECTION, EMULSION INTRAVENOUS at 15:21

## 2023-06-06 RX ADMIN — DEXAMETHASONE 4 MG: 2 TABLET ORAL at 12:00

## 2023-06-06 RX ADMIN — DEXAMETHASONE 4 MG: 2 TABLET ORAL at 20:25

## 2023-06-06 RX ADMIN — SODIUM CHLORIDE, POTASSIUM CHLORIDE, SODIUM LACTATE AND CALCIUM CHLORIDE: 600; 310; 30; 20 INJECTION, SOLUTION INTRAVENOUS at 15:15

## 2023-06-06 RX ADMIN — Medication 100 MG: at 15:16

## 2023-06-06 RX ADMIN — Medication 1 LOZENGE: at 16:38

## 2023-06-06 RX ADMIN — LEVOTHYROXINE SODIUM 50 MCG: 0.05 TABLET ORAL at 18:44

## 2023-06-06 RX ADMIN — MIDAZOLAM 1 MG: 1 INJECTION INTRAMUSCULAR; INTRAVENOUS at 15:05

## 2023-06-06 RX ADMIN — ONDANSETRON 4 MG: 2 INJECTION INTRAMUSCULAR; INTRAVENOUS at 15:35

## 2023-06-06 ASSESSMENT — ACTIVITIES OF DAILY LIVING (ADL)
ADLS_ACUITY_SCORE: 33
ADLS_ACUITY_SCORE: 37
ADLS_ACUITY_SCORE: 33
ADLS_ACUITY_SCORE: 37
ADLS_ACUITY_SCORE: 33
ADLS_ACUITY_SCORE: 33

## 2023-06-06 ASSESSMENT — ENCOUNTER SYMPTOMS: SEIZURES: 0

## 2023-06-06 ASSESSMENT — LIFESTYLE VARIABLES: TOBACCO_USE: 0

## 2023-06-06 NOTE — PROGRESS NOTES
Care Management Follow Up    Length of Stay (days): 1    Expected Discharge Date: 06/09/2023     Concerns to be Addressed: discharge planning       Patient plan of care discussed at interdisciplinary rounds: Yes    Anticipated Discharge Disposition: Group Home      Anticipated Discharge Services: Med Management, Transportation      Anticipated Discharge DME: None anticipated     Patient/family educated on Medicare website which has current facility and service quality ratings: N/A     Education Provided on the Discharge Plan: Yes     Patient/Family in Agreement with the Plan: Yes      Referrals Placed by CM/SW: None     Private pay costs discussed: Not applicable    Additional Information:    Writer spoke with Latoya (Group Home Director) to provide update and get more information. She states she is new to the position and doesn't know too much. She stated that she is available to help with discharge so that patient has all she needs. She would like to be updated on progress of patient via e-mail.    [employer] (Rodolfo)  [address]  [facility] [address]  P: [phone]     Latoya Hernández  [facility] Director  P: [phone]  P: [phone]  E: [email or web address]    Gilles (Karina)   [facility] Manager  P: [phone]     [facility]   [address] [place] [address]  P: [phone]    ROBERTO CARLOS Cleary, MSW  Adult Acute Care Float   Pager [phone]

## 2023-06-06 NOTE — ANESTHESIA PROCEDURE NOTES
Airway       Patient location during procedure: OR  Staff -        CRNA: Naun Jovel APRN CRNA       Performed By: CRNA  Consent for Airway        Urgency: elective  Indications and Patient Condition       Indications for airway management: ashlyn-procedural       Induction type:inhalational       Mask difficulty assessment: 1 - vent by mask    Final Airway Details       Final airway type: supraglottic airway    Supraglottic Airway Details        Type: LMA       Brand: I-Gel       LMA size: 5    Post intubation assessment        Placement verified by: capnometry, equal breath sounds and chest rise        Number of attempts at approach: 1       Secured with: pink tape       Ease of procedure: easy       Dentition: Intact and Unchanged

## 2023-06-06 NOTE — PLAN OF CARE
Goal Outcome Evaluation:    BP 99/66 (BP Location: Left arm)   Pulse (!) 46   Temp 98.2  F (36.8  C) (Oral)   Resp 21   Wt (!) 186.2 kg (410 lb 7.9 oz)   SpO2 96%   BMI 64.29 kg/m        Status: VSS, s/p bronchoscopy for subglottic stenosis  Pain/Nausea: Denies pain and N/V  Mobility: SBA  Diet: Regular- tolerating  Labs: K 4.5,   LDAs: R PIV-SL  Skin/incisions: Intact  Neuro: A&Ox4, denies N/T, cognitive delay, calls appropriately.   Respiratory: RA. Unlabored. Denies SOB.  Capnography on. IS teaching provided.   Cardiac: X bradycardia within parameters  GI/: Voiding spontaneously not saving. LBM 6/5, +BS  New Changes: Bronchoscopy today  Plan: Continue with POC

## 2023-06-06 NOTE — PROGRESS NOTES
New Patient: Interventional Pulmonary (Lung nodule) Nurse Navigator Note    Referring provider: Kris Pride [facility]     Referred to (specialty): Interventional Pulmonary (Lung nodule)    Requested provider (if applicable): n/a    Date Referral Received: 6/6/2023    Evaluation for : subglottic stenosis   Per Dr. Pride's IB message on 6/6:\  Can you set this patient up to see one of the IP docs in clinic with PFTs.     Clinical History (per Nurse review of records provided):    **BOOK MARKED**  6/6/2023:  Procedure(s):    A flexible bronchoscopy  Airway exam  Tissue/tumor debulking   Balloon dilation      Indication:  Tracheal stenosis     Attending of Record:  Laxmi Cline MD      Interventional Pulmonary Fellow   Kris Pride      428/2023:  CT Chest  FINDINGS:  ANGIOGRAM CHEST: Pulmonary arteries are normal caliber and negative for pulmonary emboli. Thoracic aorta is negative for dissection. No CT evidence of right heart strain.     LUNGS AND PLEURA: No focal airspace infiltrate. No pneumothorax or pleural effusion.     MEDIASTINUM/AXILLAE: Mild cardiomegaly. No pericardial effusion. A nodule in the anterior mediastinum measures 2.0 x 1.6 cm on image 82, unchanged compared to prior CT over short interval.     CORONARY ARTERY CALCIFICATION: None.     UPPER ABDOMEN: Absent gallbladder. Mildly enlarged spleen.     MUSCULOSKELETAL: Normal.                                                                      IMPRESSION:  1.  Negative for pulmonary embolism.     2.  No evidence of pneumonia.     3.  Stable mediastinal lymph node or nodule of indeterminate etiology or significance      Records Location (Care Everywhere, Media, etc.): [facility]     Records Needed: none    Additional testing needed prior to consult: PFT's

## 2023-06-06 NOTE — ANESTHESIA CARE TRANSFER NOTE
Patient: Dionne Perez    Procedure: Procedure(s):  Flexible Bronchoscopy, Laser Resection and Balloon Dilation       Diagnosis: Tracheal stenosis [J39.8]  Diagnosis Additional Information: No value filed.    Anesthesia Type:   General     Note:    Oropharynx: nasal airway in place and spontaneously breathing  Level of Consciousness: drowsy  Oxygen Supplementation: face mask  Level of Supplemental Oxygen (L/min / FiO2): 10  Independent Airway: airway patency satisfactory and stable  Dentition: dentition unchanged  Vital Signs Stable: post-procedure vital signs reviewed and stable  Report to RN Given: handoff report given  Patient transferred to: PACU    Handoff Report: Identifed the Patient, Identified the Reponsible Provider, Reviewed the pertinent medical history, Discussed the surgical course, Reviewed Intra-OP anesthesia mangement and issues during anesthesia, Set expectations for post-procedure period and Allowed opportunity for questions and acknowledgement of understanding      Vitals:  Vitals Value Taken Time   /85 06/06/23 1600   Temp     Pulse 48 06/06/23 1602   Resp 16 06/06/23 1602   SpO2 99 % 06/06/23 1602   Vitals shown include unvalidated device data.    Electronically Signed By: MARY Mock CRNA  June 6, 2023  4:03 PM

## 2023-06-06 NOTE — PROVIDER NOTIFICATION
"RN paged MD \"Pt has oral decadron ordered- can i give this or does it need to be switched? she is currently NPO no exceptions, thanks!\"  "

## 2023-06-06 NOTE — PROCEDURES
INTERVENTIONAL PULMONOLOGY       Procedure(s):    A flexible bronchoscopy  Airway exam  Tissue/tumor debulking   Balloon dilation     Indication:  Tracheal stenosis    Attending of Record:  Laxmi Cline MD     Interventional Pulmonary Fellow   Kris Pride    Trainees Present:   None     Medications:    General Anesthesia - See anesthesia flowsheet for details    Sedation Time:   Per Anesthesia Care Provider    Time Out:  Performed    The patient's medical record has been reviewed.  The indication for the procedure was reviewed.  The necessary history and physical examination was performed and reviewed.  The risks, benefits and alternatives of the procedure were discussed with the the patient in detail and she had the opportunity to ask questions.  I discussed in particular the potential complications including risks of minor or life-threatening bleeding and/or infection, respiratory failure, vocal cord trauma / paralysis, pneumothorax, and discomfort. Sedation risks were also discussed including abnormal heart rhythms, low blood pressure, and respiratory failure. All questions were answered to the best of my ability.  Verbal and written informed consent was obtained.  The proposed procedure and the patient's identification were verified prior to the procedure by the physician and the nurse.    The patient was assessed for the adequacy for the procedure and to receive medications.   Mental Status:  Alert and oriented x 3  Airway examination:  Class I (Complete visualization of soft palate)  Pulmonary:  Non labored respirations  CV:  Regular rate  ASA Grade:  (II)  Mild systemic disease    After clinical evaluation and reviewing the indication, risks, alternatives and benefits of the procedure the patient was deemed to be in satisfactory condition to undergo the procedure.           A Tuberculosis risk assessment was performed:  The patient has no known RISK of Tuberculosis    The procedure was performed in a  negative airflow room: The patient could not be moved to a negative airflow room because of needed OR for the procedure    Maneuvers / Procedure:      Patient was intubated with an LMA    Airway Examination: A complete airway examination was performed from the distal trachea to the subsegmental level in each lobe of both lungs.  Pertinent findings include a 1cm band of stenosis at the proximal trachea.     CO2 laser was used to make radial cuts and then dilate this area with a 3cm 12-15mm balloon. This was dilated nicely.          Any disposable equipment was visually inspected and deemed to be intact immediately post procedure.      Relevant Pictures    Pre intervention          Post intervention      Assessment and Recommendations:     1. Successful completion of bronchoscopy with tracheal dilation  2. Ok to discharge once medically stable. Please send home with cipro dex nebs (4 drops otic in 5-10cc of saline-placed in a neb, twice daily)  3. I will arrange a follow up with interventional pulmnology with repeat PFTs. Patient also has a history of hemoptysis concerning for vasculitis. Will also refer to rheumatology.           Kris Pride  Interventional pulmonary fellow  Pager: [phone]

## 2023-06-06 NOTE — PROGRESS NOTES
Procedure not performed today. Pt  Is reversed to regular diet. Plan to perform procedure at next day from today as of this note. Denies pain and needs redirection due DD.

## 2023-06-06 NOTE — PROGRESS NOTES
[facility]    Progress Note - Medicine Service, MAROON TEAM 4       Date of Admission:  6/5/2023    Assessment & Plan   Dionne Perez is a 38 year old female with history of neurocognitive disorder, asthma, and distal subglottic stenosis who was directly admitted from [facility] ED for bronchoscopy. Underwent bronchoscopy with tracheal dilation 06/06. Plan for discharge 06/07.     Changes today:  -Bronchoscopy with tracheal dilation 06/06, started ciprodex nebs     #Subglottic stenosis  Presented to ED multiple times for shortness of breath. Bronchoscopy 5/31 with distal subglottic stenosis. Presented to ED again on 6/4 and interventional pulmonology who recommended transfer for possible bronchoscopy. Currently on room air with no increased work of breathing. Patient does have audible stridor at rest and diffuse wheezing in all lung fields. ENT consulted, deferring management to intervnetional pulm.  - Interventional pulm consulted and following, appreciate assistance   > Underwent bronchoscopy with tracheal dilation 06/06.    > Started ciprodex nebs (4 drops otic in 5-10cc of saline-placed in a neb, twice  daily)    >Plan for outpatient interventional pulmonology follow-up with repeat PFTs.   >Will also place rheumatology referral upon discharge as there is history of  hemoptysis concerning for vasculitis  - Decadron 4 mg po bid     #Asthma  - albuterol prn    #Leukocytosis   WBC 14.7 6/6, 8.0 at baseline. Patient is afebrile and clinically stable, no concerns for infection. Leukocytosis likely secondary to steroids.   - Daily CBC       #Hyperkalemia   Potassium 6.0 6/6, previously 4.9 on admission. Specimen likely hemolyzed.   - K 4.5 on redraw       #Unspecified anxiety disorder  #Organic personality disorder  - PTA was on olanzapine and lexapro, not in our records       Diet: Regular Diet Adult    DVT Prophylaxis: Low Risk/Ambulatory with no VTE prophylaxis  indicated  Davila Catheter: Not present  Lines: None     Cardiac Monitoring: None  Code Status: Full Code      Clinically Significant Risk Factors        # Hyperkalemia: Highest K = 6 mmol/L in last 2 days, will monitor as appropriate        # Coagulation Defect: INR = 1.18 (Ref range: 0.85 - 1.15) and/or PTT = 25 Seconds (Ref range: 22 - 38 Seconds), will monitor for bleeding    # Hypertension: Noted on problem list                 Disposition Plan      Expected Discharge Date: 06/08/2023      Destination: group home  Discharge Comments: Pending evaluation for stridor, stenosis. Likely IP intervention,            MADDY DAVIS  Medical Student  Medicine Service, [facility] TEAM [address] [place] [address]  Securely message with [employer] (more info)  Text page via [employer] Paging/Directory   See signed in provider for up to date coverage information       I, Cassy Fry MD, was present with the medical student who participated in the service and in the documentation of the note.  I have verified the history and personally performed the physical exam and medical decision making.  I agree with the assessment and plan of care as documented in the note.      Cassy Fry MD  Internal Medicine - Pediatrics, PGY-2     ______________________________________________________________________    Interval History   No acute events overnight. Briefly upset about NPO status but was reassured after discussing rationale for upcoming procedure.     Physical Exam   Vital Signs: Temp: 96.8  F (36  C) Temp src: Axillary BP: (!) 147/81 Pulse: (!) 48   Resp: 16 SpO2: 100 % O2 Device: None (Room air) Oxygen Delivery: 2 LPM  Weight: 410 lbs 7.94 oz    Constitutional: awake, alert, cooperative, no apparent distress, and appears stated age  Respiratory: no increased work of breathing, audible stridor, diffuse wheezing appreciated   Cardiovascular: Normal apical impulse, regular rate and rhythm, normal S1 and S2, no S3 or  S4, and no murmur noted  Abdomen: soft, non-tender  MSK:moving all extremities equally and symmetrically  Skin: no rash visualized   Neuro: Cranial nerves II-XII grossly intact

## 2023-06-06 NOTE — PROVIDER NOTIFICATION
"Writer jordan DOYLE \"Pt extremely frustrated, yelling out, tearful about wanting to eat food now. can you come try to talk to her? we have all tried. thanks! \"  "

## 2023-06-06 NOTE — ANESTHESIA POSTPROCEDURE EVALUATION
Patient: Dionne Perez    Procedure: Procedure(s):  Flexible Bronchoscopy, Laser Resection and Balloon Dilation       Anesthesia Type:  General    Note:  Disposition: Outpatient   Postop Pain Control: Uneventful            Sign Out: Well controlled pain   PONV: No   Neuro/Psych: Uneventful            Sign Out: Acceptable/Baseline neuro status   Airway/Respiratory: Uneventful            Sign Out: Acceptable/Baseline resp. status   CV/Hemodynamics: Uneventful            Sign Out: Acceptable CV status; No obvious hypovolemia; No obvious fluid overload   Other NRE: NONE   DID A NON-ROUTINE EVENT OCCUR? No           Last vitals:  Vitals Value Taken Time   /93 06/06/23 1632   Temp 36.3  C (97.4  F) 06/06/23 1600   Pulse 46 06/06/23 1634   Resp 19 06/06/23 1634   SpO2 97 % 06/06/23 1634   Vitals shown include unvalidated device data.    Electronically Signed By: Jose Carlos Olivier MD  June 6, 2023  4:35 PM

## 2023-06-06 NOTE — PROGRESS NOTES
Blood pressure 130/77, pulse 57, temperature 98.3  F (36.8  C), temperature source Oral, resp. rate 20, weight (!) 186.2 kg (410 lb 7.9 oz), SpO2 97 %, not currently breastfeeding.    [id number]   A&O x4, anxious at times. AVSS on RA. Denies shortness of breath/chest pain overnight. CMS intact. Ongoing expiratory stridor noted on anterior lungs. Satting well on RA. Denies pain. Up ad jonas in room. Voiding spontaneously, not saving. LBM 6/5 per pt report. NPO @ [place] for Bronch 6/6

## 2023-06-07 VITALS
DIASTOLIC BLOOD PRESSURE: 52 MMHG | WEIGHT: 293 LBS | BODY MASS INDEX: 64.29 KG/M2 | OXYGEN SATURATION: 97 % | TEMPERATURE: 98.2 F | SYSTOLIC BLOOD PRESSURE: 117 MMHG | RESPIRATION RATE: 18 BRPM | HEART RATE: 54 BPM

## 2023-06-07 LAB
ANION GAP SERPL CALCULATED.3IONS-SCNC: 16 MMOL/L (ref 7–15)
BUN SERPL-MCNC: 25 MG/DL (ref 6–20)
CALCIUM SERPL-MCNC: 9.2 MG/DL (ref 8.6–10)
CHLORIDE SERPL-SCNC: 103 MMOL/L (ref 98–107)
CREAT SERPL-MCNC: 0.77 MG/DL (ref 0.51–0.95)
DEPRECATED HCO3 PLAS-SCNC: 18 MMOL/L (ref 22–29)
ERYTHROCYTE [DISTWIDTH] IN BLOOD BY AUTOMATED COUNT: 13.7 % (ref 10–15)
GFR SERPL CREATININE-BSD FRML MDRD: >90 ML/MIN/1.73M2
GLUCOSE SERPL-MCNC: 141 MG/DL (ref 70–99)
HCT VFR BLD AUTO: 43.5 % (ref 35–47)
HGB BLD-MCNC: 13.9 G/DL (ref 11.7–15.7)
LACTATE SERPL-SCNC: 4.8 MMOL/L (ref 0.7–2)
MCH RBC QN AUTO: 27.6 PG (ref 26.5–33)
MCHC RBC AUTO-ENTMCNC: 32 G/DL (ref 31.5–36.5)
MCV RBC AUTO: 87 FL (ref 78–100)
PLATELET # BLD AUTO: 214 10E3/UL (ref 150–450)
POTASSIUM BLD-SCNC: 4.4 MMOL/L (ref 3.4–5.3)
POTASSIUM BLD-SCNC: 7.4 MMOL/L (ref 3.4–5.3)
POTASSIUM SERPL-SCNC: 6.4 MMOL/L (ref 3.4–5.3)
RBC # BLD AUTO: 5.03 10E6/UL (ref 3.8–5.2)
SODIUM SERPL-SCNC: 137 MMOL/L (ref 136–145)
WBC # BLD AUTO: 14.9 10E3/UL (ref 4–11)

## 2023-06-07 PROCEDURE — 85018 HEMOGLOBIN: CPT | Performed by: STUDENT IN AN ORGANIZED HEALTH CARE EDUCATION/TRAINING PROGRAM

## 2023-06-07 PROCEDURE — 99232 SBSQ HOSP IP/OBS MODERATE 35: CPT | Mod: GC | Performed by: INTERNAL MEDICINE

## 2023-06-07 PROCEDURE — 999N000157 HC STATISTIC RCP TIME EA 10 MIN

## 2023-06-07 PROCEDURE — 82310 ASSAY OF CALCIUM: CPT | Performed by: STUDENT IN AN ORGANIZED HEALTH CARE EDUCATION/TRAINING PROGRAM

## 2023-06-07 PROCEDURE — 250N000012 HC RX MED GY IP 250 OP 636 PS 637: Performed by: STUDENT IN AN ORGANIZED HEALTH CARE EDUCATION/TRAINING PROGRAM

## 2023-06-07 PROCEDURE — 93010 ELECTROCARDIOGRAM REPORT: CPT | Performed by: INTERNAL MEDICINE

## 2023-06-07 PROCEDURE — 36415 COLL VENOUS BLD VENIPUNCTURE: CPT | Performed by: STUDENT IN AN ORGANIZED HEALTH CARE EDUCATION/TRAINING PROGRAM

## 2023-06-07 PROCEDURE — 258N000003 HC RX IP 258 OP 636: Performed by: STUDENT IN AN ORGANIZED HEALTH CARE EDUCATION/TRAINING PROGRAM

## 2023-06-07 PROCEDURE — 250N000013 HC RX MED GY IP 250 OP 250 PS 637: Performed by: STUDENT IN AN ORGANIZED HEALTH CARE EDUCATION/TRAINING PROGRAM

## 2023-06-07 PROCEDURE — 84132 ASSAY OF SERUM POTASSIUM: CPT | Performed by: STUDENT IN AN ORGANIZED HEALTH CARE EDUCATION/TRAINING PROGRAM

## 2023-06-07 PROCEDURE — 99238 HOSP IP/OBS DSCHRG MGMT 30/<: CPT | Mod: GC | Performed by: STUDENT IN AN ORGANIZED HEALTH CARE EDUCATION/TRAINING PROGRAM

## 2023-06-07 PROCEDURE — 250N000013 HC RX MED GY IP 250 OP 250 PS 637

## 2023-06-07 PROCEDURE — 93005 ELECTROCARDIOGRAM TRACING: CPT

## 2023-06-07 RX ORDER — PREDNISONE 20 MG/1
40 TABLET ORAL DAILY
Status: DISCONTINUED | OUTPATIENT
Start: 2023-06-07 | End: 2023-06-07 | Stop reason: HOSPADM

## 2023-06-07 RX ORDER — CIPROFLOXACIN AND DEXAMETHASONE 3; 1 MG/ML; MG/ML
4 SUSPENSION/ DROPS AURICULAR (OTIC) 2 TIMES DAILY
Qty: 7.5 ML | Refills: 0 | Status: SHIPPED | OUTPATIENT
Start: 2023-06-07 | End: 2023-11-28

## 2023-06-07 RX ORDER — PREDNISONE 20 MG/1
40 TABLET ORAL ONCE
Qty: 2 TABLET | Refills: 0 | Status: SHIPPED | OUTPATIENT
Start: 2023-06-08 | End: 2023-06-08

## 2023-06-07 RX ADMIN — LEVOTHYROXINE SODIUM 50 MCG: 0.05 TABLET ORAL at 09:04

## 2023-06-07 RX ADMIN — CIPROFLOXACIN AND DEXAMETHASONE 2 ML: 3; 1 SUSPENSION/ DROPS AURICULAR (OTIC) at 07:45

## 2023-06-07 RX ADMIN — PREDNISONE 40 MG: 20 TABLET ORAL at 09:04

## 2023-06-07 ASSESSMENT — ACTIVITIES OF DAILY LIVING (ADL)
ADLS_ACUITY_SCORE: 37

## 2023-06-07 NOTE — PROVIDER NOTIFICATION
Pt. had a scheduled neb scheduled at 2000. Can you please come and admin that neb tx.   Thanks,   Phyllis Fontenot RN  [phone]  Paged RT: [phone]

## 2023-06-07 NOTE — PROGRESS NOTES
Care Management Discharge Note    Discharge Date: 06/08/2023     Discharge Disposition: Group Home    [facility]    [id number]   [place] [address]  [phone]    Discharge Services: Medication Management, Transportation     Discharge DME: None      Discharge Transportation: Group Home will provide transport at 1 pm    Private pay costs discussed: Not applicable    Does the patient's insurance plan have a 3 day qualifying hospital stay waiver?  No    PAS Confirmation Code: N/A     Patient/family educated on Medicare website which has current facility and service quality ratings: N/A     Education Provided on the Discharge Plan: Yes     Persons Notified of Discharge Plans: Charge Nurse, Bedside Nurse, MD,     Patient/Family in Agreement with the Plan:      Handoff Referral Completed: Yes    Additional Information:    IMM task delegated to CHW. Group Home requests medications be sent to [facility] ([address] [place] [address] Ph: [phone]). Group Home staff will  patient at 1 pm. External hand off completed to patient's primary care physician.    ROBERTO CARLOS Cleary, MSW  Adult Acute Care Float   Pager [phone]

## 2023-06-07 NOTE — PLAN OF CARE
/69 (BP Location: Left arm, Patient Position: Supine, Cuff Size: Adult Regular)   Pulse 54   Temp 98.6  F (37  C) (Oral)   Resp 18   Wt (!) 186.2 kg (410 lb 7.9 oz)   SpO2 97%   BMI 64.29 kg/m      Shift: 5127-9234  Status:  Underwent bronchoscopy with tracheal dilation 06/06-tracheal stenosis.   Neuro: A&O x 3, could not verbalize the year.   Resp: WDL. RA. Denies SOB. Scheduled neb treatments. Productive cough-scant amount of blood in sputum x 1- no repeat episodes/ Hx: hemoptysis.   Cardiac: Bradycardia, within parameters. Denies cardiac chest pain.   GI/: Regular diet. Voiding spontaneously.   Skin: Warm and dry.   Activity: SBA, ambulated to the bathroom.   Incision & Drainage: PIV SL.   Pain: Denies pain.   Labs: Reviewed. Potassium 6.0-specimen was slightly hemolyzed. 4.5 whole blood potassium. Re-check k+ this AM.   Changes: No acute changes.   Plan: Plan for discharge 06/07-today.       Goal Outcome Evaluation:      Plan of Care Reviewed With: patient    Overall Patient Progress: no change

## 2023-06-07 NOTE — PROGRESS NOTES
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     Patient went to the OR yesterday for bronchoscopy with laser resection and dilation of her stenosis. Etiology of her stenosis is unclear, however, rheumatologic etiology needs to be entertained. I did read about her history of hemoptysis in the past.     I will arrange for her to see interventional pulm in clinic in 4-6 weeks with PFTs as well as rheumatology to assess for vasculitis induced stenosis.     Ok to discharge from IP standpoint. Her guardian, Rodolfo was updated.     Patient seen and discussed with Dr. Cline.     Kris Pride  Interventional Pulmonology Fellow    Thank you for this consultation.           Interval History:   No acute overnight events. States her breathing has improved \"a lot.\"                       Physical Exam:   Temp:  [96.8  F (36  C)-98.6  F (37  C)] 98.2  F (36.8  C)  Pulse:  [46-64] 54  Resp:  [14-21] 18  BP: ()/(52-93) 117/52  SpO2:  [92 %-100 %] 97 %    Intake/Output Summary (Last 24 hours) at 6/7/2023 1017  Last data filed at 6/7/2023 0430  Gross per 24 hour   Intake 1000 ml   Output --   Net 1000 ml       General: Awake, alert and in no apparent distress   Neck: Trachea supple/midline  Abdomen: Soft, non-tender, non-distended   MSK: No edema  Neurologic: AOx3, moving all extremities   Skin: Warm/dry            "

## 2023-06-07 NOTE — DISCHARGE SUMMARY
[facility]  Discharge Summary - Medicine & Pediatrics       Date of Admission:  6/5/2023  Date of Discharge:  6/7/2023  1:00 PM  Discharging Provider: Dr. Calvo   Discharge Service: Medicine Service, ANGELA TEAM 4    Discharge Diagnoses   Subglottic stenosis  Asthma  Leukocytosis   Hyperkalemia   Unspecified anxiety disorder  Organic personality disorder    Follow-ups Needed After Discharge   - Outpatient interventional pulmonology follow-up with repeat PFTs  - Rheumatology follow up outpatient for evaluation of vasculitis     Discharge Disposition   Discharged to assisted living  Condition at discharge: Stable    Hospital Course   Dionne Perez was admitted on 6/5/2023 for bronchoscopy.     Patient presented to [facility] ED with SOB. Had a history of subglottic stenosis and was transferred for [facility] for inpatient bronchoscopy. Patient underwent bronchoscopy 6/6 with tissue debulking and tracheal dilation. Has been hemodynamically stable and breathing room air during entirety of admission.     The following problems were addressed during her hospitalization:    #Subglottic stenosis  Presented to ED multiple times for shortness of breath. Bronchoscopy 5/31 with distal subglottic stenosis. Presented to ED again on 6/4 and interventional pulmonology who recommended transfer for possible bronchoscopy. Currently on room air with no increased work of breathing. Patient did have audible stridor at rest and diffuse wheezing in all lung fields prior to broncoscopy. ENT consulted, deferring management to interventional pulm. Patient underwent bronchoscopy with tracheal dilation 6/6.   - Interventional pulm consulted and following, appreciate assistance              > Started ciprodex nebs (4 drops otic in 5-10cc of saline-placed in a neb, twice daily)                          >Plan for outpatient interventional pulmonology follow-up with repeat PFTs.              >  Rheumatology referral outpatient as there is history of hemoptysis concerning for vasculitis  - Five day course of steroids: Decadron 4mg while inpatient, prednisone 40mg outpatient last dose 6/8     #Asthma  - albuterol prn     #Leukocytosis   WBC 14.7 6/7, 8.0 at baseline. Patient is afebrile and clinically stable, no concerns for infection. Leukocytosis likely secondary to steroids.      #Hyperkalemia    Potassium 7.4 6/7, previously 4.9 on admission. K 4.4 on redraw, initial specimen likely hemolyzed.       #Unspecified anxiety disorder  #Organic personality disorder  - PTA was on olanzapine and lexapro, not in our records       Consultations This Hospital Stay   ENT IP CONSULT  INTERVENTIONAL PULMONARY ADULT IP CONSULT  CARE MANAGEMENT / SOCIAL WORK IP CONSULT  NURSING TO CONSULT FOR VASCULAR ACCESS CARE IP CONSULT  INTERVENTIONAL PULMONARY ADULT IP CONSULT  PHARMACY IP CONSULT    Code Status   Full Code       The patient was discussed with Dr. Lubna DAVIS  Hospitalist Service  [facility] [address] [address]  Phone: [phone]    I, Cassy Fry MD, was present with the medical student who participated in the service and in the documentation of the note.  I have verified the history and personally performed the physical exam and medical decision making.  I agree with the assessment and plan of care as documented in the note.      Cassy Fry MD  Internal Medicine - Pediatrics, PGY-2     ______________________________________________________________________    Physical Exam   Vital Signs: Temp: 98.2  F (36.8  C) Temp src: Oral BP: 117/52 Pulse: 54   Resp: 18 SpO2: 97 % O2 Device: None (Room air) Oxygen Delivery: 2 LPM  Weight: 410 lbs 7.94 oz  Constitutional: awake, alert, cooperative, no apparent distress, and appears stated age  Eyes: Lids and lashes normal, extra ocular muscles intact, sclera clear, conjunctiva normal  Respiratory: No increased work  of breathing, good air exchange, no crackles or wheezing, no stridor   Cardiovascular: Normal apical impulse, regular rate and rhythm, normal S1 and S2, no S3 or S4, and no murmur noted  Neurologic: CN II-XII grossly intact      Primary Care Physician   [facility]    Discharge Orders      Adult Rheumatology  Referral      Activity    Your activity upon discharge: activity as tolerated     Follow Up and recommended labs and tests    Plan for interventional pulmonology follow-up in 4-6 weeks after your hospital discharge.     Reason for your hospital stay    You were admitted to the hospital for shortness of breath and stridor. You were found to have a narrowing of your airway. You had a successful dilation of your airway with interventional pulmonology. You received steroids during your admission and were discharged with steroids for home. You were also discharged with nebulizer treatments. Plan for outpatient follow-up with interventional pulmonology in 4-6 weeks. You were discharged from the hospital in good condition.     Diet    Follow this diet upon discharge: Orders Placed This Encounter      Regular Diet Adult       Significant Results and Procedures   Most Recent 3 CBC's:Recent Labs   Lab Test 06/07/23  0928 06/06/23  0628 06/05/23  1249   WBC 14.9* 14.7* 13.2*   HGB 13.9 12.8 13.8   MCV 87 87 89    193 202       Discharge Medications   Discharge Medication List as of 6/7/2023 12:41 PM      START taking these medications    Details   ciprofloxacin-dexamethasone (CIPRODEX) 0.3-0.1 % otic suspension Place 4 drops into the trachea 2 times daily, Disp-7.5 mL, R-0, E-PrescribePlease use 4 drops otic solution in 5-10 mls of saline. Please give as a nebulizer treatment 2 times each day.      predniSONE (DELTASONE) 20 MG tablet Take 2 tablets (40 mg) by mouth once for 1 dose, Disp-2 tablet, R-0, E-Prescribe      sodium chloride, PF, (NORMAL SALINE FLUSH) 0.9% PF flush 3 mLs by  Other route every 8 hours, Disp-500 mL, R-0, E-PrescribeAdd 5-10 mls in 4 drops of ciprodex otic solution to be given as neb treatment 2 times each day         CONTINUE these medications which have NOT CHANGED    Details   ADVAIR -21 MCG/ACT inhaler DURGA, Historical      albuterol (PROAIR HFA/PROVENTIL HFA/VENTOLIN HFA) 108 (90 Base) MCG/ACT inhaler Inhale 2 puffs into the lungs every 6 hours as needed for shortness of breath, wheezing or cough, Disp-18 g, R-0, Local PrintPharmacy may dispense brand covered by insurance (Proair, or proventil or ventolin or generic albuterol inhaler)      aspirin (ASA) 325 MG EC tablet Take 325 mg by mouth daily, Historical      budesonide (PULMICORT FLEXHALER) 90 MCG/ACT inhaler Historical      busPIRone (BUSPAR) 15 MG tablet Historical      dextromethorphan-guaiFENesin (TUSSIN DM)  MG/5ML liquid Take 10 mLs by mouth, Historical      docusate sodium (COLACE) 100 MG capsule Take 100 mg by mouth every 3 days, Historical      escitalopram (LEXAPRO) 20 MG tablet Historical      fluticasone (FLONASE) 50 MCG/ACT nasal spray Spray 2 sprays into both nostrils daily SHAKE LIQUID AND USE, Historical      hydrocortisone 2.5 % cream APPLY TOPICALLY TWICE DAILY. CAN USE FOR 2-4 WEEKSHistorical      ipratropium - albuterol 0.5 mg/2.5 mg/3 mL (DUONEB) 0.5-2.5 (3) MG/3ML neb solution Take 1 vial (3 mLs) by nebulization every 6 hours as needed for shortness of breath, wheezing or cough, Disp-90 mL, R-0, Local Print      levothyroxine (SYNTHROID/LEVOTHROID) 50 MCG tablet Take 50 mcg by mouth daily, Historical      medroxyPROGESTERone (PROVERA) 10 MG tablet Take 10 mg by mouth, Historical      melatonin 3 MG tablet Take 3 mg by mouth, Historical      Multiple Vitamins-Minerals (EMERGEN-C IMMUNE PLUS/VIT D) CHEW Take 3 chew tab by mouth daily, Historical      nystatin (MYCOSTATIN) 342352 UNIT/GM external cream Apply topically 2 times daily as needed for other (rash (around private  areas))Historical      OLANZapine zydis (ZYPREXA) 10 MG ODT Take 1 tablet (10 mg) by mouth 2 times daily as needed (associated with psychosis or anjel.), Disp-30 tablet, R-0, E-Prescribe      Pediatric Multiple Vitamins (FLINTSTONES PLUS EXTRA C) CHEW Take 2 tablets by mouth, Historical      Pediatric Multivit-Minerals-C (CHEWABLES MULTIVITAMIN PO) Take 2 tablets by mouth daily, Historical      pravastatin (PRAVACHOL) 40 MG tablet Take 40 mg by mouth daily, Historical      Probiotic Product (RISAQUAD-2) CAPS Take 1 capsule by mouth daily, Historical      risperiDONE (RISPERDAL) 0.25 MG tablet Historical      SEREVENT DISKUS 50 MCG/ACT inhaler Inhale 1 puff into the lungs 2 times daily, DURGA, Historical      traZODone (DESYREL) 50 MG tablet Take 50 mg by mouth At Bedtime, Historical         STOP taking these medications       famotidine (PEPCID) 20 MG tablet Comments:   Reason for Stopping:             Allergies   Allergies   Allergen Reactions     Ibuprofen

## 2023-06-07 NOTE — PLAN OF CARE
Goal Outcome Evaluation:      Plan of Care Reviewed With: patient, caregiver    Neuro: A&Ox4, cognitive delat. Requires frequent re-assurance.    Resp: sats mid 90s on RA. Denies SOB.   Cardiac: Bradycardia, within parameters. Denies cardiac chest pain.   GI/: Regular diet. Voiding spontaneously. No BM today.   Activity: SBA, ambulated to the bathroom.   Incision & Drainage: PIV- removed for discharge  Pain: Denies pain.   Labs: K: 7.4; redraw: 4.4.   Plan: discharge back to TCU. Discharge paperwork sent with patient. Spoke with staff at facility who will provide transport home. FABIOLA Peters will help bring patient via wheelchair to meet staff at front door.

## 2023-06-08 LAB
ATRIAL RATE - MUSE: 48 BPM
DIASTOLIC BLOOD PRESSURE - MUSE: NORMAL MMHG
INTERPRETATION ECG - MUSE: NORMAL
P AXIS - MUSE: 43 DEGREES
PR INTERVAL - MUSE: 142 MS
QRS DURATION - MUSE: 100 MS
QT - MUSE: 442 MS
QTC - MUSE: 394 MS
R AXIS - MUSE: 14 DEGREES
SYSTOLIC BLOOD PRESSURE - MUSE: NORMAL MMHG
T AXIS - MUSE: -8 DEGREES
VENTRICULAR RATE- MUSE: 48 BPM

## 2023-06-15 NOTE — CONFIDENTIAL NOTE
Diagnoses: Vasculitis    DATE RECEIVED: 07.13.2023   NOTES STATUS DETAILS   OFFICE NOTE from referring provider Internal 06.06.2023 Kris Pride DO   OFFICE NOTE from other specialist      *Only VASCULITIS or LUPUS gather office notes for the following     *PULMONARY       *ENT     *DERMATOLOGY     *RHEUMATOLOGY     DISCHARGE SUMMARY from hospital Internal 06.05.2023 [facility]   DISCHARGE REPORT from the ER     MEDICATION LIST Internal    IMAGING  (NEED IMAGES AND REPORTS)     KIDNEY CT SCAN     KIDNEY ULTRASOUND     MR ABDOMEN     NUCLEAR MEDICINE RENAL     LABS     CBC Internal 06.07.2023   CMP Internal 06.07.2023   BMP Internal 06.07.2023   UA Internal 05.09.2023   URINE PROTEIN Internal 05.09.2023   RENAL PANEL     BIOPSY     KIDNEY BIOPSY

## 2023-06-28 DIAGNOSIS — J38.6 SUBGLOTTIC STENOSIS: ICD-10-CM

## 2023-06-28 RX ORDER — CIPROFLOXACIN AND DEXAMETHASONE 3; 1 MG/ML; MG/ML
4 SUSPENSION/ DROPS AURICULAR (OTIC) 2 TIMES DAILY
Qty: 25 ML | Refills: 3 | Status: SHIPPED | OUTPATIENT
Start: 2023-06-28 | End: 2023-06-29

## 2023-06-28 NOTE — TELEPHONE ENCOUNTER
Medication: ciprofloxacin-dexamethasone 0.3-0.1 % otic suspension  Last prescribing provider: Dr. Joe Sutherland    Last clinic visit date: 6/19/23 video visit    Any missed appointments or no-shows since last clinic visit?: no    Recommendations for requested medication (if none, N/A): One refill lasts her two days. Just opened up the last refill of three today. Can't pay out of pocket d/t expense    Next clinic visit date: not yet scheduled    Any other pertinent information (if none, N/A): NA    Pended and Routed to Provider: Dr. Joe Sutherland and Care Team

## 2023-06-29 ENCOUNTER — TELEPHONE (OUTPATIENT)
Dept: PULMONOLOGY | Facility: CLINIC | Age: 38
End: 2023-06-29
Payer: MEDICARE

## 2023-06-29 DIAGNOSIS — J38.6 SUBGLOTTIC STENOSIS: ICD-10-CM

## 2023-06-29 RX ORDER — CIPROFLOXACIN AND DEXAMETHASONE 3; 1 MG/ML; MG/ML
4 SUSPENSION/ DROPS AURICULAR (OTIC) 2 TIMES DAILY
Qty: 25 ML | Refills: 11 | Status: SHIPPED | OUTPATIENT
Start: 2023-06-29 | End: 2023-09-27

## 2023-06-29 NOTE — TELEPHONE ENCOUNTER
[facility] called [facility] - inquiring about what the length of therapy is going to be for ciprodex drops Dr. Sutherland prescribed for pt.     [facility] sent message to Dr. Sutherland to have him modify order or call [facility] at [phone] to update them.

## 2023-07-03 ENCOUNTER — APPOINTMENT (OUTPATIENT)
Dept: GENERAL RADIOLOGY | Facility: CLINIC | Age: 38
End: 2023-07-03
Attending: EMERGENCY MEDICINE
Payer: MEDICARE

## 2023-07-03 ENCOUNTER — HOSPITAL ENCOUNTER (EMERGENCY)
Facility: CLINIC | Age: 38
Discharge: HOME | End: 2023-07-04
Attending: EMERGENCY MEDICINE | Admitting: EMERGENCY MEDICINE
Payer: MEDICARE

## 2023-07-03 DIAGNOSIS — M79.662 PAIN OF LEFT LOWER LEG: ICD-10-CM

## 2023-07-03 PROCEDURE — 99283 EMERGENCY DEPT VISIT LOW MDM: CPT

## 2023-07-03 PROCEDURE — 73590 X-RAY EXAM OF LOWER LEG: CPT | Mod: LT

## 2023-07-03 PROCEDURE — 73610 X-RAY EXAM OF ANKLE: CPT | Mod: LT

## 2023-07-03 PROCEDURE — 250N000013 HC RX MED GY IP 250 OP 250 PS 637: Performed by: EMERGENCY MEDICINE

## 2023-07-03 RX ORDER — ACETAMINOPHEN 325 MG/1
650 TABLET ORAL ONCE
Status: COMPLETED | OUTPATIENT
Start: 2023-07-03 | End: 2023-07-03

## 2023-07-03 RX ADMIN — ACETAMINOPHEN 650 MG: 325 TABLET, FILM COATED ORAL at 20:07

## 2023-07-03 ASSESSMENT — ACTIVITIES OF DAILY LIVING (ADL)
ADLS_ACUITY_SCORE: 35
ADLS_ACUITY_SCORE: 35

## 2023-07-04 VITALS
RESPIRATION RATE: 20 BRPM | SYSTOLIC BLOOD PRESSURE: 101 MMHG | OXYGEN SATURATION: 97 % | DIASTOLIC BLOOD PRESSURE: 68 MMHG | HEART RATE: 66 BPM | TEMPERATURE: 98.5 F

## 2023-07-04 ASSESSMENT — ACTIVITIES OF DAILY LIVING (ADL)
ADLS_ACUITY_SCORE: 35

## 2023-07-04 NOTE — ED NOTES
Attempted to contact pt's guardian [employer] [place] at both listed phone numbers, no answer.

## 2023-07-04 NOTE — ED PROVIDER NOTES
History     Chief Complaint:  Leg Swelling       HPI   Dionne Perez is a 38 year old female with history of hypertension and anemia who presents to the ED for leg swelling. The patient reports for a week she has been having left leg swelling more than her right leg. She confirms that her left leg pain is only in her anterior left leg. She states that she has had left leg discoloration for about a year. She denies recent fever, redness, injury to her left leg, fall, strain to her leg, left calf pain, or posterior left leg pain.  She reports she is here due to the ongoing pain although it is not worse.  She denies chest pain or shortness of breath.      Independent Historian:   None - Patient Only    Review of External Notes:   Outpatient clinic notes reviewed.  Outpatient DVT study and foot x-ray reviewed.      Medications:    Fluticasone propionate  Albuterol sulfate   Albuterol neb  Pulmicort flex inhaler   Cephalexin  Escitalopram oxalate   Levothyroxine  Minocycline  Medroxyprogesterone  Ipratropium-albuterol inhaler   Ipratropium neb       Past Medical History:    Altered mental status  Asthma  Agitation  Aggression  Abnormal behavior  Acute costochondritis  Adjustment reaction  Adjustment reaction with aggression  Asthma, moderate persistent  Behavior problem, adult  Cholelithiasis  Chronic abdominal pain  Cognitive impairment  Contusion of left hip  Developmental disability  Essential hypertension  Gallstone pancreatitis  Generalized anxiety disorder  Hyperlipemia  Hypothyroidism due to acquired atrophy of thyroid  Major depressive disorder, recurrent severe without psychotic features  Insomnia  Major neurocognitive disorder   Morbid obesity   Recurrent major depressive disorder  Sinus bradycardia, chronic  Suicidal ideations  Secondary amenorrhea  Syncope  Dyspnea  Acquired hammer toes of both feet  Anemia  Callus  Cellulitis  Pre-syncope  Primary osteoarthritis of left knee  Insomnia   GERD    Past  Surgical History:    Bronchoscopy flexible and rigid  Lumbar puncture   Tonsillectomy  Cholycystectomy       Physical Exam     Patient Vitals for the past 24 hrs:   BP Temp Pulse Resp SpO2   07/03/23 1930 -- -- -- -- 96 %   07/03/23 1916 115/79 98.5  F (36.9  C) 66 20 97 %        Physical Exam  General: Large female sitting upright  CV:  Regular rate and rhythm  Resp:  Normal respiratory effort.  MSK: Obese.  No pitting edema.  Generalized increased adipose tissue versus soft tissue swelling of both lower extremities, asymmetry noted at the left lower leg.  Mild generalized tenderness of the left ankle without palpable effusion.  Tender over the anterior lower leg.  No posterior left leg tenderness.  Nontender in the popliteal fossa or medial thigh.    Skin: Warm and dry.  Chronic appearing skin discoloration of the left lower leg at the ankle.  No erythema.  No increased warmth to touch.   Neuro: Alert and oriented. Responds appropriately to all questions and commands. No focal findings appreciated. Normal muscle tone.  Sensation intact to light touch over all dermatomes of the left lower leg.  Psych: Flat affect.  Otherwise appropriate.    Emergency Department Course   Imaging:  XR Ankle Left G/E 3 Views   Final Result   IMPRESSION: There is diffuse soft tissue swelling over the ankle. No acute left ankle or tibia or fibula fracture is identified. Ankle mortise is congruent. There is mild medial and lateral compartment knee degenerative arthrosis with multiple suspected    intra-articular loose bodies in the suprapatellar joint recess measuring up to 14 mm.      XR Tibia and Fibula Left 2 Views   Final Result   IMPRESSION: There is diffuse soft tissue swelling over the ankle. No acute left ankle or tibia or fibula fracture is identified. Ankle mortise is congruent. There is mild medial and lateral compartment knee degenerative arthrosis with multiple suspected    intra-articular loose bodies in the suprapatellar  joint recess measuring up to 14 mm.         Report per radiology    Emergency Department Course & Assessments:     Interventions:  Medications   acetaminophen (TYLENOL) tablet 650 mg (650 mg Oral $Given 7/3/23 2007)        Independent Interpretation (X-rays, CTs, rhythm strip):  I reviewed the patient's x-ray.  No evidence of acute fracture.    Assessments/Consultations/Discussion of Management or Tests:  ED Course as of 07/03/23 2046 Mon Jul 03, 2023 1937 I obtained history and examined the patient as noted above.    2035 I rechecked the patient and explained findings.    The patient denies any new concerns.  She feels comfortable to plan of discharge.    Social Determinants of Health affecting care:   None    Disposition:  The patient was discharged to home.     Impression & Plan    Medical Decision Making:  Dionne Perez is a 38-year-old obese female who presents emergency department with ongoing left lower leg pain localizing to the anterior lower leg and ankle.  She was on recent antibiotic but today there is no evidence of infection.  No evidence cellulitis or deeper tissue infection.  No evidence of joint effusion or concern for septic arthritis.  I do suspect there may be some involvement of arthritis or joint pain secondary to body habitus.  The patient is at this point safe for discharge home with further evaluation by primary care provider and orthopedics with ongoing pain.  She is agreeable with this plan.  All questions answered prior to discharge.  Ace wrap, rest, use of cane reports crutches are difficult for her to use, and ongoing OTC pain medications recommended.    Diagnosis:    ICD-10-CM    1. Pain of left lower leg  M79.662            Scribe Disclosure:  I, Bettina Russ, am serving as a scribe at 8:47 PM on 7/3/2023 to document services personally performed by Razia Leroy MD based on my observations and the provider's statements to me.     7/3/2023   Razia Leroy MD       Razia Leroy MD  07/04/23 1707

## 2023-07-04 NOTE — ED NOTES
Bed: ED15  Expected date: 7/3/23  Expected time: 6:59 PM  Means of arrival: Ambulance  Comments:  BSV 2 37  leg swelling

## 2023-07-04 NOTE — ED NOTES
Patient alert and oriented. Respirations even and unlabored. Refused last set of vitals\. All discharge education given. All questions answered. All medications explained in detail. Patient denies further needs and states that they are ready to leave. Patient ambulated out of the ER with steady gait.

## 2023-07-04 NOTE — ED NOTES
2103 Called group [facility] to give update and discuss pt returning. No answer, left voicemail.    2147 Called  again, no answer.    2226 Called  again, no answer, left voicemail.     2325 Called  again, no answer.    [phone] is listed phone number for , line appears to be out of service.     [phone] directs to call [phone] (staff line)

## 2023-07-04 NOTE — ED NOTES
RN attempted to call  to give report on pt. No answer. Multiple voicemails have been left with no response.

## 2023-07-04 NOTE — ED NOTES
Informed by security that pt has been calling 911 to get a ride back to . Educated pt that this is not appropriate behavior, pt agreeable to appropriate use of cell phone. Informed pt that we need to get a hold of her [facility] before allowing her to go.

## 2023-07-04 NOTE — ED TRIAGE NOTES
Pt with leg swelling for 2 weeks, seen last Monday at , prescribed antibiotics, has not finished and is noncompliant with these medications. Pt states she has finished them, but EMS found half full bottle.  Swelling is much improved since Monday. Still having pain

## 2023-07-04 NOTE — ED NOTES
Report received, assumed care of patient, patient resting in bed, eyes closed, resp even and non-labored.  Unable to contact group home despite multiple attempts by previous RN and PD.

## 2023-07-13 ENCOUNTER — PRE VISIT (OUTPATIENT)
Dept: NEPHROLOGY | Facility: CLINIC | Age: 38
End: 2023-07-13

## 2023-07-19 ENCOUNTER — TELEPHONE (OUTPATIENT)
Dept: PULMONOLOGY | Facility: CLINIC | Age: 38
End: 2023-07-19
Payer: MEDICARE

## 2023-07-20 NOTE — TELEPHONE ENCOUNTER
Writer contacted patient's group home staff line to schedule IP procedure. Left detailed message with callback number [phone].    Estuardo Pete on 7/20/2023 at 9:11 AM

## 2023-07-25 ENCOUNTER — HOSPITAL ENCOUNTER (EMERGENCY)
Facility: CLINIC | Age: 38
Discharge: HOME | End: 2023-07-25
Attending: EMERGENCY MEDICINE | Admitting: EMERGENCY MEDICINE
Payer: MEDICARE

## 2023-07-25 ENCOUNTER — APPOINTMENT (OUTPATIENT)
Dept: ULTRASOUND IMAGING | Facility: CLINIC | Age: 38
End: 2023-07-25
Attending: EMERGENCY MEDICINE
Payer: MEDICARE

## 2023-07-25 VITALS
TEMPERATURE: 98 F | DIASTOLIC BLOOD PRESSURE: 81 MMHG | SYSTOLIC BLOOD PRESSURE: 121 MMHG | OXYGEN SATURATION: 95 % | HEART RATE: 63 BPM

## 2023-07-25 DIAGNOSIS — R10.13 EPIGASTRIC PAIN: ICD-10-CM

## 2023-07-25 DIAGNOSIS — K29.00 ACUTE GASTRITIS WITHOUT HEMORRHAGE, UNSPECIFIED GASTRITIS TYPE: ICD-10-CM

## 2023-07-25 LAB
ALBUMIN SERPL BCG-MCNC: 3.7 G/DL (ref 3.5–5.2)
ALBUMIN UR-MCNC: 20 MG/DL
ALP SERPL-CCNC: 92 U/L (ref 35–104)
ALT SERPL W P-5'-P-CCNC: 27 U/L (ref 0–50)
ANION GAP SERPL CALCULATED.3IONS-SCNC: 7 MMOL/L (ref 7–15)
APPEARANCE UR: CLEAR
AST SERPL W P-5'-P-CCNC: 31 U/L (ref 0–45)
BASOPHILS # BLD AUTO: 0 10E3/UL (ref 0–0.2)
BASOPHILS NFR BLD AUTO: 0 %
BILIRUB SERPL-MCNC: 0.6 MG/DL
BILIRUB UR QL STRIP: NEGATIVE
BUN SERPL-MCNC: 17.6 MG/DL (ref 6–20)
CALCIUM SERPL-MCNC: 9.2 MG/DL (ref 8.6–10)
CAOX CRY #/AREA URNS HPF: ABNORMAL /HPF
CHLORIDE SERPL-SCNC: 103 MMOL/L (ref 98–107)
COLOR UR AUTO: YELLOW
CREAT SERPL-MCNC: 0.8 MG/DL (ref 0.51–0.95)
DEPRECATED HCO3 PLAS-SCNC: 30 MMOL/L (ref 22–29)
EOSINOPHIL # BLD AUTO: 0.1 10E3/UL (ref 0–0.7)
EOSINOPHIL NFR BLD AUTO: 1 %
ERYTHROCYTE [DISTWIDTH] IN BLOOD BY AUTOMATED COUNT: 14 % (ref 10–15)
GFR SERPL CREATININE-BSD FRML MDRD: >90 ML/MIN/1.73M2
GLUCOSE SERPL-MCNC: 91 MG/DL (ref 70–99)
GLUCOSE UR STRIP-MCNC: NEGATIVE MG/DL
HCG SERPL QL: NEGATIVE
HCT VFR BLD AUTO: 35.8 % (ref 35–47)
HGB BLD-MCNC: 10.8 G/DL (ref 11.7–15.7)
HGB UR QL STRIP: NEGATIVE
HYALINE CASTS: 17 /LPF
IMM GRANULOCYTES # BLD: 0 10E3/UL
IMM GRANULOCYTES NFR BLD: 0 %
KETONES UR STRIP-MCNC: ABNORMAL MG/DL
LEUKOCYTE ESTERASE UR QL STRIP: NEGATIVE
LIPASE SERPL-CCNC: 40 U/L (ref 13–60)
LYMPHOCYTES # BLD AUTO: 1.3 10E3/UL (ref 0.8–5.3)
LYMPHOCYTES NFR BLD AUTO: 18 %
MCH RBC QN AUTO: 27.3 PG (ref 26.5–33)
MCHC RBC AUTO-ENTMCNC: 30.2 G/DL (ref 31.5–36.5)
MCV RBC AUTO: 90 FL (ref 78–100)
MONOCYTES # BLD AUTO: 0.4 10E3/UL (ref 0–1.3)
MONOCYTES NFR BLD AUTO: 6 %
MUCOUS THREADS #/AREA URNS LPF: PRESENT /LPF
NEUTROPHILS # BLD AUTO: 5.4 10E3/UL (ref 1.6–8.3)
NEUTROPHILS NFR BLD AUTO: 75 %
NITRATE UR QL: NEGATIVE
NRBC # BLD AUTO: 0 10E3/UL
NRBC BLD AUTO-RTO: 0 /100
PH UR STRIP: 5.5 [PH] (ref 5–7)
PLATELET # BLD AUTO: 141 10E3/UL (ref 150–450)
POTASSIUM SERPL-SCNC: 4.3 MMOL/L (ref 3.4–5.3)
PROT SERPL-MCNC: 6.4 G/DL (ref 6.4–8.3)
RBC # BLD AUTO: 3.96 10E6/UL (ref 3.8–5.2)
RBC URINE: 2 /HPF
SODIUM SERPL-SCNC: 140 MMOL/L (ref 136–145)
SP GR UR STRIP: 1.03 (ref 1–1.03)
SQUAMOUS EPITHELIAL: 2 /HPF
UROBILINOGEN UR STRIP-MCNC: NORMAL MG/DL
WBC # BLD AUTO: 7.3 10E3/UL (ref 4–11)
WBC URINE: 2 /HPF

## 2023-07-25 PROCEDURE — 250N000009 HC RX 250: Performed by: EMERGENCY MEDICINE

## 2023-07-25 PROCEDURE — 36415 COLL VENOUS BLD VENIPUNCTURE: CPT | Performed by: EMERGENCY MEDICINE

## 2023-07-25 PROCEDURE — 85025 COMPLETE CBC W/AUTO DIFF WBC: CPT | Performed by: EMERGENCY MEDICINE

## 2023-07-25 PROCEDURE — 258N000003 HC RX IP 258 OP 636: Performed by: EMERGENCY MEDICINE

## 2023-07-25 PROCEDURE — 76705 ECHO EXAM OF ABDOMEN: CPT

## 2023-07-25 PROCEDURE — 96360 HYDRATION IV INFUSION INIT: CPT

## 2023-07-25 PROCEDURE — 80053 COMPREHEN METABOLIC PANEL: CPT | Performed by: EMERGENCY MEDICINE

## 2023-07-25 PROCEDURE — 271N000002 HC RX 271: Performed by: EMERGENCY MEDICINE

## 2023-07-25 PROCEDURE — 83690 ASSAY OF LIPASE: CPT | Performed by: EMERGENCY MEDICINE

## 2023-07-25 PROCEDURE — 81001 URINALYSIS AUTO W/SCOPE: CPT | Performed by: EMERGENCY MEDICINE

## 2023-07-25 PROCEDURE — 84703 CHORIONIC GONADOTROPIN ASSAY: CPT | Performed by: EMERGENCY MEDICINE

## 2023-07-25 PROCEDURE — 96361 HYDRATE IV INFUSION ADD-ON: CPT

## 2023-07-25 PROCEDURE — 250N000013 HC RX MED GY IP 250 OP 250 PS 637: Performed by: EMERGENCY MEDICINE

## 2023-07-25 PROCEDURE — 99284 EMERGENCY DEPT VISIT MOD MDM: CPT | Mod: 25

## 2023-07-25 RX ORDER — LIDOCAINE HYDROCHLORIDE 20 MG/ML
15 SOLUTION OROPHARYNGEAL ONCE
Status: COMPLETED | OUTPATIENT
Start: 2023-07-25 | End: 2023-07-25

## 2023-07-25 RX ORDER — PANTOPRAZOLE SODIUM 40 MG/1
40 TABLET, DELAYED RELEASE ORAL DAILY
Qty: 30 TABLET | Refills: 0 | Status: SHIPPED | OUTPATIENT
Start: 2023-07-25 | End: 2023-07-25

## 2023-07-25 RX ORDER — PANTOPRAZOLE SODIUM 40 MG/1
40 TABLET, DELAYED RELEASE ORAL DAILY
Qty: 30 TABLET | Refills: 0 | Status: SHIPPED | OUTPATIENT
Start: 2023-07-25 | End: 2023-11-28

## 2023-07-25 RX ORDER — MAGNESIUM HYDROXIDE/ALUMINUM HYDROXICE/SIMETHICONE 120; 1200; 1200 MG/30ML; MG/30ML; MG/30ML
30 SUSPENSION ORAL ONCE
Status: COMPLETED | OUTPATIENT
Start: 2023-07-25 | End: 2023-07-25

## 2023-07-25 RX ADMIN — LIDOCAINE HYDROCHLORIDE 15 ML: 1 POWDER at 08:43

## 2023-07-25 RX ADMIN — SODIUM CHLORIDE 1000 ML: 9 INJECTION, SOLUTION INTRAVENOUS at 08:43

## 2023-07-25 RX ADMIN — ALUMINUM HYDROXIDE, MAGNESIUM HYDROXIDE, AND DIMETHICONE 30 ML: 200; 20; 200 SUSPENSION ORAL at 08:43

## 2023-07-25 ASSESSMENT — ACTIVITIES OF DAILY LIVING (ADL)
ADLS_ACUITY_SCORE: 35

## 2023-07-25 NOTE — ED TRIAGE NOTES
Arrived by EMS from . Patient called 911 without alerting  staff. Pt states she has dizziness with movement and generalized abd pain for the past 2 hours.      Triage Assessment       Row Name 07/25/23 0640       Triage Assessment (Adult)    Airway WDL WDL       Respiratory WDL    Respiratory WDL WDL       Skin Circulation/Temperature WDL    Skin Circulation/Temperature WDL WDL       Cardiac WDL    Cardiac WDL WDL       Peripheral/Neurovascular WDL    Peripheral Neurovascular WDL WDL       Cognitive/Neuro/Behavioral WDL    Cognitive/Neuro/Behavioral WDL WDL

## 2023-07-25 NOTE — ED NOTES
Called [facility] (). Manager made aware that patient was here as staff did not contact her. Manager in agreement with patient discharging home.

## 2023-07-25 NOTE — ED NOTES
Pt is up for discharge. Writer attempted to call Group Home at [phone]. There was no answer. Left a VM including pts initials, pts readiness for discharge/ ride need and phone number to call [facility].

## 2023-07-25 NOTE — ED PROVIDER NOTES
History     Chief Complaint:  Dizziness and Abdominal Pain     The history is provided by the patient.      Dionne Perez is a 38 year old female with a history of chronic abdominal pain, hypertension, hyperlipidemia, GERD, and gallstone pancreatitis who presents via EMS from group home with upper abdominal pain and dizziness which she describes as lightheadedness which she woke up with this morning. Abdominal pain does not improve with use of heat. She denies room-spinning dizziness, nausea, vomiting, changes in bowel movements, dysuria, chest pain, or shortness of breath. She notes her last bowel movement was four days ago. She notes she does not get her period. She denies smoking or drinking alcohol.    Independent Historian:   None - Patient Only    Review of External Notes:   See MDM.     Medications:    Advair inhaler  Albuterol inhaler  Aspirin 325 MG  Budesonide inhaler  Buspirone   Dextromethorphan-guaifenesin   Docusate sodium  Escitalopram   Ipratropium - albuterol neb solution  Levothyroxine   Medroxyprogesterone   Olanzapine zydis  Pravastatin   Pulmicort inhaler  Probiotic Product  Risperidone   Serevent diskus inhaler  Sodium chloride  Trazodone     Past Medical History:    Generalized anxiety disorder  Asthma  Cholelithiasis  Depressive disorder  GERD  Hypercholesterolemia  Hypothyroidism  Obesity  Major cognitive disorder  Acquired hammer toes of both feet  Chronic abdominal pain   Iron deficiency anemia  Adjustment reaction with aggression  Gallstone pancreatitis   History of cellulitis  Primary osteoarthritis of left knee  Hyperlipidemia   Essential hypertension   Secondary amenorrhea     Past Surgical History:    Bronchoscopy flexible and rigid  IR lumbar puncture  Tonsillectomy      Physical Exam   Patient Vitals for the past 24 hrs:   BP Temp Temp src Pulse SpO2   07/25/23 0700 -- -- -- -- 95 %   07/25/23 0645 -- -- -- -- 96 %   07/25/23 0637 121/81 98  F (36.7  C) Oral 63 94 %       Physical Exam  Constitutional: Well developed, obese, nontox appearance  Head: Atraumatic.   Mouth/Throat: Oropharynx is clear and moist.   Neck:  no stridor  Eyes: no scleral icterus  Cardiovascular: RRR, 2+ bilat radial pulses  Pulmonary/Chest: nml resp effort,  Abdominal: ND, soft, periumbilical and epigastric tenderness, no rebound or guarding   Ext: Warm, well perfused, no edema  Neurological: A&O, symmetric facies, moves ext x4  Skin: Skin is warm and dry.   Psychiatric: Behavior is normal. Thought content normal.   Nursing note and vitals reviewed.    Emergency Department Course     Imaging:  US Abdomen Limited (RUQ)   Final Result   IMPRESSION:  Diffuse increased hepatic echogenicity, likely due to   underlying chronic parenchymal liver disease including hepatic   steatosis with or without fibrosis. No focal hepatic mass visualized.      MARAH MARY MD            SYSTEM ID:  [id number]         Report per radiology    Laboratory:  Labs Ordered and Resulted from Time of ED Arrival to Time of ED Departure   COMPREHENSIVE METABOLIC PANEL - Abnormal       Result Value    Sodium 140      Potassium 4.3      Chloride 103      Carbon Dioxide (CO2) 30 (*)     Anion Gap 7      Urea Nitrogen 17.6      Creatinine 0.80      Calcium 9.2      Glucose 91      Alkaline Phosphatase 92      AST 31      ALT 27      Protein Total 6.4      Albumin 3.7      Bilirubin Total 0.6      GFR Estimate >90     ROUTINE UA WITH MICROSCOPIC REFLEX TO CULTURE - Abnormal    Color Urine Yellow      Appearance Urine Clear      Glucose Urine Negative      Bilirubin Urine Negative      Ketones Urine Trace (*)     Specific Gravity Urine 1.035      Blood Urine Negative      pH Urine 5.5      Protein Albumin Urine 20 (*)     Urobilinogen Urine Normal      Nitrite Urine Negative      Leukocyte Esterase Urine Negative      Mucus Urine Present (*)     Calcium Oxalate Crystals Urine Moderate (*)     RBC Urine 2      WBC Urine 2      Squamous  Epithelials Urine 2 (*)     Hyaline Casts Urine 17 (*)    CBC WITH PLATELETS AND DIFFERENTIAL - Abnormal    WBC Count 7.3      RBC Count 3.96      Hemoglobin 10.8 (*)     Hematocrit 35.8      MCV 90      MCH 27.3      MCHC 30.2 (*)     RDW 14.0      Platelet Count 141 (*)     % Neutrophils 75      % Lymphocytes 18      % Monocytes 6      % Eosinophils 1      % Basophils 0      % Immature Granulocytes 0      NRBCs per 100 WBC 0      Absolute Neutrophils 5.4      Absolute Lymphocytes 1.3      Absolute Monocytes 0.4      Absolute Eosinophils 0.1      Absolute Basophils 0.0      Absolute Immature Granulocytes 0.0      Absolute NRBCs 0.0     LIPASE - Normal    Lipase 40     HCG QUALITATIVE PREGNANCY - Normal    hCG Serum Qualitative Negative        Emergency Department Course & Assessments:    Interventions:  Medications   lidocaine (viscous) (XYLOCAINE) 2 % solution 15 mL (15 mLs Mouth/Throat $Given 23 08)   alum & mag hydroxide-simethicone (MAALOX) suspension 30 mL (30 mLs Oral $Given 23 08)   0.9% sodium chloride BOLUS (1,000 mLs Intravenous $New Bag 23 08)      Independent Interpretation (X-rays, CTs, rhythm strip):  See MDM.     Assessments/Consultations/Discussion of Management or Tests:  ED Course as of 23 1149   Tue 2023   0712 I obtained the patient's history and examined as noted above.    1148 I attempted to reach the patient's guardian.      Social Determinants of Health affecting care:   See MDM.     Disposition:  The patient was discharged to home.     Impression & Plan      Medical Decision Makin year old female presenting w/ epigastric abdominal pain, periumbilical and epigastric tenderness     Social determinants affecting patient's health include: Developmental disability potentially increasing risk for presentation to the emergency department     I reviewed medical records from ED visit on 7/3/2023, audiology office visit on 2023     DDx includes gastritis,  pancreatitis, hepatitis, biliary pathology such as cholelithiasis or cholecystitis, abdominal pain NOS.  Doubt ACS, vascular catastrophe such as AAA or dissection given history, risk factors and physical exam.  Labs significant no remarkable abnormality.  Imaging sig for no remarkable abnormality.  Interventions as noted above with improvement in symptoms.  Upon recheck, patient reports that the GI cocktail helped her symptoms.  Presentation consistent with abdominal pain NOS versus gastritis.  Prescription provided for PPI as noted below.  I attempted to contact the patient's guardian but was unable to reach them.  At this time I feel the pt is safe for discharge.  Recommendations given regarding follow up with PCP and return to the emergency department as needed for new or worsening symptoms.  Pt counseled on all results, disposition and diagnosis.  They are understanding and agreeable to plan. Patient discharged in stable condition.      Diagnosis:    ICD-10-CM    1. Epigastric pain  R10.13       2. Acute gastritis without hemorrhage, unspecified gastritis type  K29.00          Discharge Medications:  New Prescriptions    PANTOPRAZOLE (PROTONIX) 40 MG EC TABLET    Take 1 tablet (40 mg) by mouth daily for 30 days      Scribe Disclosure:  Connie BARKLEY, am serving as a scribe at 6:55 AM on 7/25/2023 to document services personally performed by Sylvester Rascon MD based on my observations and the provider's statements to me.      7/25/2023   Sylvester Rascon MD Vaughn, Christopher E, MD  07/25/23 8434

## 2023-07-25 NOTE — ED NOTES
Patient requesting food. Per the patient's care plan  recommend avoid measures that would reinforce ED visits (ie, avoid iPad, coffee, ordering meals, etc.) that are unnecessary but may be part of secondary gain

## 2023-07-26 NOTE — TELEPHONE ENCOUNTER
FUTURE VISIT INFORMATION      SURGERY INFORMATION:  Date: 23  Location: uu or  Surgeon:  Joe Sutherland MD   Anesthesia Type:  general  Procedure: Flexible, Rigid Bronchoscopy, Tumor/Tissue debulking with Carbon dioxide  Laser,  possible Stent placement     RECORDS REQUESTED FROM:       Primary Care Provider: Alexsander Pittman APRN, CNP  - [facility]    Pertinent Medical History: hypertension, sinus bradycardia, pre-syncope    Most recent EKG+ Tracin23    Most recent ECHO: 20    Most recent PFT's: 23- [facility]

## 2023-07-27 ENCOUNTER — TELEPHONE (OUTPATIENT)
Dept: PULMONOLOGY | Facility: CLINIC | Age: 38
End: 2023-07-27

## 2023-07-27 ENCOUNTER — PRE VISIT (OUTPATIENT)
Dept: SURGERY | Facility: CLINIC | Age: 38
End: 2023-07-27

## 2023-07-27 ENCOUNTER — ANESTHESIA EVENT (OUTPATIENT)
Dept: SURGERY | Facility: CLINIC | Age: 38
End: 2023-07-27
Payer: MEDICARE

## 2023-07-27 NOTE — TELEPHONE ENCOUNTER
RNCC contacted pt with instruction to hold aspirin 7/31 - 8/4, for upcoming procedure on 8/4.  Pt verbalized understanding.  Confirmed pre-op appointment on 8/1 and confirmed that pt has received written instruction for procedure prep that was emailed by procedure , today.

## 2023-07-29 ENCOUNTER — HOSPITAL ENCOUNTER (EMERGENCY)
Facility: CLINIC | Age: 38
Discharge: HOME | End: 2023-07-29
Attending: EMERGENCY MEDICINE | Admitting: EMERGENCY MEDICINE
Payer: MEDICARE

## 2023-07-29 ENCOUNTER — APPOINTMENT (OUTPATIENT)
Dept: GENERAL RADIOLOGY | Facility: CLINIC | Age: 38
End: 2023-07-29
Attending: EMERGENCY MEDICINE
Payer: MEDICARE

## 2023-07-29 VITALS
OXYGEN SATURATION: 97 % | SYSTOLIC BLOOD PRESSURE: 149 MMHG | HEART RATE: 65 BPM | RESPIRATION RATE: 18 BRPM | DIASTOLIC BLOOD PRESSURE: 74 MMHG | TEMPERATURE: 98.1 F

## 2023-07-29 DIAGNOSIS — M25.561 ACUTE PAIN OF RIGHT KNEE: ICD-10-CM

## 2023-07-29 PROCEDURE — 99283 EMERGENCY DEPT VISIT LOW MDM: CPT

## 2023-07-29 PROCEDURE — 73562 X-RAY EXAM OF KNEE 3: CPT | Mod: RT

## 2023-07-29 ASSESSMENT — ACTIVITIES OF DAILY LIVING (ADL): ADLS_ACUITY_SCORE: 35

## 2023-07-29 NOTE — ED PROVIDER NOTES
History     Chief Complaint:  Knee Pain       HPI   Dionne Perez is a 38 year old female with history of hypertension who presents with right knee pain that she started noticing four days ago which got worse last night. She reports the knee pain getting worse with ambulation last night. She reports the pain being better today, but did begin using a cane. She took Tylenol and hot water for the pain last night. She denies any falls or trauma, numbness or tingling in her extremities, feeling like her knee would give out with ambulation, fever, or erythema.    Independent Historian:    The patient provided history as noted above.    Review of External Notes:  None      Medications:    Albuterol  Keflex  Minocin  Aspirin 325 mg  Buspar  Colace  Lexapro  Synthroid  Zyprexa  Protonix  Pravachol  Risperdal  Desyrel    Past Medical History:    ROSY  Asthma  Cholelithiasis  Depressive disorder  GERD  Hypercholesterolemia  Hypothyroidism  Obesity  Major cognitive disorder  Acquired hammer toes of both feet  Chronic abdominal pain   Iron deficiency anemia  Adjustment reaction with aggression  Gallstone pancreatitis   Cellulitis  Primary osteoarthritis of left knee  Hyperlipidemia   Hypertension   Secondary amenorrhea     Past Surgical History:    Bronchoscopy flexible and rigid  IR lumbar puncture  Tonsillectomy      Physical Exam     Patient Vitals for the past 24 hrs:   BP Temp Temp src Pulse Resp SpO2   07/29/23 1529 (!) 149/74 -- -- 65 -- --   07/29/23 1528 -- 98.1  F (36.7  C) Temporal -- 18 97 %        Physical Exam  General: the patient is awake and interactive  HEENT:  Moist mucous membranes, conjunctiva normal  Pulmonary:  Normal respiratory effort  Cardiovascular:  Well perfused  Musculoskeletal:  Moving 4 extremities grossly wnl, no deformities  Right knee:  Normal alignment with no valgus or varus deformity.  No effusion.  No tenderness over patella or patellar tendon.  No medial or lateral instability.   Medial and lateral collateral ligaments clinically intact.  Negative anterior/posterior drawer sign.  No pain with flexion/extension.  DP/PT pulse 1+.  Neuro:  Speech normal, no focal deficits    Emergency Department Course     Imaging:  XR Knee Right 3 Views   Final Result   IMPRESSION: No fractures are evident. No knee joint effusion. Mild to moderate tricompartmental degenerative changes. Normal patellar alignment. Benign osteochondroma arising from the medial tibial metaphysis.        Report per radiology    Emergency Department Course & Assessments:       Interventions:  Medications - No data to display     Assessments:  1555 I obtained history and examined the patient as noted above.  1644 I rechecked and updated the patient on the findings from her Xray. I deemed the patient safe to discharge home.    Independent Interpretation (X-rays, CTs, rhythm strip):  None    Consultations/Discussion of Management or Tests:  None       Social Determinants of Health affecting care:  Stress/Adjustment Disorders     Disposition:  The patient was discharged to home.     Impression & Plan    CMS Diagnoses: None    Medical Decision Makin-year-old female with history of obesity and hypertension presenting for evaluation of atraumatic right knee pain.  Please above for details of HPI and exam.  She CMS intact to the affected extremity.  Radiographs of the knee show mild to moderate tricompartmental degenerative changes.  No signs of fracture or effusion.  No signs of septic joint or inflammatory arthropathy on exam.  No concern for vascular emergency such as DVT or arterial insufficiency/limb ischemia.  Suspect osteoarthritis as cause for her knee pain.  Recommend Ace wrap which was performed in the ER, Tylenol/NSAIDs, ice/heat, WBAT and follow-up with PCP.  She is comfortable this plan.  Discussed return precautions for the emergency department.  All questions answered prior to discharge.    Diagnosis:    ICD-10-CM    1.  Acute pain of right knee  M25.561            Discharge Medications:  Discharge Medication List as of 7/29/2023  4:51 PM      START taking these medications    Details   diclofenac (VOLTAREN) 1 % topical gel Apply 2 g topically 4 times daily as needed for moderate pain, Disp-50 g, R-0, Local Print            Scribe Disclosure:  FILIPPO, Steven Benitez, am serving as a scribe at 3:51 PM on 7/29/2023 to document services personally performed by Skyler Gates MD based on my observations and the provider's statements to me.               Skyler Gates MD  07/29/23 3065

## 2023-08-01 ENCOUNTER — OFFICE VISIT (OUTPATIENT)
Dept: SURGERY | Facility: CLINIC | Age: 38
End: 2023-08-01
Payer: MEDICARE

## 2023-08-01 VITALS
BODY MASS INDEX: 44.41 KG/M2 | SYSTOLIC BLOOD PRESSURE: 114 MMHG | TEMPERATURE: 97.9 F | OXYGEN SATURATION: 97 % | HEIGHT: 68 IN | WEIGHT: 293 LBS | DIASTOLIC BLOOD PRESSURE: 81 MMHG | HEART RATE: 63 BPM

## 2023-08-01 DIAGNOSIS — Z01.818 PRE-OP EVALUATION: Primary | ICD-10-CM

## 2023-08-01 PROCEDURE — 99204 OFFICE O/P NEW MOD 45 MIN: CPT | Performed by: NURSE PRACTITIONER

## 2023-08-01 ASSESSMENT — PAIN SCALES - GENERAL: PAINLEVEL: NO PAIN (0)

## 2023-08-01 ASSESSMENT — LIFESTYLE VARIABLES: TOBACCO_USE: 0

## 2023-08-01 NOTE — PROGRESS NOTES
[facility] Medication History Note  Medication history completed on August 1, 2023 by this writer prior to patient's PAC appointment. See Epic admission navigator for prior to admission medications. Operating room staff will still need to confirm medications and last dose information on day of surgery.     Medication history interview sources  Patient didn't know her meds per interview with PAC LPN,  med history updated as best able per surescripts information.  Marked as unknown if unable to verify.     Changes made to PTA medication list  Added: none  Deleted: pulmicort, serevent (pt now on advair hfa)  Changed: none    -- regular refills in surescripts for the following: trazodone, risperidone, pravastatin, protonix (new one this month), lexapro, flonase, levothyroxine, buspar, advair, albuterol and diclofenac gel (new this month)      Prior to Admission medications    Medication Sig Last Dose Taking? Auth Provider Long Term End Date   ADVAIR -21 MCG/ACT inhaler 2 puffs 2 times daily Taking Yes Reported, Patient Yes    albuterol (PROAIR HFA/PROVENTIL HFA/VENTOLIN HFA) 108 (90 Base) MCG/ACT inhaler Inhale 2 puffs into the lungs every 6 hours as needed for shortness of breath, wheezing or cough Taking Yes Carmelo Trevino MD Yes    busPIRone (BUSPAR) 15 MG tablet Take 15 mg by mouth 2 times daily Taking Yes Reported, Patient Yes    ciprofloxacin-dexamethasone (CIPRODEX) 0.3-0.1 % otic suspension Place 4 drops into the trachea 2 times daily for 90 days Unknown Yes Joe Sutherland MD  9/27/23   ciprofloxacin-dexamethasone (CIPRODEX) 0.3-0.1 % otic suspension Place 4 drops into the trachea 2 times daily Unknown Yes Mag Calvo MD     dextromethorphan-guaiFENesin (TUSSIN DM)  MG/5ML liquid Take 10 mLs by mouth Unknown Yes Reported, Patient     diclofenac (VOLTAREN) 1 % topical gel Apply 2 g topically 4 times daily as needed for moderate pain Taking Yes Skyler Gates,  MD     docusate sodium (COLACE) 100 MG capsule Take 100 mg by mouth every 3 days Unknown Yes Unknown, Entered By History     escitalopram (LEXAPRO) 20 MG tablet Take 20 mg by mouth daily Taking Yes Reported, Patient No    fluticasone (FLONASE) 50 MCG/ACT nasal spray Spray 2 sprays into both nostrils daily SHAKE LIQUID AND USE Taking Yes Reported, Patient     hydrocortisone 2.5 % cream APPLY TOPICALLY TWICE DAILY. CAN USE FOR 2-4 WEEKS Unknown Yes Reported, Patient     ipratropium - albuterol 0.5 mg/2.5 mg/3 mL (DUONEB) 0.5-2.5 (3) MG/3ML neb solution Take 1 vial (3 mLs) by nebulization every 6 hours as needed for shortness of breath, wheezing or cough Unknown Yes Fei Dowell MD Yes    levothyroxine (SYNTHROID/LEVOTHROID) 50 MCG tablet Take 50 mcg by mouth daily Taking Yes Unknown, Entered By History Yes    medroxyPROGESTERone (PROVERA) 10 MG tablet Take 10 mg by mouth Unknown Yes Reported, Patient     melatonin 3 MG tablet Take 3 mg by mouth At Bedtime Unknown Yes Reported, Patient     Multiple Vitamins-Minerals (EMERGEN-C IMMUNE PLUS/VIT D) CHEW Take 3 chew tab by mouth daily Unknown Yes Unknown, Entered By History     nystatin (MYCOSTATIN) 196175 UNIT/GM external cream Apply topically 2 times daily as needed for other (rash (around private areas)) Unknown Yes Unknown, Entered By History     OLANZapine zydis (ZYPREXA) 10 MG ODT Take 1 tablet (10 mg) by mouth 2 times daily as needed (associated with psychosis or anjel.) Unknown Yes Leopoldo Garcia MD Yes    pantoprazole (PROTONIX) 40 MG EC tablet Take 1 tablet (40 mg) by mouth daily Taking Yes Sylvester Rascon MD     Pediatric Multiple Vitamins (FLINTSTONES PLUS EXTRA C) CHEW Take 2 tablets by mouth Unknown Yes Reported, Patient     Pediatric Multivit-Minerals-C (CHEWABLES MULTIVITAMIN PO) Take 2 tablets by mouth daily Unknown Yes Unknown, Entered By History     pravastatin (PRAVACHOL) 40 MG tablet Take 40 mg by mouth daily Taking Yes Unknown, Entered  By History Yes    Probiotic Product (RISAQUAD-2) CAPS Take 1 capsule by mouth daily Unknown Yes Unknown, Entered By History     risperiDONE (RISPERDAL) 0.25 MG tablet Take 0.25 mg by mouth daily Taking Yes Reported, Patient Yes    sodium chloride, PF, (NORMAL SALINE FLUSH) 0.9% PF flush 3 mLs by Other route every 8 hours Unknown Yes Mag Calvo MD     traZODone (DESYREL) 50 MG tablet Take 50 mg by mouth At Bedtime Taking Yes Unknown, Entered By History Yes    aspirin (ASA) 325 MG EC tablet Take 325 mg by mouth daily  Patient not taking: Reported on 8/1/2023 Not Taking  Unknown, Entered By History          Medication History Completed By: Alek Lira RPH 8/1/2023 8:54 AM

## 2023-08-01 NOTE — PATIENT INSTRUCTIONS
Preparing for Your Surgery      Name:  Dionne Perez   MRN:  [id number]   :  1985   Today's Date:  2023       Arriving for surgery:  Surgery date:  23  Arrival time:  9:20 am    Please come to:     Please come to:      [facility] [address]  [address] [place]  [address]      The [facility] Patient /Visitor Ramp is located at [address]. Patients and visitors who self-park will receive the reduced hospital parking rate. If the Patient /Visitor Ramp is full, please follow the signs to the  parking located at the main hospital entrance.     parking is available ( 24 hours/ 7 days a week)    Discounted parking pass options are available for patients and visitors. They can be purchased at the [employer] desk at the main hospital entrance.    -    Stop at the security desk and they will direct surgery patients to the 3rd floor Surgery Waiting Room. [phone]     -  If you are in need of directions, wheelchair or escort please stop at the Information/security desk in the lobby.       What can I eat or drink?  -  You may eat and drink normally up to 8 hours prior to arrival time. (Until 1:20 am)  -  You may have clear liquids until 2 hours prior to arrival time. (Until 7:20 am)    Examples of clear liquids:  Water  Clear broth  Juices (apple, white grape, white cranberry  and cider) without pulp  Noncarbonated, powder based beverages  (lemonade and Mauricio-Aid)  Sodas (Sprite, 7-Up, ginger ale and seltzer)  Coffee or tea (without milk or cream)  Gatorade    -  No Alcohol or cannabis products for at least 24 hours before surgery.     Which medicines can I take?    Hold Aspirin for 7 days before surgery.   Hold Multivitamins for 7 days before surgery.  Hold Supplements for 7 days before surgery.  Hold Ibuprofen (Advil, Motrin) for 1 day(s) before surgery--unless otherwise directed by surgeon.  Hold Naproxen  (Aleve) for 4 days before surgery.    -  DO NOT take these medications the day of surgery:  Dextromethorphan-guaifenesin (Tussin)  Diclofenac (Voltaren) gel  Docusate (Colace)  Hydrocortisone cream  Multivitamin - stop now  Nystatin cream  Pediatric multivitamin - stop now  Probiotic    -  PLEASE TAKE these medications the night before or the day of surgery per your usual routine:  Advair inhaler  Albuterol (Proair) inhaler if needed and bring this day of procedure  Budesonide (Pulmicort) inhaler  Buspirone (Buspar)  Ciprofloxacin-dexamethasone (Ciprodex)  Escitalopram (Lexapro)  Fluticasone (Flonase) nasal spray  Duoneb if needed  Levothyroxine  Medroxyprogesterone (Provera)  Melatonin  Olanzapine (Zyprexa)  Pantoprazole (Protonix)  Pravastatin (Pravachol)  Risperidone (Risperdal)  Serevent diskus inhaler  Trazodone    How do I prepare myself?  - Please take 2 showers (one the night prior to surgery and one the morning of surgery) using Scrubcare or Hibiclens soap.    Use this soap only from the neck to your toes.     Leave the soap on your skin for one minute--then rinse thoroughly.      You may use your own shampoo and conditioner. No other hair products.   - Please remove all jewelry and body piercings.  - No lotions, deodorants or fragrance.  - No makeup or fingernail polish.   - Bring your ID and insurance card.      ALL PATIENTS GOING HOME THE SAME DAY OF SURGERY ARE REQUIRED TO HAVE A RESPONSIBLE ADULT TO DRIVE AND BE IN ATTENDANCE WITH THEM FOR 24 HOURS FOLLOWING SURGERY.    Covid testing policy as of 12/06/2022  Your surgeon will notify and schedule you for a COVID test if one is needed before surgery--please direct any questions or COVID symptoms to your surgeon      Questions or Concerns:    - For any questions regarding the day of surgery or your hospital stay, please contact the Pre Admission Nursing Office at [phone].       - If you have health changes between today and your surgery, please call  your surgeon.       - For questions after surgery, please call your surgeons office.           Current Visitor Guidelines    You may have 2 visitors in the pre op area.    Visiting hours: 8 a.m. to 8:30 p.m.    You may have four visitors during your inpatient hospital stay.    Patients confirmed or suspected to have symptoms of COVID 19 or flu:     No visitors allowed for adult patients.   Children (under age 18) can have 1 named visitor.     People who are sick or showing symptoms of COVID 19 or flu:    Are not allowed to visit patients--we can only make exceptions in special situations.       Please follow these guidelines for your visit:          Please maintain social distance          Masking is optional--however at times you may be asked to wear a mask for the safety of yourself and others     Clean your hands with alcohol hand . Do this when you arrive at and leave the building and patient room,    And again after you touch your mask or anything in the room.     Go directly to and from the room you are visiting.     Stay in the patient s room during your visit. Limit going to other places in the hospital as much as possible     Leave bags and jackets at home or in the car.     For everyone s health, please don t come and go during your visit. That includes for smoking   during your visit.

## 2023-08-01 NOTE — H&P
Pre-Operative H & P     CC:  Preoperative exam to assess for increased cardiopulmonary risk while undergoing surgery and anesthesia.    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 She also has a history of  persistent asthma and shortness of breath. She was found to have subglottis stenosis at the beginning of June 2023. She underwent bronchoscopy 6/6/23 with tissue debulking and tracheal dilation. This has improved her breathing concerns. but she still fatigues very easily when she is walking given her morbid obesity and persistent asthma. She now presents for the above procedure.       History is obtained from the patient and chart review    Hx of abnormal bleeding or anti-platelet use: aspirin    Menstrual history: No LMP recorded. (Menstrual status: Amenorrhea).:      Past Medical History  Past Medical History:   Diagnosis Date    Anxiety     Asthma     Cholelithiasis     Depressive disorder     Gastroesophageal reflux disease     Hypercholesterolemia     Hypothyroidism     Mild mental retardation     Obesity     Subglottic stenosis        Past Surgical History  Past  Surgical History:   Procedure Laterality Date    BRONCHOSCOPY FLEXIBLE AND RIGID N/A 6/6/2023    Procedure: Flexible Bronchoscopy, Laser Resection and Balloon Dilation;  Surgeon: Laxmi Cline MD;  Location:  OR     LUMBAR PUNCTURE  06/09/2020    TONSILLECTOMY         Prior to Admission Medications  Current Outpatient Medications   Medication Sig Dispense Refill    ADVAIR -21 MCG/ACT inhaler 2 puffs 2 times daily      albuterol (PROAIR HFA/PROVENTIL HFA/VENTOLIN HFA) 108 (90 Base) MCG/ACT inhaler Inhale 2 puffs into the lungs every 6 hours as needed for shortness of breath, wheezing or cough 18 g 0    busPIRone (BUSPAR) 15 MG tablet Take 15 mg by mouth 2 times daily      ciprofloxacin-dexamethasone (CIPRODEX) 0.3-0.1 % otic suspension Place 4 drops into the trachea 2 times daily for 90 days 25 mL 11    ciprofloxacin-dexamethasone (CIPRODEX) 0.3-0.1 % otic suspension Place 4 drops into the trachea 2 times daily 7.5 mL 0    dextromethorphan-guaiFENesin (TUSSIN DM)  MG/5ML liquid Take 10 mLs by mouth      diclofenac (VOLTAREN) 1 % topical gel Apply 2 g topically 4 times daily as needed for moderate pain 50 g 0    docusate sodium (COLACE) 100 MG capsule Take 100 mg by mouth every 3 days      escitalopram (LEXAPRO) 20 MG tablet Take 20 mg by mouth daily      fluticasone (FLONASE) 50 MCG/ACT nasal spray Spray 2 sprays into both nostrils daily SHAKE LIQUID AND USE      hydrocortisone 2.5 % cream APPLY TOPICALLY TWICE DAILY. CAN USE FOR 2-4 WEEKS      ipratropium - albuterol 0.5 mg/2.5 mg/3 mL (DUONEB) 0.5-2.5 (3) MG/3ML neb solution Take 1 vial (3 mLs) by nebulization every 6 hours as needed for shortness of breath, wheezing or cough 90 mL 0    levothyroxine (SYNTHROID/LEVOTHROID) 50 MCG tablet Take 50 mcg by mouth daily      medroxyPROGESTERone (PROVERA) 10 MG tablet Take 10 mg by mouth      melatonin 3 MG tablet Take 3 mg by mouth At Bedtime      Multiple Vitamins-Minerals (EMERGEN-C IMMUNE  PLUS/VIT D) CHEW Take 3 chew tab by mouth daily      nystatin (MYCOSTATIN) 870910 UNIT/GM external cream Apply topically 2 times daily as needed for other (rash (around private areas))      OLANZapine zydis (ZYPREXA) 10 MG ODT Take 1 tablet (10 mg) by mouth 2 times daily as needed (associated with psychosis or anjel.) 30 tablet 0    pantoprazole (PROTONIX) 40 MG EC tablet Take 1 tablet (40 mg) by mouth daily 30 tablet 0    Pediatric Multiple Vitamins (FLINTSTONES PLUS EXTRA C) CHEW Take 2 tablets by mouth      Pediatric Multivit-Minerals-C (CHEWABLES MULTIVITAMIN PO) Take 2 tablets by mouth daily      pravastatin (PRAVACHOL) 40 MG tablet Take 40 mg by mouth daily      Probiotic Product (RISAQUAD-2) CAPS Take 1 capsule by mouth daily      risperiDONE (RISPERDAL) 0.25 MG tablet Take 0.25 mg by mouth daily      sodium chloride, PF, (NORMAL SALINE FLUSH) 0.9% PF flush 3 mLs by Other route every 8 hours 500 mL 0    traZODone (DESYREL) 50 MG tablet Take 50 mg by mouth At Bedtime      aspirin (ASA) 325 MG EC tablet Take 325 mg by mouth daily (Patient not taking: Reported on 8/1/2023)         Allergies  Allergies   Allergen Reactions    Ibuprofen        Social History  Social History     Socioeconomic History    Marital status: Single     Spouse name: Not on file    Number of children: Not on file    Years of education: Not on file    Highest education level: Not on file   Occupational History    Not on file   Tobacco Use    Smoking status: Never     Passive exposure: Never    Smokeless tobacco: Never   Substance and Sexual Activity    Alcohol use: No    Drug use: No    Sexual activity: Not on file   Other Topics Concern    Not on file   Social History Narrative    Not on file     Social Determinants of Health     Financial Resource Strain: Not on file   Food Insecurity: Not on file   Transportation Needs: Not on file   Physical Activity: Not on file   Stress: Not on file   Social Connections: Not on file   Intimate Partner  "Violence: Not on file   Housing Stability: Not on file       Family History  No family history on file.    Review of Systems  The complete review of systems is negative other than noted in the HPI or here.   Anesthesia Evaluation   Pt has had prior anesthetic. Type: General and MAC.    History of anesthetic complications  - .  syncope- wakes up dizzy.    ROS/MED HX  ENT/Pulmonary:     (+)                    Mild Persistent, asthma  Treatment: Inhaler daily,              (-) tobacco use   Neurologic: Comment: Neuro cognitive disorder        Cardiovascular: Comment: ALVA still presents but has improved since her bronchoscopy 6/6/23 with tissue debulking and tracheal dilation.    (+) Dyslipidemia - -   -  - -           ALVA.  fainting (syncope).                    Previous cardiac testing (2020)   Echo: Date: 2020 Results:    Stress Test:  Date: Results:    ECG Reviewed:  Date: Results:    Cath:  Date: Results:      METS/Exercise Tolerance:  Comment: Limited activity due to obesity- uses cane and walker   Hematologic:     (+)      anemia,          Musculoskeletal:  - neg musculoskeletal ROS     GI/Hepatic: Comment: Distal subglottal stenosis- bronchoscopy 6/6/23 with tissue debulking and tracheal dilation.  Chronic abdominal pain  Gallstones- pancreatitis      (+) GERD, Asymptomatic on medication,                  Renal/Genitourinary:       Endo:     (+)          thyroid problem, hypothyroidism,    Obesity,       Psychiatric/Substance Use:     (+) psychiatric history schizophrenia and other (comment) (Bipolar)    (-) alcohol abuse history   Infectious Disease:       Malignancy:       Other:            /81 (BP Location: Right arm, Patient Position: Sitting, Cuff Size: Thigh)   Pulse 63   Temp 97.9  F (36.6  C) (Oral)   Ht 1.73 m (5' 8.11\")   Wt (!) 190.5 kg (420 lb)   SpO2 97%   Breastfeeding No   BMI 63.65 kg/m      Physical Exam   Constitutional: Awake, alert, cooperative, no apparent distress, and appears " stated age.  Eyes: Pupils equal, round and reactive to light, extra ocular muscles intact, sclera clear, conjunctiva normal.  HENT: Normocephalic, oral pharynx with moist mucus membranes, missing upper front teeth. No goiter appreciated.   Respiratory: Clear to auscultation bilaterally, no crackles or wheezing.  Cardiovascular: Regular rate and rhythm, normal S1 and S2, and no murmur noted.  Carotids +2, no bruits. Non-pitting bilateral LE   edema. Palpable pulses to radial  DP and PT arteries.   GI: Normal bowel sounds, soft and non-tender. Unable to adequately assess for hepatosplenomegaly given obese abdomen. No superficial masses noted.    Surgical scars:   Lymph/Hematologic: No cervical lymphadenopathy and no supraclavicular lymphadenopathy.  Genitourinary:  deferred  Skin: Warm and dry.  No rashes at anticipated surgical site.   Musculoskeletal: Full ROM of neck. There is no redness, warmth, or swelling of the joints. Gross motor strength is normal.    Neurologic: Awake, alert, oriented to name, place and time. Cranial nerves II-XII are grossly intact. Gait is normal.   Neuropsychiatric: Calm, cooperative. Normal affect.     Prior Labs/Diagnostic Studies   All labs and imaging personally reviewed     Last Comprehensive Metabolic Panel:  Lab Results   Component Value Date     07/25/2023    POTASSIUM 4.3 07/25/2023    CHLORIDE 103 07/25/2023    CO2 30 (H) 07/25/2023    ANIONGAP 7 07/25/2023    GLC 91 07/25/2023    BUN 17.6 07/25/2023    CR 0.80 07/25/2023    GFRESTIMATED >90 07/25/2023    SANDRA 9.2 07/25/2023       Lab Results   Component Value Date    AST 31 07/25/2023    AST 37 07/07/2020     Lab Results   Component Value Date    ALT 27 07/25/2023    ALT 60 07/07/2020     No results found for: BILICONJ   Lab Results   Component Value Date    BILITOTAL 0.6 07/25/2023    BILITOTAL 0.8 07/07/2020     Lab Results   Component Value Date    ALBUMIN 3.7 07/25/2023    ALBUMIN 2.9 07/07/2020     Lab Results    Component Value Date    PROTTOTAL 6.4 07/25/2023    PROTTOTAL 6.3 07/07/2020      Lab Results   Component Value Date    ALKPHOS 92 07/25/2023    ALKPHOS 189 07/07/2020           Lab Results   Component Value Date    WBC 7.3 07/25/2023    WBC 9.0 07/07/2020     Lab Results   Component Value Date    RBC 3.96 07/25/2023    RBC 3.57 07/07/2020     Lab Results   Component Value Date    HGB 10.8 07/25/2023    HGB 10.4 07/07/2020     Lab Results   Component Value Date    HCT 35.8 07/25/2023    HCT 32.8 07/07/2020     No components found for: MCT  Lab Results   Component Value Date    MCV 90 07/25/2023    MCV 92 07/07/2020     Lab Results   Component Value Date    MCH 27.3 07/25/2023    MCH 29.1 07/07/2020     Lab Results   Component Value Date    MCHC 30.2 07/25/2023    MCHC 31.7 07/07/2020     Lab Results   Component Value Date    RDW 14.0 07/25/2023    RDW 14.6 07/07/2020     Lab Results   Component Value Date     07/25/2023     07/07/2020         EKG/ stress test - if available please see in ROS above   2020  Echo result w/o MOPS: Interpretation SummaryTechnically difficult study. Poor acoustic windows.Global and regional left ventricular function is normal with an EF of 60-65%.Negative bubble study.Previous study not available for comparison.      The patient's records and results personally reviewed by this provider.     Outside records reviewed from: Care Everywhere    LAB/DIAGNOSTIC STUDIES TODAY:  none    Assessment    Dionne Perez is a 38 year old female seen as a PAC referral for risk assessment and optimization for anesthesia.    Plan/Recommendations  Pt will be optimized for the proposed procedure.  See below for details on the assessment, risk, and preoperative recommendations    NEUROLOGY  - No history of TIA, CVA or seizure  -Post Op delirium risk factors:  History of pre-existing cognitive dysfunction    ENT  - Large neck  Mallampati: II   TM distance: > 3 FB   Neck ROM: full   Mouth  opening: > 3 cm     6/6/2023   Airway       Patient location during procedure: OR  Staff -        CRNA: Naun Jovel APRN CRNA       Performed By: CRNA  Consent for Airway        Urgency: elective  Indications and Patient Condition       Indications for airway management: ashlyn-procedural       Induction type:inhalational       Mask difficulty assessment: 1 - vent by mask     Final Airway Details       Final airway type: supraglottic airway     Supraglottic Airway Details        Type: LMA       Brand: I-Gel       LMA size: 5     CARDIAC  - No history of CAD, Hypertension, and Afib  - Hyperlipidemia  Possibly on statins, unable to verify medications.     ALVA still presents but has improved since her bronchoscopy 6/6/23 with tissue debulking and tracheal dilation.    - METS (Metabolic Equivalents)  Limited activity due to obesity- uses cane and walker  Patient CANNOT perform 4 METS exercise without symptoms            Total Score: 1    Functional Capacity: Unable to complete 4 METS      RCRI-Very low risk: Class 1 0.4% complication rate            Total Score: 0        PULMONARY    FARRUKH Low Risk            Total Score: 2    FARRUKH: BMI over 35 kg/m2    FARRUKH: Neck Circum >16 in      - Asthma  Well controlled  - Tobacco History    History   Smoking Status    Never   Smokeless Tobacco    Never       GI  - GERD  Controlled on medications: Protonix  PONV Medium Risk  Total Score: 2           1 AN PONV: Pt is Female    1 AN PONV: Patient is not a current smoker      Distal subglottal stenosis- bronchoscopy 6/6/23 with tissue debulking and tracheal dilation.  -ALVA still presents but has improved since her bronchoscopy 6/6/23 with tissue debulking and tracheal dilation. Procedure scheduled as above.     Chronic abdominal pain  Gallstones- pancreatitis    /RENAL  - Baseline Creatinine  WNL    ENDOCRINE    - BMI: Estimated body mass index is 63.65 kg/m  as calculated from the following:    Height as of this encounter: 1.73 m  "(5' 8.11\").    Weight as of this encounter: 190.5 kg (420 lb).  Class 3 Obesity (BMI > 40)  - Thyroid disorder  Continue home replacement while hospitalized.    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      The patient is optimized for their procedure. AVS with information on surgery time/arrival time, meds and NPO status given by nursing staff. No further diagnostic testing indicated.      On the day of service:     Prep time: 15 minutes  Visit time: 20 minutes  Documentation time: 10 minutes  ------------------------------------------  Total time: 45 minutes      MARY Sheriff CNP  Preoperative Assessment Center  [facility]  Phone: [phone]  Fax: [phone]    "

## 2023-08-04 ENCOUNTER — ANESTHESIA (OUTPATIENT)
Dept: ANESTHESIOLOGY | Facility: CLINIC | Age: 38
End: 2023-08-04
Payer: MEDICARE

## 2023-08-04 ENCOUNTER — HOSPITAL ENCOUNTER (OUTPATIENT)
Facility: CLINIC | Age: 38
Discharge: HOME | End: 2023-08-04
Attending: INTERNAL MEDICINE | Admitting: INTERNAL MEDICINE
Payer: MEDICARE

## 2023-08-04 VITALS
WEIGHT: 293 LBS | TEMPERATURE: 98.3 F | HEIGHT: 68 IN | RESPIRATION RATE: 20 BRPM | SYSTOLIC BLOOD PRESSURE: 119 MMHG | BODY MASS INDEX: 44.41 KG/M2 | DIASTOLIC BLOOD PRESSURE: 73 MMHG | HEART RATE: 56 BPM | OXYGEN SATURATION: 98 %

## 2023-08-04 PROCEDURE — 258N000003 HC RX IP 258 OP 636: Performed by: ANESTHESIOLOGY

## 2023-08-04 PROCEDURE — 31645 BRNCHSC W/THER ASPIR 1ST: CPT | Mod: GC | Performed by: INTERNAL MEDICINE

## 2023-08-04 PROCEDURE — 250N000011 HC RX IP 250 OP 636

## 2023-08-04 PROCEDURE — 250N000025 HC SEVOFLURANE, PER MIN: Performed by: INTERNAL MEDICINE

## 2023-08-04 PROCEDURE — 250N000013 HC RX MED GY IP 250 OP 250 PS 637: Performed by: ANESTHESIOLOGY

## 2023-08-04 PROCEDURE — 360N000084 HC SURGERY LEVEL 4 W/ FLUORO, PER MIN: Performed by: INTERNAL MEDICINE

## 2023-08-04 PROCEDURE — 370N000017 HC ANESTHESIA TECHNICAL FEE, PER MIN: Performed by: INTERNAL MEDICINE

## 2023-08-04 PROCEDURE — 999N000141 HC STATISTIC PRE-PROCEDURE NURSING ASSESSMENT: Performed by: INTERNAL MEDICINE

## 2023-08-04 PROCEDURE — 250N000011 HC RX IP 250 OP 636: Performed by: ANESTHESIOLOGY

## 2023-08-04 PROCEDURE — 710N000010 HC RECOVERY PHASE 1, LEVEL 2, PER MIN: Performed by: INTERNAL MEDICINE

## 2023-08-04 PROCEDURE — 710N000012 HC RECOVERY PHASE 2, PER MINUTE: Performed by: INTERNAL MEDICINE

## 2023-08-04 PROCEDURE — 250N000009 HC RX 250: Performed by: ANESTHESIOLOGY

## 2023-08-04 RX ORDER — SODIUM CHLORIDE, SODIUM LACTATE, POTASSIUM CHLORIDE, CALCIUM CHLORIDE 600; 310; 30; 20 MG/100ML; MG/100ML; MG/100ML; MG/100ML
INJECTION, SOLUTION INTRAVENOUS CONTINUOUS PRN
Status: DISCONTINUED | OUTPATIENT
Start: 2023-08-04 | End: 2023-08-04

## 2023-08-04 RX ORDER — FENTANYL CITRATE 50 UG/ML
INJECTION, SOLUTION INTRAMUSCULAR; INTRAVENOUS PRN
Status: DISCONTINUED | OUTPATIENT
Start: 2023-08-04 | End: 2023-08-04

## 2023-08-04 RX ORDER — HYDROMORPHONE HCL IN WATER/PF 6 MG/30 ML
0.2 PATIENT CONTROLLED ANALGESIA SYRINGE INTRAVENOUS EVERY 5 MIN PRN
Status: DISCONTINUED | OUTPATIENT
Start: 2023-08-04 | End: 2023-08-04 | Stop reason: HOSPADM

## 2023-08-04 RX ORDER — ONDANSETRON 4 MG/1
4 TABLET, ORALLY DISINTEGRATING ORAL EVERY 30 MIN PRN
Status: DISCONTINUED | OUTPATIENT
Start: 2023-08-04 | End: 2023-08-04 | Stop reason: HOSPADM

## 2023-08-04 RX ORDER — FENTANYL CITRATE 50 UG/ML
25 INJECTION, SOLUTION INTRAMUSCULAR; INTRAVENOUS EVERY 5 MIN PRN
Status: DISCONTINUED | OUTPATIENT
Start: 2023-08-04 | End: 2023-08-04 | Stop reason: HOSPADM

## 2023-08-04 RX ORDER — ONDANSETRON 2 MG/ML
INJECTION INTRAMUSCULAR; INTRAVENOUS PRN
Status: DISCONTINUED | OUTPATIENT
Start: 2023-08-04 | End: 2023-08-04

## 2023-08-04 RX ORDER — SODIUM CHLORIDE, SODIUM LACTATE, POTASSIUM CHLORIDE, CALCIUM CHLORIDE 600; 310; 30; 20 MG/100ML; MG/100ML; MG/100ML; MG/100ML
INJECTION, SOLUTION INTRAVENOUS CONTINUOUS
Status: DISCONTINUED | OUTPATIENT
Start: 2023-08-04 | End: 2023-08-04 | Stop reason: HOSPADM

## 2023-08-04 RX ORDER — FENTANYL CITRATE 50 UG/ML
50 INJECTION, SOLUTION INTRAMUSCULAR; INTRAVENOUS EVERY 5 MIN PRN
Status: DISCONTINUED | OUTPATIENT
Start: 2023-08-04 | End: 2023-08-04 | Stop reason: HOSPADM

## 2023-08-04 RX ORDER — ONDANSETRON 2 MG/ML
4 INJECTION INTRAMUSCULAR; INTRAVENOUS EVERY 30 MIN PRN
Status: DISCONTINUED | OUTPATIENT
Start: 2023-08-04 | End: 2023-08-04 | Stop reason: HOSPADM

## 2023-08-04 RX ORDER — ONDANSETRON 2 MG/ML
4 INJECTION INTRAMUSCULAR; INTRAVENOUS EVERY 30 MIN PRN
Status: DISCONTINUED | OUTPATIENT
Start: 2023-08-04 | End: 2023-08-09 | Stop reason: HOSPADM

## 2023-08-04 RX ORDER — HYDROMORPHONE HCL IN WATER/PF 6 MG/30 ML
0.4 PATIENT CONTROLLED ANALGESIA SYRINGE INTRAVENOUS EVERY 5 MIN PRN
Status: DISCONTINUED | OUTPATIENT
Start: 2023-08-04 | End: 2023-08-04 | Stop reason: HOSPADM

## 2023-08-04 RX ORDER — LIDOCAINE HYDROCHLORIDE 20 MG/ML
INJECTION, SOLUTION INFILTRATION; PERINEURAL PRN
Status: DISCONTINUED | OUTPATIENT
Start: 2023-08-04 | End: 2023-08-04

## 2023-08-04 RX ORDER — OXYCODONE HYDROCHLORIDE 10 MG/1
10 TABLET ORAL
Status: DISCONTINUED | OUTPATIENT
Start: 2023-08-04 | End: 2023-08-09 | Stop reason: HOSPADM

## 2023-08-04 RX ORDER — LIDOCAINE 40 MG/G
CREAM TOPICAL
Status: DISCONTINUED | OUTPATIENT
Start: 2023-08-04 | End: 2023-08-04 | Stop reason: HOSPADM

## 2023-08-04 RX ORDER — DEXAMETHASONE SODIUM PHOSPHATE 4 MG/ML
INJECTION, SOLUTION INTRA-ARTICULAR; INTRALESIONAL; INTRAMUSCULAR; INTRAVENOUS; SOFT TISSUE PRN
Status: DISCONTINUED | OUTPATIENT
Start: 2023-08-04 | End: 2023-08-04

## 2023-08-04 RX ORDER — PROPOFOL 10 MG/ML
INJECTION, EMULSION INTRAVENOUS PRN
Status: DISCONTINUED | OUTPATIENT
Start: 2023-08-04 | End: 2023-08-04

## 2023-08-04 RX ORDER — ONDANSETRON 4 MG/1
4 TABLET, ORALLY DISINTEGRATING ORAL EVERY 30 MIN PRN
Status: DISCONTINUED | OUTPATIENT
Start: 2023-08-04 | End: 2023-08-09 | Stop reason: HOSPADM

## 2023-08-04 RX ORDER — GLYCOPYRROLATE 0.2 MG/ML
INJECTION, SOLUTION INTRAMUSCULAR; INTRAVENOUS PRN
Status: DISCONTINUED | OUTPATIENT
Start: 2023-08-04 | End: 2023-08-04

## 2023-08-04 RX ORDER — KETAMINE HYDROCHLORIDE 10 MG/ML
INJECTION INTRAMUSCULAR; INTRAVENOUS PRN
Status: DISCONTINUED | OUTPATIENT
Start: 2023-08-04 | End: 2023-08-04

## 2023-08-04 RX ORDER — OXYCODONE HYDROCHLORIDE 5 MG/1
5 TABLET ORAL
Status: DISCONTINUED | OUTPATIENT
Start: 2023-08-04 | End: 2023-08-09 | Stop reason: HOSPADM

## 2023-08-04 RX ADMIN — ACETAMINOPHEN 650 MG: 325 SOLUTION ORAL at 15:25

## 2023-08-04 RX ADMIN — LIDOCAINE HYDROCHLORIDE 60 MG: 20 INJECTION, SOLUTION INFILTRATION; PERINEURAL at 14:07

## 2023-08-04 RX ADMIN — MIDAZOLAM 2 MG: 1 INJECTION INTRAMUSCULAR; INTRAVENOUS at 14:09

## 2023-08-04 RX ADMIN — PROPOFOL 100 MG: 10 INJECTION, EMULSION INTRAVENOUS at 14:09

## 2023-08-04 RX ADMIN — ONDANSETRON 4 MG: 2 INJECTION INTRAMUSCULAR; INTRAVENOUS at 14:10

## 2023-08-04 RX ADMIN — Medication 40 MG: at 14:09

## 2023-08-04 RX ADMIN — SODIUM CHLORIDE, POTASSIUM CHLORIDE, SODIUM LACTATE AND CALCIUM CHLORIDE: 600; 310; 30; 20 INJECTION, SOLUTION INTRAVENOUS at 14:00

## 2023-08-04 RX ADMIN — LIDOCAINE HYDROCHLORIDE 40 MG: 20 INJECTION, SOLUTION INFILTRATION; PERINEURAL at 14:13

## 2023-08-04 RX ADMIN — PROPOFOL 100 MCG/KG/MIN: 10 INJECTION, EMULSION INTRAVENOUS at 14:10

## 2023-08-04 RX ADMIN — GLYCOPYRROLATE 0.1 MG: 0.2 INJECTION, SOLUTION INTRAMUSCULAR; INTRAVENOUS at 14:05

## 2023-08-04 RX ADMIN — FENTANYL CITRATE 100 MCG: 50 INJECTION, SOLUTION INTRAMUSCULAR; INTRAVENOUS at 13:51

## 2023-08-04 RX ADMIN — DEXAMETHASONE SODIUM PHOSPHATE 10 MG: 4 INJECTION, SOLUTION INTRA-ARTICULAR; INTRALESIONAL; INTRAMUSCULAR; INTRAVENOUS; SOFT TISSUE at 14:09

## 2023-08-04 RX ADMIN — Medication 10 MG: at 14:15

## 2023-08-04 ASSESSMENT — ACTIVITIES OF DAILY LIVING (ADL)
ADLS_ACUITY_SCORE: 38
ADLS_ACUITY_SCORE: 33

## 2023-08-04 ASSESSMENT — ENCOUNTER SYMPTOMS: SEIZURES: 0

## 2023-08-04 ASSESSMENT — LIFESTYLE VARIABLES: TOBACCO_USE: 0

## 2023-08-04 NOTE — OR NURSING
2 RNs (Mg) called Mason Baldwin, patients legal guardian, to obtain telephone consent. Mason gave consent for the listed procedure.

## 2023-08-04 NOTE — ANESTHESIA CARE TRANSFER NOTE
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    Anesthesia Type:   General     Note:    Oropharynx: oropharynx clear of all foreign objects  Level of Consciousness: awake  Oxygen Supplementation: nasal cannula  Level of Supplemental Oxygen (L/min / FiO2): 2  Independent Airway: airway patency satisfactory and stable  Dentition: dentition unchanged  Vital Signs Stable: post-procedure vital signs reviewed and stable  Report to RN Given: handoff report given  Patient transferred to: PACU    Handoff Report: Identifed the Patient, Identified the Reponsible Provider, Reviewed the pertinent medical history, Discussed the surgical course, Reviewed Intra-OP anesthesia mangement and issues during anesthesia, Set expectations for post-procedure period and Allowed opportunity for questions and acknowledgement of understanding      Vitals:  Vitals Value Taken Time   /80 08/04/23 1431   Temp     Pulse 57 08/04/23 1437   Resp     SpO2 99 % 08/04/23 1437   Vitals shown include unvalidated device data.    Electronically Signed By: MARY Bee CRNA  August 4, 2023  2:39 PM

## 2023-08-04 NOTE — ANESTHESIA PREPROCEDURE EVALUATION
Anesthesia Pre-Procedure Evaluation    Patient: Dionne Perez   MRN: [id number] : 1985        Procedure : Procedure(s):  Flexible, Rigid Bronchoscopy, Tumor/Tissue debulking with Carbon dioxide  Laser,  possible Stent placement          Past Medical History:   Diagnosis Date    Anxiety     Asthma     Cholelithiasis     Depressive disorder     Gastroesophageal reflux disease     Hypercholesterolemia     Hypothyroidism     Mild mental retardation     Obesity     Subglottic stenosis       Past Surgical History:   Procedure Laterality Date    BRONCHOSCOPY FLEXIBLE AND RIGID N/A 2023    Procedure: Flexible Bronchoscopy, Laser Resection and Balloon Dilation;  Surgeon: Laxmi Cline MD;  Location: [facility] OR    IR LUMBAR PUNCTURE  2020    TONSILLECTOMY        Allergies   Allergen Reactions    Ibuprofen       Social History     Tobacco Use    Smoking status: Never     Passive exposure: Never    Smokeless tobacco: Never   Substance Use Topics    Alcohol use: No      Wt Readings from Last 1 Encounters:   23 (!) 194.6 kg (429 lb 0.2 oz)        Anesthesia Evaluation            ROS/MED HX  ENT/Pulmonary:     (+)                    Moderate Persistent, asthma  Treatment: Inhaler prn,              (-) tobacco use and sleep apnea   Neurologic:    (-) no seizures   Cardiovascular:     (+)  hypertension- -   -  - -             fainting (syncope).                         METS/Exercise Tolerance:     Hematologic: Comments: 23 06:28  WBC: 14.7 (H)  Hemoglobin: 12.8  Hematocrit: 41.6  Platelet Count: 193  RBC Count: 4.76  MCV: 87  MCH: 26.9  MCHC: 30.8 (L)  RDW: 13.6      (H): Data is abnormally high  (L): Data is abnormally low      Musculoskeletal:       GI/Hepatic: Comment: Hx of cholelithiasis    (+) GERD,                   Renal/Genitourinary:       Endo:     (+)          thyroid problem,     Obesity,       Psychiatric/Substance Use: Comment: Cognitive impairment    (+) psychiatric history  (Adjustment disorder with Aggression.) other (comment) and depression       Infectious Disease:       Malignancy:       Other:            Physical Exam    Airway        Mallampati: II   TM distance: > 3 FB   Neck ROM: full   Mouth opening: > 3 cm    Respiratory Devices and Support         Dental     Comment: Missing upper incisors        Cardiovascular          Rhythm and rate: regular     Pulmonary           (+) wheezes           OUTSIDE LABS:  CBC:   Lab Results   Component Value Date    WBC 7.3 07/25/2023    WBC 14.9 (H) 06/07/2023    HGB 10.8 (L) 07/25/2023    HGB 13.9 06/07/2023    HCT 35.8 07/25/2023    HCT 43.5 06/07/2023     (L) 07/25/2023     06/07/2023     BMP:   Lab Results   Component Value Date     07/25/2023     06/07/2023    POTASSIUM 4.3 07/25/2023    POTASSIUM 4.4 06/07/2023    CHLORIDE 103 07/25/2023    CHLORIDE 103 06/07/2023    CO2 30 (H) 07/25/2023    CO2 18 (L) 06/07/2023    BUN 17.6 07/25/2023    BUN 25.0 (H) 06/07/2023    CR 0.80 07/25/2023    CR 0.77 06/07/2023    GLC 91 07/25/2023     (H) 06/07/2023     COAGS:   Lab Results   Component Value Date    PTT 25 06/05/2023    INR 1.18 (H) 06/05/2023     POC:   Lab Results   Component Value Date    BGM 93 07/05/2020    HCG Negative 05/09/2023    HCGS Negative 07/25/2023     HEPATIC:   Lab Results   Component Value Date    ALBUMIN 3.7 07/25/2023    PROTTOTAL 6.4 07/25/2023    ALT 27 07/25/2023    AST 31 07/25/2023    GGT 66 (H) 06/08/2020    ALKPHOS 92 07/25/2023    BILITOTAL 0.6 07/25/2023    CLOTILDE 15 06/05/2020     OTHER:   Lab Results   Component Value Date    LACT 4.8 (HH) 06/07/2023    A1C 5.0 06/06/2020    SANDRA 9.2 07/25/2023    PHOS 3.5 06/13/2020    MAG 2.2 07/07/2020    LIPASE 40 07/25/2023    TSH 0.34 (L) 06/06/2020    T4 0.98 06/06/2020    CRP 3.6 06/07/2020    SED 12 06/08/2020       Anesthesia Plan    ASA Status:  3       Anesthesia Type: General.     - Airway: LMA   Induction: Intravenous.    Maintenance: TIVA (LTA).        Consents    Anesthesia Plan(s) and associated risks, benefits, and realistic alternatives discussed. Questions answered and patient/representative(s) expressed understanding.     - Discussed: Risks, Benefits and Alternatives for BOTH SEDATION and the PROCEDURE were discussed     - Discussed with:  Patient      - Extended Intubation/Ventilatory Support Discussed: No.      - Patient is DNR/DNI Status: No     Use of blood products discussed: No .     Postoperative Care    Pain management: IV analgesics.   PONV prophylaxis: Dexamethasone or Solumedrol, Ondansetron (or other 5HT-3)     Comments:                Graeme Hernandez MD

## 2023-08-04 NOTE — ANESTHESIA PROCEDURE NOTES
Airway       Patient location during procedure: OR       Procedure Start/Stop Times: 8/4/2023 2:10 PM  Staff -        CRNA: Rocco Guevara APRN CRNA       Performed By: CRNA  Consent for Airway        Urgency: elective  Indications and Patient Condition       Indications for airway management: ashlyn-procedural       Induction type:intravenous       Mask difficulty assessment: 1 - vent by mask    Final Airway Details       Final airway type: supraglottic airway    Supraglottic Airway Details        Type: LMA       Brand: I-Gel       LMA size: 5    Post intubation assessment        Placement verified by: capnometry, equal breath sounds and chest rise        Number of attempts at approach: 1       Ease of procedure: easy       Dentition: Unchanged    Medication(s) Administered   Medication Administration Time: 8/4/2023 2:10 PM         Take all antibiotics as prescribed  Please take probiotics  Drink lots of clear fluids  Call us in 2 days for urine culture results  Follow-up with PCP, OB/Gyn or Urology in the next few days for reexamination and to ensure resolution of symptoms     Call info link - [phone]  Go to ER if worsening symptoms, fevers, back pain, nausea, vomiting or other concerning symptoms

## 2023-08-04 NOTE — PROCEDURES
INTERVENTIONAL PULMONOLOGY       Procedure(s):    A flexible bronchoscopy  Airway exam  Therapeutic suctioning (1 site)    Indication:  tracheal stenosis    Attending of Record:  Joe Sutherland MD     Interventional Pulmonary Fellow   Palmer Pizarro    Trainees Present:   None     Medications:    3 ml 4% lidocaine  General Anesthesia - See anesthesia flowsheet for details    Sedation Time:   Per Anesthesia Care Provider    Time Out:  Performed    The patient's medical record has been reviewed.  The indication for the procedure was reviewed.  The necessary history and physical examination was performed and reviewed.  The risks, benefits and alternatives of the procedure were discussed with the the patient in detail and the patients respresentative (Mason Baldwin (legal guardian)) had the opportunity to ask questions.  I discussed in particular the potential complications including risks of minor or life-threatening bleeding and/or infection, respiratory failure, vocal cord trauma / paralysis, pneumothorax, and discomfort. Sedation risks were also discussed including abnormal heart rhythms, low blood pressure, and respiratory failure. All questions were answered to the best of my ability.  Verbal and written informed consent was obtained.  The proposed procedure and the patient's identification were verified prior to the procedure by the physician and the nurse.    The patient was assessed for the adequacy for the procedure and to receive medications.   Mental Status:  Alert and oriented x 3  Airway examination:  Class I (Complete visualization of soft palate)  Pulmonary:  Non labored respirations  CV:  Regular rate  ASA Grade:  (II)  Mild systemic disease    After clinical evaluation and reviewing the indication, risks, alternatives and benefits of the procedure the patient was deemed to be in satisfactory condition to undergo the procedure.           A Tuberculosis risk assessment was performed:  The patient  has no known RISK of Tuberculosis    The procedure was performed in a negative airflow room: The patient could not be moved to a negative airflow room because of no risk of TB and needed OR for the procedure    Maneuvers / Procedure:      A Flexible  bronchoscope was used for the procedure. The patient was orally intubated with a # 4 LMA and the flexible scope was passed through.    Airway Examination: A complete airway examination was performed from the proximal trachea to the distal trachea. Pertinent findings include slightly stenotic subglottic area. FB was easily passes through that area.         Therapeutic suctioning: 15-20min of operative time was spent clearing out the airway of debris and mucous prior to the intervention.     Any disposable equipment was visually inspected and deemed to be intact immediately post procedure.      Relevant Pictures:    Sublottic stenotic area        Assessment and Recommendations:     Successful completion of FB with airway examination and therapeutic suctioning  Ok to discharge once medically stable.   Follow up in IP clinic  Continue  cipro dex nebs (4 drops otic in 5-10cc of saline-placed in a neb, twice daily)          Palmer Pizarro  Interventional pulmonary fellow  Pager: [phone]

## 2023-08-04 NOTE — DISCHARGE INSTRUCTIONS
Post Bronchoscopy Patient Instructions:    August 4, 2023  Dionne Perez    Your procedure completed (bronchoscopy with bronchoscopy and airway examination) without any immediate complications.  You may cough up scant amount of blood for the next 12-24 hours. If you have excessive cough with blood, chest pain, shortness of breath, please report to the closest emergency room.    You may experience low grade (less than 100.5 F) fever next 24 hours, if so, you can take Tylenol. If the fever persists more than 24 hours, please contact to our office or your primary care provider.    You may resume your diet as it was prior to procedure.    You may resume your medications after the procedure unless you are instructed to do differently.     Please follow instructions from the nursing staff upon discharge in terms of activity. In general, you should avoid any attention or motor skill requiring activities (e.g., driving or operating any motorized vehicle) for 24 hours as you might be still under the effect of sedation medications. Please make sure an adult to accompany you next 24 hours.     Should you have any question, please do not hesitate to call our office.    Palmer Pizarro MD     [facility], [place]  Same-Day Surgery   Adult Discharge Orders & Instructions     For 24 hours after surgery    Get plenty of rest.  A responsible adult must stay with you for at least 24 hours after you leave the hospital.   Do not drive or use heavy equipment.  If you have weakness or tingling, don't drive or use heavy equipment until this feeling goes away.  Do not drink alcohol.  Avoid strenuous or risky activities.  Ask for help when climbing stairs.   You may feel lightheaded.  IF so, sit for a few minutes before standing.  Have someone help you get up.   If you have nausea (feel sick to your stomach): Drink only clear liquids such as apple juice, ginger ale, broth or 7-Up.  Rest may also help.  Be sure  to drink enough fluids.  Move to a regular diet as you feel able.  You may have a slight fever. Call the doctor if your fever is over 100 F (37.7 C) (taken under the tongue) or lasts longer than 24 hours.  You may have a dry mouth, a sore throat, muscle aches or trouble sleeping.  These should go away after 24 hours.  Do not make important or legal decisions.   Call your doctor for any of the followin.  Signs of infection (fever, growing tenderness at the surgery site, a large amount of drainage or bleeding, severe pain, foul-smelling drainage, redness, swelling).    2. It has been over 8 to 10 hours since surgery and you are still not able to urinate (pass water).    3.  Headache for over 24 hours.    4.  Numbness, tingling or weakness the day after surgery (if you had spinal anesthesia).  To contact a doctor, call Dr Sutherland's office at [phone] or:    '   [phone] and ask for the resident on call for pulmonology (answered 24 hours a day)  '   Emergency Department:[facility]: [phone]

## 2023-08-04 NOTE — OR NURSING
Dr. Sutherland came to bedside to perform a laryngoscope, due to difficult IV placement, unable to visualize, and will proceed to OR.

## 2023-08-04 NOTE — ANESTHESIA POSTPROCEDURE EVALUATION
Patient: Dionne Perez    Procedure: Procedure(s):  Flexible bronchoscopy theraputic airway inspection, CO2 laser on standby       Anesthesia Type:  General    Note:  Disposition: Outpatient   Postop Pain Control: Uneventful            Sign Out: Well controlled pain   PONV:    Neuro/Psych: Uneventful            Sign Out: Acceptable/Baseline neuro status   Airway/Respiratory: Uneventful            Sign Out: Acceptable/Baseline resp. status   CV/Hemodynamics: Uneventful            Sign Out: Acceptable CV status; No obvious hypovolemia; No obvious fluid overload   Other NRE: NONE   DID A NON-ROUTINE EVENT OCCUR?            Last vitals:  Vitals Value Taken Time   /80 08/04/23 1431   Temp     Pulse 57 08/04/23 1436   Resp     SpO2 99 % 08/04/23 1436   Vitals shown include unvalidated device data.    Electronically Signed By: Graeme Hernandez MD  August 4, 2023  2:38 PM

## 2023-08-05 ASSESSMENT — ACTIVITIES OF DAILY LIVING (ADL)
ADLS_ACUITY_SCORE: 38

## 2023-08-06 ASSESSMENT — ACTIVITIES OF DAILY LIVING (ADL)
ADLS_ACUITY_SCORE: 38

## 2023-08-07 ASSESSMENT — ACTIVITIES OF DAILY LIVING (ADL)
ADLS_ACUITY_SCORE: 38

## 2023-08-08 ASSESSMENT — ACTIVITIES OF DAILY LIVING (ADL)
ADLS_ACUITY_SCORE: 38

## 2023-08-14 ENCOUNTER — HOSPITAL ENCOUNTER (EMERGENCY)
Facility: CLINIC | Age: 38
Discharge: HOME | End: 2023-08-15
Attending: EMERGENCY MEDICINE | Admitting: EMERGENCY MEDICINE
Payer: MEDICARE

## 2023-08-14 ENCOUNTER — APPOINTMENT (OUTPATIENT)
Dept: ULTRASOUND IMAGING | Facility: CLINIC | Age: 38
End: 2023-08-14
Attending: EMERGENCY MEDICINE
Payer: MEDICARE

## 2023-08-14 DIAGNOSIS — R10.9 FLANK PAIN: ICD-10-CM

## 2023-08-14 LAB
ALBUMIN SERPL BCG-MCNC: 3.6 G/DL (ref 3.5–5.2)
ALBUMIN UR-MCNC: NEGATIVE MG/DL
ALP SERPL-CCNC: 132 U/L (ref 35–104)
ALT SERPL W P-5'-P-CCNC: 61 U/L (ref 0–50)
ANION GAP SERPL CALCULATED.3IONS-SCNC: 11 MMOL/L (ref 7–15)
APPEARANCE UR: ABNORMAL
AST SERPL W P-5'-P-CCNC: 46 U/L (ref 0–45)
BACTERIA #/AREA URNS HPF: ABNORMAL /HPF
BASOPHILS # BLD AUTO: 0 10E3/UL (ref 0–0.2)
BASOPHILS NFR BLD AUTO: 0 %
BILIRUB SERPL-MCNC: 0.5 MG/DL
BILIRUB UR QL STRIP: NEGATIVE
BUN SERPL-MCNC: 17.7 MG/DL (ref 6–20)
CALCIUM SERPL-MCNC: 9.2 MG/DL (ref 8.6–10)
CHLORIDE SERPL-SCNC: 104 MMOL/L (ref 98–107)
COLOR UR AUTO: YELLOW
CREAT SERPL-MCNC: 0.78 MG/DL (ref 0.51–0.95)
DEPRECATED HCO3 PLAS-SCNC: 23 MMOL/L (ref 22–29)
EOSINOPHIL # BLD AUTO: 0.1 10E3/UL (ref 0–0.7)
EOSINOPHIL NFR BLD AUTO: 1 %
ERYTHROCYTE [DISTWIDTH] IN BLOOD BY AUTOMATED COUNT: 13.6 % (ref 10–15)
GFR SERPL CREATININE-BSD FRML MDRD: >90 ML/MIN/1.73M2
GLUCOSE SERPL-MCNC: 129 MG/DL (ref 70–99)
GLUCOSE UR STRIP-MCNC: NEGATIVE MG/DL
HCG UR QL: NEGATIVE
HCT VFR BLD AUTO: 39.7 % (ref 35–47)
HGB BLD-MCNC: 12.1 G/DL (ref 11.7–15.7)
HGB UR QL STRIP: NEGATIVE
IMM GRANULOCYTES # BLD: 0.1 10E3/UL
IMM GRANULOCYTES NFR BLD: 1 %
KETONES UR STRIP-MCNC: NEGATIVE MG/DL
LEUKOCYTE ESTERASE UR QL STRIP: ABNORMAL
LYMPHOCYTES # BLD AUTO: 1.7 10E3/UL (ref 0.8–5.3)
LYMPHOCYTES NFR BLD AUTO: 17 %
MCH RBC QN AUTO: 27.3 PG (ref 26.5–33)
MCHC RBC AUTO-ENTMCNC: 30.5 G/DL (ref 31.5–36.5)
MCV RBC AUTO: 89 FL (ref 78–100)
MONOCYTES # BLD AUTO: 0.7 10E3/UL (ref 0–1.3)
MONOCYTES NFR BLD AUTO: 7 %
MUCOUS THREADS #/AREA URNS LPF: PRESENT /LPF
NEUTROPHILS # BLD AUTO: 7.6 10E3/UL (ref 1.6–8.3)
NEUTROPHILS NFR BLD AUTO: 74 %
NITRATE UR QL: NEGATIVE
NRBC # BLD AUTO: 0 10E3/UL
NRBC BLD AUTO-RTO: 0 /100
PH UR STRIP: 5.5 [PH] (ref 5–7)
PLATELET # BLD AUTO: 193 10E3/UL (ref 150–450)
POTASSIUM SERPL-SCNC: 3.7 MMOL/L (ref 3.4–5.3)
PROT SERPL-MCNC: 6.9 G/DL (ref 6.4–8.3)
RBC # BLD AUTO: 4.44 10E6/UL (ref 3.8–5.2)
RBC URINE: 2 /HPF
SODIUM SERPL-SCNC: 138 MMOL/L (ref 136–145)
SP GR UR STRIP: 1.03 (ref 1–1.03)
SQUAMOUS EPITHELIAL: 5 /HPF
UROBILINOGEN UR STRIP-MCNC: NORMAL MG/DL
WBC # BLD AUTO: 10.3 10E3/UL (ref 4–11)
WBC URINE: 7 /HPF

## 2023-08-14 PROCEDURE — 76775 US EXAM ABDO BACK WALL LIM: CPT

## 2023-08-14 PROCEDURE — 81025 URINE PREGNANCY TEST: CPT | Performed by: EMERGENCY MEDICINE

## 2023-08-14 PROCEDURE — 99284 EMERGENCY DEPT VISIT MOD MDM: CPT | Mod: 25

## 2023-08-14 PROCEDURE — 85025 COMPLETE CBC W/AUTO DIFF WBC: CPT | Performed by: EMERGENCY MEDICINE

## 2023-08-14 PROCEDURE — 80053 COMPREHEN METABOLIC PANEL: CPT | Performed by: EMERGENCY MEDICINE

## 2023-08-14 PROCEDURE — 36415 COLL VENOUS BLD VENIPUNCTURE: CPT | Performed by: EMERGENCY MEDICINE

## 2023-08-14 PROCEDURE — 81001 URINALYSIS AUTO W/SCOPE: CPT | Performed by: EMERGENCY MEDICINE

## 2023-08-14 ASSESSMENT — ACTIVITIES OF DAILY LIVING (ADL)
ADLS_ACUITY_SCORE: 35
ADLS_ACUITY_SCORE: 33

## 2023-08-15 VITALS
HEART RATE: 66 BPM | SYSTOLIC BLOOD PRESSURE: 128 MMHG | TEMPERATURE: 98.9 F | RESPIRATION RATE: 18 BRPM | OXYGEN SATURATION: 98 % | DIASTOLIC BLOOD PRESSURE: 78 MMHG

## 2023-08-15 ASSESSMENT — ACTIVITIES OF DAILY LIVING (ADL): ADLS_ACUITY_SCORE: 35

## 2023-08-15 NOTE — ED PROVIDER NOTES
History     Chief Complaint:  Abdominal Pain       The history is provided by the patient.      Dionne Perez is a 38 year old female with history of neurocognitive disorder, gallstone pancreatitis, chronic abdominal pain hypertension, hyperlipidemia, and kidney stones who presents with abdominal pain. She has been having left lower quadrant abdominal pain radiating to the left flank.  She also complains of chronic diarrhea, but no bloody stools.  She feels like it might be a kidney stone. She had her gallbladder removed. Denies urinary issues, vomiting, fever, or chills.  She is not taking any medications for the pain.      Independent Historian:   None - Patient Only    Review of External Notes:   I reviewed the patient's care plan.      Medications:    Advair  Albuterol proair  ASA  Buspar  Colace  Lexapro  Levothyroxine  Provera  Zyprexa  Protonix  Risperdal  Desyrel  Keflex  Minocin    Past Medical History:    Anxiety  Asthma  Cholelithiasis  Depression  GERD  Hypercholesterolemia  Hypothyroidism  Mild mental retardation  Obesity  Subglottic stenosis  Acute costochondritis  Hypertension  Gallstone pancreatitis  Hyperlipidemia  Insomnia  Sinus bradycardia  Suicidal ideations  Dyspnea  Anemia  Cellulitis  Osteoarthritis  Biliary calculus      Past Surgical History:    Bronchoscopy flexible and rigid x2  Lumbar puncture  Tonsillectomy  Cholecystectomy       Physical Exam   Patient Vitals for the past 24 hrs:   BP Temp Pulse Resp SpO2   08/14/23 2038 131/81 98.9  F (37.2  C) 64 16 100 %        Physical Exam  General: Lying on her right side very comfortably on the ED gurney  HENT: mucous membranes moist  CV: regular rate, regular rhythm  Resp: normal effort, clear throughout, no crackles or wheezing  GI: abdomen exam limited by morbid obesity.  Soft to palpation.  No CVA tenderness on the left.  No overlying rash or bruising.  MSK: no bony tenderness  Skin: appropriately warm and dry  Extremities: no edema,  calves non-tender  Neuro: Alert, oriented today.  Psych: Calm and cooperative.      Emergency Department Course       Imaging:  US Renal Limited   Final Result   IMPRESSION:   1.  Left renal ultrasound within normal limits.         Report per radiology    Laboratory:  Labs Ordered and Resulted from Time of ED Arrival to Time of ED Departure   COMPREHENSIVE METABOLIC PANEL - Abnormal       Result Value    Sodium 138      Potassium 3.7      Chloride 104      Carbon Dioxide (CO2) 23      Anion Gap 11      Urea Nitrogen 17.7      Creatinine 0.78      Calcium 9.2      Glucose 129 (*)     Alkaline Phosphatase 132 (*)     AST 46 (*)     ALT 61 (*)     Protein Total 6.9      Albumin 3.6      Bilirubin Total 0.5      GFR Estimate >90     ROUTINE UA WITH MICROSCOPIC REFLEX TO CULTURE - Abnormal    Color Urine Yellow      Appearance Urine Slightly Cloudy (*)     Glucose Urine Negative      Bilirubin Urine Negative      Ketones Urine Negative      Specific Gravity Urine 1.028      Blood Urine Negative      pH Urine 5.5      Protein Albumin Urine Negative      Urobilinogen Urine Normal      Nitrite Urine Negative      Leukocyte Esterase Urine Trace (*)     Bacteria Urine Few (*)     Mucus Urine Present (*)     RBC Urine 2      WBC Urine 7 (*)     Squamous Epithelials Urine 5 (*)    CBC WITH PLATELETS AND DIFFERENTIAL - Abnormal    WBC Count 10.3      RBC Count 4.44      Hemoglobin 12.1      Hematocrit 39.7      MCV 89      MCH 27.3      MCHC 30.5 (*)     RDW 13.6      Platelet Count 193      % Neutrophils 74      % Lymphocytes 17      % Monocytes 7      % Eosinophils 1      % Basophils 0      % Immature Granulocytes 1      NRBCs per 100 WBC 0      Absolute Neutrophils 7.6      Absolute Lymphocytes 1.7      Absolute Monocytes 0.7      Absolute Eosinophils 0.1      Absolute Basophils 0.0      Absolute Immature Granulocytes 0.1      Absolute NRBCs 0.0     HCG QUALITATIVE URINE - Normal    hCG Urine Qualitative Negative         Emergency Department Course & Assessments:    Interventions:  Medications - No data to display     Assessments:  2228 I examined the patient and obtained history as noted above.  0022 I rechecked and updated the patient.  She informed me that her group home closes after 10 PM, and that she cannot be transported back home.  Discussed that we will still attempt transfer.    Independent Interpretation (X-rays, CTs, rhythm strip):  None    Consultations/Discussion of Management or Tests:  None        Social Determinants of Health affecting care:   Transportation    Disposition:  The patient will board in the emergency department pending bed placement. Care was signed out to Dr. Dowell.     Impression & Plan    CMS Diagnoses: none    Medical Decision Making:  Dionne Perez is a 38 year old female with history of neurocognitive disorder, gallstone pancreatitis, chronic abdominal pain hypertension, hyperlipidemia, and kidney stones who presents with abdominal pain.  On exam, she appears very comfortable and has normal vitals.  No reproducible pain on exam.  Basic work-up was obtained, which is unremarkable.  No hematuria to suggest kidney stone, and findings are not consistent with Dexter low nephritis.  The patient has a normal white blood cell count.  She was noted to have very slightly elevated ALT and AST, likely related to known liver disease.  Ultrasound of the left kidney is negative for hydronephrosis.  Overall, clinical suspicion for kidney stone, pyelonephritis, PE, aortic emergency such as dissection or aneurysm, or other dangerous condition is very low.  Unfortunately, the patient's group home has a history of being able to take the patient until the morning.  She was signed out to my partner, Dr. Ellison, pending safe transfer back to her facility.    Diagnosis:    ICD-10-CM    1. Flank pain  R10.9            Scribe Disclosure:  I, Sanchez Gilmore, am serving as a scribe at 10:49 PM on 8/14/2023 to document  services personally performed by Razia Judge MD based on my observations and the provider's statements to me.     8/14/2023   Razia Judge MD Pepper, Tracy Lynn, MD  08/15/23 0307

## 2023-08-15 NOTE — ED NOTES
[place] [facility] was called at 0120 to do a welfare check on patient's group home.    Per [place] [facility], group home staff will be on their way to pick patient up from the hospital.

## 2023-08-15 NOTE — ED NOTES
Called group home at multiple listed numbers, including  manager Karina. No answer on any of the listed numbers. Left VM indicating that she will be ready to discharge soon. Provided number for [facility] ED.

## 2023-08-15 NOTE — ED TRIAGE NOTES
Patient arrives via EMS with complaints of cramps. Unsure if it is abdominal or a kidney stone. Unsure if patient has known stone  ABCs intact in triage.

## 2023-09-01 ENCOUNTER — APPOINTMENT (OUTPATIENT)
Dept: ULTRASOUND IMAGING | Facility: CLINIC | Age: 38
End: 2023-09-01
Attending: SOCIAL WORKER
Payer: MEDICARE

## 2023-09-01 ENCOUNTER — HOSPITAL ENCOUNTER (EMERGENCY)
Facility: CLINIC | Age: 38
Discharge: HOME | End: 2023-09-02
Attending: SOCIAL WORKER | Admitting: SOCIAL WORKER
Payer: MEDICARE

## 2023-09-01 DIAGNOSIS — R10.9 RIGHT FLANK PAIN: ICD-10-CM

## 2023-09-01 DIAGNOSIS — Z87.442 HISTORY OF NEPHROLITHIASIS: ICD-10-CM

## 2023-09-01 DIAGNOSIS — R29.818 NEUROCOGNITIVE DEFICITS: ICD-10-CM

## 2023-09-01 DIAGNOSIS — R41.89 NEUROCOGNITIVE DEFICITS: ICD-10-CM

## 2023-09-01 LAB
ALBUMIN SERPL BCG-MCNC: 3.8 G/DL (ref 3.5–5.2)
ALBUMIN UR-MCNC: NEGATIVE MG/DL
ALP SERPL-CCNC: 117 U/L (ref 35–104)
ALT SERPL W P-5'-P-CCNC: 34 U/L (ref 0–50)
ANION GAP SERPL CALCULATED.3IONS-SCNC: 7 MMOL/L (ref 7–15)
APPEARANCE UR: CLEAR
AST SERPL W P-5'-P-CCNC: 27 U/L (ref 0–45)
BASOPHILS # BLD AUTO: 0 10E3/UL (ref 0–0.2)
BASOPHILS NFR BLD AUTO: 0 %
BILIRUB SERPL-MCNC: 0.6 MG/DL
BILIRUB UR QL STRIP: NEGATIVE
BUN SERPL-MCNC: 19.8 MG/DL (ref 6–20)
CALCIUM SERPL-MCNC: 9.4 MG/DL (ref 8.6–10)
CHLORIDE SERPL-SCNC: 102 MMOL/L (ref 98–107)
COLOR UR AUTO: YELLOW
CREAT SERPL-MCNC: 0.97 MG/DL (ref 0.51–0.95)
DEPRECATED HCO3 PLAS-SCNC: 30 MMOL/L (ref 22–29)
EOSINOPHIL # BLD AUTO: 0.1 10E3/UL (ref 0–0.7)
EOSINOPHIL NFR BLD AUTO: 1 %
ERYTHROCYTE [DISTWIDTH] IN BLOOD BY AUTOMATED COUNT: 13.5 % (ref 10–15)
GFR SERPL CREATININE-BSD FRML MDRD: 77 ML/MIN/1.73M2
GLUCOSE SERPL-MCNC: 106 MG/DL (ref 70–99)
GLUCOSE UR STRIP-MCNC: NEGATIVE MG/DL
HCG SERPL QL: NEGATIVE
HCT VFR BLD AUTO: 40.2 % (ref 35–47)
HGB BLD-MCNC: 12.4 G/DL (ref 11.7–15.7)
HGB UR QL STRIP: NEGATIVE
HOLD SPECIMEN: NORMAL
HOLD SPECIMEN: NORMAL
IMM GRANULOCYTES # BLD: 0.1 10E3/UL
IMM GRANULOCYTES NFR BLD: 1 %
KETONES UR STRIP-MCNC: NEGATIVE MG/DL
LEUKOCYTE ESTERASE UR QL STRIP: NEGATIVE
LIPASE SERPL-CCNC: 50 U/L (ref 13–60)
LYMPHOCYTES # BLD AUTO: 2 10E3/UL (ref 0.8–5.3)
LYMPHOCYTES NFR BLD AUTO: 20 %
MCH RBC QN AUTO: 26.9 PG (ref 26.5–33)
MCHC RBC AUTO-ENTMCNC: 30.8 G/DL (ref 31.5–36.5)
MCV RBC AUTO: 87 FL (ref 78–100)
MONOCYTES # BLD AUTO: 0.5 10E3/UL (ref 0–1.3)
MONOCYTES NFR BLD AUTO: 5 %
NEUTROPHILS # BLD AUTO: 7.6 10E3/UL (ref 1.6–8.3)
NEUTROPHILS NFR BLD AUTO: 73 %
NITRATE UR QL: NEGATIVE
NRBC # BLD AUTO: 0 10E3/UL
NRBC BLD AUTO-RTO: 0 /100
PH UR STRIP: 5.5 [PH] (ref 5–7)
PLATELET # BLD AUTO: 256 10E3/UL (ref 150–450)
POTASSIUM SERPL-SCNC: 4.4 MMOL/L (ref 3.4–5.3)
PROT SERPL-MCNC: 7.1 G/DL (ref 6.4–8.3)
RBC # BLD AUTO: 4.61 10E6/UL (ref 3.8–5.2)
SODIUM SERPL-SCNC: 139 MMOL/L (ref 136–145)
SP GR UR STRIP: 1.03 (ref 1–1.03)
UROBILINOGEN UR STRIP-MCNC: NORMAL MG/DL
WBC # BLD AUTO: 10.4 10E3/UL (ref 4–11)

## 2023-09-01 PROCEDURE — 83690 ASSAY OF LIPASE: CPT | Performed by: EMERGENCY MEDICINE

## 2023-09-01 PROCEDURE — 81003 URINALYSIS AUTO W/O SCOPE: CPT | Performed by: SOCIAL WORKER

## 2023-09-01 PROCEDURE — 76775 US EXAM ABDO BACK WALL LIM: CPT

## 2023-09-01 PROCEDURE — 84703 CHORIONIC GONADOTROPIN ASSAY: CPT | Performed by: EMERGENCY MEDICINE

## 2023-09-01 PROCEDURE — 80053 COMPREHEN METABOLIC PANEL: CPT | Performed by: EMERGENCY MEDICINE

## 2023-09-01 PROCEDURE — 99284 EMERGENCY DEPT VISIT MOD MDM: CPT | Mod: 25

## 2023-09-01 PROCEDURE — 36415 COLL VENOUS BLD VENIPUNCTURE: CPT | Performed by: EMERGENCY MEDICINE

## 2023-09-01 PROCEDURE — 85014 HEMATOCRIT: CPT | Performed by: EMERGENCY MEDICINE

## 2023-09-01 ASSESSMENT — ACTIVITIES OF DAILY LIVING (ADL): ADLS_ACUITY_SCORE: 33

## 2023-09-01 NOTE — ED TRIAGE NOTES
Patient reports right sided abdominal pain since Monday.  She endorses mild nausea.  ABCs intact, A&Ox4.     Triage Assessment       Row Name 09/01/23 1764       Triage Assessment (Adult)    Airway WDL WDL       Respiratory WDL    Respiratory WDL WDL       Skin Circulation/Temperature WDL    Skin Circulation/Temperature WDL WDL       Cardiac WDL    Cardiac WDL WDL       Peripheral/Neurovascular WDL    Peripheral Neurovascular WDL WDL       Cognitive/Neuro/Behavioral WDL    Cognitive/Neuro/Behavioral WDL WDL

## 2023-09-02 VITALS
DIASTOLIC BLOOD PRESSURE: 78 MMHG | TEMPERATURE: 98.1 F | RESPIRATION RATE: 18 BRPM | OXYGEN SATURATION: 98 % | HEART RATE: 70 BPM | SYSTOLIC BLOOD PRESSURE: 132 MMHG

## 2023-09-02 NOTE — ED PROVIDER NOTES
History     Chief Complaint:  Abdominal Pain       HPI   Dionne Perez is a 38 year old female with hx of neurocognitive disorder, chronic abdominal pain, kidney stones, care plan in place for multiple visits to the emergency department, who presents to the emergency department from her group home with abdominal pain.  No caregivers from the group home present, history obtained from patient's only.  Patient reports that she has had right sided side/abdominal pain since Monday that feels like her previous kidney stones.  She reports no dysuria, reports that she has had some urinary frequency however she is unable to tell me how long this has been for and when it started.  She denies any fevers, nausea or vomiting.  She has not had any changes to her bowel movements, no diarrhea.  No bleeding from anywhere.  No chest pain, shortness of breath.      Independent Historian:   : They report that they were not aware patient present to the emergency department, reports that earlier today he stated that patient could not have fast food and suspected that she then asked to go to the emergency department as a result of this.  He reported that she has not been sick at all recently, no fever, no vomiting has been her usual self.    Review of External Notes:   No relevant external notes        Medications:    ADVAIR -21 MCG/ACT inhaler  albuterol (PROAIR HFA/PROVENTIL HFA/VENTOLIN HFA) 108 (90 Base) MCG/ACT inhaler  aspirin (ASA) 325 MG EC tablet  busPIRone (BUSPAR) 15 MG tablet  ciprofloxacin-dexamethasone (CIPRODEX) 0.3-0.1 % otic suspension  ciprofloxacin-dexamethasone (CIPRODEX) 0.3-0.1 % otic suspension  dextromethorphan-guaiFENesin (TUSSIN DM)  MG/5ML liquid  diclofenac (VOLTAREN) 1 % topical gel  docusate sodium (COLACE) 100 MG capsule  escitalopram (LEXAPRO) 20 MG tablet  fluticasone (FLONASE) 50 MCG/ACT nasal spray  hydrocortisone 2.5 % cream  ipratropium - albuterol 0.5 mg/2.5 mg/3  mL (DUONEB) 0.5-2.5 (3) MG/3ML neb solution  levothyroxine (SYNTHROID/LEVOTHROID) 50 MCG tablet  medroxyPROGESTERone (PROVERA) 10 MG tablet  melatonin 3 MG tablet  Multiple Vitamins-Minerals (EMERGEN-C IMMUNE PLUS/VIT D) CHEW  nystatin (MYCOSTATIN) 056985 UNIT/GM external cream  OLANZapine zydis (ZYPREXA) 10 MG ODT  pantoprazole (PROTONIX) 40 MG EC tablet  Pediatric Multiple Vitamins (FLINTSTONES PLUS EXTRA C) CHEW  Pediatric Multivit-Minerals-C (CHEWABLES MULTIVITAMIN PO)  pravastatin (PRAVACHOL) 40 MG tablet  Probiotic Product (RISAQUAD-2) CAPS  risperiDONE (RISPERDAL) 0.25 MG tablet  sodium chloride, PF, (NORMAL SALINE FLUSH) 0.9% PF flush  traZODone (DESYREL) 50 MG tablet        Past Medical History:    Past Medical History:   Diagnosis Date    Anxiety     Asthma     Cholelithiasis     Depressive disorder     Gastroesophageal reflux disease     Hypercholesterolemia     Hypothyroidism     Mild mental retardation     Obesity     Subglottic stenosis        Past Surgical History:    Past Surgical History:   Procedure Laterality Date    BRONCHOSCOPY FLEXIBLE AND RIGID N/A 6/6/2023    Procedure: Flexible Bronchoscopy, Laser Resection and Balloon Dilation;  Surgeon: Laxmi Cline MD;  Location: [facility] OR    IR LUMBAR PUNCTURE  06/09/2020    LASER CO2 BRONCHOSCOPY N/A 8/4/2023    Procedure: Flexible bronchoscopy theraputic airway inspection, CO2 laser on standby;  Surgeon: Joe Sutherland MD;  Location: [facility] OR    TONSILLECTOMY          Physical Exam   Patient Vitals for the past 24 hrs:   BP Temp Temp src Pulse Resp SpO2   09/02/23 0007 132/78 -- -- 70 18 98 %   09/01/23 1834 (!) 140/92 98.1  F (36.7  C) Temporal 71 18 97 %        Physical Exam  General: Alert, interactive appropriately, overall well and nontoxic appearing, resting comfortably in bed  HEENT: Conjunctivae clear, no scleral icterus, mucous membranes moist   Neuro: Alert, no focal deficit   CV: Regular rate and rhythm, no murmurs, pulses equal    Respiratory: No signs of respiratory distress, lungs clear to auscultation bilaterally   Abdomen: Soft, nontender nondistended, abdomen overall soft, no significant tenderness, no rigidity  : No CVA tenderness blt  MSK: No rashes over the abdomen, no lower extremity swelling     Imaging:  US Renal Limited   Final Result   IMPRESSION:   Normal right kidney. No hydronephrosis.         Report per radiology    Laboratory:  Labs Ordered and Resulted from Time of ED Arrival to Time of ED Departure   COMPREHENSIVE METABOLIC PANEL - Abnormal       Result Value    Sodium 139      Potassium 4.4      Chloride 102      Carbon Dioxide (CO2) 30 (*)     Anion Gap 7      Urea Nitrogen 19.8      Creatinine 0.97 (*)     Calcium 9.4      Glucose 106 (*)     Alkaline Phosphatase 117 (*)     AST 27      ALT 34      Protein Total 7.1      Albumin 3.8      Bilirubin Total 0.6      GFR Estimate 77     CBC WITH PLATELETS AND DIFFERENTIAL - Abnormal    WBC Count 10.4      RBC Count 4.61      Hemoglobin 12.4      Hematocrit 40.2      MCV 87      MCH 26.9      MCHC 30.8 (*)     RDW 13.5      Platelet Count 256      % Neutrophils 73      % Lymphocytes 20      % Monocytes 5      % Eosinophils 1      % Basophils 0      % Immature Granulocytes 1      NRBCs per 100 WBC 0      Absolute Neutrophils 7.6      Absolute Lymphocytes 2.0      Absolute Monocytes 0.5      Absolute Eosinophils 0.1      Absolute Basophils 0.0      Absolute Immature Granulocytes 0.1      Absolute NRBCs 0.0     LIPASE - Normal    Lipase 50     HCG QUALITATIVE PREGNANCY - Normal    hCG Serum Qualitative Negative     URINE MACROSCOPIC WITH REFLEX TO MICRO - Normal    Color Urine Yellow      Appearance Urine Clear      Glucose Urine Negative      Bilirubin Urine Negative      Ketones Urine Negative      Specific Gravity Urine 1.028      Blood Urine Negative      pH Urine 5.5      Protein Albumin Urine Negative      Urobilinogen Urine Normal      Nitrite Urine Negative       Leukocyte Esterase Urine Negative          Procedures       Emergency Department Course & Assessments:             Interventions:  Medications - No data to display     Assessments:  1030: I examined and assessed patient as above     Independent Interpretation (X-rays, CTs, rhythm strip):  None    Consultations/Discussion of Management or Tests:  None     ED Course as of 23 0934   Fri Sep 01, 2023   2217 Cr mildly elevated to 0.97 from 0.78.    6 Called group home, spoke to supervisor, Rebecca. He was not aware that she was here. Has not been sick at all, reports that perhaps she was not. No fevers, no vomiting.    233 No blood in urine   2351 Normal ultrasound        Social Determinants of Health affecting care:   Neurocognitive disorder, ED recidivism and c/f secondary gain    Disposition:  The patient was discharged to home.     Impression & Plan    CMS Diagnoses: None    Medical Decision Makin year old female with hx of neurocognitive disorder, chronic abdominal pain, kidney stones, care plan in place for multiple visits to the emergency department, who presented to the emergency department with chief complaint of right abdominal pain.  On my assessment, patient was resting comfortably in bed in no distress.  Her mucous membranes are moist.  She denied any nausea or vomiting.  Her abdomen was overall soft did not significantly tender no overlying skin lesions.  She had no right or left CVA tenderness.  Her urinalysis did not show any signs of infection or blood. Her renal ultrasound showed no signs of hydronephrosis.  Her labs were grossly unremarkable.  She remained overall comfortable while the emergency department. I was able to reach out to her , he was not aware that patient had gone to the emergency department and he informed me that she had been in her usual state of health without any signs of infection, including fever, nausea or vomiting, diarrhea.  Patient presentation  is not consistent with appendicitis, given her overall benign exam, no other symptoms, afebrile, reported time course since Monday.  Reassuringly no leukocytosis.  Did not see signs of pyelonephritis given her negative urine and no urinary symptoms.  Also think that an obstructing kidney stone is less likely given renal ultrasound without hydronephrosis and UA without blood.  Overall I did not think the patient had acute further emergency at this time, she was discharged back to her group home in stable condition.      Critical Care time:  was 0 minutes for this patient excluding procedures.    Diagnosis:    ICD-10-CM    1. Right flank pain  R10.9       2. History of nephrolithiasis  Z87.442       3. Neurocognitive deficits  R29.818     R41.89            Discharge Medications:  Discharge Medication List as of 9/1/2023 11:58 PM             Nevaeh Fajardo MD  9/1/2023   Nevaeh Sims MD Wu Klasek, Connie, MD  09/02/23 0934

## 2023-09-02 NOTE — DISCHARGE INSTRUCTIONS
Fan was seen in the emergency department for right-sided pain over her side.  Her exam was reassuring, we do not see signs of an acute emergency at this time.  Her labs looked good.  Ultrasound of the kidney on the right did not show any signs of blockage.  Her urine did not have any infection or blood in it.    Please follow-up with her primary care provider as needed.    If Fan starts having worsening pain, if she starts having fever, vomiting cannot keep anything down, blood in her urine, or other concerning symptoms please come back to the emerged part.

## 2023-09-02 NOTE — ED NOTES
Called Pepito Pepito [phone] explained discharge. She reports she will let staff to know to come get pt. Explained that pt will be sitting in the waiting room

## 2023-09-06 NOTE — TELEPHONE ENCOUNTER
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

## 2023-09-11 ENCOUNTER — HOSPITAL ENCOUNTER (EMERGENCY)
Facility: CLINIC | Age: 38
Discharge: HOME | End: 2023-09-11
Attending: PHYSICIAN ASSISTANT | Admitting: PHYSICIAN ASSISTANT
Payer: MEDICARE

## 2023-09-11 VITALS
TEMPERATURE: 98.9 F | WEIGHT: 293 LBS | SYSTOLIC BLOOD PRESSURE: 108 MMHG | RESPIRATION RATE: 20 BRPM | HEART RATE: 61 BPM | BODY MASS INDEX: 43.4 KG/M2 | HEIGHT: 69 IN | DIASTOLIC BLOOD PRESSURE: 62 MMHG | OXYGEN SATURATION: 100 %

## 2023-09-11 DIAGNOSIS — S16.1XXA STRAIN OF NECK MUSCLE, INITIAL ENCOUNTER: ICD-10-CM

## 2023-09-11 PROCEDURE — 250N000013 HC RX MED GY IP 250 OP 250 PS 637: Performed by: PHYSICIAN ASSISTANT

## 2023-09-11 PROCEDURE — 99283 EMERGENCY DEPT VISIT LOW MDM: CPT

## 2023-09-11 RX ORDER — CYCLOBENZAPRINE HCL 10 MG
10 TABLET ORAL AT BEDTIME
Qty: 5 TABLET | Refills: 0 | Status: SHIPPED | OUTPATIENT
Start: 2023-09-11 | End: 2023-09-16

## 2023-09-11 RX ORDER — ACETAMINOPHEN 325 MG/1
650 TABLET ORAL ONCE
Status: COMPLETED | OUTPATIENT
Start: 2023-09-11 | End: 2023-09-11

## 2023-09-11 RX ORDER — CYCLOBENZAPRINE HCL 10 MG
10 TABLET ORAL ONCE
Status: COMPLETED | OUTPATIENT
Start: 2023-09-11 | End: 2023-09-11

## 2023-09-11 RX ADMIN — CYCLOBENZAPRINE HYDROCHLORIDE 10 MG: 10 TABLET, FILM COATED ORAL at 19:28

## 2023-09-11 RX ADMIN — ACETAMINOPHEN 650 MG: 325 TABLET, FILM COATED ORAL at 19:28

## 2023-09-11 ASSESSMENT — ACTIVITIES OF DAILY LIVING (ADL): ADLS_ACUITY_SCORE: 33

## 2023-09-11 NOTE — ED PROVIDER NOTES
"  History     Chief Complaint:  Neck Pain     The history is provided by the patient.      Dionne Perez is a 38 year old female presenting via EMS for evaluation of left-sided neck pain. Fan explains that she hurt her neck six days ago when she ran into a brick wall at [facility]. She endorses loss of consciousness stating she was \"out for several seconds.\" Patient current has no headache, nausea, vomiting. She is not anticoagulated. She is able to walk and denies numbness and weakness in both upper and lower extremities. She notes use of Tylenol without relief. She denies previous neck injury or surgery.    Independent Historian:   None - Patient Only    Review of External Notes:        Medications:    Albuterol  Aspirin  Buspar  Colace  Lexapro  Levothyroxine  Provera  Zyprexa  Pravachol  Protonix  Risaquad  Risperdal  Desyrel  Deltasone  Flexeril  Antivert    Past Medical History:    Anxiety  Asthma  Cholelithiasis  Depressive disorder  GERD  Hypercholesteremia  Hypothyroidism  Obesity  Subglottic stenosis  Major neurocognitive disorder  Sinus bradycardia  Anemia  Primary osteoarthritis of left knee  Developmental disability  Acute costochondritis  Hypertension    Past Surgical History:    Bronchoscopy flexible and rigid  Lumbar puncture  Laser CO2 bronchoscopy  Tonsillectomy    Physical Exam   Patient Vitals for the past 24 hrs:   BP Temp Temp src Pulse Resp SpO2 Height Weight   09/11/23 1934 108/62 -- -- 61 20 100 % -- --   09/11/23 1800 121/80 98.9  F (37.2  C) Oral 77 20 100 % 1.753 m (5' 9\") (!) 194.6 kg (429 lb 0.2 oz)     Physical Exam  Constitutional: Alert, attentive, GCS 15  HENT:    Nose: Nose normal.    Mouth/Throat: Oropharynx is clear, mucous membranes are moist   Eyes: EOM are normal.   Neck: Left-sided paraspinal cervical tenderness. No midline bony tenderness. No step-off deformities.  CV: regular rate and rhythm; no murmurs, rubs or gallups  Chest: Effort normal and breath sounds normal.   GI:  " There is no tenderness. No distension. Normal bowel sounds  MSK: Normal range of motion.   Neurological: Alert, attentive, Oriented x 3. No facial asymmetry. CN II-XII intact. 5/5 strength in upper and lower extremities. Normal range of motion in all four extremities. Distal sensation in hands and feet intact. Normal gait.  Skin: Skin is warm and dry.    Emergency Department Course   Emergency Department Course & Assessments:       Interventions:  Medications   acetaminophen (TYLENOL) tablet 650 mg (650 mg Oral $Given 9/11/23 1928)   cyclobenzaprine (FLEXERIL) tablet 10 mg (10 mg Oral $Given 9/11/23 1928)     Assessments:  1859 I obtained history and examined the patient as noted above. I discussed findings and discharge with the patient. All questions answered.     Independent Interpretation (X-rays, CTs, rhythm strip):  None    Consultations/Discussion of Management or Tests:  None        Social Determinants of Health affecting care:   None    Disposition:  The patient was discharged to home.     Impression & Plan    Medical Decision Making:  Patient is a well-appearing 38 female with complex history and recurrent ED visits who presents with neck pain after running into a wall 6 days ago.  Vitals are appropriate.  Physical examination reveals no neurological deficits.  Patient reports no numbness or weakness in her upper or lower extremities.  She is able to walk, urinate, defecate without difficulty.  No saddle anesthesia.  Options discussed with patient.  Using [place] C-spine rules, patient is low risk for severe cervical injury.  She has no neurological symptoms today to suggest to cervical injury.  There is no indication for imaging at this time. Patient states she loss consciousness at time of injury. Using [place] head CT rules, patient is low risk for serious intracranial injury and no head CT is indicated. She is also not anticoagulated. Patient may continue to take Tylenol and will be sent home with a  muscle relaxant to help with any muscle strain or spasm.  Supportive cares encouraged including rest and ice or heat packs to the neck.  Follow-up with primary care as indicated if symptoms do not improve with conservative measures after 2 to 3 days.  Patient is comfortable with this plan.  Return precautions to the ED were discussed including weakness, numbness, headache, vomiting, or any other emergent concern.  Patient expressed understanding plan and was ready for discharge    Diagnosis:    ICD-10-CM    1. Strain of neck muscle, initial encounter  S16.1XXA         Discharge Medications:  Discharge Medication List as of 9/11/2023  7:30 PM        START taking these medications    Details   cyclobenzaprine (FLEXERIL) 10 MG tablet Take 1 tablet (10 mg) by mouth At Bedtime for 5 days, Disp-5 tablet, R-0, Local Print            Scribe Disclosure:  I, Lexy Cervantes, am serving as a scribe at 6:47 PM on 9/11/2023 to document services personally performed by Rika Stahl PA-C based on my observations and the provider's statements to me.   9/11/2023   Rika Stahl PA-C Steinbrueck, Emily, PA-C  09/11/23 1947

## 2023-09-11 NOTE — ED TRIAGE NOTES
Pt presents via EMS for evaluation of neck pain. Pt ran in to  [employer] last Tuesday and has had neck pain ever since. Pain is left sided. Rated 10/10. Has been taking tylenol for pain, nothing taken today.

## 2023-09-12 NOTE — DISCHARGE INSTRUCTIONS
Your examination is reassuring today. You will be treated for a neck strain. Continue supportive cares at home including rest, ice or heat packs, and Tylenol for pain. You will be sent home with muscle relaxant medication. Please take as prescribed and follow-up with primary care provider as needed. For any new or worsening symptoms, return to ED.

## 2023-09-26 PROBLEM — J38.6: Status: ACTIVE | Noted: 2023-09-26

## 2023-10-12 ENCOUNTER — TELEPHONE (OUTPATIENT)
Dept: OTOLARYNGOLOGY | Facility: CLINIC | Age: 38
End: 2023-10-12
Payer: MEDICARE

## 2023-11-10 DIAGNOSIS — J38.6 SUBGLOTTIC STENOSIS: Primary | ICD-10-CM

## 2023-11-15 ENCOUNTER — HOSPITAL ENCOUNTER (OUTPATIENT)
Dept: CT IMAGING | Facility: HOSPITAL | Age: 38
Discharge: HOME | End: 2023-11-15
Attending: INTERNAL MEDICINE
Payer: MEDICARE

## 2023-11-15 DIAGNOSIS — J38.6 SUBGLOTTIC STENOSIS: ICD-10-CM

## 2023-11-15 PROCEDURE — G1010 CDSM STANSON: HCPCS

## 2023-11-15 PROCEDURE — 71250 CT THORAX DX C-: CPT | Mod: MG

## 2023-11-20 ENCOUNTER — HOSPITAL ENCOUNTER (EMERGENCY)
Facility: CLINIC | Age: 38
Discharge: HOME | End: 2023-11-20
Attending: EMERGENCY MEDICINE | Admitting: EMERGENCY MEDICINE
Payer: MEDICARE

## 2023-11-20 VITALS
RESPIRATION RATE: 18 BRPM | OXYGEN SATURATION: 97 % | TEMPERATURE: 98.4 F | HEART RATE: 60 BPM | DIASTOLIC BLOOD PRESSURE: 68 MMHG | SYSTOLIC BLOOD PRESSURE: 128 MMHG

## 2023-11-20 DIAGNOSIS — R45.851 SUICIDAL THOUGHTS: ICD-10-CM

## 2023-11-20 PROCEDURE — 99283 EMERGENCY DEPT VISIT LOW MDM: CPT

## 2023-11-20 PROCEDURE — 250N000013 HC RX MED GY IP 250 OP 250 PS 637: Performed by: EMERGENCY MEDICINE

## 2023-11-20 RX ORDER — ACETAMINOPHEN 325 MG/1
650 TABLET ORAL ONCE
Status: COMPLETED | OUTPATIENT
Start: 2023-11-20 | End: 2023-11-20

## 2023-11-20 RX ADMIN — ACETAMINOPHEN 650 MG: 325 TABLET, FILM COATED ORAL at 19:48

## 2023-11-20 ASSESSMENT — ACTIVITIES OF DAILY LIVING (ADL)
ADLS_ACUITY_SCORE: 35

## 2023-11-20 NOTE — ED PROVIDER NOTES
History     Chief Complaint:  Psychiatric Problem       HPI   Dionne Perez is a 38 year old female history of ROSY, depression, developmental delay presenting today for suicidal ideations.  She states that she had a foot appointment earlier today was in the transport bus as a  to pull over and call 911 because she felt suicidal.  States she had a rough day today and had thoughts of SI today.  States its been years since she had suicidal thoughts.  Denies any hallucinations.  Denies any areas of pain or shortness of breath.  She states she does see a therapist and psychiatrist.      Independent Historian:   None - Patient Only    Review of External Notes:   Previously seen in the ER earlier this month for shortness of breath and sent home.      Medications:    ADVAIR -21 MCG/ACT inhaler  albuterol (PROAIR HFA/PROVENTIL HFA/VENTOLIN HFA) 108 (90 Base) MCG/ACT inhaler  aspirin (ASA) 325 MG EC tablet  busPIRone (BUSPAR) 15 MG tablet  ciprofloxacin-dexamethasone (CIPRODEX) 0.3-0.1 % otic suspension  dextromethorphan-guaiFENesin (TUSSIN DM)  MG/5ML liquid  diclofenac (VOLTAREN) 1 % topical gel  docusate sodium (COLACE) 100 MG capsule  escitalopram (LEXAPRO) 20 MG tablet  fluticasone (FLONASE) 50 MCG/ACT nasal spray  hydrocortisone 2.5 % cream  ipratropium - albuterol 0.5 mg/2.5 mg/3 mL (DUONEB) 0.5-2.5 (3) MG/3ML neb solution  levothyroxine (SYNTHROID/LEVOTHROID) 50 MCG tablet  medroxyPROGESTERone (PROVERA) 10 MG tablet  melatonin 3 MG tablet  Multiple Vitamins-Minerals (EMERGEN-C IMMUNE PLUS/VIT D) CHEW  nystatin (MYCOSTATIN) 147549 UNIT/GM external cream  OLANZapine zydis (ZYPREXA) 10 MG ODT  pantoprazole (PROTONIX) 40 MG EC tablet  Pediatric Multiple Vitamins (FLINTSTONES PLUS EXTRA C) CHEW  Pediatric Multivit-Minerals-C (CHEWABLES MULTIVITAMIN PO)  pravastatin (PRAVACHOL) 40 MG tablet  Probiotic Product (RISAQUAD-2) CAPS  risperiDONE (RISPERDAL) 0.25 MG tablet  sodium chloride, PF, (NORMAL  SALINE FLUSH) 0.9% PF flush  traZODone (DESYREL) 50 MG tablet        Past Medical History:    Past Medical History:   Diagnosis Date    Anxiety     Cholelithiasis     Cognitive impairment     Depressive disorder     Gastroesophageal reflux disease     Hypercholesterolemia     Hypothyroidism     Obesity     Subglottic stenosis        Past Surgical History:    Past Surgical History:   Procedure Laterality Date    BRONCHOSCOPY FLEXIBLE AND RIGID N/A 6/6/2023    Procedure: Flexible Bronchoscopy, Laser Resection and Balloon Dilation;  Surgeon: Laxmi Cline MD;  Location: [facility] OR    IR LUMBAR PUNCTURE  06/09/2020    LASER CO2 BRONCHOSCOPY N/A 8/4/2023    Procedure: Flexible bronchoscopy theraputic airway inspection, CO2 laser on standby;  Surgeon: Joe Sutherland MD;  Location: [facility] OR    TONSILLECTOMY          Physical Exam   Patient Vitals for the past 24 hrs:   BP Temp Temp src Pulse Resp SpO2   11/20/23 1519 128/61 98.4  F (36.9  C) Oral 73 18 98 %        Physical Exam  Vitals reviewed.   Constitutional:       General: She is not in acute distress.     Appearance: She is not ill-appearing.   HENT:      Head: Normocephalic and atraumatic.   Eyes:      Extraocular Movements: Extraocular movements intact.   Cardiovascular:      Rate and Rhythm: Normal rate and regular rhythm.   Pulmonary:      Effort: Pulmonary effort is normal. No respiratory distress.      Breath sounds: Normal breath sounds. No wheezing.   Abdominal:      Palpations: Abdomen is soft.      Tenderness: There is no abdominal tenderness. There is no guarding.   Musculoskeletal:      Cervical back: Normal range of motion.   Skin:     General: Skin is warm and dry.   Neurological:      Mental Status: She is alert and oriented to person, place, and time.      GCS: GCS eye subscore is 4. GCS verbal subscore is 5. GCS motor subscore is 6.   Psychiatric:      Comments: Tearful states she has suicidal ideations.           Emergency Department Course      Imaging:  No orders to display          Laboratory:  Labs Ordered and Resulted from Time of ED Arrival to Time of ED Departure - No data to display     Procedures       Emergency Department Course & Assessments:    PSS-3      Date and Time Over the past 2 weeks have you felt down, depressed, or hopeless? Over the past 2 weeks have you had thoughts of killing yourself? Have you ever attempted to kill yourself? When did this last happen? User   23 1522 yes yes yes within the last 24 hours (including today) [facility]          C-SSRS ([place])      Date and Time Q1 Wished to be Dead (Past Month) Q2 Suicidal Thoughts (Past Month) Q3 Suicidal Thought Method Q4 Suicidal Intent without Specific Plan Q5 Suicide Intent with Specific Plan Q6 Suicide Behavior (Lifetime) Within the Past 3 Months? RETIRED: Level of Risk per Screen Screening Not Complete User   23 1522 yes yes no no no -- -- -- -- [facility]                  Item Assessment   Suicidal Ideation Suicidal thoughts   Plan None   Intent None   Suicidal or self-harm behaviors States she will hurt herself with her hands   Risk Factors Previous psychiatric diagnosis and treatments   Protective Factors Pt resides at        Interventions:  Medications - No data to display       Consultations/Discussion of Management or Tests:    ED Course as of 23 1739      164 Re-Evaluated the patient and she states she is no longer suicidal. States she wants something to eat       Social Determinants of Health affecting care:   None and Education/Literacy    Disposition:  Pending DEC assessment. Pt signed out to Dr. Marin    Impression & Plan        Medical Decision Makin-year-old female history of anxiety, depression presents today with suicidal thoughts that started prior to arrival.  States that she was in transport on her way back home from an appointment when she developed suicidal thoughts.  She states that she would hurt herself with her hands.   States that its been a long time since she has had suicidal ideations.  Patient well-appearing in no acute distress.  Reports no pain.  Denies any shortness of breath.  I reviewed patient's care plan, seen frequently in the ER for acute stress reaction.  I discussed with the patient plan for a DEC evaluation.  When I reassessed the patient while she was waiting for a DEC assessment she reported no suicidal ideations.  States she wants something to eat.  Pending DEC assessment at this time, patient signed out to my colleague.      Diagnosis:    ICD-10-CM    1. Suicidal thoughts  R45.851            Discharge Medications:  New Prescriptions    No medications on file          Shaina Carmichael DO  11/20/2023   Shaina Carmichael DO Doan, Tiffani, DO  11/20/23 1739

## 2023-11-20 NOTE — ED TRIAGE NOTES
" Pt arrives via EMS she was at a dr appt in [place] and told the [employer]  to pullover and call 911 because she was feeling suicdal. Endorses she does want to harm herself and when asked about a plan she states \"with my hands.\" VSS. Pt lives at group home in [place] is the  [phone], Pt has a state guardian Rodolfo Fielder [phone]     Triage Assessment (Adult)       Row Name 11/20/23 1519          Triage Assessment    Airway WDL WDL        Respiratory WDL    Respiratory WDL WDL        Skin Circulation/Temperature WDL    Skin Circulation/Temperature WDL WDL                     "

## 2023-11-20 NOTE — ED PROVIDER NOTES
Sign-out Note    Received this patient in sign-out from Dr. Carmichael at 5:33 PM.  Please refer to earlier documentation detailing presenting complaints, evaluation, and ED course.  In brief, patient is being seen in the ER for suicidal ideation.    Recommendations from previous provider: F/U on DEC assessment    ED course under my care:    6:11 PM:  I spoke with DEC.  Patient was waiting for a ride to come back from her doctor's appointment.  She was upset because the ride was 1 hour late.  This triggered her.  She also notes feeling grief as she is missing her parents during the holiday season.  She denies any intent or plan of self-harm at this time.  DEC feel patient is safe for discharge to her group home.  Patient's legal guardian and group home also been contacted by DEC and are in agreement with outlined plan of care.    Disposition: Lavon Tarango MD  11/21/23 0894
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
## 2023-11-21 NOTE — DISCHARGE INSTRUCTIONS
Discharge Instructions  Mental Health Concerns    You were seen today for mental health concerns, such as depression, anxiety, or suicidal thinking. Your provider feels that you do not require hospitalization at this time. However, your symptoms may become worse, and you may need to return to the Emergency Department. Most treatments of depression and suicidal thoughts are a process rather than a single intervention.  Medications and counseling can take several weeks or more to help.    Generally, every Emergency Department visit should have a follow-up clinic visit with either a primary or a specialty clinic/provider. Please follow-up as instructed by your emergency provider today.    By accepting these discharge instructions:  You promise to not harm yourself or others.  You agree that if you feel you are becoming unable to keep that promise, you will do something to help yourself before you do anything to harm yourself or others.   You agree to keep any safety plan arranged on your visit here today.  You agree to take any medication prescribed or recommended by your provider.  If you are getting worse, you can contact a friend or a family member, contact your counselor or family provider, contact a crisis line, or other options discussed with the provider or therapist today.  At any time, you can call 911 and return to the Emergency Department for more help.  You understand that follow-up is essential to your treatment, and you will make and keep appointments recommended on your visit today.    How to improve your mental health and prevent suicide:  Involve others by letting family, friends, counselors know.  Do not isolate yourself.  Avoid alcohol or drugs. Remove weapons, poisons from your home.  Try to stick to routines for eating, sleeping and getting regular exercise.    Try to get into sunlight. Bright natural light not only treats seasonal affective disorder but also depression.  Increase safe activities  that you enjoy.    If you feel worse, contact 1-800-suicide (1-406.419.9946), or call 911, or your primary provider/counselor for additional assistance.    If you were given a prescription for medicine here today, be sure to read all of the information (including the package insert) that comes with your prescription.  This will include important information about the medicine, its side effects, and any warnings that you need to know about.  The pharmacist who fills the prescription can provide more information and answer questions you may have about the medicine.  If you have questions or concerns that the pharmacist cannot address, please call or return to the Emergency Department.   Remember that you can always come back to the Emergency Department if you are not able to see your regular provider in the amount of time listed above, if you get any new symptoms, or if there is anything that worries you.

## 2023-11-21 NOTE — CONSULTS
Diagnostic Evaluation Consultation  Crisis Assessment    Patient Name: Dionne Perez  Age:  38 year old  Legal Sex: female  Gender Identity: female  Pronouns:   Race: White  Ethnicity: Not  or   Language: English      Patient was assessed: Virtual: iFLYER Crisis Assessment Start Time: 1716 Crisis Assessment Stop Time: 1747  Patient location: [facility] EMERGENCY DEPT                             [facility]    Referral Data and Chief Complaint  Dionne Perez presents to the ED via EMS. Patient is presenting to the ED for the following concerns: Significant behavioral change, Anxiety, Depression, Suicidal ideation.   Factors that make the mental health crisis life threatening or complex are:  Pt lives in a group home and has a history of multiple ER visits with suicidal ideations and various health issues.  Pt has a history of intellectual disability and chronic suicidal ideations..      Informed Consent and Assessment Methods  Explained the crisis assessment process, including applicable information disclosures and limits to confidentiality, assessed understanding of the process, and obtained consent to proceed with the assessment.  Assessment methods included conducting a formal interview with patient, review of medical records, collaboration with medical staff, and obtaining relevant collateral information from family and community providers when available.  : unable to complete (Pt's legal guardian was not present in the ER.)     Patient response to interventions: eager to participate, acceptance expressed, verbalizes understanding  Coping skills were attempted to reduce the crisis:  going out to eat, shopping, arts and crafts, watching TV, movies and listening to music, sensory grounding techniques, mindfulness, deep breathing exercise, meditation and affirmations.     History of the Crisis   Pt is a 38 year old White female with a history of depression, anxiety, intellectual  "disability and suicidal ideations.  Pt was brought to the ER today by EMS due to worsening of depression and suicidal ideations.  Pt went to her doctor's appointment today, then told the metro mobility  about having suicidal ideations who called 911.  Pt remarked, \"I was suicidal.\" as her reason for visiting the ER today.  Pt endorsed increased depression, isolation, cry and anxiety.  Pt reported having normal sleep and good appetite.  Pt denied having acute psychosis and anjel. Pt endorsed suicidal ideations without intent and plan.  Pt denied having HI, access to firearms, history of SIB and previous suicide attempt.  Pt identified her metro mobility  coming late to pick her up today after her doctor's appointment and missing her parents who passed away 10 years ago as triggers leading to her current mental health crisis.    Brief Psychosocial History  Family:  Single, Children no  Support System:  Other (specify) (Pt identified her 3 close friends, Valerie, Anni and Yen as her positive supports.)  Employment Status:  disabled, unemployed  Source of Income:  unable to assess  Financial Environmental Concerns:  none  Current Hobbies:  arts/crafts, music, meditation, television/movies/videos, social media/computer activities  Barriers in Personal Life:  mental health concerns, cognitive limitations, emotional concerns    Significant Clinical History  Current Anxiety Symptoms:  anxious  Current Depression/Trauma:  apathy, crying or feels like crying, impaired decision making, withdrawl/isolation, sadness, thoughts of death/suicide  Current Somatic Symptoms:  anxious  Current Psychosis/Thought Disturbance:     Current Eating Symptoms:     Chemical Use History:  Alcohol: Social (Pt reported drinking alcohol once a while.)  Last Use:: 11/17/23  Benzodiazepines: None  Opiates: None  Cocaine: None  Marijuana: None  Other Use: None   Past diagnosis:  Anxiety Disorder, Depression, Other (intellectual " disability.)  Family history:  No known history of mental health or chemical health concerns  Past treatment:  Individual therapy, Psychiatric Medication Management, Inpatient Hospitalization, Residential Treatment, Case management  Details of most recent treatment:  Pt was living in her [facility] group home in [place] since early October, 2023.  Pt reported current established outpatient PCP, psychiatry for medication management and individual therapy service.  Pt reported having a  and legal guardian.  Pt has a history of psychiatric hospitalization and group home/residential treatment.  Other relevant history:  Pt shared her parents passed away about 10 years ago, survived by 3 sisters and 2 brothers.  Pt reported she was single, has no children and unemployed.  Pt reported she was living in a group home in [place] and she was getting alone well with other residents and group home staff.  Pt denied history of legal issues and denied being abused.       Collateral Information  Is there collateral information: Yes     Collateral information name, relationship, phone number:  Yanelis  [phone]    What happened today: Yanelis reported Pt went to her scheduled doctor's appointment today, then she received a call from the [employer]  who informed her Pt expressed suicidal ideations as Pt was being taken to the ER for further evaluation.     What is different about patient's functioning: Yanelis reported Pt has tendency to isolating herself and not much engaged in her group home program.  Yanelis reported Pt has dysregulated emotions but has been consistently taking her psychiatric medications.     Concern about alcohol/drug use:      What do you think the patient needs:      Has patient made comments about wanting to kill themselves/others: yes    If d/c is recommended, can they take part in safety/aftercare planning:  yes    Additional collateral information:  Writer  "called and spoke to Pt's legal guardian, Rodolfo Gonzalez [phone].  Rodolfo reviewed and agreed with Pt returning to her current group home and follow up with her current established outpatient mental health service providers.     Risk Assessment  Osborne Suicide Severity Rating Scale Full Clinical Version:  Suicidal Ideation  Q1 Wish to be Dead (Lifetime): Yes  Q2 Non-Specific Active Suicidal Thoughts (Lifetime): Yes  3. Active Suicidal Ideation with any Methods (Not Plan) Without Intent to Act (Lifetime): No  Q4 Active Suicidal Ideation with Some Intent to Act, Without Specific Plan (Lifetime): No  Q5 Active Suicidal Ideation with Specific Plan and Intent (Lifetime): No  Q6 Suicide Behavior (Lifetime): no     Suicidal Behavior (Lifetime)  Actual Attempt (Lifetime): No  Has subject engaged in non-suicidal self-injurious behavior? (Lifetime): No  Interrupted Attempts (Lifetime): No  Aborted or Self-Interrupted Attempt (Lifetime): No  Preparatory Acts or Behavior (Lifetime): No    Osborne Suicide Severity Rating Scale Recent:   Suicidal Ideation (Recent)  Q1 Wished to be Dead (Past Month): yes  Q2 Suicidal Thoughts (Past Month): yes  Q3 Suicidal Thought Method: no  Q4 Suicidal Intent without Specific Plan: no  Q5 Suicide Intent with Specific Plan: no  Level of Risk per Screen: low risk  Intensity of Ideation (Recent)  Most Severe Ideation Rating (Past 1 Month): 1  Description of Most Severe Ideation (Past 1 Month): \"I just want to be dead.\"  Frequency (Past 1 Month): 2-5 times in week  Duration (Past 1 Month): 4-8 hours/most of day  Suicidal Behavior (Recent)  Actual Attempt (Past 3 Months): No  Has subject engaged in non-suicidal self-injurious behavior? (Past 3 Months): No  Interrupted Attempts (Past 3 Months): No  Aborted or Self-Interrupted Attempt (Past 3 Months): No  Preparatory Acts or Behavior (Past 3 Months): No    Environmental or Psychosocial Events: loss of a loved one, unemployment/underemployment, " impulsivity/recklessness, other life stressors, neither working nor attending school  Protective Factors: Protective Factors: lives in a responsibly safe and stable environment, good treatment engagement, able to access care without barriers, help seeking, supportive ongoing medical and mental health care relationships, constructive use of leisure time, enjoyable activities, resilience, reality testing ability    Does the patient have thoughts of harming others? Feels Like Hurting Others: no  Previous Attempt to Hurt Others: no  Current presentation:  (Pt was calm, alert, oriented, engaged and cooperative.  Pt presented with coherent, normal rate of speech, constricted, depressed and anxious affect.)  Is the patient engaging in sexually inappropriate behavior?: no    Is the patient engaging in sexually inappropriate behavior?  no        Mental Status Exam   Affect: Constricted  Appearance: Appropriate  Attention Span/Concentration: Attentive  Eye Contact: Variable, Engaged    Fund of Knowledge: Appropriate   Language /Speech Content: Fluent  Language /Speech Volume: Normal  Language /Speech Rate/Productions: Normal  Recent Memory: Variable  Remote Memory: Variable  Mood: Anxious, Apathetic, Depressed, Sad, Irritable  Orientation to Person: Yes   Orientation to Place: Yes  Orientation to Time of Day: Yes  Orientation to Date: Yes     Situation (Do they understand why they are here?): Yes  Psychomotor Behavior: Normal  Thought Content: Clear, Suicidal  Thought Form: Intact     Mini-Cog Assessment  Number of Words Recalled:    Clock-Drawing Test:     Three Item Recall:    Mini-Cog Total Score:       Medication  Psychotropic medications:   Medication Orders - Psychiatric (From admission, onward)      None             Current Care Team  Patient Care Team:  Clinic, [facility] as PCP - General  Rocco Mccormack MD as MD (Otolaryngology)  Rika Bynum RN as Specialty Care Coordinator  Joe Sutherland MD  as MD (Critical Care)  Yimi Batista, RN as Specialty Care Coordinator (Pulmonary Disease)    Diagnosis  Patient Active Problem List   Diagnosis Code    Altered mental status R41.82    Agitation R45.1    Aggression R46.89    Abnormal behavior R46.89    Acute costochondritis M94.0    Adjustment reaction F43.20    Adjustment reaction with aggression F43.29    Behavior problem, adult F69    Cholelithiasis K80.20    Chronic abdominal pain R10.9, G89.29    Cognitive impairment R41.89    Constipation K59.00    Contusion of left hip S70.02XA    Developmental disability F89    Essential hypertension I10    Gallstone pancreatitis K85.10    ROSY (generalized anxiety disorder) F41.1    Hyperlipemia E78.5    Hypothyroidism due to acquired atrophy of thyroid E03.4    Severe episode of recurrent major depressive disorder, without psychotic features (H) F33.2    Insomnia G47.00    Major neurocognitive disorder (H) F03.90    Morbid obesity (H) E66.01    Recurrent major depressive disorder (H24) F33.9    Sinus bradycardia, chronic R00.1    Suicidal ideations R45.851    Syncope R55    Dyspnea R06.00    Acquired hammer toes of both feet M20.41, M20.42    Anemia, iron deficiency D50.9    Callus L84    Health care home, active care coordination Z78.9    History of cellulitis Z87.2    No-show for appointment Z91.199    Pre-syncope R55    Primary osteoarthritis of left knee M17.12    Scalp irritation R23.8    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  Pt lives in a group home and has a history of multiple ER visits with suicidal  ideations and various health issues.  Pt reported she went to her doctor's appointment earlier today, then her metro  came late to pick her up with triggered her.  Pt reported she had to wait for the ride for an hour which made her stressed.  Pt also shared missing her parents who passed away about 10 years ago.  Pt endorsed increased depression, isolation, cry and anxiety. Pt reported having normal sleep and good appetite. Pt denied having acute psychosis and anjel. Pt endorsed suicidal ideations without intent and plan. Pt denied having HI, access to firearms, history of SIB and previous suicide attempt.  Pt was able to engage in her DEC Safety plan as she felt safe to return to her group home.  Pt was able to identify her coping skills and support system to mitigate her current mental health crisis.  Pt was not imminent danger to herself or to others.  Pt was appropriate for returning to her current group home and follow up with her current established outpatient mental health services.                          Patient coping skills attempted to reduce the crisis:  going out to eat, shopping, arts and crafts, watching TV, movies and listening to music, sensory grounding techniques, mindfulness, deep breathing exercise, meditation and affirmations.    Disposition  Recommended disposition: Individual Therapy, Medication Management, Group Home        Reviewed case and recommendations with attending provider. Attending Name: Lavon Marin MD       Attending concurs with disposition: yes       Patient and/or validated legal guardian concurs with disposition:   yes       Final disposition:  discharge    Legal status on admission:      Assessment Details   Total duration spent with the patient: 31 min     CPT code(s) utilized: 10074 - Psychotherapy for Crisis - 60 (30-74*) min    Alex Brown [facility], Psychotherapist  DEC - Triage & Transition Services  Callback: [phone]

## 2023-11-21 NOTE — ED NOTES
Spoke with Yanelis  she report pt will need transport back to group home. [address] [place] [address].   Workimg om [facility] transport back

## 2023-11-21 NOTE — ED NOTES
Attempted to call [email or web address] left message. Also left message for Rodolfo cedillo pt legal guardian.

## 2023-11-25 ENCOUNTER — HOSPITAL ENCOUNTER (EMERGENCY)
Facility: CLINIC | Age: 38
Discharge: HOME | End: 2023-11-25
Attending: EMERGENCY MEDICINE | Admitting: EMERGENCY MEDICINE
Payer: MEDICARE

## 2023-11-25 VITALS
RESPIRATION RATE: 16 BRPM | OXYGEN SATURATION: 97 % | DIASTOLIC BLOOD PRESSURE: 64 MMHG | TEMPERATURE: 98.8 F | HEART RATE: 53 BPM | SYSTOLIC BLOOD PRESSURE: 100 MMHG

## 2023-11-25 DIAGNOSIS — F39 MOOD DISORDER (H): ICD-10-CM

## 2023-11-25 DIAGNOSIS — F03.90 MAJOR NEUROCOGNITIVE DISORDER (H): ICD-10-CM

## 2023-11-25 PROCEDURE — 99285 EMERGENCY DEPT VISIT HI MDM: CPT | Performed by: EMERGENCY MEDICINE

## 2023-11-25 PROCEDURE — 99284 EMERGENCY DEPT VISIT MOD MDM: CPT | Mod: FS | Performed by: EMERGENCY MEDICINE

## 2023-11-25 ASSESSMENT — ACTIVITIES OF DAILY LIVING (ADL)
ADLS_ACUITY_SCORE: 35

## 2023-11-25 NOTE — ED NOTES
Bed: RUTHANN-E  Expected date: 11/25/23  Expected time: 2:32 PM  Means of arrival:   Comments:  [address] [address] - 38 F, Suicidal ideation from group home

## 2023-11-25 NOTE — ED NOTES
RN attempted to call the pt's group home, no answer, voicemail left. This RN then attempted to call the pt's  Bety, who forwarded this RN to speak with their supervisor Adonis. Spoke with Adonis to inform them that we are sending Dionne back to their group home and that we needed the group homes phone number or be able to confirm if there were staff at the group home. Supervisor Adonis unable to confirm if there are any staff at group home or to provide the group homes number. This RN then got in contact with Rodolfo the pt's legal guardian. Rodolfo suggested to speak with Bety who this writer had already spoken to. Attempted to call the group home again and there was no answer, voicemail left. This write then called the [place] non-emergent police department phone number to request a welfare check at the pt's group home.

## 2023-11-25 NOTE — ED TRIAGE NOTES
Triage Assessment (Adult)       Row Name 11/25/23 1459          Triage Assessment    Airway WDL WDL        Respiratory WDL    Respiratory WDL WDL        Skin Circulation/Temperature WDL    Skin Circulation/Temperature WDL WDL        Cardiac WDL    Cardiac WDL WDL        Peripheral/Neurovascular WDL    Peripheral Neurovascular WDL WDL        Cognitive/Neuro/Behavioral WDL    Cognitive/Neuro/Behavioral WDL WDL

## 2023-11-25 NOTE — ED PROVIDER NOTES
"ED Provider Note  [facility]      History     Chief Complaint   Patient presents with    Suicidal     Suicidal since Monday.  Coming from group home.     HPI  37yo F pmhx developmental delay (functions at 7-9yr old level per chart), HTN, HLD, obesity and recurrent ED visits for chronic SI BIBA from group home SI.  When patient is asked if her current suicidality is different from her chronic suicidality or if she has had some new stressor to result her in feeling this way, she says \"I don't know.\" Denies discord at her home group home. Pt denies plan.  Chart review shows the patient has had multiple ED visits for similar symptoms, and has a care plan in place.  Patient most recently seen yesterday at [facility] and cleared for return home.    Past Medical History  Past Medical History:   Diagnosis Date    Anxiety     Asthma     Cholelithiasis     Cognitive impairment     Depressive disorder     Gastroesophageal reflux disease     Hypercholesterolemia     Hypothyroidism     Obesity     Subglottic stenosis      Past Surgical History:   Procedure Laterality Date    BRONCHOSCOPY FLEXIBLE AND RIGID N/A 6/6/2023    Procedure: Flexible Bronchoscopy, Laser Resection and Balloon Dilation;  Surgeon: Laxmi Cline MD;  Location: [facility] OR    IR LUMBAR PUNCTURE  06/09/2020    LASER CO2 BRONCHOSCOPY N/A 8/4/2023    Procedure: Flexible bronchoscopy theraputic airway inspection, CO2 laser on standby;  Surgeon: Joe Sutherland MD;  Location: [facility] OR    TONSILLECTOMY       ADVAIR -21 MCG/ACT inhaler  albuterol (PROAIR HFA/PROVENTIL HFA/VENTOLIN HFA) 108 (90 Base) MCG/ACT inhaler  aspirin (ASA) 325 MG EC tablet  busPIRone (BUSPAR) 15 MG tablet  ciprofloxacin-dexamethasone (CIPRODEX) 0.3-0.1 % otic suspension  dextromethorphan-guaiFENesin (TUSSIN DM)  MG/5ML liquid  diclofenac (VOLTAREN) 1 % topical gel  docusate sodium (COLACE) 100 MG capsule  escitalopram (LEXAPRO) 20 MG tablet  fluticasone " (FLONASE) 50 MCG/ACT nasal spray  hydrocortisone 2.5 % cream  ipratropium - albuterol 0.5 mg/2.5 mg/3 mL (DUONEB) 0.5-2.5 (3) MG/3ML neb solution  levothyroxine (SYNTHROID/LEVOTHROID) 50 MCG tablet  medroxyPROGESTERone (PROVERA) 10 MG tablet  melatonin 3 MG tablet  Multiple Vitamins-Minerals (EMERGEN-C IMMUNE PLUS/VIT D) CHEW  nystatin (MYCOSTATIN) 852441 UNIT/GM external cream  OLANZapine zydis (ZYPREXA) 10 MG ODT  pantoprazole (PROTONIX) 40 MG EC tablet  Pediatric Multiple Vitamins (FLINTSTONES PLUS EXTRA C) CHEW  Pediatric Multivit-Minerals-C (CHEWABLES MULTIVITAMIN PO)  pravastatin (PRAVACHOL) 40 MG tablet  Probiotic Product (RISAQUAD-2) CAPS  risperiDONE (RISPERDAL) 0.25 MG tablet  sodium chloride, PF, (NORMAL SALINE FLUSH) 0.9% PF flush  traZODone (DESYREL) 50 MG tablet      Allergies   Allergen Reactions    Ibuprofen      Family History  No family history on file.  Social History   Social History     Tobacco Use    Smoking status: Never     Passive exposure: Never    Smokeless tobacco: Never   Substance Use Topics    Alcohol use: No    Drug use: No         A medically appropriate review of systems was performed with pertinent positives and negatives noted in the HPI, and all other systems negative.    Physical Exam   BP: 112/67  Pulse: 64  Temp: 97.5  F (36.4  C)  Resp: 16  SpO2: 97 %  Physical Exam  Constitutional:       General: She is not in acute distress.     Appearance: Normal appearance. She is well-developed.   HENT:      Head: Normocephalic and atraumatic.   Eyes:      Conjunctiva/sclera: Conjunctivae normal.   Cardiovascular:      Rate and Rhythm: Normal rate.   Pulmonary:      Effort: Pulmonary effort is normal.   Abdominal:      General: Abdomen is flat.   Musculoskeletal:      Cervical back: Normal range of motion and neck supple.   Skin:     General: Skin is warm and dry.   Neurological:      Mental Status: She is alert.   Psychiatric:         Mood and Affect: Mood normal.         Speech: Speech  normal.         Behavior: Behavior normal.         Thought Content: Thought content is not paranoid or delusional. Thought content includes suicidal ideation. Thought content does not include homicidal ideation. Thought content does not include homicidal or suicidal plan.           ED Course, Procedures, & Data      Procedures          Mental Health Risk Assessment        PSS-3      Date and Time Over the past 2 weeks have you felt down, depressed, or hopeless? Over the past 2 weeks have you had thoughts of killing yourself? Have you ever attempted to kill yourself? When did this last happen? User   11/25/23 1453 yes yes no -- MRB          C-SSRS ([place])      Date and Time Q1 Wished to be Dead (Past Month) Q2 Suicidal Thoughts (Past Month) Q3 Suicidal Thought Method Q4 Suicidal Intent without Specific Plan Q5 Suicide Intent with Specific Plan Q6 Suicide Behavior (Lifetime) Within the Past 3 Months? RETIRED: Level of Risk per Screen Screening Not Complete User   11/25/23 1453 yes yes no no no -- -- -- -- MRB                  Item Assessment   Suicidal Ideation Suicidal thoughts with method (no specific plan or intent to act)   Plan N/a   Intent N/a   Suicidal or self-harm behaviors N/a   Risk Factors Major depressive episode   Protective Factors Group home resident          No results found for any visits on 11/25/23.  Medications - No data to display  Labs Ordered and Resulted from Time of ED Arrival to Time of ED Departure - No data to display  No orders to display          Critical care was not performed.     Medical Decision Making  The patient's presentation was of moderate complexity (a chronic illness mild to moderate exacerbation, progression, or side effect of treatment).    The patient's evaluation involved:  review of external note(s) from 2 sources (ED and APS at [facility] notes)    The patient's management necessitated moderate risk (limitations due to social determinants of health (intellectual  "disability, group home resident)).    Assessment & Plan    37yo F pmhx developmental delay (functions at 7-9yr old level per chart), HTN, HLD, obesity and recurrent ED visits for chronic SI BIBA from group home for SI.  When patient is asked if her current suicidality is different from her chronic suicidality or if she has had some new stressor to result her in feeling this way, she says \"I don't know.\" Denies discord at her home group home. Pt denies plan.  Chart review shows the patient has had multiple ED visits for similar symptoms, and has a care plan in place.  Patient most recently seen yesterday at [facility] and cleared for return home.      Patient with longstanding history of chronic SI and frequent ED visits.  Patient denying acute plan, or significant change from her baseline today.  Denies increased stressors or changes in her living environment.  Given patient's documented history, lack of plan today, and care plan I feel the patient is safe for discharge back to her group home.  I do not feel that DEC assessment would offer anything further. Pt to be discharged, return precautions for worsening symptoms given.     I have reviewed the nursing notes. I have reviewed the findings, diagnosis, plan and need for follow up with the patient.    New Prescriptions    No medications on file       Final diagnoses:   Mood disorder (H24)   Major neurocognitive disorder (H)       Miguel A Woo PA-C  [facility] EMERGENCY DEPARTMENT  11/25/2023     Miguel A Woo PA-C  11/25/23 1613    "

## 2023-11-26 ENCOUNTER — HOSPITAL ENCOUNTER (EMERGENCY)
Facility: CLINIC | Age: 38
Discharge: HOME | End: 2023-11-27
Attending: EMERGENCY MEDICINE | Admitting: EMERGENCY MEDICINE
Payer: MEDICARE

## 2023-11-26 VITALS
DIASTOLIC BLOOD PRESSURE: 77 MMHG | SYSTOLIC BLOOD PRESSURE: 121 MMHG | TEMPERATURE: 98.2 F | RESPIRATION RATE: 20 BRPM | OXYGEN SATURATION: 95 % | HEART RATE: 68 BPM

## 2023-11-26 DIAGNOSIS — R45.851 SUICIDAL IDEATION: ICD-10-CM

## 2023-11-26 PROCEDURE — 99283 EMERGENCY DEPT VISIT LOW MDM: CPT | Performed by: EMERGENCY MEDICINE

## 2023-11-26 PROCEDURE — 99284 EMERGENCY DEPT VISIT MOD MDM: CPT | Performed by: EMERGENCY MEDICINE

## 2023-11-26 ASSESSMENT — ACTIVITIES OF DAILY LIVING (ADL)
ADLS_ACUITY_SCORE: 35
ADLS_ACUITY_SCORE: 35

## 2023-11-26 NOTE — ED NOTES
Received a call back from the patient's group ([phone]), updated them that the pt is discharging back, they are now aware, EMS ordered.

## 2023-11-27 ENCOUNTER — HOSPITAL ENCOUNTER (OUTPATIENT)
Facility: CLINIC | Age: 38
Setting detail: OBSERVATION
Discharge: NURSING, REHAB OR LONG-TERM CARE | End: 2023-11-28
Attending: FAMILY MEDICINE | Admitting: FAMILY MEDICINE
Payer: MEDICARE

## 2023-11-27 VITALS
DIASTOLIC BLOOD PRESSURE: 84 MMHG | SYSTOLIC BLOOD PRESSURE: 138 MMHG | HEART RATE: 69 BPM | RESPIRATION RATE: 20 BRPM | TEMPERATURE: 97.7 F | OXYGEN SATURATION: 98 %

## 2023-11-27 DIAGNOSIS — R46.89 AGGRESSION: ICD-10-CM

## 2023-11-27 DIAGNOSIS — R41.89 COGNITIVE IMPAIRMENT: ICD-10-CM

## 2023-11-27 DIAGNOSIS — F69 BEHAVIOR PROBLEM, ADULT: ICD-10-CM

## 2023-11-27 DIAGNOSIS — F43.29 ADJUSTMENT REACTION WITH AGGRESSION: ICD-10-CM

## 2023-11-27 PROBLEM — F32.9 MAJOR DEPRESSIVE DISORDER: Status: ACTIVE | Noted: 2023-11-27

## 2023-11-27 PROCEDURE — G0378 HOSPITAL OBSERVATION PER HR: HCPCS

## 2023-11-27 PROCEDURE — 99285 EMERGENCY DEPT VISIT HI MDM: CPT | Mod: 25

## 2023-11-27 PROCEDURE — 99222 1ST HOSP IP/OBS MODERATE 55: CPT | Performed by: FAMILY MEDICINE

## 2023-11-27 ASSESSMENT — ACTIVITIES OF DAILY LIVING (ADL)
ADLS_ACUITY_SCORE: 35

## 2023-11-27 ASSESSMENT — COLUMBIA-SUICIDE SEVERITY RATING SCALE - C-SSRS
5. HAVE YOU STARTED TO WORK OUT OR WORKED OUT THE DETAILS OF HOW TO KILL YOURSELF? DO YOU INTEND TO CARRY OUT THIS PLAN?: NO
TOTAL  NUMBER OF INTERRUPTED ATTEMPTS SINCE LAST CONTACT: NO
ATTEMPT SINCE LAST CONTACT: NO
6. HAVE YOU EVER DONE ANYTHING, STARTED TO DO ANYTHING, OR PREPARED TO DO ANYTHING TO END YOUR LIFE?: NO
1. SINCE LAST CONTACT, HAVE YOU WISHED YOU WERE DEAD OR WISHED YOU COULD GO TO SLEEP AND NOT WAKE UP?: YES
2. HAVE YOU ACTUALLY HAD ANY THOUGHTS OF KILLING YOURSELF?: YES
TOTAL  NUMBER OF ABORTED OR SELF INTERRUPTED ATTEMPTS SINCE LAST CONTACT: NO

## 2023-11-27 NOTE — ED NOTES
Bed: URE-A  Expected date: 11/27/23  Expected time: 5:38 PM  Means of arrival:   Comments:  lAex [place], 38 female, vulnerable adult from [facility], behavioral issues, calm and cooperative, 6 min

## 2023-11-27 NOTE — ED PROVIDER NOTES
ED Provider Note  [facility]      History     Chief Complaint   Patient presents with    Aggressive Behavior     Pt brought in by ambo, pt is in a new  as of two months ago, pt is unhappy with new home. Pt was hitting herself and yelling at staff.      HPI  Dionne Perez is a 38 year old female who has a Care Plan in place with a history significant for anxiety and depression, adjustment issues, intellectual disability, developmental delay and SI who presents to the ED today BIBA from  for aggressive behavior.  Patient has had 6 visits in the last 2 weeks at several different emergency room's continues to have escalation of behavior patient's group home states that her aggression and self-injurious behaviors as well as aggression towards others have escalated over the last 24 to 48 hours.    Past Medical History  Past Medical History:   Diagnosis Date    Anxiety     Asthma     Cholelithiasis     Cognitive impairment     Depressive disorder     Gastroesophageal reflux disease     Hypercholesterolemia     Hypothyroidism     Obesity     Subglottic stenosis      Past Surgical History:   Procedure Laterality Date    BRONCHOSCOPY FLEXIBLE AND RIGID N/A 6/6/2023    Procedure: Flexible Bronchoscopy, Laser Resection and Balloon Dilation;  Surgeon: Laxmi Cline MD;  Location: [facility] OR    IR LUMBAR PUNCTURE  06/09/2020    LASER CO2 BRONCHOSCOPY N/A 8/4/2023    Procedure: Flexible bronchoscopy theraputic airway inspection, CO2 laser on standby;  Surgeon: Joe Sutherland MD;  Location: [facility] OR    TONSILLECTOMY       albuterol (PROAIR HFA/PROVENTIL HFA/VENTOLIN HFA) 108 (90 Base) MCG/ACT inhaler  ciprofloxacin-dexamethasone (CIPRODEX) 0.3-0.1 % otic suspension  diclofenac (VOLTAREN) 1 % topical gel  ipratropium - albuterol 0.5 mg/2.5 mg/3 mL (DUONEB) 0.5-2.5 (3) MG/3ML neb solution  OLANZapine zydis (ZYPREXA) 10 MG ODT  pantoprazole (PROTONIX) 40 MG EC tablet  ADVAIR -21 MCG/ACT  inhaler  aspirin (ASA) 325 MG EC tablet  busPIRone (BUSPAR) 15 MG tablet  dextromethorphan-guaiFENesin (TUSSIN DM)  MG/5ML liquid  docusate sodium (COLACE) 100 MG capsule  escitalopram (LEXAPRO) 20 MG tablet  fluticasone (FLONASE) 50 MCG/ACT nasal spray  hydrocortisone 2.5 % cream  levothyroxine (SYNTHROID/LEVOTHROID) 50 MCG tablet  medroxyPROGESTERone (PROVERA) 10 MG tablet  melatonin 3 MG tablet  Multiple Vitamins-Minerals (EMERGEN-C IMMUNE PLUS/VIT D) CHEW  nystatin (MYCOSTATIN) 236285 UNIT/GM external cream  Pediatric Multiple Vitamins (FLINTSTONES PLUS EXTRA C) CHEW  Pediatric Multivit-Minerals-C (CHEWABLES MULTIVITAMIN PO)  pravastatin (PRAVACHOL) 40 MG tablet  Probiotic Product (RISAQUAD-2) CAPS  risperiDONE (RISPERDAL) 0.25 MG tablet  sodium chloride, PF, (NORMAL SALINE FLUSH) 0.9% PF flush  traZODone (DESYREL) 50 MG tablet      Allergies   Allergen Reactions    Ibuprofen      Family History  No family history on file.  Social History   Social History     Tobacco Use    Smoking status: Never     Passive exposure: Never    Smokeless tobacco: Never   Substance Use Topics    Alcohol use: No    Drug use: No      Past medical history, past surgical history, medications, allergies, family history, and social history were reviewed with the patient. No additional pertinent items.      A complete review of systems was performed with pertinent positives and negatives noted in the HPI, and all other systems negative.    Physical Exam   BP: 138/84  Pulse: 62  Temp: 99.5  F (37.5  C)  Resp: 16  SpO2: 93 %  Physical Exam  Vitals and nursing note reviewed.   Constitutional:       General: She is not in acute distress.     Appearance: Normal appearance. She is not diaphoretic.   HENT:      Head: Atraumatic.      Mouth/Throat:      Mouth: Mucous membranes are moist.   Eyes:      General: No scleral icterus.     Conjunctiva/sclera: Conjunctivae normal.   Cardiovascular:      Rate and Rhythm: Normal rate.      Heart  sounds: Normal heart sounds.   Pulmonary:      Effort: No respiratory distress.      Breath sounds: Normal breath sounds.   Abdominal:      General: Abdomen is flat.   Musculoskeletal:      Cervical back: Neck supple.   Skin:     General: Skin is warm.      Findings: No rash.   Neurological:      General: No focal deficit present.      Mental Status: She is alert.      Sensory: No sensory deficit.      Motor: No weakness.      Coordination: Coordination normal.   Psychiatric:         Mood and Affect: Mood is anxious and depressed.         Speech: Speech normal.         Behavior: Behavior is cooperative.         Judgment: Judgment is impulsive.           ED Course, Procedures, & Data      Procedures             Medications - No data to display  Labs Ordered and Resulted from Time of ED Arrival to Time of ED Departure - No data to display  No orders to display          Critical care was not performed.     Medical Decision Making  The patient's presentation was of high complexity (a chronic illness severe exacerbation, progression, or side effect of treatment).    The patient's evaluation involved:  an assessment requiring an independent historian (was discussed at length with independent historian from [facility].)  review of external note(s) from 1 sources (notes from recent evaluation at [facility] were reviewed.)  discussion of management or test interpretation with another health professional (independent provider did full DEC assessment and discussion of hospitalization versus outpatient management.)    The patient's management necessitated high risk (a decision regarding hospitalization).    Assessment & Plan        I have reviewed the nursing notes. I have reviewed the findings, diagnosis, plan and need for follow up with the patient.    Patient with chronic behavioral issues at this time is on her sixth ER visit  discussed with [facility] and at this time patient will remain under observation  tonight with likely discharge back to [facility] tomorrow.      Final diagnoses:   Cognitive impairment   Aggression   Behavior problem, adult   Adjustment reaction with aggression         [facility] EMERGENCY DEPARTMENT  11/27/2023     Ariel Garrido MD  11/27/23 2110

## 2023-11-27 NOTE — ED NOTES
"0010  Patient declined DEC Assessment.  When  approached pt, she looked up, acknowledged , and looked away.  When asked whether she would participate in an assessment, patient said, \"no, I won't,\" and she went to sleep.      Prior to interview attempt, this  made calls to reach the group home and guardian:    2349 Attempted call to group home at [phone].  No answer.  Msg left.    2351 Rodolfo Baldwin, Legal guardian, [employer]  [id number], [place] [address] [phone] :  Called and the voicemail said a different name, did not leave msg.  Found new number for Rodolfo brandin Ph: [phone], option 5 (Left message for Rodolfo Baldwin) or [phone] (number not in service).     2352 Ayse Uriarte, , Ph#: [phone]  Called and phone was off, left voicemail.    address: [address] [place], [place] [id number] Left message for [employer]'s manager, Adonis at [phone].  This was instructed from a recent note in pt's chart.    Emergency Room Care Plan in Care Coordination notes from 2 months ago:    2. BH concerns: Standard evaluation recommended, although DEC evaluation may be unnecessary at times if presentation is consistent with acute stress reaction.    3. ED Recidivism: Given longstanding recidivism and concern for malingering, recommend avoiding measures that would reinforce ED visits (ie, avoid iPad, coffee, ordering meals, etc.) that are unnecessary but may be part of secondary gain. Consult SW when present and discuss with [facility] early in ED evaluation.        "

## 2023-11-27 NOTE — ED NOTES
Emergency Department Patient Sign-out       Brief HPI:  This is a 38 year old female signed out to me by Dr. Bojra .  See initial ED Provider note for details of the presentation.            Significant Events prior to my assuming care: Patient with SI. DEC saw, plan to discharge to  when able to communicate with them.      Exam:   Patient Vitals for the past 24 hrs:   BP Temp Temp src Pulse Resp SpO2   11/26/23 2102 121/77 98.2  F (36.8  C) Oral 68 20 95 %           ED RESULTS:   No results found for this visit on 11/26/23 (from the past 24 hour(s)).    ED MEDICATIONS:   Medications - No data to display      Impression:    ICD-10-CM    1. Suicidal ideation  R45.851           Plan:    Discharge to [facility].        MD Sunny Bear Matthew Joseph, MD  11/27/23 0101

## 2023-11-27 NOTE — ED TRIAGE NOTES
Pt BIBA from  for SI.  unable to get a hold of POA. Pt does not have a plan on how she is going to kill herself.      Triage Assessment (Adult)       Row Name 11/26/23 2102          Triage Assessment    Airway WDL WDL        Respiratory WDL    Respiratory WDL WDL        Skin Circulation/Temperature WDL    Skin Circulation/Temperature WDL WDL        Cardiac WDL    Cardiac WDL WDL        Peripheral/Neurovascular WDL    Peripheral Neurovascular WDL WDL        Cognitive/Neuro/Behavioral WDL    Cognitive/Neuro/Behavioral WDL WDL

## 2023-11-27 NOTE — ED NOTES
Bed: UREDH-D  Expected date:   Expected time:   Means of arrival:   Comments:  647 10min 38 f gh si hitting head

## 2023-11-27 NOTE — DISCHARGE INSTRUCTIONS
TODAY'S VISIT:  You were seen today for suicidal thoughts  -   - If you had any labs or imaging/radiology tests performed today, you should also discuss these tests with your usual provider.     FOLLOW-UP:  Please make an appointment to follow up with:  - Your Primary Care Provider. If you do not have a PCP, please call the Primary Care Center (phone: [phone] for an appointment    - Have your provider review the results from today's visit with you again to make sure no further follow-up or additional testing is needed based on those results.     RETURN TO THE EMERGENCY DEPARTMENT  Return to the Emergency Department at any time for any new or worsening symptoms or any concerns.

## 2023-11-27 NOTE — ED PROVIDER NOTES
History     Chief Complaint   Patient presents with    Suicidal     HPI  Dionne Perez is a 38 year old female with a past medical history of anxiety, GERD, hypothyroidism, hypercholesterolemia, cognitive impairments, asthma who presents to the emergency department with a chief complaint of suicidal ideation.  The patient was brought in from an her group home.  The patient does not have a plan for how she would kill herself.  She was reportedly hitting her head.  The patient was seen in the emergency department at [facility] earlier today.  At that time, the patient's was noted to have labile behavior and suicidal ideation reported.  Per EMS, the patient has called [phone] times this week and group home staff are unable to stop her from calling.  Prior to her coming into the emergency department here, they were unable to reach her guardian.  The patient had reported banging her head on the wall due to frustration causing her head to hurt at her earlier ER visit today.  The patient was noted to be watching TV and eagerly awaiting dinner to be served in the ER earlier today.  She did get very upset when her belongings were taken from her per unit policy.  They were able to verbally de-escalate her.  She was discharged back to her group home.    I have reviewed the Medications, Allergies, Past Medical and Surgical History, and Social History in the [employer] system.    Past Medical History:   Diagnosis Date    Anxiety     Asthma     Cholelithiasis     Cognitive impairment     Depressive disorder     Gastroesophageal reflux disease     Hypercholesterolemia     Hypothyroidism     Obesity     Subglottic stenosis      Past Surgical History:   Procedure Laterality Date    BRONCHOSCOPY FLEXIBLE AND RIGID N/A 6/6/2023    Procedure: Flexible Bronchoscopy, Laser Resection and Balloon Dilation;  Surgeon: Laxmi Cline MD;  Location: [facility] OR    IR LUMBAR PUNCTURE  06/09/2020    LASER CO2 BRONCHOSCOPY N/A 8/4/2023     Procedure: Flexible bronchoscopy theraputic airway inspection, CO2 laser on standby;  Surgeon: Joe Sutherland MD;  Location: [facility] OR    TONSILLECTOMY       No current facility-administered medications for this encounter.     Current Outpatient Medications   Medication    ADVAIR -21 MCG/ACT inhaler    albuterol (PROAIR HFA/PROVENTIL HFA/VENTOLIN HFA) 108 (90 Base) MCG/ACT inhaler    aspirin (ASA) 325 MG EC tablet    busPIRone (BUSPAR) 15 MG tablet    ciprofloxacin-dexamethasone (CIPRODEX) 0.3-0.1 % otic suspension    dextromethorphan-guaiFENesin (TUSSIN DM)  MG/5ML liquid    diclofenac (VOLTAREN) 1 % topical gel    docusate sodium (COLACE) 100 MG capsule    escitalopram (LEXAPRO) 20 MG tablet    fluticasone (FLONASE) 50 MCG/ACT nasal spray    hydrocortisone 2.5 % cream    ipratropium - albuterol 0.5 mg/2.5 mg/3 mL (DUONEB) 0.5-2.5 (3) MG/3ML neb solution    levothyroxine (SYNTHROID/LEVOTHROID) 50 MCG tablet    medroxyPROGESTERone (PROVERA) 10 MG tablet    melatonin 3 MG tablet    Multiple Vitamins-Minerals (EMERGEN-C IMMUNE PLUS/VIT D) CHEW    nystatin (MYCOSTATIN) 511284 UNIT/GM external cream    OLANZapine zydis (ZYPREXA) 10 MG ODT    pantoprazole (PROTONIX) 40 MG EC tablet    Pediatric Multiple Vitamins (FLINTSTONES PLUS EXTRA C) CHEW    Pediatric Multivit-Minerals-C (CHEWABLES MULTIVITAMIN PO)    pravastatin (PRAVACHOL) 40 MG tablet    Probiotic Product (RISAQUAD-2) CAPS    risperiDONE (RISPERDAL) 0.25 MG tablet    sodium chloride, PF, (NORMAL SALINE FLUSH) 0.9% PF flush    traZODone (DESYREL) 50 MG tablet     Allergies   Allergen Reactions    Ibuprofen      Past medical history, past surgical history, medications, and allergies were reviewed with the patient. Additional pertinent items: None    Social History     Socioeconomic History    Marital status: Single     Spouse name: Not on file    Number of children: Not on file    Years of education: Not on file    Highest education level: Not on file    Occupational History    Not on file   Tobacco Use    Smoking status: Never     Passive exposure: Never    Smokeless tobacco: Never   Substance and Sexual Activity    Alcohol use: No    Drug use: No    Sexual activity: Not on file   Other Topics Concern    Not on file   Social History Narrative    Not on file     Social Determinants of Health     Financial Resource Strain: Not on file   Food Insecurity: Not on file   Transportation Needs: Not on file   Physical Activity: Not on file   Stress: Not on file   Social Connections: Not on file   Interpersonal Safety: Not on file   Housing Stability: Not on file     Social history was reviewed with the patient. Additional pertinent items: None    Review of Systems  A medically appropriate review of systems was performed with pertinent positives and negatives noted in the HPI, and all other systems negative.    Physical Exam   BP: 121/77  Pulse: 68  Temp: 98.2  F (36.8  C)  Resp: 20  SpO2: 95 %      General: Well nourished, well developed, NAD  HEENT: EOMI, anicteric. NCAT, MMM  Neck: no jugular venous distension, supple, nl ROM  Cardiac: Regular rate and rhythm.  Extremities well-perfused  Pulm: NLB, normal RR  Skin: Warm and dry to the touch.  No rash  Extremities: No LE edema, no cyanosis, w/w/p  Neuro: A&Ox3, no gross focal deficits  Psych: Calm and cooperative    ED Course        Procedures                           Labs Ordered and Resulted from Time of ED Arrival to Time of ED Departure - No data to display         No results found for this or any previous visit (from the past 24 hour(s)).    Labs, vital signs, and imaging studies were reviewed by me.    Medications - No data to display    Assessments & Plan (with Medical Decision Making)   Dionne Perez is a 38 year old female who presents with suicidal ideation.  She is calm and cooperative on arrival to the emergency department.  Patient be discussed with DEC .  Patient did have an assessment earlier  today at [facility] and was not felt to need inpatient mental health admission at that time.  Patient likely to be discharged back to her group home.  However, per chart review, it appears that the patient's group home was difficult to reach earlier today    Critical care was not performed.     Medical Decision Making  The patient's presentation was of high complexity (a chronic illness severe exacerbation, progression, or side effect of treatment).    The patient's evaluation involved:  review of external note(s) from 1 sources (see separate area of note for details)  discussion of management or test interpretation with another health professional (see separate area of note for details)    The patient's management necessitated moderate risk (limitations due to social determinants of health (see separate area of note for details)) and high risk (a decision regarding hospitalization).    I have reviewed the nursing notes.    I have reviewed the findings, diagnosis, plan and need for follow up with the patient.    Patient to be discharged back to group home and advised to follow-up with PCP and mental health resources.  To return to ER immediately with any new/worsening symptoms.     New Prescriptions    No medications on file       Final diagnoses:   Suicidal ideation       JENNIFER BORJA MD  11/26/2023   [facility] EMERGENCY DEPARTMENT       Jennifer Borja MD  11/27/23 0012

## 2023-11-28 ENCOUNTER — HOSPITAL ENCOUNTER (EMERGENCY)
Facility: CLINIC | Age: 38
Discharge: HOME | End: 2023-11-28
Attending: FAMILY MEDICINE | Admitting: FAMILY MEDICINE
Payer: MEDICARE

## 2023-11-28 VITALS
TEMPERATURE: 98.7 F | RESPIRATION RATE: 16 BRPM | OXYGEN SATURATION: 95 % | DIASTOLIC BLOOD PRESSURE: 64 MMHG | SYSTOLIC BLOOD PRESSURE: 112 MMHG | HEART RATE: 59 BPM

## 2023-11-28 DIAGNOSIS — F79 INTELLECTUAL DISABILITY: ICD-10-CM

## 2023-11-28 DIAGNOSIS — R45.1 AGITATION: ICD-10-CM

## 2023-11-28 DIAGNOSIS — F69 BEHAVIOR PROBLEM, ADULT: ICD-10-CM

## 2023-11-28 PROCEDURE — 250N000013 HC RX MED GY IP 250 OP 250 PS 637

## 2023-11-28 PROCEDURE — G0378 HOSPITAL OBSERVATION PER HR: HCPCS

## 2023-11-28 PROCEDURE — 99285 EMERGENCY DEPT VISIT HI MDM: CPT | Performed by: FAMILY MEDICINE

## 2023-11-28 PROCEDURE — 99205 OFFICE O/P NEW HI 60 MIN: CPT

## 2023-11-28 PROCEDURE — 99283 EMERGENCY DEPT VISIT LOW MDM: CPT | Performed by: FAMILY MEDICINE

## 2023-11-28 RX ORDER — FLUTICASONE PROPIONATE AND SALMETEROL XINAFOATE 115; 21 UG/1; UG/1
2 AEROSOL, METERED RESPIRATORY (INHALATION) 2 TIMES DAILY
Status: DISCONTINUED | OUTPATIENT
Start: 2023-11-28 | End: 2023-11-28

## 2023-11-28 RX ORDER — GARLIC 200 MG
1 TABLET ORAL DAILY
Status: DISCONTINUED | OUTPATIENT
Start: 2023-11-28 | End: 2023-11-28

## 2023-11-28 RX ORDER — TRAZODONE HYDROCHLORIDE 50 MG/1
50 TABLET, FILM COATED ORAL AT BEDTIME
Status: DISCONTINUED | OUTPATIENT
Start: 2023-11-28 | End: 2023-11-28 | Stop reason: HOSPADM

## 2023-11-28 RX ORDER — GARLIC 200 MG
1 TABLET ORAL DAILY
COMMUNITY

## 2023-11-28 RX ORDER — RISPERIDONE 0.25 MG/1
0.25 TABLET ORAL 2 TIMES DAILY
Status: DISCONTINUED | OUTPATIENT
Start: 2023-11-28 | End: 2023-11-28 | Stop reason: HOSPADM

## 2023-11-28 RX ORDER — ASPIRIN 325 MG
325 TABLET, DELAYED RELEASE (ENTERIC COATED) ORAL DAILY
Status: DISCONTINUED | OUTPATIENT
Start: 2023-11-28 | End: 2023-11-28 | Stop reason: HOSPADM

## 2023-11-28 RX ORDER — ESCITALOPRAM OXALATE 20 MG/1
20 TABLET ORAL DAILY
Status: DISCONTINUED | OUTPATIENT
Start: 2023-11-28 | End: 2023-11-28 | Stop reason: HOSPADM

## 2023-11-28 RX ORDER — FLUTICASONE FUROATE AND VILANTEROL 100; 25 UG/1; UG/1
1 POWDER RESPIRATORY (INHALATION) DAILY
Status: DISCONTINUED | OUTPATIENT
Start: 2023-11-28 | End: 2023-11-28 | Stop reason: HOSPADM

## 2023-11-28 RX ORDER — IPRATROPIUM BROMIDE AND ALBUTEROL SULFATE 2.5; .5 MG/3ML; MG/3ML
1 SOLUTION RESPIRATORY (INHALATION) EVERY 6 HOURS PRN
Status: DISCONTINUED | OUTPATIENT
Start: 2023-11-28 | End: 2023-11-28 | Stop reason: HOSPADM

## 2023-11-28 RX ORDER — FLUTICASONE PROPIONATE 50 MCG
2 SPRAY, SUSPENSION (ML) NASAL DAILY
Status: DISCONTINUED | OUTPATIENT
Start: 2023-11-28 | End: 2023-11-28 | Stop reason: HOSPADM

## 2023-11-28 RX ORDER — PRAVASTATIN SODIUM 20 MG
40 TABLET ORAL DAILY
Status: DISCONTINUED | OUTPATIENT
Start: 2023-11-28 | End: 2023-11-28 | Stop reason: HOSPADM

## 2023-11-28 RX ORDER — ASPIRIN 325 MG
1 TABLET ORAL 2 TIMES DAILY
Status: DISCONTINUED | OUTPATIENT
Start: 2023-11-28 | End: 2023-11-28 | Stop reason: HOSPADM

## 2023-11-28 RX ORDER — CLINDAMYCIN PHOSPHATE 10 UG/ML
LOTION TOPICAL 2 TIMES DAILY
COMMUNITY

## 2023-11-28 RX ORDER — ALBUTEROL SULFATE 90 UG/1
2 AEROSOL, METERED RESPIRATORY (INHALATION) EVERY 6 HOURS PRN
Status: DISCONTINUED | OUTPATIENT
Start: 2023-11-28 | End: 2023-11-28 | Stop reason: HOSPADM

## 2023-11-28 RX ORDER — LEVOTHYROXINE SODIUM 50 UG/1
50 TABLET ORAL DAILY
Status: DISCONTINUED | OUTPATIENT
Start: 2023-11-28 | End: 2023-11-28 | Stop reason: HOSPADM

## 2023-11-28 RX ORDER — BUSPIRONE HYDROCHLORIDE 15 MG/1
15 TABLET ORAL 2 TIMES DAILY
Status: DISCONTINUED | OUTPATIENT
Start: 2023-11-28 | End: 2023-11-28 | Stop reason: HOSPADM

## 2023-11-28 RX ORDER — ASPIRIN 325 MG
1 TABLET ORAL 2 TIMES DAILY
COMMUNITY

## 2023-11-28 RX ORDER — DOCUSATE SODIUM 100 MG/1
100 CAPSULE, LIQUID FILLED ORAL
Status: DISCONTINUED | OUTPATIENT
Start: 2023-11-28 | End: 2023-11-28 | Stop reason: HOSPADM

## 2023-11-28 RX ADMIN — PRAVASTATIN SODIUM 40 MG: 20 TABLET ORAL at 11:52

## 2023-11-28 RX ADMIN — RISPERIDONE 0.25 MG: 0.25 TABLET, FILM COATED ORAL at 11:53

## 2023-11-28 RX ADMIN — BUSPIRONE HYDROCHLORIDE 15 MG: 15 TABLET ORAL at 11:51

## 2023-11-28 RX ADMIN — ASPIRIN 325 MG: 325 TABLET, COATED ORAL at 12:00

## 2023-11-28 RX ADMIN — FLUTICASONE FUROATE AND VILANTEROL TRIFENATATE 1 PUFF: 100; 25 POWDER RESPIRATORY (INHALATION) at 13:04

## 2023-11-28 RX ADMIN — LEVOTHYROXINE SODIUM 50 MCG: 50 TABLET ORAL at 11:52

## 2023-11-28 RX ADMIN — Medication 1 CHEW TAB: at 11:51

## 2023-11-28 RX ADMIN — DOCUSATE SODIUM 100 MG: 100 CAPSULE, LIQUID FILLED ORAL at 11:52

## 2023-11-28 RX ADMIN — ESCITALOPRAM OXALATE 20 MG: 20 TABLET ORAL at 11:52

## 2023-11-28 ASSESSMENT — ACTIVITIES OF DAILY LIVING (ADL)
ADLS_ACUITY_SCORE: 35

## 2023-11-28 ASSESSMENT — COLUMBIA-SUICIDE SEVERITY RATING SCALE - C-SSRS
TOTAL  NUMBER OF ABORTED OR SELF INTERRUPTED ATTEMPTS SINCE LAST CONTACT: NO
ATTEMPT SINCE LAST CONTACT: NO
ATTEMPT SINCE LAST CONTACT: NO
1. SINCE LAST CONTACT, HAVE YOU WISHED YOU WERE DEAD OR WISHED YOU COULD GO TO SLEEP AND NOT WAKE UP?: NO
TOTAL  NUMBER OF INTERRUPTED ATTEMPTS SINCE LAST CONTACT: NO
6. HAVE YOU EVER DONE ANYTHING, STARTED TO DO ANYTHING, OR PREPARED TO DO ANYTHING TO END YOUR LIFE?: NO
TOTAL  NUMBER OF ABORTED OR SELF INTERRUPTED ATTEMPTS SINCE LAST CONTACT: NO
REASONS FOR IDEATION SINCE LAST CONTACT: EQUALLY TO GET ATTENTION, REVENGE, OR A REACTION FROM OTHERS AND TO END/STOP THE PAIN
5. HAVE YOU STARTED TO WORK OUT OR WORKED OUT THE DETAILS OF HOW TO KILL YOURSELF? DO YOU INTEND TO CARRY OUT THIS PLAN?: NO
TOTAL  NUMBER OF INTERRUPTED ATTEMPTS SINCE LAST CONTACT: NO
1. SINCE LAST CONTACT, HAVE YOU WISHED YOU WERE DEAD OR WISHED YOU COULD GO TO SLEEP AND NOT WAKE UP?: YES
5. HAVE YOU STARTED TO WORK OUT OR WORKED OUT THE DETAILS OF HOW TO KILL YOURSELF? DO YOU INTEND TO CARRY OUT THIS PLAN?: NO
6. HAVE YOU EVER DONE ANYTHING, STARTED TO DO ANYTHING, OR PREPARED TO DO ANYTHING TO END YOUR LIFE?: NO
SUICIDE, SINCE LAST CONTACT: NO
2. HAVE YOU ACTUALLY HAD ANY THOUGHTS OF KILLING YOURSELF?: NO
2. HAVE YOU ACTUALLY HAD ANY THOUGHTS OF KILLING YOURSELF?: YES

## 2023-11-28 NOTE — ED TRIAGE NOTES
Pt brought in by rj, pt is in a new gh as of two months ago, pt is unhappy with new home. Pt was hitting herself and yelling at staff.

## 2023-11-28 NOTE — ED NOTES
"Pt received from Adult ED; BEC process explained, snacks/drinks offered. Pt states she is here at the hospital because, \"I was hitting myself\". Pt states she doesn't know why she was hitting herself. She endorses SI thoughts and states, \"I want to be dead\". She denies SIB/HI thoughts or ideations. Denies AVH. VSS, denies pain. Pt currently behaviorally controlled.  "

## 2023-11-28 NOTE — ED NOTES
"Awakened at the beginning of the shift. Multiple requests for this and that, change of TV channel from \"care channel' to [place], personal phone etc. Pt needs frequent redirections, encouragement and reassurance. Finally settled down and went to sleep. Up x2 to restroom and more requests. Otherwise, Pt slept through the rest of the night without difficulty. Respirations noted regular and unlabored. No acute distress or physical discomfort. Staff to continue to assess/monitor     "

## 2023-11-28 NOTE — ED NOTES
Pt to be dc'd EMS ordered. AVS printed w/ face sheet.Transport called . Will be here in 30 minutes.   Group Home notified.

## 2023-11-28 NOTE — PROGRESS NOTES
"Triage and Transition Services Extended Care Reassessment     Patient: Fan goes by \"Fan,\" uses she/her pronouns  Date of Service: November 28, 2023  Site of Service: [facility] EMERGENCY DEPARTMENT                               Patient was seen yes  Mode of Assessment: In person - Bec 14    Reason for Reassessment: Coordination of care and safety planning    History of Patient's Original Emergency Room Encounter: Patient lives in a group home. She has called 911 six times within the past week and gone to the emergency department. Patient has a history of bipolar affective disorder, anxiety, depression, intellectual disability, and was admitted to inpatient psych on 7/30/22. Patient has a psychiatrist, staff does not know if patient has a therapist.    Current Patient Presentation: Patient was alert and orientated to person and place. Patient easily engaged with Writer and presented happy as evidence of smiling and laughing with Writer as well as stating she is doing \"good\". Patient shares she came to the hospital yesterday because she was hitting herself but was unable to remember why she was upset. Patient reports she is good now and ready to go home. Patient reports she enjoys living at her group home and all her roommates. Patient denies SI/HI/NSSIB. Patient actively engage in safety planning.      Changes Observed Since Initial Assessment: decrease in presenting symptoms    Therapeutic Interventions Provided: Engaged in safety planning           Mental Status Exam   Affect: Appropriate  Appearance: Appropriate  Attention Span/Concentration: Attentive  Eye Contact: Engaged    Fund of Knowledge: Appropriate   Language /Speech Content: Fluent  Language /Speech Volume: Normal  Language /Speech Rate/Productions: Normal  Recent Memory: Poor  Remote Memory: Poor  Mood: Normal  Orientation to Person: Yes   Orientation to Place: Yes  Orientation to Time of Day: Yes  Orientation to Date: Yes     Situation (Do " "they understand why they are here?): No  Psychomotor Behavior: Normal  Thought Content: Clear  Thought Form: Intact    Treatment Objective(s) Addressed: rapport building, assessing safety, safety planning, identifying an appropriate aftercare plan    Patient Response to Interventions: eager to participate    Progress Towards Goals:  Patient Reports Symptoms Are: stable  Patient Progress Toward Goals: is making progress  Comment: Patient reports she is feeling \"good\" and is ready to go home. Patient actively engaged in safety planning.    Case Management:     Writer spoke with legal guardian [employer]: Rodolfo Welsh ([phone]). He shared patient had been doing really well at her previous group home unfortunately patient had to two months ago due to the  group home shutting down. Patient has been struggling to find consistency at current group home due to the new environment and staff turn over. Rodolfo is in agreement with care plan for patient to return to group home.     Adonis [phone] left voicemail at 11:49, left voicemail at 12:30pm. Called group home main number [phone] and spoke with group home staff Janis. Reviewed patients current presentation and recommendation for patient to discharge back to group home. Janis confirmed that the home is staffed 24/7 and that all sharps and medications are locked up. Group home is agreeable to patient returning.    [place] Suicide Severity Rating Scale Since Last Contact: 11/28/2023    1. Wish to be Dead (Since Last Contact): No  2. Non-Specific Active Suicidal Thoughts (Since Last Contact): No  3. Active Suicidal Ideation with any Methods (Not Plan) Without Intent to Act (Since Last Contact): No  4. Active Suicidal Ideation with Some Intent to Act, Without Specific Plan (Since Last Contact): No  5. Active Suicidal Ideation with Specific Plan and Intent (Since Last Contact): No  Suicidal Behavior (Since Last Contact)  Actual Attempt " (Since Last Contact): No  Has subject engaged in non-suicidal self-injurious behavior? (Since Last Contact): No  Interrupted Attempts (Since Last Contact): No  Aborted or Self-Interrupted Attempt (Since Last Contact): No  Preparatory Acts or Behavior (Since Last Contact): No     C-SSRS Risk (Since Last Contact)  Calculated C-SSRS Risk Score (Since Last Contact): No Risk Indicated      Plan:  Discharge- return to current group home   After therapeutic assessment, intervention and aftercare planning by [facility] care team and [facility] and in consultation with psychiatry consult provider, the patient's circumstances and mental state were appropriate for outpatient management as patient appears to be at or near baseline. It is the recommendation of this clinician that pt discharge to the care of her group home and follow up with established outpatient supports. At this time the pt is not presenting as an acute risk to self or others due to the following factors: Denied SI/HI/NSSIB, able to contract for safety, willing and wanting to discharge home.  Plan for Care reviewed with assigned Medical Provider: yes (David Esquivel)  Plan for Care Team Review: RN  Patient and/or validated legal guardian concurs: yes- Rodolfo Baldwin      Session Status: Time session started: 1136  Time session ended: 1149  Session Duration (minutes): 13 minutes  Session Number: 1  Anticipated number of sessions or this episode of care: 1    Session Start Time: 1136  Session Stop Time: 1149  CPT codes: Non-Billable  Time Spent: 13 minutes      CPT code(s) utilized: Non-Billable    Diagnosis:   Patient Active Problem List   Diagnosis Code    Altered mental status R41.82    Agitation R45.1    Aggression R46.89    Abnormal behavior R46.89    Acute costochondritis M94.0    Adjustment reaction F43.20    Adjustment reaction with aggression F43.29    Behavior problem, adult F69    Cholelithiasis K80.20    Chronic abdominal pain R10.9, G89.29    Cognitive impairment  R41.89    Constipation K59.00    Contusion of left hip S70.02XA    Developmental disability F89    Essential hypertension I10    Gallstone pancreatitis K85.10    ROSY (generalized anxiety disorder) F41.1    Hyperlipemia E78.5    Hypothyroidism due to acquired atrophy of thyroid E03.4    Severe episode of recurrent major depressive disorder, without psychotic features (H) F33.2    Insomnia G47.00    Major neurocognitive disorder (H) F03.90    Morbid obesity (H) E66.01    Recurrent major depressive disorder (H24) F33.9    Sinus bradycardia, chronic R00.1    Suicidal ideations R45.851    Syncope R55    Dyspnea R06.00    Acquired hammer toes of both feet M20.41, M20.42    Anemia, iron deficiency D50.9    Callus L84    Health care home, active care coordination Z78.9    History of cellulitis Z87.2    No-show for appointment Z91.199    Pre-syncope R55    Primary osteoarthritis of left knee M17.12    Scalp irritation R23.8    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

## 2023-11-28 NOTE — CONSULTS
Diagnostic Evaluation Consultation  Crisis Assessment    Patient Name: Dionne Perez  Age:  38 year old  Legal Sex: female  Gender Identity: female  Pronouns: unknown  Race: White  Ethnicity: Not  or   Language: English      Patient was assessed: In person      Patient location: [facility] EMERGENCY DEPARTMENT                             URE-A    Referral Data and Chief Complaint  Dionne Perez presents to the ED via EMS. Patient is presenting to the ED for the following concerns: Significant behavioral change, Depression, Suicidal ideation.   Factors that make the mental health crisis life threatening or complex are:  Patient presents to the ED for concerns of suicidal ideation. Patient is not her own guardian and lives in a group home. According to collateral, patient has called 911 six times within the past week, with concerns of SI. Staff stated this is new behavior for the patient and she has not seen the patient exhibit suicidal ideation since patient began living at the group home. Patient has lived there since October 2, 2023. Patient has a history of intellectual disability, anxiety, depression, bipolar affective disorder. Her most recent inpatient mental health admission was on 7/30/22. During the session, patient presented with labile mood, tearfulness, began hitting her head, and screamed. She endorses suicidal ideation, NSSIB, no plan and no intent..    Informed Consent and Assessment Methods  Explained the crisis assessment process, including applicable information disclosures and limits to confidentiality, assessed understanding of the process, and obtained consent to proceed with the assessment.  Assessment methods included conducting a formal interview with patient, review of medical records, collaboration with medical staff, and obtaining relevant collateral information from family and community providers when available.  : unable to complete (Writer attempted to  contact the guardian, no return phone call.)   Patient response to interventions: verbalizes understanding  Coping skills were attempted to reduce the crisis:  Patient attempted to share with writer he feelings.     History of the Crisis   Patient lives in a group home. She has called 911 six times within the past week and gone to the emergency department. Patient has a history of bipolar affective disorder, anxiety, depression, intellectual disability, and was admitted to inpatient psych on 7/30/22. Patient has a psychiatrist, staff does not know if patient has a therapist.    Brief Psychosocial History  Family:  Single, Children no  Support System:  Other (specify) (unknown, unable to assess)  Employment Status:  disabled  Source of Income:  unable to assess  Financial Environmental Concerns:  other (see comments) (unable to assess)  Current Hobbies:  arts/crafts, music, meditation, television/movies/videos, social media/computer activities  Barriers in Personal Life:  mental health concerns, cognitive limitations, emotional concerns    Significant Clinical History  Current Anxiety Symptoms:  anxious  Current Depression/Trauma:  sadness, thoughts of death/suicide, crying or feels like crying  Current Somatic Symptoms:  anxious  Current Psychosis/Thought Disturbance:   (none noted)  Current Eating Symptoms:   (unable to assess)  Chemical Use History:  Alcohol: None  Benzodiazepines: None  Opiates: None  Cocaine: None  Marijuana: None  Other Use: None  Withdrawal Symptoms:  (not applicable)  Addictions:  (none noted)   Past diagnosis:  Anxiety Disorder, Depression, Other (bipolar affective disorder)  Family history:  No known history of mental health or chemical health concerns  Past treatment:  Individual therapy, Psychiatric Medication Management, Inpatient Hospitalization, Residential Treatment, Case management  Details of most recent treatment:  Patient lives in a group home, group home staff reported patient  "has a psychiatrist, it is unknown if patient has a therapist. Most recent admission was on 7/30/22.  Other relevant history:  From chart review approximately one week ago: \"pt shared her parents passed away about 10 years ago, survived by 3 sisters and 2 brothers.  Pt reported she was single, has no children and unemployed.  Pt reported she was living in a group home in [place] and she was getting alone well with other residents and group home staff.\"     Collateral Information  Is there collateral information: Yes     Collateral information name, relationship, phone number:  Yanelis  [phone]    What happened today: Yanelis reported patient has been calling 911 and saying she is suicidal since last Tuesday, approximately 6 times she has gone to the emergency department at multiple locations. Yanelis stated this is new behaviors and symptoms.     What is different about patient's functioning: Patient continues to report she is suicidal and continues to hit herself and feel dysregulated.     Concern about alcohol/drug use:  none    What do you think the patient needs:  observation and medication adjustments    Has patient made comments about wanting to kill themselves/others: yes    If d/c is recommended, can they take part in safety/aftercare planning:  no (Yanelis stated she is leaving the company, and to contact her supervisor: Adonis [phone].)    Additional collateral information:  Writer attempted to speak with the guardian. Writer called at approximately 6:45 pm, and left a voice message. [phone]     Risk Assessment    [place] Suicide Severity Rating Scale Recent:   Suicidal Ideation (Recent)  Q1 Wished to be Dead (Past Month): yes  Q2 Suicidal Thoughts (Past Month): yes  Q3 Suicidal Thought Method: no  Q4 Suicidal Intent without Specific Plan: no  Q5 Suicide Intent with Specific Plan: no  Level of Risk per Screen: low risk       Environmental or Psychosocial Events: " impulsivity/recklessness, other life stressors, neither working nor attending school  Protective Factors: Protective Factors: lives in a responsibly safe and stable environment, good treatment engagement, able to access care without barriers, help seeking, supportive ongoing medical and mental health care relationships    Does the patient have thoughts of harming others? Feels Like Hurting Others: no  Previous Attempt to Hurt Others: no  Current presentation: Irritable (tearful, irritable, labile mood)  Violence Threats in Past 6 Months: none  Current Violence Plan or Thoughts: none  Is the patient engaging in sexually inappropriate behavior?: no  Duty to warn initiated: no    Is the patient engaging in sexually inappropriate behavior?  no        Mental Status Exam   Affect: Labile  Appearance: Appropriate  Attention Span/Concentration: Attentive  Eye Contact: Variable, Engaged    Fund of Knowledge: Appropriate   Language /Speech Content: Fluent  Language /Speech Volume: Normal  Language /Speech Rate/Productions: Normal  Recent Memory: Variable  Remote Memory: Variable  Mood: Anxious, Apathetic, Depressed, Sad, Irritable  Orientation to Person: Yes   Orientation to Place: Yes  Orientation to Time of Day: Yes  Orientation to Date: Yes     Situation (Do they understand why they are here?): Yes  Psychomotor Behavior: Normal  Thought Content: Suicidal  Thought Form: Intact    Medication  Psychotropic medications:   No current facility-administered medications for this encounter.     Current Outpatient Medications   Medication    albuterol (PROAIR HFA/PROVENTIL HFA/VENTOLIN HFA) 108 (90 Base) MCG/ACT inhaler    ciprofloxacin-dexamethasone (CIPRODEX) 0.3-0.1 % otic suspension    diclofenac (VOLTAREN) 1 % topical gel    ipratropium - albuterol 0.5 mg/2.5 mg/3 mL (DUONEB) 0.5-2.5 (3) MG/3ML neb solution    OLANZapine zydis (ZYPREXA) 10 MG ODT    pantoprazole (PROTONIX) 40 MG EC tablet    ADVAIR -21 MCG/ACT inhaler     aspirin (ASA) 325 MG EC tablet    busPIRone (BUSPAR) 15 MG tablet    dextromethorphan-guaiFENesin (TUSSIN DM)  MG/5ML liquid    docusate sodium (COLACE) 100 MG capsule    escitalopram (LEXAPRO) 20 MG tablet    fluticasone (FLONASE) 50 MCG/ACT nasal spray    hydrocortisone 2.5 % cream    levothyroxine (SYNTHROID/LEVOTHROID) 50 MCG tablet    medroxyPROGESTERone (PROVERA) 10 MG tablet    melatonin 3 MG tablet    Multiple Vitamins-Minerals (EMERGEN-C IMMUNE PLUS/VIT D) CHEW    nystatin (MYCOSTATIN) 410789 UNIT/GM external cream    Pediatric Multiple Vitamins (FLINTSTONES PLUS EXTRA C) CHEW    Pediatric Multivit-Minerals-C (CHEWABLES MULTIVITAMIN PO)    pravastatin (PRAVACHOL) 40 MG tablet    Probiotic Product (RISAQUAD-2) CAPS    risperiDONE (RISPERDAL) 0.25 MG tablet    sodium chloride, PF, (NORMAL SALINE FLUSH) 0.9% PF flush    traZODone (DESYREL) 50 MG tablet       Medication Orders - Psychiatric (From admission, onward)      None             Current Care Team  Patient Care Team:  [facility]  Rocco Mccormack MD as MD (Otolaryngology)  Rika Bynum, RN as Specialty Care Coordinator  Joe Sutherland MD as MD (Critical Care)  Yimi Batista, RN as Specialty Care Coordinator (Pulmonary Disease)    Diagnosis  Patient Active Problem List   Diagnosis Code    Altered mental status R41.82    Agitation R45.1    Aggression R46.89    Abnormal behavior R46.89    Acute costochondritis M94.0    Adjustment reaction F43.20    Adjustment reaction with aggression F43.29    Behavior problem, adult F69    Cholelithiasis K80.20    Chronic abdominal pain R10.9, G89.29    Cognitive impairment R41.89    Constipation K59.00    Contusion of left hip S70.02XA    Developmental disability F89    Essential hypertension I10    Gallstone pancreatitis K85.10    ROSY (generalized anxiety disorder) F41.1    Hyperlipemia E78.5    Hypothyroidism due to acquired atrophy of thyroid E03.4    Severe episode  of recurrent major depressive disorder, without psychotic features (H) F33.2    Insomnia G47.00    Major neurocognitive disorder (H) F03.90    Morbid obesity (H) E66.01    Recurrent major depressive disorder (H24) F33.9    Sinus bradycardia, chronic R00.1    Suicidal ideations R45.851    Syncope R55    Dyspnea R06.00    Acquired hammer toes of both feet M20.41, M20.42    Anemia, iron deficiency D50.9    Callus L84    Health care home, active care coordination Z78.9    History of cellulitis Z87.2    No-show for appointment Z91.199    Pre-syncope R55    Primary osteoarthritis of left knee M17.12    Scalp irritation R23.8    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 Group home staff report patient has called 911 six times this past week, and the suicidal ideation reports are new. Patient has been living at her current group home since October 2, 2023. Patient has a history of moderate intellectual disability, bipolar affective disorder, depression, and anxiety. Patient endorses depression and anxiety symptoms, she rated the depression symptoms at a 10/10, was tearful and labile during the session. Due to the recent behavior change regarding suicidal ideation, writer recommends observation. Attending provider is in agreement with the plan. Writer attempted to notify patient's guardian before the assessment, and after the assessment to communicate the recommendations. Guardian has not returned writer's phone calls, at the time of documentation. Guardian is: [employer] [place] - Rodolfo Baldwin [phone], ext: [phone]. Group home contact writer spoke with is Yanelis [phone]. She stated she will no longer be with the group home,  and to contact her supervisor, Adonis: [phone]. Writer left a voice message with Adonis telling him the plan and the phone number for the [facility].    Patient coping skills attempted to reduce the crisis:  Patient attempted to share with writer he feelings.    Disposition  Recommended disposition: Observation        Reviewed case and recommendations with attending provider. Attending Name: Dr. Ariel Garrido       Attending concurs with disposition: yes       Patient and/or validated legal guardian concurs with disposition:    (Writer was unable to contact the guardian, left two messages.)       Final disposition:  observation    Legal status on admission: Guardian/ad litum    Assessment Details   Total duration spent with the patient: 30 min     CPT code(s) utilized: 42933 - Psychotherapy for Crisis - 60 (30-74*) min    SONJA Mackey, Psychotherapist  [facility] - Triage & Transition Services  Callback: [phone]

## 2023-11-28 NOTE — PHARMACY-ADMISSION MEDICATION HISTORY
Pharmacist Admission Medication History    Admission medication history is complete. The information provided in this note is only as accurate as the sources available at the time of the update.    Information Source(s): Facility (U/NH/) medication list/MAR via  fax    Pertinent Information: Received Group Home MAR from  Yanelis White ([phone]).     Changes made to PTA medication list:  Added: vitamin C packets  Deleted: Ciprodex otic suspension, dextromethorphan-guaifenesin, hydrocortisone cream, olanzapine 10 mg prn, medroxyprogesterone 10 mg daily, probiotic, saline flush (patient not taking these)  Changed: None    Allergies reviewed with patient and updates made in EHR: unable to assess    Medication History Completed By: Gwen Rahman [facility] 11/28/2023 10:10 AM    PTA Med List   Medication Sig Last Dose    ADVAIR -21 MCG/ACT inhaler 2 puffs 2 times daily Past Week    Ascorbic Acid (VITAMIN C) POWD Take 1 packet by mouth daily Mix 1 packet with water and drink by mouth once daily Past Week    aspirin (ASA) 325 MG EC tablet Take 325 mg by mouth daily Past Week    busPIRone (BUSPAR) 15 MG tablet Take 15 mg by mouth 2 times daily Past Week    clindamycin (CLEOCIN T) 1 % external lotion Apply topically 2 times daily Apply to face around mouth 2 times daily for rash Past Week    docusate sodium (COLACE) 100 MG capsule Take 100 mg by mouth every 3 days Past Week    escitalopram (LEXAPRO) 20 MG tablet Take 20 mg by mouth daily Past Week    fluticasone (FLONASE) 50 MCG/ACT nasal spray Spray 2 sprays into both nostrils daily SHAKE LIQUID AND USE Past Week    levothyroxine (SYNTHROID/LEVOTHROID) 50 MCG tablet Take 50 mcg by mouth daily Past Week    melatonin 3 MG tablet Take 3 mg by mouth nightly as needed for sleep Past Week    nystatin (MYCOSTATIN) 163007 UNIT/GM external cream Apply topically 2 times daily Past Week    Pediatric Multivit-Minerals (GUMMY VITAMINS & MINERALS) chewable  tablet Take 1 chew tab by mouth 2 times daily Past Week    pravastatin (PRAVACHOL) 40 MG tablet Take 40 mg by mouth daily Past Week    risperiDONE (RISPERDAL) 0.25 MG tablet Take 0.25 mg by mouth 2 times daily Past Week    traZODone (DESYREL) 50 MG tablet Take 50 mg by mouth At Bedtime Past Week      Gwen Rahman, PharmD

## 2023-11-28 NOTE — PROGRESS NOTES
Triage & Transition Services, Extended Care     Client Name: Dionne Perez    Date: November 28, 2023      Service Type: refused to attend group session  Session Start Time:  1055    Session End Time: 1118  Session Length: 0  Site Location: [facility] EMERGENCY DEPARTMENT                             [id number]  Total Number ofAttendees: 0  Topic:   emotions/expression   Response: other (see comments) (Did not participate)     KAYLA Mills   MSW Intern, Extended Care  [phone]

## 2023-11-28 NOTE — DISCHARGE INSTRUCTIONS
" It is recommended that you follow up with your established providers (psychiatrist, mental health therapist, and/or primary care doctor - as relevant) as soon as possible.    Coordinators from [facility] will be calling you in the next 24-48 hours to ensure that you have the resources you need.  You can also contact [facility] coordinators directly at [phone].     Additional Crisis Lines/Resources:    Crisis Lines  Crisis Text Line  Text 125882  You will be connected with a trained live crisis counselor to provide support.     Por espanol, texto  JONATHAN a 325920 o texto a 442-AYUDAME en WhatsApp     National Hope Line  1.800.SUICIDE [8790932]        Community Resources  Fast Tracker  Linking people to mental health and substance use disorder resources  Re Pet.org      [place] Mental Health Warm Line  Peer to peer support  Monday thru Saturday, 12 pm to 10 pm  977.390.7718 or 1.013.516.2831  Text \"Support\" to 22380     National [place] on Mental Illness (GULSHAN)  178.832.4838 or 1.888.GULSHAN.HELPS     Mental Health Apps  My3  https://my3app.org/     VirtualHopeBox  https://InsideTrack.org/apps/virtual-hope-box/       "

## 2023-11-28 NOTE — ED PROVIDER NOTES
ED Observation History and Physical  [facility]  Observation Initiation Date: Nov 27, 2023    Dionne Perez MRN: [id number]   Age: 38 year old YOB: 1985           History           Chief Complaint   Patient presents with    Aggressive Behavior       Pt brought in by ambo, pt is in a new  as of two months ago, pt is unhappy with new home. Pt was hitting herself and yelling at staff.       HPI  Dionne Perez is a 38 year old female who has a Care Plan in place with a history significant for anxiety and depression, adjustment issues, intellectual disability, developmental delay and SI who presents to the ED today BIBA from  for aggressive behavior.  Patient has had 6 visits in the last 2 weeks at several different emergency room's continues to have escalation of behavior patient's group home states that her aggression and self-injurious behaviors as well as aggression towards others have escalated over the last 24 to 48 hours.     Past Medical History  Past Medical History        Past Medical History:   Diagnosis Date    Anxiety      Asthma      Cholelithiasis      Cognitive impairment      Depressive disorder      Gastroesophageal reflux disease      Hypercholesterolemia      Hypothyroidism      Obesity      Subglottic stenosis           Past Surgical History         Past Surgical History:   Procedure Laterality Date    BRONCHOSCOPY FLEXIBLE AND RIGID N/A 6/6/2023     Procedure: Flexible Bronchoscopy, Laser Resection and Balloon Dilation;  Surgeon: Laxmi Cline MD;  Location: [facility] OR    IR LUMBAR PUNCTURE   06/09/2020    LASER CO2 BRONCHOSCOPY N/A 8/4/2023     Procedure: Flexible bronchoscopy theraputic airway inspection, CO2 laser on standby;  Surgeon: Joe Sutherland MD;  Location: [facility] OR    TONSILLECTOMY             albuterol (PROAIR HFA/PROVENTIL HFA/VENTOLIN HFA) 108 (90 Base) MCG/ACT inhaler  ciprofloxacin-dexamethasone (CIPRODEX) 0.3-0.1 % otic  suspension  diclofenac (VOLTAREN) 1 % topical gel  ipratropium - albuterol 0.5 mg/2.5 mg/3 mL (DUONEB) 0.5-2.5 (3) MG/3ML neb solution  OLANZapine zydis (ZYPREXA) 10 MG ODT  pantoprazole (PROTONIX) 40 MG EC tablet  ADVAIR -21 MCG/ACT inhaler  aspirin (ASA) 325 MG EC tablet  busPIRone (BUSPAR) 15 MG tablet  dextromethorphan-guaiFENesin (TUSSIN DM)  MG/5ML liquid  docusate sodium (COLACE) 100 MG capsule  escitalopram (LEXAPRO) 20 MG tablet  fluticasone (FLONASE) 50 MCG/ACT nasal spray  hydrocortisone 2.5 % cream  levothyroxine (SYNTHROID/LEVOTHROID) 50 MCG tablet  medroxyPROGESTERone (PROVERA) 10 MG tablet  melatonin 3 MG tablet  Multiple Vitamins-Minerals (EMERGEN-C IMMUNE PLUS/VIT D) CHEW  nystatin (MYCOSTATIN) 368664 UNIT/GM external cream  Pediatric Multiple Vitamins (FLINTSTONES PLUS EXTRA C) CHEW  Pediatric Multivit-Minerals-C (CHEWABLES MULTIVITAMIN PO)  pravastatin (PRAVACHOL) 40 MG tablet  Probiotic Product (RISAQUAD-2) CAPS  risperiDONE (RISPERDAL) 0.25 MG tablet  sodium chloride, PF, (NORMAL SALINE FLUSH) 0.9% PF flush  traZODone (DESYREL) 50 MG tablet             Allergies   Allergen Reactions    Ibuprofen        Family History  Family History   No family history on file.     Social History   Social History            Tobacco Use    Smoking status: Never       Passive exposure: Never    Smokeless tobacco: Never   Substance Use Topics    Alcohol use: No    Drug use: No      Past medical history, past surgical history, medications, allergies, family history, and social history were reviewed with the patient. No additional pertinent items.       A complete review of systems was performed with pertinent positives and negatives noted in the HPI, and all other systems negative.     Physical Exam   BP: 138/84  Pulse: 62  Temp: 99.5  F (37.5  C)  Resp: 16  SpO2: 93 %  Physical Exam  Vitals and nursing note reviewed.   Constitutional:       General: She is not in acute distress.     Appearance: Normal  appearance. She is not diaphoretic.   HENT:      Head: Atraumatic.      Mouth/Throat:      Mouth: Mucous membranes are moist.   Eyes:      General: No scleral icterus.     Conjunctiva/sclera: Conjunctivae normal.   Cardiovascular:      Rate and Rhythm: Normal rate.      Heart sounds: Normal heart sounds.   Pulmonary:      Effort: No respiratory distress.      Breath sounds: Normal breath sounds.   Abdominal:      General: Abdomen is flat.   Musculoskeletal:      Cervical back: Neck supple.   Skin:     General: Skin is warm.      Findings: No rash.   Neurological:      General: No focal deficit present.      Mental Status: She is alert.      Sensory: No sensory deficit.      Motor: No weakness.      Coordination: Coordination normal.   Psychiatric:         Mood and Affect: Mood is anxious and depressed.         Speech: Speech normal.         Behavior: Behavior is cooperative.         Judgment: Judgment is impulsive.               ED Course, Procedures, & Data   Procedures              Medications - No data to display  Labs Ordered and Resulted from Time of ED Arrival to Time of ED Departure - No data to display  No orders to display         Critical care was not performed.      Medical Decision Making  The patient's presentation was of high complexity (a chronic illness severe exacerbation, progression, or side effect of treatment).     The patient's evaluation involved:  an assessment requiring an independent historian (was discussed at length with independent historian from [facility].)  review of external note(s) from 1 sources (notes from recent evaluation at [facility] were reviewed.)  discussion of management or test interpretation with another health professional (independent provider did full DEC assessment and discussion of hospitalization versus outpatient management.)     The patient's management necessitated high risk (a decision regarding hospitalization).     Assessment & Plan          I have  reviewed the nursing notes. I have reviewed the findings, diagnosis, plan and need for follow up with the patient.     Patient with chronic behavioral issues at this time is on her sixth ER visit  [facility] with [facility] and at this time patient will remain under observation tonight with likely [facility].        Final diagnoses:   Cognitive impairment   Aggression   Behavior problem, adult   Adjustment reaction with aggression            [facility] EMERGENCY DEPARTMENT  11/27/2023     Ariel Garrido MD  11/27/23 2114        Ariel Garrido MD  11/27/23 2123

## 2023-11-28 NOTE — ED NOTES
Pt moved back to [facility] on early rounds. Pleasant and cooperative at this time. Awaiting larger bed from Adult ED. Pt in recliner watching a movie. Meds not verified. Pharmacist notified. Ate well. Lay down for a while  CNP Zako in and donnaal done . 11;00 am napping on unit. Turning self in bed occasionally .

## 2023-11-28 NOTE — PLAN OF CARE
Dionne Perez  November 27, 2023  Plan of Care Hand-off Note     Patient Care Path: observation    Plan for Care:   Patient presents to the ED via EMS for concerns of suicidal ideation. Group home staff report patient has called [phone] six times this past week, and the suicidal ideation reports are new. Patient has been living at her current group home since October 2, 2023. Patient has a history of moderate intellectual disability, bipolar affective disorder, depression, and anxiety. Patient endorses depression and anxiety symptoms, she rated the depression symptoms at a 10/10, was tearful and labile during the session. Due to the recent behavior change regarding suicidal ideation, writer recommends observation. Attending provider is in agreement with the plan. Writer attempted to notify patient's guardian before the assessment, and after the assessment to communicate the recommendations. Guardian has not returned writer's phone calls, at the time of documentation. Guardian is: [employer] [place] - Rodolfo Nicolasa [phone], ext: [phone]. Group home contact writer spoke with scott Hilario [phone]. She stated she will no longer be with the group home, and to contact her supervisor, Adonis: [phone]. Writer left a voice message with Adonis telling him the plan and the phone number for the [facility].    Identified Goals and Safety Issues: Symptom reduction and observation.     Overview:  Legal Guardian: Rodolfo Baldwin [phone] : Adonis [phone]          Legal Status: Legal Status at Admission: Guardian/ad litum    Psychiatry Consult: none, patient is at [facility].       Updated   regarding plan of care.           Anna Tariq, [facility]

## 2023-11-28 NOTE — CONSULTS
Dionne Perez MRN# [id number]   Age: 38 year old YOB: 1985   Date of Admission to ED: 11/27/2023    In person visit Details:     Patient was assessed and interviewed face-to-face in person with this writer beryl. Patient was observed to be able to participate in the assessment as evidenced by verbal consent. Assessment methods included conducting a formal interview with patient, review of medical records, collaboration with medical staff, and obtaining relevant collateral information from family and community providers when available.        Reason for Consult:   This note is being entered to supplement the psychiatry consultation note that was completed on November 27, 2023 by the licensed mental health professional Anna Tariq, [facility]  have reviewed the pertinent clinical details related to their encounter. I am being consulted to offer additional guidance on psychiatric pharmacological interventions  Patient is alert and oriented x 4 pleasant and cooperative during assessment and interview.  Currently denies suicidal ideation homicidal ideation or self injury behavior.  Patient also denied any concerns at her group home make her depressed or sad, also patient said sometimes she feels sad and depressed and hit her head, writer assessed the area she states she hit her head there is no redness or no maryan or injury noted  .  Currently patient does not have any plan or suicidal ideation she is to be discharged back to her group home.  Restarted her all PTA medications, patient reports that she has been taking medication as prescribed.  Per pharmacy medication reconciliation patient was not taking her medications last week.  If prescription medication was not taking properly may be contributing factor for patient current mood feeling of suicidal depression, labile.  Currently patient denied any substance use disorder, patient denied any visual or auditory hallucination.    There is genetic loading for  none known.  Medical history does not appear to be significant.  Substance use does not appear to be playing a contributing role in the patient's presentation.  Patient appears to cope with stress/frustration/emotion by withdrawing, making suicidal statement which is chronic.  Stressors include chronic mental health issues, school issues, peer issues, and family dynamics.  Patient's support system includes living in a supportive environment which is a group home.       I have reviewed the nursing notes. I have reviewed the findings, diagnosis, plan and need for follow up with the patient.         HPI:      Dionne Perez is a 38 year old female with a past medical history of anxiety, GERD, hypothyroidism, hypercholesterolemia, cognitive impairments, asthma who presents to the emergency department with a chief complaint of suicidal ideation.  The patient was brought in from an her group home.  The patient does not have a plan for how she would kill herself.  She was reportedly hitting her head.  The patient was seen in the emergency department at [facility] earlier today.  At that time, the patient's was noted to have labile behavior and suicidal ideation reported.  Per EMS, the patient has called [phone] times this week and group home staff are unable to stop her from calling.  Prior to her coming into the emergency department here, they were unable to reach her guardian.  The patient had reported banging her head on the wall due to frustration causing her head to hurt at her earlier ER visit today.  The patient was noted to be watching TV and eagerly awaiting dinner to be served in the ER earlier today.  She did get very upset when her belongings were taken from her per unit policy.  They were able to verbally de-escalate her.  She was discharged back to her group home.     I have reviewed the Medications, Allergies, Past Medical and Surgical History, and Social History in the Epic syste        Brief  Therapeutic Intervention(s):   Provided active listening, unconditional positive regard, and validation. Engaged in cognitive restructuring/ reframing, looked at common cognitive distortions and challenged negative thoughts. *Engaged in guided discovery, explored patient's perspectives and helped expand them through socratic dialogue. Provided positive reinforcement for progress towards goals, gains in knowledge, and application of skills previously taught.  Engaged in social skills training. Explored and identified early warning signs to anger*        Past Psychiatric History:   See DEC  note        Substance Use and History:       None         Past Medical History:   PAST MEDICAL HISTORY:   Past Medical History:   Diagnosis Date    Anxiety     Asthma     Cholelithiasis     Cognitive impairment     Depressive disorder     Gastroesophageal reflux disease     Hypercholesterolemia     Hypothyroidism     Obesity     Subglottic stenosis        PAST SURGICAL HISTORY:   Past Surgical History:   Procedure Laterality Date    BRONCHOSCOPY FLEXIBLE AND RIGID N/A 6/6/2023    Procedure: Flexible Bronchoscopy, Laser Resection and Balloon Dilation;  Surgeon: Laxmi Cline MD;  Location: [facility] OR    IR LUMBAR PUNCTURE  06/09/2020    LASER CO2 BRONCHOSCOPY N/A 8/4/2023    Procedure: Flexible bronchoscopy theraputic airway inspection, CO2 laser on standby;  Surgeon: Joe Sutherland MD;  Location: [facility] OR    TONSILLECTOMY                 Allergies:     Allergies   Allergen Reactions    Ibuprofen              Medications:   I have reviewed this patient's current medications  Current Facility-Administered Medications   Medication    albuterol (PROVENTIL HFA/VENTOLIN HFA) inhaler    aspirin (ASA) EC tablet 325 mg    busPIRone (BUSPAR) tablet 15 mg    docusate sodium (COLACE) capsule 100 mg    escitalopram (LEXAPRO) tablet 20 mg    fluticasone (FLONASE) 50 MCG/ACT spray 2 spray    fluticasone-vilanterol (BREO ELLIPTA) 100-25  MCG/ACT inhaler 1 puff    GUMMY VITAMINS & MINERALS chewable tablet 1 chew tab    ipratropium - albuterol 0.5 mg/2.5 mg/3 mL (DUONEB) neb solution 3 mL    levothyroxine (SYNTHROID/LEVOTHROID) tablet 50 mcg    pravastatin (PRAVACHOL) tablet 40 mg    risperiDONE (risperDAL) tablet 0.25 mg    traZODone (DESYREL) tablet 50 mg     Current Outpatient Medications   Medication Sig    ADVAIR -21 MCG/ACT inhaler 2 puffs 2 times daily    Ascorbic Acid (VITAMIN C) POWD Take 1 packet by mouth daily Mix 1 packet with water and drink by mouth once daily    aspirin (ASA) 325 MG EC tablet Take 325 mg by mouth daily    busPIRone (BUSPAR) 15 MG tablet Take 15 mg by mouth 2 times daily    clindamycin (CLEOCIN T) 1 % external lotion Apply topically 2 times daily Apply to face around mouth 2 times daily for rash    docusate sodium (COLACE) 100 MG capsule Take 100 mg by mouth every 3 days    escitalopram (LEXAPRO) 20 MG tablet Take 20 mg by mouth daily    fluticasone (FLONASE) 50 MCG/ACT nasal spray Spray 2 sprays into both nostrils daily SHAKE LIQUID AND USE    levothyroxine (SYNTHROID/LEVOTHROID) 50 MCG tablet Take 50 mcg by mouth daily    melatonin 3 MG tablet Take 3 mg by mouth nightly as needed for sleep    nystatin (MYCOSTATIN) 008941 UNIT/GM external cream Apply topically 2 times daily    Pediatric Multivit-Minerals (GUMMY VITAMINS & MINERALS) chewable tablet Take 1 chew tab by mouth 2 times daily    pravastatin (PRAVACHOL) 40 MG tablet Take 40 mg by mouth daily    risperiDONE (RISPERDAL) 0.25 MG tablet Take 0.25 mg by mouth 2 times daily    traZODone (DESYREL) 50 MG tablet Take 50 mg by mouth At Bedtime    albuterol (PROAIR HFA/PROVENTIL HFA/VENTOLIN HFA) 108 (90 Base) MCG/ACT inhaler Inhale 2 puffs into the lungs every 6 hours as needed for shortness of breath, wheezing or cough    diclofenac (VOLTAREN) 1 % topical gel Apply 2 g topically 4 times daily as needed for moderate pain    ipratropium - albuterol 0.5 mg/2.5 mg/3  mL (DUONEB) 0.5-2.5 (3) MG/3ML neb solution Take 1 vial (3 mLs) by nebulization every 6 hours as needed for shortness of breath, wheezing or cough              Family History:   FAMILY HISTORY: No family history on file.           Social History:   Upbringing: born and raised   Educational History:   Relationships:  Children: **   Current Living Situation:  Occupational History: *  Financial Support: limited  Legal History: *  Abuse/Trauma History: *       - Collateral information from the famly/friend: none         PTA Medications:   (Not in a hospital admission)         Allergies:     Allergies   Allergen Reactions    Ibuprofen           Labs:   No results found for this or any previous visit (from the past 48 hour(s)).       Physical and Psychiatric Examination:     /84   Pulse 69   Temp 97.7  F (36.5  C) (Oral)   Resp 20   SpO2 98%   Weight is 0 lbs 0 oz  There is no height or weight on file to calculate BMI.    Mental Status Exam:  Appearance: awake, alert  Attitude:  cooperative  Eye Contact:  good  Mood:  better  Affect:  appropriate and in normal range  Speech:  clear, coherent  Language: fluent and intact in English  Psychomotor, Gait, Musculoskeletal:  no evidence of tardive dyskinesia, dystonia, or tics  Thought Process:  logical  Associations:  no loose associations  Thought Content:  no evidence of suicidal ideation or homicidal ideation  Insight:  limited  Judgement:  intact  Oriented to:  time, person, and place  Attention Span and Concentration:  limited  Recent and Remote Memory:  limited  Fund of Knowledge:  low-normal         Diagnoses:      Cognitive impairment  Aggression  Behavior problem, adult  Adjustment reaction with aggression         Recommendations:     1.  Patient is back to her baseline, can be discharged  2.  Continue her PTA medications, risperidone 0.25 mg twice daily, Lexapro 20 mg, BuSpar 15 mg twice daily, trazodone 50 mg at bedtime  4.  Consult psychiatry as needed  4.    Refer to psychiatric provider for medication management.    treatment per ED team    - Consulted with  Jo FRANK, [facility],  patient's ED RN regarding this case.    Please call [facility]/DEC at [phone] if you have follow-up questions or wish to place another consult.  David Esquivel, Psychiatric Nurse practitioner    Attestation:  Time with:  Patient: 25 minutes  Treatment Team: 20 Minutes  Chart Review: 40 minutes    Total time spent was 85 minutes. Over 50% of times was spent counseling and coordination of care.      I, David Esquivel, CNP, APRN, Psychiatric Nurse Practitioner have personally performed an examination of this patient.  I have edited the note to reflect all relevant changes.  I have discussed this patient with the care team November 28, 2023.  I have reviewed all vitals and laboratory findings.    Disclaimer: This note consists of symbols derived from keyboarding,

## 2023-11-28 NOTE — ED NOTES
Dr. Hernandez CNP stated pt to be dc'd if group home can accommodate her today. Compliant w/ meds and ate well at lunch.

## 2023-11-28 NOTE — PROGRESS NOTES
Pt was here in extended milieu to sleep. Pt reported to this writer that she does not feel comfortable. The patient was observed to be uncomfortable breathing and unable to turn her body in the sleeping chair. Pt asked for a few pillows, and still Pt reported feeling uncomfortable. It was suggested to try another bed on the main side. When pt got up from the sleeping chair, pt appeared to be hyperventilated. PT appears to be more safe in the regular bed on the main side.

## 2023-11-29 NOTE — ED NOTES
Bed: RUTHANN-C  Expected date: 11/28/23  Expected time: 6:11 PM  Means of arrival:   Comments:  [facility] [address] 38 [facility]
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
## 2023-11-29 NOTE — ED NOTES
Report called to Yanelis, patient's , at [phone].  Yanelis states staff is available at patient's group home to accept her back tonight.  Group home address verified with Yanelis.  Yanelis was informed patient would be discharging back to them by ambulance.

## 2023-11-29 NOTE — ED TRIAGE NOTES
Arrived via EMS from her group home for suicidal ideation. No known plan.     Patient has a guardian but patient makes her own decisions.

## 2023-11-29 NOTE — ED TRIAGE NOTES
Triage Assessment (Adult)       Row Name 11/28/23 5997          Triage Assessment    Airway WDL WDL        Respiratory WDL    Respiratory WDL WDL        Skin Circulation/Temperature WDL    Skin Circulation/Temperature WDL WDL        Cardiac WDL    Cardiac WDL WDL        Peripheral/Neurovascular WDL    Peripheral Neurovascular WDL WDL        Cognitive/Neuro/Behavioral WDL    Cognitive/Neuro/Behavioral WDL WDL

## 2023-12-01 NOTE — ED PROVIDER NOTES
ED Provider Note  [facility]      History     Chief Complaint   Patient presents with    Suicidal     Lives in a group home.  Called 911 because she was feeling suicidal.  Says she would smack herself.  Denies any self-harm.     HPI  Dionne Perez is a 38 year old female who presents to the emergency from her group home with behaviors of self injury and verbalizing thoughts of suicide patient has history of intellectual disability personality disorder and depression at this time no acute changes occurring in the group home to their knowledge group home staff were contacted these behaviors are not far off baseline for her no acute stressors were noted.    Past Medical History  Past Medical History:   Diagnosis Date    Anxiety     Asthma     Cholelithiasis     Cognitive impairment     Depressive disorder     Gastroesophageal reflux disease     Hypercholesterolemia     Hypothyroidism     Obesity     Subglottic stenosis      Past Surgical History:   Procedure Laterality Date    BRONCHOSCOPY FLEXIBLE AND RIGID N/A 6/6/2023    Procedure: Flexible Bronchoscopy, Laser Resection and Balloon Dilation;  Surgeon: Laxmi Cline MD;  Location: [facility] OR    IR LUMBAR PUNCTURE  06/09/2020    LASER CO2 BRONCHOSCOPY N/A 8/4/2023    Procedure: Flexible bronchoscopy theraputic airway inspection, CO2 laser on standby;  Surgeon: Joe Sutherland MD;  Location: [facility] OR    TONSILLECTOMY       ADVAIR -21 MCG/ACT inhaler  albuterol (PROAIR HFA/PROVENTIL HFA/VENTOLIN HFA) 108 (90 Base) MCG/ACT inhaler  Ascorbic Acid (VITAMIN C) POWD  aspirin (ASA) 325 MG EC tablet  busPIRone (BUSPAR) 15 MG tablet  clindamycin (CLEOCIN T) 1 % external lotion  diclofenac (VOLTAREN) 1 % topical gel  docusate sodium (COLACE) 100 MG capsule  escitalopram (LEXAPRO) 20 MG tablet  fluticasone (FLONASE) 50 MCG/ACT nasal spray  ipratropium - albuterol 0.5 mg/2.5 mg/3 mL (DUONEB) 0.5-2.5 (3) MG/3ML neb solution  levothyroxine  (SYNTHROID/LEVOTHROID) 50 MCG tablet  melatonin 3 MG tablet  nystatin (MYCOSTATIN) 836811 UNIT/GM external cream  Pediatric Multivit-Minerals (GUMMY VITAMINS & MINERALS) chewable tablet  pravastatin (PRAVACHOL) 40 MG tablet  risperiDONE (RISPERDAL) 0.25 MG tablet  traZODone (DESYREL) 50 MG tablet      Allergies   Allergen Reactions    Ibuprofen      Family History  No family history on file.  Social History   Social History     Tobacco Use    Smoking status: Never     Passive exposure: Never    Smokeless tobacco: Never   Substance Use Topics    Alcohol use: No    Drug use: No         A medically appropriate review of systems was performed with pertinent positives and negatives noted in the HPI, and all other systems negative.    Physical Exam   BP: 112/64  Pulse: 59  Temp: 98.7  F (37.1  C)  Resp: 16  SpO2: 95 %  Physical Exam  Constitutional:       General: She is not in acute distress.     Appearance: Normal appearance. She is not diaphoretic.   HENT:      Head: Atraumatic.      Mouth/Throat:      Mouth: Mucous membranes are moist.   Eyes:      General: No scleral icterus.     Conjunctiva/sclera: Conjunctivae normal.   Cardiovascular:      Rate and Rhythm: Normal rate.      Heart sounds: Murmur heard.      Friction rub present.   Pulmonary:      Effort: No respiratory distress.      Breath sounds: Normal breath sounds.   Abdominal:      General: Abdomen is flat.   Musculoskeletal:      Cervical back: Neck supple.   Skin:     General: Skin is warm.      Findings: No rash.   Neurological:      General: No focal deficit present.      Mental Status: She is alert.      Sensory: No sensory deficit.      Motor: No weakness.      Coordination: Coordination normal.   Psychiatric:         Attention and Perception: She does not perceive auditory or visual hallucinations.         Mood and Affect: Affect is blunt.         Speech: Speech normal.         Behavior: Behavior is slowed.         Thought Content: Thought content does  not include homicidal or suicidal ideation.         ED Course, Procedures, & Data      Procedures              Suicide assessment completed by mental health (D.E.C., LCSW, etc.)       No results found for any visits on 11/28/23.  Medications - No data to display  Labs Ordered and Resulted from Time of ED Arrival to Time of ED Departure - No data to display  No orders to display     Critical care was not performed care did involve review of records independent historian from [facility] advice from independent provider who did full assessment discussion of possible hospitalization versus outpatient management and patient eventually discharged back to group home.    Assessment & Plan      I have reviewed the nursing notes. I have reviewed the findings, diagnosis, plan and need for follow up with the patient.    Discharge Medication List as of 11/28/2023  9:42 PM          Final diagnoses:   Intellectual disability   Agitation   Behavior problem, adult         [facility] EMERGENCY DEPARTMENT  11/28/2023     Ariel Garrido MD  12/01/23 2229

## 2023-12-04 ENCOUNTER — PRE VISIT (OUTPATIENT)
Dept: OTOLARYNGOLOGY | Facility: CLINIC | Age: 38
End: 2023-12-04

## 2023-12-13 ENCOUNTER — HOSPITAL ENCOUNTER (EMERGENCY)
Facility: CLINIC | Age: 38
Discharge: HOME | End: 2023-12-13
Admitting: PHYSICIAN ASSISTANT
Payer: MEDICARE

## 2023-12-13 VITALS
OXYGEN SATURATION: 99 % | TEMPERATURE: 98.6 F | RESPIRATION RATE: 17 BRPM | DIASTOLIC BLOOD PRESSURE: 66 MMHG | SYSTOLIC BLOOD PRESSURE: 100 MMHG | HEART RATE: 89 BPM

## 2023-12-13 DIAGNOSIS — M79.671 PAIN IN BOTH FEET: ICD-10-CM

## 2023-12-13 DIAGNOSIS — J38.6 SUBGLOTTIC STENOSIS: Primary | ICD-10-CM

## 2023-12-13 DIAGNOSIS — M79.672 PAIN IN BOTH FEET: ICD-10-CM

## 2023-12-13 PROCEDURE — 99283 EMERGENCY DEPT VISIT LOW MDM: CPT | Performed by: PHYSICIAN ASSISTANT

## 2023-12-13 RX ORDER — ACETAMINOPHEN 500 MG
1000 TABLET ORAL ONCE
Status: DISCONTINUED | OUTPATIENT
Start: 2023-12-13 | End: 2023-12-13 | Stop reason: HOSPADM

## 2023-12-13 ASSESSMENT — ACTIVITIES OF DAILY LIVING (ADL)
ADLS_ACUITY_SCORE: 35

## 2023-12-13 NOTE — PROGRESS NOTES
Brief Social Work Note    Expected Discharge Date:  12/13/2023     Concerns to be Addressed:    discharge transportation    Additional Information:      1220 SW spoke with ED RN  who asked SW to arrange discharge transportation for pt.    1225 Sw met with pt to confirm phone. Pt told SW that she wanted to eat before she was transported. SW explained that meals were not provided. Pt told SW that she wasn't going to wait until she got home to eat. SW offered a sandwich and pt expressed agreement. [place] and beverage provided.    1233 spoke with  mgr. Lamb ([phone]) - who confirmed that  was unable to transport. Adonis also asked that AVS be faxed to facility (Fax:[phone]).     1251 AVS sent to  via [employer]     1302 SW booked taxi via [employer] for ASAP .SW provided pt's number ([phone])  and indicated that pt will be at the ED entrance.     1310 SW updated pt on ride status    SW will continue to follow as needed.    ABBY Salazar, SW  ED/OBS   [facility]  Phone: [phone]  Pager: [phone]  Fax: [phone]     On-call pager, [phone], 4:00 pm to midnight

## 2023-12-13 NOTE — ED PROVIDER NOTES
[place] EMERGENCY DEPARTMENT ([facility] [place], [place])    12/13/23       ED PROVIDER NOTE    History     Chief Complaint   Patient presents with    Foot Pain     The history is provided by the patient and medical records.     37yo F pmhx cognitive impairment, CVA, anxiety disorder, MDD with SI, insomnia and multiple ED visits (15 within the last month) who presents to the Emergency Department brought in by ambulance with complaints of foot pain. Patient was at [facility] this morning and did not have a way to get back to the group home and wanted to be seen in ED, and so called 911. Patient rates pain 10/10. Atraumatic. States started today when walking. Associated with paresthesia, but no weakness. Denies fevers/chills, saddle paresthesia, extremity paresthesia or weakness, or bowel/bladder dysfunction.Notably, was seen at [facility] ER two days ago for same. She had duplex of b/l LE which was negative, with associated negative basic labs.       Past Medical History  Past Medical History:   Diagnosis Date    Anxiety     Asthma     Cholelithiasis     Cognitive impairment     Depressive disorder     Gastroesophageal reflux disease     Hypercholesterolemia     Hypothyroidism     Obesity     Subglottic stenosis      Past Surgical History:   Procedure Laterality Date    BRONCHOSCOPY FLEXIBLE AND RIGID N/A 6/6/2023    Procedure: Flexible Bronchoscopy, Laser Resection and Balloon Dilation;  Surgeon: Laxmi Cline MD;  Location: [facility] OR    IR LUMBAR PUNCTURE  06/09/2020    LASER CO2 BRONCHOSCOPY N/A 8/4/2023    Procedure: Flexible bronchoscopy theraputic airway inspection, CO2 laser on standby;  Surgeon: Joe Sutherland MD;  Location: [facility] OR    TONSILLECTOMY       ADVAIR -21 MCG/ACT inhaler  albuterol (PROAIR HFA/PROVENTIL HFA/VENTOLIN HFA) 108 (90 Base) MCG/ACT inhaler  Ascorbic Acid (VITAMIN C) POWD  aspirin (ASA) 325 MG EC tablet  busPIRone (BUSPAR) 15 MG tablet  clindamycin (CLEOCIN T) 1 % external  lotion  diclofenac (VOLTAREN) 1 % topical gel  docusate sodium (COLACE) 100 MG capsule  escitalopram (LEXAPRO) 20 MG tablet  fluticasone (FLONASE) 50 MCG/ACT nasal spray  ipratropium - albuterol 0.5 mg/2.5 mg/3 mL (DUONEB) 0.5-2.5 (3) MG/3ML neb solution  levothyroxine (SYNTHROID/LEVOTHROID) 50 MCG tablet  melatonin 3 MG tablet  nystatin (MYCOSTATIN) 264267 UNIT/GM external cream  Pediatric Multivit-Minerals (GUMMY VITAMINS & MINERALS) chewable tablet  pravastatin (PRAVACHOL) 40 MG tablet  risperiDONE (RISPERDAL) 0.25 MG tablet  traZODone (DESYREL) 50 MG tablet      Allergies   Allergen Reactions    Ibuprofen      Family History  History reviewed. No pertinent family history.  Social History   Social History     Tobacco Use    Smoking status: Never     Passive exposure: Never    Smokeless tobacco: Never   Substance Use Topics    Alcohol use: No    Drug use: No         A medically appropriate review of systems was performed with pertinent positives and negatives noted in the HPI, and all other systems negative.    Physical Exam   BP: 100/66  Pulse: 89  Temp: 98.6  F (37  C)  Resp: 17  SpO2: 99 % /66   Pulse 89   Temp 98.6  F (37  C) (Oral)   Resp 17   SpO2 99%     Physical Exam  Constitutional:       General: She is not in acute distress.     Appearance: Normal appearance. She is well-developed.   HENT:      Head: Normocephalic and atraumatic.   Eyes:      Conjunctiva/sclera: Conjunctivae normal.   Cardiovascular:      Rate and Rhythm: Normal rate.   Pulmonary:      Effort: Pulmonary effort is normal. No respiratory distress.   Abdominal:      General: Abdomen is flat.   Musculoskeletal:      Cervical back: Normal range of motion and neck supple.      Comments: Full active and passive ROM of back, upper, and lower extremities.  No overlying skin changes.  5/5 Strength in b/l lower extremities.  Full sensation.  Negative straight leg test.  Ambulatory without difficulty.     Skin:     General: Skin is warm  and dry.      Findings: No rash.   Neurological:      Mental Status: She is alert and oriented to person, place, and time.           ED Course, Procedures, & Data      Procedures                     No results found for any visits on 12/13/23.  Medications   acetaminophen (TYLENOL) tablet 1,000 mg (has no administration in time range)     Labs Ordered and Resulted from Time of ED Arrival to Time of ED Departure - No data to display  No orders to display          Critical care was not performed.     Medical Decision Making  The patient's presentation was of moderate complexity (an undiagnosed new problem with uncertain diagnosis).    The patient's evaluation involved:  review of external note(s) from 3+ sources (ED notes)  review of 1 test result(s) ordered prior to this encounter (duplex)  strong consideration of a test (xray) that was ultimately deferred    The patient's management necessitated moderate risk (prescription drug management including medications given in the ED).    Assessment & Plan    37yo F pmhx cognitive impairment, CVA, anxiety disorder, MDD with SI, insomnia and multiple ED visits (15 within the last month) who presents to the Emergency Department brought in by ambulance with complaints of atraumatic bilateral foot pain and paresthesias.  Patient has an unremarkable exam.  She is ambulatory in the ED without difficulty.  No red flag back symptoms raising concern for cauda equina or epidural abscess.  No skin changes to suggest cellulitis/infectious etiology.  No reported trauma to raise concern for bony injury.  Had bilateral duplex 2 days ago at OSH which was negative for DVTs.  Plan for Tylenol here, and discharged back to her group home.  Return precautions were given for worsening symptoms.     I have reviewed the nursing notes. I have reviewed the findings, diagnosis, plan and need for follow up with the patient.    New Prescriptions    No medications on file       Final diagnoses:   Pain  in both feet         Miguel A Woo PA-C  [facility] EMERGENCY DEPARTMENT  12/13/2023     Miguel A Woo PA-C  12/13/23 1052

## 2023-12-13 NOTE — ED TRIAGE NOTES
Patient BIBA for evaluation of foot pain and headache. Patient was at [facility] this am, did not have a way to get back to , wanted to be seen in ED. Patient rates pain 10/10.      Triage Assessment (Adult)       Row Name 12/13/23 0937          Triage Assessment    Airway WDL WDL        Respiratory WDL    Respiratory WDL WDL        Skin Circulation/Temperature WDL    Skin Circulation/Temperature WDL WDL        Cardiac WDL    Cardiac WDL WDL        Peripheral/Neurovascular WDL    Peripheral Neurovascular WDL WDL        Cognitive/Neuro/Behavioral WDL    Cognitive/Neuro/Behavioral WDL WDL

## 2023-12-31 ENCOUNTER — HOSPITAL ENCOUNTER (EMERGENCY)
Facility: CLINIC | Age: 38
Discharge: HOME | End: 2023-12-31
Attending: EMERGENCY MEDICINE | Admitting: EMERGENCY MEDICINE
Payer: MEDICARE

## 2023-12-31 VITALS
DIASTOLIC BLOOD PRESSURE: 80 MMHG | SYSTOLIC BLOOD PRESSURE: 135 MMHG | TEMPERATURE: 99 F | OXYGEN SATURATION: 95 % | RESPIRATION RATE: 18 BRPM | BODY MASS INDEX: 64.53 KG/M2 | WEIGHT: 293 LBS | HEART RATE: 63 BPM

## 2023-12-31 DIAGNOSIS — R29.898 WEAKNESS OF BOTH LOWER EXTREMITIES: ICD-10-CM

## 2023-12-31 PROCEDURE — 99283 EMERGENCY DEPT VISIT LOW MDM: CPT | Performed by: EMERGENCY MEDICINE

## 2023-12-31 PROCEDURE — 99282 EMERGENCY DEPT VISIT SF MDM: CPT | Performed by: EMERGENCY MEDICINE

## 2023-12-31 ASSESSMENT — ACTIVITIES OF DAILY LIVING (ADL): ADLS_ACUITY_SCORE: 35

## 2023-12-31 NOTE — ED NOTES
Spoke with staff at [address], [place], [place], [address] [phone].   No concerns or questions from staff, they will be available whenever patient discharges.

## 2023-12-31 NOTE — ED PROVIDER NOTES
ED Provider Note  [facility]      History     Chief Complaint   Patient presents with    Dizziness     HPI  Dionne Perez is a 38 year old female presents to the ED via EMS for evaluation of intermittent dizziness.  She states she can't walk well, that her legs are weak, she does not use assistive devices. The patient has had multiple ED visits at multiple facilities for the same issue.  She was evaluated at [facility] ED yesterday for same presentation with negative work up.  She resides at a Group Home and called 911 herself.  The patient has an Emergency Care Plan in place.    Past medical history is notable for asthma, GERD, hypothyroidism, subglottic stenosis, obesity, depressive disorder, anxiety, chronic leg pain, and cognitive impairment.    This part of the medical record was transcribed by Moreno Crespo, Medical Scribe, from a dictation done by Dr. ROB Venegas MD.     Past Medical History  Past Medical History:   Diagnosis Date    Anxiety     Asthma     Cholelithiasis     Cognitive impairment     Depressive disorder     Gastroesophageal reflux disease     Hypercholesterolemia     Hypothyroidism     Obesity     Subglottic stenosis      Past Surgical History:   Procedure Laterality Date    BRONCHOSCOPY FLEXIBLE AND RIGID N/A 6/6/2023    Procedure: Flexible Bronchoscopy, Laser Resection and Balloon Dilation;  Surgeon: Laxmi Cline MD;  Location: [facility] OR    IR LUMBAR PUNCTURE  06/09/2020    LASER CO2 BRONCHOSCOPY N/A 8/4/2023    Procedure: Flexible bronchoscopy theraputic airway inspection, CO2 laser on standby;  Surgeon: Joe Sutherland MD;  Location: [facility] OR    TONSILLECTOMY       ADVAIR -21 MCG/ACT inhaler  albuterol (PROAIR HFA/PROVENTIL HFA/VENTOLIN HFA) 108 (90 Base) MCG/ACT inhaler  Ascorbic Acid (VITAMIN C) POWD  aspirin (ASA) 325 MG EC tablet  busPIRone (BUSPAR) 15 MG tablet  clindamycin (CLEOCIN T) 1 % external lotion  diclofenac (VOLTAREN) 1 % topical  gel  docusate sodium (COLACE) 100 MG capsule  escitalopram (LEXAPRO) 20 MG tablet  fluticasone (FLONASE) 50 MCG/ACT nasal spray  ipratropium - albuterol 0.5 mg/2.5 mg/3 mL (DUONEB) 0.5-2.5 (3) MG/3ML neb solution  levothyroxine (SYNTHROID/LEVOTHROID) 50 MCG tablet  melatonin 3 MG tablet  nystatin (MYCOSTATIN) 867109 UNIT/GM external cream  Pediatric Multivit-Minerals (GUMMY VITAMINS & MINERALS) chewable tablet  pravastatin (PRAVACHOL) 40 MG tablet  risperiDONE (RISPERDAL) 0.25 MG tablet  traZODone (DESYREL) 50 MG tablet      Allergies   Allergen Reactions    Ibuprofen      Family History  No family history on file.  Social History   Social History     Tobacco Use    Smoking status: Never     Passive exposure: Never    Smokeless tobacco: Never   Substance Use Topics    Alcohol use: No    Drug use: No         A medically appropriate review of systems was performed with pertinent positives and negatives noted in the HPI, and all other systems negative.    Physical Exam   BP: 135/80  Pulse: 63  Temp: 99  F (37.2  C)  Resp: 18  Weight: (!) 198.2 kg (437 lb)  SpO2: 95 %  Physical Exam  Vitals and nursing note reviewed.   Constitutional:       General: She is not in acute distress.  Cardiovascular:      Rate and Rhythm: Normal rate and regular rhythm.   Pulmonary:      Breath sounds: Normal breath sounds.   Musculoskeletal:      Right lower leg: Edema present.      Left lower leg: Edema present.   Skin:     Coloration: Skin is pale.   Neurological:      General: No focal deficit present.      Mental Status: She is alert.      Motor: Motor function is intact.      Comments: Patient is morbidly obese and deconditioned but she is able to lift both legs off the bed.         ED Course, Procedures, & Data      Procedures       Reviewed the chart regarding multiple recent ED visits as well as a care plan tried to try to prevent repeat unnecessary ED visits           No results found for any visits on 12/31/23.  Medications - No  data to display  Labs Ordered and Resulted from Time of ED Arrival to Time of ED Departure - No data to display  No orders to display            Assessment & Plan    38-year-old cognitively impaired female from a group home who is seen in the emergency department frequently she has a care plan.  She was seen at an outside department yesterday as well as on the 24th 17th 16th 13th of 11th eighth sixth and fourth, none of these resulted in either hospitalization or treatment of his significant illness or injury.  Again today her complaints are somewhat vague.  She said she could not walk but was seen to be walking from the gurney into the room.  Given the frequent visits and the lack of objective findings we will discharge her back to her group home we did call ahead to the group home and inform them that she would be coming back.    I have reviewed the nursing notes. I have reviewed the findings, diagnosis, plan and need for follow up with the patient.    New Prescriptions    No medications on file       Final diagnoses:   Weakness of both lower extremities       Zion Venegas MD  [facility] EMERGENCY DEPARTMENT  12/31/2023     Zion Venegas MD  12/31/23 8808

## 2023-12-31 NOTE — ED TRIAGE NOTES
BIBA for dizziness, leg pain   Happens frequently per pt  No trauma  Lives in group home  Vulnerable adult     Triage Assessment (Adult)       Row Name 12/31/23 9923          Triage Assessment    Airway WDL WDL        Respiratory WDL    Respiratory WDL WDL        Skin Circulation/Temperature WDL    Skin Circulation/Temperature WDL WDL        Cardiac WDL    Cardiac WDL WDL        Peripheral/Neurovascular WDL    Peripheral Neurovascular WDL WDL        Cognitive/Neuro/Behavioral WDL    Cognitive/Neuro/Behavioral WDL WDL

## 2024-01-01 ENCOUNTER — HOSPITAL ENCOUNTER (EMERGENCY)
Facility: CLINIC | Age: 39
Discharge: HOME | End: 2024-01-01
Attending: STUDENT IN AN ORGANIZED HEALTH CARE EDUCATION/TRAINING PROGRAM | Admitting: STUDENT IN AN ORGANIZED HEALTH CARE EDUCATION/TRAINING PROGRAM
Payer: MEDICARE

## 2024-01-01 VITALS
OXYGEN SATURATION: 97 % | DIASTOLIC BLOOD PRESSURE: 81 MMHG | TEMPERATURE: 98.1 F | RESPIRATION RATE: 17 BRPM | SYSTOLIC BLOOD PRESSURE: 121 MMHG | HEART RATE: 78 BPM

## 2024-01-01 DIAGNOSIS — R51.9 ACUTE NONINTRACTABLE HEADACHE, UNSPECIFIED HEADACHE TYPE: ICD-10-CM

## 2024-01-01 DIAGNOSIS — R42 DIZZINESS: ICD-10-CM

## 2024-01-01 DIAGNOSIS — R41.89 COGNITIVE IMPAIRMENT: ICD-10-CM

## 2024-01-01 PROCEDURE — 87637 SARSCOV2&INF A&B&RSV AMP PRB: CPT | Performed by: STUDENT IN AN ORGANIZED HEALTH CARE EDUCATION/TRAINING PROGRAM

## 2024-01-01 PROCEDURE — 99283 EMERGENCY DEPT VISIT LOW MDM: CPT | Performed by: STUDENT IN AN ORGANIZED HEALTH CARE EDUCATION/TRAINING PROGRAM

## 2024-01-01 ASSESSMENT — ACTIVITIES OF DAILY LIVING (ADL): ADLS_ACUITY_SCORE: 35

## 2024-01-01 NOTE — ED TRIAGE NOTES
Patient is from a group home and has been to different ER's daily for awhile now. Patient called EMS again today due to complaints for a headache. EMS state patient is non-symptomatic and is looking for social entertainment through emergency services.

## 2024-01-01 NOTE — PROGRESS NOTES
Patient ambulatory on discharge with family and friends.  All discharge reviewed with the patient Patient departed alert and responsive. Patient verbalized understanding. AVS sent with EMS for group home. Report given to EMS.

## 2024-01-02 NOTE — DISCHARGE INSTRUCTIONS
You are seen in the emergency department due to headache, dizziness.  We did check you for COVID, RSV and flu which was pending for you were discharged home.    Return to the emergency department with any loss of control of your arms or legs, numbness of your arms or legs, severe worsening headache you cannot tolerate at home or any other concerning symptoms

## 2024-01-02 NOTE — ED PROVIDER NOTES
[facility] [place] EMERGENCY DEPARTMENT ([facility])    1/01/24      ED PROVIDER NOTE        History     Chief Complaint   Patient presents with    Headache     HPI  Dionne Perez is a 38 year old female who resides at a Group home called 911 for a headache.  Patient has an Emergency Care Plan.  Pertinent past medical history includes cognitive impairment, asthma, GERD, subglottic stenosis, and hypothyroidism.    Patient been evaluated in the emergency department multiple different places multiple times over the last couple of days with similar complaints.  Notes that she has been feeling dizzy, and for her legs feel weak and painful, and has also been having a headache.  Notes that she has been around a staff member lately who seem like they were sick as they were having a hard time speaking/had loss of voice, and had some coughing.    She denies any cough, shortness of breath, chest pain.  No numbness, tingling or weakness of arms or legs    Past Medical History  Past Medical History:   Diagnosis Date    Anxiety     Asthma     Cholelithiasis     Cognitive impairment     Depressive disorder     Gastroesophageal reflux disease     Hypercholesterolemia     Hypothyroidism     Obesity     Subglottic stenosis      Past Surgical History:   Procedure Laterality Date    BRONCHOSCOPY FLEXIBLE AND RIGID N/A 6/6/2023    Procedure: Flexible Bronchoscopy, Laser Resection and Balloon Dilation;  Surgeon: Laxmi Cline MD;  Location: [facility] OR    IR LUMBAR PUNCTURE  06/09/2020    LASER CO2 BRONCHOSCOPY N/A 8/4/2023    Procedure: Flexible bronchoscopy theraputic airway inspection, CO2 laser on standby;  Surgeon: Joe Sutherland MD;  Location: [facility] OR    TONSILLECTOMY       ADVAIR -21 MCG/ACT inhaler  albuterol (PROAIR HFA/PROVENTIL HFA/VENTOLIN HFA) 108 (90 Base) MCG/ACT inhaler  Ascorbic Acid (VITAMIN C) POWD  aspirin (ASA) 325 MG EC tablet  busPIRone (BUSPAR) 15 MG tablet  clindamycin (CLEOCIN T) 1 % external  lotion  diclofenac (VOLTAREN) 1 % topical gel  docusate sodium (COLACE) 100 MG capsule  escitalopram (LEXAPRO) 20 MG tablet  fluticasone (FLONASE) 50 MCG/ACT nasal spray  ipratropium - albuterol 0.5 mg/2.5 mg/3 mL (DUONEB) 0.5-2.5 (3) MG/3ML neb solution  levothyroxine (SYNTHROID/LEVOTHROID) 50 MCG tablet  melatonin 3 MG tablet  nystatin (MYCOSTATIN) 775476 UNIT/GM external cream  Pediatric Multivit-Minerals (GUMMY VITAMINS & MINERALS) chewable tablet  pravastatin (PRAVACHOL) 40 MG tablet  risperiDONE (RISPERDAL) 0.25 MG tablet  traZODone (DESYREL) 50 MG tablet      Allergies   Allergen Reactions    Ibuprofen      Family History  No family history on file.  Social History   Social History     Tobacco Use    Smoking status: Never     Passive exposure: Never    Smokeless tobacco: Never   Substance Use Topics    Alcohol use: No    Drug use: No         A medically appropriate review of systems was performed with pertinent positives and negatives noted in the HPI, and all other systems negative.    Physical Exam   BP: 121/81  Pulse: 78  Temp: 98.1  F (36.7  C)  Resp: 17  SpO2: 97 %  Physical Exam  GEN: Well appearing, non toxic, cooperative  HEENT: normocephalic and atraumatic, PERRLA, EOMI  CV: well-perfused, normal skin color for ethnicity, regular rate and rhythm  PULM: breathing comfortably, in no respiratory distress, clear to auscultation upper and lower lung fields  ABD: nondistended, soft, nontender  EXT: Full range of motion.  No edema.  Neuro:  CN II-XII intact  Awake, alert, oriented x3  Motor: Grossly intact 5/5 motor function of bilateral upper and lower extremities.  Relatively preserved muscle tone bilateral upper and lower extremities  Sensory: Grossly intact sensation in bilateral upper and lower  Coordination: No truncal or limb ataxia, normal finger-to-nose, normal rapid alternating movements  SKIN: No rashes, ecchymosis, or lacerations  PSYCH: Calm and cooperative, interactive    ED Course,  Procedures, & Data      Procedures     No results found for any visits on 01/01/24.  Medications - No data to display  Labs Ordered and Resulted from Time of ED Arrival to Time of ED Departure - No data to display  No orders to display          Critical care was not performed.     Medical Decision Making  The patient's presentation was of moderate complexity (an undiagnosed new problem with uncertain diagnosis).    The patient's evaluation involved:  review of external note(s) from 3+ sources (see separate area of note for details)  review of 3+ test result(s) ordered prior to this encounter (see separate area of note for details)  ordering and/or review of 1 test(s) in this encounter (see separate area of note for details)    The patient's management necessitated only low risk treatment.    Assessment & Plan    38-year-old female with a past medical history of cognitive impairment, hypothyroidism, asthma who resides in a group home, and has a care plan related to her cognitive impairment and often inappropriate use of the emergency department for social reasons presenting to the emergency department due to headache, dizziness and leg pain.    Patient is been seen for very similar complaints over the last couple of days at multiple different facilities.  She has had workup in the past related to this dizziness she is endorsed including EKGs that were normal including yesterday.  Account number of days ago she had a CT of her head that demonstrated no evidence of any significant intracranial abnormalities.  No focal neurological deficits at this time with low suspicion of CVA including posterior CVA.  She has no evidence of any nystagmus with low suspicion of any other peripheral cause of her dizziness.  Heart regular rate and rhythm with no murmurs with low suspicion of valvular abnormalities.    This is all very similar to her prior complaints she has had in the past, however, she does endorse a little bit more  pain in her legs, as well as a recent sick contact with her .  Will plan for COVID/RSV/flu swab and plan for discharge    I have reviewed the nursing notes. I have reviewed the findings, diagnosis, plan and need for follow up with the patient.    New Prescriptions    No medications on file       Final diagnoses:   Dizziness   Acute nonintractable headache, unspecified headache type   Cognitive impairment       Elizabeth Aguilera MD   [facility] EMERGENCY DEPARTMENT  1/1/2024        Elizabeth Aguilera MD  01/01/24 1914

## 2024-01-16 ENCOUNTER — HOSPITAL ENCOUNTER (EMERGENCY)
Facility: CLINIC | Age: 39
Discharge: NURSING, REHAB OR LONG-TERM CARE | End: 2024-01-16
Attending: PSYCHIATRY & NEUROLOGY | Admitting: PSYCHIATRY & NEUROLOGY
Payer: MEDICARE

## 2024-01-16 VITALS
DIASTOLIC BLOOD PRESSURE: 77 MMHG | SYSTOLIC BLOOD PRESSURE: 119 MMHG | TEMPERATURE: 98.2 F | RESPIRATION RATE: 18 BRPM | WEIGHT: 293 LBS | BODY MASS INDEX: 43.4 KG/M2 | HEART RATE: 69 BPM | HEIGHT: 69 IN | OXYGEN SATURATION: 97 %

## 2024-01-16 DIAGNOSIS — F43.89 AVOIDANCE COPING: ICD-10-CM

## 2024-01-16 DIAGNOSIS — F79 INTELLECTUAL DISABILITY: ICD-10-CM

## 2024-01-16 PROBLEM — F43.20 ADJUSTMENT DISORDER, UNSPECIFIED: Status: ACTIVE | Noted: 2019-07-28

## 2024-01-16 PROCEDURE — 250N000013 HC RX MED GY IP 250 OP 250 PS 637: Performed by: PSYCHIATRY & NEUROLOGY

## 2024-01-16 PROCEDURE — 99284 EMERGENCY DEPT VISIT MOD MDM: CPT | Performed by: PSYCHIATRY & NEUROLOGY

## 2024-01-16 RX ORDER — RISPERIDONE 0.25 MG/1
1 TABLET, ORALLY DISINTEGRATING ORAL ONCE
Status: COMPLETED | OUTPATIENT
Start: 2024-01-16 | End: 2024-01-16

## 2024-01-16 RX ADMIN — RISPERIDONE 1 MG: 0.25 TABLET, ORALLY DISINTEGRATING ORAL at 20:14

## 2024-01-16 ASSESSMENT — ACTIVITIES OF DAILY LIVING (ADL)
ADLS_ACUITY_SCORE: 35
ADLS_ACUITY_SCORE: 35

## 2024-01-16 NOTE — ED TRIAGE NOTES
Triage Assessment (Adult)       Row Name 01/16/24 1723          Triage Assessment    Airway WDL WDL        Respiratory WDL    Respiratory WDL WDL        Skin Circulation/Temperature WDL    Skin Circulation/Temperature WDL WDL        Cardiac WDL    Cardiac Rhythm NSR        Peripheral/Neurovascular WDL    Peripheral Neurovascular WDL WDL        Cognitive/Neuro/Behavioral WDL    Cognitive/Neuro/Behavioral WDL WDL        [place] Coma Scale    Best Eye Response 4-->(E4) spontaneous     Best Motor Response 6-->(M6) obeys commands     Best Verbal Response 5-->(V5) oriented     [place] Coma Scale Score 15

## 2024-01-16 NOTE — ED TRIAGE NOTES
Patient brought in by ambulance from [facility]  Patient reports staff at [facility] is not feeding her breakfast and the staff is mean to her
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
## 2024-01-17 NOTE — ED PROVIDER NOTES
ED Provider Note  [facility]      History     Chief Complaint   Patient presents with    Mental Health Problem     Patient is having problems with group home staff.  Patient is a volnerable adult with guardian     HPI  Dionne Perez is a 38 year old female who is here as she got upset with staff at her group home as they were not meeting to her food needs. Patient felt staff was telling her she was stupid and was not helping with microwaving her food. She called 911 and asked to be brought to the ED. Patient has very poor coping and frustration tolerance. She uses ED services excessively to get her needs met. She was observed to be enjoying her goldfish crackers and talking to staff. She agreed to return to her group home after  talked to her about need to return there.    Past Medical History  Past Medical History:   Diagnosis Date    Anxiety     Asthma     Cholelithiasis     Cognitive impairment     Depressive disorder     Gastroesophageal reflux disease     Hypercholesterolemia     Hypothyroidism     Obesity     Subglottic stenosis      Past Surgical History:   Procedure Laterality Date    BRONCHOSCOPY FLEXIBLE AND RIGID N/A 6/6/2023    Procedure: Flexible Bronchoscopy, Laser Resection and Balloon Dilation;  Surgeon: Laxmi Cline MD;  Location: [facility] OR    IR LUMBAR PUNCTURE  06/09/2020    LASER CO2 BRONCHOSCOPY N/A 8/4/2023    Procedure: Flexible bronchoscopy theraputic airway inspection, CO2 laser on standby;  Surgeon: Joe Sutherland MD;  Location: [facility] OR    TONSILLECTOMY       ADVAIR -21 MCG/ACT inhaler  albuterol (PROAIR HFA/PROVENTIL HFA/VENTOLIN HFA) 108 (90 Base) MCG/ACT inhaler  Ascorbic Acid (VITAMIN C) POWD  aspirin (ASA) 325 MG EC tablet  busPIRone (BUSPAR) 15 MG tablet  clindamycin (CLEOCIN T) 1 % external lotion  diclofenac (VOLTAREN) 1 % topical gel  docusate sodium (COLACE) 100 MG capsule  escitalopram (LEXAPRO) 20 MG tablet  fluticasone (FLONASE)  "50 MCG/ACT nasal spray  ipratropium - albuterol 0.5 mg/2.5 mg/3 mL (DUONEB) 0.5-2.5 (3) MG/3ML neb solution  levothyroxine (SYNTHROID/LEVOTHROID) 50 MCG tablet  melatonin 3 MG tablet  nystatin (MYCOSTATIN) 132097 UNIT/GM external cream  Pediatric Multivit-Minerals (GUMMY VITAMINS & MINERALS) chewable tablet  pravastatin (PRAVACHOL) 40 MG tablet  risperiDONE (RISPERDAL) 0.25 MG tablet  traZODone (DESYREL) 50 MG tablet      Allergies   Allergen Reactions    Ibuprofen      Family History  No family history on file.  Social History   Social History     Tobacco Use    Smoking status: Never     Passive exposure: Never    Smokeless tobacco: Never   Substance Use Topics    Alcohol use: No    Drug use: No         A medically appropriate review of systems was performed with pertinent positives and negatives noted in the HPI, and all other systems negative.    Physical Exam   BP: (!) 147/84  Pulse: 65  Temp: 97.5  F (36.4  C)  Resp: 16  Height: 175.3 cm (5' 9\")  Weight: (!) 197.8 kg (436 lb)  SpO2: 95 %  Physical Exam  Vitals and nursing note reviewed.   HENT:      Head: Normocephalic.   Eyes:      Pupils: Pupils are equal, round, and reactive to light.   Pulmonary:      Effort: Pulmonary effort is normal.   Musculoskeletal:         General: Normal range of motion.      Cervical back: Normal range of motion.   Neurological:      General: No focal deficit present.      Mental Status: She is alert.   Psychiatric:         Attention and Perception: Attention and perception normal.         Mood and Affect: Mood and affect normal.         Speech: Speech normal.         Behavior: Behavior normal. Behavior is not agitated, aggressive, hyperactive or combative. Behavior is cooperative.         Thought Content: Thought content normal. Thought content is not paranoid or delusional. Thought content does not include homicidal or suicidal ideation.         Cognition and Memory: Cognition and memory normal.         Judgment: Judgment is " impulsive and inappropriate.           ED Course, Procedures, & Data      Procedures       A consult was attained from the  DEC service. The case was discussed with the  from that service. The consulting service's recommendations were provided at 7:15 PM. 10 minutes spent discussing case, care and disposition. 10 minutes spent reviewing prior records and interventions. Patient has a care plan in place due to her frequent ED visits for secondary gain.      No results found for any visits on 01/16/24.  Medications   risperiDONE (risperDAL M-TABS) ODT tab 1 mg (has no administration in time range)     Labs Ordered and Resulted from Time of ED Arrival to Time of ED Departure - No data to display  No orders to display          Critical care was not performed.     Medical Decision Making  The patient's presentation was of moderate complexity (2 or more stable chronic illnesses).    The patient's evaluation involved:  an assessment requiring an independent historian (see separate area of note for details)  review of external note(s) from 2 sources (see separate area of note for details)    The patient's management necessitated moderate risk (limitations due to social determinants of health (inadequate community  support)).    Assessment & Plan    Patient is here via EMS from her group home where she called 911 as she was upset with her staff for not helping her out with preparing this evening's food. She enjoys the attention and food that is provided to her here in the ED. She appears at baseline. She has chronic poor frustration tolerance and reacts poorly by getting mad and upset and trying to get to the ED. Patient was told that she will be going home after 2 hours of being in the ED for respite. She had changed her mind about going back to her group home and I declined her wish to stay here in the ED. I emphasized that she will be transported back by ambulance. I also ordered a dose of risperidone 1 mg to  help ease the transition back.    I have reviewed the nursing notes. I have reviewed the findings, diagnosis, plan and need for follow up with the patient.    New Prescriptions    No medications on file       Final diagnoses:   Intellectual disability   Avoidance coping       Arnoldo Corado MD  [facility] EMERGENCY DEPARTMENT  1/16/2024     Arnoldo Corado MD  01/16/24 1946

## 2024-01-17 NOTE — ED NOTES
Confirmed address with Nikole, caregiver at patient's .  Address [address]. [place], [address]

## 2024-01-22 ENCOUNTER — TELEPHONE (OUTPATIENT)
Dept: BEHAVIORAL HEALTH | Facility: CLINIC | Age: 39
End: 2024-01-22
Payer: MEDICARE

## 2024-01-22 NOTE — TELEPHONE ENCOUNTER
This writer called [facility] ([phone]) and [facility] including call back information and pts MRN and initials requesting verification of guardianship for the patient.    Received a call back from Patricia at [facility] on this date, who confirmed the guardian of the patient is still Mason Baldwin of [employer] [facility] ([phone] /  [phone] / emergencies: [phone]).

## 2024-01-26 ENCOUNTER — DOCUMENTATION ONLY (OUTPATIENT)
Dept: OTHER | Facility: CLINIC | Age: 39
End: 2024-01-26
Payer: MEDICARE

## 2024-01-29 ENCOUNTER — LAB REQUISITION (OUTPATIENT)
Dept: LAB | Facility: CLINIC | Age: 39
End: 2024-01-29
Payer: MEDICARE

## 2024-01-29 DIAGNOSIS — N91.2 AMENORRHEA, UNSPECIFIED: ICD-10-CM

## 2024-01-29 DIAGNOSIS — R39.9 UNSPECIFIED SYMPTOMS AND SIGNS INVOLVING THE GENITOURINARY SYSTEM: ICD-10-CM

## 2024-01-29 DIAGNOSIS — E66.01 MORBID (SEVERE) OBESITY DUE TO EXCESS CALORIES (H): ICD-10-CM

## 2024-01-29 DIAGNOSIS — E03.9 HYPOTHYROIDISM, UNSPECIFIED: ICD-10-CM

## 2024-01-29 LAB
ESTRADIOL SERPL-MCNC: 21 PG/ML
FSH SERPL IRP2-ACNC: 2.5 MIU/ML
HBA1C MFR BLD: 5.6 %
HOLD SPECIMEN: NORMAL
LH SERPL-ACNC: 3.7 MIU/ML
PROLACTIN SERPL 3RD IS-MCNC: 15 NG/ML (ref 5–23)
T4 FREE SERPL-MCNC: 1.06 NG/DL (ref 0.9–1.7)
TSH SERPL DL<=0.005 MIU/L-ACNC: 4.97 UIU/ML (ref 0.3–4.2)

## 2024-01-29 PROCEDURE — 84443 ASSAY THYROID STIM HORMONE: CPT | Mod: ORL | Performed by: OBSTETRICS & GYNECOLOGY

## 2024-01-29 PROCEDURE — 87086 URINE CULTURE/COLONY COUNT: CPT | Mod: ORL | Performed by: OBSTETRICS & GYNECOLOGY

## 2024-01-29 PROCEDURE — 84439 ASSAY OF FREE THYROXINE: CPT | Mod: ORL | Performed by: OBSTETRICS & GYNECOLOGY

## 2024-01-29 PROCEDURE — 83036 HEMOGLOBIN GLYCOSYLATED A1C: CPT | Mod: ORL | Performed by: OBSTETRICS & GYNECOLOGY

## 2024-01-29 PROCEDURE — 83002 ASSAY OF GONADOTROPIN (LH): CPT | Mod: ORL | Performed by: OBSTETRICS & GYNECOLOGY

## 2024-01-29 PROCEDURE — 84146 ASSAY OF PROLACTIN: CPT | Mod: ORL | Performed by: OBSTETRICS & GYNECOLOGY

## 2024-01-29 PROCEDURE — 83001 ASSAY OF GONADOTROPIN (FSH): CPT | Mod: ORL | Performed by: OBSTETRICS & GYNECOLOGY

## 2024-01-29 PROCEDURE — 82670 ASSAY OF TOTAL ESTRADIOL: CPT | Mod: ORL | Performed by: OBSTETRICS & GYNECOLOGY

## 2024-01-30 LAB — BACTERIA UR CULT: NORMAL

## 2024-01-31 ENCOUNTER — PATIENT OUTREACH (OUTPATIENT)
Dept: CARE COORDINATION | Facility: CLINIC | Age: 39
End: 2024-01-31
Payer: MEDICARE

## 2024-02-21 ENCOUNTER — HOSPITAL ENCOUNTER (EMERGENCY)
Facility: CLINIC | Age: 39
Discharge: HOME | End: 2024-02-22
Attending: EMERGENCY MEDICINE | Admitting: EMERGENCY MEDICINE
Payer: MEDICARE

## 2024-02-21 DIAGNOSIS — U07.1 COVID-19: ICD-10-CM

## 2024-02-21 DIAGNOSIS — R10.11 ABDOMINAL PAIN, RIGHT UPPER QUADRANT: ICD-10-CM

## 2024-02-21 LAB
ALBUMIN SERPL BCG-MCNC: 4 G/DL (ref 3.5–5.2)
ALBUMIN UR-MCNC: 20 MG/DL
ALP SERPL-CCNC: 99 U/L (ref 40–150)
ALT SERPL W P-5'-P-CCNC: 27 U/L (ref 0–50)
ANION GAP SERPL CALCULATED.3IONS-SCNC: 12 MMOL/L (ref 7–15)
APPEARANCE UR: ABNORMAL
AST SERPL W P-5'-P-CCNC: 22 U/L (ref 0–45)
BASOPHILS # BLD AUTO: 0 10E3/UL (ref 0–0.2)
BASOPHILS NFR BLD AUTO: 0 %
BILIRUB SERPL-MCNC: 0.4 MG/DL
BILIRUB UR QL STRIP: NEGATIVE
BUN SERPL-MCNC: 20.1 MG/DL (ref 6–20)
CALCIUM SERPL-MCNC: 8.9 MG/DL (ref 8.6–10)
CHLORIDE SERPL-SCNC: 102 MMOL/L (ref 98–107)
COLOR UR AUTO: YELLOW
CREAT SERPL-MCNC: 0.81 MG/DL (ref 0.51–0.95)
D DIMER PPP FEU-MCNC: 1.09 UG/ML FEU (ref 0–0.5)
DEPRECATED HCO3 PLAS-SCNC: 25 MMOL/L (ref 22–29)
EGFRCR SERPLBLD CKD-EPI 2021: >90 ML/MIN/1.73M2
EOSINOPHIL # BLD AUTO: 0 10E3/UL (ref 0–0.7)
EOSINOPHIL NFR BLD AUTO: 0 %
ERYTHROCYTE [DISTWIDTH] IN BLOOD BY AUTOMATED COUNT: 13.9 % (ref 10–15)
GLUCOSE SERPL-MCNC: 220 MG/DL (ref 70–99)
GLUCOSE UR STRIP-MCNC: NEGATIVE MG/DL
HCG UR QL: NEGATIVE
HCT VFR BLD AUTO: 40.3 % (ref 35–47)
HGB BLD-MCNC: 12.7 G/DL (ref 11.7–15.7)
HGB UR QL STRIP: NEGATIVE
IMM GRANULOCYTES # BLD: 0.2 10E3/UL
IMM GRANULOCYTES NFR BLD: 2 %
KETONES UR STRIP-MCNC: ABNORMAL MG/DL
LEUKOCYTE ESTERASE UR QL STRIP: ABNORMAL
LIPASE SERPL-CCNC: 25 U/L (ref 13–60)
LYMPHOCYTES # BLD AUTO: 0.9 10E3/UL (ref 0.8–5.3)
LYMPHOCYTES NFR BLD AUTO: 7 %
MCH RBC QN AUTO: 27.1 PG (ref 26.5–33)
MCHC RBC AUTO-ENTMCNC: 31.5 G/DL (ref 31.5–36.5)
MCV RBC AUTO: 86 FL (ref 78–100)
MONOCYTES # BLD AUTO: 0.5 10E3/UL (ref 0–1.3)
MONOCYTES NFR BLD AUTO: 4 %
MUCOUS THREADS #/AREA URNS LPF: PRESENT /LPF
NEUTROPHILS # BLD AUTO: 10.9 10E3/UL (ref 1.6–8.3)
NEUTROPHILS NFR BLD AUTO: 87 %
NITRATE UR QL: NEGATIVE
NRBC # BLD AUTO: 0 10E3/UL
NRBC BLD AUTO-RTO: 0 /100
PH UR STRIP: 6.5 [PH] (ref 5–7)
PLATELET # BLD AUTO: 246 10E3/UL (ref 150–450)
POTASSIUM SERPL-SCNC: 4 MMOL/L (ref 3.4–5.3)
PROT SERPL-MCNC: 7.2 G/DL (ref 6.4–8.3)
RBC # BLD AUTO: 4.69 10E6/UL (ref 3.8–5.2)
RBC URINE: 2 /HPF
SODIUM SERPL-SCNC: 139 MMOL/L (ref 135–145)
SP GR UR STRIP: 1.03 (ref 1–1.03)
SQUAMOUS EPITHELIAL: 10 /HPF
TRANSITIONAL EPI: <1 /HPF
UROBILINOGEN UR STRIP-MCNC: NORMAL MG/DL
WBC # BLD AUTO: 12.5 10E3/UL (ref 4–11)
WBC URINE: 7 /HPF

## 2024-02-21 PROCEDURE — 99285 EMERGENCY DEPT VISIT HI MDM: CPT | Mod: 25,27 | Performed by: EMERGENCY MEDICINE

## 2024-02-21 PROCEDURE — 81001 URINALYSIS AUTO W/SCOPE: CPT | Performed by: EMERGENCY MEDICINE

## 2024-02-21 PROCEDURE — 81025 URINE PREGNANCY TEST: CPT | Performed by: EMERGENCY MEDICINE

## 2024-02-21 PROCEDURE — 250N000009 HC RX 250: Performed by: EMERGENCY MEDICINE

## 2024-02-21 PROCEDURE — 80053 COMPREHEN METABOLIC PANEL: CPT | Performed by: EMERGENCY MEDICINE

## 2024-02-21 PROCEDURE — 99284 EMERGENCY DEPT VISIT MOD MDM: CPT | Performed by: EMERGENCY MEDICINE

## 2024-02-21 PROCEDURE — 36415 COLL VENOUS BLD VENIPUNCTURE: CPT | Performed by: EMERGENCY MEDICINE

## 2024-02-21 PROCEDURE — 250N000011 HC RX IP 250 OP 636: Performed by: EMERGENCY MEDICINE

## 2024-02-21 PROCEDURE — 85379 FIBRIN DEGRADATION QUANT: CPT | Performed by: EMERGENCY MEDICINE

## 2024-02-21 PROCEDURE — 83690 ASSAY OF LIPASE: CPT | Performed by: EMERGENCY MEDICINE

## 2024-02-21 PROCEDURE — 85025 COMPLETE CBC W/AUTO DIFF WBC: CPT | Performed by: EMERGENCY MEDICINE

## 2024-02-21 PROCEDURE — 87086 URINE CULTURE/COLONY COUNT: CPT | Performed by: EMERGENCY MEDICINE

## 2024-02-21 RX ORDER — IOPAMIDOL 755 MG/ML
100 INJECTION, SOLUTION INTRAVASCULAR ONCE
Status: COMPLETED | OUTPATIENT
Start: 2024-02-21 | End: 2024-02-22

## 2024-02-21 ASSESSMENT — ACTIVITIES OF DAILY LIVING (ADL)
ADLS_ACUITY_SCORE: 38

## 2024-02-22 ENCOUNTER — APPOINTMENT (OUTPATIENT)
Dept: ULTRASOUND IMAGING | Facility: CLINIC | Age: 39
End: 2024-02-22
Attending: EMERGENCY MEDICINE
Payer: MEDICARE

## 2024-02-22 ENCOUNTER — APPOINTMENT (OUTPATIENT)
Dept: CT IMAGING | Facility: CLINIC | Age: 39
End: 2024-02-22
Attending: EMERGENCY MEDICINE
Payer: MEDICARE

## 2024-02-22 ENCOUNTER — HOSPITAL ENCOUNTER (EMERGENCY)
Facility: CLINIC | Age: 39
Discharge: HOME | End: 2024-02-23
Attending: EMERGENCY MEDICINE | Admitting: EMERGENCY MEDICINE
Payer: MEDICARE

## 2024-02-22 VITALS
SYSTOLIC BLOOD PRESSURE: 99 MMHG | TEMPERATURE: 98.5 F | RESPIRATION RATE: 18 BRPM | HEART RATE: 60 BPM | OXYGEN SATURATION: 96 % | DIASTOLIC BLOOD PRESSURE: 66 MMHG

## 2024-02-22 DIAGNOSIS — F43.20 ADJUSTMENT DISORDER, UNSPECIFIED TYPE: ICD-10-CM

## 2024-02-22 DIAGNOSIS — F79 INTELLECTUAL DISABILITY: ICD-10-CM

## 2024-02-22 PROCEDURE — 250N000011 HC RX IP 250 OP 636: Performed by: EMERGENCY MEDICINE

## 2024-02-22 PROCEDURE — G1010 CDSM STANSON: HCPCS

## 2024-02-22 PROCEDURE — 99284 EMERGENCY DEPT VISIT MOD MDM: CPT | Performed by: EMERGENCY MEDICINE

## 2024-02-22 PROCEDURE — 99283 EMERGENCY DEPT VISIT LOW MDM: CPT | Performed by: EMERGENCY MEDICINE

## 2024-02-22 PROCEDURE — 76705 ECHO EXAM OF ABDOMEN: CPT

## 2024-02-22 PROCEDURE — 250N000009 HC RX 250: Performed by: EMERGENCY MEDICINE

## 2024-02-22 RX ADMIN — IOPAMIDOL 77 ML: 755 INJECTION, SOLUTION INTRAVENOUS at 00:36

## 2024-02-22 RX ADMIN — SODIUM CHLORIDE 90 ML: 9 INJECTION, SOLUTION INTRAVENOUS at 00:37

## 2024-02-22 ASSESSMENT — ACTIVITIES OF DAILY LIVING (ADL)
ADLS_ACUITY_SCORE: 38

## 2024-02-22 ASSESSMENT — COLUMBIA-SUICIDE SEVERITY RATING SCALE - C-SSRS
1. IN THE PAST MONTH, HAVE YOU WISHED YOU WERE DEAD OR WISHED YOU COULD GO TO SLEEP AND NOT WAKE UP?: YES
6. HAVE YOU EVER DONE ANYTHING, STARTED TO DO ANYTHING, OR PREPARED TO DO ANYTHING TO END YOUR LIFE?: NO
5. HAVE YOU STARTED TO WORK OUT OR WORKED OUT THE DETAILS OF HOW TO KILL YOURSELF? DO YOU INTEND TO CARRY OUT THIS PLAN?: NO
4. HAVE YOU HAD THESE THOUGHTS AND HAD SOME INTENTION OF ACTING ON THEM?: NO
2. HAVE YOU ACTUALLY HAD ANY THOUGHTS OF KILLING YOURSELF IN THE PAST MONTH?: YES
3. HAVE YOU BEEN THINKING ABOUT HOW YOU MIGHT KILL YOURSELF?: NO

## 2024-02-22 NOTE — CONSULTS
"Diagnostic Evaluation Consultation  Crisis Assessment    Patient Name: Dionne Perez  Age:  39 year old  Legal Sex: female  Gender Identity: female  Pronouns:   Race: White  Ethnicity: Not  or   Language: English      Patient was assessed: Virtual: iPad Crisis Assessment Start Time: 0743 Crisis Assessment Stop Time: 0814  Patient location: [facility] EMERGENCY DEPARTMENT                             ED15    Referral Data and Chief Complaint  Dionne Perez presents to the ED via EMS. Patient is presenting to the ED for the following concerns: Worsening psychosocial stress.   Factors that make the mental health crisis life threatening or complex are:  Pt continues to over utilize the ED when experiencing ongoing stress at the group home.      Informed Consent and Assessment Methods  Explained the crisis assessment process, including applicable information disclosures and limits to confidentiality, assessed understanding of the process, and obtained consent to proceed with the assessment.  Assessment methods included conducting a formal interview with patient, review of medical records, collaboration with medical staff, and obtaining relevant collateral information from family and community providers when available.  : done     Patient response to interventions: acceptance expressed  Coping skills were attempted to reduce the crisis:  pt reached out to 911     History of the Crisis   Patient is a 38-year-old female with a history of intellectual disability, anxiety, SIB and suicidal ideation.  Patient states she called 911.  She states the group home staff \"are not helping me out.\"  Patient states she does not remember why she called.  She states she is \"still bad\" this monring.  Patient is unable to state why she is bad then states she is \"so sad\".  When aske to share more about her sadness but states \"I don't know.  She states she wants to hit herself.  When asked about suicidal ideation " "patient states, \"I just don't like my group home.\"  Patient states she has been there is October.  She reports an argument with another resident and staff last week over a box of candy she received for her birthday.  Patient denies suicidal ideation, plan, or intent.  She denies thoughts of harming others.  She reports poor sleep and states her appetite has been okay.  Pt states she thinkgs her abdominal pain was 'nerves.\"   Reviewed patient's safety plan and discussed return to the group home.  Patient states she wants a two day break and tries to negotiate staying.   Upon further discussion and pt verbalizes understanding she will return to the group home today.    Brief Psychosocial History  Family:  Single, Children no  Support System:   (friends, siblings)  Employment Status:  disabled  Source of Income:  disability  Financial Environmental Concerns:  none  Current Hobbies:  meditation, music, social media/computer activities, television/movies/videos, arts/crafts  Barriers in Personal Life:  cognitive limitations    Significant Clinical History  Current Anxiety Symptoms:   (n/a)  Current Depression/Trauma:   (n/a)  Current Somatic Symptoms:   (n/a)  Current Psychosis/Thought Disturbance:   (pt identifies feeling agiation.  She appears calm and is cooperative)  Current Eating Symptoms:   (pt is obese)  Chemical Use History:  Alcohol: None  Benzodiazepines: None  Opiates: None  Cocaine: None  Marijuana: None  Other Use: None  Withdrawal Symptoms:  (n/a)  Addictions:  (n/a)   Past diagnosis:  Depression, Other (intellectual disability)  Family history:  No known history of mental health or chemical health concerns  Past treatment:  Individual therapy, Case management, Psychiatric Medication Management, Inpatient Hospitalization, Supportive Living Environment (group home, [facility], etc)  Details of most recent treatment:  Patient lives in a group home through [facility], [place]. Patient arrived in 10/2023. Patient has " "legal guardian. Patient is currently working with a therapist and psychiatrist  Other relevant history:  Pt's parents are . pt has three sisters.       Collateral Information  Is there collateral information: Yes     Collateral information name, relationship, phone number:  Elvia, staff member, [phone] and patient's guardian Rodolfo Baldwin, [phone]    What happened today: Discussed patient's presentation to the ED with group home and guardian.  Discussed patient's pattern of behavior and recommendation for patient to return to group home.  Group home and guardian are in agreement with patient's return.     What is different about patient's functioning: Discussed this is patient's pattern of behavior and pt is calling 911.     Concern about alcohol/drug use:      What do you think the patient needs:      Has patient made comments about wanting to kill themselves/others: no    If d/c is recommended, can they take part in safety/aftercare planning:  yes    Additional collateral information:        Risk Assessment  [place] Suicide Severity Rating Scale Full Clinical Version:  Suicidal Ideation  Q6 Suicide Behavior (Lifetime): no          [place] Suicide Severity Rating Scale Recent:   Suicidal Ideation (Recent)  Q1 Wished to be Dead (Past Month): yes  Q2 Suicidal Thoughts (Past Month): yes  Q3 Suicidal Thought Method: no  Q4 Suicidal Intent without Specific Plan: no  Q5 Suicide Intent with Specific Plan: no  Level of Risk per Screen: low risk  Intensity of Ideation (Recent)  Most Severe Ideation Rating (Past 1 Month): 1  Description of Most Severe Ideation (Past 1 Month): \"I just don't like my group home.\"  Frequency (Past 1 Month): Less than once a week  Duration (Past 1 Month): Fleeting, few seconds or minutes  Controllability (Past 1 Month): Easily able to control thoughts  Deterrents (Past 1 Month): Does not apply  Reasons for Ideation (Past 1 Month): Does not apply  Suicidal Behavior " (Recent)  Actual Attempt (Past 3 Months): No  Has subject engaged in non-suicidal self-injurious behavior? (Past 3 Months): No  Interrupted Attempts (Past 3 Months): No  Aborted or Self-Interrupted Attempt (Past 3 Months): No  Preparatory Acts or Behavior (Past 3 Months): No    Environmental or Psychosocial Events: impulsivity/recklessness  Protective Factors: Protective Factors: lives in a responsibly safe and stable environment, good treatment engagement, able to access care without barriers, supportive ongoing medical and mental health care relationships, help seeking    Does the patient have thoughts of harming others? Feels Like Hurting Others: no  Previous Attempt to Hurt Others: no  Current presentation:  (cooperative)    Is the patient engaging in sexually inappropriate behavior?           Mental Status Exam   Affect: Appropriate  Appearance: Appropriate  Attention Span/Concentration: Attentive  Eye Contact: Engaged    Fund of Knowledge: Delayed   Language /Speech Content:    Language /Speech Volume: Normal  Language /Speech Rate/Productions: Normal  Recent Memory: Variable  Remote Memory: Variable  Mood: Normal  Orientation to Person: Yes   Orientation to Place: Yes  Orientation to Time of Day: Yes  Orientation to Date: Yes     Situation (Do they understand why they are here?): Yes  Psychomotor Behavior: Normal  Thought Content: Clear  Thought Form: Intact     Mini-Cog Assessment  Number of Words Recalled:    Clock-Drawing Test:     Three Item Recall:    Mini-Cog Total Score:       Medication  Psychotropic medications:   Medication Orders - Psychiatric (From admission, onward)      None             Current Care Team  Patient Care Team:  Clinic, [facility] as PCP - General  Rocco Mccormack MD as MD (Otolaryngology)  Joe Sutherland MD as MD (Critical Care)  Tyra Hoffmann MD as MD (OB/Gyn)    Diagnosis  Patient Active Problem List   Diagnosis Code    Altered mental status R41.82     "Agitation R45.1    Aggression R46.89    Abnormal behavior R46.89    Acute costochondritis M94.0    Adjustment disorder, unspecified F43.20    Adjustment reaction with aggression F43.29    Behavior problem, adult F69    Cholelithiasis K80.20    Chronic abdominal pain R10.9, G89.29    Cognitive impairment R41.89    Constipation K59.00    Contusion of left hip S70.02XA    Developmental disability F89    Essential hypertension I10    Gallstone pancreatitis K85.10    ROSY (generalized anxiety disorder) F41.1    Hyperlipemia E78.5    Hypothyroidism due to acquired atrophy of thyroid E03.4    Severe episode of recurrent major depressive disorder, without psychotic features (H) F33.2    Insomnia G47.00    Major neurocognitive disorder (H) F03.90    Morbid obesity (H) E66.01    Recurrent major depressive disorder (H24) F33.9    Sinus bradycardia, chronic R00.1    Suicidal ideations R45.851    Syncope R55    Dyspnea R06.00    Acquired hammer toes of both feet M20.41, M20.42    Anemia, iron deficiency D50.9    Callus L84    Health care home, active care coordination Z78.9    History of cellulitis Z87.2    No-show for appointment Z91.199    Pre-syncope R55    Primary osteoarthritis of left knee M17.12    Scalp irritation R23.8    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  She has a pattern of doing this in reaction to stress or not feeling fairly treated at the group home.  This morning pt is unable to recall why she called 911 and when screened for suicide risk states, \"I just don't like my group home.\"   Patient requested to take a two day break from the group home.  Hospitalization would not benefit patient " and would reinforce behavior.  Discussed with patient's guardian and group home, they are in agreement with patient's return to group home.                          Patient coping skills attempted to reduce the crisis:  pt reached out to 911    Disposition  Recommended disposition: Group Home        Reviewed case and recommendations with attending provider. Attending Name: Dr Uriarte       Attending concurs with disposition: yes       Patient and/or validated legal guardian concurs with disposition:   yes       Final disposition:  discharge    Legal status on admission:      Assessment Details   Total duration spent with the patient: 31 min     CPT code(s) utilized: 79425 - Psychotherapy for Crisis - 60 (30-74*) min    ROBERTO CARLOS Cevallos, Psychotherapist  [facility] - Triage & Transition Services  Callback: [phone]

## 2024-02-22 NOTE — ED PROVIDER NOTES
ED Provider Note  [facility]      History     Chief Complaint   Patient presents with    Abdominal Pain     Sudden onset generalized abdominal pain after dinner. Previously called EMS for SOB 20 minutes prior to calling for abdominal pain. Per EMS on prior call notes pt had + health screening. Also reported SI to EMS with plan to hurt herself by spanking.    Suicidal     HPI  Dionne Perez is a 39 year old female with history of developmental delay, MDD, hypothyroid, HLD, constipation, chronic abdominal pain, cholelithiasis who presents with abdominal pain RUQ since 4pm today. No N/V/D. No blood in stool. No dark colored stools. Last BM this morning. No fevers/chills. No new meds. Continues to have some chest pain and SOB from COVID that was diagnosed in ED yesterday.     Plans of hurting herself by hitting and hurting others by hitting. Says the workers where she lives don't treat her well. She has a behavioral health care plan which states standard evaluation recommended, although DEC evaluation may be unnecessary at times if presentation is consistent with acute stress reaction.     Past Medical History  Past Medical History:   Diagnosis Date    Anxiety     Asthma     Cholelithiasis     Cognitive impairment     Depressive disorder     Gastroesophageal reflux disease     Hypercholesterolemia     Hypothyroidism     Obesity     Subglottic stenosis      Past Surgical History:   Procedure Laterality Date    BRONCHOSCOPY FLEXIBLE AND RIGID N/A 6/6/2023    Procedure: Flexible Bronchoscopy, Laser Resection and Balloon Dilation;  Surgeon: Laxmi Cline MD;  Location:  OR    IR LUMBAR PUNCTURE  06/09/2020    LASER CO2 BRONCHOSCOPY N/A 8/4/2023    Procedure: Flexible bronchoscopy theraputic airway inspection, CO2 laser on standby;  Surgeon: Joe Sutherland MD;  Location: [facility] OR    TONSILLECTOMY       ADVAIR -21 MCG/ACT inhaler  albuterol (PROAIR HFA/PROVENTIL HFA/VENTOLIN HFA)  108 (90 Base) MCG/ACT inhaler  Ascorbic Acid (VITAMIN C) POWD  aspirin (ASA) 325 MG EC tablet  busPIRone (BUSPAR) 15 MG tablet  clindamycin (CLEOCIN T) 1 % external lotion  diclofenac (VOLTAREN) 1 % topical gel  docusate sodium (COLACE) 100 MG capsule  escitalopram (LEXAPRO) 20 MG tablet  fluticasone (FLONASE) 50 MCG/ACT nasal spray  ipratropium - albuterol 0.5 mg/2.5 mg/3 mL (DUONEB) 0.5-2.5 (3) MG/3ML neb solution  levothyroxine (SYNTHROID/LEVOTHROID) 50 MCG tablet  melatonin 3 MG tablet  nystatin (MYCOSTATIN) 959683 UNIT/GM external cream  Pediatric Multivit-Minerals (GUMMY VITAMINS & MINERALS) chewable tablet  pravastatin (PRAVACHOL) 40 MG tablet  risperiDONE (RISPERDAL) 0.25 MG tablet  traZODone (DESYREL) 50 MG tablet  alum & mag hydroxide-simethicone (MAALOX MULTI SYMPTOM MAX ST) 400-400-40 MG/5ML SUSP suspension  alum & mag hydroxide-simethicone (MAALOX MULTI SYMPTOM MAX ST) 400-400-40 MG/5ML SUSP suspension      Allergies   Allergen Reactions    Ibuprofen      Family History  History reviewed. No pertinent family history.  Social History   Social History     Tobacco Use    Smoking status: Never     Passive exposure: Never    Smokeless tobacco: Never   Substance Use Topics    Alcohol use: No    Drug use: No         A complete review of systems was attempted but limited due to patient's baseline function.    Physical Exam   BP: (!) 141/79  Pulse: 78  Temp: 97.9  F (36.6  C)  Resp: 20  SpO2: 94 %  Physical Exam  Constitutional:       Appearance: She is obese. She is not ill-appearing.   HENT:      Head: Normocephalic and atraumatic.   Cardiovascular:      Rate and Rhythm: Normal rate and regular rhythm.   Pulmonary:      Effort: Pulmonary effort is normal. No respiratory distress.      Breath sounds: No wheezing.   Chest:      Chest wall: No tenderness.   Abdominal:      Comments: Abdominal exam limited by obesity. Abdomen is most likely soft but difficult to assess due to obesity. No rebound, no guarding.  Tenderness to palpation in mid-right abdomen.    Neurological:      Mental Status: She is alert.      Comments: At baseline           ED Course, Procedures, & Data      Procedures            Results for orders placed or performed during the hospital encounter of 02/21/24   CT Chest Pulmonary Embolism w Contrast     Status: None    Narrative    EXAM: CT CHEST PULMONARY EMBOLISM W CONTRAST  LOCATION: [facility]  DATE: 2/22/2024    INDICATION: elevated DDimer, RUQ pain and SOB  COMPARISON: CT angiogram chest with contrast 01/08/2024  TECHNIQUE: CT chest pulmonary angiogram during arterial phase injection of IV contrast. Multiplanar reformats and MIP reconstructions were performed. Dose reduction techniques were used.   CONTRAST: 77 mL Isovue 370    FINDINGS:  ANGIOGRAM CHEST: Pulmonary arteries are normal caliber and negative for pulmonary emboli. Thoracic aorta is negative for dissection. No CT evidence of right heart strain.    LUNGS AND PLEURA: Mild luminal narrowing of the proximal trachea at or just below the inferior margin of the thyroid cartilage appears similar. Central airways are otherwise patent. No consolidations, pleural effusions, or pneumothorax. Small calcified   granuloma in the right lower lobe is unchanged.    MEDIASTINUM/AXILLAE: Mild cardiomegaly with biatrial enlargement. No pericardial effusion. No lymphadenopathy.    CORONARY ARTERY CALCIFICATION: No significant.    UPPER ABDOMEN: Hepatic steatosis.    MUSCULOSKELETAL: No acute osseous abnormality.      Impression    IMPRESSION:  1.  No pulmonary embolus or other acute process within the chest.  2.  Hepatic steatosis, which may represent a source of right upper quadrant abdominal pain.   US Abdomen Limited     Status: None    Narrative    EXAM: ULTRASOUND ABDOMEN LIMITED - RIGHT UPPER QUADRANT  LOCATION: [facility]  DATE/TIME: 2/22/2024 12:54 AM  CST    INDICATION: Right upper quadrant pain.  COMPARISON: 02/22/2024 - CT chest.    TECHNIQUE: Limited abdominal ultrasound.    FINDINGS:    GALLBLADDER: Not visualized.    BILE DUCTS: No intra or extrahepatic biliary dilatation. The common duct measures 0.3 cm in diameter.    LIVER: Normal echogenicity. No visualized masses.    RIGHT KIDNEY: 11.0 cm in length. Normal echogenicity. No intrarenal collecting system dilatation, calculi or masses.     OTHER: No free fluid in the upper right hemiabdomen.      Impression    IMPRESSION:   1. Unremarkable appearance of the liver.  2. No biliary dilatation.  3. The gallbladder is not visualized.       Comprehensive metabolic panel     Status: Abnormal   Result Value Ref Range    Sodium 139 135 - 145 mmol/L    Potassium 4.0 3.4 - 5.3 mmol/L    Carbon Dioxide (CO2) 25 22 - 29 mmol/L    Anion Gap 12 7 - 15 mmol/L    Urea Nitrogen 20.1 (H) 6.0 - 20.0 mg/dL    Creatinine 0.81 0.51 - 0.95 mg/dL    GFR Estimate >90 >60 mL/min/1.73m2    Calcium 8.9 8.6 - 10.0 mg/dL    Chloride 102 98 - 107 mmol/L    Glucose 220 (H) 70 - 99 mg/dL    Alkaline Phosphatase 99 40 - 150 U/L    AST 22 0 - 45 U/L    ALT 27 0 - 50 U/L    Protein Total 7.2 6.4 - 8.3 g/dL    Albumin 4.0 3.5 - 5.2 g/dL    Bilirubin Total 0.4 <=1.2 mg/dL   Lipase     Status: Normal   Result Value Ref Range    Lipase 25 13 - 60 U/L   HCG qualitative urine     Status: Normal   Result Value Ref Range    hCG Urine Qualitative Negative Negative   UA with Microscopic reflex to Culture     Status: Abnormal    Specimen: Urine, Midstream   Result Value Ref Range    Color Urine Yellow Colorless, Straw, Light Yellow, Yellow    Appearance Urine Slightly Cloudy (A) Clear    Glucose Urine Negative Negative mg/dL    Bilirubin Urine Negative Negative    Ketones Urine Trace (A) Negative mg/dL    Specific Gravity Urine 1.035 1.003 - 1.035    Blood Urine Negative Negative    pH Urine 6.5 5.0 - 7.0    Protein Albumin Urine 20 (A) Negative mg/dL     Urobilinogen Urine Normal Normal, 2.0 mg/dL    Nitrite Urine Negative Negative    Leukocyte Esterase Urine Trace (A) Negative    Mucus Urine Present (A) None Seen /LPF    RBC Urine 2 <=2 /HPF    WBC Urine 7 (H) <=5 /HPF    Squamous Epithelials Urine 10 (H) <=1 /HPF    Transitional Epithelials Urine <1 <=1 /HPF    Narrative    Urine Culture not indicated   CBC with platelets and differential     Status: Abnormal   Result Value Ref Range    WBC Count 12.5 (H) 4.0 - 11.0 10e3/uL    RBC Count 4.69 3.80 - 5.20 10e6/uL    Hemoglobin 12.7 11.7 - 15.7 g/dL    Hematocrit 40.3 35.0 - 47.0 %    MCV 86 78 - 100 fL    MCH 27.1 26.5 - 33.0 pg    MCHC 31.5 31.5 - 36.5 g/dL    RDW 13.9 10.0 - 15.0 %    Platelet Count 246 150 - 450 10e3/uL    % Neutrophils 87 %    % Lymphocytes 7 %    % Monocytes 4 %    % Eosinophils 0 %    % Basophils 0 %    % Immature Granulocytes 2 %    NRBCs per 100 WBC 0 <1 /100    Absolute Neutrophils 10.9 (H) 1.6 - 8.3 10e3/uL    Absolute Lymphocytes 0.9 0.8 - 5.3 10e3/uL    Absolute Monocytes 0.5 0.0 - 1.3 10e3/uL    Absolute Eosinophils 0.0 0.0 - 0.7 10e3/uL    Absolute Basophils 0.0 0.0 - 0.2 10e3/uL    Absolute Immature Granulocytes 0.2 <=0.4 10e3/uL    Absolute NRBCs 0.0 10e3/uL   D dimer quantitative     Status: Abnormal   Result Value Ref Range    D-Dimer Quantitative 1.09 (H) 0.00 - 0.50 ug/mL FEU    Narrative    This D-dimer assay is intended for use in conjunction with a clinical pretest probability assessment model to exclude pulmonary embolism (PE) and deep venous thrombosis (DVT) in outpatients suspected of PE or DVT. The cut-off value is 0.50 ug/mL FEU.   Urine Culture     Status: Abnormal    Specimen: Urine, Midstream   Result Value Ref Range    Culture (A)      <10,000 CFU/mL Streptococcus agalactiae (Group B Streptococcus)    Culture 50,000-100,000 CFU/mL Mixture of urogenital chirag    CBC with platelets differential     Status: Abnormal    Narrative    The following orders were created for  panel order CBC with platelets differential.  Procedure                               Abnormality         Status                     ---------                               -----------         ------                     CBC with platelets and d...[[id number]]  Abnormal            Final result                 Please view results for these tests on the individual orders.     Medications   iopamidol (ISOVUE-370) solution 100 mL (77 mLs Intravenous $Given 2/22/24 0036)   sodium chloride 0.9 % bag 100mL (90 mLs Intravenous $Given 2/22/24 0037)     Labs Ordered and Resulted from Time of ED Arrival to Time of ED Departure   COMPREHENSIVE METABOLIC PANEL - Abnormal       Result Value    Sodium 139      Potassium 4.0      Carbon Dioxide (CO2) 25      Anion Gap 12      Urea Nitrogen 20.1 (*)     Creatinine 0.81      GFR Estimate >90      Calcium 8.9      Chloride 102      Glucose 220 (*)     Alkaline Phosphatase 99      AST 22      ALT 27      Protein Total 7.2      Albumin 4.0      Bilirubin Total 0.4     ROUTINE UA WITH MICROSCOPIC REFLEX TO CULTURE - Abnormal    Color Urine Yellow      Appearance Urine Slightly Cloudy (*)     Glucose Urine Negative      Bilirubin Urine Negative      Ketones Urine Trace (*)     Specific Gravity Urine 1.035      Blood Urine Negative      pH Urine 6.5      Protein Albumin Urine 20 (*)     Urobilinogen Urine Normal      Nitrite Urine Negative      Leukocyte Esterase Urine Trace (*)     Mucus Urine Present (*)     RBC Urine 2      WBC Urine 7 (*)     Squamous Epithelials Urine 10 (*)     Transitional Epithelials Urine <1     CBC WITH PLATELETS AND DIFFERENTIAL - Abnormal    WBC Count 12.5 (*)     RBC Count 4.69      Hemoglobin 12.7      Hematocrit 40.3      MCV 86      MCH 27.1      MCHC 31.5      RDW 13.9      Platelet Count 246      % Neutrophils 87      % Lymphocytes 7      % Monocytes 4      % Eosinophils 0      % Basophils 0      % Immature Granulocytes 2      NRBCs per 100 WBC 0       Absolute Neutrophils 10.9 (*)     Absolute Lymphocytes 0.9      Absolute Monocytes 0.5      Absolute Eosinophils 0.0      Absolute Basophils 0.0      Absolute Immature Granulocytes 0.2      Absolute NRBCs 0.0     D DIMER QUANTITATIVE - Abnormal    D-Dimer Quantitative 1.09 (*)    LIPASE - Normal    Lipase 25     HCG QUALITATIVE URINE - Normal    hCG Urine Qualitative Negative       US Abdomen Limited   Final Result   IMPRESSION:    1. Unremarkable appearance of the liver.   2. No biliary dilatation.   3. The gallbladder is not visualized.            CT Chest Pulmonary Embolism w Contrast   Final Result   IMPRESSION:   1.  No pulmonary embolus or other acute process within the chest.   2.  Hepatic steatosis, which may represent a source of right upper quadrant abdominal pain.             Assessment & Plan    Dionne Perez is a 39 year old female with history of developmental delay, MDD, hypothyroid, HLD, constipation, chronic abdominal pain, cholelithiasis who presents with abdominal pain mid-right side since 4pm today. Differential includes pancreatitis, cholecystitis, R lower lobe PE, hepatitis, exacerbation of chronic abdominal pain. Abdominal exam is limited due to patient's obesity however pt is non-toxic appearing, afebrile, and HR wnl. Lipase wnl so unlikely pancreatitis. CMP with glucose of 220, otherwise wnl and normal liver enzymes so unlikely hepatitis or electrolyte disturbance. CBC with slightly elevated WBC of 12.5 so cholecystitis a possibility. Obtained RUQ US which results were pending at the time of signout.     Pt has had persistent symptoms of shortness of breath and chest pain and was seen in ED yesterday which was attributed to COVID infection however need to consider PE as patient has low mobility, and prior DVT. Given SpO2 94% on RA, can't rule out PE by PERC score so obtained D-dimer which was elevated; CT PE study is also pending at the time of signout    Reevaluation pending at the time  of signout      I have reviewed the nursing notes. I have reviewed the findings, diagnosis, plan and need for follow up with the patient.    Discharge Medication List as of 2/22/2024 12:16 PM          Final diagnoses:   Abdominal pain, right upper quadrant   COVID-19   Sarah Lopez, MS4      [facility] EMERGENCY DEPARTMENT  2/21/2024    --    ED Attending Physician Attestation    I Eric Lake MD, cared for this patient with the Medical Student. I performed, or re-performed, the physical exam and medical decision-making. I have verified the accuracy of all the medical student findings and documentation above, and have edited as necessary.    Summary of HPI, PE, ED Course   Patient is a 39 year old female evaluated in the emergency department for right upper quadrant pain which is somewhat chronic nature for her along with shortness of breath.  Of note patient was evaluated yesterday for chest pain and shortness of breath she does frequently see medical care and trying to we did review outside medical records.k workup yesterday was negative with normal troponin and EKG however no PE study was performed at that time.  With her recent COVID test and history of blood clot concern that she likely is higher risk for PE so will evaluate this.  She otherwise normal vital signs, nontoxic-appearing, benign abdomen and normal physical exam..  At the time of signout patient is awaiting her right upper quadrant results though laboratory studies are reassuring.  Also awaiting PE results.  She was signed out to Dr. Whalen who will follow-up on this.    Critical Care & Procedures  Not applicable.        Medical Decision Making  The patient's presentation was of moderate complexity (an acute complicated injury).    The patient's evaluation involved:  review of external note(s) from 3+ sources (see separate area of note for details)  review of 3+ test result(s) ordered prior to this encounter (see separate area of  note for details)  strong consideration of a test (see separate area of note for details) that was ultimately deferred  ordering and/or review of 3+ test(s) in this encounter (see separate area of note for details)    The patient's management necessitated further care after sign-out to [facility] (see their note for further management).          Eric Lake MD  Emergency Medicine        LakeEric Winter MD  02/27/24 2922

## 2024-02-22 NOTE — ED NOTES
The patient was accepted at shift change signout pending ultrasound of the right upper quadrant and chest CT as well as DEC assessment.  Ultrasound the right upper quadrant showed an unremarkable appearance of the liver without biliary dilatation.  The gallbladder was not visualized.  We did do a chart review and see that she has had her gallbladder removed.  Chest CT showed no PE or other acute process in the chest but did show some hepatic steatosis.  She is pending a DEC assessment at this time.  She will be signed out at shift change pending this.    Dictation Disclaimer: Some of this Note has been completed with voice-recognition dictation software. Although errors are generally corrected real-time, there is the potential for a rare error to be present in the completed chart.       Sarah Bonds MD  02/22/24 1121

## 2024-02-22 NOTE — ED TRIAGE NOTES
Triage Assessment (Adult)       Row Name 02/21/24 4652          Triage Assessment    Airway WDL WDL        Respiratory WDL    Respiratory WDL WDL;X;all  pt reports SOB        Skin Circulation/Temperature WDL    Skin Circulation/Temperature WDL WDL        Cardiac WDL    Cardiac WDL WDL        Peripheral/Neurovascular WDL    Peripheral Neurovascular WDL WDL        Cognitive/Neuro/Behavioral WDL    Cognitive/Neuro/Behavioral WDL X     Mood/Behavior agitated        [place] Coma Scale    Best Eye Response 4-->(E4) spontaneous     Best Motor Response 6-->(M6) obeys commands     Best Verbal Response 5-->(V5) oriented     [place] Coma Scale Score 15

## 2024-02-22 NOTE — ED NOTES
Bed: ED15  Expected date:   Expected time:   Means of arrival:   Comments:  A 646 ETA 10 mins  39 F SIB SI abd pain 10/10  From Group home  VSS Calm, coop.

## 2024-02-23 ENCOUNTER — HOSPITAL ENCOUNTER (EMERGENCY)
Facility: CLINIC | Age: 39
Discharge: HOME | End: 2024-02-24
Attending: INTERNAL MEDICINE | Admitting: INTERNAL MEDICINE
Payer: MEDICARE

## 2024-02-23 ENCOUNTER — APPOINTMENT (OUTPATIENT)
Dept: CT IMAGING | Facility: CLINIC | Age: 39
End: 2024-02-23
Attending: INTERNAL MEDICINE
Payer: MEDICARE

## 2024-02-23 VITALS
OXYGEN SATURATION: 95 % | SYSTOLIC BLOOD PRESSURE: 114 MMHG | DIASTOLIC BLOOD PRESSURE: 60 MMHG | HEART RATE: 50 BPM | RESPIRATION RATE: 20 BRPM | TEMPERATURE: 98.7 F

## 2024-02-23 VITALS
HEART RATE: 49 BPM | SYSTOLIC BLOOD PRESSURE: 128 MMHG | OXYGEN SATURATION: 96 % | TEMPERATURE: 97.3 F | DIASTOLIC BLOOD PRESSURE: 75 MMHG | RESPIRATION RATE: 18 BRPM

## 2024-02-23 DIAGNOSIS — R10.84 ABDOMINAL PAIN, GENERALIZED: ICD-10-CM

## 2024-02-23 LAB
ALBUMIN SERPL BCG-MCNC: 3.9 G/DL (ref 3.5–5.2)
ALBUMIN UR-MCNC: NEGATIVE MG/DL
ALP SERPL-CCNC: 96 U/L (ref 40–150)
ALT SERPL W P-5'-P-CCNC: 25 U/L (ref 0–50)
ANION GAP SERPL CALCULATED.3IONS-SCNC: 9 MMOL/L (ref 7–15)
APPEARANCE UR: CLEAR
AST SERPL W P-5'-P-CCNC: 24 U/L (ref 0–45)
BASOPHILS # BLD AUTO: 0.1 10E3/UL (ref 0–0.2)
BASOPHILS NFR BLD AUTO: 0 %
BILIRUB SERPL-MCNC: 0.5 MG/DL
BILIRUB UR QL STRIP: NEGATIVE
BUN SERPL-MCNC: 23.7 MG/DL (ref 6–20)
CALCIUM SERPL-MCNC: 8.4 MG/DL (ref 8.6–10)
CHLORIDE SERPL-SCNC: 97 MMOL/L (ref 98–107)
COLOR UR AUTO: ABNORMAL
CREAT SERPL-MCNC: 0.98 MG/DL (ref 0.51–0.95)
CRP SERPL-MCNC: 3.13 MG/L
DEPRECATED HCO3 PLAS-SCNC: 31 MMOL/L (ref 22–29)
EGFRCR SERPLBLD CKD-EPI 2021: 75 ML/MIN/1.73M2
EOSINOPHIL # BLD AUTO: 0.1 10E3/UL (ref 0–0.7)
EOSINOPHIL NFR BLD AUTO: 1 %
ERYTHROCYTE [DISTWIDTH] IN BLOOD BY AUTOMATED COUNT: 14.3 % (ref 10–15)
ERYTHROCYTE [SEDIMENTATION RATE] IN BLOOD BY WESTERGREN METHOD: 23 MM/HR (ref 0–20)
GLUCOSE SERPL-MCNC: 87 MG/DL (ref 70–99)
GLUCOSE UR STRIP-MCNC: NEGATIVE MG/DL
HCG SERPL QL: NEGATIVE
HCO3 BLDV-SCNC: 31 MMOL/L (ref 21–28)
HCT VFR BLD AUTO: 41.7 % (ref 35–47)
HGB BLD-MCNC: 12.8 G/DL (ref 11.7–15.7)
HGB UR QL STRIP: NEGATIVE
IMM GRANULOCYTES # BLD: 0.1 10E3/UL
IMM GRANULOCYTES NFR BLD: 1 %
INR PPP: 0.98 (ref 0.85–1.15)
KETONES UR STRIP-MCNC: NEGATIVE MG/DL
LACTATE BLD-SCNC: 1.1 MMOL/L
LEUKOCYTE ESTERASE UR QL STRIP: ABNORMAL
LIPASE SERPL-CCNC: 58 U/L (ref 13–60)
LYMPHOCYTES # BLD AUTO: 2.7 10E3/UL (ref 0.8–5.3)
LYMPHOCYTES NFR BLD AUTO: 23 %
MCH RBC QN AUTO: 26.8 PG (ref 26.5–33)
MCHC RBC AUTO-ENTMCNC: 30.7 G/DL (ref 31.5–36.5)
MCV RBC AUTO: 87 FL (ref 78–100)
MONOCYTES # BLD AUTO: 0.8 10E3/UL (ref 0–1.3)
MONOCYTES NFR BLD AUTO: 6 %
MUCOUS THREADS #/AREA URNS LPF: PRESENT /LPF
NEUTROPHILS # BLD AUTO: 8.2 10E3/UL (ref 1.6–8.3)
NEUTROPHILS NFR BLD AUTO: 69 %
NITRATE UR QL: NEGATIVE
NRBC # BLD AUTO: 0 10E3/UL
NRBC BLD AUTO-RTO: 0 /100
PCO2 BLDV: 54 MM HG (ref 40–50)
PH BLDV: 7.37 [PH] (ref 7.32–7.43)
PH UR STRIP: 6 [PH] (ref 5–7)
PLATELET # BLD AUTO: 253 10E3/UL (ref 150–450)
PO2 BLDV: 32 MM HG (ref 25–47)
POTASSIUM SERPL-SCNC: 4.4 MMOL/L (ref 3.4–5.3)
PROCALCITONIN SERPL IA-MCNC: 0.03 NG/ML
PROT SERPL-MCNC: 7.1 G/DL (ref 6.4–8.3)
RBC # BLD AUTO: 4.77 10E6/UL (ref 3.8–5.2)
RBC URINE: 1 /HPF
SAO2 % BLDV: 58 % (ref 70–75)
SODIUM SERPL-SCNC: 137 MMOL/L (ref 135–145)
SP GR UR STRIP: 1.02 (ref 1–1.03)
SQUAMOUS EPITHELIAL: 3 /HPF
UROBILINOGEN UR STRIP-MCNC: NORMAL MG/DL
WBC # BLD AUTO: 11.9 10E3/UL (ref 4–11)
WBC URINE: 4 /HPF

## 2024-02-23 PROCEDURE — 99284 EMERGENCY DEPT VISIT MOD MDM: CPT | Performed by: INTERNAL MEDICINE

## 2024-02-23 PROCEDURE — 84703 CHORIONIC GONADOTROPIN ASSAY: CPT | Performed by: INTERNAL MEDICINE

## 2024-02-23 PROCEDURE — 82803 BLOOD GASES ANY COMBINATION: CPT

## 2024-02-23 PROCEDURE — 250N000009 HC RX 250: Performed by: EMERGENCY MEDICINE

## 2024-02-23 PROCEDURE — 84145 PROCALCITONIN (PCT): CPT | Performed by: INTERNAL MEDICINE

## 2024-02-23 PROCEDURE — 85652 RBC SED RATE AUTOMATED: CPT | Performed by: INTERNAL MEDICINE

## 2024-02-23 PROCEDURE — 250N000013 HC RX MED GY IP 250 OP 250 PS 637: Performed by: INTERNAL MEDICINE

## 2024-02-23 PROCEDURE — 250N000009 HC RX 250: Performed by: INTERNAL MEDICINE

## 2024-02-23 PROCEDURE — 86140 C-REACTIVE PROTEIN: CPT | Performed by: INTERNAL MEDICINE

## 2024-02-23 PROCEDURE — 85025 COMPLETE CBC W/AUTO DIFF WBC: CPT | Performed by: INTERNAL MEDICINE

## 2024-02-23 PROCEDURE — 250N000011 HC RX IP 250 OP 636: Performed by: INTERNAL MEDICINE

## 2024-02-23 PROCEDURE — 250N000013 HC RX MED GY IP 250 OP 250 PS 637: Performed by: EMERGENCY MEDICINE

## 2024-02-23 PROCEDURE — 82040 ASSAY OF SERUM ALBUMIN: CPT | Performed by: INTERNAL MEDICINE

## 2024-02-23 PROCEDURE — 74177 CT ABD & PELVIS W/CONTRAST: CPT | Mod: MG

## 2024-02-23 PROCEDURE — 83690 ASSAY OF LIPASE: CPT | Performed by: INTERNAL MEDICINE

## 2024-02-23 PROCEDURE — 81001 URINALYSIS AUTO W/SCOPE: CPT | Performed by: INTERNAL MEDICINE

## 2024-02-23 PROCEDURE — 85610 PROTHROMBIN TIME: CPT | Performed by: INTERNAL MEDICINE

## 2024-02-23 PROCEDURE — 99285 EMERGENCY DEPT VISIT HI MDM: CPT | Mod: 25 | Performed by: INTERNAL MEDICINE

## 2024-02-23 PROCEDURE — 36415 COLL VENOUS BLD VENIPUNCTURE: CPT | Performed by: INTERNAL MEDICINE

## 2024-02-23 RX ORDER — MAGNESIUM HYDROXIDE/ALUMINUM HYDROXICE/SIMETHICONE 120; 1200; 1200 MG/30ML; MG/30ML; MG/30ML
15 SUSPENSION ORAL ONCE
Status: COMPLETED | OUTPATIENT
Start: 2024-02-23 | End: 2024-02-23

## 2024-02-23 RX ORDER — MAGNESIUM HYDROXIDE/ALUMINUM HYDROXICE/SIMETHICONE 120; 1200; 1200 MG/30ML; MG/30ML; MG/30ML
30 SUSPENSION ORAL ONCE
Status: COMPLETED | OUTPATIENT
Start: 2024-02-23 | End: 2024-02-23

## 2024-02-23 RX ORDER — LIDOCAINE HYDROCHLORIDE 20 MG/ML
10 SOLUTION OROPHARYNGEAL ONCE
Status: COMPLETED | OUTPATIENT
Start: 2024-02-23 | End: 2024-02-23

## 2024-02-23 RX ORDER — ALUMINA, MAGNESIA, AND SIMETHICONE 2400; 2400; 240 MG/30ML; MG/30ML; MG/30ML
30 SUSPENSION ORAL EVERY 4 HOURS PRN
Qty: 355 ML | Refills: 0 | Status: SHIPPED | OUTPATIENT
Start: 2024-02-23 | End: 2024-09-10

## 2024-02-23 RX ORDER — IOPAMIDOL 755 MG/ML
100 INJECTION, SOLUTION INTRAVASCULAR ONCE
Status: COMPLETED | OUTPATIENT
Start: 2024-02-23 | End: 2024-02-23

## 2024-02-23 RX ORDER — ALUMINA, MAGNESIA, AND SIMETHICONE 2400; 2400; 240 MG/30ML; MG/30ML; MG/30ML
30 SUSPENSION ORAL EVERY 4 HOURS PRN
Qty: 355 ML | Refills: 0 | Status: SHIPPED | OUTPATIENT
Start: 2024-02-23

## 2024-02-23 RX ADMIN — SODIUM CHLORIDE 74 ML: 9 INJECTION, SOLUTION INTRAVENOUS at 21:20

## 2024-02-23 RX ADMIN — ALUMINUM HYDROXIDE, MAGNESIUM HYDROXIDE, AND DIMETHICONE 15 ML: 200; 20; 200 SUSPENSION ORAL at 00:53

## 2024-02-23 RX ADMIN — LIDOCAINE HYDROCHLORIDE 10 ML: 20 SOLUTION ORAL at 00:53

## 2024-02-23 RX ADMIN — ALUMINUM HYDROXIDE, MAGNESIUM HYDROXIDE, AND DIMETHICONE 30 ML: 200; 20; 200 SUSPENSION ORAL at 18:48

## 2024-02-23 RX ADMIN — IOPAMIDOL 135 ML: 755 INJECTION, SOLUTION INTRAVENOUS at 21:20

## 2024-02-23 ASSESSMENT — ACTIVITIES OF DAILY LIVING (ADL)
ADLS_ACUITY_SCORE: 38

## 2024-02-23 NOTE — CONSULTS
"Diagnostic Evaluation Consultation  Crisis Assessment    Patient Name: Dionne Perez  Age:  39 year old  Legal Sex: female  Gender Identity: female  Pronouns:   Race: White  Ethnicity: Not  or   Language: English      Patient was assessed: In person      Patient location: [facility] EMERGENCY DEPARTMENT                             URE-E    Referral Data and Chief Complaint  Dionne Perez presents to the ED via EMS. Patient is presenting to the ED for the following concerns: Worsening psychosocial stress, Suicidal ideation.   Factors that make the mental health crisis life threatening or complex are:  Pt presents to [facility] ED via EMS from her group home. Pt had called 911 herself reporting ear pain and indigestion. En route, pt had reported SI. At the time of assessment, pt reports to [facility] that she is here mainly for SI. She reports she has been feeling this way for \"a while.\" She reports she is not sure of any significant recent stressors, though notes she does not like her group home. She reports a plan to hit herself with her hand, and has thoughts to hit others. She is agreeable to not doing this while in the ED and at her group home. She is not sure what her group home staff could do to make her more comfortable or to help. She reports feeling supported by one of the individuals who works in the office at the group home and her guardian. She is not sure what would be helpful tonight. She is able to identify a number of positive coping skills including playing Elpidio (she reports staff won't play with her, but the other residents will), watching TV, painting and spending time outside. Pt has been at her current group home since October, and has been having difficulty adjusting to this. Reviewed patient's safety plan and discussed return to the group home, which pt expressed understanding with this plan.    Informed Consent and Assessment Methods  Explained the crisis assessment process, " including applicable information disclosures and limits to confidentiality, assessed understanding of the process, and obtained consent to proceed with the assessment.  Assessment methods included conducting a formal interview with patient, review of medical records, collaboration with medical staff, and obtaining relevant collateral information from family and community providers when available.  : done     Patient response to interventions: acceptance expressed, verbalizes understanding  Coping skills were attempted to reduce the crisis:  Pt called 911. Able to engage in assessment and answer questions. Able to identify a number of activities and coping skills that are helpful for them including playing Elpidio and other board games, watching TV, spending time outdoors, and painting.     History of the Crisis   Patient is a 38-year-old female with a history of intellectual disability, anxiety, SIB and suicidal ideation. Pt has a hx of frequent ED visits and has an established ED care plan in place due to over utilizing emergency services when experiencing ongoing stress at her group home. Pt has  group home staffing, a legal guardian, and medication management in place. Pt's parents are . She has 3 sisters.    Brief Psychosocial History  Family:  Single, Children no  Support System:  Sibling(s), Other (specify) (friends)  Employment Status:  disabled  Source of Income:  disability  Financial Environmental Concerns:  none  Current Hobbies:  meditation, music, social media/computer activities, television/movies/videos, arts/crafts  Barriers in Personal Life:  cognitive limitations    Significant Clinical History  Current Anxiety Symptoms:   (none)  Current Depression/Trauma:   (none)  Current Somatic Symptoms:   (none)  Current Psychosis/Thought Disturbance:   (none)  Current Eating Symptoms:   (no change reported)  Chemical Use History:  Alcohol: None  Benzodiazepines: None  Opiates: None  Cocaine:  None  Marijuana: None  Other Use: None  Withdrawal Symptoms:  (none reported)  Addictions:  (none reported)   Past diagnosis:  Depression, Other (intellectual disability)  Family history:  No known history of mental health or chemical health concerns  Past treatment:  Individual therapy, Case management, Psychiatric Medication Management, Inpatient Hospitalization, Supportive Living Environment (group home, custodial house, etc)  Details of most recent treatment:  Patient lives in a group home through [facility]. Patient arrived in 10/2023. Patient has legal guardian. Patient is currently working with a psychiatrist.  Other relevant history:  Pt's parents are . pt has three sisters.    Collateral Information  Is there collateral information: Yes     Collateral information name, relationship, phone number:   staff member, [phone] and patient's guardian Rodolfo Baldwin, [phone]    What happened today: Discussed patient's presentation to the ED with group home and pattern of calling 911 and frequently visiting the ED.  staff report no concerns and agree it would be best for patient to return to the group home. LVM for guardian informing of safe discharge plan back to .     What is different about patient's functioning: No change, pt has a pattern of calling 911 and visiting the ED     Has patient made comments about wanting to kill themselves/others: no    If d/c is recommended, can they take part in safety/aftercare planning:  yes     Risk Assessment  [place] Suicide Severity Rating Scale Full Clinical Version:  Suicidal Ideation  Q6 Suicide Behavior (Lifetime): no    [place] Suicide Severity Rating Scale Recent:   Suicidal Ideation (Recent)  Q1 Wished to be Dead (Past Month): yes  Q2 Suicidal Thoughts (Past Month): yes  Q3 Suicidal Thought Method: no  Q4 Suicidal Intent without Specific Plan: no  Q5 Suicide Intent with Specific Plan: no  Level of Risk per Screen: low risk  Intensity of Ideation  (Recent)  Most Severe Ideation Rating (Past 1 Month): 3  Frequency (Past 1 Month): Once a week  Duration (Past 1 Month): Fleeting, few seconds or minutes  Controllability (Past 1 Month): Easily able to control thoughts  Deterrents (Past 1 Month): Does not apply  Reasons for Ideation (Past 1 Month): Does not apply  Suicidal Behavior (Recent)  Actual Attempt (Past 3 Months): No  Total Number of Actual Attempts (Past 3 Months): 0  Has subject engaged in non-suicidal self-injurious behavior? (Past 3 Months): No  Interrupted Attempts (Past 3 Months): No  Total Number of Interrupted Attempts (Past 3 Months): 0  Aborted or Self-Interrupted Attempt (Past 3 Months): No  Total Number of Aborted or Self-Interrupted Attempts (Past 3 Months): 0  Preparatory Acts or Behavior (Past 3 Months): No  Total Number of Preparatory Acts (Past 3 Months): 0    Environmental or Psychosocial Events: impulsivity/recklessness  Protective Factors: Protective Factors: lives in a responsibly safe and stable environment, good treatment engagement, able to access care without barriers, supportive ongoing medical and mental health care relationships, help seeking    Does the patient have thoughts of harming others? Feels Like Hurting Others: no  Previous Attempt to Hurt Others: no  Current presentation:  (calm and cooperative)  Is the patient engaging in sexually inappropriate behavior?: no    Is the patient engaging in sexually inappropriate behavior?  no        Mental Status Exam   Affect: Blunted  Appearance: Appropriate  Attention Span/Concentration: Attentive  Eye Contact: Engaged    Fund of Knowledge: Delayed   Language /Speech Content: Fluent  Language /Speech Volume: Normal  Language /Speech Rate/Productions: Normal  Recent Memory: Variable  Remote Memory: Variable  Mood: Normal  Orientation to Person: Yes   Orientation to Place: Yes  Orientation to Time of Day: Yes  Orientation to Date: Yes     Situation (Do they understand why they are  here?): Yes  Psychomotor Behavior: Normal  Thought Content: Clear  Thought Form: Intact     Medication  Psychotropic medications:   No current facility-administered medications for this encounter.     Current Outpatient Medications   Medication    ADVAIR -21 MCG/ACT inhaler    albuterol (PROAIR HFA/PROVENTIL HFA/VENTOLIN HFA) 108 (90 Base) MCG/ACT inhaler    Ascorbic Acid (VITAMIN C) POWD    aspirin (ASA) 325 MG EC tablet    busPIRone (BUSPAR) 15 MG tablet    clindamycin (CLEOCIN T) 1 % external lotion    diclofenac (VOLTAREN) 1 % topical gel    docusate sodium (COLACE) 100 MG capsule    escitalopram (LEXAPRO) 20 MG tablet    fluticasone (FLONASE) 50 MCG/ACT nasal spray    ipratropium - albuterol 0.5 mg/2.5 mg/3 mL (DUONEB) 0.5-2.5 (3) MG/3ML neb solution    levothyroxine (SYNTHROID/LEVOTHROID) 50 MCG tablet    melatonin 3 MG tablet    nystatin (MYCOSTATIN) 282239 UNIT/GM external cream    Pediatric Multivit-Minerals (GUMMY VITAMINS & MINERALS) chewable tablet    pravastatin (PRAVACHOL) 40 MG tablet    risperiDONE (RISPERDAL) 0.25 MG tablet    traZODone (DESYREL) 50 MG tablet      Current Care Team  Patient Care Team:  Clinic, [facility] as PCP - General  Rocco Mccormack MD as MD (Otolaryngology)  Joe Sutherland MD as MD (Critical Care)  Tyra Hoffmann MD as MD (OB/Gyn)    Diagnosis  Patient Active Problem List   Diagnosis Code    Altered mental status R41.82    Agitation R45.1    Aggression R46.89    Abnormal behavior R46.89    Acute costochondritis M94.0    Adjustment disorder, unspecified F43.20    Adjustment reaction with aggression F43.29    Behavior problem, adult F69    Cholelithiasis K80.20    Chronic abdominal pain R10.9, G89.29    Cognitive impairment R41.89    Constipation K59.00    Contusion of left hip S70.02XA    Developmental disability F89    Essential hypertension I10    Gallstone pancreatitis K85.10    ROSY (generalized anxiety disorder) F41.1    Hyperlipemia E78.5     Hypothyroidism due to acquired atrophy of thyroid E03.4    Severe episode of recurrent major depressive disorder, without psychotic features (H) F33.2    Insomnia G47.00    Major neurocognitive disorder (H) F03.90    Morbid obesity (H) E66.01    Recurrent major depressive disorder (H24) F33.9    Sinus bradycardia, chronic R00.1    Suicidal ideations R45.851    Syncope R55    Dyspnea R06.00    Acquired hammer toes of both feet M20.41, M20.42    Anemia, iron deficiency D50.9    Callus L84    Health care home, active care coordination Z78.9    History of cellulitis Z87.2    No-show for appointment Z91.199    Pre-syncope R55    Primary osteoarthritis of left knee M17.12    Scalp irritation R23.8    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    Moderate intellectual disabilities F71    Major depressive disorder F32.9    Intellectual disability F79       Primary Problem This Admission  Active Hospital Problems    Intellectual disability      *Adjustment disorder, unspecified        Clinical Summary and Substantiation of Recommendations   After therapeutic assessment, intervention and aftercare planning by ED care team and [facility] and in consultation with attending provider, the patient's circumstances and mental state were appropriate for pt to return to her group home and continue following up with established outpatient management. Pt called 911 on her own. She was seen and assessed by DEC yesterday evening for similar concerns. Pt has a pattern of seeking out the ED in reaction to stress and not liking her group home. Pt would not benefit from IP MH and would likely reinforce maladaptive behaviors. Pt has an established safety plan and is agreeable to following it. She is able to identify a number of positive coping skills she can use in the community. Pt's group home is agreeable to accepting her back, where she has 24/7 staffing and no access to means to harm herself. [facility] left a voicemail for pt's guardian to inform him of  pt's visit to the ED and of safe discharge plan back to pt's group home.                          Patient coping skills attempted to reduce the crisis:  Pt called 911. Able to engage in assessment and answer questions. Able to identify a number of activities and coping skills that are helpful for them including playing Elpidio and other board games, watching TV, spending time outdoors, and painting.    Disposition  Recommended disposition: Group Home        Reviewed case and recommendations with attending provider. Attending Name: Dr. Virgie Blake       Attending concurs with disposition: yes       Patient and/or validated legal guardian concurs with disposition:   yes       Final disposition:  discharge    Assessment Details   Total duration spent with the patient: 32 min     CPT code(s) utilized: 24525 - Psychotherapy for Crisis - 60 (30-74*) min    KOLTON Heard, Psychotherapist  DEC - Triage & Transition Services  Callback: [phone]

## 2024-02-23 NOTE — ED NOTES
Plan is for patient to discharge back to her group home this evening. She will need transportation back.  address: [address], [place] [address]. Group home staff are ready to accept the patient back [phone]. Informed pt's legal guardian, Rodolfo Baldwin, [phone] of pt's visit and discharge plan.

## 2024-02-23 NOTE — ED TRIAGE NOTES
Pt called 911 to group home complaining of sharp ear pain and chest pain. Pt made suicidal statements to EMS in route to hospital. Pt states discomfort started today late afternoon. Pt stated feelings of hurting self and others. Agreed to not hurt others and self while at hospital.              Triage Assessment (Adult)       Row Name 02/22/24 2009          Triage Assessment    Airway WDL WDL        Respiratory WDL    Respiratory WDL WDL        Skin Circulation/Temperature WDL    Skin Circulation/Temperature WDL WDL        Cardiac WDL    Cardiac WDL WDL        Peripheral/Neurovascular WDL    Peripheral Neurovascular WDL WDL        Cognitive/Neuro/Behavioral WDL    Cognitive/Neuro/Behavioral WDL X     Mood/Behavior calm

## 2024-02-23 NOTE — ED NOTES
EMS arrived to  patient, when asked if she has any pain. Pt endorses generalized chest and abdominal pain. MD was informed and was ordered GI cocktail.

## 2024-02-23 NOTE — ED PROVIDER NOTES
[facility] EMERGENCY DEPARTMENT ([facility])    2/22/24      ED PROVIDER NOTE   History     Chief Complaint   Patient presents with    Ear Problem     Pt called 911 to group home complaining of ear pain and indigestion.     Suicidal     Pt made suicidal statements to EMS in route to hospital.      DUGLAS Perez is a 39 year old female group home resident with history of intellectual disability, chronic poor frustration tolerance, inappropriate usage of the ER who presents to the Emergency Department complaining of ear pain and indigestion.  This is her eighth visit to the emergency department this month; she was just diagnosed with COVID-19 and RSV on 2/18/2024.  She then presented to the emergency department last night reporting right upper quadrant abdominal pain.  She underwent workup including CBC, CMP, UA, D-dimer, CT chest PE protocol.  She was noted to have hepatic steatosis.  She subsequently had a right upper quadrant abdominal ultrasound which was normal.  She was discharged back to her group home at roughly 12:38 PM today.  Patient returns to the ER today reporting indigestion and ear pain.  She also made suicidal statements to EMS en route to the hospital.     Today the patient called 911 for ear pain and indigestion and then when en route said they were suicidal.  They says they don't like their group home because they won't play games with them. She says she is suicidal and her plan is to hit someone else or herself.  She has a legal guardian.  These behaviors are chronic.  Group home staff is comfortable with her return.  She has a , therapist and med provider.       Physical Exam   BP: 129/83  Pulse: 51  Temp: 97.3  F (36.3  C)  Resp: 16  SpO2: 96 %  Physical Exam  Vitals and nursing note reviewed.   Constitutional:       Appearance: She is obese.      Comments: Lying on the bed relaxing.    HENT:      Head: Normocephalic and atraumatic.      Right Ear: Tympanic membrane,  ear canal and external ear normal.      Left Ear: Tympanic membrane, ear canal and external ear normal.      Nose: Nose normal.   Eyes:      Extraocular Movements: Extraocular movements intact.   Cardiovascular:      Rate and Rhythm: Normal rate.   Pulmonary:      Effort: Pulmonary effort is normal.   Musculoskeletal:         General: No signs of injury.      Cervical back: Normal range of motion.   Skin:     Coloration: Skin is not jaundiced or pale.      Findings: No bruising.   Neurological:      General: No focal deficit present.      Mental Status: She is alert and oriented to person, place, and time.   Psychiatric:         Attention and Perception: Attention and perception normal.         Mood and Affect: Mood and affect normal.         Speech: Speech normal.         Behavior: Behavior is cooperative.         Thought Content: Thought content is not paranoid or delusional. Thought content includes suicidal ideation. Thought content does not include homicidal ideation. Thought content includes suicidal (hit self or others) plan. Thought content does not include homicidal plan.         Judgment: Judgment is impulsive and inappropriate.         ED Course, Procedures, & Data      Procedures                     Medications - No data to display  Labs Ordered and Resulted from Time of ED Arrival to Time of ED Departure - No data to display  No orders to display          Critical care was not performed.     Medical Decision Making  The patient's presentation was of moderate complexity (a chronic illness mild to moderate exacerbation, progression, or side effect of treatment).    The patient's evaluation involved:  review of external note(s) from 1 sources (most recent ed visit)  discussion of management or test interpretation with another health professional (dec )    The patient's management necessitated high risk (a decision regarding hospitalization).    Assessment & Plan    Dionne Perez is a 39 year old  female group home resident with history of intellectual disability, chronic poor frustration tolerance, inappropriate usage of the ER who presents to the Emergency Department complaining of ear pain and indigestion. She called 911.  When en route she added suicidal thoughts to her complaints.  She was just diagnosed with covid and symptoms are consistent with than.  She says she is suicidal because she doesn't like her group home and they don't play with her.  Dec assessment done and patient is at baseline. She has jaqueline, med management and .  Recommend discharge back to group [place].  Group home staff feel comfortable with her return. Please see dec assessment for further details.     I have reviewed the nursing notes. I have reviewed the findings, diagnosis, plan and need for follow up with the patient.    New Prescriptions    No medications on file       Final diagnoses:   Adjustment disorder, unspecified type   Intellectual disability       Virgie Blake MD  [facility] EMERGENCY DEPARTMENT  2/22/2024     Virgie Blake MD  02/22/24 [id number]

## 2024-02-24 LAB
BACTERIA UR CULT: ABNORMAL
BACTERIA UR CULT: ABNORMAL

## 2024-02-24 ASSESSMENT — ACTIVITIES OF DAILY LIVING (ADL): ADLS_ACUITY_SCORE: 38

## 2024-02-24 NOTE — ED PROVIDER NOTES
[facility] EMERGENCY DEPARTMENT ([facility])    2/23/24      ED PROVIDER NOTE      History     Chief Complaint   Patient presents with    Abdominal Pain     Pt start feeling abdominal pain at 2 pm     HPI  Dionne Perez is a 39 year old female with a past medical history significant for intellectual disability 2/2 major neurocognitive disorder, gallstone pancreatitis, cholelithiasis (with gallbladder still in place), GERD, and frequent ER visits (with care plan in place) who presents to the ED from her group home for evaluation of abdominal pain.  Patient states that she has been experiencing generalized abdominal pain since around 2 PM.  The pain is not localized to any specific area.  She was able to eat lunch today.  States that she has experienced a similar pain in the past before, but is unsure of what the problem had been.  Her bowel movements have been regular, the most recent being earlier today.  There has been no change in her medications.  Denies diarrhea, vomiting, or recent fevers. Of note, this is patient's ninth visit to the ED as of this month.    Past Medical History  Past Medical History:   Diagnosis Date    Anxiety     Asthma     Cholelithiasis     Cognitive impairment     Depressive disorder     Gastroesophageal reflux disease     Hypercholesterolemia     Hypothyroidism     Obesity     Subglottic stenosis      Past Surgical History:   Procedure Laterality Date    BRONCHOSCOPY FLEXIBLE AND RIGID N/A 6/6/2023    Procedure: Flexible Bronchoscopy, Laser Resection and Balloon Dilation;  Surgeon: Laxmi Cline MD;  Location: [facility] OR    IR LUMBAR PUNCTURE  06/09/2020    LASER CO2 BRONCHOSCOPY N/A 8/4/2023    Procedure: Flexible bronchoscopy theraputic airway inspection, CO2 laser on standby;  Surgeon: Joe Sutherland MD;  Location: [facility] OR    TONSILLECTOMY       alum & mag hydroxide-simethicone (MAALOX MULTI SYMPTOM MAX ST) 400-400-40 MG/5ML SUSP suspension  alum & mag  hydroxide-simethicone (MAALOX MULTI SYMPTOM MAX ST) 400-400-40 MG/5ML SUSP suspension  ADVAIR -21 MCG/ACT inhaler  albuterol (PROAIR HFA/PROVENTIL HFA/VENTOLIN HFA) 108 (90 Base) MCG/ACT inhaler  Ascorbic Acid (VITAMIN C) POWD  aspirin (ASA) 325 MG EC tablet  busPIRone (BUSPAR) 15 MG tablet  clindamycin (CLEOCIN T) 1 % external lotion  diclofenac (VOLTAREN) 1 % topical gel  docusate sodium (COLACE) 100 MG capsule  escitalopram (LEXAPRO) 20 MG tablet  fluticasone (FLONASE) 50 MCG/ACT nasal spray  ipratropium - albuterol 0.5 mg/2.5 mg/3 mL (DUONEB) 0.5-2.5 (3) MG/3ML neb solution  levothyroxine (SYNTHROID/LEVOTHROID) 50 MCG tablet  melatonin 3 MG tablet  nystatin (MYCOSTATIN) 512503 UNIT/GM external cream  Pediatric Multivit-Minerals (GUMMY VITAMINS & MINERALS) chewable tablet  pravastatin (PRAVACHOL) 40 MG tablet  risperiDONE (RISPERDAL) 0.25 MG tablet  traZODone (DESYREL) 50 MG tablet      Allergies   Allergen Reactions    Ibuprofen      Family History  No family history on file.  Social History   Social History     Tobacco Use    Smoking status: Never     Passive exposure: Never    Smokeless tobacco: Never   Substance Use Topics    Alcohol use: No    Drug use: No      Past medical history, past surgical history, medications, allergies, family history, and social history were reviewed with the patient. No additional pertinent items.      A medically appropriate review of systems was performed with pertinent positives and negatives noted in the HPI, and all other systems negative.    Physical Exam   BP: 114/60  Pulse: 50  Temp: 98.7  F (37.1  C)  Resp: 20  SpO2: 95 %  Physical Exam  Vitals and nursing note reviewed.   Constitutional:       General: She is not in acute distress.     Appearance: Normal appearance. She is not diaphoretic.   HENT:      Head: Normocephalic and atraumatic.      Mouth/Throat:      Mouth: Mucous membranes are moist.   Eyes:      General: No scleral icterus.     Extraocular Movements:  Extraocular movements intact.      Conjunctiva/sclera: Conjunctivae normal.   Cardiovascular:      Rate and Rhythm: Normal rate and regular rhythm.      Heart sounds: Normal heart sounds. No murmur heard.     No friction rub. No gallop.   Pulmonary:      Effort: Pulmonary effort is normal. No respiratory distress.      Breath sounds: Normal breath sounds. No stridor. No wheezing, rhonchi or rales.   Chest:      Chest wall: No tenderness.   Abdominal:      General: Abdomen is flat.      Palpations: Abdomen is soft.      Tenderness: There is generalized abdominal tenderness. There is no right CVA tenderness, left CVA tenderness, guarding or rebound. Negative signs include López's sign, Rovsing's sign, McBurney's sign, psoas sign and obturator sign.      Hernia: No hernia is present.       Musculoskeletal:         General: No tenderness. Normal range of motion.      Cervical back: Normal range of motion and neck supple.   Skin:     General: Skin is warm.      Findings: No rash.   Neurological:      Mental Status: She is alert.         ED Course, Procedures, & Data    6:25 PM  The patient was seen and examined by Juarez Rice Md   in Room ED19.     Procedures                   Results for orders placed or performed during the hospital encounter of 02/23/24   CT Abdomen Pelvis w Contrast     Status: None    Narrative    EXAM: CT ABDOMEN PELVIS W CONTRAST  LOCATION: [facility]  DATE: 2/23/2024    INDICATION: Abdominal pain.  COMPARISON: Abdominal ultrasound 02/22/2024. CT abdomen pelvis 08/15/2022.  TECHNIQUE: CT scan of the abdomen and pelvis was performed following injection of IV contrast. Multiplanar reformats were obtained. Dose reduction techniques were used.  CONTRAST: 135mL Isovue 370    FINDINGS:     LOWER CHEST: Small calcified granulomas at the right lung base.    HEPATOBILIARY: Status post cholecystectomy. No biliary ductal dilation. No liver  lesions.    PANCREAS: Normal.    SPLEEN: Mild splenomegaly measuring 14.3 cm. Small accessory splenule at the splenic hilum.    ADRENAL GLANDS: Normal.    KIDNEYS/BLADDER: Normal.    BOWEL: No bowel inflammation. Cecum is located within the pelvis to the left of the midline, without evidence of obstruction or volvulus. Normal appendix. Small focus of remote fat necrosis within the left upper quadrant mesentery is unchanged.    LYMPH NODES: Normal.    VASCULATURE: Unremarkable.    PELVIC ORGANS: Atrophic uterus. No adnexal mass.    MUSCULOSKELETAL: Tiny fat-containing periumbilical hernia. No acute bony abnormalities.      Impression    IMPRESSION:     1.  No acute abnormality or specific cause of abdominal pain are identified.    2.  Mild splenomegaly.   INR     Status: Normal   Result Value Ref Range    INR 0.98 0.85 - 1.15   Comprehensive metabolic panel     Status: Abnormal   Result Value Ref Range    Sodium 137 135 - 145 mmol/L    Potassium 4.4 3.4 - 5.3 mmol/L    Carbon Dioxide (CO2) 31 (H) 22 - 29 mmol/L    Anion Gap 9 7 - 15 mmol/L    Urea Nitrogen 23.7 (H) 6.0 - 20.0 mg/dL    Creatinine 0.98 (H) 0.51 - 0.95 mg/dL    GFR Estimate 75 >60 mL/min/1.73m2    Calcium 8.4 (L) 8.6 - 10.0 mg/dL    Chloride 97 (L) 98 - 107 mmol/L    Glucose 87 70 - 99 mg/dL    Alkaline Phosphatase 96 40 - 150 U/L    AST 24 0 - 45 U/L    ALT 25 0 - 50 U/L    Protein Total 7.1 6.4 - 8.3 g/dL    Albumin 3.9 3.5 - 5.2 g/dL    Bilirubin Total 0.5 <=1.2 mg/dL   Lipase     Status: Normal   Result Value Ref Range    Lipase 58 13 - 60 U/L   CRP inflammation     Status: Normal   Result Value Ref Range    CRP Inflammation 3.13 <5.00 mg/L   Erythrocyte sedimentation rate auto     Status: Abnormal   Result Value Ref Range    Erythrocyte Sedimentation Rate 23 (H) 0 - 20 mm/hr   Procalcitonin     Status: Normal   Result Value Ref Range    Procalcitonin 0.03 <0.50 ng/mL   UA with Microscopic reflex to Culture     Status: Abnormal    Specimen: Urine,  Midstream   Result Value Ref Range    Color Urine Light Yellow Colorless, Straw, Light Yellow, Yellow    Appearance Urine Clear Clear    Glucose Urine Negative Negative mg/dL    Bilirubin Urine Negative Negative    Ketones Urine Negative Negative mg/dL    Specific Gravity Urine 1.018 1.003 - 1.035    Blood Urine Negative Negative    pH Urine 6.0 5.0 - 7.0    Protein Albumin Urine Negative Negative mg/dL    Urobilinogen Urine Normal Normal, 2.0 mg/dL    Nitrite Urine Negative Negative    Leukocyte Esterase Urine Trace (A) Negative    Mucus Urine Present (A) None Seen /LPF    RBC Urine 1 <=2 /HPF    WBC Urine 4 <=5 /HPF    Squamous Epithelials Urine 3 (H) <=1 /HPF    Narrative    Urine Culture not indicated   HCG qualitative Blood     Status: Normal   Result Value Ref Range    hCG Serum Qualitative Negative Negative   CBC with platelets and differential     Status: Abnormal   Result Value Ref Range    WBC Count 11.9 (H) 4.0 - 11.0 10e3/uL    RBC Count 4.77 3.80 - 5.20 10e6/uL    Hemoglobin 12.8 11.7 - 15.7 g/dL    Hematocrit 41.7 35.0 - 47.0 %    MCV 87 78 - 100 fL    MCH 26.8 26.5 - 33.0 pg    MCHC 30.7 (L) 31.5 - 36.5 g/dL    RDW 14.3 10.0 - 15.0 %    Platelet Count 253 150 - 450 10e3/uL    % Neutrophils 69 %    % Lymphocytes 23 %    % Monocytes 6 %    % Eosinophils 1 %    % Basophils 0 %    % Immature Granulocytes 1 %    NRBCs per 100 WBC 0 <1 /100    Absolute Neutrophils 8.2 1.6 - 8.3 10e3/uL    Absolute Lymphocytes 2.7 0.8 - 5.3 10e3/uL    Absolute Monocytes 0.8 0.0 - 1.3 10e3/uL    Absolute Eosinophils 0.1 0.0 - 0.7 10e3/uL    Absolute Basophils 0.1 0.0 - 0.2 10e3/uL    Absolute Immature Granulocytes 0.1 <=0.4 10e3/uL    Absolute NRBCs 0.0 10e3/uL   iStat Gases (lactate) venous, POCT     Status: Abnormal   Result Value Ref Range    Lactic Acid POCT 1.1 <=2.0 mmol/L    Bicarbonate Venous POCT 31 (H) 21 - 28 mmol/L    O2 Sat, Venous POCT 58 (L) 70 - 75 %    pCO2 Venous POCT 54 (H) 40 - 50 mm Hg    pH Venous POCT  7.37 7.32 - 7.43    pO2 Venous POCT 32 25 - 47 mm Hg   CBC with platelets differential     Status: Abnormal    Narrative    The following orders were created for panel order CBC with platelets differential.  Procedure                               Abnormality         Status                     ---------                               -----------         ------                     CBC with platelets and d...[[id number]]  Abnormal            Final result                 Please view results for these tests on the individual orders.     Medications   alum & mag hydroxide-simethicone (MAALOX) suspension 30 mL (30 mLs Oral $Given 2/23/24 1848)   iopamidol (ISOVUE-370) solution 100 mL (135 mLs Intravenous $Given 2/23/24 2120)   sodium chloride 0.9 % bag 100mL (74 mLs Intravenous $Given 2/23/24 2120)     Labs Ordered and Resulted from Time of ED Arrival to Time of ED Departure   COMPREHENSIVE METABOLIC PANEL - Abnormal       Result Value    Sodium 137      Potassium 4.4      Carbon Dioxide (CO2) 31 (*)     Anion Gap 9      Urea Nitrogen 23.7 (*)     Creatinine 0.98 (*)     GFR Estimate 75      Calcium 8.4 (*)     Chloride 97 (*)     Glucose 87      Alkaline Phosphatase 96      AST 24      ALT 25      Protein Total 7.1      Albumin 3.9      Bilirubin Total 0.5     ERYTHROCYTE SEDIMENTATION RATE AUTO - Abnormal    Erythrocyte Sedimentation Rate 23 (*)    ROUTINE UA WITH MICROSCOPIC REFLEX TO CULTURE - Abnormal    Color Urine Light Yellow      Appearance Urine Clear      Glucose Urine Negative      Bilirubin Urine Negative      Ketones Urine Negative      Specific Gravity Urine 1.018      Blood Urine Negative      pH Urine 6.0      Protein Albumin Urine Negative      Urobilinogen Urine Normal      Nitrite Urine Negative      Leukocyte Esterase Urine Trace (*)     Mucus Urine Present (*)     RBC Urine 1      WBC Urine 4      Squamous Epithelials Urine 3 (*)    CBC WITH PLATELETS AND DIFFERENTIAL - Abnormal    WBC Count 11.9 (*)      RBC Count 4.77      Hemoglobin 12.8      Hematocrit 41.7      MCV 87      MCH 26.8      MCHC 30.7 (*)     RDW 14.3      Platelet Count 253      % Neutrophils 69      % Lymphocytes 23      % Monocytes 6      % Eosinophils 1      % Basophils 0      % Immature Granulocytes 1      NRBCs per 100 WBC 0      Absolute Neutrophils 8.2      Absolute Lymphocytes 2.7      Absolute Monocytes 0.8      Absolute Eosinophils 0.1      Absolute Basophils 0.1      Absolute Immature Granulocytes 0.1      Absolute NRBCs 0.0     ISTAT GASES LACTATE VENOUS POCT - Abnormal    Lactic Acid POCT 1.1      Bicarbonate Venous POCT 31 (*)     O2 Sat, Venous POCT 58 (*)     pCO2 Venous POCT 54 (*)     pH Venous POCT 7.37      pO2 Venous POCT 32     INR - Normal    INR 0.98     LIPASE - Normal    Lipase 58     CRP INFLAMMATION - Normal    CRP Inflammation 3.13     PROCALCITONIN - Normal    Procalcitonin 0.03     HCG QUALITATIVE PREGNANCY - Normal    hCG Serum Qualitative Negative       CT Abdomen Pelvis w Contrast   Final Result   IMPRESSION:       1.  No acute abnormality or specific cause of abdominal pain are identified.      2.  Mild splenomegaly.             Critical care was not performed.     Medical Decision Making  The patient's presentation was of moderate complexity (an acute illness with systemic symptoms).    The patient's evaluation involved:  ordering and/or review of 3+ test(s) in this encounter (see separate area of note for details)    The patient's management necessitated moderate risk (prescription drug management including medications given in the ED).    Assessment & Plan    Recurrent generalized abd pain of unclear etiology but labs including inflammatory markers and urine CT all without acute pathologies, improving with maalox, tolerating po here, will discharge with maalox and follow up with her PMD.    I have reviewed the nursing notes. I have reviewed the findings, diagnosis, plan and need for follow up with the  patient.    New Prescriptions    ALUM & MAG HYDROXIDE-SIMETHICONE (MAALOX MULTI SYMPTOM MAX ST) 400-400-40 MG/5ML SUSP SUSPENSION    Take 30 mLs by mouth every 4 hours as needed for indigestion or other (gas pain)    ALUM & MAG HYDROXIDE-SIMETHICONE (MAALOX MULTI SYMPTOM MAX ST) 400-400-40 MG/5ML SUSP SUSPENSION    Take 30 mLs by mouth every 4 hours as needed       Final diagnoses:   Abdominal pain, generalized       Juarez Rice Md  [facility] EMERGENCY DEPARTMENT  2/23/2024     Juarez Rice MD  02/23/24 [id number]

## 2024-02-24 NOTE — ED TRIAGE NOTES
Pt state she was in this ED last night and went home, start feeling stomach pain around 2 pm. Came to ED with EMS

## 2024-02-24 NOTE — ED NOTES
Writer was able to reach Group home staff Román at [phone].  Group home address:  [address], [place] [address] .     Valeriano was called at [phone] and left a VM at 10:48 PM.    Legal guardian: Rodolfo Baldwin, [phone] left a VM at 10:50 PM.    Dr Beasley was informed that [facility] uses a different Pharmacy and chart has been updated to reflect that.  [facility]  [address] [place] [id number]

## 2024-02-24 NOTE — RESULT ENCOUNTER NOTE
Final urine culture report is negative.  Adult Negative Urine culture parameters per protocol: Any # Urogenital single or mixed organism, <10,000 col/ml single organism (cath/midstream), and > 3 organisms (No susceptibilities performed).  [facility] Emergency Dept discharge antibiotic prescribed (If applicable): No  Treatment recommendations per [facility] ED Lab Result Urine Culture protocol.

## 2024-03-07 ENCOUNTER — HOSPITAL ENCOUNTER (OUTPATIENT)
Facility: CLINIC | Age: 39
Setting detail: OBSERVATION
Discharge: HOME | End: 2024-03-08
Attending: EMERGENCY MEDICINE | Admitting: EMERGENCY MEDICINE
Payer: MEDICARE

## 2024-03-07 DIAGNOSIS — F41.9 ANXIETY: ICD-10-CM

## 2024-03-07 PROBLEM — F71 MODERATE INTELLECTUAL DISABILITIES: Status: ACTIVE | Noted: 2023-11-20

## 2024-03-07 PROCEDURE — 99285 EMERGENCY DEPT VISIT HI MDM: CPT | Mod: 25 | Performed by: EMERGENCY MEDICINE

## 2024-03-07 PROCEDURE — 250N000009 HC RX 250: Performed by: EMERGENCY MEDICINE

## 2024-03-07 PROCEDURE — 99223 1ST HOSP IP/OBS HIGH 75: CPT | Mod: AI | Performed by: EMERGENCY MEDICINE

## 2024-03-07 PROCEDURE — 250N000013 HC RX MED GY IP 250 OP 250 PS 637: Performed by: EMERGENCY MEDICINE

## 2024-03-07 PROCEDURE — G0378 HOSPITAL OBSERVATION PER HR: HCPCS

## 2024-03-07 RX ORDER — ACETAMINOPHEN 325 MG/1
975 TABLET ORAL ONCE
Status: COMPLETED | OUTPATIENT
Start: 2024-03-07 | End: 2024-03-07

## 2024-03-07 RX ORDER — IPRATROPIUM BROMIDE AND ALBUTEROL SULFATE 2.5; .5 MG/3ML; MG/3ML
3 SOLUTION RESPIRATORY (INHALATION) ONCE
Status: COMPLETED | OUTPATIENT
Start: 2024-03-07 | End: 2024-03-07

## 2024-03-07 RX ADMIN — ACETAMINOPHEN 975 MG: 325 TABLET, FILM COATED ORAL at 22:12

## 2024-03-07 RX ADMIN — IPRATROPIUM BROMIDE AND ALBUTEROL SULFATE 3 ML: .5; 3 SOLUTION RESPIRATORY (INHALATION) at 18:52

## 2024-03-07 ASSESSMENT — ACTIVITIES OF DAILY LIVING (ADL)
ADLS_ACUITY_SCORE: 38

## 2024-03-08 VITALS
HEART RATE: 50 BPM | TEMPERATURE: 98.2 F | OXYGEN SATURATION: 96 % | SYSTOLIC BLOOD PRESSURE: 124 MMHG | DIASTOLIC BLOOD PRESSURE: 77 MMHG | RESPIRATION RATE: 16 BRPM

## 2024-03-08 PROCEDURE — G0378 HOSPITAL OBSERVATION PER HR: HCPCS

## 2024-03-08 PROCEDURE — 250N000013 HC RX MED GY IP 250 OP 250 PS 637: Performed by: EMERGENCY MEDICINE

## 2024-03-08 RX ORDER — OLANZAPINE 5 MG/1
5 TABLET, ORALLY DISINTEGRATING ORAL ONCE
Status: COMPLETED | OUTPATIENT
Start: 2024-03-08 | End: 2024-03-08

## 2024-03-08 RX ORDER — RISPERIDONE 0.25 MG/1
0.25 TABLET ORAL DAILY
Status: DISCONTINUED | OUTPATIENT
Start: 2024-03-08 | End: 2024-03-08 | Stop reason: HOSPADM

## 2024-03-08 RX ADMIN — OLANZAPINE 5 MG: 5 TABLET, ORALLY DISINTEGRATING ORAL at 06:18

## 2024-03-08 RX ADMIN — RISPERIDONE 0.25 MG: 0.25 TABLET, FILM COATED ORAL at 08:02

## 2024-03-08 ASSESSMENT — ACTIVITIES OF DAILY LIVING (ADL)
ADLS_ACUITY_SCORE: 38

## 2024-03-08 NOTE — ED NOTES
ED Observation Discharge Summary  [facility]  Discharge Date: 3/8/2024    Dionne Perez MRN: [id number]   Age: 39 year old YOB: 1985     Brief HPI & Initial ED Course     Chief Complaint   Patient presents with    Shortness of Breath     Pt hx of asthma, used inhaler 4 times today. Argument with roommate- felt increased SOB after argument.       HPI  Dionne Perez is a 39 year old female with PMH notable for subglottic stenosis with hx of dilation, cognitive impairment who presented to the ED with a psychiatric concern. Had SOB that was triggered by an argument. Improved with duoneb and time to calm. 2/2 to anxiety attack in setting of hx of subglottic stenosis.      The patient was evaluated in the emergency department by a physician and DEC behavioral health . The DEC  recommended discharge back to group home. See separate DEC note from this encounter for details on the assessment. The patient's psychiatric state was such that she would benefit from ongoing monitoring. Observation care was initiated with the plan including serial assessments of psychiatric condition, potential administration of medications if indicated, and further disposition pending the patient's psychiatric course during the monitoring period.     See ED Observation H&P for further details on the patient's presenting history and initial evaluation.     Physical Exam   BP: 122/78  Pulse: 76  Temp: 98.8  F (37.1  C)  Resp: 18  SpO2: 100 %    Physical Exam  General: Appears stated age.   Eyes: normal sclerae   Cardio: Regular rate, extremities well perfused  Resp: Normal work of breathing, normal respiratory rate  Neuro: alert and  oriented. Grossly normal strength and sensation in all extremities.   MSK: no deformities.   Integumentary/Skin: no rash visualized, normal color  Psych:  calm behavior. No SI. No HI. No hallucinations.    Results      Procedures       Labs Ordered and  Resulted from Time of ED Arrival to Time of ED Departure - No data to display         Observation Course   The patient was found to have a psychiatric condition that would benefit from an observation stay in the emergency department for further psychiatric stabilization and/or coordination of a safe disposition. The plan upon observation admission included serial assessments of psychiatric condition, potential administration of medications if indicated, further disposition pending the patient's psychiatric course during the monitoring period.     Serial assessments of the patient's psychiatric condition were performed. Nursing notes were reviewed. During the observation period, the patient  require medications for agitation, and  require restraints/seclusion for patient and/or provider safety.      After the period in observation care, the patient's circumstances and mental state were safe for outpatient management. After counseling on the diagnosis, work-up, and treatment plan, the patient was discharged. Close follow-up with outpatient providers recommended and community psychiatric resources were provided. Patient is to return to the ED if any urgent or potentially life-threatening concerns.      Discharge Diagnoses:   Final diagnoses:   Anxiety       --  Rand Rivera MD  [facility] EMERGENCY DEPARTMENT  3/8/2024      Rand Rivera MD  03/08/24 0739       Rand Rivera MD  03/08/24 0742

## 2024-03-08 NOTE — DISCHARGE INSTRUCTIONS
Thank you for coming to the [facility] Emergency Department.     Continue medications without change.     Please follow up with your Primary Care provider in the next 1-2 weeks.

## 2024-03-08 NOTE — ED NOTES
Writer received a call from Legal Guardian: Rodolfo Fielder [phone] who stated he was returning Gwen's call. Writer explained Gwen had left for the night and disposition is discharge to group home. Guardian agreed with this. Writer asked if Rodolfo is able to contact group home as group home is not answering our calls. Rodolfo stated they don't answer his calls either. Writer informed Rodolfo that patient will be in ED until we can verify group [place] has staff that can accept patient back.

## 2024-03-08 NOTE — ED NOTES
Writer spoke with LANA Lamb supervisor [phone]. Adonis stated patient can return to group home after 8am. Writer transferred Adonis to RN to figure out transportation details.

## 2024-03-08 NOTE — ED NOTES
Adonis from [facility] called about patient. Requesting discharge information be faxed to the facility/nurse     Patient is able to come back but they don't have anyone to come  the patient. EMS will be set up as patient has guardian/vulnerable.

## 2024-03-08 NOTE — ED TRIAGE NOTES
Pt hx of asthma, used inhaler 4 times today. Argument with roommate- felt increased SOB after argument.
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
## 2024-03-08 NOTE — PLAN OF CARE
Dionne Perez  March 7, 2024  Plan of Care Hand-off Note     Patient Care Path: discharge    Plan for Care:   Recommend pt return to therapy and add daily fresh air/ exercise for stress management.    Identified Goals and Safety Issues: Pt has reduced her anxiety level, is calm in the ED.  Able to agree to plan to avoid housemate she had conflict with earlier today.    Overview:  Legal Guardian: Rodolfo Baldwin [phone]  / [facility] [phone]  left message : Adonis [phone] (left messge at 9pm)     Legal Status: Legal Status at Admission: Voluntary/Patient has signed consent for treatment, Guardian/ad litum (left message for guardian)    Psychiatry Consult: na       Updated Dr Lake regarding plan of care and plan for discharge once GH returns call to arrange transport home.           Gwen Osman, DEANSW
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
## 2024-03-08 NOTE — ED NOTES
Bed: ED03  Expected date: 3/7/24  Expected time: 6:15 PM  Means of arrival:   Comments:  Alex 646: 38 y/o, F, from [facility], SOB, anxiety

## 2024-03-08 NOTE — ED NOTES
Writer called [facility] [phone] to see if they have staffing available to take patient back. Staff at [facility] stated they don't have enough staff for the night to take patient back. MD notified.

## 2024-03-16 ENCOUNTER — HOSPITAL ENCOUNTER (EMERGENCY)
Facility: CLINIC | Age: 39
Discharge: HOME | End: 2024-03-17
Attending: EMERGENCY MEDICINE | Admitting: EMERGENCY MEDICINE
Payer: MEDICARE

## 2024-03-16 DIAGNOSIS — R42 LIGHTHEADEDNESS: ICD-10-CM

## 2024-03-16 LAB
ANION GAP SERPL CALCULATED.3IONS-SCNC: 8 MMOL/L (ref 7–15)
BASOPHILS # BLD AUTO: 0 10E3/UL (ref 0–0.2)
BASOPHILS NFR BLD AUTO: 0 %
BUN SERPL-MCNC: 17.8 MG/DL (ref 6–20)
CALCIUM SERPL-MCNC: 9.1 MG/DL (ref 8.6–10)
CHLORIDE SERPL-SCNC: 104 MMOL/L (ref 98–107)
CREAT SERPL-MCNC: 0.88 MG/DL (ref 0.51–0.95)
DEPRECATED HCO3 PLAS-SCNC: 27 MMOL/L (ref 22–29)
EGFRCR SERPLBLD CKD-EPI 2021: 86 ML/MIN/1.73M2
EOSINOPHIL # BLD AUTO: 0.1 10E3/UL (ref 0–0.7)
EOSINOPHIL NFR BLD AUTO: 2 %
ERYTHROCYTE [DISTWIDTH] IN BLOOD BY AUTOMATED COUNT: 14.3 % (ref 10–15)
GLUCOSE SERPL-MCNC: 98 MG/DL (ref 70–99)
HCT VFR BLD AUTO: 39.5 % (ref 35–47)
HGB BLD-MCNC: 12 G/DL (ref 11.7–15.7)
HOLD SPECIMEN: NORMAL
IMM GRANULOCYTES # BLD: 0.1 10E3/UL
IMM GRANULOCYTES NFR BLD: 1 %
LYMPHOCYTES # BLD AUTO: 1.7 10E3/UL (ref 0.8–5.3)
LYMPHOCYTES NFR BLD AUTO: 23 %
MCH RBC QN AUTO: 26.8 PG (ref 26.5–33)
MCHC RBC AUTO-ENTMCNC: 30.4 G/DL (ref 31.5–36.5)
MCV RBC AUTO: 88 FL (ref 78–100)
MONOCYTES # BLD AUTO: 0.3 10E3/UL (ref 0–1.3)
MONOCYTES NFR BLD AUTO: 4 %
NEUTROPHILS # BLD AUTO: 5.3 10E3/UL (ref 1.6–8.3)
NEUTROPHILS NFR BLD AUTO: 70 %
NRBC # BLD AUTO: 0 10E3/UL
NRBC BLD AUTO-RTO: 0 /100
PLATELET # BLD AUTO: 198 10E3/UL (ref 150–450)
POTASSIUM SERPL-SCNC: 4.5 MMOL/L (ref 3.4–5.3)
RBC # BLD AUTO: 4.47 10E6/UL (ref 3.8–5.2)
SODIUM SERPL-SCNC: 139 MMOL/L (ref 135–145)
TROPONIN T SERPL HS-MCNC: <6 NG/L
WBC # BLD AUTO: 7.5 10E3/UL (ref 4–11)

## 2024-03-16 PROCEDURE — 80048 BASIC METABOLIC PNL TOTAL CA: CPT | Performed by: EMERGENCY MEDICINE

## 2024-03-16 PROCEDURE — 84484 ASSAY OF TROPONIN QUANT: CPT | Performed by: EMERGENCY MEDICINE

## 2024-03-16 PROCEDURE — 99284 EMERGENCY DEPT VISIT MOD MDM: CPT | Mod: 25 | Performed by: EMERGENCY MEDICINE

## 2024-03-16 PROCEDURE — 36415 COLL VENOUS BLD VENIPUNCTURE: CPT | Performed by: EMERGENCY MEDICINE

## 2024-03-16 PROCEDURE — 85004 AUTOMATED DIFF WBC COUNT: CPT | Performed by: EMERGENCY MEDICINE

## 2024-03-16 PROCEDURE — 99284 EMERGENCY DEPT VISIT MOD MDM: CPT | Mod: 27 | Performed by: EMERGENCY MEDICINE

## 2024-03-16 PROCEDURE — 93005 ELECTROCARDIOGRAM TRACING: CPT | Performed by: EMERGENCY MEDICINE

## 2024-03-16 PROCEDURE — 93010 ELECTROCARDIOGRAM REPORT: CPT | Performed by: EMERGENCY MEDICINE

## 2024-03-16 RX ORDER — MAGNESIUM HYDROXIDE/ALUMINUM HYDROXICE/SIMETHICONE 120; 1200; 1200 MG/30ML; MG/30ML; MG/30ML
15 SUSPENSION ORAL ONCE
Status: COMPLETED | OUTPATIENT
Start: 2024-03-16 | End: 2024-03-17

## 2024-03-16 ASSESSMENT — COLUMBIA-SUICIDE SEVERITY RATING SCALE - C-SSRS
6. HAVE YOU EVER DONE ANYTHING, STARTED TO DO ANYTHING, OR PREPARED TO DO ANYTHING TO END YOUR LIFE?: NO
2. HAVE YOU ACTUALLY HAD ANY THOUGHTS OF KILLING YOURSELF IN THE PAST MONTH?: YES
BASED ON RESPONSES TO C-SSRS QS 1-6, WHAT IS THE PATIENT'S OVERALL RISK RATING FOR SUICIDE: HIGH RISK
4. HAVE YOU HAD THESE THOUGHTS AND HAD SOME INTENTION OF ACTING ON THEM?: NO
5. HAVE YOU STARTED TO WORK OUT OR WORKED OUT THE DETAILS OF HOW TO KILL YOURSELF? DO YOU INTEND TO CARRY OUT THIS PLAN?: YES
3. HAVE YOU BEEN THINKING ABOUT HOW YOU MIGHT KILL YOURSELF?: NO
2. HAVE YOU ACTUALLY HAD ANY THOUGHTS OF KILLING YOURSELF IN THE PAST MONTH?: NO
1. IN THE PAST MONTH, HAVE YOU WISHED YOU WERE DEAD OR WISHED YOU COULD GO TO SLEEP AND NOT WAKE UP?: NO
6. HAVE YOU EVER DONE ANYTHING, STARTED TO DO ANYTHING, OR PREPARED TO DO ANYTHING TO END YOUR LIFE?: YES
1. IN THE PAST MONTH, HAVE YOU WISHED YOU WERE DEAD OR WISHED YOU COULD GO TO SLEEP AND NOT WAKE UP?: NO

## 2024-03-16 ASSESSMENT — ACTIVITIES OF DAILY LIVING (ADL)
ADLS_ACUITY_SCORE: 38

## 2024-03-16 NOTE — ED TRIAGE NOTES
"Triage Assessment & Note:    /75   Pulse 63   Temp 98.2  F (36.8  C) (Oral)   Resp 18   Ht 1.753 m (5' 9\")   Wt (!) 197.3 kg (435 lb)   SpO2 97%   BMI 64.24 kg/m      Patient presents with: PT BIBA for dizziness and CP. PT reports her dizziness started today. PT reports CP that is sharp without radiation or SOB or diaphoresis. PT from a group home with a guardian.     Home Treatments/Remedies: None    Febrile / Afebrile? Afebrile     Duration of C/o:  8 hours    Ang Cano RN  March 16, 2024       Triage Assessment (Adult)       Row Name 03/16/24 6243          Triage Assessment    Airway WDL WDL        Respiratory WDL    Respiratory WDL WDL        Cardiac WDL    Cardiac WDL chest pain        Chest Pain Assessment    Chest Pain Location substernal     Precipitating Factors nothing     Alleviating Factors nothing                     "

## 2024-03-16 NOTE — ED PROVIDER NOTES
[place] EMERGENCY DEPARTMENT ([facility])    3/16/24       ED PROVIDER NOTE   History     Chief Complaint   Patient presents with    Dizziness     HPI  Dionne Perez is a 39 year old female with a past medical history of severe cognitive impairment, depression, suicidal ideations, agitation and behavioral problems who presents to the ED via EMS for evaluation of light headedness.     She reports feeling light headed all the time starting today. She does not know whether she has any medication for this. She reports having similar symptoms in the past and having been seen before for this. No associated chest pain.     She presents to this ED and other ED's frequently for various complaints. She has a care plan in place. She has baseline behavioral health issues. There is suspicion for secondary gain and her calling EMS due to stress reactions from her group home. The patient does admit she is upset that she did not get dinner at her group home today, but unable to explain what happened. She feels safe at home.     Past Medical History  Past Medical History:   Diagnosis Date    Anxiety     Asthma     Cholelithiasis     Cognitive impairment     Depressive disorder     Gastroesophageal reflux disease     Hypercholesterolemia     Hypothyroidism     Obesity     Subglottic stenosis      Past Surgical History:   Procedure Laterality Date    BRONCHOSCOPY FLEXIBLE AND RIGID N/A 6/6/2023    Procedure: Flexible Bronchoscopy, Laser Resection and Balloon Dilation;  Surgeon: Laxmi Cline MD;  Location: [facility] OR    IR LUMBAR PUNCTURE  06/09/2020    LASER CO2 BRONCHOSCOPY N/A 8/4/2023    Procedure: Flexible bronchoscopy theraputic airway inspection, CO2 laser on standby;  Surgeon: Joe Sutherland MD;  Location: [facility] OR    TONSILLECTOMY       ADVAIR -21 MCG/ACT inhaler  albuterol (PROAIR HFA/PROVENTIL HFA/VENTOLIN HFA) 108 (90 Base) MCG/ACT inhaler  alum & mag hydroxide-simethicone (MAALOX MULTI SYMPTOM MAX  "ST) 400-400-40 MG/5ML SUSP suspension  alum & mag hydroxide-simethicone (MAALOX MULTI SYMPTOM MAX ST) 400-400-40 MG/5ML SUSP suspension  Ascorbic Acid (VITAMIN C) POWD  aspirin (ASA) 325 MG EC tablet  busPIRone (BUSPAR) 15 MG tablet  clindamycin (CLEOCIN T) 1 % external lotion  diclofenac (VOLTAREN) 1 % topical gel  docusate sodium (COLACE) 100 MG capsule  escitalopram (LEXAPRO) 20 MG tablet  fluticasone (FLONASE) 50 MCG/ACT nasal spray  ipratropium - albuterol 0.5 mg/2.5 mg/3 mL (DUONEB) 0.5-2.5 (3) MG/3ML neb solution  levothyroxine (SYNTHROID/LEVOTHROID) 50 MCG tablet  melatonin 3 MG tablet  nystatin (MYCOSTATIN) 208302 UNIT/GM external cream  Pediatric Multivit-Minerals (GUMMY VITAMINS & MINERALS) chewable tablet  pravastatin (PRAVACHOL) 40 MG tablet  risperiDONE (RISPERDAL) 0.25 MG tablet  traZODone (DESYREL) 50 MG tablet      Allergies   Allergen Reactions    Ibuprofen      Family History  No family history on file.  Social History   Social History     Tobacco Use    Smoking status: Never     Passive exposure: Never    Smokeless tobacco: Never   Substance Use Topics    Alcohol use: No    Drug use: No         A medically appropriate review of systems was performed with pertinent positives and negatives noted in the HPI, and all other systems negative.    Physical Exam   BP: 104/75  Pulse: 63  Temp: 98.2  F (36.8  C)  Resp: 18  Height: 175.3 cm (5' 9\")  Weight: (!) 197.3 kg (435 lb)  SpO2: 97 %  Physical Exam  Constitutional:       General: She is not in acute distress.     Appearance: She is obese. She is not toxic-appearing.   HENT:      Head: Normocephalic and atraumatic.      Mouth/Throat:      Mouth: Mucous membranes are moist.   Eyes:      Extraocular Movements: Extraocular movements intact.      Pupils: Pupils are equal, round, and reactive to light.   Cardiovascular:      Rate and Rhythm: Normal rate and regular rhythm.   Pulmonary:      Effort: Pulmonary effort is normal. No respiratory distress.      " Breath sounds: No wheezing.   Musculoskeletal:         General: Normal range of motion.      Cervical back: Normal range of motion.   Skin:     General: Skin is warm and dry.   Neurological:      General: No focal deficit present.      Mental Status: She is alert and oriented to person, place, and time.       ED Course, Procedures, & Data      Procedures               Results for orders placed or performed during the hospital encounter of 03/16/24   Basic metabolic panel     Status: Normal   Result Value Ref Range    Sodium 139 135 - 145 mmol/L    Potassium 4.5 3.4 - 5.3 mmol/L    Chloride 104 98 - 107 mmol/L    Carbon Dioxide (CO2) 27 22 - 29 mmol/L    Anion Gap 8 7 - 15 mmol/L    Urea Nitrogen 17.8 6.0 - 20.0 mg/dL    Creatinine 0.88 0.51 - 0.95 mg/dL    GFR Estimate 86 >60 mL/min/1.73m2    Calcium 9.1 8.6 - 10.0 mg/dL    Glucose 98 70 - 99 mg/dL   Troponin T, High Sensitivity     Status: Normal   Result Value Ref Range    Troponin T, High Sensitivity <6 <=14 ng/L   [place] Draw     Status: None    Narrative    The following orders were created for panel order [place] Draw.  Procedure                               Abnormality         Status                     ---------                               -----------         ------                     Extra Blue Top Tube[[id number]]                              Final result               Extra Red Top Tube[[id number]]                                                          Extra Green Top (Lithium...[[id number]]                      Final result               Extra Purple Top Tube[[id number]]                            Final result                 Please view results for these tests on the individual orders.   CBC with platelets and differential     Status: Abnormal   Result Value Ref Range    WBC Count 7.5 4.0 - 11.0 10e3/uL    RBC Count 4.47 3.80 - 5.20 10e6/uL    Hemoglobin 12.0 11.7 - 15.7 g/dL    Hematocrit 39.5 35.0 - 47.0 %    MCV 88 78 - 100 fL    MCH 26.8 26.5 -  33.0 pg    MCHC 30.4 (L) 31.5 - 36.5 g/dL    RDW 14.3 10.0 - 15.0 %    Platelet Count 198 150 - 450 10e3/uL    % Neutrophils 70 %    % Lymphocytes 23 %    % Monocytes 4 %    % Eosinophils 2 %    % Basophils 0 %    % Immature Granulocytes 1 %    NRBCs per 100 WBC 0 <1 /100    Absolute Neutrophils 5.3 1.6 - 8.3 10e3/uL    Absolute Lymphocytes 1.7 0.8 - 5.3 10e3/uL    Absolute Monocytes 0.3 0.0 - 1.3 10e3/uL    Absolute Eosinophils 0.1 0.0 - 0.7 10e3/uL    Absolute Basophils 0.0 0.0 - 0.2 10e3/uL    Absolute Immature Granulocytes 0.1 <=0.4 10e3/uL    Absolute NRBCs 0.0 10e3/uL   Extra Blue Top Tube     Status: None   Result Value Ref Range    Hold Specimen JIC    Extra Green Top (Lithium Heparin) Tube     Status: None   Result Value Ref Range    Hold Specimen JIC    Extra Purple Top Tube     Status: None   Result Value Ref Range    Hold Specimen JIC    CBC with platelets differential     Status: Abnormal    Narrative    The following orders were created for panel order CBC with platelets differential.  Procedure                               Abnormality         Status                     ---------                               -----------         ------                     CBC with platelets and d...[[id number]]  Abnormal            Final result                 Please view results for these tests on the individual orders.     Medications - No data to display  Labs Ordered and Resulted from Time of ED Arrival to Time of ED Departure   CBC WITH PLATELETS AND DIFFERENTIAL - Abnormal       Result Value    WBC Count 7.5      RBC Count 4.47      Hemoglobin 12.0      Hematocrit 39.5      MCV 88      MCH 26.8      MCHC 30.4 (*)     RDW 14.3      Platelet Count 198      % Neutrophils 70      % Lymphocytes 23      % Monocytes 4      % Eosinophils 2      % Basophils 0      % Immature Granulocytes 1      NRBCs per 100 WBC 0      Absolute Neutrophils 5.3      Absolute Lymphocytes 1.7      Absolute Monocytes 0.3      Absolute  Eosinophils 0.1      Absolute Basophils 0.0      Absolute Immature Granulocytes 0.1      Absolute NRBCs 0.0     BASIC METABOLIC PANEL - Normal    Sodium 139      Potassium 4.5      Chloride 104      Carbon Dioxide (CO2) 27      Anion Gap 8      Urea Nitrogen 17.8      Creatinine 0.88      GFR Estimate 86      Calcium 9.1      Glucose 98     TROPONIN T, HIGH SENSITIVITY - Normal    Troponin T, High Sensitivity <6       No orders to display              Assessment & Plan    39 year old female with a past medical history of severe cognitive impairment, depression, chronic behavioral problems who presents to the ED via EMS for evaluation of light headedness.     Initial vitals all WNL. No focal findings on exam. She has extensive history for ED visitation, and has a care plan in place. Last time to visit ED was 3 days ago. She has a care plan in place for her frequent visits; there is concern for malingering and secondary gain. She did admit today to being upset related to an interaction at her group home just prior to presentation which is likely contributing.    CBC, BMP, troponin not suggestive of acute process.    The patient was resting comfortably on re-evaluation. She stated she felt well. Results were discussed with her and plan for discharge to home which she was agreeable to. She was stable for discharge.    I have reviewed the nursing notes. I have reviewed the findings, diagnosis, plan and need for follow up with the patient.    New Prescriptions    No medications on file       Final diagnoses:   Lightheadedness       Db Batista MD  [facility] EMERGENCY DEPARTMENT  3/16/2024     Jose Carlos Rivera MD  03/17/24 0039    --    ED Attending Physician Attestation    I Jose Carlos Rivera MD, cared for this patient with the Resident. I have performed a history and physical examination of the patient and discussed management with the resident. I reviewed the resident's documentation above and  agree with the documented findings and plan of care.    Summary of HPI, PE, ED Course   Patient is a 39 year old female evaluated in the emergency department for light headedness. She has frequent ED presentations for various complaints including chest pain, light headedness. There is a care plan for her in place due to concern for malingering. Exam and ED course notable for reassuring blood work. After the completion of care in the emergency department, the patient was discharged.    Critical Care & ProceduresCritical care was not performed.     Medical Decision Making  The patient's presentation was of high complexity (a chronic illness severe exacerbation, progression, or side effect of treatment).    The patient's evaluation involved:  review of external note(s) from 3+ sources (care plan, outside ED notes)  review of 3+ test result(s) ordered prior to this encounter (CT PE, troponin, cbc, cmp)  ordering and/or review of 3+ test(s) in this encounter (see separate area of note for details)    The patient's management necessitated moderate risk (prescription drug management including medications given in the ED).            Jose Carlos Rivera MD  Emergency Medicine        Jose Carlos Rivera MD  03/17/24 0040

## 2024-03-17 VITALS
WEIGHT: 293 LBS | HEART RATE: 68 BPM | OXYGEN SATURATION: 98 % | SYSTOLIC BLOOD PRESSURE: 118 MMHG | BODY MASS INDEX: 43.4 KG/M2 | DIASTOLIC BLOOD PRESSURE: 72 MMHG | HEIGHT: 69 IN | RESPIRATION RATE: 20 BRPM | TEMPERATURE: 98.2 F

## 2024-03-17 VITALS
DIASTOLIC BLOOD PRESSURE: 71 MMHG | OXYGEN SATURATION: 98 % | BODY MASS INDEX: 39.68 KG/M2 | HEART RATE: 51 BPM | TEMPERATURE: 97.6 F | HEIGHT: 72 IN | RESPIRATION RATE: 22 BRPM | SYSTOLIC BLOOD PRESSURE: 120 MMHG | WEIGHT: 293 LBS

## 2024-03-17 DIAGNOSIS — R10.9 FLANK PAIN: ICD-10-CM

## 2024-03-17 LAB
ALBUMIN UR-MCNC: NEGATIVE MG/DL
APPEARANCE UR: CLEAR
ATRIAL RATE - MUSE: 51 BPM
BILIRUB UR QL STRIP: NEGATIVE
COLOR UR AUTO: NORMAL
DIASTOLIC BLOOD PRESSURE - MUSE: NORMAL MMHG
GLUCOSE UR STRIP-MCNC: NEGATIVE MG/DL
HGB UR QL STRIP: NEGATIVE
INTERPRETATION ECG - MUSE: NORMAL
KETONES UR STRIP-MCNC: NEGATIVE MG/DL
LEUKOCYTE ESTERASE UR QL STRIP: NEGATIVE
NITRATE UR QL: NEGATIVE
P AXIS - MUSE: 29 DEGREES
PH UR STRIP: 6.5 [PH] (ref 5–7)
PR INTERVAL - MUSE: 108 MS
QRS DURATION - MUSE: 90 MS
QT - MUSE: 474 MS
QTC - MUSE: 436 MS
R AXIS - MUSE: 17 DEGREES
RBC URINE: <1 /HPF
SP GR UR STRIP: 1.02 (ref 1–1.03)
SQUAMOUS EPITHELIAL: 1 /HPF
SYSTOLIC BLOOD PRESSURE - MUSE: NORMAL MMHG
T AXIS - MUSE: 15 DEGREES
TRANSITIONAL EPI: <1 /HPF
UROBILINOGEN UR STRIP-MCNC: NORMAL MG/DL
VENTRICULAR RATE- MUSE: 51 BPM
WBC URINE: 4 /HPF

## 2024-03-17 PROCEDURE — 99283 EMERGENCY DEPT VISIT LOW MDM: CPT | Mod: 25 | Performed by: EMERGENCY MEDICINE

## 2024-03-17 PROCEDURE — 99285 EMERGENCY DEPT VISIT HI MDM: CPT | Performed by: EMERGENCY MEDICINE

## 2024-03-17 PROCEDURE — 93010 ELECTROCARDIOGRAM REPORT: CPT | Performed by: EMERGENCY MEDICINE

## 2024-03-17 PROCEDURE — 81001 URINALYSIS AUTO W/SCOPE: CPT | Performed by: EMERGENCY MEDICINE

## 2024-03-17 PROCEDURE — 93005 ELECTROCARDIOGRAM TRACING: CPT | Performed by: EMERGENCY MEDICINE

## 2024-03-17 ASSESSMENT — ACTIVITIES OF DAILY LIVING (ADL)
ADLS_ACUITY_SCORE: 38

## 2024-03-17 ASSESSMENT — COLUMBIA-SUICIDE SEVERITY RATING SCALE - C-SSRS
2. HAVE YOU ACTUALLY HAD ANY THOUGHTS OF KILLING YOURSELF IN THE PAST MONTH?: NO
6. HAVE YOU EVER DONE ANYTHING, STARTED TO DO ANYTHING, OR PREPARED TO DO ANYTHING TO END YOUR LIFE?: NO
5. HAVE YOU STARTED TO WORK OUT OR WORKED OUT THE DETAILS OF HOW TO KILL YOURSELF? DO YOU INTEND TO CARRY OUT THIS PLAN?: NO
4. HAVE YOU HAD THESE THOUGHTS AND HAD SOME INTENTION OF ACTING ON THEM?: NO
6. HAVE YOU EVER DONE ANYTHING, STARTED TO DO ANYTHING, OR PREPARED TO DO ANYTHING TO END YOUR LIFE?: NO
3. HAVE YOU BEEN THINKING ABOUT HOW YOU MIGHT KILL YOURSELF?: NO
1. IN THE PAST MONTH, HAVE YOU WISHED YOU WERE DEAD OR WISHED YOU COULD GO TO SLEEP AND NOT WAKE UP?: YES
2. HAVE YOU ACTUALLY HAD ANY THOUGHTS OF KILLING YOURSELF IN THE PAST MONTH?: NO
1. IN THE PAST MONTH, HAVE YOU WISHED YOU WERE DEAD OR WISHED YOU COULD GO TO SLEEP AND NOT WAKE UP?: YES

## 2024-03-17 NOTE — ED TRIAGE NOTES
"Patient BIBA for kidney pain and reports they want to \"hit themselves\" in triage, when asked if they want to end their own life, or commit suicide, patient says no, but does answer yes to whether they wished to be dead in last month. Patient reports staff at group home would not give her food today.     /86   Pulse 61   Temp 98.3  F (36.8  C) (Oral)   Resp 22   Ht 1.829 m (6')   Wt (!) 197.3 kg (435 lb)   SpO2 96%   BMI 59.00 kg/m      Bashir Martinez RN  3/17/2024       Triage Assessment (Adult)       Row Name 03/17/24 1638          Triage Assessment    Airway WDL WDL        Respiratory WDL    Respiratory WDL WDL        Skin Circulation/Temperature WDL    Skin Circulation/Temperature WDL WDL        Cardiac WDL    Cardiac WDL WDL        Peripheral/Neurovascular WDL    Peripheral Neurovascular WDL WDL        Cognitive/Neuro/Behavioral WDL    Cognitive/Neuro/Behavioral WDL WDL                     "

## 2024-03-17 NOTE — ED NOTES
Attempted to call  at listed phone number to facilitate discharge planning. No response. Charge nurse made aware - will re-attempt.

## 2024-03-17 NOTE — ED TRIAGE NOTES
Pt biba from group home [facility], pt c/o of kidney pain states 10/10 pain. Was here yesterday. Hx of developmentally delayed. VSS. BP 100sys. .

## 2024-03-17 NOTE — ED PROVIDER NOTES
"    [place] EMERGENCY DEPARTMENT ([facility])    3/17/24       ED PROVIDER NOTE      History     Chief Complaint   Patient presents with    Suicidal    Flank Pain     HPI  Dionne Perez is a 39 year old female who has a PMH significant for intellectual disability 2/2 major neurocognitive disorder, gallstone pancreatitis, cholelithiasis (with gallbladder still in place), GERD, and frequent ER visits (with care plan in place) presents to the ED via EMS with left-sided flank pain. Patient reports that she was brought to the ED because she wanted to hit herself. She also notes she came due to left-sided flank pain that started today. She states that her stones are \"killing her\" and these are kidney stones. She notes that things are going well at her group home and she did not get into altercations. She notes she just wants something to eat.     Per triage note, patient was brought in from group home in [place] due to kidney pain and wanting to hit themselves. When asked if they wanted to end their own life or commit suicide, patient stated no, but did answer yes to whether they wished to be dead in the last month. Patient reported staff at group home would not give her food today.    Past Medical History  Past Medical History:   Diagnosis Date    Anxiety     Asthma     Cholelithiasis     Cognitive impairment     Depressive disorder     Gastroesophageal reflux disease     Hypercholesterolemia     Hypothyroidism     Obesity     Subglottic stenosis      Past Surgical History:   Procedure Laterality Date    BRONCHOSCOPY FLEXIBLE AND RIGID N/A 6/6/2023    Procedure: Flexible Bronchoscopy, Laser Resection and Balloon Dilation;  Surgeon: Laxmi Cline MD;  Location:  OR    IR LUMBAR PUNCTURE  06/09/2020    LASER CO2 BRONCHOSCOPY N/A 8/4/2023    Procedure: Flexible bronchoscopy theraputic airway inspection, CO2 laser on standby;  Surgeon: Joe Sutherland MD;  Location:  OR    TONSILLECTOMY       ADVAIR " -21 MCG/ACT inhaler  albuterol (PROAIR HFA/PROVENTIL HFA/VENTOLIN HFA) 108 (90 Base) MCG/ACT inhaler  alum & mag hydroxide-simethicone (MAALOX MULTI SYMPTOM MAX ST) 400-400-40 MG/5ML SUSP suspension  alum & mag hydroxide-simethicone (MAALOX MULTI SYMPTOM MAX ST) 400-400-40 MG/5ML SUSP suspension  Ascorbic Acid (VITAMIN C) POWD  aspirin (ASA) 325 MG EC tablet  busPIRone (BUSPAR) 15 MG tablet  clindamycin (CLEOCIN T) 1 % external lotion  diclofenac (VOLTAREN) 1 % topical gel  docusate sodium (COLACE) 100 MG capsule  escitalopram (LEXAPRO) 20 MG tablet  fluticasone (FLONASE) 50 MCG/ACT nasal spray  ipratropium - albuterol 0.5 mg/2.5 mg/3 mL (DUONEB) 0.5-2.5 (3) MG/3ML neb solution  levothyroxine (SYNTHROID/LEVOTHROID) 50 MCG tablet  melatonin 3 MG tablet  nystatin (MYCOSTATIN) 545699 UNIT/GM external cream  Pediatric Multivit-Minerals (GUMMY VITAMINS & MINERALS) chewable tablet  pravastatin (PRAVACHOL) 40 MG tablet  risperiDONE (RISPERDAL) 0.25 MG tablet  traZODone (DESYREL) 50 MG tablet      Allergies   Allergen Reactions    Ibuprofen      Family History  History reviewed. No pertinent family history.  Social History   Social History     Tobacco Use    Smoking status: Never     Passive exposure: Never    Smokeless tobacco: Never   Substance Use Topics    Alcohol use: No    Drug use: No         Review of systems is limited because of patient's underlying mental health issues    Physical Exam   BP: 120/86  Pulse: 61  Temp: 98.3  F (36.8  C)  Resp: 22  Height: 182.9 cm (6')  Weight: (!) 197.3 kg (435 lb)  SpO2: 96 %  Physical Exam  Vitals and nursing note reviewed.   Constitutional:       General: She is not in acute distress.  Cardiovascular:      Rate and Rhythm: Normal rate and regular rhythm.   Pulmonary:      Effort: Pulmonary effort is normal.      Breath sounds: Normal breath sounds.   Abdominal:      Comments: Obese, nontender   Neurological:      Mental Status: She is alert.   Psychiatric:         Mood  and Affect: Mood normal.         Behavior: Behavior normal.           ED Course, Procedures, & Data    5:02 PM  The patient was seen and examined by Zion Venegas in Room ED24.     Procedures            Results for orders placed or performed during the hospital encounter of 03/17/24   UA with Microscopic reflex to Culture     Status: Normal    Specimen: Urine, Midstream   Result Value Ref Range    Color Urine Light Yellow Colorless, Straw, Light Yellow, Yellow    Appearance Urine Clear Clear    Glucose Urine Negative Negative mg/dL    Bilirubin Urine Negative Negative    Ketones Urine Negative Negative mg/dL    Specific Gravity Urine 1.016 1.003 - 1.035    Blood Urine Negative Negative    pH Urine 6.5 5.0 - 7.0    Protein Albumin Urine Negative Negative mg/dL    Urobilinogen Urine Normal Normal, 2.0 mg/dL    Nitrite Urine Negative Negative    Leukocyte Esterase Urine Negative Negative    RBC Urine <1 <=2 /HPF    WBC Urine 4 <=5 /HPF    Squamous Epithelials Urine 1 <=1 /HPF    Transitional Epithelials Urine <1 <=1 /HPF    Narrative    Urine Culture not indicated   Results for orders placed or performed during the hospital encounter of 03/16/24   Basic metabolic panel     Status: Normal   Result Value Ref Range    Sodium 139 135 - 145 mmol/L    Potassium 4.5 3.4 - 5.3 mmol/L    Chloride 104 98 - 107 mmol/L    Carbon Dioxide (CO2) 27 22 - 29 mmol/L    Anion Gap 8 7 - 15 mmol/L    Urea Nitrogen 17.8 6.0 - 20.0 mg/dL    Creatinine 0.88 0.51 - 0.95 mg/dL    GFR Estimate 86 >60 mL/min/1.73m2    Calcium 9.1 8.6 - 10.0 mg/dL    Glucose 98 70 - 99 mg/dL   Troponin T, High Sensitivity     Status: Normal   Result Value Ref Range    Troponin T, High Sensitivity <6 <=14 ng/L   [place] Draw     Status: None    Narrative    The following orders were created for panel order [place] Draw.  Procedure                               Abnormality         Status                     ---------                                -----------         ------                     Extra Blue Top Tube[[id number]]                              Final result               Extra Red Top Tube[[id number]]                                                          Extra Green Top (Lithium...[[id number]]                      Final result               Extra Purple Top Tube[[id number]]                            Final result                 Please view results for these tests on the individual orders.   CBC with platelets and differential     Status: Abnormal   Result Value Ref Range    WBC Count 7.5 4.0 - 11.0 10e3/uL    RBC Count 4.47 3.80 - 5.20 10e6/uL    Hemoglobin 12.0 11.7 - 15.7 g/dL    Hematocrit 39.5 35.0 - 47.0 %    MCV 88 78 - 100 fL    MCH 26.8 26.5 - 33.0 pg    MCHC 30.4 (L) 31.5 - 36.5 g/dL    RDW 14.3 10.0 - 15.0 %    Platelet Count 198 150 - 450 10e3/uL    % Neutrophils 70 %    % Lymphocytes 23 %    % Monocytes 4 %    % Eosinophils 2 %    % Basophils 0 %    % Immature Granulocytes 1 %    NRBCs per 100 WBC 0 <1 /100    Absolute Neutrophils 5.3 1.6 - 8.3 10e3/uL    Absolute Lymphocytes 1.7 0.8 - 5.3 10e3/uL    Absolute Monocytes 0.3 0.0 - 1.3 10e3/uL    Absolute Eosinophils 0.1 0.0 - 0.7 10e3/uL    Absolute Basophils 0.0 0.0 - 0.2 10e3/uL    Absolute Immature Granulocytes 0.1 <=0.4 10e3/uL    Absolute NRBCs 0.0 10e3/uL   Extra Blue Top Tube     Status: None   Result Value Ref Range    Hold Specimen JIC    Extra Green Top (Lithium Heparin) Tube     Status: None   Result Value Ref Range    Hold Specimen JIC    Extra Purple Top Tube     Status: None   Result Value Ref Range    Hold Specimen JIC    EKG 12 lead     Status: None   Result Value Ref Range    Systolic Blood Pressure  mmHg    Diastolic Blood Pressure  mmHg    Ventricular Rate 51 BPM    Atrial Rate 51 BPM    DC Interval 108 ms    QRS Duration 90 ms     ms    QTc 436 ms    P Axis 29 degrees    R AXIS 17 degrees    T Axis 15 degrees    Interpretation ECG       ** Poor data quality,  interpretation may be adversely affected  Sinus bradycardia with short ME  Otherwise normal ECG    Unconfirmed report - interpretation of this ECG is computer generated - see medical record for final interpretation  Confirmed by - EMERGENCY ROOM, PHYSICIAN ([id number]),  DWAIN CA ([id number]) on 3/17/2024 9:20:37 AM     CBC with platelets differential     Status: Abnormal    Narrative    The following orders were created for panel order CBC with platelets differential.  Procedure                               Abnormality         Status                     ---------                               -----------         ------                     CBC with platelets and d...[[id number]]  Abnormal            Final result                 Please view results for these tests on the individual orders.     Medications - No data to display  Labs Ordered and Resulted from Time of ED Arrival to Time of ED Departure   ROUTINE UA WITH MICROSCOPIC REFLEX TO CULTURE - Normal       Result Value    Color Urine Light Yellow      Appearance Urine Clear      Glucose Urine Negative      Bilirubin Urine Negative      Ketones Urine Negative      Specific Gravity Urine 1.016      Blood Urine Negative      pH Urine 6.5      Protein Albumin Urine Negative      Urobilinogen Urine Normal      Nitrite Urine Negative      Leukocyte Esterase Urine Negative      RBC Urine <1      WBC Urine 4      Squamous Epithelials Urine 1      Transitional Epithelials Urine <1       No orders to display            Assessment & Plan    39-year-old female resident of group home with intellectual disability seen yesterday comes back now for difficult to determine reason she is not certain why she came in and I suspect that she decompensates at the group home or comes in for secondary gain.  Because of the complaint of flank pain we did a urinalysis which is normal.  Last night she had a full set of labs done I see no reason to repeat these she was interested in  getting some food which we gave her and we will discharge her back to the group home.    I have reviewed the nursing notes. I have reviewed the findings, diagnosis, plan and need for follow up with the patient.    New Prescriptions    No medications on file       Final diagnoses:   Flank pain   I, MENA BOSWELL am serving as a trained medical scribe to document services personally performed by Zion Venegas MD, based on the provider's statements to me.      I, Zion Venegas MD, was physically present and have reviewed and verified the accuracy of this note documented by MENA BOSWELL.       Zion Venegas MD  [facility] EMERGENCY DEPARTMENT  3/17/2024     Zion Venegas MD  03/17/24 1937

## 2024-03-18 ENCOUNTER — HOSPITAL ENCOUNTER (EMERGENCY)
Facility: CLINIC | Age: 39
Discharge: HOME | End: 2024-03-19
Payer: MEDICARE

## 2024-03-18 VITALS
TEMPERATURE: 97.8 F | OXYGEN SATURATION: 97 % | DIASTOLIC BLOOD PRESSURE: 86 MMHG | SYSTOLIC BLOOD PRESSURE: 137 MMHG | RESPIRATION RATE: 18 BRPM | HEART RATE: 66 BPM

## 2024-03-18 DIAGNOSIS — K21.9 GASTROESOPHAGEAL REFLUX DISEASE WITHOUT ESOPHAGITIS: ICD-10-CM

## 2024-03-18 PROCEDURE — 99284 EMERGENCY DEPT VISIT MOD MDM: CPT

## 2024-03-18 PROCEDURE — 250N000009 HC RX 250

## 2024-03-18 PROCEDURE — 250N000013 HC RX MED GY IP 250 OP 250 PS 637

## 2024-03-18 PROCEDURE — 99283 EMERGENCY DEPT VISIT LOW MDM: CPT

## 2024-03-18 RX ORDER — MAGNESIUM HYDROXIDE/ALUMINUM HYDROXICE/SIMETHICONE 120; 1200; 1200 MG/30ML; MG/30ML; MG/30ML
15 SUSPENSION ORAL ONCE
Status: COMPLETED | OUTPATIENT
Start: 2024-03-18 | End: 2024-03-18

## 2024-03-18 RX ORDER — LIDOCAINE HYDROCHLORIDE 20 MG/ML
10 SOLUTION OROPHARYNGEAL ONCE
Status: COMPLETED | OUTPATIENT
Start: 2024-03-18 | End: 2024-03-18

## 2024-03-18 RX ADMIN — ALUMINUM HYDROXIDE, MAGNESIUM HYDROXIDE, AND SIMETHICONE 15 ML: 200; 200; 20 SUSPENSION ORAL at 20:39

## 2024-03-18 RX ADMIN — LIDOCAINE HYDROCHLORIDE 10 ML: 20 SOLUTION OROPHARYNGEAL at 20:40

## 2024-03-18 ASSESSMENT — COLUMBIA-SUICIDE SEVERITY RATING SCALE - C-SSRS
1. IN THE PAST MONTH, HAVE YOU WISHED YOU WERE DEAD OR WISHED YOU COULD GO TO SLEEP AND NOT WAKE UP?: YES
6. HAVE YOU EVER DONE ANYTHING, STARTED TO DO ANYTHING, OR PREPARED TO DO ANYTHING TO END YOUR LIFE?: NO
2. HAVE YOU ACTUALLY HAD ANY THOUGHTS OF KILLING YOURSELF IN THE PAST MONTH?: NO

## 2024-03-18 ASSESSMENT — ACTIVITIES OF DAILY LIVING (ADL)
ADLS_ACUITY_SCORE: 36

## 2024-03-18 NOTE — PLAN OF CARE
/71 (BP Location: Left arm)   Pulse 51   Temp 97.6  F (36.4  C) (Oral)   Resp 22   Ht 1.829 m (6')   Wt (!) 197.3 kg (435 lb)   SpO2 98%   BMI 59.00 kg/m      Patient discharged by EMS to her group home at around 2100. Discharge documents sent with patient. Patient didn't have any questions or concerns.

## 2024-03-18 NOTE — ED TRIAGE NOTES
Pt started to have heart burn and dizziness after eating pizza for dinner. No med hx.     Pt has cognitive delay and guardian-unsure if they know pt is here. From group home.

## 2024-03-19 NOTE — ED PROVIDER NOTES
[place] EMERGENCY DEPARTMENT ([facility])    3/18/24       ED PROVIDER NOTE       History     Chief Complaint   Patient presents with    Gastrophageal Reflux    Dizziness     The history is provided by the patient and medical records. No  was used.     Dionne Perez is a 39 year old female with a history of severe cognitive impairment, depression, suicidal ideation, agitation and behavioral problems who presents emergency department for evaluation of heartburn and dizziness after eating pizza for dinner.  This is the patient's 26th ED visit since the beginning of 2024.  She states that just after dinner this evening, she began having symptoms of heartburn.  She states that she feels mildly lightheaded with her symptoms but otherwise denies any feelings of nausea, vomiting, abdominal pain.  She denies any recent fever, chills, or URI symptoms.  She denies any chest pain or shortness of air.  During the initial interview with this ED LINDA, she requests water to drink.  Patient does not take any over-the-counter or prescription medications for history of gastric reflux.    Past Medical History  Past Medical History:   Diagnosis Date    Anxiety     Asthma     Cholelithiasis     Cognitive impairment     Depressive disorder     Gastroesophageal reflux disease     Hypercholesterolemia     Hypothyroidism     Obesity     Subglottic stenosis      Past Surgical History:   Procedure Laterality Date    BRONCHOSCOPY FLEXIBLE AND RIGID N/A 6/6/2023    Procedure: Flexible Bronchoscopy, Laser Resection and Balloon Dilation;  Surgeon: Laxmi Cline MD;  [facility]    IR LUMBAR PUNCTURE  06/09/2020    LASER CO2 BRONCHOSCOPY N/A 8/4/2023    Procedure: Flexible bronchoscopy theraputic airway inspection, CO2 laser on standby;  Surgeon: Joe Sutherland MD;  [facility]    TONSILLECTOMY       omeprazole (PRILOSEC) 20 MG DR capsule  ADVAIR -21 MCG/ACT inhaler  albuterol (PROAIR  HFA/PROVENTIL HFA/VENTOLIN HFA) 108 (90 Base) MCG/ACT inhaler  alum & mag hydroxide-simethicone (MAALOX MULTI SYMPTOM MAX ST) 400-400-40 MG/5ML SUSP suspension  alum & mag hydroxide-simethicone (MAALOX MULTI SYMPTOM MAX ST) 400-400-40 MG/5ML SUSP suspension  Ascorbic Acid (VITAMIN C) POWD  aspirin (ASA) 325 MG EC tablet  busPIRone (BUSPAR) 15 MG tablet  clindamycin (CLEOCIN T) 1 % external lotion  diclofenac (VOLTAREN) 1 % topical gel  docusate sodium (COLACE) 100 MG capsule  escitalopram (LEXAPRO) 20 MG tablet  fluticasone (FLONASE) 50 MCG/ACT nasal spray  ipratropium - albuterol 0.5 mg/2.5 mg/3 mL (DUONEB) 0.5-2.5 (3) MG/3ML neb solution  levothyroxine (SYNTHROID/LEVOTHROID) 50 MCG tablet  melatonin 3 MG tablet  nystatin (MYCOSTATIN) 108950 UNIT/GM external cream  Pediatric Multivit-Minerals (GUMMY VITAMINS & MINERALS) chewable tablet  pravastatin (PRAVACHOL) 40 MG tablet  risperiDONE (RISPERDAL) 0.25 MG tablet  traZODone (DESYREL) 50 MG tablet      Allergies   Allergen Reactions    Ibuprofen      Family History  No family history on file.  Social History   Social History     Tobacco Use    Smoking status: Never     Passive exposure: Never    Smokeless tobacco: Never   Substance Use Topics    Alcohol use: No    Drug use: No         A complete review of systems was performed with pertinent positives and negatives noted in the HPI, and all other systems negative.    Physical Exam   BP: 137/86  Pulse: 63  Temp: 97.8  F (36.6  C)  Resp: 18  SpO2: 97 %  Physical Exam  Constitutional:       General: She is not in acute distress.     Appearance: Normal appearance. She is obese. She is not ill-appearing, toxic-appearing or diaphoretic.   HENT:      Mouth/Throat:      Mouth: Mucous membranes are moist.      Pharynx: Oropharynx is clear.   Cardiovascular:      Rate and Rhythm: Normal rate and regular rhythm.      Pulses: Normal pulses.   Pulmonary:      Effort: Pulmonary effort is normal. No respiratory distress.       Breath sounds: Normal breath sounds.   Abdominal:      General: Abdomen is flat. Bowel sounds are normal. There is no distension.      Palpations: Abdomen is soft.      Tenderness: There is no abdominal tenderness. There is no guarding or rebound.   Musculoskeletal:         General: Normal range of motion.   Skin:     General: Skin is warm.      Capillary Refill: Capillary refill takes less than 2 seconds.   Neurological:      General: No focal deficit present.      Mental Status: She is alert. Mental status is at baseline.   Psychiatric:         Mood and Affect: Mood normal.         Behavior: Behavior normal.         ED Course, Procedures, & Data     ED Course as of 03/18/24 2208   Mon Mar 18, 2024   2158 This ED LINDA provider attempted several phone calls and was able to finally get a hold of the [facility] at [phone]   2206 Nursing confirmed return with the group home and a ride was ordered.  Ride cannot be here for the next 2 hours.     Procedures               No results found for any visits on 03/18/24.  Medications   alum & mag hydroxide-simethicone (MAALOX) suspension 15 mL (15 mLs Oral $Given 3/18/24 2039)   lidocaine (viscous) (XYLOCAINE) 2 % solution 10 mL (10 mLs Mouth/Throat $Given 3/18/24 2040)     Labs Ordered and Resulted from Time of ED Arrival to Time of ED Departure - No data to display  No orders to display          Critical care was not performed.     Medical Decision Making  The patient's presentation was of moderate complexity (an acute illness with systemic symptoms).    The patient's evaluation involved:  review of external note(s) from 1 sources (numerous, previous ED encounters)    The patient's management necessitated moderate risk (prescription drug management including medications given in the ED).    Assessment & Plan    Fan is a 39-year-old female that presented to the ED with complaints of acid reflux symptoms.  Acceptable vital signs on presentation without any tachycardia,  hypotension, fever, or hypoxia on room air.  No acute abdomen signs on palpation including guarding, rebound, or tenderness.  No abdominal or chest tenderness otherwise on evaluation.  Patient oriented at her baseline.  No dizziness, vision changes, headache reported.  P.o. GI cocktail with viscous lidocaine administered in the ED as well as p.o. challenge of water.  Patient tolerated medication and water without any worsening symptoms.  Patient reported improvement of her symptoms.  Patient is stable for discharge home at this time with diagnosis of GERD exacerbation due to tomato based food consumption prior to symptom onset.  Strict return precautions given.  Advise follow-up with your PCP office this week for reevaluation recheck of her symptoms.  Rx Prilosec daily for her symptoms.  Advised plenty of fluids and bland diet with avoidance of acidic, tomato-based, spicy, and fatty foods as to not exacerbate symptoms.  Group home was contacted and a ride was ordered for patient to be transferred back to said group home.  Patient voiced understanding of the discharge plan and all questions were answered prior to discharge.    I have reviewed the nursing notes. I have reviewed the findings, diagnosis, plan and need for follow up with the patient.    New Prescriptions    OMEPRAZOLE (PRILOSEC) 20 MG DR CAPSULE    Take 1 capsule (20 mg) by mouth daily for 30 days       Final diagnoses:   Gastroesophageal reflux disease without esophagitis       Tamara Hendrickson PA-C    [facility] EMERGENCY DEPARTMENT  3/18/2024     Tamara Hendrickson PA-C  03/18/24 4709

## 2024-03-19 NOTE — DISCHARGE INSTRUCTIONS
Begin Prilosec daily for symptoms of heartburn and gastric reflux  Continue plenty of fluids and bland diet; recommend staying away from spicy foods, citrusy/acidic foods, tomato-based foods, caffeine, chocolate, or oily/fatty foods as to help reduce your symptom severity and onset  Follow-up with your PCP office this week for reevaluation recheck of your symptoms as well as further medication management

## 2024-03-20 ENCOUNTER — ONCOLOGY VISIT (OUTPATIENT)
Dept: PULMONOLOGY | Facility: CLINIC | Age: 39
End: 2024-03-20
Attending: INTERNAL MEDICINE
Payer: MEDICARE

## 2024-03-20 VITALS
DIASTOLIC BLOOD PRESSURE: 76 MMHG | SYSTOLIC BLOOD PRESSURE: 160 MMHG | WEIGHT: 293 LBS | HEART RATE: 63 BPM | TEMPERATURE: 97.3 F | BODY MASS INDEX: 59 KG/M2 | RESPIRATION RATE: 20 BRPM | OXYGEN SATURATION: 94 %

## 2024-03-20 DIAGNOSIS — J38.6 SUBGLOTTIC STENOSIS: Primary | ICD-10-CM

## 2024-03-20 PROCEDURE — 99214 OFFICE O/P EST MOD 30 MIN: CPT | Performed by: INTERNAL MEDICINE

## 2024-03-20 PROCEDURE — G0463 HOSPITAL OUTPT CLINIC VISIT: HCPCS | Performed by: INTERNAL MEDICINE

## 2024-03-20 RX ORDER — CIPROFLOXACIN AND DEXAMETHASONE 3; 1 MG/ML; MG/ML
4 SUSPENSION/ DROPS AURICULAR (OTIC) 2 TIMES DAILY
Qty: 7.5 ML | Refills: 11 | Status: SHIPPED | OUTPATIENT
Start: 2024-03-20 | End: 2024-05-03

## 2024-03-20 ASSESSMENT — PAIN SCALES - GENERAL: PAINLEVEL: NO PAIN (0)

## 2024-03-20 NOTE — NURSING NOTE
Oncology Rooming Note    March 20, 2024 10:25 AM   Dionne Perez is a 39 year old female who presents for:    Chief Complaint   Patient presents with    Oncology Clinic Visit     Subglottis Stenosis     Initial Vitals: BP (!) 160/76 (BP Location: Other (Comment), Patient Position: Sitting, Cuff Size: Adult Small)   Pulse 63   Temp 97.3  F (36.3  C) (Tympanic)   Resp 20   Wt (!) 197.3 kg (435 lb)   SpO2 94%   BMI 59.00 kg/m   Estimated body mass index is 59 kg/m  as calculated from the following:    Height as of 3/17/24: 1.829 m (6').    Weight as of this encounter: 197.3 kg (435 lb). Body surface area is 3.17 meters squared.  No Pain (0) Comment: Data Unavailable   No LMP recorded. (Menstrual status: Amenorrhea).  Allergies reviewed: Yes  Medications reviewed: Yes    Medications: Medication refills not needed today.  Pharmacy name entered into [email or web address]: [facility] [place] [facility] [place] [facility] [address] [place] [address]    Frailty Screening:   Is the patient here for a new oncology consult visit in cancer care? 2. No      Clinical concerns: None       Cathleen Blanchard LPN  3/20/2024

## 2024-03-20 NOTE — PROGRESS NOTES
LUNG NODULE & INTERVENTIONAL PULMONARY CLINIC  CLINICS & SURGERY CENTER, [facility]     Dionne Perez MRN# [id number]   Age: 39 year old YOB: 1985     Reason for Consultation: Tracheal/subglottic stenosis    Requesting Physician: Joe Sutherland MD  [address] [place] [address]    Assessment and Plan:    1. Subglottic stenosis. Doing well, breathing is easy. Will reorder ciprodex nebs for her. Back in clinic 6mo with CT neck.      2. Asthma. On LD advair and pumicort however not compliant. Does not use pulmicort inhaler due to intolerance. Encouraged to use advair daily in addition to prn albuterol for dyspnea.     Billing: I spent a total of 45min spent on date of encounter which includes prep time, visit with the patient and post visit work including documentation and nursing communication     Joe Sutherland MD, A  Associate Professor of Medicine  Section of Interventional Pulmonology   Division of Pulmonary, Allergy, Critical Care and Sleep Medicine   [facility]  Pager: [phone]   Office: [phone]  Email: [email or web address]    Rika Bynum RN   Interventional Pulmonary Care Coordinator   Office: [phone]  Email: [email or web address]     Estuardo Pete  Interventional Pulmonary Surgery Scheduler   Office: [phone]  Email: [email or web address]         History:     Dionne Perez is a 38 year old female with sig h/o for subglottic stenosis, asthma, anxiety, MDD, hypothyroidism who is here for evaluation/followup of Tracheal/subglottic stenosis.     - Referred to us for subglottic stenosis and had laser-assisted dilation on 6/1. Had repeat bronch in August which showed patency and no intervention was done. She is here for follow-up  - Personal hx of cancer: no  - Family hx of cancer: no  - Exposure hx: Denies asbestos or radon exposure   - Tobacco hx: Never Smoker.   - My  interpretation of the images relevant for this visit includes: no   - My interpretation of the PFT's relevant for this visit includes: None      Other active medical problems include:   - has asthma. On advair, pulmicort and albuterol. Uses it intermittently. Does not use pulmicort because she says its too harsh for her.   - has subglottic stenosis. Supposed to be on ciprodex and PPI however has not used this since discharge from the hospital.    - has hypothyroidism. On replacement  - has anxiety. Stable.            Past Medical History:      Past Medical History:   Diagnosis Date    Anxiety     Asthma     Cholelithiasis     Cognitive impairment     Depressive disorder     Gastroesophageal reflux disease     Hypercholesterolemia     Hypothyroidism     Obesity     Subglottic stenosis            Past Surgical History:      Past Surgical History:   Procedure Laterality Date    BRONCHOSCOPY FLEXIBLE AND RIGID N/A 6/6/2023    Procedure: Flexible Bronchoscopy, Laser Resection and Balloon Dilation;  Surgeon: Laxmi Cline MD;  [facility]    IR LUMBAR PUNCTURE  06/09/2020    LASER CO2 BRONCHOSCOPY N/A 8/4/2023    Procedure: Flexible bronchoscopy theraputic airway inspection, CO2 laser on standby;  Surgeon: Joe Sutherland MD;  [facility]    TONSILLECTOMY            Social History:     Social History     Tobacco Use    Smoking status: Never     Passive exposure: Never    Smokeless tobacco: Never   Substance Use Topics    Alcohol use: No          Family History:   No family history on file.        Allergies:      Allergies   Allergen Reactions    Ibuprofen           Medications:     Current Outpatient Medications   Medication Sig    ADVAIR -21 MCG/ACT inhaler 2 puffs 2 times daily    albuterol (PROAIR HFA/PROVENTIL HFA/VENTOLIN HFA) 108 (90 Base) MCG/ACT inhaler Inhale 2 puffs into the lungs every 6 hours as needed for shortness of breath, wheezing or cough    alum & mag hydroxide-simethicone (MAALOX  MULTI SYMPTOM MAX ST) 400-400-40 MG/5ML SUSP suspension Take 30 mLs by mouth every 4 hours as needed for indigestion or other (gas pain)    alum & mag hydroxide-simethicone (MAALOX MULTI SYMPTOM MAX ST) 400-400-40 MG/5ML SUSP suspension Take 30 mLs by mouth every 4 hours as needed    Ascorbic Acid (VITAMIN C) POWD Take 1 packet by mouth daily Mix 1 packet with water and drink by mouth once daily    aspirin (ASA) 325 MG EC tablet Take 325 mg by mouth daily    busPIRone (BUSPAR) 15 MG tablet Take 15 mg by mouth 2 times daily    clindamycin (CLEOCIN T) 1 % external lotion Apply topically 2 times daily Apply to face around mouth 2 times daily for rash    diclofenac (VOLTAREN) 1 % topical gel Apply 2 g topically 4 times daily as needed for moderate pain    docusate sodium (COLACE) 100 MG capsule Take 100 mg by mouth every 3 days    escitalopram (LEXAPRO) 20 MG tablet Take 20 mg by mouth daily    fluticasone (FLONASE) 50 MCG/ACT nasal spray Spray 2 sprays into both nostrils daily SHAKE LIQUID AND USE    ipratropium - albuterol 0.5 mg/2.5 mg/3 mL (DUONEB) 0.5-2.5 (3) MG/3ML neb solution Take 1 vial (3 mLs) by nebulization every 6 hours as needed for shortness of breath, wheezing or cough    levothyroxine (SYNTHROID/LEVOTHROID) 50 MCG tablet Take 50 mcg by mouth daily    melatonin 3 MG tablet Take 3 mg by mouth nightly as needed for sleep    nystatin (MYCOSTATIN) 254071 UNIT/GM external cream Apply topically 2 times daily    omeprazole (PRILOSEC) 20 MG DR capsule Take 1 capsule (20 mg) by mouth daily for 30 days    Pediatric Multivit-Minerals (GUMMY VITAMINS & MINERALS) chewable tablet Take 1 chew tab by mouth 2 times daily    pravastatin (PRAVACHOL) 40 MG tablet Take 40 mg by mouth daily    risperiDONE (RISPERDAL) 0.25 MG tablet Take 0.25 mg by mouth 2 times daily    traZODone (DESYREL) 50 MG tablet Take 50 mg by mouth At Bedtime     No current facility-administered medications for this visit.            Review of Systems:      12-point ROS reviewed and abnormalities stated in the history.         Physical Exam:     Constitutional - looks well, in no apparent distress  Respiratory -breathing appears comfortable.   Skin - No appreciable discoloration or lesions (very limited exam)  Neurological - No apparent tremors. Speech fluent and articlate          Current Laboratory Data:   All laboratory and imaging data reviewed.    No results found for this or any previous visit (from the past 24 hour(s)).       No data to display                    [place] M-copygram CT scan Radiation Dose  Low dose Chest CT (DLP 50) = 0.6 mSV  Full Chest CT (DLP=50) = 1.2 mSV   Minimum effective dose to have risk for radiation induced cancer =  mSV

## 2024-04-13 ENCOUNTER — HOSPITAL ENCOUNTER (EMERGENCY)
Facility: CLINIC | Age: 39
Discharge: NURSING, REHAB OR LONG-TERM CARE | End: 2024-04-13
Attending: EMERGENCY MEDICINE | Admitting: EMERGENCY MEDICINE
Payer: MEDICARE

## 2024-04-13 VITALS
RESPIRATION RATE: 16 BRPM | DIASTOLIC BLOOD PRESSURE: 68 MMHG | OXYGEN SATURATION: 100 % | SYSTOLIC BLOOD PRESSURE: 145 MMHG | TEMPERATURE: 98.2 F | HEART RATE: 57 BPM

## 2024-04-13 DIAGNOSIS — F41.9 ANXIETY: ICD-10-CM

## 2024-04-13 PROCEDURE — 99283 EMERGENCY DEPT VISIT LOW MDM: CPT

## 2024-04-13 ASSESSMENT — ACTIVITIES OF DAILY LIVING (ADL)
ADLS_ACUITY_SCORE: 38

## 2024-04-13 NOTE — ED PROVIDER NOTES
"  History     Chief Complaint:  Anxiety     The history is provided by the patient. The history is limited by a developmental delay.      Dionne Perez is a 39 year old female with a history of anxiety, cholelithiasis, depressive disorder, hyperlipidemia, and gallstone pancreatitis who presents to the emergency department for anxiety. The patient states that this morning at [employer], she began experiencing anxiety after she was charged $80 for a phone bill. Denies any complaints or concerns today.  She will not answer any questions regarding acute medical issues, and only wants to discuss her cell phone bill.    Independent Historian:   None - Patient Only    Review of External Notes:   Note from yesterday \"MDM: Patient is a 39-year-old female who presented to the ER per EMS from [facility]. Patient complained of the scab to left shin. She has been seen multiple times but did not get her antibiotic. No fever or chills. She called EMS. Vitals reviewed, tachycardic of 105. With history and exam as above. Spoke to caregiver/Selicicitas at [facility], she stated if he prescribed a topical antibiotic. Patient can return to the [facility] and staff member can not applied the antibiotic to the open sore. Patient is agreeable with this recommendation.\"    Medications:    ADVAIR -21 MCG/ACT inhaler  albuterol (PROAIR HFA/PROVENTIL HFA/VENTOLIN HFA) 108 (90 Base) MCG/ACT inhaler  aspirin (ASA) 325 MG EC tablet  busPIRone (BUSPAR) 15 MG tablet  escitalopram (LEXAPRO) 20 MG tablet  fluticasone (FLONASE) 50 MCG/ACT nasal spray  ipratropium - albuterol 0.5 mg/2.5 mg/3 mL (DUONEB) 0.5-2.5 (3) MG/3ML neb solution  levothyroxine (SYNTHROID/LEVOTHROID) 50 MCG tablet  nystatin (MYCOSTATIN) 470066 UNIT/GM external cream  omeprazole (PRILOSEC) 20 MG DR capsule  pravastatin (PRAVACHOL) 40 MG tablet  risperiDONE (RISPERDAL) 0.25 MG tablet  traZODone (DESYREL) 50 MG tablet    Past Medical History:  "   Anxiety  Asthma  Cholelithiasis  Depressive disorder  Cognitive impairment  GERD  Hyperlipidemia   Hypothyroidism  Obesity  Subglottic stenosis  Gallstone pancreatitis    Past Surgical History:    IR lumbar puncture  Tonsillectomy     Physical Exam   Patient Vitals for the past 24 hrs:   BP Temp Temp src Pulse Resp SpO2   04/13/24 1447 133/65 98.2  F (36.8  C) Oral 88 16 99 %      Physical Exam  General:  Alert, interactive  Cardiovascular:  Well perfused  Lungs:  No respiratory distress, no accessory muscle use  Neuro:  Moving all 4 extremities  Skin:  Warm, dry  Psych:  Cooperative but perseverating on bill from phone company    Emergency Department Course     Emergency Department Course & Assessments:    Interventions:  None     Assessments:  1540 I obtained history and examined the patient as noted above. I discussed findings and discharge with the patient. All questions answered.     Independent Interpretation (X-rays, CTs, rhythm strip):  None    Consultations/Discussion of Management or Tests:  I attempted to speak with the patient's group home, they did not answer. I left a voice message for them.     Social Determinants of Health affecting care:   None    Disposition:  The patient was discharged.     Impression & Plan      Medical Decision Making:  kevin Perez is a 39 year old female with a history of anxiety, cholelithiasis, depressive disorder, hyperlipidemia, and gallstone pancreatitis who presents to the emergency department for after having a negative interaction with staff at [employer] and cell phone company.  In the ED, she is slightly hypertensive, but otherwise has normal vitals.  When I attempted to discuss her symptoms, she proceeded to pull out her cell phone bill, and I refused to be redirected to discuss any acute medical issues.  Based on her medical screening exam, I do not detect any emergent condition today.  Despite multiple attempts, she will not talk to me about anything related to  medical complaints, and only wants to review the issues with her cell phone bill.  I note that the patient is frequently in the emergency department.  She is welcome to return at any time if she has any dangerous or concerning symptoms.  However, based on EMS reports, patient's appearance, and lack of complaints today, I do not think she needs any additional workup or observation in the ED.  She was discharged back to group home.    Diagnosis:    ICD-10-CM    1. Anxiety  F41.9          Scribe Disclosure:  I, Higinio Klein, am serving as a scribe at 3:05 PM on 4/13/2024 to document services personally performed by Razia Judge MD based on my observations and the provider's statements to me.     4/13/2024   Razia Judge MD Pepper, Tracy Lynn, MD  04/14/24 6116

## 2024-04-13 NOTE — DISCHARGE INSTRUCTIONS
Today, we found that your vital signs were normal.  You do not have any specific complaints for us to address today.  Return to the ED as needed, but otherwise, please follow-up with your primary care doctor and primary care team.

## 2024-04-13 NOTE — ED TRIAGE NOTES
Vs in /palp. Hx asthma pulse 63, o2 sats 95%. Picked up at [employer], neg interactions with staff. Anxiety spiked. Medical mobility drove to [place] and [place]. Walker at baseline.

## 2024-04-13 NOTE — ED NOTES
Patient frequently on call light inquiring about wanting to order food.  Did explain to pt new process how nutrition services send down standard tray.

## 2024-04-29 ENCOUNTER — TELEPHONE (OUTPATIENT)
Dept: PULMONOLOGY | Facility: CLINIC | Age: 39
End: 2024-04-29

## 2024-04-29 DIAGNOSIS — J38.6 SUBGLOTTIC STENOSIS: Primary | ICD-10-CM

## 2024-04-29 DIAGNOSIS — J45.909 ASTHMA: Primary | ICD-10-CM

## 2024-04-29 NOTE — TELEPHONE ENCOUNTER
Called and talked with group home  ([facility]). Stated patient just moved to new group home and was given no direction by previous group home what her meds were/were for. States that the ciprodex says for otic use, but wants it put in trachea and is concerned about the discrepency. Discussed that this is generally prescribed to our subglottic stenosis patient. Stated patient did not come to group home with a nebulizer machine - will order this for patient. Home medical supply number provided and ciprodex medication order and nebulizer order faxed to group [facility] per Lauren's request.     Also discussed patient will need follow-up in September with repeat CT neck - number for [facility] scheduling given to Lauren to reach out to schedule.

## 2024-04-29 NOTE — LETTER
2024       [facility]  [address] [address]  Phone:  [phone]  Fax:  [phone]  Patient:     Dionne Perez MRN:  [id number]   [address] [address] :  1985  SSN: [id number]   Phone: [phone]  Email: [email or web address] Sex:  F        Durable Medical Equipment Order:    Nebulizer and Supplies Order for DME - ONLY FOR DME [[id number]]    Awaiting signature from: Joe Sutherland MD   Mode: Ordering in Verbal with readback mode Communicated by: Rika Bynum RN   Ordering user: Rika Bynum RN 24 1420 Ordering provider: Joe Sutherland MD   Diagnoses  Asthma [J45.909]   Questionnaire    Question Answer   Medical Equipment (DME) Supplier: [employer]   PATIENT INSTRUCTIONS: If you did not receive this ordered item today, please contact [facility] [employer] for availability ([place] Locations: [phone], [place]: [phone]).   Start Date: 5/3/2024   Reminder: Place a separate medication order for the nebulizer solution.   Reminder: Call [facility] [employer] at [phone] if nebulizer machine/supplies need to be dispensed by [facility] [employer] [facility] [employer].   Reminder: For Nebulizer coverage, please use a more specific diagnosis. Cough/Wheezing are not covered diagnoses.   Nebulizer Type: Nebulizer with Cup/Tubing   Length of Need: Lifetime   Nebulizer Supplies Quantity: As needed for 1 year   The face to face evaluation was performed on: 5/3/2024     Height: Data Unavailable  Weight: 0 lbs 0 oz     Authorizing provider infomation:  Joe Sutherland MD   (NPI:[id number])                                                                      Order  ciprofloxacin-dexAMETHasone (CIPRODEX) 0.3-0.1 % otic suspension [[id number]] (Order [id number])    Outpatient Medication Detail     Disp Refills Start End DURGA   ciprofloxacin-dexAMETHasone (CIPRODEX) 0.3-0.1 % otic  suspension 7.5 mL 11 5/3/2024 -- No   Sig: Place 4 drops into nebulizer and use BID for subglottic stenosis.   Sent to pharmacy as: Ciprofloxacin-dexAMETHasone 0.3-0.1 % Otic Suspension (CIPRODEX)   Class: E-Prescribe   Order: [id number]   E-Prescribing Status: Receipt confirmed by pharmacy (5/3/2024  2:18 PM CDT)     Printout Tracking    External Result Report     Medication Administration Instructions    Place 4 drops into nebulizer and use BID for subglottic stenosis.     Pharmacy    [facility], [place] - [address] [place] [address]     Associated Diagnoses    Subglottic stenosis [J38.6]  - Primary        Source Order Set    Order Set Name Order ID    [id number]         Prescribing Provider's NPI: [id number]  Joe Sutherland

## 2024-04-29 NOTE — TELEPHONE ENCOUNTER
Ciprodex medication instructions and Nebulizer order faxed to patient's group home per their request.     Fax #: [phone]    Fax confirmed: SENT.

## 2024-05-03 RX ORDER — CIPROFLOXACIN AND DEXAMETHASONE 3; 1 MG/ML; MG/ML
SUSPENSION/ DROPS AURICULAR (OTIC)
Qty: 7.5 ML | Refills: 11 | Status: SHIPPED | OUTPATIENT
Start: 2024-05-03 | End: 2024-06-20

## 2024-06-20 DIAGNOSIS — J38.6 SUBGLOTTIC STENOSIS: ICD-10-CM

## 2024-06-20 RX ORDER — CIPROFLOXACIN AND DEXAMETHASONE 3; 1 MG/ML; MG/ML
SUSPENSION/ DROPS AURICULAR (OTIC)
Qty: 7.5 ML | Refills: 3 | Status: SHIPPED | OUTPATIENT
Start: 2024-06-20 | End: 2024-09-17

## 2024-09-05 DIAGNOSIS — J38.6 SUBGLOTTIC STENOSIS: ICD-10-CM

## 2024-09-10 RX ORDER — RISPERIDONE 0.5 MG/1
TABLET ORAL
COMMUNITY
Start: 2024-08-26

## 2024-09-11 ENCOUNTER — ANCILLARY PROCEDURE (OUTPATIENT)
Dept: CT IMAGING | Facility: CLINIC | Age: 39
End: 2024-09-11
Attending: INTERNAL MEDICINE
Payer: MEDICARE

## 2024-09-11 ENCOUNTER — ONCOLOGY VISIT (OUTPATIENT)
Dept: PULMONOLOGY | Facility: CLINIC | Age: 39
End: 2024-09-11
Attending: INTERNAL MEDICINE
Payer: MEDICARE

## 2024-09-11 VITALS
RESPIRATION RATE: 16 BRPM | TEMPERATURE: 97.9 F | HEART RATE: 53 BPM | BODY MASS INDEX: 61.3 KG/M2 | OXYGEN SATURATION: 100 % | SYSTOLIC BLOOD PRESSURE: 123 MMHG | WEIGHT: 293 LBS | DIASTOLIC BLOOD PRESSURE: 83 MMHG

## 2024-09-11 DIAGNOSIS — J38.6 SUBGLOTTIC STENOSIS: ICD-10-CM

## 2024-09-11 DIAGNOSIS — J38.6 SUBGLOTTIC STENOSIS: Primary | ICD-10-CM

## 2024-09-11 PROCEDURE — 70490 CT SOFT TISSUE NECK W/O DYE: CPT | Mod: MG | Performed by: RADIOLOGY

## 2024-09-11 PROCEDURE — G1010 CDSM STANSON: HCPCS | Performed by: RADIOLOGY

## 2024-09-11 PROCEDURE — G0463 HOSPITAL OUTPT CLINIC VISIT: HCPCS | Performed by: INTERNAL MEDICINE

## 2024-09-11 PROCEDURE — 99215 OFFICE O/P EST HI 40 MIN: CPT | Performed by: INTERNAL MEDICINE

## 2024-09-11 RX ORDER — ACETAMINOPHEN 325 MG/1
TABLET ORAL
COMMUNITY
Start: 2024-05-08

## 2024-09-11 RX ORDER — NYSTATIN 100000 [USP'U]/G
POWDER TOPICAL
COMMUNITY
Start: 2024-06-17

## 2024-09-11 RX ORDER — MENTHOL 5.8 MG
1 LOZENGE MUCOUS MEMBRANE
COMMUNITY
Start: 2024-08-27

## 2024-09-11 RX ORDER — GINSENG 100 MG
CAPSULE ORAL 2 TIMES DAILY
COMMUNITY

## 2024-09-11 RX ORDER — CHOLECALCIFEROL (VITAMIN D3) 125 MCG
1 CAPSULE ORAL DAILY
COMMUNITY
Start: 2024-08-01

## 2024-09-11 ASSESSMENT — PAIN SCALES - GENERAL: PAINLEVEL: NO PAIN (0)

## 2024-09-11 NOTE — PROGRESS NOTES
LUNG NODULE & INTERVENTIONAL PULMONARY CLINIC  [facility]     Dionne Perez MRN# [id number]   Age: 39 year old YOB: 1985     Reason for Consultation: Tracheal/subglottic stenosis    Requesting Physician: Joe Sutherland MD  [address] [place] [address]    Assessment and Plan:    1. Subglottic stenosis. Doing well, breathing is easy. To continue ciprodex. CT neck showed patent subglottic airway. RTC 6mo     2. Asthma. On LD advair and pumicort however not compliant. Does not use pulmicort inhaler due to intolerance. Encouraged to use advair daily in addition to prn albuterol for dyspnea.     3. Dyspnea. Unclear but goes to ER frequently for this. SpO2 has been >95% at home. Will get PFT to evaluate     ** please call Lauren Draper her  at the group home for scheduling/orders.     Billing: I spent a total of 45min spent on date of encounter which includes prep time, visit with the patient and post visit work including documentation and nursing communication     Joe Sutherland MD, A  Associate Professor of Medicine  Section of Interventional Pulmonology   Division of Pulmonary, Allergy, Critical Care and Sleep Medicine   [facility]  Pager: [phone]   Office: [phone]  Email: [email or web address]    Rika Bynum RN   Interventional Pulmonary Care Coordinator   Office: [phone]  Email: [email or web address]     Estuardo Pete  Interventional Pulmonary Surgery Scheduler   Office: [phone]  Email: [email or web address]         History:     Dionne Perez is a 38 year old female with sig h/o for subglottic stenosis, asthma, anxiety, MDD, hypothyroidism who is here for evaluation/followup of Tracheal/subglottic stenosis.     - Referred to us for subglottic stenosis and had laser-assisted dilation on 6/1. Had repeat bronch in August which showed  patency and no intervention was done. She is here for follow-up  - Personal hx of cancer: no  - Family hx of cancer: no  - Exposure hx: Denies asbestos or radon exposure   - Tobacco hx: Never Smoker.   - My interpretation of the images relevant for this visit includes: no   - My interpretation of the PFT's relevant for this visit includes: None      Other active medical problems include:   - has asthma. On advair, pulmicort and albuterol. Uses it intermittently. Does not use pulmicort because she says its too harsh for her.   - has subglottic stenosis. Supposed to be on ciprodex and PPI however has not used this since discharge from the hospital.    - has hypothyroidism. On replacement  - has anxiety. Stable.            Past Medical History:      Past Medical History:   Diagnosis Date    Anxiety     Asthma     Cholelithiasis     Cognitive impairment     Depressive disorder     Gastroesophageal reflux disease     Hypercholesterolemia     Hypothyroidism     Obesity     Subglottic stenosis            Past Surgical History:      Past Surgical History:   Procedure Laterality Date    BRONCHOSCOPY FLEXIBLE AND RIGID N/A 6/6/2023    Procedure: Flexible Bronchoscopy, Laser Resection and Balloon Dilation;  Surgeon: Laxmi Cline MD;  Location:  OR    IR LUMBAR PUNCTURE  06/09/2020    LASER CO2 BRONCHOSCOPY N/A 8/4/2023    Procedure: Flexible bronchoscopy theraputic airway inspection, CO2 laser on standby;  Surgeon: Joe Sutherland MD;  Location: [facility] OR    TONSILLECTOMY            Social History:     Social History     Tobacco Use    Smoking status: Never     Passive exposure: Never    Smokeless tobacco: Never   Substance Use Topics    Alcohol use: No          Family History:   No family history on file.        Allergies:      Allergies   Allergen Reactions    Ibuprofen           Medications:     Current Outpatient Medications   Medication Sig Dispense Refill    ADVAIR -21 MCG/ACT inhaler 2 puffs 2 times daily       albuterol (PROAIR HFA/PROVENTIL HFA/VENTOLIN HFA) 108 (90 Base) MCG/ACT inhaler Inhale 2 puffs into the lungs every 6 hours as needed for shortness of breath, wheezing or cough 18 g 0    alum & mag hydroxide-simethicone (MAALOX MULTI SYMPTOM MAX ST) 400-400-40 MG/5ML SUSP suspension Take 30 mLs by mouth every 4 hours as needed 355 mL 0    Ascorbic Acid (VITAMIN C) POWD Take 1 packet by mouth daily Mix 1 packet with water and drink by mouth once daily      aspirin (ASA) 325 MG EC tablet Take 325 mg by mouth daily      busPIRone (BUSPAR) 15 MG tablet Take 15 mg by mouth 2 times daily      ciprofloxacin-dexAMETHasone (CIPRODEX) 0.3-0.1 % otic suspension Place 4 drops into nebulizer and use BID for subglottic stenosis. 7.5 mL 3    clindamycin (CLEOCIN T) 1 % external lotion Apply topically 2 times daily Apply to face around mouth 2 times daily for rash      diclofenac (VOLTAREN) 1 % topical gel Apply 2 g topically 4 times daily as needed for moderate pain 50 g 0    docusate sodium (COLACE) 100 MG capsule Take 100 mg by mouth every 3 days      escitalopram (LEXAPRO) 20 MG tablet Take 20 mg by mouth daily      fluticasone (FLONASE) 50 MCG/ACT nasal spray Spray 2 sprays into both nostrils daily SHAKE LIQUID AND USE      ipratropium - albuterol 0.5 mg/2.5 mg/3 mL (DUONEB) 0.5-2.5 (3) MG/3ML neb solution Take 1 vial (3 mLs) by nebulization every 6 hours as needed for shortness of breath, wheezing or cough 90 mL 0    levothyroxine (SYNTHROID/LEVOTHROID) 50 MCG tablet Take 50 mcg by mouth daily      melatonin 3 MG tablet Take 3 mg by mouth nightly as needed for sleep      omeprazole (PRILOSEC) 20 MG DR capsule       Pediatric Multivit-Minerals (GUMMY VITAMINS & MINERALS) chewable tablet Take 1 chew tab by mouth 2 times daily      pravastatin (PRAVACHOL) 40 MG tablet Take 40 mg by mouth daily      risperiDONE (RISPERDAL) 0.5 MG tablet       traZODone (DESYREL) 50 MG tablet Take 50 mg by mouth At Bedtime       No current  facility-administered medications for this visit.            Review of Systems:     12-point ROS reviewed and abnormalities stated in the history.         Physical Exam:     Constitutional - looks well, in no apparent distress  Respiratory -breathing appears comfortable.   Skin - No appreciable discoloration or lesions (very limited exam)  Neurological - No apparent tremors. Speech fluent and articlate          Current Laboratory Data:   All laboratory and imaging data reviewed.    No results found for this or any previous visit (from the past 24 hour(s)).       No data to display                    [facility] CT scan Radiation Dose  Low dose Chest CT (DLP 50) = 0.6 mSV  Full Chest CT (DLP=50) = 1.2 mSV   Minimum effective dose to have risk for radiation induced cancer =  mSV

## 2024-09-11 NOTE — NURSING NOTE
Oncology Rooming Note    September 11, 2024 11:18 AM   Dionne Perez is a 39 year old female who presents for:    Chief Complaint   Patient presents with    Oncology Clinic Visit     Subglottic stenosis     Initial Vitals: /83 (BP Location: Right arm, Patient Position: Sitting, Cuff Size: Adult Large)   Pulse 53   Temp 97.9  F (36.6  C) (Oral)   Resp 16   Wt (!) 205 kg (452 lb)   SpO2 100%   BMI 61.30 kg/m   Estimated body mass index is 61.3 kg/m  as calculated from the following:    Height as of 3/17/24: 1.829 m (6').    Weight as of this encounter: 205 kg (452 lb). Body surface area is 3.23 meters squared.  No Pain (0) Comment: Data Unavailable   No LMP recorded. (Menstrual status: Amenorrhea).  Allergies reviewed: Yes  Medications reviewed: Yes    Medications: MEDICATION REFILLS NEEDED TODAY. Provider was notified.  Pharmacy name entered into [employer]:    [facility] [place] [facility] [place], [place] - [address] [place] [address] [address]    Frailty Screening:   Is the patient here for a new oncology consult visit in cancer care? 2. No      Clinical concerns: Refill: Ciprodpaolo Mcghee, EMT  9/11/2024

## 2024-09-11 NOTE — PROGRESS NOTES
Pre-visit planning and chart review completed.     RETURN patient appointment:    9/11 with Dr. Sutherland  9/11 CT chest wo contrast    - 6 month follow-up.  - SGS patient: on Ciprodex nebs (yes).    CE updated. Medications, allergies, problem list, and immunizations reconciled.

## 2024-09-17 ENCOUNTER — NURSE TRIAGE (OUTPATIENT)
Dept: PULMONOLOGY | Facility: CLINIC | Age: 39
End: 2024-09-17
Payer: MEDICARE

## 2024-09-17 DIAGNOSIS — J38.6 SUBGLOTTIC STENOSIS: ICD-10-CM

## 2024-09-17 RX ORDER — CIPROFLOXACIN AND DEXAMETHASONE 3; 1 MG/ML; MG/ML
SUSPENSION/ DROPS AURICULAR (OTIC)
Qty: 7.5 ML | Refills: 11 | Status: SHIPPED | OUTPATIENT
Start: 2024-09-17

## 2024-09-17 NOTE — TELEPHONE ENCOUNTER
Caller is trying to reach Interventional Pulmonology. Unable to LVM d/t VM being full.  Caller reports that she has been trying to reach Rika, HAM and has left several VM messages. She is calling for a refill of Ciprodex that the pt has been out of for a week.  She would like multiple refills if possible because one prescription only lasts two weeks. This writer explained that would be up to Dr. Sutherland, but this writer will put in that request from caller.    Caller's info.  PH: [phone]  Name: Lauren    Secure chat sent to Dr. Sutherland and Rika to ask if this writer should pend up med refill.  This writer will pend up in separate refill encounter.     Routed to Care Team.

## 2024-09-17 NOTE — TELEPHONE ENCOUNTER
Medication requested: Ciprodex    Last prescribing provider: Joe Sutherland MD on 6/2024    Last clinic visit date: 9/11/24 with Dr. Sutherland    Recommendations for requested medication (if none, N/A): Please note: Pharmacy they want med refilled at is [facility]. They would like several refills if possible.    Any other pertinent information (if none, N/A): NA    Refilled: Y/N, if NO, why?    Pended and Routed to Dr. Joe Sutherland and Rika Bynum RNCC

## 2024-09-30 ENCOUNTER — OFFICE VISIT (OUTPATIENT)
Dept: URGENT CARE | Facility: URGENT CARE | Age: 39
End: 2024-09-30
Payer: MEDICARE

## 2024-09-30 VITALS
HEART RATE: 95 BPM | WEIGHT: 293 LBS | RESPIRATION RATE: 28 BRPM | DIASTOLIC BLOOD PRESSURE: 77 MMHG | BODY MASS INDEX: 61.26 KG/M2 | OXYGEN SATURATION: 95 % | TEMPERATURE: 98.7 F | SYSTOLIC BLOOD PRESSURE: 139 MMHG

## 2024-09-30 DIAGNOSIS — F89 DEVELOPMENTAL DISABILITY: ICD-10-CM

## 2024-09-30 DIAGNOSIS — R41.89 COGNITIVE IMPAIRMENT: ICD-10-CM

## 2024-09-30 DIAGNOSIS — N64.4 PAIN OF BOTH BREASTS: Primary | ICD-10-CM

## 2024-09-30 DIAGNOSIS — Z12.31 ENCOUNTER FOR SCREENING MAMMOGRAM FOR MALIGNANT NEOPLASM OF BREAST: ICD-10-CM

## 2024-09-30 PROCEDURE — 99204 OFFICE O/P NEW MOD 45 MIN: CPT | Performed by: PHYSICIAN ASSISTANT

## 2024-09-30 NOTE — PROGRESS NOTES
Assessment & Plan     Pain of both breasts  - MA Screen Bilateral w/Dylon; Future    Encounter for screening mammogram for malignant neoplasm of breast  - MA Screen Bilateral w/Dylon; Future    Developmental disability    Cognitive impairment    Schedule mammogram.    Return in about 1 week (around 10/7/2024) for visit with primary care provider if not improving.     [facility]    Subjective   Dionne Perez is a 39 year old who presents for the following health issues     Patient presents with:  Urgent Care  Breast Pain: Both side breasts pain for a week       HPI    Fan presents with her caregiver for evaluation of breast pain. She states pain began about 1 week ago. She feels pain in bilateral breast, diffusely. No nipple discharge. She also noted pain in her chest at all times. She has been evaluated by pulmonology and has had several emergency room visits about this without any concerning findings.     Per chart review, she had a normal mammogram 11/2022 and screening mammograms were recommended at age 40.    Review of Systems   ROS negative except as stated above.      Objective    /77 (BP Location: Left arm, Patient Position: Sitting, Cuff Size: Adult Large)   Pulse 95   Temp 98.7  F (37.1  C) (Oral)   Resp 28   Wt (!) 204.9 kg (451 lb 11.2 oz)   SpO2 95%   BMI 61.26 kg/m    Physical Exam   GENERAL: alert and no distress  BREAST: Diffuse tenderness to palpation of breasts bilaterally. normal without masses or nipple discharge and no palpable axillary masses or adenopathy    No results found for any visits on 09/30/24.

## 2024-12-04 RX ORDER — CIPROFLOXACIN AND DEXAMETHASONE 3; 1 MG/ML; MG/ML
SUSPENSION/ DROPS AURICULAR (OTIC)
Qty: 7.5 ML | Refills: 11 | OUTPATIENT
Start: 2024-12-04

## 2025-03-05 PROBLEM — G47.33 MILD OBSTRUCTIVE SLEEP APNEA: Status: ACTIVE | Noted: 2024-06-03

## 2025-03-05 PROBLEM — F31.9 BIPOLAR DISORDER, UNSPECIFIED (H): Status: ACTIVE | Noted: 2025-03-05

## 2025-03-05 RX ORDER — ASPIRIN 325 MG
TABLET ORAL
COMMUNITY
Start: 2025-02-03

## 2025-03-05 RX ORDER — POLYETHYLENE GLYCOL 3350 17 G/17G
POWDER, FOR SOLUTION ORAL DAILY
COMMUNITY

## 2025-03-05 RX ORDER — PYRIDOXINE HCL (VITAMIN B6) 50 MG
50 TABLET ORAL DAILY
COMMUNITY
Start: 2025-01-29

## 2025-03-05 RX ORDER — HYDROCORTISONE 25 MG/G
CREAM TOPICAL
COMMUNITY
Start: 2024-10-29

## 2025-03-05 RX ORDER — METFORMIN HYDROCHLORIDE 500 MG/1
1000 TABLET, EXTENDED RELEASE ORAL
COMMUNITY
Start: 2025-01-14 | End: 2026-04-11

## 2025-03-05 RX ORDER — PHENYLEPHRINE HCL 10 MG/1
TABLET, FILM COATED ORAL
COMMUNITY

## 2025-03-05 RX ORDER — CHOLECALCIFEROL (VITAMIN D3) 50 MCG
2000 TABLET ORAL DAILY
COMMUNITY
Start: 2025-03-01

## 2025-03-19 ENCOUNTER — ONCOLOGY VISIT (OUTPATIENT)
Dept: PULMONOLOGY | Facility: CLINIC | Age: 40
End: 2025-03-19
Attending: INTERNAL MEDICINE
Payer: MEDICARE

## 2025-03-19 ENCOUNTER — ANCILLARY PROCEDURE (OUTPATIENT)
Dept: CT IMAGING | Facility: CLINIC | Age: 40
End: 2025-03-19
Attending: INTERNAL MEDICINE
Payer: MEDICARE

## 2025-03-19 VITALS
RESPIRATION RATE: 22 BRPM | HEIGHT: 68 IN | TEMPERATURE: 97.6 F | BODY MASS INDEX: 44.41 KG/M2 | OXYGEN SATURATION: 95 % | WEIGHT: 293 LBS | DIASTOLIC BLOOD PRESSURE: 89 MMHG | SYSTOLIC BLOOD PRESSURE: 128 MMHG | HEART RATE: 72 BPM

## 2025-03-19 DIAGNOSIS — J38.6 SUBGLOTTIC STENOSIS: Primary | ICD-10-CM

## 2025-03-19 DIAGNOSIS — J38.6 SUBGLOTTIC STENOSIS: ICD-10-CM

## 2025-03-19 LAB
DLCOUNC-%PRED-PRE: 92 %
DLCOUNC-PRE: 22.49 ML/MIN/MMHG
DLCOUNC-PRED: 24.33 ML/MIN/MMHG
EXPTIME-PRE: 1.85 SEC
FEF2575-%PRED-PRE: 84 %
FEF2575-PRE: 2.86 L/SEC
FEF2575-PRED: 3.39 L/SEC
FEFMAX-%PRED-PRE: 49 %
FEFMAX-PRE: 3.87 L/SEC
FEFMAX-PRED: 7.78 L/SEC
FEV1-%PRED-PRE: 64 %
FEV1-PRE: 2.15 L
FEV1FEV6-PRE: 99 %
FEV1FEV6-PRED: 84 %
FEV1FVC-PRE: 99 %
FEV1FVC-PRED: 83 %
FIFMAX-PRE: 1.66 L/SEC
FRCN2-%PRED-PRE: 56 %
FRCN2-PRE: 1.8 L
FRCN2-PRED: 3.19 L
FVC-%PRED-PRE: 54 %
FVC-PRE: 2.18 L
FVC-PRED: 4.03 L
VA-%PRED-PRE: 53 %
VA-PRE: 3.1 L

## 2025-03-19 PROCEDURE — G0463 HOSPITAL OUTPT CLINIC VISIT: HCPCS | Performed by: INTERNAL MEDICINE

## 2025-03-19 PROCEDURE — 70490 CT SOFT TISSUE NECK W/O DYE: CPT | Mod: GC | Performed by: RADIOLOGY

## 2025-03-19 PROCEDURE — 94727 GAS DIL/WSHOT DETER LNG VOL: CPT | Performed by: INTERNAL MEDICINE

## 2025-03-19 PROCEDURE — 99214 OFFICE O/P EST MOD 30 MIN: CPT | Mod: 25 | Performed by: INTERNAL MEDICINE

## 2025-03-19 PROCEDURE — 94150 VITAL CAPACITY TEST: CPT | Performed by: INTERNAL MEDICINE

## 2025-03-19 PROCEDURE — 94375 RESPIRATORY FLOW VOLUME LOOP: CPT | Performed by: INTERNAL MEDICINE

## 2025-03-19 PROCEDURE — 94729 DIFFUSING CAPACITY: CPT | Performed by: INTERNAL MEDICINE

## 2025-03-19 ASSESSMENT — PAIN SCALES - GENERAL: PAINLEVEL_OUTOF10: NO PAIN (0)

## 2025-03-19 NOTE — PROGRESS NOTES
LUNG NODULE & INTERVENTIONAL PULMONARY CLINIC  CL[facility]ICS [facility] SURGERY CENTER, [facility], [facility]     Dionne Perez MRN# [id number]   Age: 39 year old YOB: 1985     Reason for Consultation: Tracheal/subglottic stenosis    Requesting Physician: Joe Sutherland MD  [address] [place] [address]    Assessment and Plan:    1. Subglottic stenosis. Doing well, breathing is easy. CT neck showed patent (mild narrowing) subglottic airway. Been using ciprodex since 6/2023. Given CT stability, will hold ciprodex for now. RTC 6mo     2. Asthma. On LD advair and pumicort however not compliant. Does not use pulmicort inhaler due to intolerance. Encouraged to use advair daily in addition to prn albuterol for dyspnea.     ** please call Lauren Draper her  at the group home for scheduling/orders.     Billing: I spent a total of 45min spent on date of encounter which includes prep time, visit with the patient and post visit work including documentation and nursing communication     Joe Sutherland MD, A  Associate Professor of Medicine  Section of Interventional Pulmonology   Division of Pulmonary, Allergy, Critical Care and Sleep Medicine   [facility]  Pager: [phone]   Office: [phone]  Email: [email or web address]    Rika Bynum RN   Interventional Pulmonary Care Coordinator   Office: [phone]  Email: [email or web address]     Estuardo Pete  Interventional Pulmonary Surgery Scheduler   Office: [phone]  Email: [email or web address]         History:     Dionne Perez is a 38 year old female with sig h/o for subglottic stenosis, asthma, anxiety, MDD, hypothyroidism who is here for evaluation/followup of Tracheal/subglottic stenosis.     - Referred to us for subglottic stenosis and had laser-assisted dilation on 6/1. Had repeat bronch in August which showed patency and no intervention was done. She  is here for follow-up  - Personal hx of cancer: no  - Family hx of cancer: no  - Exposure hx: Denies asbestos or radon exposure   - Tobacco hx: Never Smoker.   - My interpretation of the images relevant for this visit includes: no   - My interpretation of the PFT's relevant for this visit includes: does not meet ATS criteria      Other active medical problems include:   - has asthma. On advair, pulmicort and albuterol. Uses it intermittently. Does not use pulmicort because she says its too harsh for her.   - has subglottic stenosis. Supposed to be on ciprodex and PPI however has not used this since discharge from the hospital.    - has hypothyroidism. On replacement  - has anxiety. Stable.            Past Medical History:      Past Medical History:   Diagnosis Date    Anxiety     Asthma     Cholelithiasis     Cognitive impairment     Depressive disorder     Gastroesophageal reflux disease     Hypercholesterolemia     Hypothyroidism     Obesity     Subglottic stenosis            Past Surgical History:      Past Surgical History:   Procedure Laterality Date    BRONCHOSCOPY FLEXIBLE AND RIGID N/A 6/6/2023    Procedure: Flexible Bronchoscopy, Laser Resection and Balloon Dilation;  Surgeon: Laxmi Cline MD;  Location: [facility] OR    IR LUMBAR PUNCTURE  06/09/2020    LASER CO2 BRONCHOSCOPY N/A 8/4/2023    Procedure: Flexible bronchoscopy theraputic airway inspection, CO2 laser on standby;  Surgeon: Joe Sutherland MD;  Location: [facility] OR    TONSILLECTOMY            Social History:     Social History     Tobacco Use    Smoking status: Never     Passive exposure: Never    Smokeless tobacco: Never   Substance Use Topics    Alcohol use: No          Family History:   No family history on file.        Allergies:      Allergies   Allergen Reactions    Ibuprofen           Medications:     Current Outpatient Medications   Medication Sig Dispense Refill    acetaminophen (TYLENOL) 325 MG tablet 2 tablets in the morning, 2 tablets at  2 pm, and 2 tablets at bedtime daily PRN. Use only as needed for pain including headaches.      ADVAIR -21 MCG/ACT inhaler 2 puffs 2 times daily      albuterol (PROAIR HFA/PROVENTIL HFA/VENTOLIN HFA) 108 (90 Base) MCG/ACT inhaler Inhale 2 puffs into the lungs every 6 hours as needed for shortness of breath, wheezing or cough 18 g 0    aspirin (ASA) 325 MG tablet       bacitracin 500 UNIT/GM OINT Apply topically 2 times daily.      bismuth subsalicylate (PEPTO BISMOL) 262 MG/15ML suspension Take 15 mLs by mouth.      busPIRone (BUSPAR) 15 MG tablet Take 15 mg by mouth 2 times daily      calcium carbonate 750 MG CHEW Take 750 mg by mouth.      ciprofloxacin-dexAMETHasone (CIPRODEX) 0.3-0.1 % otic suspension Place 4 drops into nebulizer and use BID for subglottic stenosis. 7.5 mL 11    Dextromethorphan HBr 10 MG/5ML LIQD Take by mouth.      docusate sodium (COLACE) 100 MG capsule Take 100 mg by mouth every 3 days      escitalopram (LEXAPRO) 20 MG tablet Take 20 mg by mouth daily      Fiber-Vitamins-Minerals ([employer] FIBER GUMMIES) CHEW Take 1 each by mouth.      fluticasone (FLONASE) 50 MCG/ACT nasal spray Spray 2 sprays into both nostrils daily SHAKE LIQUID AND USE      hydrocortisone, Perianal, (ANUSOL-HC) 2.5 % cream Place rectally.      ipratropium - albuterol 0.5 mg/2.5 mg/3 mL (DUONEB) 0.5-2.5 (3) MG/3ML neb solution Take 1 vial (3 mLs) by nebulization every 6 hours as needed for shortness of breath, wheezing or cough 90 mL 0    Lactobacillus (PROBIOTIC ACIDOPHILUS) CAPS Take 1 capsule by mouth daily.      levothyroxine (SYNTHROID/LEVOTHROID) 50 MCG tablet Take 50 mcg by mouth daily      melatonin 3 MG tablet Take 3 mg by mouth nightly as needed for sleep      metFORMIN (GLUCOPHAGE XR) 500 MG 24 hr tablet Take 1,000 mg by mouth.      nystatin (MYCOSTATIN) 102067 UNIT/GM external powder Apply small amount to the affected area twice daily as needed      omeprazole (PRILOSEC) 20 MG DR capsule       phenylephrine  HCl 10 MG TABS       polyethylene glycol (MIRALAX) 17 GM/Dose powder Take by mouth daily.      pravastatin (PRAVACHOL) 40 MG tablet Take 40 mg by mouth daily      pyridOXINE (VITAMIN B-6) 50 MG tablet Take 50 mg by mouth daily.      risperiDONE (RISPERDAL) 0.5 MG tablet       LISSET-ON LOPERAMIDE HCL OR       traZODone (DESYREL) 50 MG tablet Take 50 mg by mouth At Bedtime      Vitamin D3 50 mcg (2000 units) tablet Take 2,000 Units by mouth daily.       No current facility-administered medications for this visit.            Review of Systems:     12-point ROS reviewed and abnormalities stated in the history.         Physical Exam:     Constitutional - looks well, in no apparent distress  Respiratory -breathing appears comfortable.   Skin - No appreciable discoloration or lesions (very limited exam)  Neurological - No apparent tremors. Speech fluent and articlate          Current Laboratory Data:   All laboratory and imaging data reviewed.    Results for orders placed or performed in visit on 03/19/25 (from the past 24 hours)   General PFT Lab (Please always keep checked)   Result Value Ref Range    FVC-Pred 4.03 L    FVC-Pre 2.18 L    FVC-%Pred-Pre 54 %    FEV1-Pre 2.15 L    FEV1-%Pred-Pre 64 %    FEV1FVC-Pred 83 %    FEV1FVC-Pre 99 %    FEFMax-Pred 7.78 L/sec    FEFMax-Pre 3.87 L/sec    FEFMax-%Pred-Pre 49 %    FEF2575-Pred 3.39 L/sec    FEF2575-Pre 2.86 L/sec    YEU4231-%Pred-Pre 84 %    ExpTime-Pre 1.85 sec    FIFMax-Pre 1.66 L/sec    FRCN2-Pred 3.19 L    FRCN2-Pre 1.80 L    FRCN2-%Pred-Pre 56 %    FEV1FEV6-Pred 84 %    FEV1FEV6-Pre 99 %    DLCOunc-Pred 24.33 ml/min/mmHg    DLCOunc-Pre 22.49 ml/min/mmHg    DLCOunc-%Pred-Pre 92 %    VA-Pre 3.10 L    VA-%Pred-Pre 53 %         Latest Ref Rng & Units 3/19/2025     8:31 AM   PFT   FVC L 2.18  P   FEV1 L 2.15  P   FVC% % 54  P   FEV1% % 64  P      P Preliminary result           [facility] CT scan Radiation Dose  Low dose Chest CT (DLP 50) = 0.6 mSV  Full Chest CT  (DLP=50) = 1.2 mSV   Minimum effective dose to have risk for radiation induced cancer =  mSV

## 2025-03-19 NOTE — PROGRESS NOTES
Dionne Perez comes into clinic today at the request of Dr. Joe Sutherland Ordering Provider for PFT      Bashir Yost, RRT

## 2025-03-19 NOTE — NURSING NOTE
"Oncology Rooming Note    March 19, 2025 10:02 AM   Dionne Perez is a 40 year old female who presents for:    Chief Complaint   Patient presents with    Oncology Clinic Visit     Subglottic Stenosis     Initial Vitals: /89   Pulse 72   Temp 97.6  F (36.4  C) (Tympanic)   Resp 22   Ht 1.727 m (5' 8\")   Wt (!) 197.8 kg (436 lb)   SpO2 95%   BMI 66.29 kg/m   Estimated body mass index is 66.29 kg/m  as calculated from the following:    Height as of this encounter: 1.727 m (5' 8\").    Weight as of this encounter: 197.8 kg (436 lb). Body surface area is 3.08 meters squared.  No Pain (0) Comment: Data Unavailable   No LMP recorded. (Menstrual status: Amenorrhea).  Allergies reviewed: Yes  Medications reviewed: Yes    Medications: MEDICATION REFILLS NEEDED TODAY. Provider was notified.  Pharmacy name entered into [employer]:    [facility] [place], [place] - [address] [place], [place] [address]E. S.    Frailty Screening:   Is the patient here for a new oncology consult visit in cancer care? 2. No    PHQ9:  Did this patient require a PHQ9?: No      Clinical concerns: Needs a refill of the Ciprodex      Navneet Blake LPN              "

## 2025-05-21 ENCOUNTER — OFFICE VISIT (OUTPATIENT)
Dept: URGENT CARE | Facility: URGENT CARE | Age: 40
End: 2025-05-21
Payer: MEDICARE

## 2025-05-21 VITALS
SYSTOLIC BLOOD PRESSURE: 139 MMHG | OXYGEN SATURATION: 95 % | DIASTOLIC BLOOD PRESSURE: 86 MMHG | HEART RATE: 83 BPM | TEMPERATURE: 97.9 F | WEIGHT: 293 LBS | RESPIRATION RATE: 18 BRPM | BODY MASS INDEX: 66.43 KG/M2

## 2025-05-21 DIAGNOSIS — M79.89 LEG SWELLING: Primary | ICD-10-CM

## 2025-05-21 DIAGNOSIS — F89 DEVELOPMENTAL DISABILITY: ICD-10-CM

## 2025-05-21 DIAGNOSIS — R41.89 COGNITIVE IMPAIRMENT: ICD-10-CM

## 2025-05-21 PROCEDURE — 1125F AMNT PAIN NOTED PAIN PRSNT: CPT

## 2025-05-21 PROCEDURE — 99213 OFFICE O/P EST LOW 20 MIN: CPT

## 2025-05-21 PROCEDURE — 3079F DIAST BP 80-89 MM HG: CPT

## 2025-05-21 PROCEDURE — 3075F SYST BP GE 130 - 139MM HG: CPT

## 2025-05-21 ASSESSMENT — PAIN SCALES - GENERAL: PAINLEVEL_OUTOF10: SEVERE PAIN (10)

## 2025-05-21 NOTE — PROGRESS NOTES
Urgent Care Clinic Visit    Chief Complaint   Patient presents with    Leg Swelling     Both legs are swollen and painful to the touch, worse when standing - unable to verbalize when it started                5/21/2025    10:57 AM   Additional Questions   Roomed by Rosalee   Accompanied by Caregiver

## 2025-05-21 NOTE — PATIENT INSTRUCTIONS
I am less concerned about a blood clot given that swelling is equal on both sides and less concerned for infection with lack of redness. At this point I think it is appropriate to follow-up with your primary care provider, I have placed a referral for you. If you develop worsening pain or shortness of breath you should be evaluated in the ER.

## 2025-05-21 NOTE — PROGRESS NOTES
Patient presents with:  Leg Swelling: Both legs are swollen and painful to the touch, worse when standing - unable to verbalize when it started       Clinical Decision Making:      ICD-10-CM    1. Leg swelling  M79.89 PRIMARY CARE FOLLOW-UP SCHEDULING        Patient presents to clinic today with bilateral lower extremity swelling and pain.  She does have quite an extensive history that makes it difficult to get an accurate assessment (cognitive impairment, altered mental status, adjustment disorder, anxiety disorder, behavior problem, bipolar disorder, and intellectual disability) but it sounds as though symptoms started about a week ago, swelling and pain are similar in both legs.  Mild tenderness around the ankles. She has not noticed any redness or fevers.  She does endorse some shortness of breath but it does not seem that this is a new issue and she appears to be breathing appropriately today in clinic without any signs of acute respiratory distress.  She denies any chest pain.  Less concern for DVT given that swelling is equivalent in both legs.  Also less suspicious for cellulitis or infectious etiology given lack of redness or fevers.  Per chart review, it seems as she was seen for similar issue in the ER on 1/18/2025 in which x-ray and ultrasound were performed that were negative for fracture or DVT.  At this time I do not feel that this is an urgent or emergent issue with the low probability of a DVT or cellulitis and given that she is well-appearing and vitally stable today in clinic.  It sounds as though she has had similar symptoms to this in the past in which symptoms have resolved on their own. May take tylenol as needed for pain. I do think she should be seen for further evaluation with her PCP, referral placed.  We did have a discussion that if she does get any worsening swelling or shortness of breath that she should be seen in the ER for further evaluation.  Patient agreed with plan. Patient  instructions as below. Discussed red flag symptoms for when to return.       Patient Instructions   I am less concerned about a blood clot given that swelling is equal on both sides and less concerned for infection with lack of redness. At this point I think it is appropriate to follow-up with your primary care provider, I have placed a referral for you. If you develop worsening pain or shortness of breath you should be evaluated in the ER.     HPI:  Dionne Perez is a 40 year old female with PMH of cognitive impairment, altered mental status, adjustment disorder, anxiety disorder, behavior problem, bipolar disorder, and intellectual disability who presents today with concerns of bilateral leg swelling and pain. She think it started about a week ago. Pain and swelling is equal in both legs. No redness or fevers. No change in medications. She notes she has some difficulty breathing though this is does not seem to be new. No chest pain. No injury to legs. Has had similar pain before that has self resolved. Has not taken any OTC medication.     History obtained from the patient.    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  2023-06: Dyspnea  2023-04: No-show for appointment  2023-03: Primary osteoarthritis of left knee  2023-01: Recurrent major depressive disorder  2022-09: Major neurocognitive disorder (H)  2022-08: Agitation  2022-08: Aggression  2022-08: Abnormal behavior  2021-10: Chronic abdominal pain  2021-10: Insomnia  2021-10: Acquired hammer toes of both feet  2021-10: Callus  2021-10: Scalp irritation  2021-01: Constipation  2020-09: Anemia, iron deficiency  2020-06: Altered mental status  2020-05: Behavior problem, adult  2019-12: Adjustment reaction with aggression  2019-12:  Developmental disability  2019-12: Severe episode of recurrent major depressive disorder,   without psychotic features (H)  2019-11: Suicidal ideations  2019-10: Gallstone pancreatitis  2019-10: Cholelithiasis  2019-07: Adjustment disorder, unspecified  2019-03: ROSY (generalized anxiety disorder)  2018-10: Health care home, active care coordination  2018-05: Acute costochondritis  2018-05: Sinus bradycardia, chronic  2018-05: Syncope  2018-05: Essential hypertension  2018-05: Pre-syncope  2017-11: Contusion of left hip  2016-05: Hypothyroidism due to acquired atrophy of thyroid  2014-04: Cognitive impairment  2014-04: Hyperlipemia  2014-04: Morbid obesity (H)  2014-04: History of cellulitis      Past Medical History:   Diagnosis Date    Anxiety     Asthma     Cholelithiasis     Cognitive impairment     Depressive disorder     Gastroesophageal reflux disease     Hypercholesterolemia     Hypothyroidism     Obesity     Subglottic stenosis        Social History     Tobacco Use    Smoking status: Never     Passive exposure: Never    Smokeless tobacco: Never   Substance Use Topics    Alcohol use: No       ROS is negative other than what is noted in HPI.       Vitals:    05/21/25 1056   BP: 139/86   BP Location: Left arm   Patient Position: Sitting   Cuff Size: Adult Large   Pulse: 83   Resp: 18   Temp: 97.9  F (36.6  C)   TempSrc: Oral   SpO2: 95%   Weight: (!) 198.2 kg (436 lb 14.4 oz)       Physical Exam  Constitutional:       General: She is not in acute distress.     Appearance: She is not diaphoretic.   HENT:      Head: Normocephalic and atraumatic.      Right Ear: External ear normal.      Left Ear: External ear normal.   Eyes:      Conjunctiva/sclera: Conjunctivae normal.   Cardiovascular:      Rate and Rhythm: Normal rate and regular rhythm.      Pulses: Normal pulses.      Heart sounds: Normal heart sounds. No murmur heard.  Pulmonary:      Effort: Pulmonary effort is normal. No respiratory distress.      Breath  sounds: Normal breath sounds.   Musculoskeletal:         General: Tenderness present. Normal range of motion.      Right lower leg: Edema present.      Left lower leg: Edema present.      Comments: +1 edema to BLE. No redness. Minimal tenderness.    Skin:     General: Skin is warm.      Findings: No erythema.   Neurological:      General: No focal deficit present.      Mental Status: She is alert.      Cranial Nerves: No cranial nerve deficit.      Sensory: No sensory deficit.      Motor: No weakness.      Gait: Gait normal.   Psychiatric:         Mood and Affect: Mood normal.         Thought Content: Thought content normal.         Judgment: Judgment normal.       25 minutes spent on the date of the encounter doing chart review, history and examination, documentation, and further activities as noted.      At the end of the encounter, I discussed results, diagnosis, medications. Discussed red flags for immediate return to clinic/ER, as well as indications for follow up if no improvement. Patient understood and agreed to plan. Patient was stable for discharge.    MARY Man [facility] CARE

## 2025-05-26 ENCOUNTER — PATIENT OUTREACH (OUTPATIENT)
Dept: CARE COORDINATION | Facility: CLINIC | Age: 40
End: 2025-05-26
Payer: MEDICARE

## 2025-05-31 NOTE — PLAN OF CARE
Problem: Pain - Adult  Goal: Verbalizes/displays adequate comfort level or baseline comfort level  Outcome: Progressing     Problem: Discharge Planning  Goal: Discharge to home or other facility with appropriate resources  Outcome: Progressing     Problem: Chronic Conditions and Co-morbidities  Goal: Patient's chronic conditions and co-morbidity symptoms are monitored and maintained or improved  Outcome: Progressing     Problem: Nutrition  Goal: Nutrient intake appropriate for maintaining nutritional needs  Outcome: Progressing     Problem: Fall/Injury  Goal: Not fall by end of shift  Outcome: Progressing  Goal: Be free from injury by end of the shift  Outcome: Progressing  Goal: Verbalize understanding of personal risk factors for fall in the hospital  Outcome: Progressing  Goal: Use assistive devices by end of the shift  Outcome: Progressing  Goal: Pace activities to prevent fatigue by end of the shift  Outcome: Progressing     Problem: Skin  Goal: Participates in plan/prevention/treatment measures  Outcome: Progressing  Goal: Prevent/manage excess moisture  Outcome: Progressing  Goal: Prevent/minimize sheer/friction injuries  Outcome: Progressing  Goal: Promote/optimize nutrition  Outcome: Progressing  Goal: Promote skin healing  Outcome: Progressing    The clinical goals for the shift include Patient's pain controlled to tolerable level. Patient compliant with DVT prophylaxis. Patient remains safe and free from falls.       Pt arrived on unit at 2200 in bariatric bed. Pt arrived with grey sweatshirt. VSS, on 1 L O2. Potassium recheck 3.9- next check in am. Phosphorus recheck 1.1, paged pharmacy for new bag for further replacement. Alert but GIOVANNA orientation- pt did not answer questions. Pt denies pain. 3 PIV, 1 infusing D5 1/2 NS, 1 running TKO, and 1 saline locked. Feeds running at 15 mL/hr, to be turned off at midnight for procedure tomorrow. Assist x2 with mechanical lift, reposition q2h. Urinary incontinence, purewick in place.

## (undated) DEVICE — TUBING SUCTION 10'X3/16" N510

## (undated) DEVICE — ENDO ADPT BRONCH SWIVEL Y A1002

## (undated) DEVICE — LUBRICANT INST KIT ENDO-LUBE 220-90

## (undated) DEVICE — ANTIFOG SOLUTION W/FOAM PAD 31142527

## (undated) DEVICE — KIT ENDO FIRST STEP DISINFECTANT 200ML W/POUCH EP-4

## (undated) DEVICE — ENDO VALVE SUCTION BRONCH EVIS MAJ-209

## (undated) DEVICE — ENDO VALVE BX EVIS MAJ-210

## (undated) DEVICE — PITCHER STERILE 1000ML  SSK9004A

## (undated) DEVICE — LABEL MEDICATION SYSTEM 3303-P

## (undated) DEVICE — CATH BALLOON ELATION PULMONARY 3CM 12-13.5-15MMX100CM P12L30

## (undated) DEVICE — LINEN TOWEL PACK X5 5464

## (undated) DEVICE — INFLATION DEVICE BIG 60 ENDO-AN6012

## (undated) DEVICE — JELLY LUBRICATING SURGILUBE 2OZ TUBE

## (undated) DEVICE — SOL NACL 0.9% IRRIG 1000ML BOTTLE 2F7124

## (undated) DEVICE — CATH BALLOON ELATION PULMONARY 2CM 12-13.5-15MMX100CM P12L20

## (undated) DEVICE — LASER FIBER CO2 40W ACUPULSE AC-1059590

## (undated) DEVICE — SYR 30ML SLIP TIP W/O NDL 302833

## (undated) RX ORDER — LIDOCAINE HYDROCHLORIDE 20 MG/ML
SOLUTION OROPHARYNGEAL
Status: DISPENSED
Start: 2023-08-04

## (undated) RX ORDER — PROPOFOL 10 MG/ML
INJECTION, EMULSION INTRAVENOUS
Status: DISPENSED
Start: 2023-08-04

## (undated) RX ORDER — GLYCOPYRROLATE 0.2 MG/ML
INJECTION, SOLUTION INTRAMUSCULAR; INTRAVENOUS
Status: DISPENSED
Start: 2023-08-04

## (undated) RX ORDER — LIDOCAINE HYDROCHLORIDE 10 MG/ML
INJECTION, SOLUTION EPIDURAL; INFILTRATION; INTRACAUDAL; PERINEURAL
Status: DISPENSED
Start: 2020-06-09

## (undated) RX ORDER — FENTANYL CITRATE 50 UG/ML
INJECTION, SOLUTION INTRAMUSCULAR; INTRAVENOUS
Status: DISPENSED
Start: 2023-08-04

## (undated) RX ORDER — FENTANYL CITRATE 50 UG/ML
INJECTION, SOLUTION INTRAMUSCULAR; INTRAVENOUS
Status: DISPENSED
Start: 2023-06-06

## (undated) RX ORDER — ACETAMINOPHEN 325 MG/1
TABLET ORAL
Status: DISPENSED
Start: 2023-04-23

## (undated) RX ORDER — ONDANSETRON 2 MG/ML
INJECTION INTRAMUSCULAR; INTRAVENOUS
Status: DISPENSED
Start: 2023-08-04

## (undated) RX ORDER — FENTANYL CITRATE 50 UG/ML
INJECTION, SOLUTION INTRAMUSCULAR; INTRAVENOUS
Status: DISPENSED
Start: 2020-06-09

## (undated) RX ORDER — DEXAMETHASONE SODIUM PHOSPHATE 4 MG/ML
INJECTION, SOLUTION INTRA-ARTICULAR; INTRALESIONAL; INTRAMUSCULAR; INTRAVENOUS; SOFT TISSUE
Status: DISPENSED
Start: 2023-08-04

## (undated) RX ORDER — ACETAMINOPHEN 325 MG/10.15ML
LIQUID ORAL
Status: DISPENSED
Start: 2023-08-04